# Patient Record
Sex: FEMALE | Race: BLACK OR AFRICAN AMERICAN | Employment: OTHER | URBAN - METROPOLITAN AREA
[De-identification: names, ages, dates, MRNs, and addresses within clinical notes are randomized per-mention and may not be internally consistent; named-entity substitution may affect disease eponyms.]

---

## 2017-01-27 PROBLEM — J45.909 UNCOMPLICATED ASTHMA: Status: ACTIVE | Noted: 2017-01-27

## 2017-01-27 PROBLEM — E55.9 VITAMIN D DEFICIENCY: Status: ACTIVE | Noted: 2017-01-27

## 2017-01-27 PROBLEM — F31.9 BIPOLAR AFFECTIVE DISORDER (HCC): Status: ACTIVE | Noted: 2017-01-27

## 2017-01-27 PROBLEM — E53.8 B12 DEFICIENCY: Status: ACTIVE | Noted: 2017-01-27

## 2017-01-27 PROBLEM — F43.10 PTSD (POST-TRAUMATIC STRESS DISORDER): Status: ACTIVE | Noted: 2017-01-27

## 2017-01-27 PROBLEM — D64.9 ANEMIA: Status: ACTIVE | Noted: 2017-01-27

## 2017-01-27 PROBLEM — K59.09 CHRONIC CONSTIPATION: Status: ACTIVE | Noted: 2017-01-27

## 2017-01-27 PROBLEM — Z91.199 NON-COMPLIANCE WITH TREATMENT: Status: ACTIVE | Noted: 2017-01-27

## 2017-01-27 PROBLEM — K56.7 ILEUS, UNSPECIFIED (HCC): Status: ACTIVE | Noted: 2017-01-27

## 2017-01-27 PROBLEM — E78.5 DYSLIPIDEMIA: Status: ACTIVE | Noted: 2017-01-27

## 2017-04-03 ENCOUNTER — OFFICE VISIT (OUTPATIENT)
Dept: NEUROLOGY | Age: 42
End: 2017-04-03
Payer: COMMERCIAL

## 2017-04-03 VITALS
BODY MASS INDEX: 26.46 KG/M2 | DIASTOLIC BLOOD PRESSURE: 90 MMHG | HEIGHT: 65 IN | SYSTOLIC BLOOD PRESSURE: 131 MMHG | HEART RATE: 70 BPM | WEIGHT: 158.8 LBS

## 2017-04-03 DIAGNOSIS — G40.909 SEIZURE DISORDER (HCC): ICD-10-CM

## 2017-04-03 PROCEDURE — 99204 OFFICE O/P NEW MOD 45 MIN: CPT | Performed by: PSYCHIATRY & NEUROLOGY

## 2017-04-03 RX ORDER — FOLIC ACID/VIT B COMPLEX AND C 400 MCG
1 TABLET ORAL DAILY
COMMUNITY
Start: 2017-03-29 | End: 2017-04-24 | Stop reason: SDUPTHER

## 2017-04-03 RX ORDER — LEVETIRACETAM 500 MG/1
500 TABLET ORAL 2 TIMES DAILY
Qty: 180 TABLET | Refills: 3 | Status: SHIPPED | OUTPATIENT
Start: 2017-04-03 | End: 2018-03-18 | Stop reason: SDUPTHER

## 2017-04-03 RX ORDER — HYDROXYZINE PAMOATE 25 MG/1
1 CAPSULE ORAL NIGHTLY
COMMUNITY
Start: 2017-03-17 | End: 2017-06-21 | Stop reason: ALTCHOICE

## 2017-04-13 ENCOUNTER — HOSPITAL ENCOUNTER (OUTPATIENT)
Dept: MRI IMAGING | Age: 42
Discharge: HOME | End: 2017-04-13
Payer: COMMERCIAL

## 2017-04-13 DIAGNOSIS — G40.909 SEIZURE DISORDER (HCC): ICD-10-CM

## 2017-04-13 PROCEDURE — 70553 MRI BRAIN STEM W/O & W/DYE: CPT

## 2017-04-13 PROCEDURE — 6360000004 HC RX CONTRAST MEDICATION: Performed by: PSYCHIATRY & NEUROLOGY

## 2017-04-13 PROCEDURE — A9579 GAD-BASE MR CONTRAST NOS,1ML: HCPCS | Performed by: PSYCHIATRY & NEUROLOGY

## 2017-04-13 RX ADMIN — GADOPENTETATE DIMEGLUMINE 15 ML: 469.01 INJECTION INTRAVENOUS at 11:30

## 2017-05-04 ENCOUNTER — HOSPITAL ENCOUNTER (OUTPATIENT)
Dept: NEUROLOGY | Age: 42
Discharge: HOME | End: 2017-05-04
Payer: COMMERCIAL

## 2017-05-04 DIAGNOSIS — G40.909 SEIZURE DISORDER (HCC): ICD-10-CM

## 2017-05-04 PROCEDURE — 95816 EEG AWAKE AND DROWSY: CPT

## 2017-05-30 ENCOUNTER — HOSPITAL ENCOUNTER (OUTPATIENT)
Age: 42
Discharge: HOME | End: 2017-05-30
Payer: COMMERCIAL

## 2017-05-30 DIAGNOSIS — R73.9 HYPERGLYCEMIA: ICD-10-CM
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
## 2017-05-30 LAB
ALBUMIN SERPL-MCNC: 4.2 G/DL (ref 3.5–5.2)
ALBUMIN/GLOBULIN RATIO: ABNORMAL (ref 1–2.5)
ALP BLD-CCNC: 78 U/L (ref 35–104)
ALT SERPL-CCNC: 20 U/L (ref 5–33)
ANION GAP SERPL CALCULATED.3IONS-SCNC: 15 MMOL/L (ref 9–17)
AST SERPL-CCNC: 18 U/L
BILIRUB SERPL-MCNC: 0.44 MG/DL (ref 0.3–1.2)
BUN BLDV-MCNC: 8 MG/DL (ref 6–20)
BUN/CREAT BLD: 10 (ref 9–20)
CALCIUM SERPL-MCNC: 9 MG/DL (ref 8.6–10.4)
CHLORIDE BLD-SCNC: 102 MMOL/L (ref 98–107)
CO2: 26 MMOL/L (ref 20–31)
CREAT SERPL-MCNC: 0.79 MG/DL (ref 0.5–0.9)
CREATININE URINE: 297.2 MG/DL (ref 28–217)
GFR AFRICAN AMERICAN: >60 ML/MIN
GFR NON-AFRICAN AMERICAN: >60 ML/MIN
GFR SERPL CREATININE-BSD FRML MDRD: ABNORMAL ML/MIN/{1.73_M2}
GFR SERPL CREATININE-BSD FRML MDRD: ABNORMAL ML/MIN/{1.73_M2}
GLUCOSE BLD-MCNC: 103 MG/DL (ref 70–99)
HCT VFR BLD CALC: 40.8 % (ref 36–46)
HEMOGLOBIN: 13 G/DL (ref 12–16)
MCH RBC QN AUTO: 23.8 PG (ref 26–34)
MCHC RBC AUTO-ENTMCNC: 31.8 G/DL (ref 31–37)
MCV RBC AUTO: 75 FL (ref 80–100)
MICROALBUMIN/CREAT 24H UR: 13 MG/L
MICROALBUMIN/CREAT UR-RTO: 4 MCG/MG CREAT
PDW BLD-RTO: 16 % (ref 11.5–14.5)
PLATELET # BLD: 192 K/UL (ref 130–400)
PMV BLD AUTO: 9.3 FL (ref 6–12)
POTASSIUM SERPL-SCNC: 3.5 MMOL/L (ref 3.7–5.3)
RBC # BLD: 5.44 M/UL (ref 4–5.2)
SODIUM BLD-SCNC: 143 MMOL/L (ref 135–144)
TOTAL PROTEIN: 7.5 G/DL (ref 6.4–8.3)
WBC # BLD: 5.5 K/UL (ref 3.5–11)

## 2017-05-30 PROCEDURE — 82043 UR ALBUMIN QUANTITATIVE: CPT

## 2017-05-30 PROCEDURE — 82570 ASSAY OF URINE CREATININE: CPT

## 2017-05-30 PROCEDURE — 83036 HEMOGLOBIN GLYCOSYLATED A1C: CPT

## 2017-05-30 PROCEDURE — 80053 COMPREHEN METABOLIC PANEL: CPT

## 2017-05-30 PROCEDURE — 85027 COMPLETE CBC AUTOMATED: CPT

## 2017-05-30 PROCEDURE — 36415 COLL VENOUS BLD VENIPUNCTURE: CPT

## 2017-05-31 ENCOUNTER — OFFICE VISIT (OUTPATIENT)
Dept: NEUROLOGY | Age: 42
End: 2017-05-31
Payer: COMMERCIAL

## 2017-05-31 VITALS
WEIGHT: 155.4 LBS | HEART RATE: 61 BPM | BODY MASS INDEX: 24.98 KG/M2 | HEIGHT: 66 IN | DIASTOLIC BLOOD PRESSURE: 97 MMHG | SYSTOLIC BLOOD PRESSURE: 147 MMHG

## 2017-05-31 DIAGNOSIS — R51.9 HEADACHE DISORDER: Primary | ICD-10-CM

## 2017-05-31 LAB
ESTIMATED AVERAGE GLUCOSE: 100 MG/DL
HBA1C MFR BLD: 5.1 % (ref 4–6)

## 2017-05-31 PROCEDURE — 99214 OFFICE O/P EST MOD 30 MIN: CPT | Performed by: PSYCHIATRY & NEUROLOGY

## 2017-05-31 RX ORDER — AMITRIPTYLINE HYDROCHLORIDE 25 MG/1
25 TABLET, FILM COATED ORAL NIGHTLY
Qty: 30 TABLET | Refills: 3 | Status: SHIPPED | OUTPATIENT
Start: 2017-05-31 | End: 2017-07-30 | Stop reason: SDUPTHER

## 2017-06-05 PROBLEM — K56.7 ILEUS, UNSPECIFIED (HCC): Status: RESOLVED | Noted: 2017-01-27 | Resolved: 2017-06-05

## 2017-06-14 ENCOUNTER — HOSPITAL ENCOUNTER (OUTPATIENT)
Dept: MRI IMAGING | Age: 42
Discharge: HOME | End: 2017-06-14
Payer: COMMERCIAL

## 2017-06-14 DIAGNOSIS — R51.9 HEADACHE DISORDER: ICD-10-CM

## 2017-06-14 PROCEDURE — A9579 GAD-BASE MR CONTRAST NOS,1ML: HCPCS | Performed by: PSYCHIATRY & NEUROLOGY

## 2017-06-14 PROCEDURE — 6360000004 HC RX CONTRAST MEDICATION: Performed by: PSYCHIATRY & NEUROLOGY

## 2017-06-14 PROCEDURE — 72156 MRI NECK SPINE W/O & W/DYE: CPT

## 2017-06-14 PROCEDURE — 70551 MRI BRAIN STEM W/O DYE: CPT

## 2017-06-14 RX ADMIN — GADOPENTETATE DIMEGLUMINE 15 ML: 469.01 INJECTION INTRAVENOUS at 13:45

## 2017-06-15 ENCOUNTER — TELEPHONE (OUTPATIENT)
Dept: NEUROLOGY | Age: 42
End: 2017-06-15

## 2017-06-15 DIAGNOSIS — R90.89 ABNORMAL BRAIN MRI: ICD-10-CM

## 2017-06-15 DIAGNOSIS — G93.5 CHIARI MALFORMATION TYPE I (HCC): Primary | ICD-10-CM

## 2017-06-21 PROBLEM — G93.5 CHIARI I MALFORMATION (HCC): Status: ACTIVE | Noted: 2017-06-21

## 2017-07-13 ENCOUNTER — INITIAL CONSULT (OUTPATIENT)
Dept: NEUROSURGERY | Age: 42
End: 2017-07-13
Payer: COMMERCIAL

## 2017-07-13 VITALS
HEART RATE: 68 BPM | HEIGHT: 66 IN | BODY MASS INDEX: 25.88 KG/M2 | SYSTOLIC BLOOD PRESSURE: 118 MMHG | WEIGHT: 161 LBS | DIASTOLIC BLOOD PRESSURE: 84 MMHG

## 2017-07-13 DIAGNOSIS — G93.5 CHIARI I MALFORMATION (HCC): Primary | ICD-10-CM

## 2017-07-13 PROCEDURE — 99203 OFFICE O/P NEW LOW 30 MIN: CPT | Performed by: NEUROLOGICAL SURGERY

## 2017-07-13 RX ORDER — CARVEDILOL 12.5 MG/1
TABLET ORAL
Refills: 0 | COMMUNITY
Start: 2017-07-03 | End: 2017-07-13

## 2017-07-13 ASSESSMENT — VISUAL ACUITY: VA_NORMAL: 1

## 2017-07-30 ENCOUNTER — OFFICE VISIT (OUTPATIENT)
Dept: NEUROLOGY | Age: 42
End: 2017-07-30
Payer: COMMERCIAL

## 2017-07-30 VITALS
WEIGHT: 161.6 LBS | HEIGHT: 66 IN | HEART RATE: 65 BPM | BODY MASS INDEX: 25.97 KG/M2 | DIASTOLIC BLOOD PRESSURE: 102 MMHG | SYSTOLIC BLOOD PRESSURE: 152 MMHG

## 2017-07-30 DIAGNOSIS — G93.5 CHIARI MALFORMATION TYPE I (HCC): Primary | ICD-10-CM

## 2017-07-30 PROCEDURE — 99214 OFFICE O/P EST MOD 30 MIN: CPT | Performed by: PSYCHIATRY & NEUROLOGY

## 2017-07-30 RX ORDER — AMITRIPTYLINE HYDROCHLORIDE 50 MG/1
50 TABLET, FILM COATED ORAL NIGHTLY
Qty: 90 TABLET | Refills: 3 | Status: SHIPPED | OUTPATIENT
Start: 2017-07-30 | End: 2019-01-08 | Stop reason: CLARIF

## 2017-08-07 ENCOUNTER — HOSPITAL ENCOUNTER (EMERGENCY)
Age: 42
Discharge: HOME | End: 2017-08-08
Attending: EMERGENCY MEDICINE
Payer: COMMERCIAL

## 2017-08-07 ENCOUNTER — APPOINTMENT (OUTPATIENT)
Dept: GENERAL RADIOLOGY | Age: 42
End: 2017-08-07
Payer: COMMERCIAL

## 2017-08-07 ENCOUNTER — APPOINTMENT (OUTPATIENT)
Dept: CT IMAGING | Age: 42
End: 2017-08-07
Payer: COMMERCIAL

## 2017-08-07 DIAGNOSIS — R07.89 CHEST WALL PAIN: Primary | ICD-10-CM

## 2017-08-07 LAB
ABSOLUTE EOS #: 0.2 K/UL (ref 0–0.4)
ABSOLUTE LYMPH #: 3.3 K/UL (ref 1–4.8)
ABSOLUTE MONO #: 0.8 K/UL (ref 0.2–0.8)
ANION GAP SERPL CALCULATED.3IONS-SCNC: 16 MMOL/L (ref 9–17)
BASOPHILS # BLD: 3 %
BASOPHILS ABSOLUTE: 0.2 K/UL (ref 0–0.2)
BUN BLDV-MCNC: 16 MG/DL (ref 6–20)
BUN/CREAT BLD: 21 (ref 9–20)
CALCIUM SERPL-MCNC: 9 MG/DL (ref 8.6–10.4)
CHLORIDE BLD-SCNC: 96 MMOL/L (ref 98–107)
CO2: 23 MMOL/L (ref 20–31)
CREAT SERPL-MCNC: 0.77 MG/DL (ref 0.5–0.9)
D-DIMER QUANTITATIVE: 0.56 MG/L FEU
DIFFERENTIAL TYPE: ABNORMAL
EOSINOPHILS RELATIVE PERCENT: 2 %
GFR AFRICAN AMERICAN: >60 ML/MIN
GFR NON-AFRICAN AMERICAN: >60 ML/MIN
GFR SERPL CREATININE-BSD FRML MDRD: ABNORMAL ML/MIN/{1.73_M2}
GFR SERPL CREATININE-BSD FRML MDRD: ABNORMAL ML/MIN/{1.73_M2}
GLUCOSE BLD-MCNC: 105 MG/DL (ref 70–99)
HCT VFR BLD CALC: 38.4 % (ref 36–46)
HEMOGLOBIN: 12.3 G/DL (ref 12–16)
LYMPHOCYTES # BLD: 44 %
MCH RBC QN AUTO: 24.3 PG (ref 26–34)
MCHC RBC AUTO-ENTMCNC: 32 G/DL (ref 31–37)
MCV RBC AUTO: 75.8 FL (ref 80–100)
MONOCYTES # BLD: 10 %
MYOGLOBIN: 22 NG/ML (ref 25–58)
PDW BLD-RTO: 17.1 % (ref 11.5–14.5)
PLATELET # BLD: 195 K/UL (ref 130–400)
PLATELET ESTIMATE: ABNORMAL
PMV BLD AUTO: 9.2 FL (ref 6–12)
POTASSIUM SERPL-SCNC: 4.1 MMOL/L (ref 3.7–5.3)
RBC # BLD: 5.07 M/UL (ref 4–5.2)
RBC # BLD: ABNORMAL 10*6/UL
SEG NEUTROPHILS: 41 %
SEGMENTED NEUTROPHILS ABSOLUTE COUNT: 3.1 K/UL (ref 1.8–7.7)
SODIUM BLD-SCNC: 135 MMOL/L (ref 135–144)
TROPONIN INTERP: ABNORMAL
TROPONIN T: <0.03 NG/ML
WBC # BLD: 7.5 K/UL (ref 3.5–11)
WBC # BLD: ABNORMAL 10*3/UL

## 2017-08-07 PROCEDURE — 85025 COMPLETE CBC W/AUTO DIFF WBC: CPT

## 2017-08-07 PROCEDURE — 6360000002 HC RX W HCPCS: Performed by: PHYSICIAN ASSISTANT

## 2017-08-07 PROCEDURE — 83874 ASSAY OF MYOGLOBIN: CPT

## 2017-08-07 PROCEDURE — 99285 EMERGENCY DEPT VISIT HI MDM: CPT

## 2017-08-07 PROCEDURE — 93005 ELECTROCARDIOGRAM TRACING: CPT

## 2017-08-07 PROCEDURE — 96374 THER/PROPH/DIAG INJ IV PUSH: CPT

## 2017-08-07 PROCEDURE — 6370000000 HC RX 637 (ALT 250 FOR IP): Performed by: PHYSICIAN ASSISTANT

## 2017-08-07 PROCEDURE — 71020 XR CHEST STANDARD TWO VW: CPT

## 2017-08-07 PROCEDURE — 6360000004 HC RX CONTRAST MEDICATION: Performed by: PHYSICIAN ASSISTANT

## 2017-08-07 PROCEDURE — 80048 BASIC METABOLIC PNL TOTAL CA: CPT

## 2017-08-07 PROCEDURE — 85379 FIBRIN DEGRADATION QUANT: CPT

## 2017-08-07 PROCEDURE — 71260 CT THORAX DX C+: CPT

## 2017-08-07 PROCEDURE — 84484 ASSAY OF TROPONIN QUANT: CPT

## 2017-08-07 RX ORDER — ASPIRIN 81 MG/1
324 TABLET, CHEWABLE ORAL ONCE
Status: COMPLETED | OUTPATIENT
Start: 2017-08-07 | End: 2017-08-07

## 2017-08-07 RX ORDER — KETOROLAC TROMETHAMINE 30 MG/ML
30 INJECTION, SOLUTION INTRAMUSCULAR; INTRAVENOUS ONCE
Status: COMPLETED | OUTPATIENT
Start: 2017-08-07 | End: 2017-08-07

## 2017-08-07 RX ADMIN — KETOROLAC TROMETHAMINE 30 MG: 30 INJECTION, SOLUTION INTRAMUSCULAR; INTRAVENOUS at 23:16

## 2017-08-07 RX ADMIN — ASPIRIN 81 MG 324 MG: 81 TABLET ORAL at 23:15

## 2017-08-07 ASSESSMENT — PAIN DESCRIPTION - FREQUENCY: FREQUENCY: CONTINUOUS

## 2017-08-07 ASSESSMENT — PAIN DESCRIPTION - ORIENTATION: ORIENTATION: RIGHT

## 2017-08-07 ASSESSMENT — PAIN DESCRIPTION - DESCRIPTORS: DESCRIPTORS: STABBING

## 2017-08-07 ASSESSMENT — PAIN DESCRIPTION - PAIN TYPE: TYPE: ACUTE PAIN

## 2017-08-07 ASSESSMENT — PAIN DESCRIPTION - LOCATION: LOCATION: CHEST

## 2017-08-07 ASSESSMENT — PAIN SCALES - GENERAL: PAINLEVEL_OUTOF10: 9

## 2017-08-08 VITALS
TEMPERATURE: 98.2 F | RESPIRATION RATE: 12 BRPM | OXYGEN SATURATION: 98 % | HEIGHT: 66 IN | HEART RATE: 83 BPM | WEIGHT: 161 LBS | BODY MASS INDEX: 25.88 KG/M2 | DIASTOLIC BLOOD PRESSURE: 85 MMHG | SYSTOLIC BLOOD PRESSURE: 145 MMHG

## 2017-08-08 PROCEDURE — 6360000004 HC RX CONTRAST MEDICATION: Performed by: PHYSICIAN ASSISTANT

## 2017-08-08 PROCEDURE — 6370000000 HC RX 637 (ALT 250 FOR IP): Performed by: PHYSICIAN ASSISTANT

## 2017-08-08 RX ORDER — OXYCODONE HYDROCHLORIDE AND ACETAMINOPHEN 5; 325 MG/1; MG/1
1-2 TABLET ORAL EVERY 6 HOURS PRN
Qty: 20 TABLET | Refills: 0 | Status: SHIPPED | OUTPATIENT
Start: 2017-08-08 | End: 2017-11-04

## 2017-08-08 RX ORDER — CYCLOBENZAPRINE HCL 10 MG
10 TABLET ORAL 3 TIMES DAILY PRN
Qty: 30 TABLET | Refills: 0 | Status: SHIPPED | OUTPATIENT
Start: 2017-08-08 | End: 2017-08-18

## 2017-08-08 RX ORDER — NAPROXEN 500 MG/1
500 TABLET ORAL 2 TIMES DAILY WITH MEALS
Qty: 20 TABLET | Refills: 0 | Status: ON HOLD | OUTPATIENT
Start: 2017-08-08 | End: 2018-01-16

## 2017-08-08 RX ORDER — OXYCODONE HYDROCHLORIDE AND ACETAMINOPHEN 5; 325 MG/1; MG/1
1 TABLET ORAL ONCE
Status: COMPLETED | OUTPATIENT
Start: 2017-08-08 | End: 2017-08-08

## 2017-08-08 RX ADMIN — OXYCODONE HYDROCHLORIDE AND ACETAMINOPHEN 1 TABLET: 5; 325 TABLET ORAL at 01:16

## 2017-08-08 RX ADMIN — IOVERSOL 100 ML: 741 INJECTION INTRA-ARTERIAL; INTRAVENOUS at 00:00

## 2017-08-09 LAB
EKG ATRIAL RATE: 87 BPM
EKG P AXIS: 62 DEGREES
EKG P-R INTERVAL: 188 MS
EKG Q-T INTERVAL: 376 MS
EKG QRS DURATION: 68 MS
EKG QTC CALCULATION (BAZETT): 452 MS
EKG R AXIS: -22 DEGREES
EKG T AXIS: 28 DEGREES
EKG VENTRICULAR RATE: 87 BPM

## 2017-08-23 ENCOUNTER — OFFICE VISIT (OUTPATIENT)
Dept: FAMILY MEDICINE CLINIC | Age: 42
End: 2017-08-23
Payer: COMMERCIAL

## 2017-08-23 ENCOUNTER — TELEPHONE (OUTPATIENT)
Dept: FAMILY MEDICINE CLINIC | Age: 42
End: 2017-08-23

## 2017-08-23 VITALS
SYSTOLIC BLOOD PRESSURE: 120 MMHG | DIASTOLIC BLOOD PRESSURE: 80 MMHG | BODY MASS INDEX: 25.84 KG/M2 | WEIGHT: 160.8 LBS | HEART RATE: 67 BPM | HEIGHT: 66 IN

## 2017-08-23 DIAGNOSIS — H10.13 ALLERGIC CONJUNCTIVITIS, BILATERAL: ICD-10-CM

## 2017-08-23 DIAGNOSIS — M54.2 NECK PAIN: ICD-10-CM

## 2017-08-23 DIAGNOSIS — M25.511 RIGHT SHOULDER PAIN, UNSPECIFIED CHRONICITY: ICD-10-CM

## 2017-08-23 DIAGNOSIS — H00.012 HORDEOLUM EXTERNUM OF RIGHT LOWER EYELID: ICD-10-CM

## 2017-08-23 DIAGNOSIS — J30.9 ALLERGIC RHINITIS, UNSPECIFIED ALLERGIC RHINITIS TRIGGER, UNSPECIFIED RHINITIS SEASONALITY: Primary | ICD-10-CM

## 2017-08-23 PROCEDURE — 99213 OFFICE O/P EST LOW 20 MIN: CPT | Performed by: FAMILY MEDICINE

## 2017-08-23 RX ORDER — PREDNISONE 10 MG/1
10 TABLET ORAL
Qty: 15 TABLET | Refills: 0 | Status: SHIPPED | OUTPATIENT
Start: 2017-08-23 | End: 2017-08-28

## 2017-08-23 RX ORDER — CEPHALEXIN 250 MG/1
250 CAPSULE ORAL 2 TIMES DAILY
Qty: 14 CAPSULE | Refills: 0 | Status: SHIPPED | OUTPATIENT
Start: 2017-08-23 | End: 2017-09-05 | Stop reason: ALTCHOICE

## 2017-08-23 RX ORDER — KETOTIFEN FUMARATE 0.35 MG/ML
1 SOLUTION/ DROPS OPHTHALMIC 2 TIMES DAILY
Qty: 1 ML | Refills: 3 | Status: SHIPPED | OUTPATIENT
Start: 2017-08-23 | End: 2017-09-02

## 2017-08-23 ASSESSMENT — ENCOUNTER SYMPTOMS
BLOOD IN STOOL: 0
RECTAL PAIN: 0
ANAL BLEEDING: 0
EYE REDNESS: 0
RHINORRHEA: 1
VOICE CHANGE: 0
EYE PAIN: 0
TROUBLE SWALLOWING: 0
ABDOMINAL PAIN: 0
ABDOMINAL DISTENTION: 0
CONSTIPATION: 0
EYE DISCHARGE: 1
BACK PAIN: 0
DIARRHEA: 0
SINUS PRESSURE: 0
CHEST TIGHTNESS: 0
NAUSEA: 0
VOMITING: 0
SHORTNESS OF BREATH: 0
COLOR CHANGE: 0
COUGH: 1

## 2017-09-05 ENCOUNTER — APPOINTMENT (OUTPATIENT)
Dept: GENERAL RADIOLOGY | Age: 42
End: 2017-09-05
Payer: COMMERCIAL

## 2017-09-05 ENCOUNTER — HOSPITAL ENCOUNTER (EMERGENCY)
Age: 42
Discharge: HOME | End: 2017-09-05
Attending: EMERGENCY MEDICINE
Payer: COMMERCIAL

## 2017-09-05 VITALS
BODY MASS INDEX: 25.5 KG/M2 | OXYGEN SATURATION: 99 % | DIASTOLIC BLOOD PRESSURE: 69 MMHG | SYSTOLIC BLOOD PRESSURE: 113 MMHG | WEIGHT: 158 LBS | TEMPERATURE: 98.3 F | RESPIRATION RATE: 18 BRPM | HEART RATE: 80 BPM

## 2017-09-05 DIAGNOSIS — J40 BRONCHITIS: ICD-10-CM

## 2017-09-05 DIAGNOSIS — G40.909 SEIZURE DISORDER (HCC): Primary | ICD-10-CM

## 2017-09-05 LAB
ABSOLUTE EOS #: 0.1 K/UL (ref 0–0.4)
ABSOLUTE LYMPH #: 2.4 K/UL (ref 1–4.8)
ABSOLUTE MONO #: 0.5 K/UL (ref 0.2–0.8)
ANION GAP SERPL CALCULATED.3IONS-SCNC: 14 MMOL/L (ref 9–17)
BASOPHILS # BLD: 2 %
BASOPHILS ABSOLUTE: 0.2 K/UL (ref 0–0.2)
BUN BLDV-MCNC: 11 MG/DL (ref 6–20)
BUN/CREAT BLD: 18 (ref 9–20)
CALCIUM SERPL-MCNC: 8.3 MG/DL (ref 8.6–10.4)
CHLORIDE BLD-SCNC: 104 MMOL/L (ref 98–107)
CO2: 20 MMOL/L (ref 20–31)
CREAT SERPL-MCNC: 0.6 MG/DL (ref 0.5–0.9)
DIFFERENTIAL TYPE: ABNORMAL
EOSINOPHILS RELATIVE PERCENT: 2 %
GFR AFRICAN AMERICAN: >60 ML/MIN
GFR NON-AFRICAN AMERICAN: >60 ML/MIN
GFR SERPL CREATININE-BSD FRML MDRD: ABNORMAL ML/MIN/{1.73_M2}
GFR SERPL CREATININE-BSD FRML MDRD: ABNORMAL ML/MIN/{1.73_M2}
GLUCOSE BLD-MCNC: 118 MG/DL (ref 70–99)
HCT VFR BLD CALC: 34.4 % (ref 36–46)
HEMOGLOBIN: 11.6 G/DL (ref 12–16)
KEPPRA: 14 UG/ML
LYMPHOCYTES # BLD: 29 %
MCH RBC QN AUTO: 25.6 PG (ref 26–34)
MCHC RBC AUTO-ENTMCNC: 33.7 G/DL (ref 31–37)
MCV RBC AUTO: 75.9 FL (ref 80–100)
MONOCYTES # BLD: 6 %
PDW BLD-RTO: 16.3 % (ref 11.5–14.5)
PLATELET # BLD: 174 K/UL (ref 130–400)
PLATELET ESTIMATE: ABNORMAL
PMV BLD AUTO: ABNORMAL FL (ref 6–12)
POTASSIUM SERPL-SCNC: 4 MMOL/L (ref 3.7–5.3)
RBC # BLD: 4.53 M/UL (ref 4–5.2)
RBC # BLD: ABNORMAL 10*6/UL
SEG NEUTROPHILS: 61 %
SEGMENTED NEUTROPHILS ABSOLUTE COUNT: 5.1 K/UL (ref 1.8–7.7)
SODIUM BLD-SCNC: 138 MMOL/L (ref 135–144)
WBC # BLD: 8.3 K/UL (ref 3.5–11)
WBC # BLD: ABNORMAL 10*3/UL

## 2017-09-05 PROCEDURE — 2580000003 HC RX 258: Performed by: EMERGENCY MEDICINE

## 2017-09-05 PROCEDURE — 87040 BLOOD CULTURE FOR BACTERIA: CPT

## 2017-09-05 PROCEDURE — 6360000002 HC RX W HCPCS: Performed by: EMERGENCY MEDICINE

## 2017-09-05 PROCEDURE — 36415 COLL VENOUS BLD VENIPUNCTURE: CPT

## 2017-09-05 PROCEDURE — 80048 BASIC METABOLIC PNL TOTAL CA: CPT

## 2017-09-05 PROCEDURE — 85025 COMPLETE CBC W/AUTO DIFF WBC: CPT

## 2017-09-05 PROCEDURE — 96365 THER/PROPH/DIAG IV INF INIT: CPT

## 2017-09-05 PROCEDURE — 80177 DRUG SCRN QUAN LEVETIRACETAM: CPT

## 2017-09-05 PROCEDURE — 71010 XR CHEST PORTABLE: CPT

## 2017-09-05 PROCEDURE — 6370000000 HC RX 637 (ALT 250 FOR IP): Performed by: EMERGENCY MEDICINE

## 2017-09-05 PROCEDURE — 99285 EMERGENCY DEPT VISIT HI MDM: CPT

## 2017-09-05 RX ORDER — AMOXICILLIN AND CLAVULANATE POTASSIUM 875; 125 MG/1; MG/1
1 TABLET, FILM COATED ORAL 2 TIMES DAILY
Qty: 20 TABLET | Refills: 0 | Status: SHIPPED | OUTPATIENT
Start: 2017-09-05 | End: 2017-09-15

## 2017-09-05 RX ORDER — BENZONATATE 100 MG/1
100 CAPSULE ORAL 3 TIMES DAILY PRN
Qty: 30 CAPSULE | Refills: 0 | Status: SHIPPED | OUTPATIENT
Start: 2017-09-05 | End: 2017-09-12

## 2017-09-05 RX ORDER — HYDROCODONE BITARTRATE AND ACETAMINOPHEN 5; 325 MG/1; MG/1
1 TABLET ORAL EVERY 6 HOURS PRN
Qty: 10 TABLET | Refills: 0 | Status: SHIPPED | OUTPATIENT
Start: 2017-09-05 | End: 2017-09-12

## 2017-09-05 RX ORDER — HYDROCODONE BITARTRATE AND ACETAMINOPHEN 5; 325 MG/1; MG/1
1 TABLET ORAL EVERY 6 HOURS PRN
Status: DISCONTINUED | OUTPATIENT
Start: 2017-09-05 | End: 2017-09-05 | Stop reason: HOSPADM

## 2017-09-05 RX ADMIN — HYDROCODONE BITARTRATE AND ACETAMINOPHEN 1 TABLET: 5; 325 TABLET ORAL at 14:59

## 2017-09-05 RX ADMIN — LEVETIRACETAM 500 MG: 100 INJECTION, SOLUTION INTRAVENOUS at 16:07

## 2017-09-05 ASSESSMENT — ENCOUNTER SYMPTOMS
SHORTNESS OF BREATH: 1
COUGH: 1
SINUS PRESSURE: 1
DIARRHEA: 0
GASTROINTESTINAL NEGATIVE: 1
SORE THROAT: 1
APNEA: 0

## 2017-09-05 ASSESSMENT — PAIN SCALES - GENERAL
PAINLEVEL_OUTOF10: 8
PAINLEVEL_OUTOF10: 6
PAINLEVEL_OUTOF10: 8

## 2017-09-11 LAB
CULTURE: NORMAL
Lab: NORMAL
Lab: NORMAL
SPECIMEN DESCRIPTION: NORMAL
STATUS: NORMAL
STATUS: NORMAL

## 2017-11-04 ENCOUNTER — APPOINTMENT (OUTPATIENT)
Dept: GENERAL RADIOLOGY | Age: 42
End: 2017-11-04
Payer: COMMERCIAL

## 2017-11-04 ENCOUNTER — APPOINTMENT (OUTPATIENT)
Dept: CT IMAGING | Age: 42
End: 2017-11-04
Payer: COMMERCIAL

## 2017-11-04 ENCOUNTER — HOSPITAL ENCOUNTER (EMERGENCY)
Age: 42
Discharge: HOME | End: 2017-11-04
Attending: EMERGENCY MEDICINE
Payer: COMMERCIAL

## 2017-11-04 VITALS
SYSTOLIC BLOOD PRESSURE: 136 MMHG | DIASTOLIC BLOOD PRESSURE: 95 MMHG | WEIGHT: 160 LBS | BODY MASS INDEX: 26.66 KG/M2 | OXYGEN SATURATION: 100 % | HEART RATE: 65 BPM | TEMPERATURE: 97.7 F | RESPIRATION RATE: 18 BRPM | HEIGHT: 65 IN

## 2017-11-04 DIAGNOSIS — S43.401A SPRAIN OF RIGHT SHOULDER, UNSPECIFIED SHOULDER SPRAIN TYPE, INITIAL ENCOUNTER: ICD-10-CM

## 2017-11-04 DIAGNOSIS — R56.9 SEIZURE (HCC): Primary | ICD-10-CM

## 2017-11-04 DIAGNOSIS — A59.03 TRICHOMONIASIS OF BLADDER: ICD-10-CM

## 2017-11-04 LAB
-: ABNORMAL
ABSOLUTE EOS #: 0.13 K/UL (ref 0–0.4)
ABSOLUTE IMMATURE GRANULOCYTE: ABNORMAL K/UL (ref 0–0.3)
ABSOLUTE LYMPH #: 3.4 K/UL (ref 1–4.8)
ABSOLUTE MONO #: 0.69 K/UL (ref 0.2–0.8)
AMORPHOUS: ABNORMAL
AMPHETAMINE SCREEN URINE: NEGATIVE
ANION GAP SERPL CALCULATED.3IONS-SCNC: 12 MMOL/L (ref 9–17)
BACTERIA: ABNORMAL
BARBITURATE SCREEN URINE: NEGATIVE
BASOPHILS # BLD: 1 %
BASOPHILS ABSOLUTE: 0.06 K/UL (ref 0–0.2)
BENZODIAZEPINE SCREEN, URINE: NEGATIVE
BILIRUBIN URINE: NEGATIVE
BUN BLDV-MCNC: 11 MG/DL (ref 6–20)
BUN/CREAT BLD: 15 (ref 9–20)
BUPRENORPHINE URINE: ABNORMAL
CALCIUM SERPL-MCNC: 8.5 MG/DL (ref 8.6–10.4)
CANNABINOID SCREEN URINE: POSITIVE
CASTS UA: ABNORMAL /LPF
CHLORIDE BLD-SCNC: 105 MMOL/L (ref 98–107)
CO2: 20 MMOL/L (ref 20–31)
COCAINE METABOLITE, URINE: NEGATIVE
COLOR: YELLOW
COMMENT UA: ABNORMAL
CREAT SERPL-MCNC: 0.71 MG/DL (ref 0.5–0.9)
CRYSTALS, UA: ABNORMAL /HPF
DIFFERENTIAL TYPE: ABNORMAL
EKG ATRIAL RATE: 59 BPM
EKG P AXIS: 48 DEGREES
EKG P-R INTERVAL: 216 MS
EKG Q-T INTERVAL: 452 MS
EKG QRS DURATION: 78 MS
EKG QTC CALCULATION (BAZETT): 447 MS
EKG R AXIS: -10 DEGREES
EKG T AXIS: 1 DEGREES
EKG VENTRICULAR RATE: 59 BPM
EOSINOPHILS RELATIVE PERCENT: 2 %
EPITHELIAL CELLS UA: ABNORMAL /HPF
ETHANOL PERCENT: <0.01 %
ETHANOL: <10 MG/DL
GFR AFRICAN AMERICAN: >60 ML/MIN
GFR NON-AFRICAN AMERICAN: >60 ML/MIN
GFR SERPL CREATININE-BSD FRML MDRD: ABNORMAL ML/MIN/{1.73_M2}
GFR SERPL CREATININE-BSD FRML MDRD: ABNORMAL ML/MIN/{1.73_M2}
GLUCOSE BLD-MCNC: 96 MG/DL (ref 70–99)
GLUCOSE URINE: NEGATIVE
HCT VFR BLD CALC: 37.9 % (ref 36–46)
HEMOGLOBIN: 12.1 G/DL (ref 12–16)
IMMATURE GRANULOCYTES: ABNORMAL %
KEPPRA: 8 UG/ML
KETONES, URINE: NEGATIVE
LEUKOCYTE ESTERASE, URINE: ABNORMAL
LYMPHOCYTES # BLD: 54 %
MCH RBC QN AUTO: 24.8 PG (ref 26–34)
MCHC RBC AUTO-ENTMCNC: 31.9 G/DL (ref 31–37)
MCV RBC AUTO: 77.6 FL (ref 80–100)
MDMA URINE: ABNORMAL
METHADONE SCREEN, URINE: NEGATIVE
METHAMPHETAMINE, URINE: ABNORMAL
MONOCYTES # BLD: 11 %
MORPHOLOGY: ABNORMAL
MUCUS: ABNORMAL
NITRITE, URINE: NEGATIVE
OPIATES, URINE: NEGATIVE
OTHER OBSERVATIONS UA: ABNORMAL
OXYCODONE SCREEN URINE: NEGATIVE
PDW BLD-RTO: 15.7 % (ref 11.5–14.5)
PH UA: 6 (ref 5–8)
PHENCYCLIDINE, URINE: NEGATIVE
PLATELET # BLD: 184 K/UL (ref 130–400)
PLATELET ESTIMATE: ABNORMAL
PMV BLD AUTO: 10.4 FL (ref 6–12)
POTASSIUM SERPL-SCNC: 3.9 MMOL/L (ref 3.7–5.3)
PROPOXYPHENE, URINE: ABNORMAL
PROTEIN UA: NEGATIVE
RBC # BLD: 4.88 M/UL (ref 4–5.2)
RBC # BLD: ABNORMAL 10*6/UL
RBC UA: ABNORMAL /HPF (ref 0–2)
RENAL EPITHELIAL, UA: ABNORMAL /HPF
SEG NEUTROPHILS: 32 %
SEGMENTED NEUTROPHILS ABSOLUTE COUNT: 2.02 K/UL (ref 1.8–7.7)
SODIUM BLD-SCNC: 137 MMOL/L (ref 135–144)
SPECIFIC GRAVITY UA: 1.02 (ref 1–1.03)
TEST INFORMATION: ABNORMAL
TRICHOMONAS: POSITIVE
TRICYCLIC ANTIDEPRESSANTS, UR: ABNORMAL
TURBIDITY: ABNORMAL
URINE HGB: NEGATIVE
UROBILINOGEN, URINE: NORMAL
WBC # BLD: 6.3 K/UL (ref 3.5–11)
WBC # BLD: ABNORMAL 10*3/UL
WBC UA: ABNORMAL /HPF (ref 0–5)
YEAST: ABNORMAL

## 2017-11-04 PROCEDURE — 6370000000 HC RX 637 (ALT 250 FOR IP): Performed by: EMERGENCY MEDICINE

## 2017-11-04 PROCEDURE — 85025 COMPLETE CBC W/AUTO DIFF WBC: CPT

## 2017-11-04 PROCEDURE — 93005 ELECTROCARDIOGRAM TRACING: CPT

## 2017-11-04 PROCEDURE — 81001 URINALYSIS AUTO W/SCOPE: CPT

## 2017-11-04 PROCEDURE — 80048 BASIC METABOLIC PNL TOTAL CA: CPT

## 2017-11-04 PROCEDURE — 73030 X-RAY EXAM OF SHOULDER: CPT

## 2017-11-04 PROCEDURE — 6360000002 HC RX W HCPCS: Performed by: EMERGENCY MEDICINE

## 2017-11-04 PROCEDURE — G0480 DRUG TEST DEF 1-7 CLASSES: HCPCS

## 2017-11-04 PROCEDURE — 70450 CT HEAD/BRAIN W/O DYE: CPT

## 2017-11-04 PROCEDURE — 80177 DRUG SCRN QUAN LEVETIRACETAM: CPT

## 2017-11-04 PROCEDURE — 96365 THER/PROPH/DIAG IV INF INIT: CPT

## 2017-11-04 PROCEDURE — 2580000003 HC RX 258: Performed by: EMERGENCY MEDICINE

## 2017-11-04 PROCEDURE — 96361 HYDRATE IV INFUSION ADD-ON: CPT

## 2017-11-04 PROCEDURE — 99285 EMERGENCY DEPT VISIT HI MDM: CPT

## 2017-11-04 PROCEDURE — 80307 DRUG TEST PRSMV CHEM ANLYZR: CPT

## 2017-11-04 RX ORDER — ACETAMINOPHEN AND CODEINE PHOSPHATE 300; 30 MG/1; MG/1
1 TABLET ORAL EVERY 4 HOURS PRN
Status: DISCONTINUED | OUTPATIENT
Start: 2017-11-04 | End: 2017-11-05 | Stop reason: HOSPADM

## 2017-11-04 RX ORDER — ACETAMINOPHEN AND CODEINE PHOSPHATE 300; 30 MG/1; MG/1
1 TABLET ORAL 3 TIMES DAILY PRN
Qty: 10 TABLET | Refills: 0 | Status: SHIPPED | OUTPATIENT
Start: 2017-11-04 | End: 2018-01-06

## 2017-11-04 RX ORDER — SODIUM CHLORIDE 9 MG/ML
INJECTION, SOLUTION INTRAVENOUS CONTINUOUS
Status: DISCONTINUED | OUTPATIENT
Start: 2017-11-04 | End: 2017-11-05 | Stop reason: HOSPADM

## 2017-11-04 RX ORDER — METRONIDAZOLE 500 MG/1
500 TABLET ORAL 3 TIMES DAILY
Qty: 30 TABLET | Refills: 0 | Status: SHIPPED | OUTPATIENT
Start: 2017-11-04 | End: 2017-11-14

## 2017-11-04 RX ADMIN — SODIUM CHLORIDE: 9 INJECTION, SOLUTION INTRAVENOUS at 20:01

## 2017-11-04 RX ADMIN — LEVETIRACETAM 500 MG: 100 INJECTION, SOLUTION INTRAVENOUS at 21:32

## 2017-11-04 RX ADMIN — ACETAMINOPHEN AND CODEINE PHOSPHATE 1 TABLET: 300; 30 TABLET ORAL at 23:38

## 2017-11-04 ASSESSMENT — ENCOUNTER SYMPTOMS
GASTROINTESTINAL NEGATIVE: 1
EYES NEGATIVE: 1
ALLERGIC/IMMUNOLOGIC NEGATIVE: 1
RESPIRATORY NEGATIVE: 1

## 2017-11-04 ASSESSMENT — PAIN DESCRIPTION - DESCRIPTORS: DESCRIPTORS: ACHING

## 2017-11-04 ASSESSMENT — PAIN DESCRIPTION - ORIENTATION: ORIENTATION: RIGHT

## 2017-11-04 ASSESSMENT — PAIN DESCRIPTION - FREQUENCY: FREQUENCY: CONTINUOUS

## 2017-11-04 ASSESSMENT — PAIN DESCRIPTION - LOCATION: LOCATION: ARM

## 2017-11-04 ASSESSMENT — PAIN SCALES - GENERAL
PAINLEVEL_OUTOF10: 8
PAINLEVEL_OUTOF10: 10

## 2017-11-04 ASSESSMENT — PAIN DESCRIPTION - PAIN TYPE: TYPE: ACUTE PAIN

## 2017-11-04 NOTE — ED NOTES
Patient is brought in by  at bedside. Patient presents with general weakness and right arm pain with onset 11/2 and seizures 3 on 11/3 and 3 today. Patient states that she has history of grand mal seizures and takes keppra daily.  makes movement that patient may not be complaint with medications. Patient is alert and oriented bu drowsy. Patient denies any chest pain or sob at this time and does not appear to be in distress. Patient states that she had appt with dr Jeremiah Carty on 11/3 but missed it d/t seizure activity.        Carol Ann Keating RN  11/04/17 [id number]

## 2017-11-05 NOTE — ED NOTES
Patient in bed sleeping, woke easily and is calling . No signs of seizure since admission.      Holden Minaya RN  11/04/17 [id number]

## 2017-11-05 NOTE — ED NOTES
Pt informed(significant other present in room) that the trichomonas she is being treated for is sexually transmitted  And that sexual partner should seek tx as well     Maria Esther Belcher LPN  25/56/08 5617

## 2017-11-14 DIAGNOSIS — G93.5 CHIARI I MALFORMATION (HCC): ICD-10-CM

## 2017-11-14 DIAGNOSIS — G40.909 SEIZURE DISORDER (HCC): ICD-10-CM

## 2017-11-14 PROBLEM — R51.9 CHRONIC HEADACHE: Status: ACTIVE | Noted: 2017-11-14

## 2017-11-14 PROBLEM — G89.29 CHRONIC HEADACHE: Status: ACTIVE | Noted: 2017-11-14

## 2018-01-06 ENCOUNTER — HOSPITAL ENCOUNTER (EMERGENCY)
Age: 43
Discharge: HOME | End: 2018-01-06
Attending: EMERGENCY MEDICINE
Payer: COMMERCIAL

## 2018-01-06 VITALS
RESPIRATION RATE: 18 BRPM | WEIGHT: 174.25 LBS | TEMPERATURE: 98 F | OXYGEN SATURATION: 96 % | BODY MASS INDEX: 29.03 KG/M2 | HEART RATE: 86 BPM | SYSTOLIC BLOOD PRESSURE: 147 MMHG | DIASTOLIC BLOOD PRESSURE: 90 MMHG | HEIGHT: 65 IN

## 2018-01-06 DIAGNOSIS — J01.90 ACUTE SINUSITIS, RECURRENCE NOT SPECIFIED, UNSPECIFIED LOCATION: Primary | ICD-10-CM

## 2018-01-06 DIAGNOSIS — L02.411 ABSCESSES OF BOTH AXILLAE: ICD-10-CM

## 2018-01-06 DIAGNOSIS — L02.412 ABSCESSES OF BOTH AXILLAE: ICD-10-CM

## 2018-01-06 PROCEDURE — 99282 EMERGENCY DEPT VISIT SF MDM: CPT

## 2018-01-06 RX ORDER — DOXYCYCLINE HYCLATE 100 MG
100 TABLET ORAL 2 TIMES DAILY
Qty: 20 TABLET | Refills: 0 | Status: SHIPPED | OUTPATIENT
Start: 2018-01-06 | End: 2018-01-16

## 2018-01-06 RX ORDER — FLUTICASONE PROPIONATE 50 MCG
1 SPRAY, SUSPENSION (ML) NASAL DAILY
Qty: 1 BOTTLE | Refills: 0 | Status: ON HOLD | OUTPATIENT
Start: 2018-01-06 | End: 2019-04-01

## 2018-01-06 RX ORDER — ACETAMINOPHEN AND CODEINE PHOSPHATE 300; 30 MG/1; MG/1
1 TABLET ORAL EVERY 6 HOURS PRN
Qty: 20 TABLET | Refills: 0 | Status: SHIPPED | OUTPATIENT
Start: 2018-01-06 | End: 2018-01-13

## 2018-01-06 NOTE — ED PROVIDER NOTES
[address] ED  eMERGENCY dEPARTMENT eNCOUnter      Pt Name: Carol Duarte  MRN: [id number]  Estefaníagfmarlen 1975  Date of evaluation: 1/6/2018  Provider: Bruna Campbell NP    CHIEF COMPLAINT       Chief Complaint   Patient presents with    Pharyngitis     past 2 days    Abscess     bilateral axilla past week / drainage noted this am         HISTORY OF PRESENT ILLNESS  (Location/Symptom, Timing/Onset, Context/Setting, Quality, Duration, Modifying Factors, Severity.)   Carol Duarte is a 38 y.o. female who presents to the emergency department via private auto. Reports sinus congestion/drainage/pressure, sore throat, cough, ear pain x3 days. Reports erythematous, raised areas to her bilateral axilla. Onset was one week ago. Reports drainage this morning. Denies fever, chills, SOB. Rates her pain 10/10 at this time. Nursing Notes were reviewed. ALLERGIES     Bee venom; Levaquin [levofloxacin in d5w];  Macrobid [nitrofurantoin monohyd macro]; and Sulfa antibiotics    CURRENT MEDICATIONS       Discharge Medication List as of 1/6/2018  1:08 PM      CONTINUE these medications which have NOT CHANGED    Details   cloNIDine (CATAPRES) 0.2 MG/24HR apply 1 patch every week, Disp-4 patch, R-0Normal      amLODIPine (NORVASC) 10 MG tablet take 1 tablet by mouth once daily, Disp-30 tablet, R-2Normal      lisinopril (PRINIVIL;ZESTRIL) 40 MG tablet take 1 tablet by mouth once daily, Disp-30 tablet, R-2Normal      pantoprazole (PROTONIX) 40 MG tablet take 1 tablet by mouth once daily, Disp-30 tablet, R-3Normal      carvedilol (COREG) 25 MG tablet take 1 tablet by mouth twice a day, Disp-60 tablet, R-3Normal      Cholecalciferol (VITAMIN D) 2000 units CAPS capsule Take 1 capsule by mouth dailyHistorical Med      atorvastatin (LIPITOR) 10 MG tablet take 1 tablet by mouth once daily, Disp-30 tablet, R-3Normal      naproxen (NAPROSYN) 500 MG tablet Take 1 tablet by mouth 2 times daily (with meals), Disp-20 tablet,

## 2018-01-06 NOTE — ED PROVIDER NOTES
The patient was seen and examined by me in conjunction with the mid-level provider. I agree with his/her assessment and treatment plan. The patient will be treated for sinusitis and will also follow-up with her surgeon in regards to the hidradenitis.      Rufino Polo MD  01/06/18 [id number]

## 2018-01-07 ASSESSMENT — ENCOUNTER SYMPTOMS
COLOR CHANGE: 1
SORE THROAT: 1
SHORTNESS OF BREATH: 0
COUGH: 1
RHINORRHEA: 1
VOMITING: 0
NAUSEA: 0
ABDOMINAL PAIN: 0
DIARRHEA: 0
SINUS PRESSURE: 1

## 2018-01-08 ENCOUNTER — OFFICE VISIT (OUTPATIENT)
Dept: FAMILY MEDICINE CLINIC | Age: 43
End: 2018-01-08
Payer: COMMERCIAL

## 2018-01-08 VITALS
DIASTOLIC BLOOD PRESSURE: 66 MMHG | WEIGHT: 176.4 LBS | SYSTOLIC BLOOD PRESSURE: 102 MMHG | BODY MASS INDEX: 29.39 KG/M2 | HEIGHT: 65 IN | HEART RATE: 68 BPM

## 2018-01-08 DIAGNOSIS — G89.29 CHRONIC NONINTRACTABLE HEADACHE, UNSPECIFIED HEADACHE TYPE: ICD-10-CM

## 2018-01-08 DIAGNOSIS — I10 ESSENTIAL HYPERTENSION: ICD-10-CM

## 2018-01-08 DIAGNOSIS — J45.909 UNCOMPLICATED ASTHMA, UNSPECIFIED ASTHMA SEVERITY, UNSPECIFIED WHETHER PERSISTENT: ICD-10-CM

## 2018-01-08 DIAGNOSIS — F43.10 PTSD (POST-TRAUMATIC STRESS DISORDER): ICD-10-CM

## 2018-01-08 DIAGNOSIS — R51.9 CHRONIC NONINTRACTABLE HEADACHE, UNSPECIFIED HEADACHE TYPE: ICD-10-CM

## 2018-01-08 DIAGNOSIS — E55.9 VITAMIN D DEFICIENCY: ICD-10-CM

## 2018-01-08 DIAGNOSIS — E78.5 DYSLIPIDEMIA: ICD-10-CM

## 2018-01-08 DIAGNOSIS — L73.2 HIDRADENITIS AXILLARIS: Primary | ICD-10-CM

## 2018-01-08 DIAGNOSIS — K59.09 CHRONIC CONSTIPATION: ICD-10-CM

## 2018-01-08 DIAGNOSIS — F31.9 BIPOLAR AFFECTIVE DISORDER, REMISSION STATUS UNSPECIFIED (HCC): ICD-10-CM

## 2018-01-08 PROCEDURE — G8427 DOCREV CUR MEDS BY ELIG CLIN: HCPCS | Performed by: FAMILY MEDICINE

## 2018-01-08 PROCEDURE — 4004F PT TOBACCO SCREEN RCVD TLK: CPT | Performed by: FAMILY MEDICINE

## 2018-01-08 PROCEDURE — G8484 FLU IMMUNIZE NO ADMIN: HCPCS | Performed by: FAMILY MEDICINE

## 2018-01-08 PROCEDURE — 99214 OFFICE O/P EST MOD 30 MIN: CPT | Performed by: FAMILY MEDICINE

## 2018-01-08 PROCEDURE — G8417 CALC BMI ABV UP PARAM F/U: HCPCS | Performed by: FAMILY MEDICINE

## 2018-01-08 RX ORDER — PANTOPRAZOLE SODIUM 40 MG/1
TABLET, DELAYED RELEASE ORAL
COMMUNITY
Start: 2017-03-06 | End: 2018-01-08 | Stop reason: SDUPTHER

## 2018-01-08 RX ORDER — ALBUTEROL SULFATE 90 UG/1
AEROSOL, METERED RESPIRATORY (INHALATION)
COMMUNITY
End: 2018-01-08 | Stop reason: SDUPTHER

## 2018-01-08 RX ORDER — LISINOPRIL 40 MG/1
40 TABLET ORAL DAILY
Qty: 30 TABLET | Refills: 2 | Status: SHIPPED | OUTPATIENT
Start: 2018-01-08 | End: 2018-04-15 | Stop reason: SDUPTHER

## 2018-01-08 RX ORDER — ERGOCALCIFEROL (VITAMIN D2) 1250 MCG
CAPSULE ORAL
Status: ON HOLD | COMMUNITY
Start: 2016-12-16 | End: 2018-06-02 | Stop reason: ALTCHOICE

## 2018-01-08 RX ORDER — LUBIPROSTONE 24 UG/1
CAPSULE, GELATIN COATED ORAL
COMMUNITY
Start: 2016-07-26 | End: 2018-01-08 | Stop reason: SDUPTHER

## 2018-01-08 RX ORDER — LUBIPROSTONE 24 UG/1
24 CAPSULE, GELATIN COATED ORAL 2 TIMES DAILY WITH MEALS
Qty: 60 CAPSULE | Refills: 3 | Status: SHIPPED | OUTPATIENT
Start: 2018-01-08 | End: 2019-01-08 | Stop reason: CLARIF

## 2018-01-08 ASSESSMENT — PATIENT HEALTH QUESTIONNAIRE - PHQ9
SUM OF ALL RESPONSES TO PHQ9 QUESTIONS 1 & 2: 0
2. FEELING DOWN, DEPRESSED OR HOPELESS: 0
SUM OF ALL RESPONSES TO PHQ QUESTIONS 1-9: 0
1. LITTLE INTEREST OR PLEASURE IN DOING THINGS: 0

## 2018-01-08 ASSESSMENT — ENCOUNTER SYMPTOMS
COLOR CHANGE: 1
SINUS PRESSURE: 1
RHINORRHEA: 1
EYE DISCHARGE: 0
TROUBLE SWALLOWING: 0
SHORTNESS OF BREATH: 0
EYE REDNESS: 0
DIARRHEA: 0
ANAL BLEEDING: 0
CONSTIPATION: 0
SINUS PAIN: 1
VOMITING: 0
VOICE CHANGE: 0
CHEST TIGHTNESS: 0
COUGH: 0
BLOOD IN STOOL: 0
NAUSEA: 0
ABDOMINAL DISTENTION: 0
RECTAL PAIN: 0
EYE PAIN: 0
BACK PAIN: 0
ABDOMINAL PAIN: 0

## 2018-01-08 NOTE — PROGRESS NOTES
and affect. Assessment:      1. Hidradenitis axillaris     2. Essential hypertension  cloNIDine (CATAPRES) 0.2 MG/24HR    lisinopril (PRINIVIL;ZESTRIL) 40 MG tablet   3. Chronic constipation  lubiprostone (AMITIZA) 24 MCG capsule   4. Dyslipidemia     5. Vitamin D deficiency     6. Uncomplicated asthma, unspecified asthma severity, unspecified whether persistent     7. Bipolar affective disorder, remission status unspecified ([facility]     8. PTSD (post-traumatic stress disorder)     9. Chronic nonintractable headache, unspecified headache type           Plan:      No orders of the defined types were placed in this encounter. Outpatient Encounter Prescriptions as of 1/8/2018   Medication Sig Dispense Refill    lurasidone (LATUDA) 40 MG TABS tablet take 1 tablet by mouth once daily with food AT LEAST 350 CALORIES      ergocalciferol (DRISDOL) 52212 units capsule       cloNIDine (CATAPRES) 0.2 MG/24HR Place 1 patch onto the skin once a week 4 patch 0    lubiprostone (AMITIZA) 24 MCG capsule Take 1 capsule by mouth 2 times daily (with meals) 60 capsule 3    lisinopril (PRINIVIL;ZESTRIL) 40 MG tablet Take 1 tablet by mouth daily 30 tablet 2    doxycycline hyclate (VIBRA-TABS) 100 MG tablet Take 1 tablet by mouth 2 times daily for 10 days 20 tablet 0    acetaminophen-codeine (TYLENOL/CODEINE #3) 300-30 MG per tablet Take 1 tablet by mouth every 6 hours as needed for Pain (WARNING:  May cause drowsiness.  May impair ability to operate vehicles or machinery.  Do not use in combination with alcohol.) for up to 7 days.  20 tablet 0    fluticasone (FLONASE) 50 MCG/ACT nasal spray 1 spray by Nasal route daily 1 Bottle 0    amLODIPine (NORVASC) 10 MG tablet take 1 tablet by mouth once daily 30 tablet 2    pantoprazole (PROTONIX) 40 MG tablet take 1 tablet by mouth once daily 30 tablet 3    carvedilol (COREG) 25 MG tablet take 1 tablet by mouth twice a day 60 tablet 3    Cholecalciferol (VITAMIN D) 2000 units

## 2018-01-15 ENCOUNTER — ANESTHESIA EVENT (OUTPATIENT)
Dept: OPERATING ROOM | Age: 43
End: 2018-01-15
Payer: COMMERCIAL

## 2018-01-16 ENCOUNTER — ANESTHESIA (OUTPATIENT)
Dept: OPERATING ROOM | Age: 43
End: 2018-01-16
Payer: COMMERCIAL

## 2018-01-16 ENCOUNTER — HOSPITAL ENCOUNTER (OUTPATIENT)
Age: 43
Setting detail: OUTPATIENT SURGERY
Discharge: HOME | End: 2018-01-16
Attending: SURGERY | Admitting: SURGERY
Payer: COMMERCIAL

## 2018-01-16 VITALS
OXYGEN SATURATION: 98 % | HEART RATE: 75 BPM | SYSTOLIC BLOOD PRESSURE: 152 MMHG | RESPIRATION RATE: 14 BRPM | HEIGHT: 65 IN | BODY MASS INDEX: 29.22 KG/M2 | TEMPERATURE: 97.5 F | WEIGHT: 175.4 LBS | DIASTOLIC BLOOD PRESSURE: 93 MMHG

## 2018-01-16 VITALS — SYSTOLIC BLOOD PRESSURE: 98 MMHG | DIASTOLIC BLOOD PRESSURE: 58 MMHG | TEMPERATURE: 93.6 F | OXYGEN SATURATION: 100 %

## 2018-01-16 DIAGNOSIS — L73.2 HIDRADENITIS AXILLARIS: Primary | ICD-10-CM

## 2018-01-16 PROCEDURE — 87075 CULTR BACTERIA EXCEPT BLOOD: CPT

## 2018-01-16 PROCEDURE — 7100000010 HC PHASE II RECOVERY - FIRST 15 MIN: Performed by: SURGERY

## 2018-01-16 PROCEDURE — A6403 STERILE GAUZE>16 <= 48 SQ IN: HCPCS | Performed by: SURGERY

## 2018-01-16 PROCEDURE — 87077 CULTURE AEROBIC IDENTIFY: CPT

## 2018-01-16 PROCEDURE — 6360000002 HC RX W HCPCS: Performed by: ANESTHESIOLOGY

## 2018-01-16 PROCEDURE — 7100000000 HC PACU RECOVERY - FIRST 15 MIN: Performed by: SURGERY

## 2018-01-16 PROCEDURE — 87205 SMEAR GRAM STAIN: CPT

## 2018-01-16 PROCEDURE — 2580000003 HC RX 258: Performed by: NURSE ANESTHETIST, CERTIFIED REGISTERED

## 2018-01-16 PROCEDURE — 7100000001 HC PACU RECOVERY - ADDTL 15 MIN: Performed by: SURGERY

## 2018-01-16 PROCEDURE — 87070 CULTURE OTHR SPECIMN AEROBIC: CPT

## 2018-01-16 PROCEDURE — A6222 GAUZE <=16 IN NO W/SAL W/O B: HCPCS | Performed by: SURGERY

## 2018-01-16 PROCEDURE — 3600000012 HC SURGERY LEVEL 2 ADDTL 15MIN: Performed by: SURGERY

## 2018-01-16 PROCEDURE — 87186 SC STD MICRODIL/AGAR DIL: CPT

## 2018-01-16 PROCEDURE — 3700000001 HC ADD 15 MINUTES (ANESTHESIA): Performed by: SURGERY

## 2018-01-16 PROCEDURE — 2500000003 HC RX 250 WO HCPCS: Performed by: NURSE ANESTHETIST, CERTIFIED REGISTERED

## 2018-01-16 PROCEDURE — 6360000002 HC RX W HCPCS: Performed by: NURSE ANESTHETIST, CERTIFIED REGISTERED

## 2018-01-16 PROCEDURE — 3700000000 HC ANESTHESIA ATTENDED CARE: Performed by: SURGERY

## 2018-01-16 PROCEDURE — 2580000003 HC RX 258: Performed by: ANESTHESIOLOGY

## 2018-01-16 PROCEDURE — 3600000002 HC SURGERY LEVEL 2 BASE: Performed by: SURGERY

## 2018-01-16 PROCEDURE — 86403 PARTICLE AGGLUT ANTBDY SCRN: CPT

## 2018-01-16 PROCEDURE — 87176 TISSUE HOMOGENIZATION CULTR: CPT

## 2018-01-16 PROCEDURE — 7100000011 HC PHASE II RECOVERY - ADDTL 15 MIN: Performed by: SURGERY

## 2018-01-16 RX ORDER — PROMETHAZINE HYDROCHLORIDE 25 MG/ML
6.25 INJECTION, SOLUTION INTRAMUSCULAR; INTRAVENOUS
Status: DISCONTINUED | OUTPATIENT
Start: 2018-01-16 | End: 2018-01-16 | Stop reason: HOSPADM

## 2018-01-16 RX ORDER — ONDANSETRON 2 MG/ML
INJECTION INTRAMUSCULAR; INTRAVENOUS PRN
Status: DISCONTINUED | OUTPATIENT
Start: 2018-01-16 | End: 2018-01-16 | Stop reason: SDUPTHER

## 2018-01-16 RX ORDER — HYDROMORPHONE HCL 110MG/55ML
0.5 PATIENT CONTROLLED ANALGESIA SYRINGE INTRAVENOUS EVERY 5 MIN PRN
Status: DISCONTINUED | OUTPATIENT
Start: 2018-01-16 | End: 2018-01-16 | Stop reason: HOSPADM

## 2018-01-16 RX ORDER — KETOROLAC TROMETHAMINE 30 MG/ML
30 INJECTION, SOLUTION INTRAMUSCULAR; INTRAVENOUS ONCE
Status: COMPLETED | OUTPATIENT
Start: 2018-01-16 | End: 2018-01-16

## 2018-01-16 RX ORDER — MIDAZOLAM HYDROCHLORIDE 1 MG/ML
INJECTION INTRAMUSCULAR; INTRAVENOUS PRN
Status: DISCONTINUED | OUTPATIENT
Start: 2018-01-16 | End: 2018-01-16 | Stop reason: SDUPTHER

## 2018-01-16 RX ORDER — ONDANSETRON 2 MG/ML
4 INJECTION INTRAMUSCULAR; INTRAVENOUS
Status: DISCONTINUED | OUTPATIENT
Start: 2018-01-16 | End: 2018-01-16 | Stop reason: HOSPADM

## 2018-01-16 RX ORDER — GREEN TEA/HOODIA GORDONII 315-12.5MG
1 CAPSULE ORAL DAILY
Qty: 10 TABLET | Refills: 0 | Status: SHIPPED | OUTPATIENT
Start: 2018-01-16 | End: 2018-01-26

## 2018-01-16 RX ORDER — SODIUM CHLORIDE, SODIUM LACTATE, POTASSIUM CHLORIDE, CALCIUM CHLORIDE 600; 310; 30; 20 MG/100ML; MG/100ML; MG/100ML; MG/100ML
INJECTION, SOLUTION INTRAVENOUS CONTINUOUS PRN
Status: DISCONTINUED | OUTPATIENT
Start: 2018-01-16 | End: 2018-01-16 | Stop reason: SDUPTHER

## 2018-01-16 RX ORDER — FENTANYL CITRATE 50 UG/ML
25 INJECTION, SOLUTION INTRAMUSCULAR; INTRAVENOUS EVERY 5 MIN PRN
Status: DISCONTINUED | OUTPATIENT
Start: 2018-01-16 | End: 2018-01-16 | Stop reason: HOSPADM

## 2018-01-16 RX ORDER — DOXYCYCLINE HYCLATE 100 MG
100 TABLET ORAL 2 TIMES DAILY
Qty: 14 TABLET | Refills: 0 | Status: SHIPPED | OUTPATIENT
Start: 2018-01-16 | End: 2018-01-23

## 2018-01-16 RX ORDER — DIPHENHYDRAMINE HYDROCHLORIDE 50 MG/ML
25 INJECTION INTRAMUSCULAR; INTRAVENOUS
Status: COMPLETED | OUTPATIENT
Start: 2018-01-16 | End: 2018-01-16

## 2018-01-16 RX ORDER — LIDOCAINE HYDROCHLORIDE 20 MG/ML
INJECTION, SOLUTION INFILTRATION; PERINEURAL PRN
Status: DISCONTINUED | OUTPATIENT
Start: 2018-01-16 | End: 2018-01-16 | Stop reason: SDUPTHER

## 2018-01-16 RX ORDER — OXYCODONE HYDROCHLORIDE AND ACETAMINOPHEN 5; 325 MG/1; MG/1
1 TABLET ORAL EVERY 6 HOURS PRN
Qty: 28 TABLET | Refills: 0 | Status: SHIPPED | OUTPATIENT
Start: 2018-01-16 | End: 2018-01-23

## 2018-01-16 RX ORDER — PROPOFOL 10 MG/ML
INJECTION, EMULSION INTRAVENOUS PRN
Status: DISCONTINUED | OUTPATIENT
Start: 2018-01-16 | End: 2018-01-16 | Stop reason: SDUPTHER

## 2018-01-16 RX ORDER — ASPIRIN 81 MG/1
81 TABLET ORAL DAILY
Qty: 30 TABLET | Refills: 3 | Status: SHIPPED | OUTPATIENT
Start: 2018-01-17 | End: 2019-10-04 | Stop reason: ALTCHOICE

## 2018-01-16 RX ORDER — FENTANYL CITRATE 50 UG/ML
INJECTION, SOLUTION INTRAMUSCULAR; INTRAVENOUS PRN
Status: DISCONTINUED | OUTPATIENT
Start: 2018-01-16 | End: 2018-01-16 | Stop reason: SDUPTHER

## 2018-01-16 RX ORDER — LABETALOL HYDROCHLORIDE 5 MG/ML
5 INJECTION, SOLUTION INTRAVENOUS EVERY 10 MIN PRN
Status: DISCONTINUED | OUTPATIENT
Start: 2018-01-16 | End: 2018-01-16 | Stop reason: HOSPADM

## 2018-01-16 RX ORDER — HYDRALAZINE HYDROCHLORIDE 20 MG/ML
5 INJECTION INTRAMUSCULAR; INTRAVENOUS EVERY 10 MIN PRN
Status: DISCONTINUED | OUTPATIENT
Start: 2018-01-16 | End: 2018-01-16 | Stop reason: HOSPADM

## 2018-01-16 RX ORDER — ROCURONIUM BROMIDE 10 MG/ML
INJECTION, SOLUTION INTRAVENOUS PRN
Status: DISCONTINUED | OUTPATIENT
Start: 2018-01-16 | End: 2018-01-16 | Stop reason: SDUPTHER

## 2018-01-16 RX ORDER — LIDOCAINE HYDROCHLORIDE 10 MG/ML
5 INJECTION, SOLUTION INFILTRATION; PERINEURAL ONCE
Status: DISCONTINUED | OUTPATIENT
Start: 2018-01-16 | End: 2018-01-16 | Stop reason: HOSPADM

## 2018-01-16 RX ORDER — HYDROMORPHONE HCL 110MG/55ML
0.5 PATIENT CONTROLLED ANALGESIA SYRINGE INTRAVENOUS EVERY 5 MIN PRN
Status: COMPLETED | OUTPATIENT
Start: 2018-01-16 | End: 2018-01-16

## 2018-01-16 RX ORDER — SODIUM CHLORIDE, SODIUM LACTATE, POTASSIUM CHLORIDE, CALCIUM CHLORIDE 600; 310; 30; 20 MG/100ML; MG/100ML; MG/100ML; MG/100ML
INJECTION, SOLUTION INTRAVENOUS CONTINUOUS
Status: DISCONTINUED | OUTPATIENT
Start: 2018-01-16 | End: 2018-01-16 | Stop reason: HOSPADM

## 2018-01-16 RX ORDER — CEFAZOLIN SODIUM 1 G/3ML
INJECTION, POWDER, FOR SOLUTION INTRAMUSCULAR; INTRAVENOUS PRN
Status: DISCONTINUED | OUTPATIENT
Start: 2018-01-16 | End: 2018-01-16 | Stop reason: SDUPTHER

## 2018-01-16 RX ADMIN — SODIUM CHLORIDE, POTASSIUM CHLORIDE, SODIUM LACTATE AND CALCIUM CHLORIDE: 600; 310; 30; 20 INJECTION, SOLUTION INTRAVENOUS at 07:37

## 2018-01-16 RX ADMIN — FENTANYL CITRATE 100 MCG: 50 INJECTION, SOLUTION INTRAMUSCULAR; INTRAVENOUS at 09:04

## 2018-01-16 RX ADMIN — HYDROMORPHONE HYDROCHLORIDE 0.5 MG: 2 INJECTION, SOLUTION INTRAMUSCULAR; INTRAVENOUS; SUBCUTANEOUS at 11:00

## 2018-01-16 RX ADMIN — HYDROMORPHONE HYDROCHLORIDE 0.5 MG: 2 INJECTION, SOLUTION INTRAMUSCULAR; INTRAVENOUS; SUBCUTANEOUS at 10:54

## 2018-01-16 RX ADMIN — HYDROMORPHONE HYDROCHLORIDE 0.5 MG: 2 INJECTION, SOLUTION INTRAMUSCULAR; INTRAVENOUS; SUBCUTANEOUS at 10:25

## 2018-01-16 RX ADMIN — MIDAZOLAM HYDROCHLORIDE 2 MG: 1 INJECTION, SOLUTION INTRAMUSCULAR; INTRAVENOUS at 08:57

## 2018-01-16 RX ADMIN — DIPHENHYDRAMINE HYDROCHLORIDE 25 MG: 50 INJECTION, SOLUTION INTRAMUSCULAR; INTRAVENOUS at 11:36

## 2018-01-16 RX ADMIN — LIDOCAINE HYDROCHLORIDE 60 MG: 20 INJECTION, SOLUTION INFILTRATION; PERINEURAL at 09:04

## 2018-01-16 RX ADMIN — ONDANSETRON 4 MG: 2 INJECTION, SOLUTION INTRAMUSCULAR; INTRAVENOUS at 09:21

## 2018-01-16 RX ADMIN — HYDROMORPHONE HYDROCHLORIDE 0.5 MG: 2 INJECTION, SOLUTION INTRAMUSCULAR; INTRAVENOUS; SUBCUTANEOUS at 10:36

## 2018-01-16 RX ADMIN — KETOROLAC TROMETHAMINE 30 MG: 30 INJECTION, SOLUTION INTRAMUSCULAR at 11:38

## 2018-01-16 RX ADMIN — SUGAMMADEX 200 MG: 100 INJECTION, SOLUTION INTRAVENOUS at 09:25

## 2018-01-16 RX ADMIN — ROCURONIUM BROMIDE 30 MG: 10 INJECTION, SOLUTION INTRAVENOUS at 09:04

## 2018-01-16 RX ADMIN — PROPOFOL 180 MG: 10 INJECTION, EMULSION INTRAVENOUS at 09:04

## 2018-01-16 RX ADMIN — CEFAZOLIN 2000 MG: 1 INJECTION, POWDER, FOR SOLUTION INTRAMUSCULAR; INTRAVENOUS at 09:13

## 2018-01-16 RX ADMIN — SODIUM CHLORIDE, POTASSIUM CHLORIDE, SODIUM LACTATE AND CALCIUM CHLORIDE: 600; 310; 30; 20 INJECTION, SOLUTION INTRAVENOUS at 08:57

## 2018-01-16 ASSESSMENT — PULMONARY FUNCTION TESTS
PIF_VALUE: 18
PIF_VALUE: 13
PIF_VALUE: 0
PIF_VALUE: 2
PIF_VALUE: 16
PIF_VALUE: 16
PIF_VALUE: 0
PIF_VALUE: 24
PIF_VALUE: 16
PIF_VALUE: 1
PIF_VALUE: 16
PIF_VALUE: 7
PIF_VALUE: 16
PIF_VALUE: 22
PIF_VALUE: 16
PIF_VALUE: 0
PIF_VALUE: 16
PIF_VALUE: 25
PIF_VALUE: 16
PIF_VALUE: 17
PIF_VALUE: 16
PIF_VALUE: 16
PIF_VALUE: 17
PIF_VALUE: 16
PIF_VALUE: 13
PIF_VALUE: 16
PIF_VALUE: 12
PIF_VALUE: 1
PIF_VALUE: 25
PIF_VALUE: 19
PIF_VALUE: 16
PIF_VALUE: 16
PIF_VALUE: 10

## 2018-01-16 ASSESSMENT — PAIN - FUNCTIONAL ASSESSMENT: PAIN_FUNCTIONAL_ASSESSMENT: 0-10

## 2018-01-16 ASSESSMENT — PAIN DESCRIPTION - LOCATION: LOCATION: ARM

## 2018-01-16 ASSESSMENT — PAIN SCALES - GENERAL
PAINLEVEL_OUTOF10: 8
PAINLEVEL_OUTOF10: 7
PAINLEVEL_OUTOF10: 10
PAINLEVEL_OUTOF10: 5
PAINLEVEL_OUTOF10: 10
PAINLEVEL_OUTOF10: 10
PAINLEVEL_OUTOF10: 5
PAINLEVEL_OUTOF10: 9
PAINLEVEL_OUTOF10: 7
PAINLEVEL_OUTOF10: 9
PAINLEVEL_OUTOF10: 7

## 2018-01-16 ASSESSMENT — PAIN DESCRIPTION - ORIENTATION: ORIENTATION: RIGHT

## 2018-01-16 ASSESSMENT — PAIN DESCRIPTION - DESCRIPTORS
DESCRIPTORS: ACHING;BURNING
DESCRIPTORS: BURNING

## 2018-01-16 ASSESSMENT — PAIN DESCRIPTION - PAIN TYPE: TYPE: SURGICAL PAIN

## 2018-01-16 NOTE — ANESTHESIA PRE PROCEDURE
Department of Anesthesiology  Preprocedure Note       Name:  Tequila Rojas   Age:  38 y.o.  :  1975                                          MRN:  [id number]         Date:  2018      Surgeon: Jorge Luis Ross):  Roseann Cabrales DO    Procedure: Procedure(s):  EXCISION INFECTED CYST BILATERAL    Medications prior to admission:   Prior to Admission medications    Medication Sig Start Date End Date Taking? Authorizing Provider   aspirin EC 81 MG EC tablet Take 1 tablet by mouth daily 18  Yes Gricelda Heard DO   doxycycline hyclate (VIBRA-TABS) 100 MG tablet Take 1 tablet by mouth 2 times daily for 7 days 18 Yes Gricelda Heard DO   Lactobacillus (PROBIOTIC ACIDOPHILUS) TABS Take 1 tablet by mouth daily for 10 days 18 Yes Roseann Cabrales DO   oxyCODONE-acetaminophen (PERCOCET) 5-325 MG per tablet Take 1 tablet by mouth every 6 hours as needed for Pain for up to 7 days.  18 Yes Gricelda Heard DO   lurasidone (LATUDA) 40 MG TABS tablet take 1 tablet by mouth once daily with food AT LEAST 350 CALORIES 6/15/17  Yes Historical Provider, MD   ergocalciferol (DRISDOL) 30658 units capsule  16  Yes Historical Provider, MD   cloNIDine (CATAPRES) 0.2 MG/24HR Place 1 patch onto the skin once a week 18  Yes Kendal Morris MD   lubiprostone (AMITIZA) 24 MCG capsule Take 1 capsule by mouth 2 times daily (with meals) 18  Yes Kendal Morris MD   lisinopril (PRINIVIL;ZESTRIL) 40 MG tablet Take 1 tablet by mouth daily 18  Yes Kendal Morris MD   doxycycline hyclate (VIBRA-TABS) 100 MG tablet Take 1 tablet by mouth 2 times daily for 10 days 18 Yes Marianela Sethi NP   amLODIPine (NORVASC) 10 MG tablet take 1 tablet by mouth once daily 17  Yes Kendal Morris MD   pantoprazole (PROTONIX) 40 MG tablet take 1 tablet by mouth once daily 10/24/17  Yes Kendal Morris MD   carvedilol (COREG) 25 MG tablet take 1 tablet by mouth twice a day 10/2/17  Yes Toy Turpin DO        HYDROmorphone (DILAUDID) injection 0.5 mg  0.5 mg Intravenous Q5 Min PRN Jillian Shorten, DO        [facility]ron [facility]) injection 4 mg  4 mg Intravenous Once PRN Jillian Shorten, DO        promethazine (PHENERGAN) injection 6.25 mg  6.25 mg Intravenous Once PRN Jillian Shorten, DO        labetalol (NORMODYNE;TRANDATE) injection 5 mg  5 mg Intravenous Q10 Min PRN Jillian Shorten, DO        hydrALAZINE (APRESOLINE) injection 5 mg  5 mg Intravenous Q10 Min PRN Jillian Shorten, DO           Allergies:     Allergies   Allergen Reactions    Bee Venom Hives    Levaquin [Levofloxacin In D5w] Hives    Macrobid [Nitrofurantoin Monohyd Macro] Hives    Sulfa Antibiotics Hives       Problem List:    Patient Active Problem List   Diagnosis Code    H/O hysterectomy for benign disease Z90.710    H/O gestational diabetes mellitus, not currently pregnant Z86.32    Borderline diabetes R73.03    FH: diabetes mellitus Z83.3    FH: throat cancer Z80.0    FH: uterine cancer Z80.53    FH: breast cancer Z80.3    Neck pain, chronic M54.2, G89.29    Tobacco abuse disorder Z72.0    Seizure disorder ([facility] G40.909    Non-compliance with treatment Z91.19    Dyslipidemia E72.7    Uncomplicated asthma W58.021    Anemia D64.9    Vitamin D deficiency E55.9    Chronic constipation K59.09    B12 deficiency E53.8    Bipolar affective disorder (HCC) F31.9    PTSD (post-traumatic stress disorder) F43.10    Chiari I malformation (HCC) G93.5    Chronic headache R51       Past Medical History:        Diagnosis Date    Abdominal pain     Anemia     Asthma     Back problem     Bipolar 1 disorder (HCC)     Chest pain     CHF (congestive heart failure) (HCC)     Chicken pox     Chronic mental illness     Depression     Depression     GERD (gastroesophageal reflux disease)     Hair loss     Headache     Hernia     High blood pressure     Hyperlipidemia     Hypertension     Ileus ([facility]     Irregular bowel habits     Irritable bowel syndrome     Nervousness     Pneumonia     Seizures (HCC)     Stroke (cerebrum) (HCC)     TIA (transient ischemic attack)     Weight loss        Past Surgical History:        Procedure Laterality Date    ABSCESS DRAINAGE      abdominal abscess RLQ    AXILLARY SURGERY Right 2018    Excision of hidradenitis cysts, right axilla    BREAST LUMPECTOMY Bilateral     BREAST LUMPECTOMY       SECTION      x2    ENDOMETRIAL ABLATION      HERNIA REPAIR      HYSTERECTOMY      TONSILLECTOMY      TUBAL LIGATION      UMBILICAL HERNIA REPAIR         Social History:    Social History   Substance Use Topics    Smoking status: Current Every Day Smoker     Packs/day: 0.50     Types: Cigarettes    Smokeless tobacco: Never Used      Comment: wants help    Alcohol use Yes      Comment: occ                                Ready to quit: Not Answered  Counseling given: Not Answered      Vital Signs (Current):   Vitals:    18 0806 18 0937 18 0945 18 1000   BP: 129/85 (!) 152/95 (!) 145/90 (!) 154/95   Pulse: 73 79 76 71   Resp:     Temp:  97.9 °F (36.6 °C)     TempSrc:  Temporal     SpO2:  96% 99% 100%   Weight:       Height:                                                  BP Readings from Last 3 Encounters:   18 (!) 154/95   18 102/66   18 (!) 147/90       NPO Status: Time of last liquid consumption: 0                        Time of last solid consumption:                         Date of last liquid consumption: 01/15/18                        Date of last solid food consumption: 01/15/18    BMI:   Wt Readings from Last 3 Encounters:   18 175 lb 6.4 oz (79.6 kg)   18 176 lb 6.4 oz (80 kg)   18 174 lb 4 oz (79 kg)     Body mass index is 29.19 kg/m².     CBC:   Lab Results   Component Value Date    WBC 6.3 2017    RBC 4.88 2017    HGB 12.1 2017    HCT 37.9 2017    MCV

## 2018-01-16 NOTE — PROGRESS NOTES
DR Kimberlee Pulido called and updated on pt now c/o itching al over body. No rash or hives noted. Also c/o pain although restful. New orders received.

## 2018-01-16 NOTE — H&P
10/24/17  Yes Mir Skinner MD   carvedilol (COREG) 25 MG tablet take 1 tablet by mouth twice a day 10/2/17  Yes Mir Skinner MD   Cholecalciferol (VITAMIN D) 2000 units CAPS capsule Take 1 capsule by mouth daily   Yes Historical Provider, MD   atorvastatin (LIPITOR) 10 MG tablet take 1 tablet by mouth once daily 8/8/17  Yes Mir Skinner MD   cetirizine (ZYRTEC) 10 MG tablet Take 1 tablet by mouth daily 6/21/17  Yes Mir Skinner MD   vitamin B and C (VITABEE/C) TABS tablet take 1 tablet by mouth once daily 4/24/17  Yes Mir Skinner MD   escitalopram (LEXAPRO) 10 MG tablet take 1 tablet by mouth once daily 3/17/17  Yes Historical Provider, MD   levETIRAcetam (KEPPRA) 500 MG tablet Take 1 tablet by mouth 2 times daily 4/3/17 1/16/18 Yes Roddy Rosado MD   RA VITAMIN C 500 MG tablet take 1 tablet by mouth once daily 3/2/17  Yes Mir Skinner MD   fluticasone [facility]) 50 MCG/ACT nasal spray 1 spray by Nasal route daily 1/6/18   Bruna Campbell NP   amitriptyline (ELAVIL) 50 MG tablet Take 1 tablet by mouth nightly 7/30/17 11/4/17  Roddy Rosado MD   cyanocobalamin ([facility] VITAMIN B12) 1000 MCG tablet Place under the tongue 11/1/16   Historical Provider, MD   aspirin EC 81 MG EC tablet Take 81 mg by mouth daily    Historical Provider, MD   miconazole (ZEASORB-AF) 2 % powder Apply topically 2 times daily. 5/22/17   Mir Skinner MD   albuterol sulfate HFA (VENTOLIN HFA) 108 (90 BASE) MCG/ACT inhaler Inhale 2 puffs into the lungs every 4 hours as needed for Wheezing 10/11/16   Mir Skinner MD       This is a 38 y.o. female who is pleasant, cooperative, alert and oriented x3, in no acute distress. Heart: Heart sounds are normal.  Regular rate and rhythm without murmur, gallop or rub.    Lungs:clear to auscultation without wheezes or rales  No increased work of breathing, good air exchange, clear to auscultation bilaterally, no crackles or wheezing  Abdomen: soft, nontender, nondistended, no masses menstrual problem, pelvic pain, urgency, vaginal bleeding and vaginal discharge. Musculoskeletal: Negative for arthralgias, back pain, gait problem, joint swelling, myalgias, neck pain and neck stiffness. Skin: Positive for color change and wound. Negative for pallor and rash. Multiple boils to B/L Axilla. Allergic/Immunologic: Negative for environmental allergies, food allergies and immunocompromised state. Neurological: Negative for dizziness, tremors, seizures, syncope, facial asymmetry, speech difficulty, weakness, light-headedness, numbness and headaches. Hematological: Negative for adenopathy. Does not bruise/bleed easily. Psychiatric/Behavioral: Negative for agitation, behavioral problems, confusion, decreased concentration, sleep disturbance and suicidal ideas. The patient is not nervous/anxious.          Objective:   Physical Exam   Constitutional: She is oriented to person, place, and time. She appears well-developed and well-nourished. HENT:   Head: Normocephalic and atraumatic. Right Ear: External ear normal.   Left Ear: External ear normal.   Nose: Nose normal.   Mouth/Throat: Oropharynx is clear and moist. No oropharyngeal exudate. Eyes: Conjunctivae and EOM are normal. Pupils are equal, round, and reactive to light. Right eye exhibits no discharge. Left eye exhibits no discharge. No scleral icterus. Neck: Normal range of motion. Neck supple. No JVD present. No tracheal deviation present. No thyromegaly present. Cardiovascular: Normal rate, regular rhythm, normal heart sounds and intact distal pulses. Exam reveals no gallop and no friction rub. No murmur heard. Pulmonary/Chest: Effort normal and breath sounds normal. No respiratory distress. She has no wheezes. She has no rales. She exhibits no tenderness. Abdominal: Soft. Bowel sounds are normal. She exhibits no distension and no mass. There is no tenderness. There is no rebound and no guarding.    Musculoskeletal:

## 2018-01-16 NOTE — OP NOTE
Operative Note      PATIENT NAME:  [facility] RECORD NO. [id number]                DATE OF PROCEDURE:  1/16/2018  PRIMARY CARE PHYSICIAN:  [facility], MD  PREOPERATIVE DIAGNOSIS:  Recurrent Hidradenitis, right axilla  POSTOPERATIVE DIAGNOSIS:  Same  PROCEDURE PERFORMED:  Excision of hidradenitis cysts, right axilla  SURGEON:  Gricelda Heard DO, FACS  ANESTHESIA:  general  BLOOD LOSS:  <10 ml. SPECIMENS:  Cysts for culture. COMPLICATIONS:  None immediately appreciated. DISCUSSION:  Prosper Bingham is a 38y.o. year old female who presented with signs and symptoms of recurrent hidradenitis, right axilla, unresponsive to antibiotics. After history and physical examination was performed potential diagnostic and therapeutic modalities were discussed with the patient. Operative and nonoperative management was discussed. Risks, complications, benefits were reviewed. She was given the opportunity to ask questions. Once answered an informed consent was obtained. The patient was brought to the operating room on 1/16/2018. PROCEDURE:   The patient received preoperative antibiotic, DVT and GI prophylaxis. She was taken to the operating room, placed on the operative table in the supine position. General anesthetic was administered and the patient was intubated. The operative site was prepped and draped in the usual sterile fashion. Time out was called. An elliptical incision was made, encompassing the infected cysts. Incision taken down through the SQ tissue. Cysts removed intact. The area was checked and hemostasis assured. Area was irrigated and dried, then packed with 1/2 inch iodophor packing, and a bulky dressing was applied   The patient was awakened, extubated, and taken to recovery in stable condition. Instruments, needles, and sponges were counted twice at the end of the procedure and the counts were correct.

## 2018-01-22 LAB
CULTURE: ABNORMAL
DIRECT EXAM: ABNORMAL
DIRECT EXAM: ABNORMAL
Lab: ABNORMAL
ORGANISM: ABNORMAL
SPECIMEN DESCRIPTION: ABNORMAL
SPECIMEN DESCRIPTION: ABNORMAL
STATUS: ABNORMAL

## 2018-01-24 ENCOUNTER — HOSPITAL ENCOUNTER (EMERGENCY)
Age: 43
Discharge: HOME | End: 2018-01-24
Attending: EMERGENCY MEDICINE
Payer: COMMERCIAL

## 2018-01-24 VITALS
HEIGHT: 65 IN | WEIGHT: 175 LBS | HEART RATE: 72 BPM | RESPIRATION RATE: 18 BRPM | TEMPERATURE: 99.1 F | BODY MASS INDEX: 29.16 KG/M2 | DIASTOLIC BLOOD PRESSURE: 100 MMHG | SYSTOLIC BLOOD PRESSURE: 149 MMHG | OXYGEN SATURATION: 98 %

## 2018-01-24 DIAGNOSIS — G89.18 POST-OP PAIN: ICD-10-CM

## 2018-01-24 DIAGNOSIS — R03.0 ELEVATED BLOOD PRESSURE READING: ICD-10-CM

## 2018-01-24 DIAGNOSIS — Z51.89 WOUND CHECK, ABSCESS: Primary | ICD-10-CM

## 2018-01-24 PROCEDURE — 99282 EMERGENCY DEPT VISIT SF MDM: CPT

## 2018-01-24 RX ORDER — CEPHALEXIN 500 MG/1
500 CAPSULE ORAL 4 TIMES DAILY
Qty: 40 CAPSULE | Refills: 0 | Status: SHIPPED | OUTPATIENT
Start: 2018-01-24 | End: 2018-02-03

## 2018-01-24 RX ORDER — OXYCODONE HYDROCHLORIDE AND ACETAMINOPHEN 5; 325 MG/1; MG/1
1-2 TABLET ORAL EVERY 6 HOURS PRN
Qty: 20 TABLET | Refills: 0 | Status: SHIPPED | OUTPATIENT
Start: 2018-01-24 | End: 2018-01-29

## 2018-01-24 RX ORDER — IBUPROFEN 800 MG/1
800 TABLET ORAL EVERY 8 HOURS PRN
Qty: 30 TABLET | Refills: 0 | Status: ON HOLD | OUTPATIENT
Start: 2018-01-24 | End: 2018-06-05 | Stop reason: HOSPADM

## 2018-01-24 ASSESSMENT — PAIN DESCRIPTION - ORIENTATION: ORIENTATION: RIGHT

## 2018-01-24 ASSESSMENT — PAIN DESCRIPTION - DESCRIPTORS: DESCRIPTORS: BURNING;CONSTANT

## 2018-01-24 ASSESSMENT — PAIN SCALES - GENERAL: PAINLEVEL_OUTOF10: 8

## 2018-01-24 ASSESSMENT — PAIN DESCRIPTION - FREQUENCY: FREQUENCY: CONTINUOUS

## 2018-01-24 NOTE — ED PROVIDER NOTES
[facility] ED  eMERGENCY dEPARTMENT eNCOUnter      Pt Name: Blair Blood  MRN: [id number]  [place] 1975  Date of evaluation: 1/24/2018  Provider: Katlyn Chun Dr       Chief Complaint   Patient presents with    Post-op Problem     right axilla pain, lump reoved 1/16/2018    Wound Infection         HISTORY OF PRESENT ILLNESS  (Location/Symptom, Timing/Onset, Context/Setting, Quality, Duration, Modifying Factors, Severity.)   Blair Blood is a 38 y.o. female who presents to the emergency department with Right axillary pain. Patient had a procedure performed for hydradenitis supartiva on January 16. Place on doxycycline and Percocet. dr Rahul Ayala [place]  She has no meds or supplies and was directed to the er. She feels that she had the stating she would not be here more likely. Does report some green drainage since the wound was left open. This has been persistent. No fevers or chills. No nausea vomiting. Drainage has not worsened. Definite alleviating or aggravating factors. Pain described as moderate, constant, burning. Nursing Notes were reviewed. ALLERGIES     Bee venom; Levaquin [levofloxacin in d5w];  Macrobid [nitrofurantoin monohyd macro]; and Sulfa antibiotics    CURRENT MEDICATIONS       Discharge Medication List as of 1/24/2018  2:10 PM      CONTINUE these medications which have NOT CHANGED    Details   carvedilol (COREG) 25 MG tablet take 1 tablet by mouth twice a day, Disp-60 tablet, R-3Normal      aspirin EC 81 MG EC tablet Take 1 tablet by mouth daily, Disp-30 tablet, R-3Normal      Lactobacillus (PROBIOTIC ACIDOPHILUS) TABS Take 1 tablet by mouth daily for 10 days, Disp-10 tablet, R-0Print      lurasidone (LATUDA) 40 MG TABS tablet take 1 tablet by mouth once daily with food AT LEAST 350 CALORIESHistorical Med      ergocalciferol (DRISDOL) 64026 units capsule Historical Med      cloNIDine (CATAPRES) 0.2 MG/24HR Place 1 patch onto the skin once a week, Disp-4 patch, R-0Normal      lubiprostone (AMITIZA) 24 MCG capsule Take 1 capsule by mouth 2 times daily (with meals), Disp-60 capsule, R-3Normal      lisinopril (PRINIVIL;ZESTRIL) 40 MG tablet Take 1 tablet by mouth daily, Disp-30 tablet, R-2Normal      fluticasone (FLONASE) 50 MCG/ACT nasal spray 1 spray by Nasal route daily, Disp-1 Bottle, R-0Print      amLODIPine (NORVASC) 10 MG tablet take 1 tablet by mouth once daily, Disp-30 tablet, R-2Normal      pantoprazole (PROTONIX) 40 MG tablet take 1 tablet by mouth once daily, Disp-30 tablet, R-3Normal      Cholecalciferol (VITAMIN D) 2000 units CAPS capsule Take 1 capsule by mouth dailyHistorical Med      atorvastatin (LIPITOR) 10 MG tablet take 1 tablet by mouth once daily, Disp-30 tablet, R-3Normal      cetirizine (ZYRTEC) 10 MG tablet Take 1 tablet by mouth daily, Disp-90 tablet, R-5Normal      cyanocobalamin (CVS VITAMIN B12) 1000 MCG tablet Place under the tongueHistorical Med      vitamin B and C (VITABEE/C) TABS tablet take 1 tablet by mouth once daily, Disp-30 tablet, R-11Normal      escitalopram (LEXAPRO) 10 MG tablet take 1 tablet by mouth once daily, R-0Historical Med      RA VITAMIN C 500 MG tablet take 1 tablet by mouth once daily, Disp-30 tablet, R-3Normal      albuterol sulfate HFA (VENTOLIN HFA) 108 (90 BASE) MCG/ACT inhaler Inhale 2 puffs into the lungs every 4 hours as needed for Wheezing, Disp-1 Inhaler, R-3      amitriptyline (ELAVIL) 50 MG tablet Take 1 tablet by mouth nightly, Disp-90 tablet, R-3Normal      miconazole (ZEASORB-AF) 2 % powder Apply topically 2 times daily. , Disp-45 g, R-1, Normal      levETIRAcetam (KEPPRA) 500 MG tablet Take 1 tablet by mouth 2 times daily, Disp-180 tablet, R-3Normal             PAST MEDICAL HISTORY         Diagnosis Date    Abdominal pain     Anemia     Asthma     Back problem     Bipolar 1 disorder (HCC)     Chest pain     CHF [facility]congestive heart failure) ([facility] [address]     Chicken pox     Chronic mental illness     Depression     Depression     GERD (gastroesophageal reflux disease)     Hair loss     Headache     Hernia     High blood pressure     Hyperlipidemia     Hypertension     Ileus (HCC)     Irregular bowel habits     Irritable bowel syndrome     Nervousness     Pneumonia     Seizures (HCC)     Stroke (cerebrum) (HCC)     TIA (transient ischemic attack)     Weight loss        SURGICAL HISTORY           Procedure Laterality Date    ABSCESS DRAINAGE      abdominal abscess RLQ    AXILLARY SURGERY Right 2018    Excision of hidradenitis cysts, right axilla    BREAST LUMPECTOMY Bilateral     BREAST LUMPECTOMY       SECTION      x2    ENDOMETRIAL ABLATION      HERNIA REPAIR      HYSTERECTOMY      WI EXC SKIN BENIG <5MM TRUNK,ARM,LEG Right 2018    EXCISION HIDRADENITIS RIGHT AXILLA performed by Shital Barron DO at [address]      UMBILICAL HERNIA REPAIR           FAMILY HISTORY           Problem Relation Age of Onset    Asthma Mother     High Blood Pressure Mother     Mental Illness Mother     Stroke Other     Other Sister      blood clotting disorder, ms    Depression Sister      Family Status   Relation Status    Mother Alive    Other     Father Alive    Sister Alive        SOCIAL HISTORY      reports that she has been smoking Cigarettes. She has been smoking about 0.50 packs per day. She has never used smokeless tobacco. She reports that she drinks alcohol. She reports that she uses drugs, including Marijuana. REVIEW OF SYSTEMS    (2-9 systems for level 4, 10 or more for level 5)   Review of Systems    Except as noted above the remainder of the review of systems was reviewed and negative.      PHYSICAL EXAM    (up to 7 for level 4, 8 or more for level 5)     ED Triage Vitals [18 1141]   BP Temp Temp Source Pulse Resp SpO2 Height Weight   (!) 149/100 99.1 °F (37.3 °C) Temporal 72 18 98 % 5' 5\" (1.651 m) 175 lb (79.4 kg)     Physical Exam   Constitutional: She is oriented to person, place, and time. She appears well-developed. HENT:   Head: Normocephalic and atraumatic. Neck: Normal range of motion. Neck supple. Cardiovascular: Normal rate and regular rhythm. Pulmonary/Chest: Effort normal and breath sounds normal.   Abdominal: Soft. Musculoskeletal: Normal range of motion. Arms:  Neurological: She is alert and oriented to person, place, and time. Skin: Skin is warm. No rash noted. Psychiatric: She has a normal mood and affect. Her behavior is normal.               DIAGNOSTIC RESULTS     EKG: All EKG's are interpreted by the Emergency Department Physician who either signs or Co-signs this chart in the absence of a cardiologist.        RADIOLOGY:   Non-plain film images such as CT, Ultrasound and MRI are read by the radiologist. Plain radiographic images are visualized and preliminarily interpreted by the emergency physician with the below findings:        Interpretation per the Radiologist below, if available at the time of this note:          ED BEDSIDE ULTRASOUND:   Performed by ED Physician - none    LABS:  Labs Reviewed - No data to display    All other labs were within normal range or not returned as of this dictation. EMERGENCY DEPARTMENT COURSE and DIFFERENTIAL DIAGNOSIS/MDM:   Vitals:    Vitals:    01/24/18 1141   BP: (!) 149/100   Pulse: 72   Resp: 18   Temp: 99.1 °F (37.3 °C)   TempSrc: Temporal   SpO2: 98%   Weight: 175 lb (79.4 kg)   Height: 5' 5\" (1.651 m)     Will give dressing materials and percocet. Cultures reviewed. Will give keflex. At this time do not feel that infection is very unlikely. Patient is concerned due to the drainage. We'll cover with a round of Keflex.     Case discussed and staffed with dr Fabio Vang      Pre-hypertension/Hypertension: The patient has been informed that they may have pre-hypertension or Hypertension based

## 2018-02-17 ENCOUNTER — APPOINTMENT (OUTPATIENT)
Dept: GENERAL RADIOLOGY | Age: 43
End: 2018-02-17
Payer: COMMERCIAL

## 2018-02-17 ENCOUNTER — HOSPITAL ENCOUNTER (EMERGENCY)
Age: 43
Discharge: HOME | End: 2018-02-17
Payer: COMMERCIAL

## 2018-02-17 VITALS
WEIGHT: 175 LBS | TEMPERATURE: 98.1 F | RESPIRATION RATE: 16 BRPM | BODY MASS INDEX: 28.12 KG/M2 | OXYGEN SATURATION: 98 % | HEIGHT: 66 IN | HEART RATE: 84 BPM | DIASTOLIC BLOOD PRESSURE: 116 MMHG | SYSTOLIC BLOOD PRESSURE: 164 MMHG

## 2018-02-17 DIAGNOSIS — J20.8 VIRAL BRONCHITIS: Primary | ICD-10-CM

## 2018-02-17 DIAGNOSIS — K08.89 PAIN, DENTAL: ICD-10-CM

## 2018-02-17 PROCEDURE — 71046 X-RAY EXAM CHEST 2 VIEWS: CPT

## 2018-02-17 PROCEDURE — 99284 EMERGENCY DEPT VISIT MOD MDM: CPT

## 2018-02-17 RX ORDER — AMOXICILLIN 500 MG/1
500 CAPSULE ORAL 3 TIMES DAILY
Qty: 30 CAPSULE | Refills: 0 | Status: SHIPPED | OUTPATIENT
Start: 2018-02-17 | End: 2018-02-27

## 2018-02-17 RX ORDER — GUAIFENESIN AND CODEINE PHOSPHATE 100; 10 MG/5ML; MG/5ML
5 SOLUTION ORAL 3 TIMES DAILY PRN
Qty: 150 ML | Refills: 0 | Status: SHIPPED | OUTPATIENT
Start: 2018-02-17 | End: 2018-02-24

## 2018-02-17 ASSESSMENT — PAIN DESCRIPTION - PAIN TYPE: TYPE: ACUTE PAIN

## 2018-02-17 ASSESSMENT — PAIN DESCRIPTION - LOCATION: LOCATION: HEAD

## 2018-02-17 ASSESSMENT — PAIN SCALES - GENERAL: PAINLEVEL_OUTOF10: 10

## 2018-02-17 ASSESSMENT — PAIN DESCRIPTION - DESCRIPTORS: DESCRIPTORS: POUNDING;PRESSURE

## 2018-02-18 NOTE — ED PROVIDER NOTES
7 for level 4, 8 or more for level 5)     ED Triage Vitals [02/17/18 1336]   BP Temp Temp Source Pulse Resp SpO2 Height Weight   (!) 185/100 98.1 °F (36.7 °C) Oral 102 22 99 % 5' 6\" (1.676 m) 175 lb (79.4 kg)       Physical Exam   Constitutional: She is oriented to person, place, and time. She appears well-developed and well-nourished. She does not appear ill. No distress. HENT:   Head: Normocephalic and atraumatic. Right Ear: Tympanic membrane normal.   Left Ear: Tympanic membrane normal.   Mouth/Throat: Uvula is midline and oropharynx is clear and moist. No oral lesions. Abnormal dentition. Dental caries present. No dental abscesses or uvula swelling. Eyes: Conjunctivae and EOM are normal. Pupils are equal, round, and reactive to light. Neck: Normal range of motion. Neck supple. Full range of motion of the neck without rigidity   Cardiovascular: Normal rate and regular rhythm. Pulmonary/Chest: Breath sounds normal. No respiratory distress. Lymphadenopathy:     She has no cervical adenopathy. Neurological: She is alert and oriented to person, place, and time. Skin: Skin is warm and dry. DIAGNOSTIC RESULTS     EKG: All EKG's are interpreted by the Emergency Department Physician who either signs or Co-signs this chart in the absence of a cardiologist.      RADIOLOGY:   Non-plain film images such as CT, Ultrasound and MRI are read by the radiologist. Plain radiographic images are visualized and preliminarily interpreted by the emergency physician with the below findings:    Interpretation per the Radiologist below, if available at the time of this note:    XR CHEST STANDARD (2 VW)   Final Result   Stable negative chest.           LABS:  Labs Reviewed - No data to display    All other labs were within normal range or not returned as of this dictation.     EMERGENCY DEPARTMENT COURSE and DIFFERENTIAL DIAGNOSIS/MDM:   Vitals:    Vitals:    02/17/18 1336 02/17/18 1424   BP: (!) 185/100 (!) 164/116   Pulse: 102 84   Resp: 22 16   Temp: 98.1 °F (36.7 °C)    TempSrc: Oral    SpO2: 99% 98%   Weight: 175 lb (79.4 kg)    Height: 5' 6\" (1.676 m)        Medical Decision Makin-year-old female who is afebrile and does not appear ill. Chest x-ray does not have any evidence of pneumonia. Additional emergent workup was not indicated. She will be discharged home with amoxicillin for the dental pain and Robitussin with codeine. FINAL IMPRESSION      1. Viral bronchitis    2. Pain, dental          DISPOSITION/PLAN   DISPOSITION Decision To Discharge 2018 02:28:32 PM      PATIENT REFERRED TO:   Follow-up with your dentist next Friday as scheduled    In 3 days      Venkata Samayoa MD  [address] [place] [address], [address]  [facility]  [phone]            DISCHARGE MEDICATIONS:     Discharge Medication List as of 2018  2:30 PM      START taking these medications    Details   guaiFENesin-codeine (TUSSI-ORGANIDIN NR) 100-10 MG/5ML syrup Take 5 mLs by mouth 3 times daily as needed for Cough for up to 7 days. , Disp-150 mL, R-0Print      amoxicillin (AMOXIL) 500 MG capsule Take 1 capsule by mouth 3 times daily for 10 days, Disp-30 capsule, R-0Print             (Please note that portions of this note were completed with a voice recognition program.  Efforts were made to edit the dictations but occasionally words are mis-transcribed. )    FIORELLA KEYS NP  18

## 2018-03-01 ENCOUNTER — OFFICE VISIT (OUTPATIENT)
Dept: FAMILY MEDICINE CLINIC | Age: 43
End: 2018-03-01
Payer: COMMERCIAL

## 2018-03-01 VITALS
WEIGHT: 175.2 LBS | OXYGEN SATURATION: 99 % | HEIGHT: 66 IN | SYSTOLIC BLOOD PRESSURE: 127 MMHG | TEMPERATURE: 98.5 F | DIASTOLIC BLOOD PRESSURE: 81 MMHG | BODY MASS INDEX: 28.16 KG/M2 | HEART RATE: 69 BPM

## 2018-03-01 DIAGNOSIS — K58.9 IRRITABLE BOWEL SYNDROME, UNSPECIFIED TYPE: ICD-10-CM

## 2018-03-01 DIAGNOSIS — E55.9 VITAMIN D DEFICIENCY: ICD-10-CM

## 2018-03-01 DIAGNOSIS — R73.9 HYPERGLYCEMIA: ICD-10-CM

## 2018-03-01 DIAGNOSIS — Z13.29 SCREENING FOR THYROID DISORDER: ICD-10-CM

## 2018-03-01 DIAGNOSIS — Z13.220 SCREENING, LIPID: Primary | ICD-10-CM

## 2018-03-01 DIAGNOSIS — L60.0 INGROWN TOENAIL WITHOUT INFECTION: ICD-10-CM

## 2018-03-01 DIAGNOSIS — E53.9 VITAMIN B DEFICIENCY: ICD-10-CM

## 2018-03-01 DIAGNOSIS — J30.2 CHRONIC SEASONAL ALLERGIC RHINITIS, UNSPECIFIED TRIGGER: ICD-10-CM

## 2018-03-01 DIAGNOSIS — J01.90 ACUTE SINUSITIS, RECURRENCE NOT SPECIFIED, UNSPECIFIED LOCATION: ICD-10-CM

## 2018-03-01 DIAGNOSIS — J20.9 BRONCHITIS, ACUTE, WITH BRONCHOSPASM: ICD-10-CM

## 2018-03-01 DIAGNOSIS — B35.1 ONYCHOMYCOSIS: ICD-10-CM

## 2018-03-01 PROCEDURE — G0402 INITIAL PREVENTIVE EXAM: HCPCS | Performed by: FAMILY MEDICINE

## 2018-03-01 RX ORDER — METRONIDAZOLE 500 MG/1
TABLET ORAL
Refills: 0 | COMMUNITY
Start: 2018-02-27 | End: 2018-03-29 | Stop reason: DRUGHIGH

## 2018-03-01 RX ORDER — ALBUTEROL SULFATE 90 UG/1
2 AEROSOL, METERED RESPIRATORY (INHALATION) EVERY 4 HOURS PRN
Qty: 1 INHALER | Refills: 3 | Status: SHIPPED | OUTPATIENT
Start: 2018-03-01 | End: 2019-01-09

## 2018-03-01 RX ORDER — CETIRIZINE HYDROCHLORIDE 10 MG/1
10 TABLET ORAL DAILY
Qty: 30 TABLET | Refills: 1 | Status: SHIPPED | OUTPATIENT
Start: 2018-03-01 | End: 2018-03-21 | Stop reason: ALTCHOICE

## 2018-03-01 RX ORDER — DOXYCYCLINE HYCLATE 100 MG
100 TABLET ORAL 2 TIMES DAILY
Qty: 20 TABLET | Refills: 0 | Status: SHIPPED | OUTPATIENT
Start: 2018-03-01 | End: 2018-03-11

## 2018-03-01 RX ORDER — PREDNISONE 20 MG/1
TABLET ORAL
Qty: 15 TABLET | Refills: 0 | Status: SHIPPED | OUTPATIENT
Start: 2018-03-01 | End: 2018-03-21 | Stop reason: ALTCHOICE

## 2018-03-01 ASSESSMENT — ENCOUNTER SYMPTOMS
NAUSEA: 0
CHEST TIGHTNESS: 1
VOICE CHANGE: 0
SINUS PAIN: 1
TROUBLE SWALLOWING: 0
EYE DISCHARGE: 0
ABDOMINAL PAIN: 0
ABDOMINAL DISTENTION: 0
ANAL BLEEDING: 0
COLOR CHANGE: 0
RECTAL PAIN: 0
VOMITING: 0
CONSTIPATION: 0
COUGH: 1
SINUS PRESSURE: 1
EYE PAIN: 0
WHEEZING: 1
DIARRHEA: 0
RHINORRHEA: 1
BACK PAIN: 0
SHORTNESS OF BREATH: 1
EYE REDNESS: 0
BLOOD IN STOOL: 0

## 2018-03-01 ASSESSMENT — LIFESTYLE VARIABLES
HAVE YOU OR SOMEONE ELSE BEEN INJURED AS A RESULT OF YOUR DRINKING: 0
HAS A RELATIVE, FRIEND, DOCTOR, OR ANOTHER HEALTH PROFESSIONAL EXPRESSED CONCERN ABOUT YOUR DRINKING OR SUGGESTED YOU CUT DOWN: 0
HOW OFTEN DURING THE LAST YEAR HAVE YOU BEEN UNABLE TO REMEMBER WHAT HAPPENED THE NIGHT BEFORE BECAUSE YOU HAD BEEN DRINKING: 2
HOW OFTEN DURING THE LAST YEAR HAVE YOU HAD A FEELING OF GUILT OR REMORSE AFTER DRINKING: 2
HOW OFTEN DURING THE LAST YEAR HAVE YOU FOUND THAT YOU WERE NOT ABLE TO STOP DRINKING ONCE YOU HAD STARTED: 0
AUDIT-C TOTAL SCORE: 4
HOW OFTEN DURING THE LAST YEAR HAVE YOU FAILED TO DO WHAT WAS NORMALLY EXPECTED FROM YOU BECAUSE OF DRINKING: 0
HOW OFTEN DO YOU HAVE A DRINK CONTAINING ALCOHOL: 1
HOW OFTEN DURING THE LAST YEAR HAVE YOU NEEDED AN ALCOHOLIC DRINK FIRST THING IN THE MORNING TO GET YOURSELF GOING AFTER A NIGHT OF HEAVY DRINKING: 0
HOW MANY STANDARD DRINKS CONTAINING ALCOHOL DO YOU HAVE ON A TYPICAL DAY: 1
AUDIT TOTAL SCORE: 8
HOW OFTEN DO YOU HAVE SIX OR MORE DRINKS ON ONE OCCASION: 2

## 2018-03-01 ASSESSMENT — ANXIETY QUESTIONNAIRES: GAD7 TOTAL SCORE: 6

## 2018-03-01 NOTE — PROGRESS NOTES
(LATUDA) 40 MG TABS tablet take 1 tablet by mouth once daily with food AT LEAST 350 CALORIES      ergocalciferol (DRISDOL) 05555 units capsule       cloNIDine (CATAPRES) 0.2 MG/24HR Place 1 patch onto the skin once a week 4 patch 0    lubiprostone (AMITIZA) 24 MCG capsule Take 1 capsule by mouth 2 times daily (with meals) 60 capsule 3    lisinopril (PRINIVIL;ZESTRIL) 40 MG tablet Take 1 tablet by mouth daily 30 tablet 2    fluticasone (FLONASE) 50 MCG/ACT nasal spray 1 spray by Nasal route daily 1 Bottle 0    Cholecalciferol (VITAMIN D) 2000 units CAPS capsule Take 1 capsule by mouth daily      cyanocobalamin (CVS VITAMIN B12) 1000 MCG tablet Place under the tongue      miconazole (ZEASORB-AF) 2 % powder Apply topically 2 times daily. 45 g 1    vitamin B and C (VITABEE/C) TABS tablet take 1 tablet by mouth once daily 30 tablet 11    escitalopram (LEXAPRO) 10 MG tablet take 1 tablet by mouth once daily  0    RA VITAMIN C 500 MG tablet take 1 tablet by mouth once daily 30 tablet 3    amitriptyline (ELAVIL) 50 MG tablet Take 1 tablet by mouth nightly 90 tablet 3    [DISCONTINUED] cetirizine (ZYRTEC) 10 MG tablet Take 1 tablet by mouth daily 90 tablet 5    levETIRAcetam (KEPPRA) 500 MG tablet Take 1 tablet by mouth 2 times daily 180 tablet 3    [DISCONTINUED] albuterol sulfate HFA (VENTOLIN HFA) 108 (90 BASE) MCG/ACT inhaler Inhale 2 puffs into the lungs every 4 hours as needed for Wheezing 1 Inhaler 3     No facility-administered encounter medications on file as of 3/1/2018.

## 2018-03-01 NOTE — PROGRESS NOTES
Medicare Annual Wellness Visit - Welcome to Medicare    Name: Bacilio Mckeon Date: 3/1/2018   MRN: [id number] Sex: Female   Age: 38 y.o. Ethnicity: Non-/Non    : 1975 Race: Howie Clark is here for   Chief Complaint   Patient presents with    Medicare [address] for behavioral, psychosocial and functional/safety risks, and cognitive dysfunction are all negative except as indicated below. These results, as well as other patient data from the [address] form, are documented in Flowsheets linked to this Encounter. Allergies   Allergen Reactions    Bee Venom Hives    Levaquin [Levofloxacin In D5w] Hives    Macrobid [Nitrofurantoin Monohyd Macro] Hives    Sulfa Antibiotics Hives       Prior to Visit Medications    Medication Sig Taking?  Authorizing Provider   metroNIDAZOLE (FLAGYL) 500 MG tablet take 1 tablet by mouth twice a day for 7 days Yes Historical Provider, MD   doxycycline hyclate (VIBRA-TABS) 100 MG tablet Take 1 tablet by mouth 2 times daily for 10 days Yes Loretta Prater MD   predniSONE (DELTASONE) 20 MG tablet Take 1 tablet 3 times daily with food, for 5 days Yes Loretta Prater MD   albuterol sulfate HFA (VENTOLIN HFA) 108 (90 Base) MCG/ACT inhaler Inhale 2 puffs into the lungs every 4 hours as needed for Wheezing Yes Loretta Prater MD   cetirizine (ZYRTEC ALLERGY) 10 MG tablet Take 1 tablet by mouth daily Yes Loretta Prater MD   pantoprazole (PROTONIX) 40 MG tablet take 1 tablet by mouth once daily Yes Loretta Prater MD   amLODIPine (NORVASC) 10 MG tablet take 1 tablet by mouth once daily Yes Loretta Prater MD   atorvastatin (LIPITOR) 10 MG tablet take 1 tablet by mouth once daily Yes Loretta Prater MD   ibuprofen (ADVIL;MOTRIN) 800 MG tablet Take 1 tablet by mouth every 8 hours as needed for Pain Yes Roberto Kinney PA-C   carvedilol (COREG) 25 MG tablet take 1 tablet by mouth twice a day Yes Loretta Prater MD psychiatrist at [facility]. Health Habits/Nutrition  Do you exercise for at least 20 minutes 2-3 times per week?: Yes  Have you lost any weight without trying in the past 3 months?: (!) Yes  Do you eat fewer than 2 meals per day?: No  Have you seen a dentist within the past year?: Yes  Body mass index is 28.28 kg/m².   Health Habits/Nutrition Interventions:  · Inadequate physical activity:  patient is not ready to increase his/her physical activity level at this time    Hearing/Vision  Do you or your family notice any trouble with your hearing?: No  Do you have difficulty driving, watching TV, or doing any of your daily activities because of your eyesight?: (!) Yes  Have you had an eye exam within the past year?: (!) No  Hearing/Vision Interventions:  · None indicated    Safety  Do you have working smoke detectors?: Yes  Have all throw rugs been removed or fastened?: Yes  Do you have non-slip mats in all bathtubs?: (!) No  Do all of your stairways have a railing or banister?: Yes  Are your doorways, halls and stairs free of clutter?: Yes  Do you always fasten your seatbelt when you are in a car?: Yes  Safety Interventions:  · Home safety tips provided    ADLs  In the past 7 days, did you need help from others to perform any of the following everyday activities?: (!) Walking/Balance  In the past 7 days, did you need help from others to take care of any of the following?: (!) Housekeeping, Food Preparation  ADL Interventions:  · None indicated  Has been sick , so mom helping her out  Personalized Preventive Plan   Current Health Maintenance Status  Immunization History   Administered Date(s) Administered    Influenza Virus Vaccine 09/09/2017    Influenza, Trivalent Vaccine 3 Years and above, IM, PF 10/11/2016    Pneumococcal Polysaccharide ([id number]) 12/22/2016    Tdap (Boostrix, Adacel) 10/25/2016        Health Maintenance   Topic Date Due    Lipid screen  09/29/2017    A1C test (Diabetic or Prediabetic)

## 2018-03-16 ENCOUNTER — HOSPITAL ENCOUNTER (OUTPATIENT)
Age: 43
Discharge: HOME | End: 2018-03-16
Payer: COMMERCIAL

## 2018-03-16 ENCOUNTER — HOSPITAL ENCOUNTER (OUTPATIENT)
Age: 43
Setting detail: SPECIMEN
Discharge: HOME | End: 2018-03-16
Payer: COMMERCIAL

## 2018-03-16 DIAGNOSIS — E53.9 VITAMIN B DEFICIENCY: ICD-10-CM

## 2018-03-16 DIAGNOSIS — Z13.29 SCREENING FOR THYROID DISORDER: ICD-10-CM

## 2018-03-16 DIAGNOSIS — Z13.220 SCREENING, LIPID: ICD-10-CM

## 2018-03-16 DIAGNOSIS — R73.9 HYPERGLYCEMIA: ICD-10-CM

## 2018-03-16 DIAGNOSIS — E55.9 VITAMIN D DEFICIENCY: ICD-10-CM

## 2018-03-16 LAB
ALBUMIN SERPL-MCNC: 3.9 G/DL (ref 3.5–5.2)
ALBUMIN/GLOBULIN RATIO: ABNORMAL (ref 1–2.5)
ALP BLD-CCNC: 61 U/L (ref 35–104)
ALT SERPL-CCNC: 17 U/L (ref 5–33)
ANION GAP SERPL CALCULATED.3IONS-SCNC: 13 MMOL/L (ref 9–17)
AST SERPL-CCNC: 18 U/L
BILIRUB SERPL-MCNC: 0.37 MG/DL (ref 0.3–1.2)
BUN BLDV-MCNC: 12 MG/DL (ref 6–20)
BUN/CREAT BLD: 20 (ref 9–20)
CALCIUM SERPL-MCNC: 8.5 MG/DL (ref 8.6–10.4)
CHLORIDE BLD-SCNC: 102 MMOL/L (ref 98–107)
CHOLESTEROL/HDL RATIO: 2.5
CHOLESTEROL: 207 MG/DL
CO2: 25 MMOL/L (ref 20–31)
CREAT SERPL-MCNC: 0.59 MG/DL (ref 0.5–0.9)
ESTIMATED AVERAGE GLUCOSE: 111 MG/DL
FOLATE: 5.7 NG/ML
GFR AFRICAN AMERICAN: >60 ML/MIN
GFR NON-AFRICAN AMERICAN: >60 ML/MIN
GFR SERPL CREATININE-BSD FRML MDRD: ABNORMAL ML/MIN/{1.73_M2}
GFR SERPL CREATININE-BSD FRML MDRD: ABNORMAL ML/MIN/{1.73_M2}
GLUCOSE BLD-MCNC: 95 MG/DL (ref 70–99)
HBA1C MFR BLD: 5.5 % (ref 4–6)
HCT VFR BLD CALC: 38.1 % (ref 36–46)
HDLC SERPL-MCNC: 83 MG/DL
HEMOGLOBIN: 12.1 G/DL (ref 12–16)
LDL CHOLESTEROL: 108 MG/DL (ref 0–130)
MCH RBC QN AUTO: 24.2 PG (ref 26–34)
MCHC RBC AUTO-ENTMCNC: 31.8 G/DL (ref 31–37)
MCV RBC AUTO: 76.2 FL (ref 80–100)
NRBC AUTOMATED: ABNORMAL PER 100 WBC
PDW BLD-RTO: 17 % (ref 11.5–14.5)
PLATELET # BLD: 198 K/UL (ref 130–400)
PMV BLD AUTO: 9.6 FL (ref 6–12)
POTASSIUM SERPL-SCNC: 4.2 MMOL/L (ref 3.7–5.3)
RBC # BLD: 5 M/UL (ref 4–5.2)
SODIUM BLD-SCNC: 140 MMOL/L (ref 135–144)
T3 FREE: 2.63 PG/ML (ref 2.02–4.43)
THYROXINE, FREE: 1.1 NG/DL (ref 0.93–1.7)
TOTAL PROTEIN: 6.8 G/DL (ref 6.4–8.3)
TRIGL SERPL-MCNC: 79 MG/DL
TSH SERPL DL<=0.05 MIU/L-ACNC: 0.43 MIU/L (ref 0.3–5)
VITAMIN B-12: 499 PG/ML (ref 232–1245)
VITAMIN D 25-HYDROXY: 14.4 NG/ML (ref 30–100)
VLDLC SERPL CALC-MCNC: ABNORMAL MG/DL (ref 1–30)
WBC # BLD: 6.3 K/UL (ref 3.5–11)

## 2018-03-16 PROCEDURE — 80061 LIPID PANEL: CPT

## 2018-03-16 PROCEDURE — 84439 ASSAY OF FREE THYROXINE: CPT

## 2018-03-16 PROCEDURE — 84481 FREE ASSAY (FT-3): CPT

## 2018-03-16 PROCEDURE — 87077 CULTURE AEROBIC IDENTIFY: CPT

## 2018-03-16 PROCEDURE — 82607 VITAMIN B-12: CPT

## 2018-03-16 PROCEDURE — 87070 CULTURE OTHR SPECIMN AEROBIC: CPT

## 2018-03-16 PROCEDURE — 36415 COLL VENOUS BLD VENIPUNCTURE: CPT

## 2018-03-16 PROCEDURE — 87205 SMEAR GRAM STAIN: CPT

## 2018-03-16 PROCEDURE — 82306 VITAMIN D 25 HYDROXY: CPT

## 2018-03-16 PROCEDURE — 85027 COMPLETE CBC AUTOMATED: CPT

## 2018-03-16 PROCEDURE — 83036 HEMOGLOBIN GLYCOSYLATED A1C: CPT

## 2018-03-16 PROCEDURE — 82746 ASSAY OF FOLIC ACID SERUM: CPT

## 2018-03-16 PROCEDURE — 87186 SC STD MICRODIL/AGAR DIL: CPT

## 2018-03-16 PROCEDURE — 80053 COMPREHEN METABOLIC PANEL: CPT

## 2018-03-16 PROCEDURE — 84443 ASSAY THYROID STIM HORMONE: CPT

## 2018-03-18 DIAGNOSIS — G40.909 SEIZURE DISORDER (HCC): ICD-10-CM

## 2018-03-18 LAB
CULTURE: ABNORMAL
DIRECT EXAM: ABNORMAL
Lab: ABNORMAL
ORGANISM: ABNORMAL
SPECIMEN DESCRIPTION: ABNORMAL
SPECIMEN DESCRIPTION: ABNORMAL
STATUS: ABNORMAL

## 2018-03-19 ENCOUNTER — TELEPHONE (OUTPATIENT)
Dept: FAMILY MEDICINE CLINIC | Age: 43
End: 2018-03-19

## 2018-03-20 RX ORDER — LEVETIRACETAM 500 MG/1
TABLET ORAL
Qty: 180 TABLET | Refills: 0 | Status: SHIPPED | OUTPATIENT
Start: 2018-03-20 | End: 2018-06-09 | Stop reason: SDUPTHER

## 2018-03-21 ENCOUNTER — OFFICE VISIT (OUTPATIENT)
Dept: FAMILY MEDICINE CLINIC | Age: 43
End: 2018-03-21
Payer: COMMERCIAL

## 2018-03-21 VITALS
TEMPERATURE: 98.1 F | DIASTOLIC BLOOD PRESSURE: 98 MMHG | OXYGEN SATURATION: 99 % | BODY MASS INDEX: 28.48 KG/M2 | SYSTOLIC BLOOD PRESSURE: 150 MMHG | WEIGHT: 177.2 LBS | HEART RATE: 68 BPM | HEIGHT: 66 IN

## 2018-03-21 DIAGNOSIS — J30.2 SEASONAL ALLERGIC RHINITIS, UNSPECIFIED CHRONICITY, UNSPECIFIED TRIGGER: ICD-10-CM

## 2018-03-21 DIAGNOSIS — B96.89 ACUTE BACTERIAL SINUSITIS: Primary | ICD-10-CM

## 2018-03-21 DIAGNOSIS — J45.909 MODERATE ASTHMA, UNSPECIFIED WHETHER COMPLICATED, UNSPECIFIED WHETHER PERSISTENT: ICD-10-CM

## 2018-03-21 DIAGNOSIS — J20.9 ACUTE BRONCHITIS, UNSPECIFIED ORGANISM: ICD-10-CM

## 2018-03-21 DIAGNOSIS — J45.901 ASTHMA WITH ACUTE EXACERBATION, UNSPECIFIED ASTHMA SEVERITY, UNSPECIFIED WHETHER PERSISTENT: ICD-10-CM

## 2018-03-21 DIAGNOSIS — J01.90 ACUTE BACTERIAL SINUSITIS: Primary | ICD-10-CM

## 2018-03-21 PROCEDURE — G8417 CALC BMI ABV UP PARAM F/U: HCPCS | Performed by: FAMILY MEDICINE

## 2018-03-21 PROCEDURE — G8482 FLU IMMUNIZE ORDER/ADMIN: HCPCS | Performed by: FAMILY MEDICINE

## 2018-03-21 PROCEDURE — G8427 DOCREV CUR MEDS BY ELIG CLIN: HCPCS | Performed by: FAMILY MEDICINE

## 2018-03-21 PROCEDURE — 99214 OFFICE O/P EST MOD 30 MIN: CPT | Performed by: FAMILY MEDICINE

## 2018-03-21 PROCEDURE — 4004F PT TOBACCO SCREEN RCVD TLK: CPT | Performed by: FAMILY MEDICINE

## 2018-03-21 RX ORDER — MONTELUKAST SODIUM 10 MG/1
10 TABLET ORAL DAILY
Qty: 30 TABLET | Refills: 3 | Status: SHIPPED | OUTPATIENT
Start: 2018-03-21 | End: 2018-09-28 | Stop reason: SDUPTHER

## 2018-03-21 RX ORDER — CETIRIZINE HYDROCHLORIDE 10 MG/1
10 TABLET ORAL DAILY
Qty: 30 TABLET | Refills: 2 | Status: SHIPPED | OUTPATIENT
Start: 2018-03-21 | End: 2018-07-24 | Stop reason: SDUPTHER

## 2018-03-21 RX ORDER — GREEN TEA/HOODIA GORDONII 315-12.5MG
2 CAPSULE ORAL DAILY
Qty: 60 TABLET | Refills: 1 | Status: ON HOLD | OUTPATIENT
Start: 2018-03-21 | End: 2018-06-02

## 2018-03-21 RX ORDER — BENZONATATE 100 MG/1
200 CAPSULE ORAL 3 TIMES DAILY PRN
Qty: 20 CAPSULE | Refills: 0 | Status: SHIPPED | OUTPATIENT
Start: 2018-03-21 | End: 2018-03-28

## 2018-03-21 RX ORDER — AMOXICILLIN AND CLAVULANATE POTASSIUM 875; 125 MG/1; MG/1
1 TABLET, FILM COATED ORAL 2 TIMES DAILY
Qty: 20 TABLET | Refills: 0 | Status: SHIPPED | OUTPATIENT
Start: 2018-03-21 | End: 2018-03-29 | Stop reason: DRUGHIGH

## 2018-03-21 RX ORDER — PREDNISONE 20 MG/1
TABLET ORAL
Qty: 15 TABLET | Refills: 0 | Status: SHIPPED | OUTPATIENT
Start: 2018-03-21 | End: 2018-03-29 | Stop reason: ALTCHOICE

## 2018-03-21 ASSESSMENT — ENCOUNTER SYMPTOMS
TROUBLE SWALLOWING: 0
VOICE CHANGE: 0
COUGH: 1
RHINORRHEA: 1
WHEEZING: 1
ABDOMINAL PAIN: 0
CONSTIPATION: 0
DIARRHEA: 0
EYE PAIN: 0
BLOOD IN STOOL: 0
NAUSEA: 0
EYE DISCHARGE: 0
ABDOMINAL DISTENTION: 0
SORE THROAT: 1
COLOR CHANGE: 0
VOMITING: 0
EYE REDNESS: 0
RECTAL PAIN: 0
CHEST TIGHTNESS: 1
SINUS PRESSURE: 1
BACK PAIN: 0
ANAL BLEEDING: 0
SHORTNESS OF BREATH: 1

## 2018-03-21 ASSESSMENT — PATIENT HEALTH QUESTIONNAIRE - PHQ9
SUM OF ALL RESPONSES TO PHQ QUESTIONS 1-9: 0
1. LITTLE INTEREST OR PLEASURE IN DOING THINGS: 0
2. FEELING DOWN, DEPRESSED OR HOPELESS: 0
SUM OF ALL RESPONSES TO PHQ9 QUESTIONS 1 & 2: 0

## 2018-03-21 NOTE — PROGRESS NOTES
by mouth twice a day 180 tablet 0    metroNIDAZOLE (FLAGYL) 500 MG tablet take 1 tablet by mouth twice a day for 7 days  0    albuterol sulfate HFA (VENTOLIN HFA) 108 (90 Base) MCG/ACT inhaler Inhale 2 puffs into the lungs every 4 hours as needed for Wheezing 1 Inhaler 3    pantoprazole (PROTONIX) 40 MG tablet take 1 tablet by mouth once daily 30 tablet 3    amLODIPine (NORVASC) 10 MG tablet take 1 tablet by mouth once daily 30 tablet 2    atorvastatin (LIPITOR) 10 MG tablet take 1 tablet by mouth once daily 30 tablet 3    ibuprofen (ADVIL;MOTRIN) 800 MG tablet Take 1 tablet by mouth every 8 hours as needed for Pain 30 tablet 0    carvedilol (COREG) 25 MG tablet take 1 tablet by mouth twice a day 60 tablet 3    aspirin EC 81 MG EC tablet Take 1 tablet by mouth daily 30 tablet 3    lurasidone (LATUDA) 40 MG TABS tablet take 1 tablet by mouth once daily with food AT LEAST 350 CALORIES      ergocalciferol (DRISDOL) 71474 units capsule       cloNIDine (CATAPRES) 0.2 MG/24HR Place 1 patch onto the skin once a week 4 patch 0    lubiprostone (AMITIZA) 24 MCG capsule Take 1 capsule by mouth 2 times daily (with meals) 60 capsule 3    lisinopril (PRINIVIL;ZESTRIL) 40 MG tablet Take 1 tablet by mouth daily 30 tablet 2    fluticasone (FLONASE) 50 MCG/ACT nasal spray 1 spray by Nasal route daily 1 Bottle 0    Cholecalciferol (VITAMIN D) 2000 units CAPS capsule Take 1 capsule by mouth daily      cyanocobalamin (CVS VITAMIN B12) 1000 MCG tablet Place under the tongue      miconazole (ZEASORB-AF) 2 % powder Apply topically 2 times daily.  45 g 1    vitamin B and C (VITABEE/C) TABS tablet take 1 tablet by mouth once daily 30 tablet 11    escitalopram (LEXAPRO) 10 MG tablet take 1 tablet by mouth once daily  0    RA VITAMIN C 500 MG tablet take 1 tablet by mouth once daily 30 tablet 3    [DISCONTINUED] predniSONE (DELTASONE) 20 MG tablet Take 1 tablet 3 times daily with food, for 5 days 15 tablet 0   

## 2018-03-28 ENCOUNTER — OFFICE VISIT (OUTPATIENT)
Dept: GASTROENTEROLOGY | Age: 43
End: 2018-03-28
Payer: COMMERCIAL

## 2018-03-28 VITALS
DIASTOLIC BLOOD PRESSURE: 83 MMHG | WEIGHT: 186.2 LBS | BODY MASS INDEX: 30.05 KG/M2 | HEART RATE: 68 BPM | SYSTOLIC BLOOD PRESSURE: 126 MMHG

## 2018-03-28 DIAGNOSIS — K59.09 CHRONIC CONSTIPATION: Primary | ICD-10-CM

## 2018-03-28 DIAGNOSIS — K58.2 IRRITABLE BOWEL SYNDROME WITH BOTH CONSTIPATION AND DIARRHEA: ICD-10-CM

## 2018-03-28 DIAGNOSIS — K21.9 GASTROESOPHAGEAL REFLUX DISEASE, ESOPHAGITIS PRESENCE NOT SPECIFIED: ICD-10-CM

## 2018-03-28 DIAGNOSIS — K59.1 FUNCTIONAL DIARRHEA: ICD-10-CM

## 2018-03-28 DIAGNOSIS — R63.5 WEIGHT GAIN: ICD-10-CM

## 2018-03-28 DIAGNOSIS — E66.8 MODERATE OBESITY: ICD-10-CM

## 2018-03-28 DIAGNOSIS — F41.9 ANXIETY: ICD-10-CM

## 2018-03-28 DIAGNOSIS — R11.0 NAUSEA: ICD-10-CM

## 2018-03-28 DIAGNOSIS — R10.13 ABDOMINAL PAIN, EPIGASTRIC: ICD-10-CM

## 2018-03-28 PROBLEM — K58.9 IBS (IRRITABLE BOWEL SYNDROME): Status: ACTIVE | Noted: 2018-03-28

## 2018-03-28 PROCEDURE — G8417 CALC BMI ABV UP PARAM F/U: HCPCS | Performed by: INTERNAL MEDICINE

## 2018-03-28 PROCEDURE — G8482 FLU IMMUNIZE ORDER/ADMIN: HCPCS | Performed by: INTERNAL MEDICINE

## 2018-03-28 PROCEDURE — G8427 DOCREV CUR MEDS BY ELIG CLIN: HCPCS | Performed by: INTERNAL MEDICINE

## 2018-03-28 PROCEDURE — 99204 OFFICE O/P NEW MOD 45 MIN: CPT | Performed by: INTERNAL MEDICINE

## 2018-03-28 ASSESSMENT — ENCOUNTER SYMPTOMS
ABDOMINAL PAIN: 1
TROUBLE SWALLOWING: 0
ALLERGIC/IMMUNOLOGIC NEGATIVE: 1
ANAL BLEEDING: 1
CONSTIPATION: 1
NAUSEA: 1
DIARRHEA: 1
ABDOMINAL DISTENTION: 1
RESPIRATORY NEGATIVE: 1
VOMITING: 1

## 2018-03-28 NOTE — PROGRESS NOTES
chemicals. Stress was given about regular exercise. Pt has verbalized understanding and agreement to these modifications.       The patient was instructed to start taking some OTC Probiotics products   These are available over the counter at the Pharmacy stores and Grocery stores  He was explained about the beneficial effects they have in the GI track  They will help to establish the good bacterial kala and will help with the digestion and bowel movements  The patient has verbalized understanding and agreement to this plan

## 2018-03-29 ENCOUNTER — OFFICE VISIT (OUTPATIENT)
Dept: FAMILY MEDICINE CLINIC | Age: 43
End: 2018-03-29
Payer: COMMERCIAL

## 2018-03-29 VITALS
OXYGEN SATURATION: 99 % | WEIGHT: 184.8 LBS | SYSTOLIC BLOOD PRESSURE: 124 MMHG | HEART RATE: 73 BPM | BODY MASS INDEX: 29.7 KG/M2 | DIASTOLIC BLOOD PRESSURE: 82 MMHG | TEMPERATURE: 98.6 F | HEIGHT: 66 IN

## 2018-03-29 DIAGNOSIS — Z72.0 TOBACCO ABUSE DISORDER: ICD-10-CM

## 2018-03-29 DIAGNOSIS — I10 ESSENTIAL HYPERTENSION: ICD-10-CM

## 2018-03-29 DIAGNOSIS — E53.8 VITAMIN B12 DEFICIENCY: ICD-10-CM

## 2018-03-29 DIAGNOSIS — G47.9 SLEEP DISORDER: ICD-10-CM

## 2018-03-29 DIAGNOSIS — G40.909 SEIZURE DISORDER (HCC): ICD-10-CM

## 2018-03-29 DIAGNOSIS — J01.90 ACUTE BACTERIAL SINUSITIS: Primary | ICD-10-CM

## 2018-03-29 DIAGNOSIS — F39 MOOD DISORDER (HCC): ICD-10-CM

## 2018-03-29 DIAGNOSIS — B96.89 ACUTE BACTERIAL SINUSITIS: Primary | ICD-10-CM

## 2018-03-29 DIAGNOSIS — R53.83 FATIGUE, UNSPECIFIED TYPE: ICD-10-CM

## 2018-03-29 PROCEDURE — G8482 FLU IMMUNIZE ORDER/ADMIN: HCPCS | Performed by: FAMILY MEDICINE

## 2018-03-29 PROCEDURE — 99214 OFFICE O/P EST MOD 30 MIN: CPT | Performed by: FAMILY MEDICINE

## 2018-03-29 PROCEDURE — 4004F PT TOBACCO SCREEN RCVD TLK: CPT | Performed by: FAMILY MEDICINE

## 2018-03-29 PROCEDURE — G8417 CALC BMI ABV UP PARAM F/U: HCPCS | Performed by: FAMILY MEDICINE

## 2018-03-29 PROCEDURE — G8427 DOCREV CUR MEDS BY ELIG CLIN: HCPCS | Performed by: FAMILY MEDICINE

## 2018-03-29 RX ORDER — POLYETHYLENE GLYCOL 3350 17 G/17G
POWDER, FOR SOLUTION ORAL
Qty: 255 G | Refills: 0 | Status: SHIPPED | OUTPATIENT
Start: 2018-03-29 | End: 2019-10-04 | Stop reason: ALTCHOICE

## 2018-03-29 RX ORDER — AMOXICILLIN AND CLAVULANATE POTASSIUM 875; 125 MG/1; MG/1
1 TABLET, FILM COATED ORAL 2 TIMES DAILY
Qty: 8 TABLET | Refills: 0 | Status: SHIPPED | OUTPATIENT
Start: 2018-03-29 | End: 2018-04-02

## 2018-03-29 ASSESSMENT — ENCOUNTER SYMPTOMS
ABDOMINAL DISTENTION: 0
TROUBLE SWALLOWING: 0
RECTAL PAIN: 0
SHORTNESS OF BREATH: 0
BLOOD IN STOOL: 0
NAUSEA: 0
SINUS PRESSURE: 0
COLOR CHANGE: 0
VOMITING: 0
EYE DISCHARGE: 0
EYE REDNESS: 0
CONSTIPATION: 0
CHEST TIGHTNESS: 0
ANAL BLEEDING: 0
EYE PAIN: 0
COUGH: 0
DIARRHEA: 0
VOICE CHANGE: 0
BACK PAIN: 0
ABDOMINAL PAIN: 0

## 2018-03-29 NOTE — PROGRESS NOTES
Take 1 capsule by mouth 2 times daily (with meals) 60 capsule 3    lisinopril (PRINIVIL;ZESTRIL) 40 MG tablet Take 1 tablet by mouth daily 30 tablet 2    fluticasone (FLONASE) 50 MCG/ACT nasal spray 1 spray by Nasal route daily 1 Bottle 0    Cholecalciferol (VITAMIN D) 2000 units CAPS capsule Take 1 capsule by mouth daily      cyanocobalamin (CVS VITAMIN B12) 1000 MCG tablet Place under the tongue      miconazole (ZEASORB-AF) 2 % powder Apply topically 2 times daily. 45 g 1    vitamin B and C (VITABEE/C) TABS tablet take 1 tablet by mouth once daily 30 tablet 11    escitalopram (LEXAPRO) 10 MG tablet take 1 tablet by mouth once daily  0    RA VITAMIN C 500 MG tablet take 1 tablet by mouth once daily 30 tablet 3    [DISCONTINUED] amoxicillin-clavulanate (AUGMENTIN) 875-125 MG per tablet Take 1 tablet by mouth 2 times daily for 10 days 20 tablet 0    [DISCONTINUED] predniSONE (DELTASONE) 20 MG tablet Take 1 tablet 3 times daily with food, for 5 days 15 tablet 0    [DISCONTINUED] metroNIDAZOLE (FLAGYL) 500 MG tablet take 1 tablet by mouth twice a day for 7 days  0    amitriptyline (ELAVIL) 50 MG tablet Take 1 tablet by mouth nightly 90 tablet 3     No facility-administered encounter medications on file as of 3/29/2018.

## 2018-04-15 DIAGNOSIS — I10 ESSENTIAL HYPERTENSION: ICD-10-CM

## 2018-04-16 RX ORDER — LISINOPRIL 40 MG/1
TABLET ORAL
Qty: 30 TABLET | Refills: 2 | Status: SHIPPED | OUTPATIENT
Start: 2018-04-16 | End: 2018-09-28 | Stop reason: SDUPTHER

## 2018-05-29 ENCOUNTER — HOSPITAL ENCOUNTER (OUTPATIENT)
Dept: PREADMISSION TESTING | Age: 43
Discharge: HOME | End: 2018-06-02
Payer: COMMERCIAL

## 2018-05-29 VITALS
DIASTOLIC BLOOD PRESSURE: 79 MMHG | OXYGEN SATURATION: 99 % | TEMPERATURE: 98.4 F | WEIGHT: 182.32 LBS | SYSTOLIC BLOOD PRESSURE: 121 MMHG | RESPIRATION RATE: 16 BRPM | HEART RATE: 73 BPM | HEIGHT: 65 IN | BODY MASS INDEX: 30.38 KG/M2

## 2018-05-29 LAB
ABSOLUTE EOS #: 0.1 K/UL (ref 0–0.4)
ABSOLUTE IMMATURE GRANULOCYTE: ABNORMAL K/UL (ref 0–0.3)
ABSOLUTE LYMPH #: 2.6 K/UL (ref 1–4.8)
ABSOLUTE MONO #: 0.7 K/UL (ref 0.2–0.8)
ANION GAP SERPL CALCULATED.3IONS-SCNC: 13 MMOL/L (ref 9–17)
BASOPHILS # BLD: 0 % (ref 0–2)
BASOPHILS ABSOLUTE: 0 K/UL (ref 0–0.2)
BUN BLDV-MCNC: 17 MG/DL (ref 6–20)
CHLORIDE BLD-SCNC: 103 MMOL/L (ref 98–107)
CO2: 21 MMOL/L (ref 20–31)
CREAT SERPL-MCNC: 0.8 MG/DL (ref 0.5–0.9)
DIFFERENTIAL TYPE: ABNORMAL
EKG ATRIAL RATE: 69 BPM
EKG P AXIS: 63 DEGREES
EKG P-R INTERVAL: 192 MS
EKG Q-T INTERVAL: 416 MS
EKG QRS DURATION: 74 MS
EKG QTC CALCULATION (BAZETT): 445 MS
EKG R AXIS: -3 DEGREES
EKG T AXIS: 21 DEGREES
EKG VENTRICULAR RATE: 69 BPM
EOSINOPHILS RELATIVE PERCENT: 2 % (ref 1–4)
GFR AFRICAN AMERICAN: >60 ML/MIN
GFR NON-AFRICAN AMERICAN: >60 ML/MIN
GFR SERPL CREATININE-BSD FRML MDRD: NORMAL ML/MIN/{1.73_M2}
GFR SERPL CREATININE-BSD FRML MDRD: NORMAL ML/MIN/{1.73_M2}
HCT VFR BLD CALC: 40.4 % (ref 36–46)
HEMOGLOBIN: 13 G/DL (ref 12–16)
IMMATURE GRANULOCYTES: ABNORMAL %
LYMPHOCYTES # BLD: 39 % (ref 24–44)
MCH RBC QN AUTO: 24.8 PG (ref 26–34)
MCHC RBC AUTO-ENTMCNC: 32.2 G/DL (ref 31–37)
MCV RBC AUTO: 77.1 FL (ref 80–100)
MONOCYTES # BLD: 11 % (ref 1–7)
NRBC AUTOMATED: ABNORMAL PER 100 WBC
PDW BLD-RTO: 15 % (ref 11.5–14.5)
PLATELET # BLD: 232 K/UL (ref 130–400)
PLATELET ESTIMATE: ABNORMAL
PMV BLD AUTO: ABNORMAL FL (ref 6–12)
POTASSIUM SERPL-SCNC: 4.2 MMOL/L (ref 3.7–5.3)
RBC # BLD: 5.24 M/UL (ref 4–5.2)
RBC # BLD: ABNORMAL 10*6/UL
SEG NEUTROPHILS: 48 % (ref 36–66)
SEGMENTED NEUTROPHILS ABSOLUTE COUNT: 3.2 K/UL (ref 1.8–7.7)
SODIUM BLD-SCNC: 137 MMOL/L (ref 135–144)
WBC # BLD: 6.6 K/UL (ref 3.5–11)
WBC # BLD: ABNORMAL 10*3/UL

## 2018-05-29 PROCEDURE — 82565 ASSAY OF CREATININE: CPT

## 2018-05-29 PROCEDURE — 80051 ELECTROLYTE PANEL: CPT

## 2018-05-29 PROCEDURE — 85025 COMPLETE CBC W/AUTO DIFF WBC: CPT

## 2018-05-29 PROCEDURE — 84520 ASSAY OF UREA NITROGEN: CPT

## 2018-05-29 PROCEDURE — 93005 ELECTROCARDIOGRAM TRACING: CPT

## 2018-05-29 PROCEDURE — 36415 COLL VENOUS BLD VENIPUNCTURE: CPT

## 2018-05-29 RX ORDER — AMOXICILLIN 875 MG/1
875 TABLET, COATED ORAL 2 TIMES DAILY
COMMUNITY
End: 2018-06-02

## 2018-05-29 ASSESSMENT — PAIN DESCRIPTION - PROGRESSION: CLINICAL_PROGRESSION: NOT CHANGED

## 2018-05-29 ASSESSMENT — PAIN DESCRIPTION - FREQUENCY: FREQUENCY: CONTINUOUS

## 2018-05-29 ASSESSMENT — PAIN SCALES - GENERAL: PAINLEVEL_OUTOF10: 7

## 2018-05-29 ASSESSMENT — PAIN DESCRIPTION - DESCRIPTORS: DESCRIPTORS: STABBING

## 2018-05-29 ASSESSMENT — PAIN DESCRIPTION - LOCATION: LOCATION: FOOT

## 2018-05-29 ASSESSMENT — PAIN DESCRIPTION - ORIENTATION: ORIENTATION: LEFT

## 2018-05-29 ASSESSMENT — PAIN DESCRIPTION - ONSET: ONSET: ON-GOING

## 2018-05-29 ASSESSMENT — PAIN DESCRIPTION - PAIN TYPE: TYPE: CHRONIC PAIN

## 2018-06-02 ENCOUNTER — APPOINTMENT (OUTPATIENT)
Dept: GENERAL RADIOLOGY | Age: 43
DRG: 299 | End: 2018-06-02
Payer: COMMERCIAL

## 2018-06-02 ENCOUNTER — HOSPITAL ENCOUNTER (INPATIENT)
Age: 43
LOS: 3 days | Discharge: HOME | DRG: 299 | End: 2018-06-05
Attending: FAMILY MEDICINE | Admitting: FAMILY MEDICINE
Payer: COMMERCIAL

## 2018-06-02 ENCOUNTER — APPOINTMENT (OUTPATIENT)
Dept: CT IMAGING | Age: 43
DRG: 299 | End: 2018-06-02
Payer: COMMERCIAL

## 2018-06-02 DIAGNOSIS — I26.92 ACUTE SADDLE PULMONARY EMBOLISM WITHOUT ACUTE COR PULMONALE (HCC): Primary | ICD-10-CM

## 2018-06-02 PROBLEM — J44.9 COPD (CHRONIC OBSTRUCTIVE PULMONARY DISEASE) (HCC): Status: ACTIVE | Noted: 2018-06-02

## 2018-06-02 LAB
ABSOLUTE EOS #: 0.1 K/UL (ref 0–0.4)
ABSOLUTE IMMATURE GRANULOCYTE: ABNORMAL K/UL (ref 0–0.3)
ABSOLUTE LYMPH #: 2.6 K/UL (ref 1–4.8)
ABSOLUTE MONO #: 0.9 K/UL (ref 0.2–0.8)
ANION GAP SERPL CALCULATED.3IONS-SCNC: 12 MMOL/L (ref 9–17)
BASOPHILS # BLD: 0 % (ref 0–2)
BASOPHILS ABSOLUTE: 0 K/UL (ref 0–0.2)
BNP INTERPRETATION: NORMAL
BUN BLDV-MCNC: 15 MG/DL (ref 6–20)
BUN/CREAT BLD: 22 (ref 9–20)
CALCIUM SERPL-MCNC: 8.5 MG/DL (ref 8.6–10.4)
CHLORIDE BLD-SCNC: 109 MMOL/L (ref 98–107)
CO2: 19 MMOL/L (ref 20–31)
CREAT SERPL-MCNC: 0.67 MG/DL (ref 0.5–0.9)
D-DIMER QUANTITATIVE: 3.94 MG/L FEU
DIFFERENTIAL TYPE: ABNORMAL
DIRECT EXAM: NORMAL
EKG ATRIAL RATE: 83 BPM
EKG P AXIS: 53 DEGREES
EKG P-R INTERVAL: 198 MS
EKG Q-T INTERVAL: 382 MS
EKG QRS DURATION: 68 MS
EKG QTC CALCULATION (BAZETT): 448 MS
EKG R AXIS: -2 DEGREES
EKG T AXIS: 4 DEGREES
EKG VENTRICULAR RATE: 83 BPM
EOSINOPHILS RELATIVE PERCENT: 2 % (ref 1–4)
GFR AFRICAN AMERICAN: >60 ML/MIN
GFR NON-AFRICAN AMERICAN: >60 ML/MIN
GFR SERPL CREATININE-BSD FRML MDRD: ABNORMAL ML/MIN/{1.73_M2}
GFR SERPL CREATININE-BSD FRML MDRD: ABNORMAL ML/MIN/{1.73_M2}
GLUCOSE BLD-MCNC: 102 MG/DL (ref 70–99)
HCT VFR BLD CALC: 37.1 % (ref 36–46)
HEMOGLOBIN: 11.9 G/DL (ref 12–16)
HOMOCYSTEINE: 8.2 UMOL/L
IMMATURE GRANULOCYTES: ABNORMAL %
INR BLD: 1
LYMPHOCYTES # BLD: 36 % (ref 24–44)
Lab: NORMAL
MCH RBC QN AUTO: 24.8 PG (ref 26–34)
MCHC RBC AUTO-ENTMCNC: 32.1 G/DL (ref 31–37)
MCV RBC AUTO: 77.4 FL (ref 80–100)
MONOCYTES # BLD: 12 % (ref 1–7)
MYOGLOBIN: <21 NG/ML (ref 25–58)
NRBC AUTOMATED: ABNORMAL PER 100 WBC
PARTIAL THROMBOPLASTIN TIME: 25.8 SEC (ref 23–31)
PDW BLD-RTO: 15 % (ref 11.5–14.5)
PLATELET # BLD: 174 K/UL (ref 130–400)
PLATELET ESTIMATE: ABNORMAL
PMV BLD AUTO: ABNORMAL FL (ref 6–12)
POTASSIUM SERPL-SCNC: 4.2 MMOL/L (ref 3.7–5.3)
PRO-BNP: 68 PG/ML
PROTHROMBIN TIME: 10.4 SEC (ref 9.7–11.6)
RBC # BLD: 4.79 M/UL (ref 4–5.2)
RBC # BLD: ABNORMAL 10*6/UL
RHEUMATOID FACTOR: <10 IU/ML
SEDIMENTATION RATE, ERYTHROCYTE: 8 MM (ref 0–20)
SEG NEUTROPHILS: 50 % (ref 36–66)
SEGMENTED NEUTROPHILS ABSOLUTE COUNT: 3.5 K/UL (ref 1.8–7.7)
SODIUM BLD-SCNC: 140 MMOL/L (ref 135–144)
SPECIMEN DESCRIPTION: NORMAL
STATUS: NORMAL
TROPONIN INTERP: ABNORMAL
TROPONIN T: <0.03 NG/ML
WBC # BLD: 7.1 K/UL (ref 3.5–11)
WBC # BLD: ABNORMAL 10*3/UL

## 2018-06-02 PROCEDURE — 2580000003 HC RX 258: Performed by: NURSE PRACTITIONER

## 2018-06-02 PROCEDURE — 6360000002 HC RX W HCPCS: Performed by: FAMILY MEDICINE

## 2018-06-02 PROCEDURE — 85730 THROMBOPLASTIN TIME PARTIAL: CPT

## 2018-06-02 PROCEDURE — 6360000002 HC RX W HCPCS: Performed by: NURSE PRACTITIONER

## 2018-06-02 PROCEDURE — 81241 F5 GENE: CPT

## 2018-06-02 PROCEDURE — 83090 ASSAY OF HOMOCYSTEINE: CPT

## 2018-06-02 PROCEDURE — 85302 CLOT INHIBIT PROT C ANTIGEN: CPT

## 2018-06-02 PROCEDURE — 83874 ASSAY OF MYOGLOBIN: CPT

## 2018-06-02 PROCEDURE — 6370000000 HC RX 637 (ALT 250 FOR IP): Performed by: FAMILY MEDICINE

## 2018-06-02 PROCEDURE — 85303 CLOT INHIBIT PROT C ACTIVITY: CPT

## 2018-06-02 PROCEDURE — 96374 THER/PROPH/DIAG INJ IV PUSH: CPT

## 2018-06-02 PROCEDURE — 99223 1ST HOSP IP/OBS HIGH 75: CPT | Performed by: FAMILY MEDICINE

## 2018-06-02 PROCEDURE — 85610 PROTHROMBIN TIME: CPT

## 2018-06-02 PROCEDURE — 85025 COMPLETE CBC W/AUTO DIFF WBC: CPT

## 2018-06-02 PROCEDURE — 80048 BASIC METABOLIC PNL TOTAL CA: CPT

## 2018-06-02 PROCEDURE — 83880 ASSAY OF NATRIURETIC PEPTIDE: CPT

## 2018-06-02 PROCEDURE — 93971 EXTREMITY STUDY: CPT

## 2018-06-02 PROCEDURE — 93005 ELECTROCARDIOGRAM TRACING: CPT

## 2018-06-02 PROCEDURE — 6360000002 HC RX W HCPCS: Performed by: INTERNAL MEDICINE

## 2018-06-02 PROCEDURE — 71260 CT THORAX DX C+: CPT

## 2018-06-02 PROCEDURE — 85613 RUSSELL VIPER VENOM DILUTED: CPT

## 2018-06-02 PROCEDURE — 86431 RHEUMATOID FACTOR QUANT: CPT

## 2018-06-02 PROCEDURE — 85651 RBC SED RATE NONAUTOMATED: CPT

## 2018-06-02 PROCEDURE — 85379 FIBRIN DEGRADATION QUANT: CPT

## 2018-06-02 PROCEDURE — 36415 COLL VENOUS BLD VENIPUNCTURE: CPT

## 2018-06-02 PROCEDURE — 87804 INFLUENZA ASSAY W/OPTIC: CPT

## 2018-06-02 PROCEDURE — 2000000000 HC ICU R&B

## 2018-06-02 PROCEDURE — 99285 EMERGENCY DEPT VISIT HI MDM: CPT

## 2018-06-02 PROCEDURE — 86038 ANTINUCLEAR ANTIBODIES: CPT

## 2018-06-02 PROCEDURE — 86147 CARDIOLIPIN ANTIBODY EA IG: CPT

## 2018-06-02 PROCEDURE — 71046 X-RAY EXAM CHEST 2 VIEWS: CPT

## 2018-06-02 PROCEDURE — 2580000003 HC RX 258: Performed by: FAMILY MEDICINE

## 2018-06-02 PROCEDURE — 6360000004 HC RX CONTRAST MEDICATION: Performed by: NURSE PRACTITIONER

## 2018-06-02 PROCEDURE — 85305 CLOT INHIBIT PROT S TOTAL: CPT

## 2018-06-02 PROCEDURE — 94640 AIRWAY INHALATION TREATMENT: CPT

## 2018-06-02 PROCEDURE — 94664 DEMO&/EVAL PT USE INHALER: CPT

## 2018-06-02 PROCEDURE — 85306 CLOT INHIBIT PROT S FREE: CPT

## 2018-06-02 PROCEDURE — 84484 ASSAY OF TROPONIN QUANT: CPT

## 2018-06-02 RX ORDER — HEPARIN SODIUM 1000 [USP'U]/ML
80 INJECTION, SOLUTION INTRAVENOUS; SUBCUTANEOUS PRN
Status: DISCONTINUED | OUTPATIENT
Start: 2018-06-02 | End: 2018-06-04 | Stop reason: ALTCHOICE

## 2018-06-02 RX ORDER — HEPARIN SODIUM 1000 [USP'U]/ML
80 INJECTION, SOLUTION INTRAVENOUS; SUBCUTANEOUS PRN
Status: DISCONTINUED | OUTPATIENT
Start: 2018-06-02 | End: 2018-06-02

## 2018-06-02 RX ORDER — ACETAMINOPHEN AND CODEINE PHOSPHATE 300; 30 MG/1; MG/1
2 TABLET ORAL EVERY 6 HOURS PRN
Status: DISCONTINUED | OUTPATIENT
Start: 2018-06-02 | End: 2018-06-02 | Stop reason: CLARIF

## 2018-06-02 RX ORDER — CETIRIZINE HYDROCHLORIDE 10 MG/1
10 TABLET ORAL DAILY PRN
Status: DISCONTINUED | OUTPATIENT
Start: 2018-06-02 | End: 2018-06-05 | Stop reason: HOSPADM

## 2018-06-02 RX ORDER — POLYETHYLENE GLYCOL 3350 17 G/17G
17 POWDER, FOR SOLUTION ORAL DAILY PRN
Status: DISCONTINUED | OUTPATIENT
Start: 2018-06-02 | End: 2018-06-05 | Stop reason: HOSPADM

## 2018-06-02 RX ORDER — LEVETIRACETAM 500 MG/1
500 TABLET ORAL 2 TIMES DAILY
Status: DISCONTINUED | OUTPATIENT
Start: 2018-06-02 | End: 2018-06-05 | Stop reason: HOSPADM

## 2018-06-02 RX ORDER — CARVEDILOL 12.5 MG/1
12.5 TABLET ORAL 2 TIMES DAILY WITH MEALS
Status: DISCONTINUED | OUTPATIENT
Start: 2018-06-02 | End: 2018-06-05 | Stop reason: HOSPADM

## 2018-06-02 RX ORDER — PANTOPRAZOLE SODIUM 40 MG/1
40 TABLET, DELAYED RELEASE ORAL
Status: DISCONTINUED | OUTPATIENT
Start: 2018-06-03 | End: 2018-06-05 | Stop reason: HOSPADM

## 2018-06-02 RX ORDER — CARVEDILOL 6.25 MG/1
6.25 TABLET ORAL 2 TIMES DAILY WITH MEALS
Status: DISCONTINUED | OUTPATIENT
Start: 2018-06-02 | End: 2018-06-05 | Stop reason: HOSPADM

## 2018-06-02 RX ORDER — AMLODIPINE BESYLATE 5 MG/1
5 TABLET ORAL NIGHTLY
Status: DISCONTINUED | OUTPATIENT
Start: 2018-06-02 | End: 2018-06-05 | Stop reason: HOSPADM

## 2018-06-02 RX ORDER — AMLODIPINE BESYLATE 10 MG/1
10 TABLET ORAL NIGHTLY
Status: DISCONTINUED | OUTPATIENT
Start: 2018-06-02 | End: 2018-06-05 | Stop reason: HOSPADM

## 2018-06-02 RX ORDER — ATORVASTATIN CALCIUM 10 MG/1
10 TABLET, FILM COATED ORAL NIGHTLY
Status: DISCONTINUED | OUTPATIENT
Start: 2018-06-02 | End: 2018-06-05 | Stop reason: HOSPADM

## 2018-06-02 RX ORDER — FLUTICASONE PROPIONATE 50 MCG
1 SPRAY, SUSPENSION (ML) NASAL DAILY
Status: DISCONTINUED | OUTPATIENT
Start: 2018-06-02 | End: 2018-06-05 | Stop reason: HOSPADM

## 2018-06-02 RX ORDER — LUBIPROSTONE 24 UG/1
24 CAPSULE, GELATIN COATED ORAL
Status: DISCONTINUED | OUTPATIENT
Start: 2018-06-03 | End: 2018-06-05 | Stop reason: HOSPADM

## 2018-06-02 RX ORDER — IPRATROPIUM BROMIDE AND ALBUTEROL SULFATE 2.5; .5 MG/3ML; MG/3ML
1 SOLUTION RESPIRATORY (INHALATION)
Status: DISCONTINUED | OUTPATIENT
Start: 2018-06-02 | End: 2018-06-02

## 2018-06-02 RX ORDER — HEPARIN SODIUM 10000 [USP'U]/100ML
18 INJECTION, SOLUTION INTRAVENOUS CONTINUOUS
Status: DISPENSED | OUTPATIENT
Start: 2018-06-02 | End: 2018-06-04

## 2018-06-02 RX ORDER — IPRATROPIUM BROMIDE AND ALBUTEROL SULFATE 2.5; .5 MG/3ML; MG/3ML
1 SOLUTION RESPIRATORY (INHALATION) EVERY 4 HOURS PRN
Status: DISCONTINUED | OUTPATIENT
Start: 2018-06-02 | End: 2018-06-05 | Stop reason: HOSPADM

## 2018-06-02 RX ORDER — HEPARIN SODIUM 1000 [USP'U]/ML
80 INJECTION, SOLUTION INTRAVENOUS; SUBCUTANEOUS ONCE
Status: DISCONTINUED | OUTPATIENT
Start: 2018-06-02 | End: 2018-06-02

## 2018-06-02 RX ORDER — ERGOCALCIFEROL (VITAMIN D2) 1250 MCG
50000 CAPSULE ORAL WEEKLY
Status: DISCONTINUED | OUTPATIENT
Start: 2018-06-02 | End: 2018-06-02

## 2018-06-02 RX ORDER — MORPHINE SULFATE 2 MG/ML
2 INJECTION, SOLUTION INTRAMUSCULAR; INTRAVENOUS
Status: DISCONTINUED | OUTPATIENT
Start: 2018-06-02 | End: 2018-06-05 | Stop reason: HOSPADM

## 2018-06-02 RX ORDER — MORPHINE SULFATE 2 MG/ML
1 INJECTION, SOLUTION INTRAMUSCULAR; INTRAVENOUS
Status: DISCONTINUED | OUTPATIENT
Start: 2018-06-02 | End: 2018-06-02

## 2018-06-02 RX ORDER — ALBUTEROL SULFATE 2.5 MG/3ML
5 SOLUTION RESPIRATORY (INHALATION)
Status: DISCONTINUED | OUTPATIENT
Start: 2018-06-02 | End: 2018-06-02

## 2018-06-02 RX ORDER — 0.9 % SODIUM CHLORIDE 0.9 %
80 INTRAVENOUS SOLUTION INTRAVENOUS ONCE
Status: COMPLETED | OUTPATIENT
Start: 2018-06-02 | End: 2018-06-02

## 2018-06-02 RX ORDER — METHYLPREDNISOLONE SODIUM SUCCINATE 40 MG/ML
40 INJECTION, POWDER, LYOPHILIZED, FOR SOLUTION INTRAMUSCULAR; INTRAVENOUS EVERY 8 HOURS
Status: DISCONTINUED | OUTPATIENT
Start: 2018-06-02 | End: 2018-06-05

## 2018-06-02 RX ORDER — SODIUM CHLORIDE 9 MG/ML
INJECTION, SOLUTION INTRAVENOUS CONTINUOUS
Status: DISCONTINUED | OUTPATIENT
Start: 2018-06-02 | End: 2018-06-05

## 2018-06-02 RX ORDER — CARVEDILOL 25 MG/1
25 TABLET ORAL 2 TIMES DAILY
Status: DISCONTINUED | OUTPATIENT
Start: 2018-06-02 | End: 2018-06-02 | Stop reason: SDUPTHER

## 2018-06-02 RX ORDER — MORPHINE SULFATE 2 MG/ML
2 INJECTION, SOLUTION INTRAMUSCULAR; INTRAVENOUS
Status: DISCONTINUED | OUTPATIENT
Start: 2018-06-02 | End: 2018-06-02

## 2018-06-02 RX ORDER — LANOLIN ALCOHOL/MO/W.PET/CERES
1000 CREAM (GRAM) TOPICAL DAILY
Status: DISCONTINUED | OUTPATIENT
Start: 2018-06-02 | End: 2018-06-05 | Stop reason: HOSPADM

## 2018-06-02 RX ORDER — NICOTINE 21 MG/24HR
1 PATCH, TRANSDERMAL 24 HOURS TRANSDERMAL DAILY
Status: DISCONTINUED | OUTPATIENT
Start: 2018-06-02 | End: 2018-06-05 | Stop reason: HOSPADM

## 2018-06-02 RX ORDER — LISINOPRIL 40 MG/1
40 TABLET ORAL DAILY
Status: DISCONTINUED | OUTPATIENT
Start: 2018-06-02 | End: 2018-06-05 | Stop reason: HOSPADM

## 2018-06-02 RX ORDER — HEPARIN SODIUM 1000 [USP'U]/ML
80 INJECTION, SOLUTION INTRAVENOUS; SUBCUTANEOUS ONCE
Status: COMPLETED | OUTPATIENT
Start: 2018-06-02 | End: 2018-06-02

## 2018-06-02 RX ORDER — HYDROCODONE BITARTRATE AND ACETAMINOPHEN 5; 325 MG/1; MG/1
2 TABLET ORAL EVERY 6 HOURS PRN
Status: DISCONTINUED | OUTPATIENT
Start: 2018-06-02 | End: 2018-06-05 | Stop reason: HOSPADM

## 2018-06-02 RX ORDER — MORPHINE SULFATE 2 MG/ML
1 INJECTION, SOLUTION INTRAMUSCULAR; INTRAVENOUS
Status: DISCONTINUED | OUTPATIENT
Start: 2018-06-02 | End: 2018-06-05 | Stop reason: HOSPADM

## 2018-06-02 RX ORDER — AMLODIPINE BESYLATE 10 MG/1
10 TABLET ORAL NIGHTLY
Status: DISCONTINUED | OUTPATIENT
Start: 2018-06-02 | End: 2018-06-02 | Stop reason: SDUPTHER

## 2018-06-02 RX ORDER — BENZONATATE 100 MG/1
100 CAPSULE ORAL 3 TIMES DAILY PRN
Status: DISCONTINUED | OUTPATIENT
Start: 2018-06-02 | End: 2018-06-05 | Stop reason: HOSPADM

## 2018-06-02 RX ORDER — ALBUTEROL SULFATE 90 UG/1
2 AEROSOL, METERED RESPIRATORY (INHALATION)
Status: DISCONTINUED | OUTPATIENT
Start: 2018-06-02 | End: 2018-06-02

## 2018-06-02 RX ORDER — ESCITALOPRAM OXALATE 10 MG/1
10 TABLET ORAL DAILY
Status: DISCONTINUED | OUTPATIENT
Start: 2018-06-02 | End: 2018-06-05 | Stop reason: HOSPADM

## 2018-06-02 RX ORDER — SODIUM CHLORIDE 0.9 % (FLUSH) 0.9 %
10 SYRINGE (ML) INJECTION EVERY 12 HOURS SCHEDULED
Status: DISCONTINUED | OUTPATIENT
Start: 2018-06-02 | End: 2018-06-05 | Stop reason: HOSPADM

## 2018-06-02 RX ORDER — MONTELUKAST SODIUM 10 MG/1
10 TABLET ORAL DAILY
Status: DISCONTINUED | OUTPATIENT
Start: 2018-06-02 | End: 2018-06-05 | Stop reason: HOSPADM

## 2018-06-02 RX ORDER — SODIUM CHLORIDE 0.9 % (FLUSH) 0.9 %
10 SYRINGE (ML) INJECTION PRN
Status: DISCONTINUED | OUTPATIENT
Start: 2018-06-02 | End: 2018-06-05 | Stop reason: HOSPADM

## 2018-06-02 RX ORDER — CARVEDILOL 25 MG/1
25 TABLET ORAL 2 TIMES DAILY WITH MEALS
Status: DISCONTINUED | OUTPATIENT
Start: 2018-06-02 | End: 2018-06-05 | Stop reason: HOSPADM

## 2018-06-02 RX ORDER — HEPARIN SODIUM 10000 [USP'U]/100ML
18 INJECTION, SOLUTION INTRAVENOUS CONTINUOUS
Status: DISCONTINUED | OUTPATIENT
Start: 2018-06-02 | End: 2018-06-02

## 2018-06-02 RX ORDER — DOCUSATE SODIUM 100 MG/1
100 CAPSULE, LIQUID FILLED ORAL 2 TIMES DAILY
Status: DISCONTINUED | OUTPATIENT
Start: 2018-06-02 | End: 2018-06-05 | Stop reason: HOSPADM

## 2018-06-02 RX ORDER — TERBINAFINE HYDROCHLORIDE 250 MG/1
250 TABLET ORAL DAILY
Status: ON HOLD | COMMUNITY
End: 2018-06-05 | Stop reason: HOSPADM

## 2018-06-02 RX ORDER — HEPARIN SODIUM 1000 [USP'U]/ML
40 INJECTION, SOLUTION INTRAVENOUS; SUBCUTANEOUS PRN
Status: DISCONTINUED | OUTPATIENT
Start: 2018-06-02 | End: 2018-06-04 | Stop reason: ALTCHOICE

## 2018-06-02 RX ORDER — ASCORBIC ACID 500 MG
250 TABLET ORAL DAILY
Status: DISCONTINUED | OUTPATIENT
Start: 2018-06-02 | End: 2018-06-05 | Stop reason: HOSPADM

## 2018-06-02 RX ORDER — ASPIRIN 81 MG/1
81 TABLET ORAL DAILY
Status: DISCONTINUED | OUTPATIENT
Start: 2018-06-02 | End: 2018-06-05 | Stop reason: HOSPADM

## 2018-06-02 RX ORDER — HEPARIN SODIUM 1000 [USP'U]/ML
40 INJECTION, SOLUTION INTRAVENOUS; SUBCUTANEOUS PRN
Status: DISCONTINUED | OUTPATIENT
Start: 2018-06-02 | End: 2018-06-02

## 2018-06-02 RX ORDER — AMITRIPTYLINE HYDROCHLORIDE 50 MG/1
50 TABLET, FILM COATED ORAL NIGHTLY
Status: DISCONTINUED | OUTPATIENT
Start: 2018-06-02 | End: 2018-06-05 | Stop reason: HOSPADM

## 2018-06-02 RX ORDER — ONDANSETRON 2 MG/ML
4 INJECTION INTRAMUSCULAR; INTRAVENOUS EVERY 6 HOURS PRN
Status: DISCONTINUED | OUTPATIENT
Start: 2018-06-02 | End: 2018-06-05 | Stop reason: HOSPADM

## 2018-06-02 RX ADMIN — HYDROCODONE BITARTRATE AND ACETAMINOPHEN 2 TABLET: 5; 325 TABLET ORAL at 18:02

## 2018-06-02 RX ADMIN — AMLODIPINE BESYLATE 10 MG: 10 TABLET ORAL at 20:10

## 2018-06-02 RX ADMIN — AMITRIPTYLINE HYDROCHLORIDE 50 MG: 50 TABLET, FILM COATED ORAL at 20:10

## 2018-06-02 RX ADMIN — Medication 2 MG: at 15:41

## 2018-06-02 RX ADMIN — MOMETASONE FUROATE AND FORMOTEROL FUMARATE DIHYDRATE 2 PUFF: 200; 5 AEROSOL RESPIRATORY (INHALATION) at 19:43

## 2018-06-02 RX ADMIN — ATORVASTATIN CALCIUM 10 MG: 10 TABLET, FILM COATED ORAL at 20:10

## 2018-06-02 RX ADMIN — HEPARIN SODIUM AND DEXTROSE 18 UNITS/KG/HR: 10000; 5 INJECTION INTRAVENOUS at 12:54

## 2018-06-02 RX ADMIN — LURASIDONE HYDROCHLORIDE 60 MG: 20 TABLET, FILM COATED ORAL at 17:52

## 2018-06-02 RX ADMIN — ASPIRIN 81 MG: 81 TABLET, COATED ORAL at 15:55

## 2018-06-02 RX ADMIN — SODIUM CHLORIDE: 9 INJECTION, SOLUTION INTRAVENOUS at 15:44

## 2018-06-02 RX ADMIN — SODIUM CHLORIDE 80 ML: 9 INJECTION, SOLUTION INTRAVENOUS at 11:58

## 2018-06-02 RX ADMIN — HEPARIN SODIUM 6620 UNITS: 1000 INJECTION, SOLUTION INTRAVENOUS; SUBCUTANEOUS at 12:52

## 2018-06-02 RX ADMIN — LEVETIRACETAM 500 MG: 500 TABLET ORAL at 20:10

## 2018-06-02 RX ADMIN — IOPAMIDOL 75 ML: 755 INJECTION, SOLUTION INTRAVENOUS at 11:58

## 2018-06-02 RX ADMIN — ALBUTEROL SULFATE 5 MG: 5 SOLUTION RESPIRATORY (INHALATION) at 10:46

## 2018-06-02 RX ADMIN — METHYLPREDNISOLONE SODIUM SUCCINATE 40 MG: 40 INJECTION, POWDER, FOR SOLUTION INTRAMUSCULAR; INTRAVENOUS at 23:47

## 2018-06-02 RX ADMIN — METHYLPREDNISOLONE SODIUM SUCCINATE 40 MG: 40 INJECTION, POWDER, FOR SOLUTION INTRAMUSCULAR; INTRAVENOUS at 15:55

## 2018-06-02 RX ADMIN — Medication 2 MG: at 22:04

## 2018-06-02 RX ADMIN — Medication 10 ML: at 11:58

## 2018-06-02 ASSESSMENT — ENCOUNTER SYMPTOMS
EYE REDNESS: 0
NAUSEA: 0
SORE THROAT: 0
RHINORRHEA: 1
ABDOMINAL PAIN: 0
STRIDOR: 0
BACK PAIN: 0
DOUBLE VISION: 0
DIARRHEA: 0
VOMITING: 0
COLOR CHANGE: 0
CONSTIPATION: 0
COUGH: 1
ORTHOPNEA: 0
BLOOD IN STOOL: 0
PHOTOPHOBIA: 0
SPUTUM PRODUCTION: 0
EYE DISCHARGE: 0
HEMOPTYSIS: 0
EYE PAIN: 0
HEARTBURN: 0
SINUS PRESSURE: 0
SHORTNESS OF BREATH: 1
BLURRED VISION: 0

## 2018-06-02 ASSESSMENT — PAIN SCALES - GENERAL
PAINLEVEL_OUTOF10: 10
PAINLEVEL_OUTOF10: 8
PAINLEVEL_OUTOF10: 10
PAINLEVEL_OUTOF10: 6
PAINLEVEL_OUTOF10: 9
PAINLEVEL_OUTOF10: 8
PAINLEVEL_OUTOF10: 10
PAINLEVEL_OUTOF10: 10

## 2018-06-02 ASSESSMENT — PAIN DESCRIPTION - PAIN TYPE
TYPE: ACUTE PAIN

## 2018-06-02 ASSESSMENT — PAIN DESCRIPTION - LOCATION
LOCATION: CHEST

## 2018-06-03 LAB
ABSOLUTE EOS #: 0 K/UL (ref 0–0.4)
ABSOLUTE IMMATURE GRANULOCYTE: ABNORMAL K/UL (ref 0–0.3)
ABSOLUTE LYMPH #: 1.4 K/UL (ref 1–4.8)
ABSOLUTE MONO #: 0.2 K/UL (ref 0.2–0.8)
BASOPHILS # BLD: 1 % (ref 0–2)
BASOPHILS ABSOLUTE: 0.1 K/UL (ref 0–0.2)
DIFFERENTIAL TYPE: ABNORMAL
EOSINOPHILS RELATIVE PERCENT: 0 % (ref 1–4)
HCT VFR BLD CALC: 36.5 % (ref 36–46)
HEMOGLOBIN: 11.7 G/DL (ref 12–16)
IMMATURE GRANULOCYTES: ABNORMAL %
LYMPHOCYTES # BLD: 16 % (ref 24–44)
MCH RBC QN AUTO: 25 PG (ref 26–34)
MCHC RBC AUTO-ENTMCNC: 32.2 G/DL (ref 31–37)
MCV RBC AUTO: 77.7 FL (ref 80–100)
MONOCYTES # BLD: 3 % (ref 1–7)
NRBC AUTOMATED: ABNORMAL PER 100 WBC
PARTIAL THROMBOPLASTIN TIME: 48.8 SEC (ref 23–31)
PARTIAL THROMBOPLASTIN TIME: 52.9 SEC (ref 23–31)
PARTIAL THROMBOPLASTIN TIME: 55.6 SEC (ref 23–31)
PARTIAL THROMBOPLASTIN TIME: 71.3 SEC (ref 23–31)
PARTIAL THROMBOPLASTIN TIME: 96.5 SEC (ref 23–31)
PARTIAL THROMBOPLASTIN TIME: >120 SEC (ref 23–31)
PDW BLD-RTO: 15.2 % (ref 11.5–14.5)
PLATELET # BLD: 168 K/UL (ref 130–400)
PLATELET ESTIMATE: ABNORMAL
PMV BLD AUTO: ABNORMAL FL (ref 6–12)
RBC # BLD: 4.7 M/UL (ref 4–5.2)
RBC # BLD: ABNORMAL 10*6/UL
SEG NEUTROPHILS: 80 % (ref 36–66)
SEGMENTED NEUTROPHILS ABSOLUTE COUNT: 6.9 K/UL (ref 1.8–7.7)
WBC # BLD: 8.6 K/UL (ref 3.5–11)
WBC # BLD: ABNORMAL 10*3/UL

## 2018-06-03 PROCEDURE — 6370000000 HC RX 637 (ALT 250 FOR IP): Performed by: FAMILY MEDICINE

## 2018-06-03 PROCEDURE — 85025 COMPLETE CBC W/AUTO DIFF WBC: CPT

## 2018-06-03 PROCEDURE — 6360000002 HC RX W HCPCS: Performed by: INTERNAL MEDICINE

## 2018-06-03 PROCEDURE — 36415 COLL VENOUS BLD VENIPUNCTURE: CPT

## 2018-06-03 PROCEDURE — 99233 SBSQ HOSP IP/OBS HIGH 50: CPT | Performed by: FAMILY MEDICINE

## 2018-06-03 PROCEDURE — 94640 AIRWAY INHALATION TREATMENT: CPT

## 2018-06-03 PROCEDURE — 6360000002 HC RX W HCPCS: Performed by: NURSE PRACTITIONER

## 2018-06-03 PROCEDURE — 2580000003 HC RX 258: Performed by: FAMILY MEDICINE

## 2018-06-03 PROCEDURE — 2000000000 HC ICU R&B

## 2018-06-03 PROCEDURE — 6360000002 HC RX W HCPCS: Performed by: FAMILY MEDICINE

## 2018-06-03 PROCEDURE — 94761 N-INVAS EAR/PLS OXIMETRY MLT: CPT

## 2018-06-03 PROCEDURE — 85730 THROMBOPLASTIN TIME PARTIAL: CPT

## 2018-06-03 RX ORDER — DOXYCYCLINE 100 MG/1
100 CAPSULE ORAL 2 TIMES DAILY
Status: DISCONTINUED | OUTPATIENT
Start: 2018-06-03 | End: 2018-06-05 | Stop reason: HOSPADM

## 2018-06-03 RX ORDER — GINSENG 100 MG
CAPSULE ORAL 3 TIMES DAILY
Status: DISCONTINUED | OUTPATIENT
Start: 2018-06-03 | End: 2018-06-05 | Stop reason: HOSPADM

## 2018-06-03 RX ADMIN — MOMETASONE FUROATE AND FORMOTEROL FUMARATE DIHYDRATE 2 PUFF: 200; 5 AEROSOL RESPIRATORY (INHALATION) at 09:44

## 2018-06-03 RX ADMIN — AMLODIPINE BESYLATE 10 MG: 10 TABLET ORAL at 20:41

## 2018-06-03 RX ADMIN — LUBIPROSTONE 24 MCG: 24 CAPSULE, GELATIN COATED ORAL at 08:46

## 2018-06-03 RX ADMIN — ESCITALOPRAM OXALATE 10 MG: 10 TABLET ORAL at 08:29

## 2018-06-03 RX ADMIN — METHYLPREDNISOLONE SODIUM SUCCINATE 40 MG: 40 INJECTION, POWDER, FOR SOLUTION INTRAMUSCULAR; INTRAVENOUS at 14:30

## 2018-06-03 RX ADMIN — CARVEDILOL 25 MG: 25 TABLET, FILM COATED ORAL at 08:19

## 2018-06-03 RX ADMIN — PROBIOTIC PRODUCT - TAB 2 TABLET: TAB at 08:45

## 2018-06-03 RX ADMIN — METHYLPREDNISOLONE SODIUM SUCCINATE 40 MG: 40 INJECTION, POWDER, FOR SOLUTION INTRAMUSCULAR; INTRAVENOUS at 08:21

## 2018-06-03 RX ADMIN — AMITRIPTYLINE HYDROCHLORIDE 50 MG: 50 TABLET, FILM COATED ORAL at 20:41

## 2018-06-03 RX ADMIN — DOCUSATE SODIUM 100 MG: 100 CAPSULE, LIQUID FILLED ORAL at 08:19

## 2018-06-03 RX ADMIN — LEVETIRACETAM 500 MG: 500 TABLET ORAL at 20:41

## 2018-06-03 RX ADMIN — LURASIDONE HYDROCHLORIDE 60 MG: 20 TABLET, FILM COATED ORAL at 16:37

## 2018-06-03 RX ADMIN — CYANOCOBALAMIN TAB 1000 MCG 1000 MCG: 1000 TAB at 08:46

## 2018-06-03 RX ADMIN — SODIUM CHLORIDE 75 ML/HR: 9 INJECTION, SOLUTION INTRAVENOUS at 10:08

## 2018-06-03 RX ADMIN — Medication 1 MG: at 20:51

## 2018-06-03 RX ADMIN — HEPARIN SODIUM AND DEXTROSE 12 UNITS/KG/HR: 10000; 5 INJECTION INTRAVENOUS at 10:09

## 2018-06-03 RX ADMIN — FLUTICASONE PROPIONATE 1 SPRAY: 50 SPRAY, METERED NASAL at 08:22

## 2018-06-03 RX ADMIN — CARVEDILOL 25 MG: 25 TABLET, FILM COATED ORAL at 16:37

## 2018-06-03 RX ADMIN — ATORVASTATIN CALCIUM 10 MG: 10 TABLET, FILM COATED ORAL at 20:41

## 2018-06-03 RX ADMIN — Medication 250 MG: at 08:44

## 2018-06-03 RX ADMIN — HYDROCODONE BITARTRATE AND ACETAMINOPHEN 2 TABLET: 5; 325 TABLET ORAL at 08:52

## 2018-06-03 RX ADMIN — HEPARIN SODIUM 3310 UNITS: 1000 INJECTION, SOLUTION INTRAVENOUS; SUBCUTANEOUS at 00:56

## 2018-06-03 RX ADMIN — DOXYCYCLINE 100 MG: 100 CAPSULE ORAL at 20:41

## 2018-06-03 RX ADMIN — ASPIRIN 81 MG: 81 TABLET, COATED ORAL at 08:19

## 2018-06-03 RX ADMIN — BACITRACIN: 500 OINTMENT TOPICAL at 20:41

## 2018-06-03 RX ADMIN — LEVETIRACETAM 500 MG: 500 TABLET ORAL at 08:19

## 2018-06-03 RX ADMIN — HEPARIN SODIUM 3310 UNITS: 1000 INJECTION, SOLUTION INTRAVENOUS; SUBCUTANEOUS at 15:19

## 2018-06-03 RX ADMIN — LISINOPRIL 40 MG: 40 TABLET ORAL at 08:45

## 2018-06-03 RX ADMIN — MONTELUKAST SODIUM 10 MG: 10 TABLET, FILM COATED ORAL at 20:41

## 2018-06-03 RX ADMIN — HYDROCODONE BITARTRATE AND ACETAMINOPHEN 2 TABLET: 5; 325 TABLET ORAL at 16:36

## 2018-06-03 RX ADMIN — VITAMIN D, TAB 1000IU (100/BT) 2000 UNITS: 25 TAB at 08:44

## 2018-06-03 RX ADMIN — PANTOPRAZOLE SODIUM 40 MG: 40 TABLET, DELAYED RELEASE ORAL at 08:44

## 2018-06-03 RX ADMIN — Medication 10 ML: at 08:31

## 2018-06-03 ASSESSMENT — ENCOUNTER SYMPTOMS
SHORTNESS OF BREATH: 0
COUGH: 1
NAUSEA: 0
VOMITING: 0
DIARRHEA: 0

## 2018-06-03 ASSESSMENT — PAIN SCALES - GENERAL
PAINLEVEL_OUTOF10: 8
PAINLEVEL_OUTOF10: 10
PAINLEVEL_OUTOF10: 10
PAINLEVEL_OUTOF10: 6
PAINLEVEL_OUTOF10: 10
PAINLEVEL_OUTOF10: 8
PAINLEVEL_OUTOF10: 6

## 2018-06-03 ASSESSMENT — PAIN DESCRIPTION - LOCATION
LOCATION: CHEST
LOCATION: BACK

## 2018-06-03 ASSESSMENT — PAIN DESCRIPTION - PAIN TYPE
TYPE: ACUTE PAIN
TYPE: ACUTE PAIN

## 2018-06-03 ASSESSMENT — PAIN DESCRIPTION - ORIENTATION: ORIENTATION: LOWER

## 2018-06-04 ENCOUNTER — APPOINTMENT (OUTPATIENT)
Dept: GENERAL RADIOLOGY | Age: 43
DRG: 299 | End: 2018-06-04
Payer: COMMERCIAL

## 2018-06-04 ENCOUNTER — TELEPHONE (OUTPATIENT)
Dept: GASTROENTEROLOGY | Age: 43
End: 2018-06-04

## 2018-06-04 LAB
ABSOLUTE EOS #: 0 K/UL (ref 0–0.4)
ABSOLUTE IMMATURE GRANULOCYTE: ABNORMAL K/UL (ref 0–0.3)
ABSOLUTE LYMPH #: 2.1 K/UL (ref 1–4.8)
ABSOLUTE MONO #: 0.6 K/UL (ref 0.2–0.8)
ANION GAP SERPL CALCULATED.3IONS-SCNC: 14 MMOL/L (ref 9–17)
ANTI-NUCLEAR ANTIBODY (ANA): NEGATIVE
BASOPHILS # BLD: 0 % (ref 0–2)
BASOPHILS ABSOLUTE: 0 K/UL (ref 0–0.2)
BUN BLDV-MCNC: 14 MG/DL (ref 6–20)
BUN/CREAT BLD: 20 (ref 9–20)
CALCIUM SERPL-MCNC: 8.7 MG/DL (ref 8.6–10.4)
CHLORIDE BLD-SCNC: 104 MMOL/L (ref 98–107)
CO2: 20 MMOL/L (ref 20–31)
CREAT SERPL-MCNC: 0.69 MG/DL (ref 0.5–0.9)
DIFFERENTIAL TYPE: ABNORMAL
EOSINOPHILS RELATIVE PERCENT: 0 % (ref 1–4)
GFR AFRICAN AMERICAN: >60 ML/MIN
GFR NON-AFRICAN AMERICAN: >60 ML/MIN
GFR SERPL CREATININE-BSD FRML MDRD: ABNORMAL ML/MIN/{1.73_M2}
GFR SERPL CREATININE-BSD FRML MDRD: ABNORMAL ML/MIN/{1.73_M2}
GLUCOSE BLD-MCNC: 143 MG/DL (ref 70–99)
HCT VFR BLD CALC: 36.3 % (ref 36–46)
HEMOGLOBIN: 11.4 G/DL (ref 12–16)
IMMATURE GRANULOCYTES: ABNORMAL %
LV EF: 65 %
LVEF MODALITY: NORMAL
LYMPHOCYTES # BLD: 13 % (ref 24–44)
MCH RBC QN AUTO: 24.8 PG (ref 26–34)
MCHC RBC AUTO-ENTMCNC: 31.5 G/DL (ref 31–37)
MCV RBC AUTO: 78.7 FL (ref 80–100)
MONOCYTES # BLD: 4 % (ref 1–7)
NRBC AUTOMATED: ABNORMAL PER 100 WBC
PARTIAL THROMBOPLASTIN TIME: 69.3 SEC (ref 23–31)
PDW BLD-RTO: 16 % (ref 11.5–14.5)
PLATELET # BLD: 180 K/UL (ref 130–400)
PLATELET # BLD: 185 K/UL (ref 130–400)
PLATELET ESTIMATE: ABNORMAL
PMV BLD AUTO: 10.2 FL (ref 6–12)
POTASSIUM SERPL-SCNC: 3.7 MMOL/L (ref 3.7–5.3)
RBC # BLD: 4.61 M/UL (ref 4–5.2)
RBC # BLD: ABNORMAL 10*6/UL
SEG NEUTROPHILS: 83 % (ref 36–66)
SEGMENTED NEUTROPHILS ABSOLUTE COUNT: 13.6 K/UL (ref 1.8–7.7)
SODIUM BLD-SCNC: 138 MMOL/L (ref 135–144)
WBC # BLD: 16.4 K/UL (ref 3.5–11)
WBC # BLD: ABNORMAL 10*3/UL

## 2018-06-04 PROCEDURE — 97530 THERAPEUTIC ACTIVITIES: CPT

## 2018-06-04 PROCEDURE — 2580000003 HC RX 258: Performed by: FAMILY MEDICINE

## 2018-06-04 PROCEDURE — G8979 MOBILITY GOAL STATUS: HCPCS

## 2018-06-04 PROCEDURE — 80048 BASIC METABOLIC PNL TOTAL CA: CPT

## 2018-06-04 PROCEDURE — 6360000002 HC RX W HCPCS: Performed by: NURSE PRACTITIONER

## 2018-06-04 PROCEDURE — 6370000000 HC RX 637 (ALT 250 FOR IP): Performed by: FAMILY MEDICINE

## 2018-06-04 PROCEDURE — 71045 X-RAY EXAM CHEST 1 VIEW: CPT

## 2018-06-04 PROCEDURE — 36415 COLL VENOUS BLD VENIPUNCTURE: CPT

## 2018-06-04 PROCEDURE — 94640 AIRWAY INHALATION TREATMENT: CPT

## 2018-06-04 PROCEDURE — 99233 SBSQ HOSP IP/OBS HIGH 50: CPT | Performed by: FAMILY MEDICINE

## 2018-06-04 PROCEDURE — 93306 TTE W/DOPPLER COMPLETE: CPT

## 2018-06-04 PROCEDURE — G8978 MOBILITY CURRENT STATUS: HCPCS

## 2018-06-04 PROCEDURE — 2060000000 HC ICU INTERMEDIATE R&B

## 2018-06-04 PROCEDURE — 85049 AUTOMATED PLATELET COUNT: CPT

## 2018-06-04 PROCEDURE — 94761 N-INVAS EAR/PLS OXIMETRY MLT: CPT

## 2018-06-04 PROCEDURE — 6370000000 HC RX 637 (ALT 250 FOR IP): Performed by: INTERNAL MEDICINE

## 2018-06-04 PROCEDURE — 97116 GAIT TRAINING THERAPY: CPT

## 2018-06-04 PROCEDURE — 97162 PT EVAL MOD COMPLEX 30 MIN: CPT

## 2018-06-04 PROCEDURE — 6360000002 HC RX W HCPCS: Performed by: INTERNAL MEDICINE

## 2018-06-04 PROCEDURE — 85730 THROMBOPLASTIN TIME PARTIAL: CPT

## 2018-06-04 PROCEDURE — 6360000002 HC RX W HCPCS: Performed by: FAMILY MEDICINE

## 2018-06-04 PROCEDURE — 85025 COMPLETE CBC W/AUTO DIFF WBC: CPT

## 2018-06-04 RX ADMIN — METHYLPREDNISOLONE SODIUM SUCCINATE 40 MG: 40 INJECTION, POWDER, FOR SOLUTION INTRAMUSCULAR; INTRAVENOUS at 16:35

## 2018-06-04 RX ADMIN — MONTELUKAST SODIUM 10 MG: 10 TABLET, FILM COATED ORAL at 20:50

## 2018-06-04 RX ADMIN — PROBIOTIC PRODUCT - TAB 2 TABLET: TAB at 09:49

## 2018-06-04 RX ADMIN — AMITRIPTYLINE HYDROCHLORIDE 50 MG: 50 TABLET, FILM COATED ORAL at 20:50

## 2018-06-04 RX ADMIN — ONDANSETRON 4 MG: 2 INJECTION INTRAMUSCULAR; INTRAVENOUS at 18:03

## 2018-06-04 RX ADMIN — ASPIRIN 81 MG: 81 TABLET, COATED ORAL at 09:49

## 2018-06-04 RX ADMIN — FLUTICASONE PROPIONATE 1 SPRAY: 50 SPRAY, METERED NASAL at 09:51

## 2018-06-04 RX ADMIN — HYDROCODONE BITARTRATE AND ACETAMINOPHEN 2 TABLET: 5; 325 TABLET ORAL at 09:38

## 2018-06-04 RX ADMIN — CARVEDILOL 25 MG: 25 TABLET, FILM COATED ORAL at 09:34

## 2018-06-04 RX ADMIN — DOXYCYCLINE 100 MG: 100 CAPSULE ORAL at 09:49

## 2018-06-04 RX ADMIN — METHYLPREDNISOLONE SODIUM SUCCINATE 40 MG: 40 INJECTION, POWDER, FOR SOLUTION INTRAMUSCULAR; INTRAVENOUS at 22:54

## 2018-06-04 RX ADMIN — SODIUM CHLORIDE: 9 INJECTION, SOLUTION INTRAVENOUS at 09:29

## 2018-06-04 RX ADMIN — ESCITALOPRAM OXALATE 10 MG: 10 TABLET ORAL at 09:49

## 2018-06-04 RX ADMIN — Medication 250 MG: at 09:33

## 2018-06-04 RX ADMIN — HEPARIN SODIUM AND DEXTROSE 14 UNITS/KG/HR: 10000; 5 INJECTION INTRAVENOUS at 09:30

## 2018-06-04 RX ADMIN — BACITRACIN: 500 OINTMENT TOPICAL at 13:56

## 2018-06-04 RX ADMIN — SODIUM CHLORIDE: 9 INJECTION, SOLUTION INTRAVENOUS at 22:49

## 2018-06-04 RX ADMIN — MOMETASONE FUROATE AND FORMOTEROL FUMARATE DIHYDRATE 2 PUFF: 200; 5 AEROSOL RESPIRATORY (INHALATION) at 19:29

## 2018-06-04 RX ADMIN — PANTOPRAZOLE SODIUM 40 MG: 40 TABLET, DELAYED RELEASE ORAL at 09:34

## 2018-06-04 RX ADMIN — BACITRACIN: 500 OINTMENT TOPICAL at 11:16

## 2018-06-04 RX ADMIN — CARVEDILOL 6.25 MG: 6.25 TABLET, FILM COATED ORAL at 17:00

## 2018-06-04 RX ADMIN — CYANOCOBALAMIN TAB 1000 MCG 1000 MCG: 1000 TAB at 09:33

## 2018-06-04 RX ADMIN — LEVETIRACETAM 500 MG: 500 TABLET ORAL at 09:33

## 2018-06-04 RX ADMIN — HYDROCODONE BITARTRATE AND ACETAMINOPHEN 2 TABLET: 5; 325 TABLET ORAL at 16:04

## 2018-06-04 RX ADMIN — LISINOPRIL 40 MG: 40 TABLET ORAL at 09:33

## 2018-06-04 RX ADMIN — APIXABAN 10 MG: 5 TABLET, FILM COATED ORAL at 20:50

## 2018-06-04 RX ADMIN — LURASIDONE HYDROCHLORIDE 60 MG: 20 TABLET, FILM COATED ORAL at 17:01

## 2018-06-04 RX ADMIN — DOCUSATE SODIUM 100 MG: 100 CAPSULE, LIQUID FILLED ORAL at 20:50

## 2018-06-04 RX ADMIN — ATORVASTATIN CALCIUM 10 MG: 10 TABLET, FILM COATED ORAL at 20:49

## 2018-06-04 RX ADMIN — METHYLPREDNISOLONE SODIUM SUCCINATE 40 MG: 40 INJECTION, POWDER, FOR SOLUTION INTRAMUSCULAR; INTRAVENOUS at 00:24

## 2018-06-04 RX ADMIN — LUBIPROSTONE 24 MCG: 24 CAPSULE, GELATIN COATED ORAL at 09:49

## 2018-06-04 RX ADMIN — DOXYCYCLINE 100 MG: 100 CAPSULE ORAL at 20:49

## 2018-06-04 RX ADMIN — VITAMIN D, TAB 1000IU (100/BT) 2000 UNITS: 25 TAB at 09:33

## 2018-06-04 RX ADMIN — LEVETIRACETAM 500 MG: 500 TABLET ORAL at 20:50

## 2018-06-04 RX ADMIN — AMLODIPINE BESYLATE 10 MG: 10 TABLET ORAL at 20:51

## 2018-06-04 RX ADMIN — METHYLPREDNISOLONE SODIUM SUCCINATE 40 MG: 40 INJECTION, POWDER, FOR SOLUTION INTRAMUSCULAR; INTRAVENOUS at 09:37

## 2018-06-04 RX ADMIN — MOMETASONE FUROATE AND FORMOTEROL FUMARATE DIHYDRATE 2 PUFF: 200; 5 AEROSOL RESPIRATORY (INHALATION) at 09:25

## 2018-06-04 ASSESSMENT — PAIN DESCRIPTION - PAIN TYPE: TYPE: ACUTE PAIN

## 2018-06-04 ASSESSMENT — PAIN SCALES - GENERAL
PAINLEVEL_OUTOF10: 8
PAINLEVEL_OUTOF10: 8
PAINLEVEL_OUTOF10: 4
PAINLEVEL_OUTOF10: 8

## 2018-06-04 ASSESSMENT — ENCOUNTER SYMPTOMS
SHORTNESS OF BREATH: 0
VOMITING: 0
DIARRHEA: 0
COUGH: 1
NAUSEA: 0

## 2018-06-04 ASSESSMENT — PAIN DESCRIPTION - ONSET: ONSET: ON-GOING

## 2018-06-04 ASSESSMENT — PAIN DESCRIPTION - ORIENTATION: ORIENTATION: LOWER

## 2018-06-04 ASSESSMENT — PAIN DESCRIPTION - DESCRIPTORS: DESCRIPTORS: ACHING;DISCOMFORT

## 2018-06-04 ASSESSMENT — PAIN DESCRIPTION - FREQUENCY: FREQUENCY: CONTINUOUS

## 2018-06-04 ASSESSMENT — PAIN DESCRIPTION - LOCATION: LOCATION: BACK

## 2018-06-05 VITALS
BODY MASS INDEX: 31.74 KG/M2 | HEIGHT: 65 IN | SYSTOLIC BLOOD PRESSURE: 135 MMHG | TEMPERATURE: 98.4 F | RESPIRATION RATE: 16 BRPM | DIASTOLIC BLOOD PRESSURE: 80 MMHG | OXYGEN SATURATION: 97 % | WEIGHT: 190.48 LBS | HEART RATE: 72 BPM

## 2018-06-05 LAB
ANTICARDIOLIPIN IGA ANTIBODY: 1.2 APU
ANTICARDIOLIPIN IGG ANTIBODY: 5.1 GPU
CARDIOLIPIN AB IGM: 5.8 MPU
DILUTE RUSSELL VIPER VENOM TIME: ABNORMAL
FACTOR V LEIDEN MUTATION: NORMAL
INR BLD: 1.1
LUPUS ANTICOAG: ABNORMAL
PARTIAL THROMBOPLASTIN TIME: 24.4 SEC (ref 23–31)
PARTIAL THROMBOPLASTIN TIME: >120 SEC (ref 23–31)
PROTEIN C ACTIVITY: 98 %
PROTEIN S ACTIVITY: 70 % (ref 59–130)
PROTHROMBIN TIME: 11.1 SEC (ref 9.7–11.6)

## 2018-06-05 PROCEDURE — 97165 OT EVAL LOW COMPLEX 30 MIN: CPT

## 2018-06-05 PROCEDURE — 97530 THERAPEUTIC ACTIVITIES: CPT

## 2018-06-05 PROCEDURE — 94640 AIRWAY INHALATION TREATMENT: CPT

## 2018-06-05 PROCEDURE — 6370000000 HC RX 637 (ALT 250 FOR IP): Performed by: INTERNAL MEDICINE

## 2018-06-05 PROCEDURE — 94760 N-INVAS EAR/PLS OXIMETRY 1: CPT

## 2018-06-05 PROCEDURE — 6360000002 HC RX W HCPCS: Performed by: INTERNAL MEDICINE

## 2018-06-05 PROCEDURE — 85730 THROMBOPLASTIN TIME PARTIAL: CPT

## 2018-06-05 PROCEDURE — G8987 SELF CARE CURRENT STATUS: HCPCS

## 2018-06-05 PROCEDURE — G8988 SELF CARE GOAL STATUS: HCPCS

## 2018-06-05 PROCEDURE — 36415 COLL VENOUS BLD VENIPUNCTURE: CPT

## 2018-06-05 PROCEDURE — 6370000000 HC RX 637 (ALT 250 FOR IP): Performed by: NURSE PRACTITIONER

## 2018-06-05 PROCEDURE — 6370000000 HC RX 637 (ALT 250 FOR IP): Performed by: FAMILY MEDICINE

## 2018-06-05 PROCEDURE — 99239 HOSP IP/OBS DSCHRG MGMT >30: CPT | Performed by: FAMILY MEDICINE

## 2018-06-05 RX ORDER — PSEUDOEPHEDRINE HCL 30 MG
100 TABLET ORAL 2 TIMES DAILY
Qty: 60 CAPSULE | Refills: 0 | Status: SHIPPED | OUTPATIENT
Start: 2018-06-05 | End: 2019-01-08 | Stop reason: CLARIF

## 2018-06-05 RX ORDER — PREDNISONE 20 MG/1
20 TABLET ORAL 2 TIMES DAILY
Status: DISCONTINUED | OUTPATIENT
Start: 2018-06-05 | End: 2018-06-05 | Stop reason: HOSPADM

## 2018-06-05 RX ORDER — NICOTINE 21 MG/24HR
1 PATCH, TRANSDERMAL 24 HOURS TRANSDERMAL DAILY
Qty: 30 PATCH | Refills: 3 | Status: SHIPPED | OUTPATIENT
Start: 2018-06-06 | End: 2019-01-08 | Stop reason: CLARIF

## 2018-06-05 RX ORDER — PREDNISONE 20 MG/1
TABLET ORAL
Qty: 40 TABLET | Refills: 0 | Status: SHIPPED | OUTPATIENT
Start: 2018-06-05 | End: 2018-07-12

## 2018-06-05 RX ADMIN — HYDROCODONE BITARTRATE AND ACETAMINOPHEN 2 TABLET: 5; 325 TABLET ORAL at 09:16

## 2018-06-05 RX ADMIN — VITAMIN D, TAB 1000IU (100/BT) 2000 UNITS: 25 TAB at 08:59

## 2018-06-05 RX ADMIN — LUBIPROSTONE 24 MCG: 24 CAPSULE, GELATIN COATED ORAL at 09:03

## 2018-06-05 RX ADMIN — CYANOCOBALAMIN TAB 1000 MCG 1000 MCG: 1000 TAB at 08:59

## 2018-06-05 RX ADMIN — ASPIRIN 81 MG: 81 TABLET, COATED ORAL at 08:59

## 2018-06-05 RX ADMIN — PROBIOTIC PRODUCT - TAB 2 TABLET: TAB at 08:59

## 2018-06-05 RX ADMIN — LEVETIRACETAM 500 MG: 500 TABLET ORAL at 09:00

## 2018-06-05 RX ADMIN — APIXABAN 10 MG: 5 TABLET, FILM COATED ORAL at 09:08

## 2018-06-05 RX ADMIN — ESCITALOPRAM OXALATE 10 MG: 10 TABLET ORAL at 09:00

## 2018-06-05 RX ADMIN — PREDNISONE 20 MG: 20 TABLET ORAL at 11:31

## 2018-06-05 RX ADMIN — DOXYCYCLINE 100 MG: 100 CAPSULE ORAL at 08:59

## 2018-06-05 RX ADMIN — CARVEDILOL 25 MG: 25 TABLET, FILM COATED ORAL at 08:59

## 2018-06-05 RX ADMIN — METHYLPREDNISOLONE SODIUM SUCCINATE 40 MG: 40 INJECTION, POWDER, FOR SOLUTION INTRAMUSCULAR; INTRAVENOUS at 08:56

## 2018-06-05 RX ADMIN — MOMETASONE FUROATE AND FORMOTEROL FUMARATE DIHYDRATE 2 PUFF: 200; 5 AEROSOL RESPIRATORY (INHALATION) at 09:38

## 2018-06-05 RX ADMIN — LISINOPRIL 40 MG: 40 TABLET ORAL at 09:00

## 2018-06-05 RX ADMIN — FLUTICASONE PROPIONATE 1 SPRAY: 50 SPRAY, METERED NASAL at 09:03

## 2018-06-05 RX ADMIN — DOCUSATE SODIUM 100 MG: 100 CAPSULE, LIQUID FILLED ORAL at 09:00

## 2018-06-05 RX ADMIN — BACITRACIN: 500 OINTMENT TOPICAL at 09:03

## 2018-06-05 RX ADMIN — Medication 250 MG: at 09:00

## 2018-06-05 RX ADMIN — PANTOPRAZOLE SODIUM 40 MG: 40 TABLET, DELAYED RELEASE ORAL at 08:59

## 2018-06-05 ASSESSMENT — PAIN SCALES - GENERAL
PAINLEVEL_OUTOF10: 8
PAINLEVEL_OUTOF10: 5

## 2018-06-06 LAB
PROTEIN C ANTIGEN: 101 % (ref 63–153)
PROTEIN S ANTIGEN, TOTAL: 118 % (ref 63–126)

## 2018-06-07 ENCOUNTER — OFFICE VISIT (OUTPATIENT)
Dept: FAMILY MEDICINE CLINIC | Age: 43
End: 2018-06-07
Payer: MEDICAID

## 2018-06-07 VITALS
WEIGHT: 188 LBS | HEIGHT: 65 IN | DIASTOLIC BLOOD PRESSURE: 86 MMHG | OXYGEN SATURATION: 97 % | BODY MASS INDEX: 31.32 KG/M2 | TEMPERATURE: 99.2 F | HEART RATE: 72 BPM | SYSTOLIC BLOOD PRESSURE: 117 MMHG

## 2018-06-07 DIAGNOSIS — I26.92 SADDLE EMBOLUS OF PULMONARY ARTERY WITHOUT ACUTE COR PULMONALE, UNSPECIFIED CHRONICITY (HCC): Primary | ICD-10-CM

## 2018-06-07 DIAGNOSIS — G40.909 SEIZURE DISORDER (HCC): ICD-10-CM

## 2018-06-07 DIAGNOSIS — E78.5 DYSLIPIDEMIA: ICD-10-CM

## 2018-06-07 DIAGNOSIS — E53.0 VITAMIN B2 DEFICIENCY: ICD-10-CM

## 2018-06-07 DIAGNOSIS — F31.9 BIPOLAR AFFECTIVE DISORDER, REMISSION STATUS UNSPECIFIED (HCC): ICD-10-CM

## 2018-06-07 DIAGNOSIS — E55.9 VITAMIN D DEFICIENCY: ICD-10-CM

## 2018-06-07 DIAGNOSIS — M79.605 ACUTE LEG PAIN, LEFT: ICD-10-CM

## 2018-06-07 DIAGNOSIS — K58.1 IRRITABLE BOWEL SYNDROME WITH CONSTIPATION: ICD-10-CM

## 2018-06-07 DIAGNOSIS — R07.9 CHEST PAIN, UNSPECIFIED TYPE: ICD-10-CM

## 2018-06-07 DIAGNOSIS — K59.04 CHRONIC IDIOPATHIC CONSTIPATION: ICD-10-CM

## 2018-06-07 DIAGNOSIS — J45.909 UNCOMPLICATED ASTHMA, UNSPECIFIED ASTHMA SEVERITY, UNSPECIFIED WHETHER PERSISTENT: ICD-10-CM

## 2018-06-07 DIAGNOSIS — Z72.0 TOBACCO ABUSE DISORDER: ICD-10-CM

## 2018-06-07 DIAGNOSIS — Z72.0 TOBACCO ABUSE: ICD-10-CM

## 2018-06-07 DIAGNOSIS — Z79.01 ANTICOAGULANT LONG-TERM USE: ICD-10-CM

## 2018-06-07 DIAGNOSIS — G93.5 CHIARI I MALFORMATION (HCC): ICD-10-CM

## 2018-06-07 DIAGNOSIS — E53.8 B12 DEFICIENCY: ICD-10-CM

## 2018-06-07 DIAGNOSIS — J44.9 CHRONIC OBSTRUCTIVE PULMONARY DISEASE, UNSPECIFIED COPD TYPE (HCC): ICD-10-CM

## 2018-06-07 PROCEDURE — 99495 TRANSJ CARE MGMT MOD F2F 14D: CPT | Performed by: FAMILY MEDICINE

## 2018-06-07 PROCEDURE — 1111F DSCHRG MED/CURRENT MED MERGE: CPT | Performed by: FAMILY MEDICINE

## 2018-06-07 RX ORDER — ERGOCALCIFEROL 1.25 MG/1
50000 CAPSULE ORAL WEEKLY
Qty: 12 CAPSULE | Refills: 2 | Status: SHIPPED | OUTPATIENT
Start: 2018-06-07 | End: 2019-10-04 | Stop reason: ALTCHOICE

## 2018-06-07 RX ORDER — ACETAMINOPHEN AND CODEINE PHOSPHATE 300; 30 MG/1; MG/1
1-2 TABLET ORAL
Qty: 18 TABLET | Refills: 0 | Status: SHIPPED | OUTPATIENT
Start: 2018-06-07 | End: 2018-06-10

## 2018-06-07 RX ORDER — MAGNESIUM 200 MG
1 TABLET ORAL DAILY
Qty: 30 TABLET | Refills: 11 | Status: SHIPPED | OUTPATIENT
Start: 2018-06-07 | End: 2020-01-27 | Stop reason: SDUPTHER

## 2018-06-07 ASSESSMENT — PATIENT HEALTH QUESTIONNAIRE - PHQ9
1. LITTLE INTEREST OR PLEASURE IN DOING THINGS: 0
SUM OF ALL RESPONSES TO PHQ QUESTIONS 1-9: 0
SUM OF ALL RESPONSES TO PHQ9 QUESTIONS 1 & 2: 0
2. FEELING DOWN, DEPRESSED OR HOPELESS: 0

## 2018-06-09 DIAGNOSIS — G40.909 SEIZURE DISORDER (HCC): ICD-10-CM

## 2018-06-12 RX ORDER — LEVETIRACETAM 500 MG/1
TABLET ORAL
Qty: 60 TABLET | Refills: 0 | Status: SHIPPED | OUTPATIENT
Start: 2018-06-12 | End: 2019-10-18 | Stop reason: SDUPTHER

## 2018-06-20 ENCOUNTER — HOSPITAL ENCOUNTER (EMERGENCY)
Age: 43
Discharge: HOME | End: 2018-06-20
Attending: EMERGENCY MEDICINE
Payer: MEDICAID

## 2018-06-20 ENCOUNTER — APPOINTMENT (OUTPATIENT)
Dept: GENERAL RADIOLOGY | Age: 43
End: 2018-06-20
Payer: MEDICAID

## 2018-06-20 VITALS
DIASTOLIC BLOOD PRESSURE: 86 MMHG | BODY MASS INDEX: 31.2 KG/M2 | OXYGEN SATURATION: 96 % | SYSTOLIC BLOOD PRESSURE: 127 MMHG | WEIGHT: 187.25 LBS | HEIGHT: 65 IN | RESPIRATION RATE: 18 BRPM | HEART RATE: 81 BPM | TEMPERATURE: 98.4 F

## 2018-06-20 DIAGNOSIS — K08.89 PAIN, DENTAL: ICD-10-CM

## 2018-06-20 DIAGNOSIS — S63.602A SPRAIN OF LEFT THUMB, UNSPECIFIED SITE OF FINGER, INITIAL ENCOUNTER: Primary | ICD-10-CM

## 2018-06-20 LAB
ABSOLUTE EOS #: 0.1 K/UL (ref 0–0.4)
ABSOLUTE IMMATURE GRANULOCYTE: ABNORMAL K/UL (ref 0–0.3)
ABSOLUTE LYMPH #: 3.3 K/UL (ref 1–4.8)
ABSOLUTE MONO #: 1 K/UL (ref 0.2–0.8)
ANION GAP SERPL CALCULATED.3IONS-SCNC: 13 MMOL/L (ref 9–17)
BASOPHILS # BLD: 3 % (ref 0–2)
BASOPHILS ABSOLUTE: 0.3 K/UL (ref 0–0.2)
BUN BLDV-MCNC: 14 MG/DL (ref 6–20)
BUN/CREAT BLD: 19 (ref 9–20)
CALCIUM SERPL-MCNC: 8.8 MG/DL (ref 8.6–10.4)
CHLORIDE BLD-SCNC: 99 MMOL/L (ref 98–107)
CO2: 26 MMOL/L (ref 20–31)
CREAT SERPL-MCNC: 0.72 MG/DL (ref 0.5–0.9)
DIFFERENTIAL TYPE: ABNORMAL
EKG ATRIAL RATE: 67 BPM
EKG P AXIS: 54 DEGREES
EKG P-R INTERVAL: 196 MS
EKG Q-T INTERVAL: 426 MS
EKG QRS DURATION: 84 MS
EKG QTC CALCULATION (BAZETT): 450 MS
EKG R AXIS: -12 DEGREES
EKG T AXIS: 21 DEGREES
EKG VENTRICULAR RATE: 67 BPM
EOSINOPHILS RELATIVE PERCENT: 1 % (ref 1–4)
GFR AFRICAN AMERICAN: >60 ML/MIN
GFR NON-AFRICAN AMERICAN: >60 ML/MIN
GFR SERPL CREATININE-BSD FRML MDRD: NORMAL ML/MIN/{1.73_M2}
GFR SERPL CREATININE-BSD FRML MDRD: NORMAL ML/MIN/{1.73_M2}
GLUCOSE BLD-MCNC: 81 MG/DL (ref 70–99)
HCT VFR BLD CALC: 37.8 % (ref 36–46)
HEMOGLOBIN: 12.4 G/DL (ref 12–16)
IMMATURE GRANULOCYTES: ABNORMAL %
INR BLD: 1
LYMPHOCYTES # BLD: 38 % (ref 24–44)
MCH RBC QN AUTO: 25.7 PG (ref 26–34)
MCHC RBC AUTO-ENTMCNC: 32.7 G/DL (ref 31–37)
MCV RBC AUTO: 78.6 FL (ref 80–100)
MONOCYTES # BLD: 12 % (ref 1–7)
MYOGLOBIN: <21 NG/ML (ref 25–58)
NRBC AUTOMATED: ABNORMAL PER 100 WBC
PDW BLD-RTO: 15.4 % (ref 11.5–14.5)
PLATELET # BLD: 186 K/UL (ref 130–400)
PLATELET ESTIMATE: ABNORMAL
PMV BLD AUTO: ABNORMAL FL (ref 6–12)
POTASSIUM SERPL-SCNC: 4.2 MMOL/L (ref 3.7–5.3)
PROTHROMBIN TIME: 10 SEC (ref 9.7–11.6)
RBC # BLD: 4.81 M/UL (ref 4–5.2)
RBC # BLD: ABNORMAL 10*6/UL
SEG NEUTROPHILS: 46 % (ref 36–66)
SEGMENTED NEUTROPHILS ABSOLUTE COUNT: 3.9 K/UL (ref 1.8–7.7)
SODIUM BLD-SCNC: 138 MMOL/L (ref 135–144)
TROPONIN INTERP: ABNORMAL
TROPONIN T: <0.03 NG/ML
WBC # BLD: 8.6 K/UL (ref 3.5–11)
WBC # BLD: ABNORMAL 10*3/UL

## 2018-06-20 PROCEDURE — 99284 EMERGENCY DEPT VISIT MOD MDM: CPT

## 2018-06-20 PROCEDURE — 93971 EXTREMITY STUDY: CPT

## 2018-06-20 PROCEDURE — 71046 X-RAY EXAM CHEST 2 VIEWS: CPT

## 2018-06-20 PROCEDURE — 6370000000 HC RX 637 (ALT 250 FOR IP): Performed by: NURSE PRACTITIONER

## 2018-06-20 PROCEDURE — 80048 BASIC METABOLIC PNL TOTAL CA: CPT

## 2018-06-20 PROCEDURE — 83874 ASSAY OF MYOGLOBIN: CPT

## 2018-06-20 PROCEDURE — 84484 ASSAY OF TROPONIN QUANT: CPT

## 2018-06-20 PROCEDURE — 73130 X-RAY EXAM OF HAND: CPT

## 2018-06-20 PROCEDURE — 29125 APPL SHORT ARM SPLINT STATIC: CPT

## 2018-06-20 PROCEDURE — 93005 ELECTROCARDIOGRAM TRACING: CPT

## 2018-06-20 PROCEDURE — 85610 PROTHROMBIN TIME: CPT

## 2018-06-20 PROCEDURE — 85025 COMPLETE CBC W/AUTO DIFF WBC: CPT

## 2018-06-20 RX ORDER — CEPHALEXIN 500 MG/1
500 CAPSULE ORAL 2 TIMES DAILY
Qty: 14 CAPSULE | Refills: 0 | Status: SHIPPED | OUTPATIENT
Start: 2018-06-20 | End: 2018-07-24

## 2018-06-20 RX ORDER — HYDROCODONE BITARTRATE AND ACETAMINOPHEN 5; 325 MG/1; MG/1
2 TABLET ORAL ONCE
Status: COMPLETED | OUTPATIENT
Start: 2018-06-20 | End: 2018-06-20

## 2018-06-20 RX ORDER — HYDROCODONE BITARTRATE AND ACETAMINOPHEN 5; 325 MG/1; MG/1
1 TABLET ORAL EVERY 8 HOURS PRN
Qty: 15 TABLET | Refills: 0 | Status: SHIPPED | OUTPATIENT
Start: 2018-06-20 | End: 2018-06-25

## 2018-06-20 RX ADMIN — HYDROCODONE BITARTRATE AND ACETAMINOPHEN 2 TABLET: 5; 325 TABLET ORAL at 21:22

## 2018-06-20 ASSESSMENT — ENCOUNTER SYMPTOMS
COLOR CHANGE: 0
VOMITING: 0
NAUSEA: 0
ABDOMINAL PAIN: 0
COUGH: 0
SORE THROAT: 0
SHORTNESS OF BREATH: 0
RHINORRHEA: 0
SINUS PRESSURE: 0
CONSTIPATION: 0
WHEEZING: 0
DIARRHEA: 0

## 2018-06-20 ASSESSMENT — PAIN DESCRIPTION - PAIN TYPE: TYPE: ACUTE PAIN

## 2018-06-20 ASSESSMENT — PAIN DESCRIPTION - DESCRIPTORS: DESCRIPTORS: ACHING

## 2018-06-20 ASSESSMENT — PAIN SCALES - GENERAL
PAINLEVEL_OUTOF10: 8
PAINLEVEL_OUTOF10: 8

## 2018-06-26 RX ORDER — EPINEPHRINE 0.3 MG/.3ML
0.3 INJECTION SUBCUTANEOUS ONCE
Qty: 0.3 ML | Refills: 0 | Status: SHIPPED | OUTPATIENT
Start: 2018-06-26 | End: 2019-01-09

## 2018-07-12 ENCOUNTER — OFFICE VISIT (OUTPATIENT)
Dept: FAMILY MEDICINE CLINIC | Age: 43
End: 2018-07-12
Payer: MEDICAID

## 2018-07-12 VITALS
DIASTOLIC BLOOD PRESSURE: 88 MMHG | SYSTOLIC BLOOD PRESSURE: 125 MMHG | HEIGHT: 65 IN | OXYGEN SATURATION: 100 % | BODY MASS INDEX: 31.96 KG/M2 | WEIGHT: 191.8 LBS | RESPIRATION RATE: 16 BRPM | HEART RATE: 72 BPM

## 2018-07-12 DIAGNOSIS — J40 BRONCHITIS: ICD-10-CM

## 2018-07-12 DIAGNOSIS — I26.92 SADDLE EMBOLUS OF PULMONARY ARTERY WITHOUT ACUTE COR PULMONALE, UNSPECIFIED CHRONICITY (HCC): Primary | ICD-10-CM

## 2018-07-12 DIAGNOSIS — M79.645 THUMB PAIN, LEFT: ICD-10-CM

## 2018-07-12 PROCEDURE — G8417 CALC BMI ABV UP PARAM F/U: HCPCS | Performed by: FAMILY MEDICINE

## 2018-07-12 PROCEDURE — 99214 OFFICE O/P EST MOD 30 MIN: CPT | Performed by: FAMILY MEDICINE

## 2018-07-12 PROCEDURE — G8427 DOCREV CUR MEDS BY ELIG CLIN: HCPCS | Performed by: FAMILY MEDICINE

## 2018-07-12 PROCEDURE — 4004F PT TOBACCO SCREEN RCVD TLK: CPT | Performed by: FAMILY MEDICINE

## 2018-07-12 RX ORDER — AMOXICILLIN 500 MG/1
500 CAPSULE ORAL 3 TIMES DAILY
Qty: 21 CAPSULE | Refills: 0 | Status: SHIPPED | OUTPATIENT
Start: 2018-07-12 | End: 2018-07-19

## 2018-07-12 RX ORDER — PREDNISONE 20 MG/1
40 TABLET ORAL DAILY
Qty: 10 TABLET | Refills: 0 | Status: SHIPPED | OUTPATIENT
Start: 2018-07-12 | End: 2018-07-17

## 2018-07-12 RX ORDER — TRAMADOL HYDROCHLORIDE 50 MG/1
50 TABLET ORAL EVERY 8 HOURS PRN
Qty: 20 TABLET | Refills: 0 | Status: SHIPPED | OUTPATIENT
Start: 2018-07-12 | End: 2018-07-17

## 2018-07-12 ASSESSMENT — ENCOUNTER SYMPTOMS
WHEEZING: 1
SORE THROAT: 1
SHORTNESS OF BREATH: 1
COUGH: 1

## 2018-07-12 NOTE — PROGRESS NOTES
Angelica Mills MD  [facility]  COMPREHENSIVE CARE  [address]  [address]  Dept: [phone]    Real Hollis is a 38 y.o. female who presents today for her medical conditions/complaints as noted below. Real Hollis is here today c/o Cough; Headache; Chest Pain (x 1 week); and Medication Refill (eloquis)       HPI:     Cough   This is a new problem. The current episode started in the past 7 days. The problem has been unchanged. The cough is productive of sputum. Associated symptoms include chest pain, chills, ear pain, headaches, nasal congestion, a sore throat, shortness of breath and wheezing. Pertinent negatives include no fever or weight loss. She has tried a beta-agonist inhaler for the symptoms. The treatment provided mild relief. Her past medical history is significant for asthma.    1 week hx of productive cough with yellow sputum, throat pain  No documented fevers  Asthma, compliant with inhalers and singulair, using prn albuterol q 6 hr this past week    ER June 20 for L thumb pain radiating up arm, now ongoing for few weeks, L hand xray wnl, no hx of injury  Sent home in velcro thumb splint from ER, discharged with norco and told to f/u with Dr. Symone Ribeiro the [facility] has not been touching her pain, has been trying Tylenol and Flexeril as well, wrist splint not offering any relief, she does wear it at night still, interfering with activities during the day    On eloquis for PE, unprovoked, diagnosed last month, saw PCP in follow-up for this, needs refill    Patient Active Problem List   Diagnosis    H/O hysterectomy for benign disease    H/O gestational diabetes mellitus, not currently pregnant    Borderline diabetes    FH: diabetes mellitus    FH: throat cancer    FH: uterine cancer    FH: breast cancer    Neck pain, chronic    Tobacco abuse disorder    Seizure disorder ([address]    Non-compliance with treatment    Dyslipidemia    Uncomplicated performed by Pablo Martin DO at [address] [place] [address]    TUBAL LIGATION      UMBILICAL HERNIA REPAIR       Family History   Problem Relation Age of Onset    Asthma Mother     High Blood Pressure Mother     Mental Illness Mother     Stroke Other     Other Sister         blood clotting disorder, ms    Depression Sister     Cancer Maternal Grandmother     Diabetes Maternal Grandmother     Cancer Maternal Aunt     Diabetes Maternal Grandfather      Social History   Substance Use Topics    Smoking status: Current Every Day Smoker     Packs/day: 1.00     Years: 26.00     Types: Cigarettes    Smokeless tobacco: Never Used      Comment: wants help- adviced about smoking cessation plans    Alcohol use Yes      Comment: occ       Current Outpatient Prescriptions:     apixaban (ELIQUIS) 5 MG TABS tablet, 1 tab BID, Disp: 60 tablet, Rfl: 0    predniSONE (DELTASONE) 20 MG tablet, Take 2 tablets by mouth daily for 5 days, Disp: 10 tablet, Rfl: 0    amoxicillin (AMOXIL) 500 MG capsule, Take 1 capsule by mouth 3 times daily for 7 days, Disp: 21 capsule, Rfl: 0    traMADol (ULTRAM) 50 MG tablet, Take 1 tablet by mouth every 8 hours as needed for Pain for up to 5 days. Intended supply: 5 days.  Take lowest dose possible to manage pain., Disp: 20 tablet, Rfl: 0    EPINEPHrine (EPIPEN 2-STEPHANIE) 0.3 MG/0.3ML SOAJ injection, Inject 0.3 mLs into the muscle once for 1 dose Use as directed for allergic reaction, Disp: 0.3 mL, Rfl: 0    cephALEXin (KEFLEX) 500 MG capsule, Take 1 capsule by mouth 2 times daily, Disp: 14 capsule, Rfl: 0    levETIRAcetam (KEPPRA) 500 MG tablet, take 1 tablet by mouth twice a day, Disp: 60 tablet, Rfl: 0    pantoprazole (PROTONIX) 40 MG tablet, take 1 tablet by mouth once daily, Disp: 30 tablet, Rfl: 3    Cyanocobalamin (VITAMIN B-12) 1000 MCG SUBL, Place 1 tablet under the tongue daily, Disp: 30 tablet, Rfl: 11    vitamin D (ERGOCALCIFEROL) Disp: 60 capsule, Rfl: 3    fluticasone (FLONASE) 50 MCG/ACT nasal spray, 1 spray by Nasal route daily, Disp: 1 Bottle, Rfl: 0    Cholecalciferol (VITAMIN D) 2000 units CAPS capsule, Take 1 capsule by mouth daily, Disp: , Rfl:     amitriptyline (ELAVIL) 50 MG tablet, Take 1 tablet by mouth nightly (Patient taking differently: Take 50 mg by mouth nightly as needed for Sleep ), Disp: 90 tablet, Rfl: 3    cyanocobalamin ([employer]S VITAMIN B12) 1000 MCG tablet, Take 1,000 mcg by mouth daily , Disp: , Rfl:     miconazole (ZEASORB-AF) 2 % powder, Apply topically 2 times daily. (Patient taking differently: Apply topically 2 times daily to foot), Disp: 45 g, Rfl: 1    vitamin B and C (VITABEE/C) TABS tablet, take 1 tablet by mouth once daily, Disp: 30 tablet, Rfl: 11    escitalopram (LEXAPRO) 10 MG tablet, take 1 tablet by mouth once daily, Disp: , Rfl: 0    RA VITAMIN C 500 MG tablet, take 1 tablet by mouth once daily, Disp: 30 tablet, Rfl: 3    Subjective:     Review of Systems   Constitutional: Positive for chills and fatigue. Negative for fever and weight loss. HENT: Positive for ear pain and sore throat. Respiratory: Positive for cough, shortness of breath and wheezing. Cardiovascular: Positive for chest pain. Negative for palpitations. Neurological: Positive for headaches. Objective:     /88   Pulse 72   Resp 16   Ht 5' 5\" (1.651 m)   Wt 191 lb 12.8 oz (87 kg)   SpO2 100%   BMI 31.92 kg/m²     Physical Exam   Constitutional: She is oriented to person, place, and time. She appears well-developed. No distress. HENT:   Head: Normocephalic. Right Ear: Ear canal normal. Tympanic membrane is erythematous. Tympanic membrane is not perforated, not retracted and not bulging. No middle ear effusion. Left Ear: Ear canal normal. Tympanic membrane is erythematous. Tympanic membrane is not perforated, not retracted and not bulging. No middle ear effusion.    Mouth/Throat: No oropharyngeal exudate. Eyes: Conjunctivae and EOM are normal.   Neck: Normal range of motion. Neck supple. No thyromegaly present. Cardiovascular: Normal heart sounds. No murmur heard. Pulmonary/Chest: Effort normal and breath sounds normal. No respiratory distress. She has no wheezes. Abdominal: Soft. There is no rebound and no guarding. Musculoskeletal: She exhibits no edema or deformity. Left thumb: Pain on palpation at the base of the thumb and along the entire digit, stiffness noted, unable to flex at the MCP or IP joints. No obvious effusion, skin changes, or erythema. Lymphadenopathy:     She has no cervical adenopathy. Neurological: She is alert and oriented to person, place, and time. Skin: Skin is warm. No rash noted. She is not diaphoretic. Psychiatric: She has a normal mood and affect. Her behavior is normal. Judgment and thought content normal.       Assessment & Plan:      1. Saddle embolus of pulmonary artery without acute cor pulmonale, unspecified chronicity (HCC)  1 month supply given, she is to follow with her PCP for further refills  - apixaban (ELIQUIS) 5 MG TABS tablet; 1 tab BID  Dispense: 60 tablet; Refill: 0    2. Thumb pain, left  Could be related to [place]. I recommended physical therapy, continuing with splint, tramadol and Tylenol as needed for pain. Referral made to Dr. Sergio Adkins. - Raina Carlson MD, Orthopedics [facility]*  - [facility]  - traMADol (ULTRAM) 50 MG tablet; Take 1 tablet by mouth every 8 hours as needed for Pain for up to 5 days. Intended supply: 5 days. Take lowest dose possible to manage pain. Dispense: 20 tablet; Refill: 0    3. Bronchitis  Recommend using albuterol inhaler as needed for wheezing  Recommend OTC Tylenol/Motrin for discomfort   Advise to use salt water gargles for sore throat  Advise to take hot water steam to help with congestion  Use saline rinse to nose as needed for congestion.    Recommend use

## 2018-07-24 ENCOUNTER — OFFICE VISIT (OUTPATIENT)
Dept: FAMILY MEDICINE CLINIC | Age: 43
End: 2018-07-24
Payer: MEDICAID

## 2018-07-24 VITALS
DIASTOLIC BLOOD PRESSURE: 78 MMHG | SYSTOLIC BLOOD PRESSURE: 110 MMHG | HEART RATE: 74 BPM | BODY MASS INDEX: 31.59 KG/M2 | HEIGHT: 65 IN | OXYGEN SATURATION: 98 % | WEIGHT: 189.6 LBS

## 2018-07-24 DIAGNOSIS — G40.909 SEIZURE DISORDER (HCC): ICD-10-CM

## 2018-07-24 DIAGNOSIS — Z79.01 ANTICOAGULANT LONG-TERM USE: ICD-10-CM

## 2018-07-24 DIAGNOSIS — F31.9 BIPOLAR AFFECTIVE DISORDER, REMISSION STATUS UNSPECIFIED (HCC): ICD-10-CM

## 2018-07-24 DIAGNOSIS — J44.9 CHRONIC OBSTRUCTIVE PULMONARY DISEASE, UNSPECIFIED COPD TYPE (HCC): ICD-10-CM

## 2018-07-24 DIAGNOSIS — T78.40XA ALLERGIC REACTION TO DRUG, INITIAL ENCOUNTER: ICD-10-CM

## 2018-07-24 DIAGNOSIS — I26.92 SADDLE EMBOLUS OF PULMONARY ARTERY WITHOUT ACUTE COR PULMONALE, UNSPECIFIED CHRONICITY (HCC): Primary | ICD-10-CM

## 2018-07-24 PROCEDURE — 99214 OFFICE O/P EST MOD 30 MIN: CPT | Performed by: FAMILY MEDICINE

## 2018-07-24 PROCEDURE — 3023F SPIROM DOC REV: CPT | Performed by: FAMILY MEDICINE

## 2018-07-24 PROCEDURE — 4004F PT TOBACCO SCREEN RCVD TLK: CPT | Performed by: FAMILY MEDICINE

## 2018-07-24 PROCEDURE — G8417 CALC BMI ABV UP PARAM F/U: HCPCS | Performed by: FAMILY MEDICINE

## 2018-07-24 PROCEDURE — G8926 SPIRO NO PERF OR DOC: HCPCS | Performed by: FAMILY MEDICINE

## 2018-07-24 PROCEDURE — G8427 DOCREV CUR MEDS BY ELIG CLIN: HCPCS | Performed by: FAMILY MEDICINE

## 2018-07-24 RX ORDER — CETIRIZINE HYDROCHLORIDE 10 MG/1
10 TABLET ORAL DAILY
Qty: 30 TABLET | Refills: 0 | Status: SHIPPED | OUTPATIENT
Start: 2018-07-24 | End: 2019-10-18

## 2018-07-24 RX ORDER — PREDNISONE 10 MG/1
TABLET ORAL
Qty: 10 TABLET | Refills: 0 | Status: SHIPPED | OUTPATIENT
Start: 2018-07-24 | End: 2019-01-08 | Stop reason: CLARIF

## 2018-07-24 ASSESSMENT — ENCOUNTER SYMPTOMS
TROUBLE SWALLOWING: 0
BACK PAIN: 0
EYE REDNESS: 0
ABDOMINAL DISTENTION: 0
BLOOD IN STOOL: 0
COLOR CHANGE: 0
CONSTIPATION: 0
VOICE CHANGE: 0
EYE DISCHARGE: 0
RECTAL PAIN: 0
SINUS PRESSURE: 0
ABDOMINAL PAIN: 0
CHEST TIGHTNESS: 0
DIARRHEA: 0
NAUSEA: 0
ANAL BLEEDING: 0
VOMITING: 0
COUGH: 1
SHORTNESS OF BREATH: 0
EYE PAIN: 0

## 2018-07-24 NOTE — PROGRESS NOTES
mouth 2 times daily 60 capsule 0    Doxycycline Hyclate (DOXY-CAPS PO) Take 100 mg by mouth 2 times daily      atorvastatin (LIPITOR) 10 MG tablet take 1 tablet by mouth once daily 30 tablet 3    carvedilol (COREG) 25 MG tablet take 1 tablet by mouth twice a day 60 tablet 3    amLODIPine (NORVASC) 10 MG tablet take 1 tablet by mouth once daily 30 tablet 2    lisinopril (PRINIVIL;ZESTRIL) 40 MG tablet take 1 tablet by mouth once daily 30 tablet 2    polyethylene glycol (MIRALAX) powder Please dispense 4 Ducolax tablets with Miralax. Use as directed by following your patient instructions given by office 255 g 0    mometasone-formoterol (DULERA) 200-5 MCG/ACT inhaler Inhale 2 puffs into the lungs every 12 hours 1 puff 0    montelukast (SINGULAIR) 10 MG tablet Take 1 tablet by mouth daily 30 tablet 3    albuterol sulfate HFA (VENTOLIN HFA) 108 (90 Base) MCG/ACT inhaler Inhale 2 puffs into the lungs every 4 hours as needed for Wheezing 1 Inhaler 3    aspirin EC 81 MG EC tablet Take 1 tablet by mouth daily 30 tablet 3    lurasidone (LATUDA) 60 MG TABS tablet take 1 tablet by mouth once daily with food AT LEAST 350 CALORIES      lubiprostone (AMITIZA) 24 MCG capsule Take 1 capsule by mouth 2 times daily (with meals) 60 capsule 3    fluticasone (FLONASE) 50 MCG/ACT nasal spray 1 spray by Nasal route daily 1 Bottle 0    Cholecalciferol (VITAMIN D) 2000 units CAPS capsule Take 1 capsule by mouth daily      cyanocobalamin (CVS VITAMIN B12) 1000 MCG tablet Take 1,000 mcg by mouth daily       miconazole (ZEASORB-AF) 2 % powder Apply topically 2 times daily.  (Patient taking differently: Apply topically 2 times daily to foot) 45 g 1    vitamin B and C (VITABEE/C) TABS tablet take 1 tablet by mouth once daily 30 tablet 11    escitalopram (LEXAPRO) 10 MG tablet take 1 tablet by mouth once daily  0    RA VITAMIN C 500 MG tablet take 1 tablet by mouth once daily 30 tablet 3    EPINEPHrine (EPIPEN 2-STEPHANIE) 0.3 MG/0.3ML SOAJ injection Inject 0.3 mLs into the muscle once for 1 dose Use as directed for allergic reaction 0.3 mL 0    [DISCONTINUED] cephALEXin (KEFLEX) 500 MG capsule Take 1 capsule by mouth 2 times daily 14 capsule 0    [DISCONTINUED] cetirizine (ZYRTEC ALLERGY) 10 MG tablet Take 1 tablet by mouth daily 30 tablet 2    amitriptyline (ELAVIL) 50 MG tablet Take 1 tablet by mouth nightly (Patient taking differently: Take 50 mg by mouth nightly as needed for Sleep ) 90 tablet 3     No facility-administered encounter medications on file as of 7/24/2018.

## 2018-07-27 ENCOUNTER — HOSPITAL ENCOUNTER (OUTPATIENT)
Age: 43
Discharge: HOME | End: 2018-07-27
Payer: MEDICAID

## 2018-07-27 ENCOUNTER — OFFICE VISIT (OUTPATIENT)
Dept: FAMILY MEDICINE CLINIC | Age: 43
End: 2018-07-27
Payer: MEDICAID

## 2018-07-27 ENCOUNTER — HOSPITAL ENCOUNTER (OUTPATIENT)
Dept: GENERAL RADIOLOGY | Age: 43
Discharge: HOME | End: 2018-07-29
Payer: MEDICAID

## 2018-07-27 ENCOUNTER — HOSPITAL ENCOUNTER (OUTPATIENT)
Age: 43
Discharge: HOME | End: 2018-07-29
Payer: MEDICAID

## 2018-07-27 VITALS
HEART RATE: 74 BPM | BODY MASS INDEX: 32.15 KG/M2 | HEIGHT: 65 IN | WEIGHT: 193 LBS | SYSTOLIC BLOOD PRESSURE: 147 MMHG | DIASTOLIC BLOOD PRESSURE: 102 MMHG | OXYGEN SATURATION: 98 %

## 2018-07-27 DIAGNOSIS — K59.1 FUNCTIONAL DIARRHEA: ICD-10-CM

## 2018-07-27 DIAGNOSIS — K21.9 GASTROESOPHAGEAL REFLUX DISEASE, ESOPHAGITIS PRESENCE NOT SPECIFIED: ICD-10-CM

## 2018-07-27 DIAGNOSIS — K59.09 CHRONIC CONSTIPATION: ICD-10-CM

## 2018-07-27 DIAGNOSIS — R11.0 NAUSEA: ICD-10-CM

## 2018-07-27 DIAGNOSIS — R50.9 FEVER, UNSPECIFIED FEVER CAUSE: Primary | ICD-10-CM

## 2018-07-27 DIAGNOSIS — R50.9 FEVER, UNSPECIFIED FEVER CAUSE: ICD-10-CM

## 2018-07-27 DIAGNOSIS — J20.9 ACUTE BRONCHITIS, UNSPECIFIED ORGANISM: ICD-10-CM

## 2018-07-27 DIAGNOSIS — G89.29 CHRONIC DENTAL PAIN: ICD-10-CM

## 2018-07-27 DIAGNOSIS — K58.2 IRRITABLE BOWEL SYNDROME WITH BOTH CONSTIPATION AND DIARRHEA: ICD-10-CM

## 2018-07-27 DIAGNOSIS — I10 ESSENTIAL HYPERTENSION: ICD-10-CM

## 2018-07-27 DIAGNOSIS — M79.10 MYALGIA: ICD-10-CM

## 2018-07-27 DIAGNOSIS — R63.5 WEIGHT GAIN: ICD-10-CM

## 2018-07-27 DIAGNOSIS — K08.9 CHRONIC DENTAL PAIN: ICD-10-CM

## 2018-07-27 DIAGNOSIS — R10.13 ABDOMINAL PAIN, EPIGASTRIC: ICD-10-CM

## 2018-07-27 DIAGNOSIS — E66.8 MODERATE OBESITY: ICD-10-CM

## 2018-07-27 DIAGNOSIS — F41.9 ANXIETY: ICD-10-CM

## 2018-07-27 LAB
ABSOLUTE EOS #: 0.1 K/UL (ref 0–0.4)
ABSOLUTE IMMATURE GRANULOCYTE: ABNORMAL K/UL (ref 0–0.3)
ABSOLUTE LYMPH #: 2.8 K/UL (ref 1–4.8)
ABSOLUTE MONO #: 1.2 K/UL (ref 0.2–0.8)
ALBUMIN SERPL-MCNC: 4.1 G/DL (ref 3.5–5.2)
ALBUMIN/GLOBULIN RATIO: ABNORMAL (ref 1–2.5)
ALP BLD-CCNC: 76 U/L (ref 35–104)
ALT SERPL-CCNC: 35 U/L (ref 5–33)
ANION GAP SERPL CALCULATED.3IONS-SCNC: 11 MMOL/L (ref 9–17)
AST SERPL-CCNC: 24 U/L
BASOPHILS # BLD: 0 % (ref 0–2)
BASOPHILS ABSOLUTE: 0.1 K/UL (ref 0–0.2)
BILIRUB SERPL-MCNC: 0.12 MG/DL (ref 0.3–1.2)
BUN BLDV-MCNC: 13 MG/DL (ref 6–20)
BUN/CREAT BLD: 18 (ref 9–20)
CALCIUM SERPL-MCNC: 8.9 MG/DL (ref 8.6–10.4)
CHLORIDE BLD-SCNC: 104 MMOL/L (ref 98–107)
CO2: 23 MMOL/L (ref 20–31)
CREAT SERPL-MCNC: 0.74 MG/DL (ref 0.5–0.9)
DIFFERENTIAL TYPE: ABNORMAL
EOSINOPHILS RELATIVE PERCENT: 0 % (ref 1–4)
GFR AFRICAN AMERICAN: >60 ML/MIN
GFR NON-AFRICAN AMERICAN: >60 ML/MIN
GFR SERPL CREATININE-BSD FRML MDRD: ABNORMAL ML/MIN/{1.73_M2}
GFR SERPL CREATININE-BSD FRML MDRD: ABNORMAL ML/MIN/{1.73_M2}
GLUCOSE BLD-MCNC: 94 MG/DL (ref 70–99)
HCT VFR BLD CALC: 39.1 % (ref 36–46)
HEMOGLOBIN: 12.4 G/DL (ref 12–16)
IMMATURE GRANULOCYTES: ABNORMAL %
LYMPHOCYTES # BLD: 21 % (ref 24–44)
MCH RBC QN AUTO: 24.5 PG (ref 26–34)
MCHC RBC AUTO-ENTMCNC: 31.7 G/DL (ref 31–37)
MCV RBC AUTO: 77.2 FL (ref 80–100)
MONOCYTES # BLD: 9 % (ref 1–7)
NRBC AUTOMATED: ABNORMAL PER 100 WBC
PDW BLD-RTO: 17.5 % (ref 11.5–14.5)
PLATELET # BLD: 211 K/UL (ref 130–400)
PLATELET ESTIMATE: ABNORMAL
PMV BLD AUTO: 9.9 FL (ref 6–12)
POTASSIUM SERPL-SCNC: 4.5 MMOL/L (ref 3.7–5.3)
RBC # BLD: 5.06 M/UL (ref 4–5.2)
RBC # BLD: ABNORMAL 10*6/UL
SEG NEUTROPHILS: 70 % (ref 36–66)
SEGMENTED NEUTROPHILS ABSOLUTE COUNT: 9.5 K/UL (ref 1.8–7.7)
SODIUM BLD-SCNC: 138 MMOL/L (ref 135–144)
TOTAL PROTEIN: 7.3 G/DL (ref 6.4–8.3)
WBC # BLD: 13.6 K/UL (ref 3.5–11)
WBC # BLD: ABNORMAL 10*3/UL

## 2018-07-27 PROCEDURE — 4004F PT TOBACCO SCREEN RCVD TLK: CPT | Performed by: FAMILY MEDICINE

## 2018-07-27 PROCEDURE — 36415 COLL VENOUS BLD VENIPUNCTURE: CPT

## 2018-07-27 PROCEDURE — 80053 COMPREHEN METABOLIC PANEL: CPT

## 2018-07-27 PROCEDURE — G8427 DOCREV CUR MEDS BY ELIG CLIN: HCPCS | Performed by: FAMILY MEDICINE

## 2018-07-27 PROCEDURE — 71046 X-RAY EXAM CHEST 2 VIEWS: CPT

## 2018-07-27 PROCEDURE — 99214 OFFICE O/P EST MOD 30 MIN: CPT | Performed by: FAMILY MEDICINE

## 2018-07-27 PROCEDURE — G8417 CALC BMI ABV UP PARAM F/U: HCPCS | Performed by: FAMILY MEDICINE

## 2018-07-27 PROCEDURE — 85025 COMPLETE CBC W/AUTO DIFF WBC: CPT

## 2018-07-27 ASSESSMENT — ENCOUNTER SYMPTOMS
NAUSEA: 0
ABDOMINAL DISTENTION: 0
TROUBLE SWALLOWING: 0
EYE PAIN: 0
SINUS PRESSURE: 0
ABDOMINAL PAIN: 0
ANAL BLEEDING: 0
BACK PAIN: 0
VOICE CHANGE: 0
RECTAL PAIN: 0
DIARRHEA: 0
EYE DISCHARGE: 0
EYE REDNESS: 0
VOMITING: 0
CONSTIPATION: 0
BLOOD IN STOOL: 0
CHEST TIGHTNESS: 0
SHORTNESS OF BREATH: 0
COLOR CHANGE: 0
COUGH: 0

## 2018-07-27 ASSESSMENT — PATIENT HEALTH QUESTIONNAIRE - PHQ9
1. LITTLE INTEREST OR PLEASURE IN DOING THINGS: 0
2. FEELING DOWN, DEPRESSED OR HOPELESS: 0
SUM OF ALL RESPONSES TO PHQ QUESTIONS 1-9: 0
SUM OF ALL RESPONSES TO PHQ9 QUESTIONS 1 & 2: 0

## 2018-07-27 NOTE — PROGRESS NOTES
numbness and headaches. Hematological: Negative for adenopathy. Does not bruise/bleed easily. Psychiatric/Behavioral: Negative for agitation, behavioral problems, confusion, decreased concentration, sleep disturbance and suicidal ideas. The patient is not nervous/anxious. Objective:   Physical Exam   Constitutional: She is oriented to person, place, and time. She appears well-developed and well-nourished. HENT:   Head: Normocephalic and atraumatic. Right Ear: External ear normal.   Left Ear: External ear normal.   Nose: Nose normal.   Mouth/Throat: Oropharynx is clear and moist. Abnormal dentition. Dental caries present. No oropharyngeal exudate. Eyes: Conjunctivae and EOM are normal. Pupils are equal, round, and reactive to light. Right eye exhibits no discharge. Left eye exhibits no discharge. No scleral icterus. Neck: Normal range of motion. Neck supple. No JVD present. No tracheal deviation present. No thyromegaly present. Cardiovascular: Normal rate, regular rhythm, normal heart sounds and intact distal pulses. Exam reveals no gallop and no friction rub. No murmur heard. Pulmonary/Chest: Effort normal and breath sounds normal. No respiratory distress. She has no wheezes. She has no rales. She exhibits no tenderness. Abdominal: Soft. Bowel sounds are normal. She exhibits no distension and no mass. There is no tenderness. There is no rebound and no guarding. Musculoskeletal: Normal range of motion. She exhibits no edema or tenderness. Lymphadenopathy:     She has no cervical adenopathy. Neurological: She is alert and oriented to person, place, and time. She has normal reflexes. No cranial nerve deficit. Coordination normal.   Skin: Skin is warm and dry. No rash noted. Psychiatric: She has a normal mood and affect. Has multiple teeth that are broken and discolored. one molar on right lower jaw is painful to touch, no swelling of cervical glands or localized gum swelling noted though she has poor gum health all over. Assessment:       Diagnosis Orders   1. Fever, unspecified fever cause  CBC With Auto Differential    Sputum Culture    Comprehensive Metabolic Panel    XR CHEST STANDARD (2 VW)   2. Myalgia     3. Acute bronchitis, unspecified organism     4. Chronic dental pain     5. Essential hypertension           Plan:      No orders of the defined types were placed in this encounter. Outpatient Encounter Prescriptions as of 7/27/2018   Medication Sig Dispense Refill    predniSONE (DELTASONE) 10 MG tablet 1 tab BID x 3 days, QD x 3 days 1/2 tab daily x 3 days and discontinue.  10 tablet 0    cetirizine (ZYRTEC ALLERGY) 10 MG tablet Take 1 tablet by mouth daily 30 tablet 0    apixaban (ELIQUIS) 5 MG TABS tablet 1 tab BID 60 tablet 0    levETIRAcetam (KEPPRA) 500 MG tablet take 1 tablet by mouth twice a day 60 tablet 0    pantoprazole (PROTONIX) 40 MG tablet take 1 tablet by mouth once daily 30 tablet 3    Cyanocobalamin (VITAMIN B-12) 1000 MCG SUBL Place 1 tablet under the tongue daily 30 tablet 11    vitamin D (ERGOCALCIFEROL) 71758 units CAPS capsule Take 1 capsule by mouth once a week 12 capsule 2    nicotine (NICODERM CQ) 14 MG/24HR Place 1 patch onto the skin daily 30 patch 3    lactobacillus (BACID) TABS Take 2 tablets by mouth daily 60 tablet 2    cloNIDine (CATAPRES) 0.2 MG/24HR Place 1 patch onto the skin once a week 4 patch 3    docusate sodium (COLACE, DULCOLAX) 100 MG CAPS Take 100 mg by mouth 2 times daily 60 capsule 0    Doxycycline Hyclate (DOXY-CAPS PO) Take 100 mg by mouth 2 times daily      atorvastatin (LIPITOR) 10 MG tablet take 1 tablet by mouth once daily 30 tablet 3    carvedilol (COREG) 25 MG tablet take 1 tablet by mouth twice a day 60 tablet 3    amLODIPine (NORVASC) 10 MG tablet take 1 tablet by mouth once daily 30 tablet 2    lisinopril (PRINIVIL;ZESTRIL) 40 MG tablet take 1 tablet by mouth once daily 30 tablet 2    polyethylene glycol

## 2018-07-28 ENCOUNTER — HOSPITAL ENCOUNTER (OUTPATIENT)
Age: 43
Setting detail: SPECIMEN
Discharge: HOME | End: 2018-07-28
Payer: MEDICAID

## 2018-07-28 PROCEDURE — 87070 CULTURE OTHR SPECIMN AEROBIC: CPT

## 2018-07-28 PROCEDURE — 87205 SMEAR GRAM STAIN: CPT

## 2018-07-30 ENCOUNTER — TELEPHONE (OUTPATIENT)
Dept: FAMILY MEDICINE CLINIC | Age: 43
End: 2018-07-30

## 2018-07-31 ENCOUNTER — TELEPHONE (OUTPATIENT)
Dept: FAMILY MEDICINE CLINIC | Age: 43
End: 2018-07-31

## 2018-07-31 NOTE — TELEPHONE ENCOUNTER
Lab called regarding sputum culture on 7/28/18, unable to perform test due to presence of oral akla.

## 2018-08-02 LAB
CULTURE: ABNORMAL
DIRECT EXAM: ABNORMAL
DIRECT EXAM: ABNORMAL
Lab: ABNORMAL
SPECIMEN DESCRIPTION: ABNORMAL
STATUS: ABNORMAL

## 2018-09-05 ENCOUNTER — TELEPHONE (OUTPATIENT)
Dept: PULMONOLOGY | Age: 43
End: 2018-09-05

## 2018-09-05 ENCOUNTER — OFFICE VISIT (OUTPATIENT)
Dept: PULMONOLOGY | Age: 43
End: 2018-09-05
Payer: MEDICAID

## 2018-09-05 ENCOUNTER — OFFICE VISIT (OUTPATIENT)
Dept: PULMONOLOGY | Age: 43
End: 2018-09-05
Payer: COMMERCIAL

## 2018-09-05 VITALS
HEIGHT: 65 IN | TEMPERATURE: 98 F | DIASTOLIC BLOOD PRESSURE: 77 MMHG | BODY MASS INDEX: 31.32 KG/M2 | RESPIRATION RATE: 20 BRPM | WEIGHT: 188 LBS | SYSTOLIC BLOOD PRESSURE: 109 MMHG | HEART RATE: 78 BPM

## 2018-09-05 VITALS — HEART RATE: 80 BPM | WEIGHT: 188 LBS | BODY MASS INDEX: 31.32 KG/M2 | HEIGHT: 65 IN | OXYGEN SATURATION: 99 %

## 2018-09-05 DIAGNOSIS — I26.92 SADDLE EMBOLUS OF PULMONARY ARTERY WITHOUT ACUTE COR PULMONALE, UNSPECIFIED CHRONICITY (HCC): ICD-10-CM

## 2018-09-05 DIAGNOSIS — E66.09 OBESITY DUE TO EXCESS CALORIES, UNSPECIFIED OBESITY SEVERITY: ICD-10-CM

## 2018-09-05 DIAGNOSIS — Z79.01 ANTICOAGULANT LONG-TERM USE: Primary | ICD-10-CM

## 2018-09-05 DIAGNOSIS — J44.9 CHRONIC OBSTRUCTIVE PULMONARY DISEASE, UNSPECIFIED COPD TYPE (HCC): Primary | ICD-10-CM

## 2018-09-05 DIAGNOSIS — F17.200 SMOKING: ICD-10-CM

## 2018-09-05 DIAGNOSIS — J45.40 MODERATE PERSISTENT ASTHMA WITHOUT COMPLICATION: ICD-10-CM

## 2018-09-05 PROBLEM — J45.909 ASTHMA: Status: ACTIVE | Noted: 2018-09-05

## 2018-09-05 PROCEDURE — 94726 PLETHYSMOGRAPHY LUNG VOLUMES: CPT | Performed by: INTERNAL MEDICINE

## 2018-09-05 PROCEDURE — G8417 CALC BMI ABV UP PARAM F/U: HCPCS | Performed by: INTERNAL MEDICINE

## 2018-09-05 PROCEDURE — 94060 EVALUATION OF WHEEZING: CPT | Performed by: INTERNAL MEDICINE

## 2018-09-05 PROCEDURE — 94729 DIFFUSING CAPACITY: CPT | Performed by: INTERNAL MEDICINE

## 2018-09-05 PROCEDURE — G8427 DOCREV CUR MEDS BY ELIG CLIN: HCPCS | Performed by: INTERNAL MEDICINE

## 2018-09-05 PROCEDURE — 99205 OFFICE O/P NEW HI 60 MIN: CPT | Performed by: INTERNAL MEDICINE

## 2018-09-06 NOTE — PROGRESS NOTES
abdominal abscess RLQ    AXILLARY SURGERY Right 2018    Excision of hidradenitis cysts, right axilla    BREAST LUMPECTOMY Bilateral     BREAST LUMPECTOMY       SECTION      x2    ENDOMETRIAL ABLATION      ENDOSCOPY, COLON, DIAGNOSTIC  2014    HERNIA REPAIR      HYSTERECTOMY      MA EXC SKIN BENIG <5MM TRUNK,ARM,LEG Right 2018    EXCISION HIDRADENITIS RIGHT AXILLA performed by Agnieszka Montoya DO at [address] [place]      UMBILICAL HERNIA REPAIR         ALLERGIES:    Allergies   Allergen Reactions    Amoxil [Amoxicillin]      Swelling of throat, rash and cough    Bee Venom Hives    Keflex [Cephalexin]      itching    Levaquin [Levofloxacin In D5w] Hives    Macrobid [Nitrofurantoin Monohyd Macro] Hives    Sulfa Antibiotics Hives       MEDICATIONS:   Current Outpatient Prescriptions   Medication Sig Dispense Refill    carvedilol (COREG) 25 MG tablet take 1 tablet by mouth twice a day 60 tablet 3    amLODIPine (NORVASC) 10 MG tablet take 1 tablet by mouth once daily 30 tablet 2    cetirizine (ZYRTEC ALLERGY) 10 MG tablet Take 1 tablet by mouth daily 30 tablet 0    levETIRAcetam (KEPPRA) 500 MG tablet take 1 tablet by mouth twice a day 60 tablet 0    pantoprazole (PROTONIX) 40 MG tablet take 1 tablet by mouth once daily 30 tablet 3    vitamin D (ERGOCALCIFEROL) 60641 units CAPS capsule Take 1 capsule by mouth once a week 12 capsule 2    cloNIDine (CATAPRES) 0.2 MG/24HR Place 1 patch onto the skin once a week 4 patch 3    atorvastatin (LIPITOR) 10 MG tablet take 1 tablet by mouth once daily 30 tablet 3    lisinopril (PRINIVIL;ZESTRIL) 40 MG tablet take 1 tablet by mouth once daily 30 tablet 2    polyethylene glycol (MIRALAX) powder Please dispense 4 Ducolax tablets with Miralax. Use as directed by following your patient instructions given by office 255 g 0    mometasone-formoterol (DULERA) 200-5 MCG/ACT (ZEASORB-AF) 2 % powder Apply topically 2 times daily. (Patient taking differently: Apply topically 2 times daily to foot) 45 g 1     No current facility-administered medications for this visit. FAMILY HISTORY: family history includes Asthma in her mother; Cancer in her maternal aunt and maternal grandmother; Depression in her sister; Diabetes in her maternal grandfather and maternal grandmother; High Blood Pressure in her mother; Mental Illness in her mother; Other in her sister; Stroke in an other family member. SOCIAL AND OCCUPATIONAL HEALTH:  The patient is a Current smoker. There  is not history of TB or TB exposure. There is not asbestos or silica dust exposure. The patient reports does not have coal, foundry, quarry or [employer] exposure. Travel history reveals no significant history of risk factors for pulm disease. There is not  history of recreational or IV drug use. The patient does not pets, dogs, cats turtles or exotic birds.         Review of Systems:  Review of Systems -   General ROS: Completed and except as mentioned above were negative   Psychological ROS:  Completed and except as mentioned above were negative  Ophthalmic ROS:  Completed and except as mentioned above were negative  ENT ROS:  Completed and except as mentioned above were negative  Allergy and Immunology ROS:  Completed and except as mentioned above were negative  Hematological and Lymphatic ROS:  Completed and except as mentioned above were negative  Endocrine ROS: Completed and except as mentioned above were negative  Breast ROS:  Completed and except as mentioned above were negative  Respiratory ROS:  Completed and except as mentioned above were negative  Cardiovascular ROS:  Completed and except as mentioned above were negative  Gastrointestinal ROS: Completed and except as mentioned above were negative  Genito-Urinary ROS:  Completed and except as mentioned above were negative  Musculoskeletal ROS:  Completed and content and emotional status is normal.          DATA:     pft's-isolated decrease in diffusing capacity    CT scan of the chest for pulmonary embolism in June 2018 showed-  Saddle embolus. Pulmonary arteries extending into the bilateral lower lobes without any right ventricle strain. A repeat CT scan in August showed presence of bilateral pulmonary artery webs    CXR: REVIEWED: On July 22, 2018 with no acute process  Patient had blood work including factor V Leiden mutation, protein C&S levels, lupus anticoagulant, rheumatoid factor, homocysteine level and CANDIDA and the overall normal    IMPRESSION:   1. Anticoagulant long-term use    2. Moderate persistent asthma without complication    3. Saddle embolus of pulmonary artery without acute cor pulmonale, unspecified chronicity (HCC)    4. Smoking    5. Obesity due to excess calories, unspecified obesity severity     I suspect patient has bronchial asthma well than COPD in view of the pulmonary function test is not showing an obstructive ventilatory defect Currently. The decrease in diffusing capacity seen in the pulmonary function test is likely due to pulmonary embolism               PLAN:       Refills were provided-none   Patient was recommended to have prednisone and an antibiotic available for use during an exacerbation  Educated and clarified the medication use. Recommended lifelong anticoagulation in view of the unprovoked pulmonary embolism, which was very significant  Discussed use, benefit, and side effects of prescribed medications. Barriers to medication compliance addressed. Jannet Farleyela received counseling on the following healthy behaviors: nutrition, exercise and medication adherence  Recommend flu vaccination in the fall annually. Recommendations given regarding pneumococcal vaccinations. Patient is up-to-date with vaccinations from pulmonary perspective. Maintain an active lifestyle. Recommend smoking cessation.   Jannet Enriquez was instructed on smoking cessation for greater than 10 minutes. I discussed with this patient today the risks and benefits of various tobacco cessation strategies  Patient was educated on how to use the respiratory medications. All the questions that the patient  has had were answered to  Her satisfaction. Home O2 evaluation to be done. Supplemental oxygen was not needed. Pulmonary function tests were reviewed. Chest x-ray was reviewed. CT scan of the chest was reviewed. Ordered a CT scan of the chest in 3 months for pulmonary embolism to evaluate the resolution of the blood clots. If not resolved, would consider surgical approach to pulmonary embolism. After reviewing the patient's smoking history and his age patient does not meet the criteria for lung cancer screening. We'll see the patient back in 3 months  Thank you for having us involved in the care of your patient. Please call us if you have any questions or concerns.               Jeevan Peña MD  9/6/2018 8:08 AM

## 2018-09-13 NOTE — TELEPHONE ENCOUNTER
Ann Castellanos is calling to request a refill on the following medication(s):  Requested Prescriptions     Pending Prescriptions Disp Refills    cloNIDine (CATAPRES) 0.2 MG/24HR [Pharmacy Med Name: CLONIDINE 0.2 MG/DAY PATCH] 4 patch 3     Sig: apply 1 patch ONTO SKIN EVERY WEEK       Last Visit Date (If Applicable):  8/94/6499    Next Visit Date:    Visit date not found

## 2018-10-11 ENCOUNTER — TELEPHONE (OUTPATIENT)
Dept: PULMONOLOGY | Age: 43
End: 2018-10-11

## 2018-10-11 NOTE — TELEPHONE ENCOUNTER
----- Message from Jean Hammer MD sent at 10/10/2018  7:06 PM EDT -----   I talked to Dr. Scott Baez, patient's podiatrist , and informed her that I would want continued anticoagulation for at least 3 more months since her last visit and would recommend CT scan at that time. She mentioned that she does not need anticoagulation to be stopped and informed her that I prefer so. She understands patient is on full anticoagulation with Eliquis.   Thank you

## 2018-11-13 ENCOUNTER — HOSPITAL ENCOUNTER (EMERGENCY)
Age: 43
Discharge: HOME | End: 2018-11-13
Attending: EMERGENCY MEDICINE
Payer: COMMERCIAL

## 2018-11-13 VITALS
HEART RATE: 81 BPM | RESPIRATION RATE: 18 BRPM | SYSTOLIC BLOOD PRESSURE: 140 MMHG | HEIGHT: 65 IN | TEMPERATURE: 98.2 F | BODY MASS INDEX: 30.99 KG/M2 | DIASTOLIC BLOOD PRESSURE: 87 MMHG | WEIGHT: 186 LBS | OXYGEN SATURATION: 100 %

## 2018-11-13 DIAGNOSIS — L02.91 ABSCESS: ICD-10-CM

## 2018-11-13 DIAGNOSIS — A59.9 TRICHOMONAL INFECTION: ICD-10-CM

## 2018-11-13 DIAGNOSIS — S39.012A STRAIN OF LUMBAR REGION, INITIAL ENCOUNTER: Primary | ICD-10-CM

## 2018-11-13 LAB
-: ABNORMAL
AMORPHOUS: ABNORMAL
BACTERIA: ABNORMAL
BILIRUBIN URINE: NEGATIVE
CASTS UA: ABNORMAL /LPF
COLOR: YELLOW
COMMENT UA: ABNORMAL
CRYSTALS, UA: ABNORMAL /HPF
EPITHELIAL CELLS UA: ABNORMAL /HPF (ref 0–5)
GLUCOSE URINE: NEGATIVE
KETONES, URINE: NEGATIVE
LEUKOCYTE ESTERASE, URINE: ABNORMAL
MUCUS: ABNORMAL
NITRITE, URINE: NEGATIVE
OTHER OBSERVATIONS UA: ABNORMAL
PH UA: 6 (ref 5–8)
PROTEIN UA: NEGATIVE
RBC UA: ABNORMAL /HPF (ref 0–2)
RENAL EPITHELIAL, UA: ABNORMAL /HPF
SPECIFIC GRAVITY UA: 1.02 (ref 1–1.03)
TRICHOMONAS: POSITIVE
TURBIDITY: ABNORMAL
URINE HGB: NEGATIVE
UROBILINOGEN, URINE: NORMAL
WBC UA: ABNORMAL /HPF (ref 0–5)
YEAST: ABNORMAL

## 2018-11-13 PROCEDURE — 96372 THER/PROPH/DIAG INJ SC/IM: CPT

## 2018-11-13 PROCEDURE — 87491 CHLMYD TRACH DNA AMP PROBE: CPT

## 2018-11-13 PROCEDURE — 81001 URINALYSIS AUTO W/SCOPE: CPT

## 2018-11-13 PROCEDURE — 87086 URINE CULTURE/COLONY COUNT: CPT

## 2018-11-13 PROCEDURE — 6360000002 HC RX W HCPCS: Performed by: NURSE PRACTITIONER

## 2018-11-13 PROCEDURE — 87591 N.GONORRHOEAE DNA AMP PROB: CPT

## 2018-11-13 PROCEDURE — 99283 EMERGENCY DEPT VISIT LOW MDM: CPT

## 2018-11-13 RX ORDER — KETOROLAC TROMETHAMINE 30 MG/ML
60 INJECTION, SOLUTION INTRAMUSCULAR; INTRAVENOUS ONCE
Status: COMPLETED | OUTPATIENT
Start: 2018-11-13 | End: 2018-11-13

## 2018-11-13 RX ORDER — IBUPROFEN 800 MG/1
800 TABLET ORAL EVERY 8 HOURS PRN
Qty: 15 TABLET | Refills: 0 | Status: SHIPPED | OUTPATIENT
Start: 2018-11-13 | End: 2019-01-08 | Stop reason: CLARIF

## 2018-11-13 RX ORDER — LIDOCAINE 50 MG/G
OINTMENT TOPICAL
Qty: 50 G | Refills: 0 | Status: SHIPPED | OUTPATIENT
Start: 2018-11-13 | End: 2019-01-08 | Stop reason: CLARIF

## 2018-11-13 RX ORDER — METHOCARBAMOL 750 MG/1
750 TABLET, FILM COATED ORAL 3 TIMES DAILY
Qty: 30 TABLET | Refills: 0 | Status: SHIPPED | OUTPATIENT
Start: 2018-11-13 | End: 2019-01-08 | Stop reason: CLARIF

## 2018-11-13 RX ORDER — DOXYCYCLINE HYCLATE 100 MG
100 TABLET ORAL 2 TIMES DAILY
Qty: 20 TABLET | Refills: 0 | Status: SHIPPED | OUTPATIENT
Start: 2018-11-13 | End: 2018-11-23

## 2018-11-13 RX ORDER — METRONIDAZOLE 500 MG/1
500 TABLET ORAL 2 TIMES DAILY
Qty: 14 TABLET | Refills: 0 | Status: SHIPPED | OUTPATIENT
Start: 2018-11-13 | End: 2018-11-20

## 2018-11-13 RX ADMIN — KETOROLAC TROMETHAMINE 60 MG: 30 INJECTION, SOLUTION INTRAMUSCULAR at 12:27

## 2018-11-13 ASSESSMENT — ENCOUNTER SYMPTOMS
COUGH: 0
ABDOMINAL PAIN: 0
DIARRHEA: 0
SHORTNESS OF BREATH: 0
COLOR CHANGE: 1
BACK PAIN: 1
NAUSEA: 0
VOMITING: 0

## 2018-11-13 ASSESSMENT — PAIN SCALES - GENERAL
PAINLEVEL_OUTOF10: 8
PAINLEVEL_OUTOF10: 10
PAINLEVEL_OUTOF10: 10

## 2018-11-13 ASSESSMENT — PAIN DESCRIPTION - DESCRIPTORS: DESCRIPTORS: ACHING;SHARP;STABBING;TENDER;THROBBING

## 2018-11-13 NOTE — ED PROVIDER NOTES
Sister     Cancer Maternal Grandmother     Diabetes Maternal Grandmother     Cancer Maternal Aunt     Diabetes Maternal Grandfather      Family Status   Relation Status    Mother Alive    Other (Not Specified)    Father Alive    Sister Alive    MGM (Not Specified)    MAunt (Not Specified)    MGF (Not Specified)        SOCIAL HISTORY      reports that she has been smoking Cigarettes. She has a 26.00 pack-year smoking history. She has never used smokeless tobacco. She reports that she drinks alcohol. She reports that she uses drugs, including Marijuana. REVIEW OF SYSTEMS    (2-9 systems for level 4, 10 or more for level 5)     Review of Systems   Constitutional: Negative for chills, diaphoresis, fatigue and fever. Respiratory: Negative for cough and shortness of breath. Cardiovascular: Negative for chest pain. Gastrointestinal: Negative for abdominal pain, diarrhea, nausea and vomiting. Genitourinary: Negative for flank pain, frequency, hematuria and urgency. Musculoskeletal: Positive for back pain. Skin: Positive for color change and wound. Except as noted above the remainder of the review of systems was reviewed and negative. PHYSICAL EXAM    (up to 7 for level 4, 8 or more for level 5)     ED Triage Vitals [11/13/18 1151]   BP Temp Temp Source Pulse Resp SpO2 Height Weight   (!) 140/87 98.2 °F (36.8 °C) Oral 81 18 100 % 5' 5\" (1.651 m) 186 lb (84.4 kg)     Physical Exam   Constitutional: She is oriented to person, place, and time. She appears well-developed and well-nourished. No distress. Eyes: Conjunctivae are normal.   Cardiovascular: Normal rate, regular rhythm and normal heart sounds. Pulmonary/Chest: Effort normal and breath sounds normal. No respiratory distress. She has no wheezes. She has no rales. Musculoskeletal:        Thoracic back: She exhibits no tenderness and no bony tenderness. Lumbar back: She exhibits tenderness (left supraspinal) and pain.  She exhibits no bony tenderness, no swelling and no deformity. Neurological: She is alert and oriented to person, place, and time. Skin: Skin is warm and dry. No rash noted. She is not diaphoretic. Erythematous, minimally raised area to right proximal medial thigh. No drainage. I and D is not indicated. Psychiatric: She has a normal mood and affect. Her behavior is normal.   Vitals reviewed. DIAGNOSTIC RESULTS       RADIOLOGY:   Non-plain film images such as CT, Ultrasound and MRI are read by the radiologist. Plain radiographic images are visualized and preliminarily interpreted by the emergency physician with the below findings:    Interpretation per the Radiologist below, if available at the time of this note:    Not indicated. LABS:  Labs Reviewed   URINE RT REFLEX TO CULTURE - Abnormal; Notable for the following:        Result Value    Turbidity UA SLIGHTLY CLOUDY (*)     Leukocyte Esterase, Urine TRACE (*)     All other components within normal limits   MICROSCOPIC URINALYSIS - Abnormal; Notable for the following:     Trichomonas, UA POSITIVE (*)     All other components within normal limits   URINE CULTURE CLEAN CATCH   C.TRACHOMATIS N.GONORRHOEAE DNA, URINE       All other labs were within normal range or not returned as of this dictation. EMERGENCY DEPARTMENT COURSE and DIFFERENTIAL DIAGNOSIS/MDM:   Vitals:    Vitals:    11/13/18 1151   BP: (!) 140/87   Pulse: 81   Resp: 18   Temp: 98.2 °F (36.8 °C)   TempSrc: Oral   SpO2: 100%   Weight: 186 lb (84.4 kg)   Height: 5' 5\" (1.651 m)       MEDICATIONS GIVEN IN THE ED:  Medications   ketorolac (TORADOL) injection 60 mg (60 mg Intramuscular Given 11/13/18 1227)       CLINICAL DECISION MAKING:  The patient presented alert with a nontoxic appearance and was seen in conjunction with Dr. Dhaval Leyva. Urinalysis was positive for trichomonas; GC/chlamydia cultures are pending.  Prescriptions were written for flagyl, doxycycline, motrin, robaxin, and

## 2018-11-14 LAB
C. TRACHOMATIS DNA ,URINE: NEGATIVE
CULTURE: NORMAL
Lab: NORMAL
N. GONORRHOEAE DNA, URINE: NEGATIVE
SPECIMEN DESCRIPTION: NORMAL
STATUS: NORMAL

## 2018-12-20 DIAGNOSIS — J30.2 SEASONAL ALLERGIC RHINITIS: ICD-10-CM

## 2018-12-20 DIAGNOSIS — I10 ESSENTIAL HYPERTENSION: ICD-10-CM

## 2018-12-20 RX ORDER — LISINOPRIL 40 MG/1
TABLET ORAL
Qty: 30 TABLET | Refills: 2 | Status: SHIPPED | OUTPATIENT
Start: 2018-12-20 | End: 2019-03-15 | Stop reason: SDUPTHER

## 2018-12-20 RX ORDER — MONTELUKAST SODIUM 10 MG/1
TABLET ORAL
Qty: 30 TABLET | Refills: 2 | Status: SHIPPED | OUTPATIENT
Start: 2018-12-20 | End: 2019-03-15 | Stop reason: SDUPTHER

## 2018-12-27 ENCOUNTER — HOSPITAL ENCOUNTER (OUTPATIENT)
Dept: CT IMAGING | Age: 43
Discharge: HOME | End: 2018-12-29
Payer: COMMERCIAL

## 2018-12-27 DIAGNOSIS — I26.92 SADDLE EMBOLUS OF PULMONARY ARTERY WITHOUT ACUTE COR PULMONALE, UNSPECIFIED CHRONICITY (HCC): ICD-10-CM

## 2018-12-27 LAB
CREAT SERPL-MCNC: 0.7 MG/DL (ref 0.5–0.9)
GFR AFRICAN AMERICAN: >60 ML/MIN
GFR NON-AFRICAN AMERICAN: >60 ML/MIN
GFR SERPL CREATININE-BSD FRML MDRD: NORMAL ML/MIN/{1.73_M2}
GFR SERPL CREATININE-BSD FRML MDRD: NORMAL ML/MIN/{1.73_M2}

## 2018-12-27 PROCEDURE — 82565 ASSAY OF CREATININE: CPT

## 2018-12-27 PROCEDURE — 6360000004 HC RX CONTRAST MEDICATION: Performed by: INTERNAL MEDICINE

## 2018-12-27 PROCEDURE — 36415 COLL VENOUS BLD VENIPUNCTURE: CPT

## 2018-12-27 PROCEDURE — 71260 CT THORAX DX C+: CPT

## 2018-12-27 PROCEDURE — 2580000003 HC RX 258: Performed by: INTERNAL MEDICINE

## 2018-12-27 RX ORDER — SODIUM CHLORIDE 0.9 % (FLUSH) 0.9 %
10 SYRINGE (ML) INJECTION PRN
Status: DISCONTINUED | OUTPATIENT
Start: 2018-12-27 | End: 2018-12-30 | Stop reason: HOSPADM

## 2018-12-27 RX ORDER — 0.9 % SODIUM CHLORIDE 0.9 %
80 INTRAVENOUS SOLUTION INTRAVENOUS ONCE
Status: COMPLETED | OUTPATIENT
Start: 2018-12-27 | End: 2018-12-27

## 2018-12-27 RX ADMIN — SODIUM CHLORIDE 80 ML: 9 INJECTION, SOLUTION INTRAVENOUS at 12:28

## 2018-12-27 RX ADMIN — Medication 10 ML: at 12:28

## 2018-12-27 RX ADMIN — IOPAMIDOL 75 ML: 755 INJECTION, SOLUTION INTRAVENOUS at 12:27

## 2019-01-08 ENCOUNTER — OFFICE VISIT (OUTPATIENT)
Dept: FAMILY MEDICINE CLINIC | Age: 44
End: 2019-01-08
Payer: COMMERCIAL

## 2019-01-08 VITALS
SYSTOLIC BLOOD PRESSURE: 112 MMHG | WEIGHT: 191.8 LBS | BODY MASS INDEX: 31.96 KG/M2 | TEMPERATURE: 97.7 F | HEART RATE: 68 BPM | DIASTOLIC BLOOD PRESSURE: 80 MMHG | OXYGEN SATURATION: 98 % | HEIGHT: 65 IN

## 2019-01-08 DIAGNOSIS — Z72.0 TOBACCO ABUSE DISORDER: ICD-10-CM

## 2019-01-08 DIAGNOSIS — G93.5 CHIARI I MALFORMATION (HCC): ICD-10-CM

## 2019-01-08 DIAGNOSIS — G40.909 SEIZURE DISORDER (HCC): ICD-10-CM

## 2019-01-08 DIAGNOSIS — H65.92 OME (OTITIS MEDIA WITH EFFUSION), LEFT: Primary | ICD-10-CM

## 2019-01-08 DIAGNOSIS — I26.92 SADDLE EMBOLUS OF PULMONARY ARTERY WITHOUT ACUTE COR PULMONALE, UNSPECIFIED CHRONICITY (HCC): ICD-10-CM

## 2019-01-08 DIAGNOSIS — J44.9 CHRONIC OBSTRUCTIVE PULMONARY DISEASE, UNSPECIFIED COPD TYPE (HCC): ICD-10-CM

## 2019-01-08 DIAGNOSIS — Z79.01 ANTICOAGULANT LONG-TERM USE: ICD-10-CM

## 2019-01-08 DIAGNOSIS — E55.9 VITAMIN D DEFICIENCY: ICD-10-CM

## 2019-01-08 DIAGNOSIS — F31.9 BIPOLAR AFFECTIVE DISORDER, REMISSION STATUS UNSPECIFIED (HCC): ICD-10-CM

## 2019-01-08 DIAGNOSIS — Z71.6 TOBACCO ABUSE COUNSELING: ICD-10-CM

## 2019-01-08 DIAGNOSIS — K58.1 IRRITABLE BOWEL SYNDROME WITH CONSTIPATION: ICD-10-CM

## 2019-01-08 PROCEDURE — G0444 DEPRESSION SCREEN ANNUAL: HCPCS | Performed by: FAMILY MEDICINE

## 2019-01-08 PROCEDURE — 4004F PT TOBACCO SCREEN RCVD TLK: CPT | Performed by: FAMILY MEDICINE

## 2019-01-08 PROCEDURE — G8427 DOCREV CUR MEDS BY ELIG CLIN: HCPCS | Performed by: FAMILY MEDICINE

## 2019-01-08 PROCEDURE — G8926 SPIRO NO PERF OR DOC: HCPCS | Performed by: FAMILY MEDICINE

## 2019-01-08 PROCEDURE — 3023F SPIROM DOC REV: CPT | Performed by: FAMILY MEDICINE

## 2019-01-08 PROCEDURE — G8484 FLU IMMUNIZE NO ADMIN: HCPCS | Performed by: FAMILY MEDICINE

## 2019-01-08 PROCEDURE — 99214 OFFICE O/P EST MOD 30 MIN: CPT | Performed by: FAMILY MEDICINE

## 2019-01-08 PROCEDURE — G8417 CALC BMI ABV UP PARAM F/U: HCPCS | Performed by: FAMILY MEDICINE

## 2019-01-08 RX ORDER — NEOMYCIN SULFATE, POLYMYXIN B SULFATE AND HYDROCORTISONE 10; 3.5; 1 MG/ML; MG/ML; [USP'U]/ML
SUSPENSION/ DROPS AURICULAR (OTIC)
Refills: 0 | COMMUNITY
Start: 2019-01-06 | End: 2019-01-08 | Stop reason: CLARIF

## 2019-01-08 RX ORDER — AZITHROMYCIN 250 MG/1
TABLET, FILM COATED ORAL
Refills: 0 | COMMUNITY
Start: 2019-01-06 | End: 2019-10-04 | Stop reason: ALTCHOICE

## 2019-01-08 ASSESSMENT — PATIENT HEALTH QUESTIONNAIRE - PHQ9
SUM OF ALL RESPONSES TO PHQ QUESTIONS 1-9: 1
6. FEELING BAD ABOUT YOURSELF - OR THAT YOU ARE A FAILURE OR HAVE LET YOURSELF OR YOUR FAMILY DOWN: 0
1. LITTLE INTEREST OR PLEASURE IN DOING THINGS: 0
9. THOUGHTS THAT YOU WOULD BE BETTER OFF DEAD, OR OF HURTING YOURSELF: 0
8. MOVING OR SPEAKING SO SLOWLY THAT OTHER PEOPLE COULD HAVE NOTICED. OR THE OPPOSITE, BEING SO FIGETY OR RESTLESS THAT YOU HAVE BEEN MOVING AROUND A LOT MORE THAN USUAL: 0
SUM OF ALL RESPONSES TO PHQ QUESTIONS 1-9: 1
10. IF YOU CHECKED OFF ANY PROBLEMS, HOW DIFFICULT HAVE THESE PROBLEMS MADE IT FOR YOU TO DO YOUR WORK, TAKE CARE OF THINGS AT HOME, OR GET ALONG WITH OTHER PEOPLE: 0
SUM OF ALL RESPONSES TO PHQ9 QUESTIONS 1 & 2: 6
SUM OF ALL RESPONSES TO PHQ9 QUESTIONS 1 & 2: 0
4. FEELING TIRED OR HAVING LITTLE ENERGY: 0
SUM OF ALL RESPONSES TO PHQ QUESTIONS 1-9: 6
1. LITTLE INTEREST OR PLEASURE IN DOING THINGS: 3
2. FEELING DOWN, DEPRESSED OR HOPELESS: 3
5. POOR APPETITE OR OVEREATING: 0
3. TROUBLE FALLING OR STAYING ASLEEP: 1
7. TROUBLE CONCENTRATING ON THINGS, SUCH AS READING THE NEWSPAPER OR WATCHING TELEVISION: 0
2. FEELING DOWN, DEPRESSED OR HOPELESS: 0
SUM OF ALL RESPONSES TO PHQ QUESTIONS 1-9: 6

## 2019-01-08 ASSESSMENT — ENCOUNTER SYMPTOMS
DIARRHEA: 0
CHEST TIGHTNESS: 0
COUGH: 0
BACK PAIN: 0
SINUS PRESSURE: 0
ABDOMINAL PAIN: 0
VOICE CHANGE: 0
EYE DISCHARGE: 0
COLOR CHANGE: 1
CONSTIPATION: 0
SHORTNESS OF BREATH: 0
TROUBLE SWALLOWING: 0
BLOOD IN STOOL: 0
ABDOMINAL DISTENTION: 0
EYE REDNESS: 0
RECTAL PAIN: 0
NAUSEA: 0
EYE PAIN: 0
ANAL BLEEDING: 0
VOMITING: 0

## 2019-01-09 ENCOUNTER — OFFICE VISIT (OUTPATIENT)
Dept: PULMONOLOGY | Age: 44
End: 2019-01-09
Payer: COMMERCIAL

## 2019-01-09 VITALS
SYSTOLIC BLOOD PRESSURE: 121 MMHG | BODY MASS INDEX: 31.89 KG/M2 | WEIGHT: 191.4 LBS | HEART RATE: 73 BPM | DIASTOLIC BLOOD PRESSURE: 86 MMHG | HEIGHT: 65 IN | RESPIRATION RATE: 14 BRPM

## 2019-01-09 DIAGNOSIS — I26.92 SADDLE EMBOLUS OF PULMONARY ARTERY WITHOUT ACUTE COR PULMONALE, UNSPECIFIED CHRONICITY (HCC): Primary | ICD-10-CM

## 2019-01-09 DIAGNOSIS — F17.200 SMOKING: ICD-10-CM

## 2019-01-09 DIAGNOSIS — J45.40 MODERATE PERSISTENT ASTHMA WITHOUT COMPLICATION: ICD-10-CM

## 2019-01-09 DIAGNOSIS — E66.09 OBESITY DUE TO EXCESS CALORIES, UNSPECIFIED OBESITY SEVERITY: ICD-10-CM

## 2019-01-09 DIAGNOSIS — Z79.01 ANTICOAGULANT LONG-TERM USE: ICD-10-CM

## 2019-01-09 PROCEDURE — G8484 FLU IMMUNIZE NO ADMIN: HCPCS | Performed by: INTERNAL MEDICINE

## 2019-01-09 PROCEDURE — G8417 CALC BMI ABV UP PARAM F/U: HCPCS | Performed by: INTERNAL MEDICINE

## 2019-01-09 PROCEDURE — 99214 OFFICE O/P EST MOD 30 MIN: CPT | Performed by: INTERNAL MEDICINE

## 2019-01-09 PROCEDURE — 4004F PT TOBACCO SCREEN RCVD TLK: CPT | Performed by: INTERNAL MEDICINE

## 2019-01-09 PROCEDURE — G8427 DOCREV CUR MEDS BY ELIG CLIN: HCPCS | Performed by: INTERNAL MEDICINE

## 2019-03-15 DIAGNOSIS — J30.2 SEASONAL ALLERGIC RHINITIS: ICD-10-CM

## 2019-03-15 DIAGNOSIS — I10 ESSENTIAL HYPERTENSION: ICD-10-CM

## 2019-03-15 RX ORDER — MONTELUKAST SODIUM 10 MG/1
TABLET ORAL
Qty: 30 TABLET | Refills: 2 | Status: SHIPPED | OUTPATIENT
Start: 2019-03-15 | End: 2019-06-07 | Stop reason: SDUPTHER

## 2019-03-15 RX ORDER — LISINOPRIL 40 MG/1
TABLET ORAL
Qty: 30 TABLET | Refills: 2 | Status: SHIPPED | OUTPATIENT
Start: 2019-03-15 | End: 2019-06-07 | Stop reason: SDUPTHER

## 2019-03-20 DIAGNOSIS — E55.9 VITAMIN D DEFICIENCY: ICD-10-CM

## 2019-03-20 RX ORDER — CLONIDINE 0.2 MG/24H
PATCH, EXTENDED RELEASE TRANSDERMAL
Qty: 4 PATCH | Refills: 3 | Status: SHIPPED | OUTPATIENT
Start: 2019-03-20 | End: 2019-06-30 | Stop reason: SDUPTHER

## 2019-03-20 RX ORDER — ERGOCALCIFEROL 1.25 MG/1
CAPSULE ORAL
Qty: 12 CAPSULE | Refills: 2 | OUTPATIENT
Start: 2019-03-20

## 2019-03-28 ENCOUNTER — HOSPITAL ENCOUNTER (EMERGENCY)
Age: 44
Discharge: HOME | End: 2019-03-28
Attending: EMERGENCY MEDICINE
Payer: COMMERCIAL

## 2019-03-28 VITALS
HEIGHT: 64 IN | SYSTOLIC BLOOD PRESSURE: 118 MMHG | WEIGHT: 189.9 LBS | OXYGEN SATURATION: 100 % | RESPIRATION RATE: 16 BRPM | TEMPERATURE: 98.2 F | DIASTOLIC BLOOD PRESSURE: 84 MMHG | HEART RATE: 72 BPM | BODY MASS INDEX: 32.42 KG/M2

## 2019-03-28 DIAGNOSIS — L02.219 CELLULITIS AND ABSCESS OF TRUNK: ICD-10-CM

## 2019-03-28 DIAGNOSIS — B37.31 CANDIDAL VULVOVAGINITIS: ICD-10-CM

## 2019-03-28 DIAGNOSIS — N76.0 BV (BACTERIAL VAGINOSIS): ICD-10-CM

## 2019-03-28 DIAGNOSIS — B96.89 BV (BACTERIAL VAGINOSIS): ICD-10-CM

## 2019-03-28 DIAGNOSIS — A59.01 TRICHOMONAS VAGINITIS: Primary | ICD-10-CM

## 2019-03-28 DIAGNOSIS — L03.319 CELLULITIS AND ABSCESS OF TRUNK: ICD-10-CM

## 2019-03-28 LAB
-: ABNORMAL
AMORPHOUS: ABNORMAL
BACTERIA: ABNORMAL
BILIRUBIN URINE: NEGATIVE
CASTS UA: ABNORMAL /LPF
CHP ED QC CHECK: NORMAL
COLOR: YELLOW
COMMENT UA: ABNORMAL
CRYSTALS, UA: ABNORMAL /HPF
DIRECT EXAM: ABNORMAL
EPITHELIAL CELLS UA: ABNORMAL /HPF (ref 0–5)
GLUCOSE URINE: NEGATIVE
KETONES, URINE: NEGATIVE
LEUKOCYTE ESTERASE, URINE: ABNORMAL
Lab: ABNORMAL
MUCUS: ABNORMAL
NITRITE, URINE: NEGATIVE
OTHER OBSERVATIONS UA: ABNORMAL
PH UA: 5.5 (ref 5–8)
PREGNANCY TEST URINE, POC: NORMAL
PROTEIN UA: NEGATIVE
RBC UA: ABNORMAL /HPF (ref 0–2)
RENAL EPITHELIAL, UA: ABNORMAL /HPF
SPECIFIC GRAVITY UA: 1.02 (ref 1–1.03)
SPECIMEN DESCRIPTION: ABNORMAL
TRICHOMONAS: POSITIVE
TURBIDITY: ABNORMAL
URINE HGB: NEGATIVE
UROBILINOGEN, URINE: NORMAL
WBC UA: ABNORMAL /HPF (ref 0–5)
YEAST: ABNORMAL

## 2019-03-28 PROCEDURE — 87491 CHLMYD TRACH DNA AMP PROBE: CPT

## 2019-03-28 PROCEDURE — 2500000003 HC RX 250 WO HCPCS: Performed by: NURSE PRACTITIONER

## 2019-03-28 PROCEDURE — 99283 EMERGENCY DEPT VISIT LOW MDM: CPT

## 2019-03-28 PROCEDURE — 10061 I&D ABSCESS COMP/MULTIPLE: CPT

## 2019-03-28 PROCEDURE — 87591 N.GONORRHOEAE DNA AMP PROB: CPT

## 2019-03-28 PROCEDURE — 6360000002 HC RX W HCPCS: Performed by: NURSE PRACTITIONER

## 2019-03-28 PROCEDURE — 81001 URINALYSIS AUTO W/SCOPE: CPT

## 2019-03-28 PROCEDURE — 6370000000 HC RX 637 (ALT 250 FOR IP): Performed by: NURSE PRACTITIONER

## 2019-03-28 PROCEDURE — 87480 CANDIDA DNA DIR PROBE: CPT

## 2019-03-28 PROCEDURE — 87510 GARDNER VAG DNA DIR PROBE: CPT

## 2019-03-28 PROCEDURE — 84703 CHORIONIC GONADOTROPIN ASSAY: CPT

## 2019-03-28 PROCEDURE — 96372 THER/PROPH/DIAG INJ SC/IM: CPT

## 2019-03-28 PROCEDURE — 87660 TRICHOMONAS VAGIN DIR PROBE: CPT

## 2019-03-28 RX ORDER — METRONIDAZOLE 500 MG/1
500 TABLET ORAL 2 TIMES DAILY
Qty: 14 TABLET | Refills: 0 | Status: SHIPPED | OUTPATIENT
Start: 2019-03-28 | End: 2019-04-04

## 2019-03-28 RX ORDER — FLUCONAZOLE 150 MG/1
150 TABLET ORAL ONCE
Status: COMPLETED | OUTPATIENT
Start: 2019-03-28 | End: 2019-03-28

## 2019-03-28 RX ORDER — IBUPROFEN 800 MG/1
800 TABLET ORAL 3 TIMES DAILY PRN
Status: DISCONTINUED | OUTPATIENT
Start: 2019-03-28 | End: 2019-03-28

## 2019-03-28 RX ORDER — ACETAMINOPHEN AND CODEINE PHOSPHATE 300; 30 MG/1; MG/1
1 TABLET ORAL EVERY 8 HOURS PRN
Qty: 10 TABLET | Refills: 0 | Status: SHIPPED | OUTPATIENT
Start: 2019-03-28 | End: 2019-03-31

## 2019-03-28 RX ORDER — AZITHROMYCIN 250 MG/1
1000 TABLET, FILM COATED ORAL ONCE
Status: COMPLETED | OUTPATIENT
Start: 2019-03-28 | End: 2019-03-28

## 2019-03-28 RX ORDER — CEFTRIAXONE SODIUM 250 MG/1
250 INJECTION, POWDER, FOR SOLUTION INTRAMUSCULAR; INTRAVENOUS ONCE
Status: COMPLETED | OUTPATIENT
Start: 2019-03-28 | End: 2019-03-28

## 2019-03-28 RX ORDER — FLUCONAZOLE 150 MG/1
150 TABLET ORAL DAILY
Qty: 2 TABLET | Refills: 0 | Status: SHIPPED | OUTPATIENT
Start: 2019-03-28 | End: 2019-03-30

## 2019-03-28 RX ORDER — ACETAMINOPHEN 500 MG
1000 TABLET ORAL ONCE
Status: COMPLETED | OUTPATIENT
Start: 2019-03-28 | End: 2019-03-28

## 2019-03-28 RX ORDER — LIDOCAINE HYDROCHLORIDE 10 MG/ML
5 INJECTION, SOLUTION INFILTRATION; PERINEURAL ONCE
Status: COMPLETED | OUTPATIENT
Start: 2019-03-28 | End: 2019-03-28

## 2019-03-28 RX ORDER — DOXYCYCLINE HYCLATE 100 MG
100 TABLET ORAL 2 TIMES DAILY
Qty: 20 TABLET | Refills: 0 | Status: SHIPPED | OUTPATIENT
Start: 2019-03-28 | End: 2019-04-07

## 2019-03-28 RX ADMIN — FLUCONAZOLE 150 MG: 150 TABLET ORAL at 13:09

## 2019-03-28 RX ADMIN — AZITHROMYCIN 1000 MG: 250 TABLET, FILM COATED ORAL at 12:19

## 2019-03-28 RX ADMIN — CEFTRIAXONE SODIUM 250 MG: 250 INJECTION, POWDER, FOR SOLUTION INTRAMUSCULAR; INTRAVENOUS at 12:19

## 2019-03-28 RX ADMIN — LIDOCAINE HYDROCHLORIDE 5 ML: 10 INJECTION, SOLUTION INFILTRATION; PERINEURAL at 13:21

## 2019-03-28 RX ADMIN — ACETAMINOPHEN 1000 MG: 500 TABLET ORAL at 12:54

## 2019-03-28 ASSESSMENT — PAIN DESCRIPTION - LOCATION: LOCATION: ARM

## 2019-03-28 ASSESSMENT — PAIN DESCRIPTION - ORIENTATION: ORIENTATION: LEFT

## 2019-03-28 ASSESSMENT — PAIN DESCRIPTION - DESCRIPTORS: DESCRIPTORS: CONSTANT;BURNING

## 2019-03-28 ASSESSMENT — ENCOUNTER SYMPTOMS
NAUSEA: 0
VOMITING: 0
ABDOMINAL PAIN: 0
COLOR CHANGE: 1

## 2019-03-28 ASSESSMENT — PAIN SCALES - GENERAL: PAINLEVEL_OUTOF10: 10

## 2019-03-28 ASSESSMENT — PAIN DESCRIPTION - FREQUENCY: FREQUENCY: CONTINUOUS

## 2019-03-28 NOTE — ED NOTES
Patient comes in from home and presents with vaginal itching and discharge, burning with urination and abscess above the left axillary. Patient states that her boyfriend told her that he had sex with another woman. Patient is alert and oriented and denies any other complaints at this time. Patient is afebrile, has warm dry skin and unlabored respirations at this time.      Ana Padilla RN  03/28/19 [id number]

## 2019-03-28 NOTE — ED PROVIDER NOTES
[address] [place] [address] ED  eMERGENCY dEPARTMENT eNCOUnter      Pt Name: Hieu Richard  MRN: [id number]  [place] 1975  Date of evaluation: 3/28/2019  Provider: ELIZABETH Peters CNP    CHIEF COMPLAINT       Chief Complaint   Patient presents with    Exposure to STD    Dysuria    Abscess     left AX         HISTORY OFPRESENT ILLNESS  (Location/Symptom, Timing/Onset, Context/Setting, Quality, Duration, Modifying Factors, Severity.)   Hieu Richard is a 41 y.o. female who presents to the emergency department by private auto for evaluation of vaginal discharge and vaginal irritation for the past 2 days. Patient states her partner recently told her that he was positive for STD but did not tell her what he was positive for. Patient also complains of left axillary abscess that started 3 days ago. She rates her abscess and vaginal pain at 10 out of 10. She denies fever or chills. Nursing Notes were reviewed. PASTMEDICAL HISTORY     Past Medical History:   Diagnosis Date    Abdominal pain     Anemia     Arthritis 2018    Left Foot    Asthma     Back problem     Bipolar 1 disorder ([address]     Chest pain     with \"coughing\" per pt    CHF (congestive heart failure) ([address]     When child was delivered     Chicken pox     Chronic idiopathic constipation 2014    Chronic mental illness     Depression     Depression     Emphysema lung ([address] 2011    GERD (gastroesophageal reflux disease)     GERD (gastroesophageal reflux disease) 2004    Hair loss     Headache     Hernia     High blood pressure     Hyperlipidemia     Hypertension     Ileus (HCC)     Irregular bowel habits     Irritable bowel syndrome     MDRO (multiple drug resistant organisms) resistance 2005    MRSA in  per pt.  Nervousness     Obesity     Pneumonia     Seizures ([address]     Last Seizure 18    Slow gastric motility     Stroke (cerebrum) ([address] 2014    Suicidal intent     2003.  Denies 18    TIA normal.   Nose: Nose normal.   Mouth/Throat: Oropharynx is clear and moist.   Eyes: Pupils are equal, round, and reactive to light. Conjunctivae and EOM are normal.   Neck: Normal range of motion. Neck supple. Pulmonary/Chest: Effort normal. No respiratory distress. Abdominal: Soft. Bowel sounds are normal. There is no tenderness. Genitourinary: Pelvic exam was performed with patient supine. There is no rash, tenderness or lesion on the right labia. There is no rash, tenderness or lesion on the left labia. Uterus is tender. Cervix exhibits no motion tenderness. Right adnexum displays no tenderness. Left adnexum displays no tenderness. No erythema or bleeding in the vagina. No foreign body in the vagina. Vaginal discharge found. Genitourinary Comments: Physical exam a chaperoned by a nurse Nolvia. There is a moderate amount of grayish white discharge in the vaginal vault. No foreign body or bleeding. No adnexal or cervical motion tenderness. Uterus is tender to palpation. Musculoskeletal: Normal range of motion. Neurological: She is alert and oriented to person, place, and time. She has normal strength. No cranial nerve deficit or sensory deficit. Skin: Skin is warm and dry. Capillary refill takes less than 2 seconds. No rash noted. There is erythema. Psychiatric: She has a normal mood and affect.  Her behavior is normal. Judgment and thought content normal.         DIAGNOSTIC RESULTS     EKG:All EKG's are interpreted by the Emergency Department Physician who either signs or Co-signs this chart in the absence of a cardiologist.        RADIOLOGY:   Non-plain film images such as CT, Ultrasound and MRI are read by theradiologist. Plain radiographic images are visualized and preliminarily interpreted by the emergency physician with the below findings:      Interpretation per the Radiologist below, if available at the time of this note:    No orders to display         EDBEDSIDE ULTRASOUND: Performed by Juliet Day - stormy    LABS:  [unfilled]    All other labs were within normal range or not returned as of this dictation. EMERGENCY DEPARTMENT COURSE andDIFFERENTIAL DIAGNOSIS/MDM:   Urine pregnancy negative. Chlamydia and gonorrhea cultures pending. Patient is positive for Gardnerella, Trichomonas and Candida. Patient states her to be treated prophylactically for possible STD exposure. She was given Rocephin, Diflucan, Tylenol and azithromycin in ED. left chest wall abscess I&D. See procedure note. Discharged with doxycycline, Flagyl, Diflucan and Tylenol with Codeine. She was instructed to follow-up with her doctor. Return to emergency department if symptoms worsen. Vitals:    Vitals:    03/28/19 1128   BP: 118/84   Pulse: 72   Resp: 16   Temp: 98.2 °F (36.8 °C)   TempSrc: Oral   SpO2: 100%   Weight: 189 lb 14.4 oz (86.1 kg)   Height: 5' 4\" (1.626 m)         CONSULTS:  None    PROCEDURES:  Incision/Drainage  Date/Time: 3/28/2019 8:32 PM  Performed by: ELIZABETH Hudson - CNP  Authorized by: Apolinar Rene MD     Consent:     Consent obtained:  Verbal    Consent given by:  Patient    Risks discussed:  Bleeding, incomplete drainage and pain  Location:     Type:  Abscess    Size:  1.5 cm    Location:  Trunk    Trunk location:  Chest (left)  Pre-procedure details:     Skin preparation:  Betadine  Anesthesia (see MAR for exact dosages): Anesthesia method:  Local infiltration    Local anesthetic:  Lidocaine 1% w/o epi (2 ml)  Procedure details:     Incision types:  Single straight    Incision depth:  Subcutaneous    Scalpel blade:  11    Wound management:  Probed and deloculated    Drainage:  Purulent    Drainage amount:  Copious    Wound treatment:  Wound left open    Packing materials:  None  Post-procedure details:     Patient tolerance of procedure: Tolerated well, no immediate complications        FINAL IMPRESSION      1. Trichomonas vaginitis    2.  BV (bacterial vaginosis) 3. Candidal vulvovaginitis    4. Cellulitis and abscess of trunk          DISPOSITION/PLAN   DISPOSITION Decision To Discharge 03/28/2019 01:07:13 PM      PATIENT REFERRED TO:   Iza Mckeon MD  [address] [place] [address] [facility]  [phone]    Schedule an appointment as soon as possible for a visit       [facility] ED  [address] [facility]  [phone]    If symptoms worsen      DISCHARGE MEDICATIONS:     Discharge Medication List as of 3/28/2019  1:12 PM      START taking these medications    Details   metroNIDAZOLE (FLAGYL) 500 MG tablet Take 1 tablet by mouth 2 times daily for 7 days, Disp-14 tablet, R-0Print      fluconazole (DIFLUCAN) 150 MG tablet Take 1 tablet by mouth daily for 2 days, Disp-2 tablet, R-0Print      doxycycline hyclate (VIBRA-TABS) 100 MG tablet Take 1 tablet by mouth 2 times daily for 10 days, Disp-20 tablet, R-0Print      acetaminophen-codeine (TYLENOL/CODEINE #3) 300-30 MG per tablet Take 1 tablet by mouth every 8 hours as needed for Pain for up to 3 days. , Disp-10 tablet, R-0Print           Electronically signed by ELIZABETH Anaya 3/28/2019 at 8:31 PM           ELIZABETH Anaya CNP  03/28/19 2034

## 2019-03-28 NOTE — ED PROVIDER NOTES
eMERGENCY dEPARTMENT eNCOUnter   Attending Attestation     Pt Name: Dayton Gabriel  MRN: [id number]  [place] 1975  Date of evaluation: 3/28/19   Dayton Gabriel is a 41 y.o. female with CC: Exposure to STD; Dysuria; and Abscess (left AX)    MDM:   The patient is a 53-year-old female presented to the emergency department secondary to dysuria concern for STD. Pelvic cultures obtained. Patient initially on antibiotics discharged home with outpatient follow-up. CRITICAL CARE:       EKG: All EKG's are interpreted by the Emergency Department Physician who either signs or Co-signs this chart in the absence of a cardiologist.      RADIOLOGY:All plain film, CT, MRI, and formal ultrasound images (except ED bedside ultrasound) are read by the radiologist, see reports below, unless otherwise noted in MDM or here. No orders to display     LABS: All lab results were reviewed by myself, and all abnormals are listed below. Labs Reviewed   VAGINITIS DNA PROBE - Abnormal; Notable for the following components:       Result Value    Direct Exam POSITIVE for Candida sp. (*)     Direct Exam POSITIVE for Gardnerella vaginalis. (*)     Direct Exam POSITIVE for Trichomonas vaginalis (*)     All other components within normal limits   URINALYSIS - Abnormal; Notable for the following components:    Turbidity UA SLIGHTLY CLOUDY (*)     Leukocyte Esterase, Urine TRACE (*)     All other components within normal limits   MICROSCOPIC URINALYSIS - Abnormal; Notable for the following components:    Trichomonas, UA POSITIVE (*)     All other components within normal limits   POCT URINE PREGNANCY - Normal   C.TRACHOMATIS N.GONORRHOEAE DNA           I personally evaluated and examined the patient in conjunction with the APC and agree with the assessment, treatment plan, and disposition of the patient as recorded by the APC.    Brenda Young MD  Attending Emergency Physician          Brenda Young MD  [phone]

## 2019-03-28 NOTE — ED NOTES
Patient education flyer \"[facility] What [facility] to [facility]\" was provided and reviewed. Questions answered and understanding was verbalized by the patient and/or family.         Estela Luacs RN  03/28/19 9858

## 2019-03-28 NOTE — ED NOTES
Patient denies any adverse reactions to medications. Patient does not appear to be in any distress or SOB.      Mauricio Ridley RN  03/28/19 6217

## 2019-03-29 ENCOUNTER — ANESTHESIA EVENT (OUTPATIENT)
Dept: OPERATING ROOM | Age: 44
End: 2019-03-29
Payer: COMMERCIAL

## 2019-03-29 LAB
C TRACH DNA GENITAL QL NAA+PROBE: NEGATIVE
HCG, PREGNANCY URINE (POC): NEGATIVE
N. GONORRHOEAE DNA: NEGATIVE
SPECIMEN DESCRIPTION: NORMAL

## 2019-04-01 ENCOUNTER — ANESTHESIA (OUTPATIENT)
Dept: OPERATING ROOM | Age: 44
End: 2019-04-01
Payer: COMMERCIAL

## 2019-04-01 ENCOUNTER — HOSPITAL ENCOUNTER (OUTPATIENT)
Age: 44
Setting detail: OUTPATIENT SURGERY
Discharge: HOME | End: 2019-04-01
Attending: SURGERY | Admitting: SURGERY
Payer: COMMERCIAL

## 2019-04-01 VITALS
SYSTOLIC BLOOD PRESSURE: 147 MMHG | WEIGHT: 188.8 LBS | OXYGEN SATURATION: 97 % | TEMPERATURE: 97.7 F | RESPIRATION RATE: 16 BRPM | HEART RATE: 77 BPM | HEIGHT: 66 IN | DIASTOLIC BLOOD PRESSURE: 101 MMHG | BODY MASS INDEX: 30.34 KG/M2

## 2019-04-01 VITALS — TEMPERATURE: 96.1 F | OXYGEN SATURATION: 98 % | SYSTOLIC BLOOD PRESSURE: 132 MMHG | DIASTOLIC BLOOD PRESSURE: 80 MMHG

## 2019-04-01 DIAGNOSIS — L73.2 HIDRADENITIS SUPPURATIVA OF LEFT AXILLA: Primary | ICD-10-CM

## 2019-04-01 LAB
ABSOLUTE EOS #: 0.1 K/UL (ref 0–0.4)
ABSOLUTE IMMATURE GRANULOCYTE: ABNORMAL K/UL (ref 0–0.3)
ABSOLUTE LYMPH #: 2.3 K/UL (ref 1–4.8)
ABSOLUTE MONO #: 0.7 K/UL (ref 0.2–0.8)
ANION GAP SERPL CALCULATED.3IONS-SCNC: 12 MMOL/L (ref 9–17)
BASOPHILS # BLD: 1 % (ref 0–2)
BASOPHILS ABSOLUTE: 0 K/UL (ref 0–0.2)
BUN BLDV-MCNC: 11 MG/DL (ref 6–20)
CHLORIDE BLD-SCNC: 104 MMOL/L (ref 98–107)
CO2: 23 MMOL/L (ref 20–31)
CREAT SERPL-MCNC: 0.68 MG/DL (ref 0.5–0.9)
DIFFERENTIAL TYPE: ABNORMAL
EOSINOPHILS RELATIVE PERCENT: 1 % (ref 1–4)
GFR AFRICAN AMERICAN: >60 ML/MIN
GFR NON-AFRICAN AMERICAN: >60 ML/MIN
GFR SERPL CREATININE-BSD FRML MDRD: NORMAL ML/MIN/{1.73_M2}
GFR SERPL CREATININE-BSD FRML MDRD: NORMAL ML/MIN/{1.73_M2}
HCT VFR BLD CALC: 37.8 % (ref 36–46)
HEMOGLOBIN: 12.1 G/DL (ref 12–16)
IMMATURE GRANULOCYTES: ABNORMAL %
INR BLD: 1
LYMPHOCYTES # BLD: 38 % (ref 24–44)
MCH RBC QN AUTO: 24 PG (ref 26–34)
MCHC RBC AUTO-ENTMCNC: 32.1 G/DL (ref 31–37)
MCV RBC AUTO: 74.8 FL (ref 80–100)
MONOCYTES # BLD: 11 % (ref 1–7)
NRBC AUTOMATED: ABNORMAL PER 100 WBC
PARTIAL THROMBOPLASTIN TIME: 26 SEC (ref 23–31)
PDW BLD-RTO: 18.9 % (ref 11.5–14.5)
PLATELET # BLD: 217 K/UL (ref 130–400)
PLATELET ESTIMATE: ABNORMAL
PMV BLD AUTO: 9.6 FL (ref 6–12)
POTASSIUM SERPL-SCNC: 3.6 MMOL/L (ref 3.7–5.3)
PROTHROMBIN TIME: 10.7 SEC (ref 9.7–11.6)
RBC # BLD: 5.05 M/UL (ref 4–5.2)
RBC # BLD: ABNORMAL 10*6/UL
SEG NEUTROPHILS: 49 % (ref 36–66)
SEGMENTED NEUTROPHILS ABSOLUTE COUNT: 3.1 K/UL (ref 1.8–7.7)
SODIUM BLD-SCNC: 139 MMOL/L (ref 135–144)
WBC # BLD: 6.2 K/UL (ref 3.5–11)
WBC # BLD: ABNORMAL 10*3/UL

## 2019-04-01 PROCEDURE — 3600000012 HC SURGERY LEVEL 2 ADDTL 15MIN: Performed by: SURGERY

## 2019-04-01 PROCEDURE — 85610 PROTHROMBIN TIME: CPT

## 2019-04-01 PROCEDURE — 85025 COMPLETE CBC W/AUTO DIFF WBC: CPT

## 2019-04-01 PROCEDURE — 93005 ELECTROCARDIOGRAM TRACING: CPT

## 2019-04-01 PROCEDURE — 3600000002 HC SURGERY LEVEL 2 BASE: Performed by: SURGERY

## 2019-04-01 PROCEDURE — 82565 ASSAY OF CREATININE: CPT

## 2019-04-01 PROCEDURE — 7100000011 HC PHASE II RECOVERY - ADDTL 15 MIN: Performed by: SURGERY

## 2019-04-01 PROCEDURE — 3700000000 HC ANESTHESIA ATTENDED CARE: Performed by: SURGERY

## 2019-04-01 PROCEDURE — 2500000003 HC RX 250 WO HCPCS: Performed by: SURGERY

## 2019-04-01 PROCEDURE — 6360000002 HC RX W HCPCS: Performed by: ANESTHESIOLOGY

## 2019-04-01 PROCEDURE — 3700000001 HC ADD 15 MINUTES (ANESTHESIA): Performed by: SURGERY

## 2019-04-01 PROCEDURE — 2709999900 HC NON-CHARGEABLE SUPPLY: Performed by: SURGERY

## 2019-04-01 PROCEDURE — 2500000003 HC RX 250 WO HCPCS: Performed by: NURSE ANESTHETIST, CERTIFIED REGISTERED

## 2019-04-01 PROCEDURE — 6360000002 HC RX W HCPCS: Performed by: NURSE ANESTHETIST, CERTIFIED REGISTERED

## 2019-04-01 PROCEDURE — 2580000003 HC RX 258: Performed by: ANESTHESIOLOGY

## 2019-04-01 PROCEDURE — 85730 THROMBOPLASTIN TIME PARTIAL: CPT

## 2019-04-01 PROCEDURE — 7100000010 HC PHASE II RECOVERY - FIRST 15 MIN: Performed by: SURGERY

## 2019-04-01 PROCEDURE — 7100000000 HC PACU RECOVERY - FIRST 15 MIN: Performed by: SURGERY

## 2019-04-01 PROCEDURE — 7100000001 HC PACU RECOVERY - ADDTL 15 MIN: Performed by: SURGERY

## 2019-04-01 PROCEDURE — 88304 TISSUE EXAM BY PATHOLOGIST: CPT

## 2019-04-01 PROCEDURE — 80051 ELECTROLYTE PANEL: CPT

## 2019-04-01 PROCEDURE — 84520 ASSAY OF UREA NITROGEN: CPT

## 2019-04-01 PROCEDURE — 2580000003 HC RX 258: Performed by: NURSE ANESTHETIST, CERTIFIED REGISTERED

## 2019-04-01 RX ORDER — MIDAZOLAM HYDROCHLORIDE 1 MG/ML
INJECTION INTRAMUSCULAR; INTRAVENOUS PRN
Status: DISCONTINUED | OUTPATIENT
Start: 2019-04-01 | End: 2019-04-01 | Stop reason: SDUPTHER

## 2019-04-01 RX ORDER — ONDANSETRON 2 MG/ML
4 INJECTION INTRAMUSCULAR; INTRAVENOUS
Status: DISCONTINUED | OUTPATIENT
Start: 2019-04-01 | End: 2019-04-01 | Stop reason: HOSPADM

## 2019-04-01 RX ORDER — CLINDAMYCIN PHOSPHATE 900 MG/50ML
900 INJECTION INTRAVENOUS ONCE
Status: COMPLETED | OUTPATIENT
Start: 2019-04-01 | End: 2019-04-01

## 2019-04-01 RX ORDER — ONDANSETRON 2 MG/ML
INJECTION INTRAMUSCULAR; INTRAVENOUS PRN
Status: DISCONTINUED | OUTPATIENT
Start: 2019-04-01 | End: 2019-04-01 | Stop reason: SDUPTHER

## 2019-04-01 RX ORDER — SODIUM CHLORIDE 0.9 % (FLUSH) 0.9 %
10 SYRINGE (ML) INJECTION PRN
Status: DISCONTINUED | OUTPATIENT
Start: 2019-04-01 | End: 2019-04-01 | Stop reason: HOSPADM

## 2019-04-01 RX ORDER — OXYCODONE HYDROCHLORIDE AND ACETAMINOPHEN 5; 325 MG/1; MG/1
1 TABLET ORAL EVERY 6 HOURS PRN
Qty: 28 TABLET | Refills: 0 | Status: SHIPPED | OUTPATIENT
Start: 2019-04-01 | End: 2019-04-08

## 2019-04-01 RX ORDER — SODIUM CHLORIDE, SODIUM LACTATE, POTASSIUM CHLORIDE, CALCIUM CHLORIDE 600; 310; 30; 20 MG/100ML; MG/100ML; MG/100ML; MG/100ML
INJECTION, SOLUTION INTRAVENOUS CONTINUOUS PRN
Status: DISCONTINUED | OUTPATIENT
Start: 2019-04-01 | End: 2019-04-01 | Stop reason: SDUPTHER

## 2019-04-01 RX ORDER — FENTANYL CITRATE 50 UG/ML
50 INJECTION, SOLUTION INTRAMUSCULAR; INTRAVENOUS EVERY 5 MIN PRN
Status: DISCONTINUED | OUTPATIENT
Start: 2019-04-01 | End: 2019-04-01 | Stop reason: HOSPADM

## 2019-04-01 RX ORDER — FENTANYL CITRATE 50 UG/ML
25 INJECTION, SOLUTION INTRAMUSCULAR; INTRAVENOUS EVERY 5 MIN PRN
Status: DISCONTINUED | OUTPATIENT
Start: 2019-04-01 | End: 2019-04-01 | Stop reason: HOSPADM

## 2019-04-01 RX ORDER — HYDROMORPHONE HCL 110MG/55ML
0.25 PATIENT CONTROLLED ANALGESIA SYRINGE INTRAVENOUS EVERY 5 MIN PRN
Status: DISCONTINUED | OUTPATIENT
Start: 2019-04-01 | End: 2019-04-01 | Stop reason: HOSPADM

## 2019-04-01 RX ORDER — LIDOCAINE HYDROCHLORIDE AND EPINEPHRINE 10; 10 MG/ML; UG/ML
INJECTION, SOLUTION INFILTRATION; PERINEURAL PRN
Status: DISCONTINUED | OUTPATIENT
Start: 2019-04-01 | End: 2019-04-01 | Stop reason: ALTCHOICE

## 2019-04-01 RX ORDER — LIDOCAINE HYDROCHLORIDE 10 MG/ML
1 INJECTION, SOLUTION EPIDURAL; INFILTRATION; INTRACAUDAL; PERINEURAL
Status: DISCONTINUED | OUTPATIENT
Start: 2019-04-01 | End: 2019-04-01 | Stop reason: HOSPADM

## 2019-04-01 RX ORDER — HYDROMORPHONE HCL 110MG/55ML
0.5 PATIENT CONTROLLED ANALGESIA SYRINGE INTRAVENOUS EVERY 5 MIN PRN
Status: DISCONTINUED | OUTPATIENT
Start: 2019-04-01 | End: 2019-04-01 | Stop reason: HOSPADM

## 2019-04-01 RX ORDER — PROPOFOL 10 MG/ML
INJECTION, EMULSION INTRAVENOUS PRN
Status: DISCONTINUED | OUTPATIENT
Start: 2019-04-01 | End: 2019-04-01 | Stop reason: SDUPTHER

## 2019-04-01 RX ORDER — LIDOCAINE HYDROCHLORIDE 20 MG/ML
INJECTION, SOLUTION EPIDURAL; INFILTRATION; INTRACAUDAL; PERINEURAL PRN
Status: DISCONTINUED | OUTPATIENT
Start: 2019-04-01 | End: 2019-04-01 | Stop reason: SDUPTHER

## 2019-04-01 RX ORDER — SODIUM CHLORIDE, SODIUM LACTATE, POTASSIUM CHLORIDE, CALCIUM CHLORIDE 600; 310; 30; 20 MG/100ML; MG/100ML; MG/100ML; MG/100ML
INJECTION, SOLUTION INTRAVENOUS CONTINUOUS
Status: DISCONTINUED | OUTPATIENT
Start: 2019-04-01 | End: 2019-04-01 | Stop reason: HOSPADM

## 2019-04-01 RX ORDER — SODIUM CHLORIDE 9 MG/ML
INJECTION, SOLUTION INTRAVENOUS CONTINUOUS
Status: DISCONTINUED | OUTPATIENT
Start: 2019-04-02 | End: 2019-04-01

## 2019-04-01 RX ORDER — PHENYLEPHRINE HCL IN 0.9% NACL 1 MG/10 ML
SYRINGE (ML) INTRAVENOUS PRN
Status: DISCONTINUED | OUTPATIENT
Start: 2019-04-01 | End: 2019-04-01 | Stop reason: SDUPTHER

## 2019-04-01 RX ORDER — DEXAMETHASONE SODIUM PHOSPHATE 10 MG/ML
INJECTION, SOLUTION INTRAMUSCULAR; INTRAVENOUS PRN
Status: DISCONTINUED | OUTPATIENT
Start: 2019-04-01 | End: 2019-04-01 | Stop reason: SDUPTHER

## 2019-04-01 RX ORDER — SODIUM CHLORIDE 0.9 % (FLUSH) 0.9 %
10 SYRINGE (ML) INJECTION EVERY 12 HOURS SCHEDULED
Status: DISCONTINUED | OUTPATIENT
Start: 2019-04-01 | End: 2019-04-01 | Stop reason: HOSPADM

## 2019-04-01 RX ORDER — FENTANYL CITRATE 50 UG/ML
INJECTION, SOLUTION INTRAMUSCULAR; INTRAVENOUS PRN
Status: DISCONTINUED | OUTPATIENT
Start: 2019-04-01 | End: 2019-04-01 | Stop reason: SDUPTHER

## 2019-04-01 RX ADMIN — Medication 100 MCG: at 07:31

## 2019-04-01 RX ADMIN — ONDANSETRON 4 MG: 2 INJECTION, SOLUTION INTRAMUSCULAR; INTRAVENOUS at 07:36

## 2019-04-01 RX ADMIN — SODIUM CHLORIDE, POTASSIUM CHLORIDE, SODIUM LACTATE AND CALCIUM CHLORIDE: 600; 310; 30; 20 INJECTION, SOLUTION INTRAVENOUS at 06:36

## 2019-04-01 RX ADMIN — SODIUM CHLORIDE, POTASSIUM CHLORIDE, SODIUM LACTATE AND CALCIUM CHLORIDE: 600; 310; 30; 20 INJECTION, SOLUTION INTRAVENOUS at 07:29

## 2019-04-01 RX ADMIN — Medication 200 MCG: at 07:45

## 2019-04-01 RX ADMIN — MIDAZOLAM 2 MG: 1 INJECTION INTRAMUSCULAR; INTRAVENOUS at 07:30

## 2019-04-01 RX ADMIN — FENTANYL CITRATE 50 MCG: 50 INJECTION, SOLUTION INTRAMUSCULAR; INTRAVENOUS at 08:48

## 2019-04-01 RX ADMIN — FENTANYL CITRATE 50 MCG: 50 INJECTION, SOLUTION INTRAMUSCULAR; INTRAVENOUS at 09:02

## 2019-04-01 RX ADMIN — PROPOFOL 150 MG: 10 INJECTION, EMULSION INTRAVENOUS at 07:31

## 2019-04-01 RX ADMIN — CLINDAMYCIN PHOSPHATE 900 MG: 900 INJECTION, SOLUTION INTRAVENOUS at 07:38

## 2019-04-01 RX ADMIN — DEXAMETHASONE SODIUM PHOSPHATE 10 MG: 10 INJECTION, SOLUTION INTRAMUSCULAR; INTRAVENOUS at 07:36

## 2019-04-01 RX ADMIN — LIDOCAINE HYDROCHLORIDE 100 MG: 20 INJECTION, SOLUTION EPIDURAL; INFILTRATION; INTRACAUDAL; PERINEURAL at 07:31

## 2019-04-01 ASSESSMENT — PULMONARY FUNCTION TESTS
PIF_VALUE: 14
PIF_VALUE: 2
PIF_VALUE: 14
PIF_VALUE: 10
PIF_VALUE: 2
PIF_VALUE: 2
PIF_VALUE: 10
PIF_VALUE: 11
PIF_VALUE: 2
PIF_VALUE: 3
PIF_VALUE: 2
PIF_VALUE: 2
PIF_VALUE: 1
PIF_VALUE: 10
PIF_VALUE: 1
PIF_VALUE: 11
PIF_VALUE: 11
PIF_VALUE: 10
PIF_VALUE: 14
PIF_VALUE: 14
PIF_VALUE: 19
PIF_VALUE: 2
PIF_VALUE: 1
PIF_VALUE: 2
PIF_VALUE: 3
PIF_VALUE: 3
PIF_VALUE: 2
PIF_VALUE: 2
PIF_VALUE: 14
PIF_VALUE: 6
PIF_VALUE: 10
PIF_VALUE: 2
PIF_VALUE: 10
PIF_VALUE: 2
PIF_VALUE: 2
PIF_VALUE: 3
PIF_VALUE: 2
PIF_VALUE: 2
PIF_VALUE: 11
PIF_VALUE: 2
PIF_VALUE: 10

## 2019-04-01 ASSESSMENT — PAIN DESCRIPTION - ORIENTATION
ORIENTATION: LEFT

## 2019-04-01 ASSESSMENT — PAIN - FUNCTIONAL ASSESSMENT
PAIN_FUNCTIONAL_ASSESSMENT: ACTIVITIES ARE NOT PREVENTED

## 2019-04-01 ASSESSMENT — LIFESTYLE VARIABLES: SMOKING_STATUS: 1

## 2019-04-01 ASSESSMENT — PAIN DESCRIPTION - LOCATION
LOCATION: ARM

## 2019-04-01 ASSESSMENT — PAIN DESCRIPTION - PAIN TYPE
TYPE: SURGICAL PAIN

## 2019-04-01 ASSESSMENT — PAIN DESCRIPTION - DESCRIPTORS
DESCRIPTORS: ACHING

## 2019-04-01 ASSESSMENT — PAIN DESCRIPTION - PROGRESSION
CLINICAL_PROGRESSION: RAPIDLY WORSENING
CLINICAL_PROGRESSION: RAPIDLY WORSENING
CLINICAL_PROGRESSION: GRADUALLY IMPROVING
CLINICAL_PROGRESSION: NOT CHANGED

## 2019-04-01 ASSESSMENT — PAIN SCALES - GENERAL
PAINLEVEL_OUTOF10: 8
PAINLEVEL_OUTOF10: 0
PAINLEVEL_OUTOF10: 0
PAINLEVEL_OUTOF10: 4
PAINLEVEL_OUTOF10: 4
PAINLEVEL_OUTOF10: 7
PAINLEVEL_OUTOF10: 0
PAINLEVEL_OUTOF10: 5

## 2019-04-01 ASSESSMENT — PAIN DESCRIPTION - ONSET
ONSET: ON-GOING

## 2019-04-01 ASSESSMENT — PAIN DESCRIPTION - FREQUENCY
FREQUENCY: CONTINUOUS

## 2019-04-01 NOTE — ANESTHESIA PRE PROCEDURE
Department of Anesthesiology  Preprocedure Note       Name:  Star Dejesus   Age:  41 y.o.  :  1975                                          MRN:  [id number]         Date:  2019      Surgeon: Sameer Stevens):  Sivakumar Sweeney DO    Procedure: LEFT AXILLARY HYDRADENITIS EXCISION (Left )    Medications prior to admission:   Prior to Admission medications    Medication Sig Start Date End Date Taking?  Authorizing Provider   metroNIDAZOLE (FLAGYL) 500 MG tablet Take 1 tablet by mouth 2 times daily for 7 days 3/28/19 4/4/19 Yes ELIZABETH Min CNP   doxycycline hyclate (VIBRA-TABS) 100 MG tablet Take 1 tablet by mouth 2 times daily for 10 days 3/28/19 4/7/19 Yes ELIZABETH Min CNP   cloNIDine (CATAPRES) 0.2 MG/24HR PTWK apply 1 patch every week 3/20/19  Yes Bertha Baker MD   lisinopril (PRINIVIL;ZESTRIL) 40 MG tablet take 1 tablet by mouth once daily 3/15/19  Yes Bertha Baker MD   montelukast (SINGULAIR) 10 MG tablet take 1 tablet by mouth once daily 3/15/19  Yes Bertha Baker MD   atorvastatin (LIPITOR) 10 MG tablet take 1 tablet by mouth once daily 19  Yes Bertha Baker MD   amLODIPine (NORVASC) 10 MG tablet take 1 tablet by mouth once daily 19  Yes Bertha Baker MD   azithromycin (ZITHROMAX) 250 MG tablet take 2 tablets by mouth today then take 1 tablet DAILY FOR 4 DAYS 19  Yes Historical Provider, MD   carvedilol (COREG) 25 MG tablet take 1 tablet by mouth twice a day 18  Yes Bertha Baker MD   levETIRAcetam (KEPPRA) 500 MG tablet take 1 tablet by mouth twice a day 18  Yes Jarad Arnold MD   Cyanocobalamin (VITAMIN B-12) 1000 MCG SUBL Place 1 tablet under the tongue daily 18  Yes Bertha Baker MD   vitamin D (ERGOCALCIFEROL) 96157 units CAPS capsule Take 1 capsule by mouth once a week 18  Yes Bertha Baker MD   aspirin EC 81 MG EC tablet Take 1 tablet by mouth daily 18  Yes Gricelda Heard DO   lurasidone (LATUDA) 60 MG TABS tablet take 1 tablet by mouth once daily with food AT LEAST 350 CALORIES 6/15/17  Yes Historical Provider, MD   escitalopram (LEXAPRO) 10 MG tablet take 1 tablet by mouth once daily 3/17/17  Yes Historical Provider, MD   apixaban (ELIQUIS) 5 MG TABS tablet 1 tab BID 9/5/18   Roddy Whaley MD   cetirizine (ZYRTEC ALLERGY) 10 MG tablet Take 1 tablet by mouth daily 7/24/18   Brittany Dang MD   polyethylene glycol (MIRALAX) powder Please dispense 4 Ducolax tablets with Miralax. Use as directed by following your patient instructions given by office 3/29/18   Radha Marie MD   mometasone-formoterol [facility]) 200-5 MCG/ACT inhaler Inhale 2 puffs into the lungs every 12 hours 3/21/18   Brittany Dang MD       Current medications:    Current Facility-Administered Medications   Medication Dose Route Frequency Provider Last Rate Last Dose    lactated ringers infusion   Intravenous Continuous Mammie Bonine,  mL/hr at 04/01/19 0636      sodium chloride flush 0.9 % injection 10 mL  10 mL Intravenous 2 times per day Hieu Rogers,         sodium chloride flush 0.9 % injection 10 mL  10 mL Intravenous PRN Mammie Bonine, DO        lidocaine PF 1 % injection 1 mL  1 mL Intradermal Once PRN Mammie Bonine, DO           Allergies:     Allergies   Allergen Reactions    Amoxil [Amoxicillin]      Swelling of throat, rash and cough    Bee Venom Hives    Keflex [Cephalexin]      itching    Levaquin [Levofloxacin In D5w] Hives    Macrobid [Nitrofurantoin Monohyd Macro] Hives    Sulfa Antibiotics Hives       Problem List:    Patient Active Problem List   Diagnosis Code    H/O hysterectomy for benign disease Z90.710    H/O gestational diabetes mellitus, not currently pregnant Z86.32    Borderline diabetes R73.03    FH: diabetes mellitus Z83.3    FH: throat cancer Z80.0    FH: uterine cancer Z80.53    FH: breast cancer Z80.3    Neck pain, chronic M54.2, G89.29    Tobacco abuse disorder Z72.0    Seizure disorder ([address] G40.909    Non-compliance with treatment Z91.19    Dyslipidemia B34.5    Uncomplicated asthma L87.915    Anemia D64.9    Vitamin D deficiency E55.9    Chronic constipation K59.09    B12 deficiency E53.8    Bipolar affective disorder ([facility]) F31.9    PTSD (post-traumatic stress disorder) F43.10    Chiari I malformation ([facility]) G93.5    Chronic headache R51    IBS (irritable bowel syndrome) K58.9    Saddle embolus of pulmonary artery without acute cor pulmonale ([facility]) I26.92    COPD (chronic obstructive pulmonary disease) ([facility]) J44.9    Chronic idiopathic constipation K59.04    Anticoagulant long-term use Z79.01    Asthma J45.909       Past Medical History:        Diagnosis Date    Abdominal pain     Anemia     Arthritis 2018    Left Foot    Asthma     Back problem     Bipolar 1 disorder ([facility])     Chest pain     with \"coughing\" per pt    CHF (congestive heart failure) ([address]     When child was delivered     Chicken pox     Chronic idiopathic constipation 2014    Chronic mental illness     Depression     Depression     Emphysema lung ([address]     GERD (gastroesophageal reflux disease)     GERD (gastroesophageal reflux disease) 2004    Hair loss     Headache     Hernia     High blood pressure     Hyperlipidemia     Hypertension     Ileus ([facility])     Irregular bowel habits     Irritable bowel syndrome     MDRO (multiple drug resistant organisms) resistance     MRSA in  per pt.  Nervousness     Obesity     Pneumonia     Seizures ([address]     Last Seizure 18    Slow gastric motility     Stroke (cerebrum) ([address] 2014    Suicidal intent     2003.  Denies 18    TIA (transient ischemic attack) 2014    Weight loss        Past Surgical History:        Procedure Laterality Date    ABSCESS DRAINAGE      abdominal abscess RLQ    AXILLARY SURGERY Right 2018    Excision of hidradenitis cysts, right axilla    BREAST LUMPECTOMY Bilateral     BREAST LUMPECTOMY       SECTION      x2    ENDOMETRIAL ABLATION      ENDOSCOPY, COLON, DIAGNOSTIC  2014    HERNIA REPAIR      HYSTERECTOMY      HI EXC SKIN BENIG <5MM TRUNK,ARM,LEG Right 2018    EXCISION HIDRADENITIS RIGHT AXILLA performed by Jazz Lynn DO at [address]      UMBILICAL HERNIA REPAIR         Social History:    Social History     Tobacco Use    Smoking status: Current Every Day Smoker     Packs/day: 1.00     Years: 26.00     Pack years: 26.00     Types: Cigarettes    Smokeless tobacco: Never Used    Tobacco comment: wants help- adviced about smoking cessation plans   Substance Use Topics    Alcohol use: Yes     Comment: occ                                Ready to quit: Not Answered  Counseling given: Not Answered  Comment: wants help- adviced about smoking cessation plans      Vital Signs (Current):   Vitals:    19 0622 19 0629   BP: (!) 142/95    Pulse: 65    Resp: 18    Temp: 97.9 °F (36.6 °C)    TempSrc: Oral Oral   SpO2: 99%    Weight:  188 lb 12.8 oz (85.6 kg)   Height:  5' 5.5\" (1.664 m)                                              BP Readings from Last 3 Encounters:   19 (!) 142/95   19 118/84   19 121/86       NPO Status: Time of last liquid consumption: 2300                        Time of last solid consumption:                         Date of last liquid consumption: 19                        Date of last solid food consumption: 19    BMI:   Wt Readings from Last 3 Encounters:   19 188 lb 12.8 oz (85.6 kg)   19 189 lb 14.4 oz (86.1 kg)   19 191 lb 6.4 oz (86.8 kg)     Body mass index is 30.94 kg/m².     CBC:   Lab Results   Component Value Date    WBC 6.2 2019    RBC 5.05 2019    HGB 12.1 2019    HCT 37.8 2019    MCV 74.8 2019    RDW 18.9 2019     2019       CMP:   Lab Results Component Value Date     04/01/2019    K 3.6 04/01/2019     04/01/2019    CO2 23 04/01/2019    BUN 11 04/01/2019    CREATININE 0.68 04/01/2019    GFRAA >60 04/01/2019    LABGLOM >60 04/01/2019    GLUCOSE 94 07/27/2018    PROT 7.3 07/27/2018    CALCIUM 8.9 07/27/2018    BILITOT 0.12 07/27/2018    ALKPHOS 76 07/27/2018    AST 24 07/27/2018    ALT 35 07/27/2018       POC Tests: No results for input(s): POCGLU, POCNA, POCK, POCCL, POCBUN, POCHEMO, POCHCT in the last 72 hours. Coags:   Lab Results   Component Value Date    PROTIME 10.7 04/01/2019    INR 1.0 04/01/2019    APTT 26.0 04/01/2019       HCG (If Applicable):   Lab Results   Component Value Date    PREGTESTUR neg 03/28/2019    HCG NEGATIVE 03/28/2019        ABGs: No results found for: PHART, PO2ART, HXH4SPW, GYS3GSD, BEART, V1YUYMLQ     Type & Screen (If Applicable):  No results found for: LABABO, LABRH    Anesthesia Evaluation   no history of anesthetic complications:   Airway: Mallampati: I  TM distance: >3 FB   Neck ROM: full  Mouth opening: > = 3 FB Dental:          Pulmonary: breath sounds clear to auscultation  (+) COPD:  asthma: current smoker                           Cardiovascular:    (+) hypertension:,     (-)  angina and  LUCIANO      Rhythm: regular  Rate: normal                    Neuro/Psych:   (+) seizures:, psychiatric history:            GI/Hepatic/Renal:   (+) GERD: well controlled,           Endo/Other:                     Abdominal:           Vascular:   + PE. Anesthesia Plan      general and MAC     ASA 3       Induction: intravenous. Anesthetic plan and risks discussed with patient.                       Reynaldo Mendes MD   4/1/2019

## 2019-04-01 NOTE — H&P
History and Physical Service   [place]    HISTORY AND PHYSICAL EXAMINATION            Date of Evaluation: 2019  Patient name:  Star Dejesus  MRN:   [id number]  YOB: 1975  PCP:    Jono Jones MD    History Obtained From:     Patient, medical records    History of Present Illness: This is Star Dejesus a 41 y.o. female who presents today for greg excision of a left hydradenitis left axilla by Dr. Susan Frankel. She has had intermittent exacerbation of the left axillary hydradenitis. She denies any recent fever or chills. No increased pain of the left axillary area. Past Medical History:     Past Medical History:   Diagnosis Date    Abdominal pain     Anemia     Arthritis 2018    Left Foot    Asthma     Back problem     Bipolar 1 disorder ([address]     Chest pain     with \"coughing\" per pt    CHF (congestive heart failure) ([address]     When child was delivered     Chicken pox     Chronic idiopathic constipation 2014    Chronic mental illness     Depression     Depression     Emphysema lung ([address]     GERD (gastroesophageal reflux disease)     GERD (gastroesophageal reflux disease) 2004    Hair loss     Headache     Hernia     High blood pressure     Hyperlipidemia     Hypertension     Ileus (HCC)     Irregular bowel habits     Irritable bowel syndrome     MDRO (multiple drug resistant organisms) resistance 2005    MRSA in  per pt.  Nervousness     Obesity     Pneumonia     Seizures ([address]     Last Seizure 18    Slow gastric motility     Stroke (cerebrum) ([address] 2014    Suicidal intent     .  Denies 18    TIA (transient ischemic attack) 2014    Weight loss         Past Surgical History:     Past Surgical History:   Procedure Laterality Date    ABSCESS DRAINAGE      abdominal abscess RLQ    AXILLARY SURGERY Right 2018    Excision of hidradenitis cysts, right axilla    BREAST LUMPECTOMY Bilateral tablet Take 1 tablet by mouth daily 1/17/18  Yes Gricelda Heard,    lurasidone (LATUDA) 60 MG TABS tablet take 1 tablet by mouth once daily with food AT LEAST 350 CALORIES 6/15/17  Yes Historical Provider, MD   escitalopram (LEXAPRO) 10 MG tablet take 1 tablet by mouth once daily 3/17/17  Yes Historical Provider, MD   apixaban (ELIQUIS) 5 MG TABS tablet 1 tab BID 9/5/18   Teresa Palacios MD   cetirizine (ZYRTEC ALLERGY) 10 MG tablet Take 1 tablet by mouth daily 7/24/18   Ledy Scott MD   polyethylene glycol (MIRALAX) powder Please dispense 4 Ducolax tablets with Miralax. Use as directed by following your patient instructions given by office 3/29/18   Last Rosales MD   mometasone-formoterol [facility]) 200-5 MCG/ACT inhaler Inhale 2 puffs into the lungs every 12 hours 3/21/18   eLdy Scott MD        Allergies:     Amoxil [amoxicillin]; Bee venom; Keflex [cephalexin]; Levaquin [levofloxacin in d5w]; Macrobid [nitrofurantoin monohyd macro]; and Sulfa antibiotics    Social History:     Tobacco:    reports that she has been smoking cigarettes. She has a 26.00 pack-year smoking history. She has never used smokeless tobacco.  Alcohol:      reports that she drinks alcohol. Drug Use:  reports that she has current or past drug history. Drug: Marijuana. Frequency: 7.00 times per week. Family History:     Family History   Problem Relation Age of Onset    Asthma Mother     High Blood Pressure Mother     Mental Illness Mother     Stroke Other     Other Sister         blood clotting disorder, ms    Depression Sister     Cancer Maternal Grandmother     Diabetes Maternal Grandmother     Cancer Maternal Aunt     Diabetes Maternal Grandfather        Review of Systems:     Positive and Negative as described in HPI.     CONSTITUTIONAL:  negative for fevers, chills, sweats, fatigue, weight loss  HEENT:  negative for vision, hearing changes, runny nose, throat pain  RESPIRATORY:  negative for shortness of breath, cough, congestion, wheezing. CARDIOVASCULAR:  negative for chest pain, palpitations. GASTROINTESTINAL:  negative for nausea, vomiting, diarrhea, constipation, change in bowel habits, abdominal pain   GENITOURINARY:  negative for difficulty of urination, burning with urination, frequency   INTEGUMENT:  See HPI. negative for rash, easy bruising   HEMATOLOGIC/LYMPHATIC:  negative for swelling/edema   ALLERGIC/IMMUNOLOGIC:  negative for urticaria , itching  ENDOCRINE:  negative increase in drinking, increase in urination, hot or cold intolerance  MUSCULOSKELETAL:  negative joint pains, muscle aches, swelling of joints  NEUROLOGICAL:  negative for headaches, dizziness, lightheadedness, numbness, pain, tingling extremities  BEHAVIOR/PSYCH:  negative for depression, anxiety    Physical Exam:   BP (!) 142/95   Pulse 65   Temp 97.9 °F (36.6 °C) (Oral)   Resp 18   Ht 5' 5.5\" (1.664 m)   Wt 188 lb 12.8 oz (85.6 kg)   LMP  (Exact Date)   SpO2 99%   BMI 30.94 kg/m²   No LMP recorded (exact date). Patient has had a hysterectomy. General Appearance:  alert, well appearing, and in no acute distress  Mental status: oriented to person, place, and time with normal affect  Head:  normocephalic, atraumatic. Eye: no icterus, redness, pupils equal and reactive, extraocular eye movements intact, conjunctiva clear  Ear: normal external ear, no discharge, hearing intact  Nose:  no drainage noted  Mouth: mucous membranes moist  Neck: supple, no carotid bruits, thyroid not palpable  Lungs: Bilateral equal air entry, clear to ausculation, no wheezing, rales or rhonchi, normal effort  Cardiovascular: normal rate, regular rhythm, no murmur, gallop, rub.   Abdomen: Soft, nontender, nondistended, normal bowel sounds, no hepatomegaly or splenomegaly  Neurologic: There are no new focal motor or sensory deficits, normal muscle tone and bulk, no abnormal sensation, normal speech, cranial nerves II through XII grossly intact  Skin: No rashes, bruising or bleeding on exposed skin area  Extremities:  peripheral pulses palpable, no pedal edema or calf pain with palpation  Psych: normal affect     Investigations:      Laboratory Testing:  Recent Results (from the past 24 hour(s))   CBC Auto Differential    Collection Time: 04/01/19  6:40 AM   Result Value Ref Range    WBC 6.2 3.5 - 11.0 k/uL    RBC 5.05 4.0 - 5.2 m/uL    Hemoglobin 12.1 12.0 - 16.0 g/dL    Hematocrit 37.8 36 - 46 %    MCV 74.8 (L) 80 - 100 fL    MCH 24.0 (L) 26 - 34 pg    MCHC 32.1 31 - 37 g/dL    RDW 18.9 (H) 11.5 - 14.5 %    Platelets 292 185 - 147 k/uL    MPV 9.6 6.0 - 12.0 fL    NRBC Automated NOT REPORTED per 100 WBC    Differential Type NOT REPORTED     Seg Neutrophils 49 36 - 66 %    Lymphocytes 38 24 - 44 %    Monocytes 11 (H) 1 - 7 %    Eosinophils % 1 1 - 4 %    Basophils 1 0 - 2 %    Immature Granulocytes NOT REPORTED 0 %    Segs Absolute 3.10 1.8 - 7.7 k/uL    Absolute Lymph # 2.30 1.0 - 4.8 k/uL    Absolute Mono # 0.70 0.2 - 0.8 k/uL    Absolute Eos # 0.10 0.0 - 0.4 k/uL    Basophils # 0.00 0.0 - 0.2 k/uL    Absolute Immature Granulocyte NOT REPORTED 0.00 - 0.30 k/uL    WBC Morphology NOT REPORTED     RBC Morphology NOT REPORTED     Platelet Estimate NOT REPORTED        Recent Labs     04/01/19  0640 03/28/19  1155   HGB 12.1  --    HCT 37.8  --    WBC 6.2  --    MCV 74.8*  --    HCG  --  NEGATIVE         Diagnosis:      Hydradenitis left axilla      ELIZABETH Duval CNP  4/1/2019  6:52 AM

## 2019-04-01 NOTE — ANESTHESIA POSTPROCEDURE EVALUATION
Department of Anesthesiology  Postprocedure Note    Patient: Chino James  MRN: [id number]  YOB: 1975  Date of evaluation: 4/1/2019  Time:  10:42 AM     Procedure Summary     Date:  04/01/19 Room / Location:  [facility] / [facility]    Anesthesia Start:  0436 Anesthesia Stop:  0816    Procedure:  LEFT AXILLARY HYDRADENITIS EXCISION (Left ) Diagnosis:  (DX HIDRADENTITIS LEFT AXILLA)    Surgeon:  Gladys Hazel DO Responsible Provider:  Carlos Chaves MD    Anesthesia Type:  general ASA Status:  3          Anesthesia Type: general    Viviane Phase I: Viviane Score: 7    Viviane Phase II:      Last vitals: Reviewed and per EMR flowsheets.        Anesthesia Post Evaluation    Patient location during evaluation: PACU  Level of consciousness: awake and alert  Airway patency: patent  Nausea & Vomiting: no nausea and no vomiting  Complications: no  Cardiovascular status: blood pressure returned to baseline  Respiratory status: acceptable

## 2019-04-02 LAB
EKG ATRIAL RATE: 63 BPM
EKG P AXIS: 35 DEGREES
EKG P-R INTERVAL: 210 MS
EKG Q-T INTERVAL: 454 MS
EKG QRS DURATION: 72 MS
EKG QTC CALCULATION (BAZETT): 464 MS
EKG R AXIS: -12 DEGREES
EKG T AXIS: -23 DEGREES
EKG VENTRICULAR RATE: 63 BPM
SURGICAL PATHOLOGY REPORT: NORMAL

## 2019-06-06 DIAGNOSIS — I26.92 SADDLE EMBOLUS OF PULMONARY ARTERY WITHOUT ACUTE COR PULMONALE, UNSPECIFIED CHRONICITY (HCC): ICD-10-CM

## 2019-06-07 DIAGNOSIS — J30.2 SEASONAL ALLERGIC RHINITIS: ICD-10-CM

## 2019-06-07 DIAGNOSIS — I10 ESSENTIAL HYPERTENSION: ICD-10-CM

## 2019-06-07 RX ORDER — MONTELUKAST SODIUM 10 MG/1
TABLET ORAL
Qty: 30 TABLET | Refills: 2 | Status: SHIPPED | OUTPATIENT
Start: 2019-06-07 | End: 2019-08-27 | Stop reason: SDUPTHER

## 2019-06-07 RX ORDER — LISINOPRIL 40 MG/1
TABLET ORAL
Qty: 30 TABLET | Refills: 2 | Status: SHIPPED | OUTPATIENT
Start: 2019-06-07 | End: 2019-08-29 | Stop reason: SDUPTHER

## 2019-06-07 NOTE — TELEPHONE ENCOUNTER
Izabel Washington is calling to request a refill on the following medication(s):  Requested Prescriptions     Pending Prescriptions Disp Refills    montelukast (SINGULAIR) 10 MG tablet [Pharmacy Med Name: MONTELUKAST SOD 10 MG TABLET] 30 tablet 2     Sig: take 1 tablet by mouth once daily    lisinopril (PRINIVIL;ZESTRIL) 40 MG tablet [Pharmacy Med Name: LISINOPRIL 40 MG TABLET] 30 tablet 2     Sig: take 1 tablet by mouth once daily       Last Visit Date (If Applicable):  3/6/2868    Next Visit Date:    Visit date not found

## 2019-07-01 RX ORDER — CLONIDINE 0.2 MG/24H
PATCH, EXTENDED RELEASE TRANSDERMAL
Qty: 4 PATCH | Refills: 3 | Status: SHIPPED | OUTPATIENT
Start: 2019-07-01 | End: 2019-10-04 | Stop reason: DRUGHIGH

## 2019-08-15 DIAGNOSIS — I26.92 SADDLE EMBOLUS OF PULMONARY ARTERY WITHOUT ACUTE COR PULMONALE, UNSPECIFIED CHRONICITY (HCC): ICD-10-CM

## 2019-08-27 DIAGNOSIS — J30.2 SEASONAL ALLERGIC RHINITIS: ICD-10-CM

## 2019-08-27 RX ORDER — MONTELUKAST SODIUM 10 MG/1
TABLET ORAL
Qty: 30 TABLET | Refills: 2 | Status: SHIPPED | OUTPATIENT
Start: 2019-08-27 | End: 2019-10-04 | Stop reason: ALTCHOICE

## 2019-08-29 ENCOUNTER — OFFICE VISIT (OUTPATIENT)
Dept: PULMONOLOGY | Age: 44
End: 2019-08-29
Payer: COMMERCIAL

## 2019-08-29 VITALS
BODY MASS INDEX: 30.37 KG/M2 | WEIGHT: 189 LBS | RESPIRATION RATE: 16 BRPM | HEART RATE: 69 BPM | DIASTOLIC BLOOD PRESSURE: 89 MMHG | HEIGHT: 66 IN | OXYGEN SATURATION: 98 % | SYSTOLIC BLOOD PRESSURE: 139 MMHG

## 2019-08-29 DIAGNOSIS — I26.92 SADDLE EMBOLUS OF PULMONARY ARTERY WITHOUT ACUTE COR PULMONALE, UNSPECIFIED CHRONICITY (HCC): Primary | ICD-10-CM

## 2019-08-29 DIAGNOSIS — Z79.01 ANTICOAGULANT LONG-TERM USE: ICD-10-CM

## 2019-08-29 DIAGNOSIS — F17.200 SMOKING: ICD-10-CM

## 2019-08-29 DIAGNOSIS — I82.413 DVT OF DEEP FEMORAL VEIN, BILATERAL (HCC): ICD-10-CM

## 2019-08-29 DIAGNOSIS — E66.09 OBESITY DUE TO EXCESS CALORIES, UNSPECIFIED OBESITY SEVERITY: ICD-10-CM

## 2019-08-29 DIAGNOSIS — J45.40 MODERATE PERSISTENT ASTHMA WITHOUT COMPLICATION: ICD-10-CM

## 2019-08-29 PROCEDURE — 99214 OFFICE O/P EST MOD 30 MIN: CPT | Performed by: INTERNAL MEDICINE

## 2019-08-29 PROCEDURE — G8427 DOCREV CUR MEDS BY ELIG CLIN: HCPCS | Performed by: INTERNAL MEDICINE

## 2019-08-29 PROCEDURE — G8417 CALC BMI ABV UP PARAM F/U: HCPCS | Performed by: INTERNAL MEDICINE

## 2019-08-29 PROCEDURE — 4004F PT TOBACCO SCREEN RCVD TLK: CPT | Performed by: INTERNAL MEDICINE

## 2019-08-29 RX ORDER — PREDNISONE 20 MG/1
TABLET ORAL
Refills: 0 | COMMUNITY
Start: 2019-08-25 | End: 2019-10-04 | Stop reason: ALTCHOICE

## 2019-08-30 ENCOUNTER — HOSPITAL ENCOUNTER (OUTPATIENT)
Dept: VASCULAR LAB | Age: 44
Discharge: HOME | End: 2019-08-30
Payer: COMMERCIAL

## 2019-08-30 DIAGNOSIS — I82.413 DVT OF DEEP FEMORAL VEIN, BILATERAL (HCC): Primary | ICD-10-CM

## 2019-08-30 PROCEDURE — 93970 EXTREMITY STUDY: CPT

## 2019-10-04 ENCOUNTER — OFFICE VISIT (OUTPATIENT)
Dept: FAMILY MEDICINE CLINIC | Age: 44
End: 2019-10-04
Payer: COMMERCIAL

## 2019-10-04 VITALS
WEIGHT: 193.4 LBS | HEIGHT: 66 IN | OXYGEN SATURATION: 99 % | SYSTOLIC BLOOD PRESSURE: 134 MMHG | BODY MASS INDEX: 31.08 KG/M2 | DIASTOLIC BLOOD PRESSURE: 96 MMHG | HEART RATE: 66 BPM

## 2019-10-04 DIAGNOSIS — I10 ESSENTIAL HYPERTENSION: ICD-10-CM

## 2019-10-04 DIAGNOSIS — E53.8 B12 DEFICIENCY: ICD-10-CM

## 2019-10-04 DIAGNOSIS — D64.9 ANEMIA, UNSPECIFIED TYPE: ICD-10-CM

## 2019-10-04 DIAGNOSIS — Z86.711 HISTORY OF PULMONARY EMBOLISM: ICD-10-CM

## 2019-10-04 DIAGNOSIS — K21.9 GASTROESOPHAGEAL REFLUX DISEASE, ESOPHAGITIS PRESENCE NOT SPECIFIED: ICD-10-CM

## 2019-10-04 DIAGNOSIS — J44.9 CHRONIC OBSTRUCTIVE PULMONARY DISEASE, UNSPECIFIED COPD TYPE (HCC): ICD-10-CM

## 2019-10-04 DIAGNOSIS — E55.9 VITAMIN D DEFICIENCY: Primary | ICD-10-CM

## 2019-10-04 DIAGNOSIS — J45.40 MODERATE PERSISTENT ASTHMA WITHOUT COMPLICATION: ICD-10-CM

## 2019-10-04 DIAGNOSIS — Z79.01 ANTICOAGULANT LONG-TERM USE: ICD-10-CM

## 2019-10-04 DIAGNOSIS — F31.9 BIPOLAR AFFECTIVE DISORDER, REMISSION STATUS UNSPECIFIED (HCC): ICD-10-CM

## 2019-10-04 DIAGNOSIS — E53.8 FOLATE DEFICIENCY: ICD-10-CM

## 2019-10-04 DIAGNOSIS — K58.1 IRRITABLE BOWEL SYNDROME WITH CONSTIPATION: ICD-10-CM

## 2019-10-04 DIAGNOSIS — E78.5 DYSLIPIDEMIA: ICD-10-CM

## 2019-10-04 DIAGNOSIS — R53.83 OTHER FATIGUE: ICD-10-CM

## 2019-10-04 DIAGNOSIS — R53.82 CHRONIC FATIGUE: ICD-10-CM

## 2019-10-04 DIAGNOSIS — R51.9 CHRONIC NONINTRACTABLE HEADACHE, UNSPECIFIED HEADACHE TYPE: ICD-10-CM

## 2019-10-04 DIAGNOSIS — R73.9 HYPERGLYCEMIA: ICD-10-CM

## 2019-10-04 DIAGNOSIS — Z23 NEED FOR INFLUENZA VACCINATION: ICD-10-CM

## 2019-10-04 DIAGNOSIS — F39 MOOD DISORDER (HCC): ICD-10-CM

## 2019-10-04 DIAGNOSIS — E53.9 VITAMIN B DEFICIENCY: ICD-10-CM

## 2019-10-04 DIAGNOSIS — Z13.29 SCREENING FOR THYROID DISORDER: ICD-10-CM

## 2019-10-04 DIAGNOSIS — G89.29 CHRONIC NONINTRACTABLE HEADACHE, UNSPECIFIED HEADACHE TYPE: ICD-10-CM

## 2019-10-04 PROBLEM — I26.92 SADDLE EMBOLUS OF PULMONARY ARTERY WITHOUT ACUTE COR PULMONALE (HCC): Status: RESOLVED | Noted: 2018-06-02 | Resolved: 2019-10-04

## 2019-10-04 PROCEDURE — 99214 OFFICE O/P EST MOD 30 MIN: CPT | Performed by: FAMILY MEDICINE

## 2019-10-04 PROCEDURE — G0008 ADMIN INFLUENZA VIRUS VAC: HCPCS | Performed by: FAMILY MEDICINE

## 2019-10-04 PROCEDURE — 90686 IIV4 VACC NO PRSV 0.5 ML IM: CPT | Performed by: FAMILY MEDICINE

## 2019-10-04 PROCEDURE — G8484 FLU IMMUNIZE NO ADMIN: HCPCS | Performed by: FAMILY MEDICINE

## 2019-10-04 PROCEDURE — 4004F PT TOBACCO SCREEN RCVD TLK: CPT | Performed by: FAMILY MEDICINE

## 2019-10-04 PROCEDURE — G8926 SPIRO NO PERF OR DOC: HCPCS | Performed by: FAMILY MEDICINE

## 2019-10-04 PROCEDURE — 3023F SPIROM DOC REV: CPT | Performed by: FAMILY MEDICINE

## 2019-10-04 PROCEDURE — G8417 CALC BMI ABV UP PARAM F/U: HCPCS | Performed by: FAMILY MEDICINE

## 2019-10-04 PROCEDURE — G8427 DOCREV CUR MEDS BY ELIG CLIN: HCPCS | Performed by: FAMILY MEDICINE

## 2019-10-04 RX ORDER — VERAPAMIL HYDROCHLORIDE 360 MG/1
360 CAPSULE, DELAYED RELEASE PELLETS ORAL DAILY
Qty: 30 CAPSULE | Refills: 0 | Status: SHIPPED | OUTPATIENT
Start: 2019-10-04 | End: 2020-01-20 | Stop reason: SDUPTHER

## 2019-10-04 RX ORDER — CLONIDINE 0.1 MG/24H
1 PATCH, EXTENDED RELEASE TRANSDERMAL
Qty: 4 PATCH | Refills: 1 | Status: SHIPPED | OUTPATIENT
Start: 2019-10-04 | End: 2019-11-23 | Stop reason: SDUPTHER

## 2019-10-04 RX ORDER — ATORVASTATIN CALCIUM 10 MG/1
10 TABLET, FILM COATED ORAL DAILY
Qty: 30 TABLET | Refills: 3 | Status: SHIPPED | OUTPATIENT
Start: 2019-10-04 | End: 2020-01-21

## 2019-10-04 RX ORDER — ESCITALOPRAM OXALATE 5 MG/1
TABLET ORAL
Refills: 0 | COMMUNITY
Start: 2019-09-23 | End: 2019-10-04 | Stop reason: ALTCHOICE

## 2019-10-04 ASSESSMENT — ENCOUNTER SYMPTOMS
SINUS PRESSURE: 0
TROUBLE SWALLOWING: 0
NAUSEA: 0
RECTAL PAIN: 0
ABDOMINAL PAIN: 0
SHORTNESS OF BREATH: 0
COLOR CHANGE: 0
COUGH: 1
VOICE CHANGE: 1
BLOOD IN STOOL: 0
DIARRHEA: 0
EYE DISCHARGE: 0
CONSTIPATION: 0
BACK PAIN: 0
SORE THROAT: 1
VOMITING: 0
ANAL BLEEDING: 0
EYE REDNESS: 0
ABDOMINAL DISTENTION: 0
EYE PAIN: 0
RHINORRHEA: 1
CHEST TIGHTNESS: 0

## 2019-10-04 ASSESSMENT — PATIENT HEALTH QUESTIONNAIRE - PHQ9
SUM OF ALL RESPONSES TO PHQ QUESTIONS 1-9: 0
2. FEELING DOWN, DEPRESSED OR HOPELESS: 0
SUM OF ALL RESPONSES TO PHQ QUESTIONS 1-9: 0
SUM OF ALL RESPONSES TO PHQ9 QUESTIONS 1 & 2: 0
1. LITTLE INTEREST OR PLEASURE IN DOING THINGS: 0

## 2019-10-07 ENCOUNTER — TELEPHONE (OUTPATIENT)
Dept: FAMILY MEDICINE CLINIC | Age: 44
End: 2019-10-07

## 2019-10-07 DIAGNOSIS — Z72.0 BRONCHITIS DUE TO TOBACCO USE: Primary | ICD-10-CM

## 2019-10-07 DIAGNOSIS — J40 BRONCHITIS DUE TO TOBACCO USE: Primary | ICD-10-CM

## 2019-10-09 ENCOUNTER — HOSPITAL ENCOUNTER (OUTPATIENT)
Age: 44
Discharge: HOME | End: 2019-10-09
Payer: COMMERCIAL

## 2019-10-09 DIAGNOSIS — R73.9 HYPERGLYCEMIA: ICD-10-CM

## 2019-10-09 DIAGNOSIS — E53.9 VITAMIN B DEFICIENCY: ICD-10-CM

## 2019-10-09 DIAGNOSIS — R53.83 OTHER FATIGUE: ICD-10-CM

## 2019-10-09 DIAGNOSIS — D64.9 ANEMIA, UNSPECIFIED TYPE: ICD-10-CM

## 2019-10-09 DIAGNOSIS — E78.5 DYSLIPIDEMIA: ICD-10-CM

## 2019-10-09 DIAGNOSIS — E55.9 VITAMIN D DEFICIENCY: ICD-10-CM

## 2019-10-09 DIAGNOSIS — Z13.29 SCREENING FOR THYROID DISORDER: ICD-10-CM

## 2019-10-09 PROBLEM — E53.8 FOLATE DEFICIENCY: Status: ACTIVE | Noted: 2019-10-09

## 2019-10-09 LAB
ABSOLUTE EOS #: 0.09 K/UL (ref 0–0.44)
ABSOLUTE IMMATURE GRANULOCYTE: <0.03 K/UL (ref 0–0.3)
ABSOLUTE LYMPH #: 2.99 K/UL (ref 1.1–3.7)
ABSOLUTE MONO #: 0.59 K/UL (ref 0.1–1.2)
ALBUMIN SERPL-MCNC: 3.9 G/DL (ref 3.5–5.2)
ALBUMIN/GLOBULIN RATIO: 1.2 (ref 1–2.5)
ALP BLD-CCNC: 93 U/L (ref 35–104)
ALT SERPL-CCNC: 17 U/L (ref 5–33)
ANION GAP SERPL CALCULATED.3IONS-SCNC: 13 MMOL/L (ref 9–17)
AST SERPL-CCNC: 17 U/L
BASOPHILS # BLD: 1 % (ref 0–2)
BASOPHILS ABSOLUTE: 0.03 K/UL (ref 0–0.2)
BILIRUB SERPL-MCNC: 0.27 MG/DL (ref 0.3–1.2)
BUN BLDV-MCNC: 11 MG/DL (ref 6–20)
BUN/CREAT BLD: ABNORMAL (ref 9–20)
CALCIUM SERPL-MCNC: 8.7 MG/DL (ref 8.6–10.4)
CHLORIDE BLD-SCNC: 106 MMOL/L (ref 98–107)
CHOLESTEROL/HDL RATIO: 2.9
CHOLESTEROL: 205 MG/DL
CO2: 20 MMOL/L (ref 20–31)
CREAT SERPL-MCNC: 0.76 MG/DL (ref 0.5–0.9)
DIFFERENTIAL TYPE: ABNORMAL
EOSINOPHILS RELATIVE PERCENT: 2 % (ref 1–4)
ESTIMATED AVERAGE GLUCOSE: 108 MG/DL
FOLATE: 2.9 NG/ML
GFR AFRICAN AMERICAN: >60 ML/MIN
GFR NON-AFRICAN AMERICAN: >60 ML/MIN
GFR SERPL CREATININE-BSD FRML MDRD: ABNORMAL ML/MIN/{1.73_M2}
GFR SERPL CREATININE-BSD FRML MDRD: ABNORMAL ML/MIN/{1.73_M2}
GLUCOSE BLD-MCNC: 84 MG/DL (ref 70–99)
HBA1C MFR BLD: 5.4 % (ref 4–6)
HCT VFR BLD CALC: 39.4 % (ref 36.3–47.1)
HDLC SERPL-MCNC: 70 MG/DL
HEMOGLOBIN: 12.1 G/DL (ref 11.9–15.1)
IMMATURE GRANULOCYTES: 0 %
IRON SATURATION: 28 % (ref 20–55)
IRON: 81 UG/DL (ref 37–145)
LDL CHOLESTEROL: 123 MG/DL (ref 0–130)
LYMPHOCYTES # BLD: 51 % (ref 24–43)
MCH RBC QN AUTO: 24.1 PG (ref 25.2–33.5)
MCHC RBC AUTO-ENTMCNC: 30.7 G/DL (ref 28.4–34.8)
MCV RBC AUTO: 78.3 FL (ref 82.6–102.9)
MONOCYTES # BLD: 10 % (ref 3–12)
NRBC AUTOMATED: 0 PER 100 WBC
PDW BLD-RTO: 18.2 % (ref 11.8–14.4)
PLATELET # BLD: ABNORMAL K/UL (ref 138–453)
PLATELET ESTIMATE: ABNORMAL
PLATELET, FLUORESCENCE: 191 K/UL (ref 138–453)
PLATELET, IMMATURE FRACTION: 6.4 % (ref 1.1–10.3)
PMV BLD AUTO: ABNORMAL FL (ref 8.1–13.5)
POTASSIUM SERPL-SCNC: 4.2 MMOL/L (ref 3.7–5.3)
RBC # BLD: 5.03 M/UL (ref 3.95–5.11)
RBC # BLD: ABNORMAL 10*6/UL
SEG NEUTROPHILS: 37 % (ref 36–65)
SEGMENTED NEUTROPHILS ABSOLUTE COUNT: 2.16 K/UL (ref 1.5–8.1)
SODIUM BLD-SCNC: 139 MMOL/L (ref 135–144)
T3 FREE: 2.61 PG/ML (ref 2.02–4.43)
THYROXINE, FREE: 1.05 NG/DL (ref 0.93–1.7)
TOTAL IRON BINDING CAPACITY: 294 UG/DL (ref 250–450)
TOTAL PROTEIN: 7.1 G/DL (ref 6.4–8.3)
TRIGL SERPL-MCNC: 58 MG/DL
TSH SERPL DL<=0.05 MIU/L-ACNC: 0.73 MIU/L (ref 0.3–5)
UNSATURATED IRON BINDING CAPACITY: 213 UG/DL (ref 112–347)
VITAMIN B-12: 375 PG/ML (ref 232–1245)
VITAMIN D 25-HYDROXY: 17.2 NG/ML (ref 30–100)
VLDLC SERPL CALC-MCNC: ABNORMAL MG/DL (ref 1–30)
WBC # BLD: 5.9 K/UL (ref 3.5–11.3)
WBC # BLD: ABNORMAL 10*3/UL

## 2019-10-09 PROCEDURE — 36415 COLL VENOUS BLD VENIPUNCTURE: CPT

## 2019-10-09 PROCEDURE — 84481 FREE ASSAY (FT-3): CPT

## 2019-10-09 PROCEDURE — 83036 HEMOGLOBIN GLYCOSYLATED A1C: CPT

## 2019-10-09 PROCEDURE — 83550 IRON BINDING TEST: CPT

## 2019-10-09 PROCEDURE — 84439 ASSAY OF FREE THYROXINE: CPT

## 2019-10-09 PROCEDURE — 84443 ASSAY THYROID STIM HORMONE: CPT

## 2019-10-09 PROCEDURE — 82306 VITAMIN D 25 HYDROXY: CPT

## 2019-10-09 PROCEDURE — 80053 COMPREHEN METABOLIC PANEL: CPT

## 2019-10-09 PROCEDURE — 85025 COMPLETE CBC W/AUTO DIFF WBC: CPT

## 2019-10-09 PROCEDURE — 85055 RETICULATED PLATELET ASSAY: CPT

## 2019-10-09 PROCEDURE — 80061 LIPID PANEL: CPT

## 2019-10-09 PROCEDURE — 82746 ASSAY OF FOLIC ACID SERUM: CPT

## 2019-10-09 PROCEDURE — 83540 ASSAY OF IRON: CPT

## 2019-10-09 PROCEDURE — 82607 VITAMIN B-12: CPT

## 2019-10-09 RX ORDER — ERGOCALCIFEROL 1.25 MG/1
50000 CAPSULE ORAL WEEKLY
Qty: 12 CAPSULE | Refills: 1 | Status: SHIPPED | OUTPATIENT
Start: 2019-10-09 | End: 2020-03-11

## 2019-10-09 RX ORDER — FOLIC ACID 1 MG/1
1 TABLET ORAL DAILY
Qty: 90 TABLET | Refills: 1 | Status: SHIPPED | OUTPATIENT
Start: 2019-10-09 | End: 2020-09-22 | Stop reason: SDUPTHER

## 2019-10-16 DIAGNOSIS — I26.92 SADDLE EMBOLUS OF PULMONARY ARTERY WITHOUT ACUTE COR PULMONALE, UNSPECIFIED CHRONICITY (HCC): ICD-10-CM

## 2019-10-18 ENCOUNTER — HOSPITAL ENCOUNTER (OUTPATIENT)
Age: 44
Setting detail: SPECIMEN
Discharge: HOME | End: 2019-10-18
Payer: COMMERCIAL

## 2019-10-18 ENCOUNTER — OFFICE VISIT (OUTPATIENT)
Dept: FAMILY MEDICINE CLINIC | Age: 44
End: 2019-10-18
Payer: COMMERCIAL

## 2019-10-18 VITALS
DIASTOLIC BLOOD PRESSURE: 99 MMHG | HEIGHT: 66 IN | WEIGHT: 196.8 LBS | BODY MASS INDEX: 31.63 KG/M2 | OXYGEN SATURATION: 100 % | SYSTOLIC BLOOD PRESSURE: 129 MMHG | HEART RATE: 61 BPM

## 2019-10-18 DIAGNOSIS — E55.9 VITAMIN D INSUFFICIENCY: ICD-10-CM

## 2019-10-18 DIAGNOSIS — R35.0 URINARY FREQUENCY: ICD-10-CM

## 2019-10-18 DIAGNOSIS — I10 ESSENTIAL HYPERTENSION: ICD-10-CM

## 2019-10-18 DIAGNOSIS — F43.10 PTSD (POST-TRAUMATIC STRESS DISORDER): ICD-10-CM

## 2019-10-18 DIAGNOSIS — Z79.01 ANTICOAGULANT LONG-TERM USE: ICD-10-CM

## 2019-10-18 DIAGNOSIS — Z86.711 HISTORY OF PULMONARY EMBOLISM: ICD-10-CM

## 2019-10-18 DIAGNOSIS — J20.9 ACUTE TRACHEOBRONCHITIS: ICD-10-CM

## 2019-10-18 DIAGNOSIS — G40.909 SEIZURE DISORDER (HCC): Primary | ICD-10-CM

## 2019-10-18 DIAGNOSIS — E78.5 DYSLIPIDEMIA: ICD-10-CM

## 2019-10-18 DIAGNOSIS — F31.9 BIPOLAR AFFECTIVE DISORDER, REMISSION STATUS UNSPECIFIED (HCC): ICD-10-CM

## 2019-10-18 DIAGNOSIS — J44.9 CHRONIC OBSTRUCTIVE PULMONARY DISEASE, UNSPECIFIED COPD TYPE (HCC): ICD-10-CM

## 2019-10-18 DIAGNOSIS — J30.2 SEASONAL ALLERGIES: ICD-10-CM

## 2019-10-18 DIAGNOSIS — R39.15 URINARY URGENCY: ICD-10-CM

## 2019-10-18 DIAGNOSIS — E53.8 B12 DEFICIENCY: ICD-10-CM

## 2019-10-18 DIAGNOSIS — E53.8 FOLATE DEFICIENCY: ICD-10-CM

## 2019-10-18 DIAGNOSIS — E55.9 VITAMIN D DEFICIENCY: ICD-10-CM

## 2019-10-18 LAB
-: ABNORMAL
AMORPHOUS: ABNORMAL
BACTERIA: ABNORMAL
BILIRUBIN URINE: NEGATIVE
BILIRUBIN, POC: NORMAL
BLOOD URINE, POC: NORMAL
CASTS UA: ABNORMAL /LPF (ref 0–8)
CLARITY, POC: NORMAL
COLOR, POC: NORMAL
COLOR: YELLOW
COMMENT UA: ABNORMAL
CRYSTALS, UA: ABNORMAL /HPF
EPITHELIAL CELLS UA: ABNORMAL /HPF (ref 0–5)
GLUCOSE URINE, POC: NORMAL
GLUCOSE URINE: NEGATIVE
KETONES, POC: NORMAL
KETONES, URINE: ABNORMAL
LEUKOCYTE EST, POC: NORMAL
LEUKOCYTE ESTERASE, URINE: NEGATIVE
MUCUS: ABNORMAL
NITRITE, POC: NORMAL
NITRITE, URINE: NEGATIVE
OTHER OBSERVATIONS UA: ABNORMAL
PH UA: 5.5 (ref 5–8)
PH, POC: 6
PROTEIN UA: ABNORMAL
PROTEIN, POC: NORMAL
RBC UA: ABNORMAL /HPF (ref 0–4)
RENAL EPITHELIAL, UA: ABNORMAL /HPF
SPECIFIC GRAVITY UA: 1.03 (ref 1–1.03)
SPECIFIC GRAVITY, POC: 1.03
TRICHOMONAS: ABNORMAL
TURBIDITY: ABNORMAL
URINE HGB: NEGATIVE
UROBILINOGEN, POC: 0.2
UROBILINOGEN, URINE: NORMAL
WBC UA: ABNORMAL /HPF (ref 0–5)
YEAST: ABNORMAL

## 2019-10-18 PROCEDURE — G8427 DOCREV CUR MEDS BY ELIG CLIN: HCPCS | Performed by: FAMILY MEDICINE

## 2019-10-18 PROCEDURE — 4004F PT TOBACCO SCREEN RCVD TLK: CPT | Performed by: FAMILY MEDICINE

## 2019-10-18 PROCEDURE — G8926 SPIRO NO PERF OR DOC: HCPCS | Performed by: FAMILY MEDICINE

## 2019-10-18 PROCEDURE — 99214 OFFICE O/P EST MOD 30 MIN: CPT | Performed by: FAMILY MEDICINE

## 2019-10-18 PROCEDURE — 81003 URINALYSIS AUTO W/O SCOPE: CPT | Performed by: FAMILY MEDICINE

## 2019-10-18 PROCEDURE — 3023F SPIROM DOC REV: CPT | Performed by: FAMILY MEDICINE

## 2019-10-18 PROCEDURE — G8482 FLU IMMUNIZE ORDER/ADMIN: HCPCS | Performed by: FAMILY MEDICINE

## 2019-10-18 PROCEDURE — G8417 CALC BMI ABV UP PARAM F/U: HCPCS | Performed by: FAMILY MEDICINE

## 2019-10-18 RX ORDER — FOLIC ACID 1 MG/1
1 TABLET ORAL DAILY
Qty: 90 TABLET | Refills: 1 | Status: SHIPPED | OUTPATIENT
Start: 2019-10-18 | End: 2020-01-20 | Stop reason: SDUPTHER

## 2019-10-18 RX ORDER — BENZTROPINE MESYLATE 0.5 MG/1
TABLET ORAL
Refills: 0 | COMMUNITY
Start: 2019-10-07

## 2019-10-18 RX ORDER — LEVETIRACETAM 500 MG/1
500 TABLET ORAL 2 TIMES DAILY
Qty: 60 TABLET | Refills: 0 | Status: SHIPPED | OUTPATIENT
Start: 2019-10-18 | End: 2020-08-18

## 2019-10-18 RX ORDER — CETIRIZINE HYDROCHLORIDE 10 MG/1
10 TABLET ORAL DAILY
Qty: 90 TABLET | Refills: 1 | Status: SHIPPED | OUTPATIENT
Start: 2019-10-18 | End: 2021-10-15

## 2019-10-18 ASSESSMENT — PATIENT HEALTH QUESTIONNAIRE - PHQ9
1. LITTLE INTEREST OR PLEASURE IN DOING THINGS: 1
SUM OF ALL RESPONSES TO PHQ9 QUESTIONS 1 & 2: 1
SUM OF ALL RESPONSES TO PHQ QUESTIONS 1-9: 1
2. FEELING DOWN, DEPRESSED OR HOPELESS: 0
SUM OF ALL RESPONSES TO PHQ QUESTIONS 1-9: 1

## 2019-10-18 ASSESSMENT — ENCOUNTER SYMPTOMS
CONSTIPATION: 0
VOMITING: 0
EYE DISCHARGE: 0
EYE REDNESS: 0
ANAL BLEEDING: 0
BACK PAIN: 0
TROUBLE SWALLOWING: 0
DIARRHEA: 0
ABDOMINAL PAIN: 0
SHORTNESS OF BREATH: 0
NAUSEA: 0
EYE PAIN: 0
ABDOMINAL DISTENTION: 0
VOICE CHANGE: 0
BLOOD IN STOOL: 0
COLOR CHANGE: 0
RECTAL PAIN: 0
CHEST TIGHTNESS: 0
COUGH: 0
SINUS PRESSURE: 0

## 2019-10-19 LAB
CULTURE: NORMAL
Lab: NORMAL
SPECIMEN DESCRIPTION: NORMAL

## 2019-10-23 ENCOUNTER — TELEPHONE (OUTPATIENT)
Dept: FAMILY MEDICINE CLINIC | Age: 44
End: 2019-10-23

## 2019-10-23 RX ORDER — DOXYCYCLINE HYCLATE 100 MG
100 TABLET ORAL 2 TIMES DAILY
Qty: 20 TABLET | Refills: 0 | Status: SHIPPED | OUTPATIENT
Start: 2019-10-23 | End: 2019-11-02

## 2019-11-04 ENCOUNTER — APPOINTMENT (OUTPATIENT)
Dept: CT IMAGING | Age: 44
End: 2019-11-04
Payer: COMMERCIAL

## 2019-11-04 ENCOUNTER — APPOINTMENT (OUTPATIENT)
Dept: GENERAL RADIOLOGY | Age: 44
End: 2019-11-04
Payer: COMMERCIAL

## 2019-11-04 ENCOUNTER — HOSPITAL ENCOUNTER (EMERGENCY)
Age: 44
Discharge: HOME | End: 2019-11-04
Attending: EMERGENCY MEDICINE
Payer: COMMERCIAL

## 2019-11-04 VITALS
TEMPERATURE: 97.9 F | RESPIRATION RATE: 18 BRPM | BODY MASS INDEX: 31.47 KG/M2 | DIASTOLIC BLOOD PRESSURE: 95 MMHG | HEART RATE: 55 BPM | SYSTOLIC BLOOD PRESSURE: 157 MMHG | WEIGHT: 195 LBS

## 2019-11-04 DIAGNOSIS — I27.82 CHRONIC PULMONARY EMBOLISM, UNSPECIFIED PULMONARY EMBOLISM TYPE, UNSPECIFIED WHETHER ACUTE COR PULMONALE PRESENT (HCC): Primary | ICD-10-CM

## 2019-11-04 LAB
% CKMB: 1.3 % (ref 0–3)
ABSOLUTE EOS #: 0.08 K/UL (ref 0–0.44)
ABSOLUTE IMMATURE GRANULOCYTE: 0.02 K/UL (ref 0–0.3)
ABSOLUTE LYMPH #: 4.21 K/UL (ref 1.1–3.7)
ABSOLUTE MONO #: 0.68 K/UL (ref 0.1–1.2)
ANION GAP SERPL CALCULATED.3IONS-SCNC: 11 MMOL/L (ref 9–17)
BASOPHILS # BLD: 0 % (ref 0–2)
BASOPHILS ABSOLUTE: <0.03 K/UL (ref 0–0.2)
BNP INTERPRETATION: NORMAL
BUN BLDV-MCNC: 11 MG/DL (ref 6–20)
BUN/CREAT BLD: 12 (ref 9–20)
CALCIUM SERPL-MCNC: 8.5 MG/DL (ref 8.6–10.4)
CHLORIDE BLD-SCNC: 101 MMOL/L (ref 98–107)
CK MB: <1 NG/ML
CKMB INTERPRETATION: NORMAL
CO2: 24 MMOL/L (ref 20–31)
CREAT SERPL-MCNC: 0.89 MG/DL (ref 0.5–0.9)
DIFFERENTIAL TYPE: ABNORMAL
EOSINOPHILS RELATIVE PERCENT: 1 % (ref 1–4)
GFR AFRICAN AMERICAN: >60 ML/MIN
GFR NON-AFRICAN AMERICAN: >60 ML/MIN
GFR SERPL CREATININE-BSD FRML MDRD: ABNORMAL ML/MIN/{1.73_M2}
GFR SERPL CREATININE-BSD FRML MDRD: ABNORMAL ML/MIN/{1.73_M2}
GLUCOSE BLD-MCNC: 108 MG/DL (ref 70–99)
HCT VFR BLD CALC: 34.2 % (ref 36.3–47.1)
HEMOGLOBIN: 11.3 G/DL (ref 11.9–15.1)
IMMATURE GRANULOCYTES: 0 %
LYMPHOCYTES # BLD: 58 % (ref 24–43)
MAGNESIUM: 1.9 MG/DL (ref 1.6–2.6)
MCH RBC QN AUTO: 24.3 PG (ref 25.2–33.5)
MCHC RBC AUTO-ENTMCNC: 33 G/DL (ref 28.4–34.8)
MCV RBC AUTO: 73.5 FL (ref 82.6–102.9)
MONOCYTES # BLD: 9 % (ref 3–12)
MYOGLOBIN: <21 NG/ML (ref 25–58)
NRBC AUTOMATED: 0 PER 100 WBC
PDW BLD-RTO: 15.1 % (ref 11.8–14.4)
PLATELET # BLD: 194 K/UL (ref 138–453)
PLATELET ESTIMATE: ABNORMAL
PMV BLD AUTO: 11.9 FL (ref 8.1–13.5)
POTASSIUM SERPL-SCNC: 3.7 MMOL/L (ref 3.7–5.3)
PRO-BNP: 133 PG/ML
RBC # BLD: 4.65 M/UL (ref 3.95–5.11)
RBC # BLD: ABNORMAL 10*6/UL
SEG NEUTROPHILS: 31 % (ref 36–65)
SEGMENTED NEUTROPHILS ABSOLUTE COUNT: 2.22 K/UL (ref 1.5–8.1)
SODIUM BLD-SCNC: 136 MMOL/L (ref 135–144)
TOTAL CK: 80 U/L (ref 26–192)
TROPONIN INTERP: ABNORMAL
TROPONIN T: ABNORMAL NG/ML
TROPONIN, HIGH SENSITIVITY: <6 NG/L (ref 0–14)
WBC # BLD: 7.2 K/UL (ref 3.5–11.3)
WBC # BLD: ABNORMAL 10*3/UL

## 2019-11-04 PROCEDURE — 82553 CREATINE MB FRACTION: CPT

## 2019-11-04 PROCEDURE — 96375 TX/PRO/DX INJ NEW DRUG ADDON: CPT

## 2019-11-04 PROCEDURE — 71260 CT THORAX DX C+: CPT

## 2019-11-04 PROCEDURE — 83735 ASSAY OF MAGNESIUM: CPT

## 2019-11-04 PROCEDURE — 84484 ASSAY OF TROPONIN QUANT: CPT

## 2019-11-04 PROCEDURE — 2580000003 HC RX 258: Performed by: EMERGENCY MEDICINE

## 2019-11-04 PROCEDURE — 96374 THER/PROPH/DIAG INJ IV PUSH: CPT

## 2019-11-04 PROCEDURE — 80048 BASIC METABOLIC PNL TOTAL CA: CPT

## 2019-11-04 PROCEDURE — 83880 ASSAY OF NATRIURETIC PEPTIDE: CPT

## 2019-11-04 PROCEDURE — 93005 ELECTROCARDIOGRAM TRACING: CPT | Performed by: EMERGENCY MEDICINE

## 2019-11-04 PROCEDURE — 85025 COMPLETE CBC W/AUTO DIFF WBC: CPT

## 2019-11-04 PROCEDURE — 36415 COLL VENOUS BLD VENIPUNCTURE: CPT

## 2019-11-04 PROCEDURE — 82550 ASSAY OF CK (CPK): CPT

## 2019-11-04 PROCEDURE — 6360000002 HC RX W HCPCS: Performed by: EMERGENCY MEDICINE

## 2019-11-04 PROCEDURE — 6360000004 HC RX CONTRAST MEDICATION: Performed by: EMERGENCY MEDICINE

## 2019-11-04 PROCEDURE — 83874 ASSAY OF MYOGLOBIN: CPT

## 2019-11-04 PROCEDURE — 71045 X-RAY EXAM CHEST 1 VIEW: CPT

## 2019-11-04 PROCEDURE — 99285 EMERGENCY DEPT VISIT HI MDM: CPT

## 2019-11-04 RX ORDER — MORPHINE SULFATE 4 MG/ML
4 INJECTION, SOLUTION INTRAMUSCULAR; INTRAVENOUS ONCE
Status: COMPLETED | OUTPATIENT
Start: 2019-11-04 | End: 2019-11-04

## 2019-11-04 RX ORDER — ONDANSETRON 2 MG/ML
4 INJECTION INTRAMUSCULAR; INTRAVENOUS ONCE
Status: COMPLETED | OUTPATIENT
Start: 2019-11-04 | End: 2019-11-04

## 2019-11-04 RX ORDER — 0.9 % SODIUM CHLORIDE 0.9 %
80 INTRAVENOUS SOLUTION INTRAVENOUS ONCE
Status: COMPLETED | OUTPATIENT
Start: 2019-11-04 | End: 2019-11-04

## 2019-11-04 RX ORDER — OXYCODONE HYDROCHLORIDE AND ACETAMINOPHEN 5; 325 MG/1; MG/1
1 TABLET ORAL EVERY 6 HOURS PRN
Qty: 20 TABLET | Refills: 0 | Status: SHIPPED | OUTPATIENT
Start: 2019-11-04 | End: 2019-11-05 | Stop reason: ALTCHOICE

## 2019-11-04 RX ORDER — SODIUM CHLORIDE 0.9 % (FLUSH) 0.9 %
10 SYRINGE (ML) INJECTION PRN
Status: DISCONTINUED | OUTPATIENT
Start: 2019-11-04 | End: 2019-11-05 | Stop reason: HOSPADM

## 2019-11-04 RX ADMIN — Medication 10 ML: at 21:45

## 2019-11-04 RX ADMIN — SODIUM CHLORIDE 80 ML: 9 INJECTION, SOLUTION INTRAVENOUS at 21:45

## 2019-11-04 RX ADMIN — IOPAMIDOL 80 ML: 755 INJECTION, SOLUTION INTRAVENOUS at 21:46

## 2019-11-04 RX ADMIN — ONDANSETRON 4 MG: 2 INJECTION INTRAMUSCULAR; INTRAVENOUS at 21:11

## 2019-11-04 RX ADMIN — MORPHINE SULFATE 4 MG: 4 INJECTION INTRAVENOUS at 21:11

## 2019-11-04 ASSESSMENT — ENCOUNTER SYMPTOMS
GASTROINTESTINAL NEGATIVE: 1
COUGH: 1
CHEST TIGHTNESS: 1
SHORTNESS OF BREATH: 1

## 2019-11-04 ASSESSMENT — PAIN DESCRIPTION - DESCRIPTORS: DESCRIPTORS: THROBBING

## 2019-11-04 ASSESSMENT — PAIN DESCRIPTION - LOCATION: LOCATION: CHEST

## 2019-11-04 ASSESSMENT — PAIN DESCRIPTION - ORIENTATION: ORIENTATION: LEFT

## 2019-11-04 ASSESSMENT — PAIN SCALES - GENERAL
PAINLEVEL_OUTOF10: 9
PAINLEVEL_OUTOF10: 9

## 2019-11-05 ENCOUNTER — OFFICE VISIT (OUTPATIENT)
Dept: FAMILY MEDICINE CLINIC | Age: 44
End: 2019-11-05
Payer: COMMERCIAL

## 2019-11-05 VITALS
HEIGHT: 66 IN | BODY MASS INDEX: 31.66 KG/M2 | DIASTOLIC BLOOD PRESSURE: 82 MMHG | OXYGEN SATURATION: 100 % | SYSTOLIC BLOOD PRESSURE: 115 MMHG | WEIGHT: 197 LBS | HEART RATE: 74 BPM

## 2019-11-05 DIAGNOSIS — Z00.00 ROUTINE GENERAL MEDICAL EXAMINATION AT A HEALTH CARE FACILITY: Primary | ICD-10-CM

## 2019-11-05 LAB
EKG ATRIAL RATE: 57 BPM
EKG P AXIS: 55 DEGREES
EKG P-R INTERVAL: 226 MS
EKG Q-T INTERVAL: 498 MS
EKG QRS DURATION: 86 MS
EKG QTC CALCULATION (BAZETT): 484 MS
EKG R AXIS: 3 DEGREES
EKG T AXIS: 1 DEGREES
EKG VENTRICULAR RATE: 57 BPM

## 2019-11-05 PROCEDURE — G8482 FLU IMMUNIZE ORDER/ADMIN: HCPCS | Performed by: FAMILY MEDICINE

## 2019-11-05 PROCEDURE — G0438 PPPS, INITIAL VISIT: HCPCS | Performed by: FAMILY MEDICINE

## 2019-11-05 RX ORDER — ESCITALOPRAM OXALATE 5 MG/1
10 TABLET ORAL
Refills: 0 | COMMUNITY
Start: 2019-10-26 | End: 2021-05-24 | Stop reason: DRUGHIGH

## 2019-11-05 RX ORDER — MONTELUKAST SODIUM 10 MG/1
TABLET ORAL
Refills: 0 | COMMUNITY
Start: 2019-10-26 | End: 2019-11-25 | Stop reason: SDUPTHER

## 2019-11-05 ASSESSMENT — LIFESTYLE VARIABLES
AUDIT TOTAL SCORE: 8
HOW OFTEN DURING THE LAST YEAR HAVE YOU BEEN UNABLE TO REMEMBER WHAT HAPPENED THE NIGHT BEFORE BECAUSE YOU HAD BEEN DRINKING: 1
HOW OFTEN DURING THE LAST YEAR HAVE YOU HAD A FEELING OF GUILT OR REMORSE AFTER DRINKING: 1
HOW OFTEN DURING THE LAST YEAR HAVE YOU NEEDED AN ALCOHOLIC DRINK FIRST THING IN THE MORNING TO GET YOURSELF GOING AFTER A NIGHT OF HEAVY DRINKING: 0
HOW OFTEN DURING THE LAST YEAR HAVE YOU FOUND THAT YOU WERE NOT ABLE TO STOP DRINKING ONCE YOU HAD STARTED: 0
HOW OFTEN DO YOU HAVE SIX OR MORE DRINKS ON ONE OCCASION: 2
HAS A RELATIVE, FRIEND, DOCTOR, OR ANOTHER HEALTH PROFESSIONAL EXPRESSED CONCERN ABOUT YOUR DRINKING OR SUGGESTED YOU CUT DOWN: 0
HOW OFTEN DO YOU HAVE A DRINK CONTAINING ALCOHOL: 1
HAVE YOU OR SOMEONE ELSE BEEN INJURED AS A RESULT OF YOUR DRINKING: 2
HOW MANY STANDARD DRINKS CONTAINING ALCOHOL DO YOU HAVE ON A TYPICAL DAY: 1
AUDIT-C TOTAL SCORE: 4
HOW OFTEN DURING THE LAST YEAR HAVE YOU FAILED TO DO WHAT WAS NORMALLY EXPECTED FROM YOU BECAUSE OF DRINKING: 0

## 2019-11-05 ASSESSMENT — PATIENT HEALTH QUESTIONNAIRE - PHQ9: SUM OF ALL RESPONSES TO PHQ QUESTIONS 1-9: 6

## 2019-11-24 DIAGNOSIS — I26.92 SADDLE EMBOLUS OF PULMONARY ARTERY WITHOUT ACUTE COR PULMONALE, UNSPECIFIED CHRONICITY (HCC): ICD-10-CM

## 2019-12-18 ENCOUNTER — APPOINTMENT (OUTPATIENT)
Dept: GENERAL RADIOLOGY | Age: 44
End: 2019-12-18
Payer: COMMERCIAL

## 2019-12-18 ENCOUNTER — HOSPITAL ENCOUNTER (EMERGENCY)
Age: 44
Discharge: HOME | End: 2019-12-18
Attending: EMERGENCY MEDICINE
Payer: COMMERCIAL

## 2019-12-18 VITALS
HEART RATE: 74 BPM | DIASTOLIC BLOOD PRESSURE: 97 MMHG | OXYGEN SATURATION: 100 % | RESPIRATION RATE: 16 BRPM | HEIGHT: 66 IN | TEMPERATURE: 97.9 F | BODY MASS INDEX: 29.89 KG/M2 | WEIGHT: 186 LBS | SYSTOLIC BLOOD PRESSURE: 145 MMHG

## 2019-12-18 DIAGNOSIS — G89.29 ACUTE EXACERBATION OF CHRONIC LOW BACK PAIN: Primary | ICD-10-CM

## 2019-12-18 DIAGNOSIS — K08.89 PAIN, DENTAL: ICD-10-CM

## 2019-12-18 DIAGNOSIS — M54.50 ACUTE EXACERBATION OF CHRONIC LOW BACK PAIN: Primary | ICD-10-CM

## 2019-12-18 DIAGNOSIS — K02.9 DENTAL CARIES: ICD-10-CM

## 2019-12-18 LAB
-: NORMAL
AMORPHOUS: NORMAL
BACTERIA: NORMAL
BILIRUBIN URINE: NEGATIVE
CASTS UA: NORMAL /LPF
CHP ED QC CHECK: NORMAL
COLOR: YELLOW
COMMENT UA: ABNORMAL
CRYSTALS, UA: NORMAL /HPF
EPITHELIAL CELLS UA: NORMAL /HPF (ref 0–5)
GLUCOSE URINE: NEGATIVE
KETONES, URINE: NEGATIVE
LEUKOCYTE ESTERASE, URINE: NEGATIVE
MUCUS: NORMAL
NITRITE, URINE: NEGATIVE
OTHER OBSERVATIONS UA: NORMAL
PH UA: 6 (ref 5–8)
PROTEIN UA: NEGATIVE
RBC UA: NORMAL /HPF (ref 0–2)
RENAL EPITHELIAL, UA: NORMAL /HPF
SPECIFIC GRAVITY UA: 1.02 (ref 1–1.03)
TRICHOMONAS: NORMAL
TURBIDITY: ABNORMAL
URINE HGB: NEGATIVE
UROBILINOGEN, URINE: NORMAL
WBC UA: NORMAL /HPF (ref 0–5)
YEAST: NORMAL

## 2019-12-18 PROCEDURE — 99283 EMERGENCY DEPT VISIT LOW MDM: CPT

## 2019-12-18 PROCEDURE — 81001 URINALYSIS AUTO W/SCOPE: CPT

## 2019-12-18 PROCEDURE — 6360000002 HC RX W HCPCS: Performed by: PHYSICIAN ASSISTANT

## 2019-12-18 PROCEDURE — 72100 X-RAY EXAM L-S SPINE 2/3 VWS: CPT

## 2019-12-18 PROCEDURE — 96372 THER/PROPH/DIAG INJ SC/IM: CPT

## 2019-12-18 RX ORDER — CLINDAMYCIN HYDROCHLORIDE 150 MG/1
300 CAPSULE ORAL 3 TIMES DAILY
Qty: 60 CAPSULE | Refills: 0 | Status: SHIPPED | OUTPATIENT
Start: 2019-12-18 | End: 2019-12-28

## 2019-12-18 RX ORDER — HYDROCODONE BITARTRATE AND ACETAMINOPHEN 5; 325 MG/1; MG/1
1-2 TABLET ORAL 3 TIMES DAILY
Qty: 12 TABLET | Refills: 0 | Status: SHIPPED | OUTPATIENT
Start: 2019-12-18 | End: 2019-12-21

## 2019-12-18 RX ORDER — ORPHENADRINE CITRATE 30 MG/ML
60 INJECTION INTRAMUSCULAR; INTRAVENOUS ONCE
Status: COMPLETED | OUTPATIENT
Start: 2019-12-18 | End: 2019-12-18

## 2019-12-18 RX ORDER — NAPROXEN 500 MG/1
500 TABLET ORAL 2 TIMES DAILY WITH MEALS
Qty: 20 TABLET | Refills: 0 | Status: SHIPPED | OUTPATIENT
Start: 2019-12-18 | End: 2020-01-20

## 2019-12-18 RX ORDER — KETOROLAC TROMETHAMINE 30 MG/ML
60 INJECTION, SOLUTION INTRAMUSCULAR; INTRAVENOUS ONCE
Status: COMPLETED | OUTPATIENT
Start: 2019-12-18 | End: 2019-12-18

## 2019-12-18 RX ORDER — METHOCARBAMOL 750 MG/1
750 TABLET, FILM COATED ORAL 4 TIMES DAILY
Qty: 40 TABLET | Refills: 0 | Status: SHIPPED | OUTPATIENT
Start: 2019-12-18 | End: 2019-12-28

## 2019-12-18 RX ADMIN — ORPHENADRINE CITRATE 60 MG: 30 INJECTION INTRAMUSCULAR; INTRAVENOUS at 13:23

## 2019-12-18 RX ADMIN — KETOROLAC TROMETHAMINE 60 MG: 30 INJECTION, SOLUTION INTRAMUSCULAR at 13:22

## 2019-12-18 ASSESSMENT — PAIN DESCRIPTION - DESCRIPTORS: DESCRIPTORS: CONSTANT;SHARP

## 2019-12-18 ASSESSMENT — PAIN DESCRIPTION - LOCATION: LOCATION: BACK;TEETH

## 2019-12-18 ASSESSMENT — PAIN DESCRIPTION - ORIENTATION: ORIENTATION: RIGHT;LEFT;UPPER

## 2019-12-18 ASSESSMENT — PAIN SCALES - GENERAL
PAINLEVEL_OUTOF10: 10
PAINLEVEL_OUTOF10: 10

## 2019-12-18 ASSESSMENT — PAIN DESCRIPTION - FREQUENCY: FREQUENCY: CONTINUOUS

## 2020-01-03 ENCOUNTER — TELEPHONE (OUTPATIENT)
Dept: OTHER | Age: 45
End: 2020-01-03

## 2020-01-03 NOTE — TELEPHONE ENCOUNTER
PT had to reschedule todays appt. Her brother passed away this morning. She is very worried she will be dismissed from the office for missing todays appt. She did reschedule for 01/17/20. Pt requested that I let you know why she had to cancel todays appt.       Please call Pt with any questions  Thank you

## 2020-01-15 RX ORDER — CLONIDINE 0.1 MG/24H
PATCH, EXTENDED RELEASE TRANSDERMAL
Qty: 4 PATCH | Refills: 1 | Status: SHIPPED | OUTPATIENT
Start: 2020-01-15 | End: 2020-02-17

## 2020-01-20 ENCOUNTER — OFFICE VISIT (OUTPATIENT)
Dept: FAMILY MEDICINE CLINIC | Age: 45
End: 2020-01-20
Payer: COMMERCIAL

## 2020-01-20 VITALS
HEART RATE: 69 BPM | OXYGEN SATURATION: 99 % | HEIGHT: 66 IN | BODY MASS INDEX: 31.72 KG/M2 | DIASTOLIC BLOOD PRESSURE: 99 MMHG | SYSTOLIC BLOOD PRESSURE: 145 MMHG | WEIGHT: 197.4 LBS

## 2020-01-20 PROBLEM — A49.02 MRSA INFECTION: Status: ACTIVE | Noted: 2020-01-20

## 2020-01-20 PROCEDURE — 99214 OFFICE O/P EST MOD 30 MIN: CPT | Performed by: FAMILY MEDICINE

## 2020-01-20 PROCEDURE — 4004F PT TOBACCO SCREEN RCVD TLK: CPT | Performed by: FAMILY MEDICINE

## 2020-01-20 PROCEDURE — G8427 DOCREV CUR MEDS BY ELIG CLIN: HCPCS | Performed by: FAMILY MEDICINE

## 2020-01-20 PROCEDURE — G8482 FLU IMMUNIZE ORDER/ADMIN: HCPCS | Performed by: FAMILY MEDICINE

## 2020-01-20 PROCEDURE — G8417 CALC BMI ABV UP PARAM F/U: HCPCS | Performed by: FAMILY MEDICINE

## 2020-01-20 RX ORDER — DOXYCYCLINE HYCLATE 100 MG
100 TABLET ORAL 2 TIMES DAILY
Qty: 20 TABLET | Refills: 0 | Status: SHIPPED | OUTPATIENT
Start: 2020-01-20 | End: 2020-01-30

## 2020-01-20 RX ORDER — VERAPAMIL HYDROCHLORIDE 360 MG/1
360 CAPSULE, DELAYED RELEASE PELLETS ORAL DAILY
Qty: 90 CAPSULE | Refills: 1 | Status: SHIPPED | OUTPATIENT
Start: 2020-01-20 | End: 2020-04-08

## 2020-01-20 ASSESSMENT — ENCOUNTER SYMPTOMS
RECTAL PAIN: 0
VOICE CHANGE: 0
SHORTNESS OF BREATH: 0
EYE DISCHARGE: 0
COUGH: 0
EYE PAIN: 0
ABDOMINAL DISTENTION: 0
EYE REDNESS: 0
COLOR CHANGE: 1
VOMITING: 0
CHEST TIGHTNESS: 0
SINUS PRESSURE: 0
ABDOMINAL PAIN: 0
CONSTIPATION: 0
BACK PAIN: 0
DIARRHEA: 0
ANAL BLEEDING: 0
NAUSEA: 0
TROUBLE SWALLOWING: 0
BLOOD IN STOOL: 0

## 2020-01-20 NOTE — PROGRESS NOTES
Subjective:      Patient ID: Mahi Sterling is a 45 y.o. female. HPI States has been under a lot of stress from deaths in the family. Had a sore spot on left buttock since last week and then it drained Saturday, pain is a little better but still sore. Has had similar lesions to her axilla and had surgery to axilla in past .Seen Dr Ama Bunch and Dr [place] Settler in the past for this. No fever or chills. H/O MRSA positive status for several years, has multiple drug resistance. States out of verapamil for a few weeks , states BP is high as she has not been taking this as prescribed. H/O PE and on long term anticoagulation  COPD hist, stable. Mood, sleep ok  Review of Systems   Constitutional: Negative for activity change, appetite change and fatigue. HENT: Negative for dental problem, ear pain, hearing loss, postnasal drip, sinus pressure, sneezing, tinnitus, trouble swallowing and voice change. Eyes: Negative for pain, discharge, redness and visual disturbance. Respiratory: Negative for cough, chest tightness and shortness of breath. Cardiovascular: Negative for chest pain, palpitations and leg swelling. Gastrointestinal: Negative for abdominal distention, abdominal pain, anal bleeding, blood in stool, constipation, diarrhea, nausea, rectal pain and vomiting. Endocrine: Negative for cold intolerance, heat intolerance, polydipsia, polyphagia and polyuria. Genitourinary: Negative for decreased urine volume, difficulty urinating, dyspareunia, dysuria, enuresis, flank pain, frequency, genital sores, hematuria, menstrual problem, pelvic pain, urgency, vaginal bleeding and vaginal discharge. Musculoskeletal: Negative for arthralgias, back pain, gait problem, joint swelling, myalgias, neck pain and neck stiffness. Skin: Positive for color change and wound. Negative for pallor and rash. Painful draining lesion to left buttock with tenderness, induration and warmth.    Allergic/Immunologic: TABS tablet take 1 tablet by mouth once daily with food AT LEAST 350 CALORIES      [DISCONTINUED] naproxen (NAPROSYN) 500 MG tablet Take 1 tablet by mouth 2 times daily (with meals) (Patient not taking: Reported on 1/20/2020) 20 tablet 0    [DISCONTINUED] folic acid (FOLVITE) 1 MG tablet Take 1 tablet by mouth daily 90 tablet 1    [DISCONTINUED] verapamil (VERELAN) 360 MG extended release capsule Take 1 capsule by mouth daily Stop amlodipine as well as lisinopril and coreg as of now, will reevaluate in a week 30 capsule 0     No facility-administered encounter medications on file as of 1/20/2020.             Conor Vyas MD

## 2020-01-27 ENCOUNTER — OFFICE VISIT (OUTPATIENT)
Dept: FAMILY MEDICINE CLINIC | Age: 45
End: 2020-01-27
Payer: COMMERCIAL

## 2020-01-27 VITALS
OXYGEN SATURATION: 99 % | DIASTOLIC BLOOD PRESSURE: 86 MMHG | HEART RATE: 67 BPM | WEIGHT: 197.2 LBS | HEIGHT: 66 IN | SYSTOLIC BLOOD PRESSURE: 140 MMHG | BODY MASS INDEX: 31.69 KG/M2

## 2020-01-27 PROBLEM — R42 DIZZINESS: Status: ACTIVE | Noted: 2020-01-27

## 2020-01-27 PROBLEM — E78.5 HYPERLIPIDEMIA: Status: ACTIVE | Noted: 2020-01-27

## 2020-01-27 PROBLEM — Z71.6 TOBACCO ABUSE COUNSELING: Status: ACTIVE | Noted: 2020-01-27

## 2020-01-27 PROCEDURE — G8482 FLU IMMUNIZE ORDER/ADMIN: HCPCS | Performed by: FAMILY MEDICINE

## 2020-01-27 PROCEDURE — G8427 DOCREV CUR MEDS BY ELIG CLIN: HCPCS | Performed by: FAMILY MEDICINE

## 2020-01-27 PROCEDURE — G8926 SPIRO NO PERF OR DOC: HCPCS | Performed by: FAMILY MEDICINE

## 2020-01-27 PROCEDURE — 4004F PT TOBACCO SCREEN RCVD TLK: CPT | Performed by: FAMILY MEDICINE

## 2020-01-27 PROCEDURE — 99213 OFFICE O/P EST LOW 20 MIN: CPT | Performed by: FAMILY MEDICINE

## 2020-01-27 PROCEDURE — G8417 CALC BMI ABV UP PARAM F/U: HCPCS | Performed by: FAMILY MEDICINE

## 2020-01-27 PROCEDURE — 3023F SPIROM DOC REV: CPT | Performed by: FAMILY MEDICINE

## 2020-01-27 RX ORDER — MAGNESIUM 200 MG
1 TABLET ORAL DAILY
Qty: 90 TABLET | Refills: 5 | Status: SHIPPED | OUTPATIENT
Start: 2020-01-27 | End: 2021-10-15 | Stop reason: SDUPTHER

## 2020-01-27 ASSESSMENT — PATIENT HEALTH QUESTIONNAIRE - PHQ9
SUM OF ALL RESPONSES TO PHQ QUESTIONS 1-9: 0
SUM OF ALL RESPONSES TO PHQ9 QUESTIONS 1 & 2: 0
1. LITTLE INTEREST OR PLEASURE IN DOING THINGS: 0
2. FEELING DOWN, DEPRESSED OR HOPELESS: 0
SUM OF ALL RESPONSES TO PHQ QUESTIONS 1-9: 0

## 2020-01-27 ASSESSMENT — ENCOUNTER SYMPTOMS
CHEST TIGHTNESS: 0
ABDOMINAL PAIN: 0
BLOOD IN STOOL: 0
SHORTNESS OF BREATH: 0
COUGH: 0
VOICE CHANGE: 0
ABDOMINAL DISTENTION: 0
ANAL BLEEDING: 0
DIARRHEA: 0
EYE REDNESS: 0
RECTAL PAIN: 0
SINUS PRESSURE: 0
NAUSEA: 0
COLOR CHANGE: 0
VOMITING: 0
EYE PAIN: 0
TROUBLE SWALLOWING: 0
CONSTIPATION: 0
BACK PAIN: 0
EYE DISCHARGE: 0

## 2020-01-27 NOTE — PROGRESS NOTES
4. Anticoagulant long-term use     5. Chronic fatigue     6. Gastroesophageal reflux disease, esophagitis presence not specified     7. History of pulmonary embolism     8. Folate deficiency     9. Vitamin D deficiency     10. Essential hypertension     11. Chronic obstructive pulmonary disease, unspecified COPD type ([facility]     12. Irritable bowel syndrome with constipation     13. PTSD (post-traumatic stress disorder)     14. Bipolar 1 disorder ([facility]     15. Chronic constipation     16. Dyslipidemia     17. Tobacco abuse disorder     18. Tobacco abuse counseling     19. Dizziness           Plan:      No orders of the defined types were placed in this encounter.       Outpatient Encounter Medications as of 1/27/2020   Medication Sig Dispense Refill    Cyanocobalamin (B-12) 1000 MCG SUBL Place 1 tablet under the tongue daily 90 tablet 5    atorvastatin (LIPITOR) 10 MG tablet take 1 tablet by mouth once daily 90 tablet 1    verapamil (VERELAN) 360 MG extended release capsule Take 1 capsule by mouth daily 90 capsule 1    doxycycline hyclate (VIBRA-TABS) 100 MG tablet Take 1 tablet by mouth 2 times daily for 10 days 20 tablet 0    cloNIDine (CATAPRES) 0.1 MG/24HR PTWK apply 1 patch  EVERY 7 DAYS 4 patch 1    montelukast (SINGULAIR) 10 MG tablet take 1 tablet by mouth once daily 90 tablet 1    apixaban (ELIQUIS) 5 MG TABS tablet take 1 tablet by mouth twice a day 60 tablet 3    escitalopram (LEXAPRO) 5 MG tablet   0    benztropine (COGENTIN) 0.5 MG tablet take 1 tablet by mouth twice a day  0    cetirizine (ZYRTEC) 10 MG tablet Take 1 tablet by mouth daily 90 tablet 1    levETIRAcetam (KEPPRA) 500 MG tablet Take 1 tablet by mouth 2 times daily 60 tablet 0    folic acid (FOLVITE) 1 MG tablet Take 1 tablet by mouth daily 90 tablet 1    vitamin D (ERGOCALCIFEROL) 70196 units CAPS capsule Take 1 capsule by mouth once a week 12 capsule 1    lurasidone (LATUDA) 60 MG TABS tablet take 1 tablet by mouth once daily with food AT LEAST 350 CALORIES      [DISCONTINUED] Cyanocobalamin (VITAMIN B-12) 1000 MCG SUBL Place 1 tablet under the tongue daily 30 tablet 11     No facility-administered encounter medications on file as of 1/27/2020.             Jason Ardon MD

## 2020-02-05 ENCOUNTER — OFFICE VISIT (OUTPATIENT)
Dept: SURGERY | Age: 45
End: 2020-02-05
Payer: COMMERCIAL

## 2020-02-05 VITALS
SYSTOLIC BLOOD PRESSURE: 188 MMHG | DIASTOLIC BLOOD PRESSURE: 108 MMHG | WEIGHT: 198 LBS | HEART RATE: 62 BPM | HEIGHT: 66 IN | BODY MASS INDEX: 31.82 KG/M2

## 2020-02-05 PROCEDURE — 4004F PT TOBACCO SCREEN RCVD TLK: CPT | Performed by: STUDENT IN AN ORGANIZED HEALTH CARE EDUCATION/TRAINING PROGRAM

## 2020-02-05 PROCEDURE — G8482 FLU IMMUNIZE ORDER/ADMIN: HCPCS | Performed by: STUDENT IN AN ORGANIZED HEALTH CARE EDUCATION/TRAINING PROGRAM

## 2020-02-05 PROCEDURE — 99202 OFFICE O/P NEW SF 15 MIN: CPT | Performed by: STUDENT IN AN ORGANIZED HEALTH CARE EDUCATION/TRAINING PROGRAM

## 2020-02-05 PROCEDURE — G8417 CALC BMI ABV UP PARAM F/U: HCPCS | Performed by: STUDENT IN AN ORGANIZED HEALTH CARE EDUCATION/TRAINING PROGRAM

## 2020-02-05 PROCEDURE — G8427 DOCREV CUR MEDS BY ELIG CLIN: HCPCS | Performed by: STUDENT IN AN ORGANIZED HEALTH CARE EDUCATION/TRAINING PROGRAM

## 2020-02-05 RX ORDER — DOXYCYCLINE HYCLATE 100 MG
100 TABLET ORAL 2 TIMES DAILY
Qty: 20 TABLET | Refills: 0 | Status: SHIPPED | OUTPATIENT
Start: 2020-02-05 | End: 2020-02-15

## 2020-02-05 NOTE — PROGRESS NOTES
History and Physical  [facility]    Patient's Name/Date of Birth: Cortney Tracey / 1975 (59 y.o.)    Date: 2020     HPI: Pt is a 45 y.o. female who presents to [facility] for evaluation of possible left buttock abscess. Patient states she has had pain in the area of the left buttock for the past few weeks. She visited her PCP who prescribed a 10 day course of doxycycline 100mg BID. Patient states that this helped decrease the size of the infection as well as decrease drainage which had been occurring, but she still experiences tenderness and is unable to put weight on her left side. This infection has come and gone twice before in the past several years. Denies fevers, chills. Patient does take eliquis for history of PE. Past Medical History:   Diagnosis Date    Abdominal pain     Anemia     Anticoagulant long-term use     Arthritis 2018    Left Foot    Asthma     Moderate persistent asthma without complication    Back problem     Bipolar 1 disorder (HCC)     Chest pain     with \"coughing\" per pt    CHF (congestive heart failure) ([address]     When child was delivered     Chicken pox     Chronic idiopathic constipation 2014    Chronic mental illness     Depression     Depression     Emphysema lung ([address] 2011    GERD (gastroesophageal reflux disease)     GERD (gastroesophageal reflux disease) 2004    Hair loss     Headache     Hernia     High blood pressure     Hyperlipidemia     Hypertension     Ileus ([address]     Irregular bowel habits     Irritable bowel syndrome     MDRO (multiple drug resistant organisms) resistance     MRSA in  per pt.  Nervousness     Obesity     Pneumonia     Saddle embolus of pulmonary artery without acute cor pulmonale (HCC)     Seizures ([address]     Last Seizure 18    Slow gastric motility     Smoking     Stroke (cerebrum) ([address] 2014    Suicidal intent     .  Denies 18    TIA mouth daily 90 tablet 1    vitamin D (ERGOCALCIFEROL) 96539 units CAPS capsule Take 1 capsule by mouth once a week 12 capsule 1    lurasidone (LATUDA) 60 MG TABS tablet take 1 tablet by mouth once daily with food AT LEAST 350 CALORIES       No current facility-administered medications for this visit.         Allergies   Allergen Reactions    Amoxil [Amoxicillin]      Swelling of throat, rash and cough    Bee Venom Hives    Flonase [Fluticasone]      Headaches      Keflex [Cephalexin]      itching    Levaquin [Levofloxacin In D5w] Hives    Macrobid [Nitrofurantoin Monohyd Macro] Hives    Sulfa Antibiotics Hives       Family History   Problem Relation Age of Onset    Asthma Mother     High Blood Pressure Mother     Mental Illness Mother     Stroke Other     Other Sister         blood clotting disorder, ms    Depression Sister     Cancer Maternal Grandmother     Diabetes Maternal Grandmother     Cancer Maternal Aunt     Diabetes Maternal Grandfather        Social History     Socioeconomic History    Marital status: Single     Spouse name: Not on file    Number of children: Not on file    Years of education: Not on file    Highest education level: Not on file   Occupational History    Not on file   Social Needs    Financial resource strain: Not on file    Food insecurity:     Worry: Not on file     Inability: Not on file    Transportation needs:     Medical: Not on file     Non-medical: Not on file   Tobacco Use    Smoking status: Current Every Day Smoker     Packs/day: 1.00     Years: 26.00     Pack years: 26.00     Types: Cigarettes     Start date: 1981    Smokeless tobacco: Never Used    Tobacco comment: wants help- adviced about smoking cessation plans   Substance and Sexual Activity    Alcohol use: Yes     Comment: occ    Drug use: Yes     Frequency: 7.0 times per week     Types: Marijuana     Comment: Last used 3/31/19    Sexual activity: Yes     Partners: Male   Lifestyle    Physical activity:     Days per week: Not on file     Minutes per session: Not on file    Stress: Not on file   Relationships    Social connections:     Talks on phone: Not on file     Gets together: Not on file     Attends [employer] service: Not on file     Active member of club or organization: Not on file     Attends meetings of clubs or organizations: Not on file     Relationship status: Not on file    Intimate partner violence:     Fear of current or ex partner: Not on file     Emotionally abused: Not on file     Physically abused: Not on file     Forced sexual activity: Not on file   Other Topics Concern    Not on file   Social History Narrative    Not on file       ROS:   GEN: Denies fevers, chills, fatigue. HEENT: No HA, vision of hearing changes   CV: No chest pain, palpitations   RESP: Denies shortness of breath, wheezing. GI: Denies abdominal pain, nausea/vomiting  : Denies increased frequency or dysuria. HEM[de-identified] H/o PE on eliquis   ENDO: Denies history of thyroid problems or diabetes. NEURO: h/o TIA. Physical Exam:  Vitals:    02/05/20 0927   BP: (!) 188/108   Pulse:      General:A & O x3, no acute distress  HEENT: atraumatic, normocephalic, PERRL, oral mucus membrane pink and moist, no mass palpated on neck exam  Heart: RRR, no murmurs   Lungs: Effort normal, no respiratory distress, no accessory muscle use   Abdomen: soft, nontender, nondistended, no guarding or peritoneal signs   RECTAL: Area of induration on left buttock without significant fluctuance. No active drainage or erythema. Extremity: Normal, without deformities, edema, or skin discoloration  Neuro: CN II-XII grossly intact. No motor or sensory deficits appreciated. MK:normal throughout upper and lower extremities    Assessment      Diagnosis Orders   1. Left buttock abscess         Plan   1. Patient's abscess appears to have resolved without any significant area of fluctuance that would benefit from drainage.  Small amount

## 2020-02-16 ENCOUNTER — HOSPITAL ENCOUNTER (EMERGENCY)
Age: 45
Discharge: HOME | End: 2020-02-16
Attending: EMERGENCY MEDICINE
Payer: COMMERCIAL

## 2020-02-16 ENCOUNTER — APPOINTMENT (OUTPATIENT)
Dept: GENERAL RADIOLOGY | Age: 45
End: 2020-02-16
Payer: COMMERCIAL

## 2020-02-16 VITALS
WEIGHT: 200 LBS | HEART RATE: 78 BPM | RESPIRATION RATE: 16 BRPM | OXYGEN SATURATION: 98 % | DIASTOLIC BLOOD PRESSURE: 92 MMHG | TEMPERATURE: 97.5 F | SYSTOLIC BLOOD PRESSURE: 152 MMHG | BODY MASS INDEX: 33.32 KG/M2 | HEIGHT: 65 IN

## 2020-02-16 LAB
ABSOLUTE EOS #: 0.06 K/UL (ref 0–0.44)
ABSOLUTE IMMATURE GRANULOCYTE: 0.02 K/UL (ref 0–0.3)
ABSOLUTE LYMPH #: 2.91 K/UL (ref 1.1–3.7)
ABSOLUTE MONO #: 0.64 K/UL (ref 0.1–1.2)
ANION GAP SERPL CALCULATED.3IONS-SCNC: 10 MMOL/L (ref 9–17)
BASOPHILS # BLD: 0 % (ref 0–2)
BASOPHILS ABSOLUTE: <0.03 K/UL (ref 0–0.2)
BNP INTERPRETATION: NORMAL
BUN BLDV-MCNC: 10 MG/DL (ref 6–20)
BUN/CREAT BLD: 10 (ref 9–20)
CALCIUM SERPL-MCNC: 8.4 MG/DL (ref 8.6–10.4)
CHLORIDE BLD-SCNC: 107 MMOL/L (ref 98–107)
CO2: 22 MMOL/L (ref 20–31)
CREAT SERPL-MCNC: 1 MG/DL (ref 0.5–0.9)
DIFFERENTIAL TYPE: ABNORMAL
EOSINOPHILS RELATIVE PERCENT: 1 % (ref 1–4)
GFR AFRICAN AMERICAN: >60 ML/MIN
GFR NON-AFRICAN AMERICAN: >60 ML/MIN
GFR SERPL CREATININE-BSD FRML MDRD: ABNORMAL ML/MIN/{1.73_M2}
GFR SERPL CREATININE-BSD FRML MDRD: ABNORMAL ML/MIN/{1.73_M2}
GLUCOSE BLD-MCNC: 119 MG/DL (ref 70–99)
HCT VFR BLD CALC: 34.4 % (ref 36.3–47.1)
HEMOGLOBIN: 11 G/DL (ref 11.9–15.1)
IMMATURE GRANULOCYTES: 0 %
LYMPHOCYTES # BLD: 52 % (ref 24–43)
MCH RBC QN AUTO: 23.6 PG (ref 25.2–33.5)
MCHC RBC AUTO-ENTMCNC: 32 G/DL (ref 28.4–34.8)
MCV RBC AUTO: 73.7 FL (ref 82.6–102.9)
MONOCYTES # BLD: 11 % (ref 3–12)
MYOGLOBIN: <21 NG/ML (ref 25–58)
NRBC AUTOMATED: 0 PER 100 WBC
PDW BLD-RTO: 16.1 % (ref 11.8–14.4)
PLATELET # BLD: 179 K/UL (ref 138–453)
PLATELET ESTIMATE: ABNORMAL
PMV BLD AUTO: 10.8 FL (ref 8.1–13.5)
POTASSIUM SERPL-SCNC: 3.6 MMOL/L (ref 3.7–5.3)
PRO-BNP: 233 PG/ML
RBC # BLD: 4.67 M/UL (ref 3.95–5.11)
RBC # BLD: ABNORMAL 10*6/UL
SEG NEUTROPHILS: 36 % (ref 36–65)
SEGMENTED NEUTROPHILS ABSOLUTE COUNT: 2.01 K/UL (ref 1.5–8.1)
SODIUM BLD-SCNC: 139 MMOL/L (ref 135–144)
TROPONIN INTERP: ABNORMAL
TROPONIN T: ABNORMAL NG/ML
TROPONIN, HIGH SENSITIVITY: <6 NG/L (ref 0–14)
WBC # BLD: 5.7 K/UL (ref 3.5–11.3)
WBC # BLD: ABNORMAL 10*3/UL

## 2020-02-16 PROCEDURE — 83874 ASSAY OF MYOGLOBIN: CPT

## 2020-02-16 PROCEDURE — 71045 X-RAY EXAM CHEST 1 VIEW: CPT

## 2020-02-16 PROCEDURE — 96375 TX/PRO/DX INJ NEW DRUG ADDON: CPT

## 2020-02-16 PROCEDURE — 6360000002 HC RX W HCPCS: Performed by: NURSE PRACTITIONER

## 2020-02-16 PROCEDURE — 80048 BASIC METABOLIC PNL TOTAL CA: CPT

## 2020-02-16 PROCEDURE — 85025 COMPLETE CBC W/AUTO DIFF WBC: CPT

## 2020-02-16 PROCEDURE — 6370000000 HC RX 637 (ALT 250 FOR IP): Performed by: EMERGENCY MEDICINE

## 2020-02-16 PROCEDURE — 93005 ELECTROCARDIOGRAM TRACING: CPT

## 2020-02-16 PROCEDURE — 99285 EMERGENCY DEPT VISIT HI MDM: CPT

## 2020-02-16 PROCEDURE — 84484 ASSAY OF TROPONIN QUANT: CPT

## 2020-02-16 PROCEDURE — 2580000003 HC RX 258: Performed by: NURSE PRACTITIONER

## 2020-02-16 PROCEDURE — 83880 ASSAY OF NATRIURETIC PEPTIDE: CPT

## 2020-02-16 PROCEDURE — 96374 THER/PROPH/DIAG INJ IV PUSH: CPT

## 2020-02-16 RX ORDER — 0.9 % SODIUM CHLORIDE 0.9 %
500 INTRAVENOUS SOLUTION INTRAVENOUS ONCE
Status: COMPLETED | OUTPATIENT
Start: 2020-02-16 | End: 2020-02-16

## 2020-02-16 RX ORDER — METOCLOPRAMIDE HYDROCHLORIDE 5 MG/ML
10 INJECTION INTRAMUSCULAR; INTRAVENOUS ONCE
Status: COMPLETED | OUTPATIENT
Start: 2020-02-16 | End: 2020-02-16

## 2020-02-16 RX ORDER — DIPHENHYDRAMINE HYDROCHLORIDE 50 MG/ML
25 INJECTION INTRAMUSCULAR; INTRAVENOUS ONCE
Status: COMPLETED | OUTPATIENT
Start: 2020-02-16 | End: 2020-02-16

## 2020-02-16 RX ORDER — BUTALBITAL, ACETAMINOPHEN AND CAFFEINE 50; 325; 40 MG/1; MG/1; MG/1
1 TABLET ORAL EVERY 6 HOURS PRN
Qty: 20 TABLET | Refills: 0 | Status: SHIPPED | OUTPATIENT
Start: 2020-02-16 | End: 2021-04-02

## 2020-02-16 RX ORDER — KETOROLAC TROMETHAMINE 30 MG/ML
30 INJECTION, SOLUTION INTRAMUSCULAR; INTRAVENOUS ONCE
Status: COMPLETED | OUTPATIENT
Start: 2020-02-16 | End: 2020-02-16

## 2020-02-16 RX ORDER — ACETAMINOPHEN 500 MG
1000 TABLET ORAL ONCE
Status: COMPLETED | OUTPATIENT
Start: 2020-02-16 | End: 2020-02-16

## 2020-02-16 RX ADMIN — KETOROLAC TROMETHAMINE 30 MG: 30 INJECTION, SOLUTION INTRAMUSCULAR at 13:53

## 2020-02-16 RX ADMIN — ACETAMINOPHEN 1000 MG: 500 TABLET ORAL at 15:14

## 2020-02-16 RX ADMIN — SODIUM CHLORIDE 500 ML: 9 INJECTION, SOLUTION INTRAVENOUS at 13:53

## 2020-02-16 RX ADMIN — DIPHENHYDRAMINE HYDROCHLORIDE 25 MG: 50 INJECTION, SOLUTION INTRAMUSCULAR; INTRAVENOUS at 13:54

## 2020-02-16 RX ADMIN — METOCLOPRAMIDE 10 MG: 5 INJECTION, SOLUTION INTRAMUSCULAR; INTRAVENOUS at 13:53

## 2020-02-16 ASSESSMENT — ENCOUNTER SYMPTOMS
SORE THROAT: 0
VOMITING: 0
SHORTNESS OF BREATH: 0
ABDOMINAL PAIN: 0
COUGH: 0
COLOR CHANGE: 0
SINUS PRESSURE: 0
DIARRHEA: 0
CONSTIPATION: 0
WHEEZING: 0
NAUSEA: 0
RHINORRHEA: 0

## 2020-02-16 ASSESSMENT — PAIN DESCRIPTION - PAIN TYPE: TYPE: ACUTE PAIN

## 2020-02-16 ASSESSMENT — PAIN SCALES - GENERAL
PAINLEVEL_OUTOF10: 7
PAINLEVEL_OUTOF10: 10
PAINLEVEL_OUTOF10: 10

## 2020-02-16 NOTE — ED PROVIDER NOTES
R-1Normal      cloNIDine (CATAPRES) 0.1 MG/24HR PTWK apply 1 patch  EVERY 7 DAYS, Disp-4 patch, R-1Normal      montelukast (SINGULAIR) 10 MG tablet take 1 tablet by mouth once daily, Disp-90 tablet, R-1Normal      apixaban (ELIQUIS) 5 MG TABS tablet take 1 tablet by mouth twice a day, Disp-60 tablet, R-3Normal      escitalopram (LEXAPRO) 5 MG tablet R-0Historical Med      benztropine (COGENTIN) 0.5 MG tablet take 1 tablet by mouth twice a day, R-0Historical Med      cetirizine (ZYRTEC) 10 MG tablet Take 1 tablet by mouth daily, Disp-90 tablet, R-1Normal      levETIRAcetam (KEPPRA) 500 MG tablet Take 1 tablet by mouth 2 times daily, Disp-60 tablet, [id number]      folic acid (FOLVITE) 1 MG tablet Take 1 tablet by mouth daily, Disp-90 tablet, R-1Normal      vitamin D (ERGOCALCIFEROL) 50254 units CAPS capsule Take 1 capsule by mouth once a week, Disp-12 capsule, R-1Normal      lurasidone (LATUDA) 60 MG TABS tablet take 1 tablet by mouth once daily with food AT LEAST 350 CALORIESHistorical Med             PAST MEDICAL HISTORY         Diagnosis Date    Abdominal pain     Anemia     Anticoagulant long-term use     Arthritis 2018    Left Foot    Asthma     Moderate persistent asthma without complication    Back problem     Bipolar 1 disorder (HCC)     Chest pain     with \"coughing\" per pt    CHF (congestive heart failure) ([address]     When child was delivered     Chicken pox     Chronic idiopathic constipation     Chronic mental illness     Depression     Depression     Emphysema lung ([address]     GERD (gastroesophageal reflux disease)     GERD (gastroesophageal reflux disease) 2004    Hair loss     Headache     Hernia     High blood pressure     Hyperlipidemia     Hypertension     Ileus ([address]     Irregular bowel habits     Irritable bowel syndrome     MDRO (multiple drug resistant organisms) resistance     MRSA in  per pt.     Nervousness     Obesity     Pneumonia     use. Frequency: 7.00 times per week. Drug: Marijuana. REVIEW OF SYSTEMS    (2-9 systems for level 4, 10 or more for level 5)     Review of Systems   Constitutional: Negative for chills, fever and unexpected weight change. HENT: Negative for congestion, rhinorrhea, sinus pressure and sore throat. Respiratory: Negative for cough, shortness of breath and wheezing. Cardiovascular: Negative for chest pain and palpitations. Gastrointestinal: Negative for abdominal pain, constipation, diarrhea, nausea and vomiting. Genitourinary: Negative for dysuria and hematuria. Musculoskeletal: Negative for arthralgias and myalgias. Skin: Negative for color change and rash. Neurological: Negative for dizziness, weakness and headaches. Hematological: Negative for adenopathy. All other systems reviewed and are negative. Except as noted above the remainder of the review of systems was reviewed and negative. PHYSICAL EXAM    (up to 7 for level 4, 8 or more for level 5)     ED Triage Vitals   BP Temp Temp src Pulse Resp SpO2 Height Weight   02/16/20 1300 02/16/20 1300 -- 02/16/20 1300 02/16/20 1300 02/16/20 1300 02/16/20 1302 02/16/20 1302   (!) 153/97 97.5 °F (36.4 °C)  89 16 98 % 5' 5\" (1.651 m) 200 lb (90.7 kg)       Physical Exam  Vitals signs reviewed. Constitutional:       Appearance: She is well-developed. HENT:      Head: Normocephalic and atraumatic. Eyes:      Conjunctiva/sclera: Conjunctivae normal.      Pupils: Pupils are equal, round, and reactive to light. Neck:      Musculoskeletal: Normal range of motion and neck supple. Cardiovascular:      Rate and Rhythm: Normal rate and regular rhythm. Pulmonary:      Effort: Pulmonary effort is normal. No respiratory distress. Breath sounds: Normal breath sounds. No stridor. Abdominal:      General: Bowel sounds are normal.      Palpations: Abdomen is soft. Musculoskeletal: Normal range of motion.    Lymphadenopathy:      Cervical: No cervical adenopathy. Skin:     General: Skin is warm and dry. Findings: No rash. Neurological:      Mental Status: She is alert and oriented to person, place, and time. DIAGNOSTIC RESULTS     RADIOLOGY:   Non-plain film images such as CT, Ultrasound and MRI are read by the radiologist. Latha Uribe radiographic images are visualized and preliminarily interpreted by the emergency physician with the below findings:    Xr Chest Portable    Result Date: 2/16/2020  EXAMINATION: ONE XRAY VIEW OF THE CHEST 2/16/2020 1:39 pm COMPARISON: November 4, 2019 HISTORY: ORDERING SYSTEM PROVIDED HISTORY: Chest Pain TECHNOLOGIST PROVIDED HISTORY: Chest Pain Reason for Exam: chest pain Acuity: Unknown Type of Exam: Unknown FINDINGS: Lungs are clear. Cardiomegaly. No pulmonary edema. No acute findings. Interpretation per the Radiologist below, if available at the time of this note:    XR CHEST PORTABLE   Final Result   No acute findings. LABS:  Labs Reviewed   CBC WITH AUTO DIFFERENTIAL - Abnormal; Notable for the following components:       Result Value    Hemoglobin 11.0 (*)     Hematocrit 34.4 (*)     MCV 73.7 (*)     MCH 23.6 (*)     RDW 16.1 (*)     Lymphocytes 52 (*)     All other components within normal limits   BASIC METABOLIC PANEL - Abnormal; Notable for the following components:    Glucose 119 (*)     CREATININE 1.00 (*)     Calcium 8.4 (*)     Potassium 3.6 (*)     All other components within normal limits   TROP/MYOGLOBIN - Abnormal; Notable for the following components:    Myoglobin <21 (*)     All other components within normal limits   BRAIN NATRIURETIC PEPTIDE       All other labs were within normal range or not returned as of this dictation.     EMERGENCY DEPARTMENT COURSE and DIFFERENTIAL DIAGNOSIS/MDM:   Vitals:    Vitals:    02/16/20 1300 02/16/20 1302 02/16/20 1500   BP: (!) 153/97  (!) 152/92   Pulse: 89  78   Resp: 16  16   Temp: 97.5 °F (36.4 °C)     SpO2: 98%  98% Weight:  200 lb (90.7 kg)    Height:  5' 5\" (1.651 m)        Medical Decision Making: Patient stable to be discharged home. Her laboratory studies to include a d-dimer are negative. She is also already on Eliquis. Will place on some Fioricet for her headache. Follow-up with her primary care physician for recheck reevaluation. Return ER for worsening headache, chest pain, shortness of breath, fever or any other concerns  Medications   ketorolac (TORADOL) injection 30 mg (30 mg Intravenous Given 2/16/20 1353)   metoclopramide (REGLAN) injection 10 mg (10 mg Intravenous Given 2/16/20 1353)   diphenhydrAMINE (BENADRYL) injection 25 mg (25 mg Intravenous Given 2/16/20 1354)   0.9 % sodium chloride bolus (0 mLs Intravenous Stopped 2/16/20 1508)   acetaminophen (TYLENOL) tablet 1,000 mg (1,000 mg Oral Given 2/16/20 1514)       FINAL IMPRESSION      1. Chest pain, unspecified type    2.  Acute nonintractable headache, unspecified headache type          DISPOSITION/PLAN   DISPOSITION Decision To Discharge 02/16/2020 03:03:24 PM      PATIENT REFERRED TO:   Mina Barbosa MD  [address] [place], [place] [id number] [phone]    Call       [facility] ED  [address] [facility]  [phone]    If symptoms worsen      DISCHARGE MEDICATIONS:     Discharge Medication List as of 2/16/2020  3:04 PM      START taking these medications    Details   butalbital-acetaminophen-caffeine (ESGIC) -40 MG per tablet Take 1 tablet by mouth every 6 hours as needed for Headaches, Disp-20 tablet, R-0Print                 (Please note that portions of this note were completed with a voice recognition program.  Efforts were made to edit the dictations but occasionally words are mis-transcribed.)    [address] FIORELLA, ELIZABETH - CNP  Certified Nurse Practitioner          ELIZABETH Doyle CNP  02/16/20 [id number]

## 2020-02-16 NOTE — ED PROVIDER NOTES
EMERGENCY DEPARTMENT ENCOUNTER   ATTENDING ATTESTATION     Pt Name: Roque Winston  MRN: [id number]  [place] 1975  Date of evaluation: 2/16/20   Roque Winston is a 45 y.o. female with CC: Chest Pain and Headache    MDM:   Patient is a 51-year-old female who presents to the ED complaining of headache, nasal congestion and chest wall pain. Headache started 2 weeks ago. She did not take ibuprofen because she is on Eliquis and she was told not to do so. Chest wall pain is reproducible, located right side, denies trauma. No evidence of ischemia on EKG or lab work. Chest x-ray negative for infiltrate. Labs unremarkable, chest x-ray unremarkable. Symptoms improving with analgesia. CRITICAL CARE:       EKG: All EKG's are interpreted by the Emergency Department Physician who either signs or Co-signs this chart in the absence of a cardiologist.      RADIOLOGY:All plain film, CT, MRI, and formal ultrasound images (except ED bedside ultrasound) are read by the radiologist, see reports below, unless otherwise noted in MDM or here. XR CHEST PORTABLE   Final Result   No acute findings. LABS: All lab results were reviewed by myself, and all abnormals are listed below. Labs Reviewed   CBC WITH AUTO DIFFERENTIAL - Abnormal; Notable for the following components:       Result Value    Hemoglobin 11.0 (*)     Hematocrit 34.4 (*)     MCV 73.7 (*)     MCH 23.6 (*)     RDW 16.1 (*)     Lymphocytes 52 (*)     All other components within normal limits   BASIC METABOLIC PANEL - Abnormal; Notable for the following components:    Glucose 119 (*)     CREATININE 1.00 (*)     Calcium 8.4 (*)     Potassium 3.6 (*)     All other components within normal limits   TROP/MYOGLOBIN - Abnormal; Notable for the following components:    Myoglobin <21 (*)     All other components within normal limits   BRAIN NATRIURETIC PEPTIDE     CONSULTS:  None  FINAL IMPRESSION    No diagnosis found.         PASTMEDICAL HISTORY Past Medical History:   Diagnosis Date    Abdominal pain     Anemia     Anticoagulant long-term use     Arthritis 2018    Left Foot    Asthma     Moderate persistent asthma without complication    Back problem     Bipolar 1 disorder (HCC)     Chest pain     with \"coughing\" per pt    CHF (congestive heart failure) ([address]     When child was delivered     Chicken pox     Chronic idiopathic constipation     Chronic mental illness     Depression     Depression     Emphysema lung ([address]     GERD (gastroesophageal reflux disease)     GERD (gastroesophageal reflux disease) 2004    Hair loss     Headache     Hernia     High blood pressure     Hyperlipidemia     Hypertension     Ileus ([address]     Irregular bowel habits     Irritable bowel syndrome     MDRO (multiple drug resistant organisms) resistance     MRSA in  per pt. 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  Denies 18    TIA (transient ischemic attack) 2014    Weight loss      SURGICAL HISTORY       Past Surgical History:   Procedure Laterality Date    ABSCESS DRAINAGE      abdominal abscess RLQ    AXILLARY SURGERY Right 2018    Excision of hidradenitis cysts, right axilla    BREAST LUMPECTOMY Bilateral     BREAST LUMPECTOMY       SECTION      x2    ENDOMETRIAL ABLATION      ENDOSCOPY, COLON, DIAGNOSTIC  2014    HERNIA REPAIR      HYSTERECTOMY      LYMPH NODE DISSECTION Left 2019    LEFT AXILLARY HYDRADENITIS EXCISION performed by Phill Shepherd DO at [address] Left 2019    LEFT AXILLARY HYDRADENITIS EXCISION (Left )    NC EXC SKIN BENIG <5MM TRUNK,ARM,LEG Right 2018    EXCISION HIDRADENITIS RIGHT AXILLA performed by Yolanda Campuzano

## 2020-02-17 ENCOUNTER — TELEPHONE (OUTPATIENT)
Dept: FAMILY MEDICINE CLINIC | Age: 45
End: 2020-02-17

## 2020-02-17 LAB
EKG ATRIAL RATE: 77 BPM
EKG P AXIS: 50 DEGREES
EKG P-R INTERVAL: 194 MS
EKG Q-T INTERVAL: 408 MS
EKG QRS DURATION: 78 MS
EKG QTC CALCULATION (BAZETT): 461 MS
EKG R AXIS: -10 DEGREES
EKG T AXIS: 5 DEGREES
EKG VENTRICULAR RATE: 77 BPM

## 2020-02-17 RX ORDER — CLONIDINE 0.1 MG/24H
PATCH, EXTENDED RELEASE TRANSDERMAL
Qty: 4 PATCH | Refills: 1 | Status: SHIPPED | OUTPATIENT
Start: 2020-02-17 | End: 2020-03-09

## 2020-02-17 NOTE — TELEPHONE ENCOUNTER
Tom Bridges is calling to request a refill on the following medication(s):    Last Visit Date (If Applicable):  9/98/9543    Next Visit Date:    3/9/2020  Last filled 01/20/2020  Medication Request:  Requested Prescriptions     Pending Prescriptions Disp Refills    cloNIDine (CATAPRES) 0.1 MG/24HR PTWK [Pharmacy Med Name: CLONIDINE 0.1 MG/DAY PATCH] 4 patch 1     Sig: apply 1 patch EVERY 7 DAYS

## 2020-02-17 NOTE — TELEPHONE ENCOUNTER
The pt went to the ER on:2.16.2020. HX: Headaches    She was given: butalbital-acetaminophen-caffeine (ESGIC) -40 MG per tablet   Not covered by her insurance.     Asking what could she use in place of?

## 2020-02-25 NOTE — TELEPHONE ENCOUNTER
Dr Fer Isaac, patient is current and due in for an appointment on 2/27/20. Per last dictation patient to be on lifelong anticoagulation. Please sign for refill if ok. Thank you.

## 2020-03-09 RX ORDER — CLONIDINE 0.1 MG/24H
PATCH, EXTENDED RELEASE TRANSDERMAL
Qty: 4 PATCH | Refills: 1 | Status: SHIPPED | OUTPATIENT
Start: 2020-03-09 | End: 2020-04-10

## 2020-03-09 NOTE — TELEPHONE ENCOUNTER
Sandrita Franco is calling to request a refill on the following medication(s):    Last Visit Date (If Applicable):  6/09/7551    Next Visit Date:    4/1/2020  Last filled 02/17/2020  Medication Request:  Requested Prescriptions     Pending Prescriptions Disp Refills    cloNIDine (CATAPRES) 0.1 MG/24HR PTWK [Pharmacy Med Name: CLONIDINE 0.1 MG/DAY PATCH] 4 patch 1     Sig: apply 1 patch EVERY 7 DAYS

## 2020-03-11 RX ORDER — ERGOCALCIFEROL 1.25 MG/1
CAPSULE ORAL
Qty: 12 CAPSULE | Refills: 1 | Status: SHIPPED | OUTPATIENT
Start: 2020-03-11 | End: 2020-08-14 | Stop reason: SDUPTHER

## 2020-03-11 NOTE — TELEPHONE ENCOUNTER
Roque Winston is calling to request a refill on the following medication(s):    Last Visit Date (If Applicable):  1/69/0405    Next Visit Date:    4/1/2020  Last filled 10/09/19  Medication Request:  Requested Prescriptions     Pending Prescriptions Disp Refills    vitamin D (ERGOCALCIFEROL) 1.25 MG (83593 UT) CAPS capsule [Pharmacy Med Name: VITAMIN D2 1.25MG(50,000 UNIT)] 12 capsule 1     Sig: take 1 capsule by mouth every week

## 2020-03-18 ENCOUNTER — TELEPHONE (OUTPATIENT)
Dept: FAMILY MEDICINE CLINIC | Age: 45
End: 2020-03-18

## 2020-03-18 NOTE — TELEPHONE ENCOUNTER
She is supposed to be seeing a neurologist, will need to contact them. I am not sure what the HA is from and cannot treat this without her getting evaluated for it.

## 2020-03-18 NOTE — TELEPHONE ENCOUNTER
Pt called for the following:    HX: H/A x 4 days     Been takin tabs of Tylenol 500 mg, every 6-8 hrs  Not working! Unable to use Motrin. H/A are most of the time are of her forehead. But, sometimes also in the back of head.

## 2020-03-19 ENCOUNTER — HOSPITAL ENCOUNTER (EMERGENCY)
Facility: CLINIC | Age: 45
Discharge: HOME | End: 2020-03-19
Attending: EMERGENCY MEDICINE
Payer: COMMERCIAL

## 2020-03-19 ENCOUNTER — APPOINTMENT (OUTPATIENT)
Dept: CT IMAGING | Facility: CLINIC | Age: 45
End: 2020-03-19
Payer: COMMERCIAL

## 2020-03-19 ENCOUNTER — OFFICE VISIT (OUTPATIENT)
Dept: FAMILY MEDICINE CLINIC | Age: 45
End: 2020-03-19
Payer: COMMERCIAL

## 2020-03-19 VITALS
SYSTOLIC BLOOD PRESSURE: 178 MMHG | RESPIRATION RATE: 16 BRPM | HEIGHT: 66 IN | OXYGEN SATURATION: 95 % | TEMPERATURE: 98.2 F | DIASTOLIC BLOOD PRESSURE: 100 MMHG | WEIGHT: 199 LBS | HEART RATE: 68 BPM | BODY MASS INDEX: 31.98 KG/M2

## 2020-03-19 VITALS
HEIGHT: 65 IN | WEIGHT: 199.4 LBS | DIASTOLIC BLOOD PRESSURE: 94 MMHG | SYSTOLIC BLOOD PRESSURE: 134 MMHG | BODY MASS INDEX: 33.22 KG/M2 | OXYGEN SATURATION: 98 % | HEART RATE: 69 BPM

## 2020-03-19 LAB
ABSOLUTE EOS #: 0 K/UL (ref 0–0.4)
ABSOLUTE IMMATURE GRANULOCYTE: ABNORMAL K/UL (ref 0–0.3)
ABSOLUTE LYMPH #: 2.6 K/UL (ref 1–4.8)
ABSOLUTE MONO #: 0.7 K/UL (ref 0.1–1.2)
ANION GAP SERPL CALCULATED.3IONS-SCNC: 11 MMOL/L (ref 9–17)
BASOPHILS # BLD: 1 % (ref 0–2)
BASOPHILS ABSOLUTE: 0.1 K/UL (ref 0–0.2)
BUN BLDV-MCNC: 7 MG/DL (ref 6–20)
BUN/CREAT BLD: NORMAL (ref 9–20)
CALCIUM SERPL-MCNC: 9.1 MG/DL (ref 8.6–10.4)
CHLORIDE BLD-SCNC: 103 MMOL/L (ref 98–107)
CO2: 24 MMOL/L (ref 20–31)
CREAT SERPL-MCNC: 0.8 MG/DL (ref 0.5–0.9)
DIFFERENTIAL TYPE: ABNORMAL
EOSINOPHILS RELATIVE PERCENT: 1 % (ref 1–4)
GFR AFRICAN AMERICAN: >60 ML/MIN
GFR NON-AFRICAN AMERICAN: >60 ML/MIN
GFR SERPL CREATININE-BSD FRML MDRD: NORMAL ML/MIN/{1.73_M2}
GFR SERPL CREATININE-BSD FRML MDRD: NORMAL ML/MIN/{1.73_M2}
GLUCOSE BLD-MCNC: 95 MG/DL (ref 70–99)
HCT VFR BLD CALC: 37.8 % (ref 36–46)
HEMOGLOBIN: 12 G/DL (ref 12–16)
IMMATURE GRANULOCYTES: ABNORMAL %
LYMPHOCYTES # BLD: 49 % (ref 24–44)
MCH RBC QN AUTO: 23.7 PG (ref 26–34)
MCHC RBC AUTO-ENTMCNC: 31.7 G/DL (ref 31–37)
MCV RBC AUTO: 74.8 FL (ref 80–100)
MONOCYTES # BLD: 13 % (ref 2–11)
NRBC AUTOMATED: ABNORMAL PER 100 WBC
PDW BLD-RTO: 15.9 % (ref 12.5–15.4)
PLATELET # BLD: 181 K/UL (ref 140–450)
PLATELET ESTIMATE: ABNORMAL
PMV BLD AUTO: 8.9 FL (ref 6–12)
POTASSIUM SERPL-SCNC: 4.2 MMOL/L (ref 3.7–5.3)
RBC # BLD: 5.05 M/UL (ref 4–5.2)
RBC # BLD: ABNORMAL 10*6/UL
SEG NEUTROPHILS: 36 % (ref 36–66)
SEGMENTED NEUTROPHILS ABSOLUTE COUNT: 2 K/UL (ref 1.8–7.7)
SODIUM BLD-SCNC: 138 MMOL/L (ref 135–144)
WBC # BLD: 5.4 K/UL (ref 3.5–11)
WBC # BLD: ABNORMAL 10*3/UL

## 2020-03-19 PROCEDURE — 96374 THER/PROPH/DIAG INJ IV PUSH: CPT

## 2020-03-19 PROCEDURE — 80048 BASIC METABOLIC PNL TOTAL CA: CPT

## 2020-03-19 PROCEDURE — 96375 TX/PRO/DX INJ NEW DRUG ADDON: CPT

## 2020-03-19 PROCEDURE — 70450 CT HEAD/BRAIN W/O DYE: CPT

## 2020-03-19 PROCEDURE — 36415 COLL VENOUS BLD VENIPUNCTURE: CPT

## 2020-03-19 PROCEDURE — G8926 SPIRO NO PERF OR DOC: HCPCS | Performed by: FAMILY MEDICINE

## 2020-03-19 PROCEDURE — G8482 FLU IMMUNIZE ORDER/ADMIN: HCPCS | Performed by: FAMILY MEDICINE

## 2020-03-19 PROCEDURE — 6360000002 HC RX W HCPCS: Performed by: EMERGENCY MEDICINE

## 2020-03-19 PROCEDURE — 4004F PT TOBACCO SCREEN RCVD TLK: CPT | Performed by: FAMILY MEDICINE

## 2020-03-19 PROCEDURE — 99214 OFFICE O/P EST MOD 30 MIN: CPT | Performed by: FAMILY MEDICINE

## 2020-03-19 PROCEDURE — 85025 COMPLETE CBC W/AUTO DIFF WBC: CPT

## 2020-03-19 PROCEDURE — G8417 CALC BMI ABV UP PARAM F/U: HCPCS | Performed by: FAMILY MEDICINE

## 2020-03-19 PROCEDURE — 3023F SPIROM DOC REV: CPT | Performed by: FAMILY MEDICINE

## 2020-03-19 PROCEDURE — 2580000003 HC RX 258: Performed by: EMERGENCY MEDICINE

## 2020-03-19 PROCEDURE — 99284 EMERGENCY DEPT VISIT MOD MDM: CPT

## 2020-03-19 PROCEDURE — G8427 DOCREV CUR MEDS BY ELIG CLIN: HCPCS | Performed by: FAMILY MEDICINE

## 2020-03-19 RX ORDER — PROMETHAZINE HYDROCHLORIDE 25 MG/ML
12.5 INJECTION, SOLUTION INTRAMUSCULAR; INTRAVENOUS ONCE
Status: COMPLETED | OUTPATIENT
Start: 2020-03-19 | End: 2020-03-19

## 2020-03-19 RX ORDER — 0.9 % SODIUM CHLORIDE 0.9 %
1000 INTRAVENOUS SOLUTION INTRAVENOUS ONCE
Status: COMPLETED | OUTPATIENT
Start: 2020-03-19 | End: 2020-03-19

## 2020-03-19 RX ORDER — HYDROCODONE BITARTRATE AND ACETAMINOPHEN 5; 325 MG/1; MG/1
1 TABLET ORAL EVERY 6 HOURS PRN
Qty: 10 TABLET | Refills: 0 | Status: SHIPPED | OUTPATIENT
Start: 2020-03-19 | End: 2020-03-22

## 2020-03-19 RX ORDER — ONDANSETRON 2 MG/ML
4 INJECTION INTRAMUSCULAR; INTRAVENOUS ONCE
Status: COMPLETED | OUTPATIENT
Start: 2020-03-19 | End: 2020-03-19

## 2020-03-19 RX ORDER — NALBUPHINE HCL 10 MG/ML
10 AMPUL (ML) INJECTION ONCE
Status: COMPLETED | OUTPATIENT
Start: 2020-03-19 | End: 2020-03-19

## 2020-03-19 RX ORDER — KETOROLAC TROMETHAMINE 30 MG/ML
30 INJECTION, SOLUTION INTRAMUSCULAR; INTRAVENOUS ONCE
Status: COMPLETED | OUTPATIENT
Start: 2020-03-19 | End: 2020-03-19

## 2020-03-19 RX ORDER — DIPHENHYDRAMINE HYDROCHLORIDE 50 MG/ML
25 INJECTION INTRAMUSCULAR; INTRAVENOUS ONCE
Status: COMPLETED | OUTPATIENT
Start: 2020-03-19 | End: 2020-03-19

## 2020-03-19 RX ADMIN — ONDANSETRON HYDROCHLORIDE 4 MG: 2 INJECTION, SOLUTION INTRAVENOUS at 14:30

## 2020-03-19 RX ADMIN — KETOROLAC TROMETHAMINE 30 MG: 30 INJECTION, SOLUTION INTRAMUSCULAR at 15:08

## 2020-03-19 RX ADMIN — PROMETHAZINE HYDROCHLORIDE 12.5 MG: 25 INJECTION INTRAMUSCULAR; INTRAVENOUS at 15:08

## 2020-03-19 RX ADMIN — NALBUPHINE HYDROCHLORIDE 10 MG: 10 INJECTION, SOLUTION INTRAMUSCULAR; INTRAVENOUS; SUBCUTANEOUS at 14:29

## 2020-03-19 RX ADMIN — DIPHENHYDRAMINE HYDROCHLORIDE 25 MG: 50 INJECTION, SOLUTION INTRAMUSCULAR; INTRAVENOUS at 15:08

## 2020-03-19 RX ADMIN — SODIUM CHLORIDE 1000 ML: 9 INJECTION, SOLUTION INTRAVENOUS at 14:29

## 2020-03-19 ASSESSMENT — ENCOUNTER SYMPTOMS
COUGH: 0
ANAL BLEEDING: 0
SINUS PRESSURE: 0
DIARRHEA: 0
COUGH: 0
VOMITING: 0
DIARRHEA: 0
ABDOMINAL DISTENTION: 0
SHORTNESS OF BREATH: 0
VOMITING: 0
EYE PAIN: 0
SHORTNESS OF BREATH: 0
EYE REDNESS: 0
BACK PAIN: 0
EYE DISCHARGE: 0
CONSTIPATION: 0
ABDOMINAL PAIN: 0
NAUSEA: 0
EYE PAIN: 0
COLOR CHANGE: 0
BACK PAIN: 0
NAUSEA: 0
CHEST TIGHTNESS: 0
ABDOMINAL PAIN: 0
RECTAL PAIN: 0
TROUBLE SWALLOWING: 0
VOICE CHANGE: 0
BLOOD IN STOOL: 0
BLOOD IN STOOL: 0
CONSTIPATION: 0

## 2020-03-19 ASSESSMENT — PAIN SCALES - GENERAL
PAINLEVEL_OUTOF10: 10
PAINLEVEL_OUTOF10: 8
PAINLEVEL_OUTOF10: 10
PAINLEVEL_OUTOF10: 10

## 2020-03-19 NOTE — ED PROVIDER NOTES
self-injury and suicidal ideas. PAST MEDICAL HISTORY    has a past medical history of Abdominal pain, Anemia, Anticoagulant long-term use, Arthritis, Asthma, Back problem, Bipolar 1 disorder (HCC), Chest pain, CHF (congestive heart failure) ([address] Chicken pox, Chronic idiopathic constipation, Chronic mental illness, Current every day smoker, Depression, Depression, DVT of deep femoral vein, bilateral ([address] Emphysema lung ([address] GERD (gastroesophageal reflux disease), GERD (gastroesophageal reflux disease), Hair loss, Headache, Hernia, High blood pressure, Hyperlipidemia, Hypertension, Ileus ([address] Irregular bowel habits, Irritable bowel syndrome, MDRO (multiple drug resistant organisms) resistance, Nervousness, Obesity, Pneumonia, Saddle embolus of pulmonary artery without acute cor pulmonale ([address] Seizures ([address] Slow gastric motility, Smoking, Stroke (cerebrum) ([address] Suicidal intent, TIA (transient ischemic attack), and Weight loss. SURGICAL HISTORY      has a past surgical history that includes  section; Umbilical hernia repair; Endometrial ablation; Hysterectomy; Tonsillectomy; Tubal ligation; Breast lumpectomy (Bilateral); Abscess Drainage; hernia repair; Breast lumpectomy; Axillary Surgery (Right, 2018); pr exc skin benig <5mm trunk,arm,leg (Right, 2018); Tonsillectomy and adenoidectomy (); Endoscopy, colon, diagnostic (); other surgical history (Left, 2019); and lymph node dissection (Left, 2019).     CURRENT MEDICATIONS       Previous Medications    APIXABAN (ELIQUIS) 5 MG TABS TABLET    take 1 tablet by mouth twice a day    ATORVASTATIN (LIPITOR) 10 MG TABLET    take 1 tablet by mouth once daily    BENZTROPINE (COGENTIN) 0.5 MG TABLET    take 1 tablet by mouth twice a day    BUTALBITAL-ACETAMINOPHEN-CAFFEINE (ESGIC) -40 MG PER TABLET    Take 1 tablet by mouth every 6 hours as needed for Headaches    CETIRIZINE (ZYRTEC) 10 MG TABLET    Take 1 tablet by Marijuana. PHYSICAL EXAM     INITIAL VITALS:  height is 5' 6\" (1.676 m) and weight is 90.3 kg (199 lb). Her oral temperature is 98.2 °F (36.8 °C). Her blood pressure is 149/92 (abnormal) and her pulse is 62. Her respiration is 16 and oxygen saturation is 95%. Physical Exam  Constitutional:       General: She is not in acute distress. Appearance: Normal appearance. She is well-developed. She is not ill-appearing, toxic-appearing or diaphoretic. HENT:      Head: Normocephalic and atraumatic. Right Ear: External ear normal.      Left Ear: External ear normal.   Eyes:      Conjunctiva/sclera: Conjunctivae normal.      Pupils: Pupils are equal, round, and reactive to light. Neck:      Musculoskeletal: Normal range of motion. No neck rigidity or muscular tenderness. Cardiovascular:      Rate and Rhythm: Normal rate and regular rhythm. Pulmonary:      Effort: Pulmonary effort is normal.      Breath sounds: Normal breath sounds. Abdominal:      General: Bowel sounds are normal.      Palpations: Abdomen is soft. Musculoskeletal: Normal range of motion. General: No tenderness. Lymphadenopathy:      Cervical: No cervical adenopathy. Skin:     General: Skin is warm and dry. Neurological:      General: No focal deficit present. Mental Status: She is alert and oriented to person, place, and time. Cranial Nerves: No cranial nerve deficit. Sensory: No sensory deficit. Motor: No weakness.       Coordination: Coordination normal.   Psychiatric:         Behavior: Behavior normal.           DIFFERENTIAL DIAGNOSIS/ MDM:     Headache for 1 month with a CT of the head treated with IV fluids check some basic labs and give her some pain control she does have a ride home    DIAGNOSTIC RESULTS     EKG: All EKG's are interpreted by the Emergency Department Physician who either signs or Co-signs this chart in the absence of a cardiologist.        RADIOLOGY:   Non-plain film images such as CT, Ultrasound and MRI are read by the radiologist. Plain radiographic images are visualized and the radiologist interpretations are reviewed as follows:        EXAMINATION:   CT OF THE HEAD WITHOUT CONTRAST  3/19/2020 2:29 pm       TECHNIQUE:   CT of the head was performed without the administration of intravenous   contrast. Dose modulation, iterative reconstruction, and/or weight based   adjustment of the mA/kV was utilized to reduce the radiation dose to as low   as reasonably achievable.       COMPARISON:   CT head 11/04/2017, MRI brain 04/13/2017       HISTORY:   ORDERING SYSTEM PROVIDED HISTORY: Headache for 1 month, known Chiari I   malformation. TECHNOLOGIST PROVIDED HISTORY:       Headache for 1 month   Is the patient pregnant?-> no   Reason for Exam: Pt c/o headache x 1 month, worsening last x 5 days. No   trauma. On blood thinners. Acuity: Acute   Type of Exam: Initial       FINDINGS:   BRAIN/VENTRICLES: There is no acute intracranial hemorrhage, mass effect or   midline shift.  No abnormal extra-axial fluid collection.  The gray-white   differentiation is maintained without evidence of an acute infarct.  There is   no evidence of hydrocephalus.  Inferior cerebellar tonsillar ectopia measures   about 7 mm in the posterior fossa appears tight, typical of Chiari I   malformation described on MRI brain 04/13/2017.       ORBITS: The visualized portion of the orbits demonstrate no acute abnormality.       SINUSES: The visualized paranasal sinuses and mastoid air cells demonstrate   no acute abnormality.       SOFT TISSUES/SKULL:  No acute abnormality of the visualized skull or soft   tissues.           Impression   No acute intracranial abnormality.       Stable appearance of Chiari I malformation.             LABS:  Results for orders placed or performed during the hospital encounter of 03/19/20   CBC Auto Differential   Result Value Ref Range    WBC 5.4 3.5 - 11.0 k/uL    RBC 5.05 4.0 - 5.2 m/uL Hemoglobin 12.0 12.0 - 16.0 g/dL    Hematocrit 37.8 36 - 46 %    MCV 74.8 (L) 80 - 100 fL    MCH 23.7 (L) 26 - 34 pg    MCHC 31.7 31 - 37 g/dL    RDW 15.9 (H) 12.5 - 15.4 %    Platelets 930 081 - 686 k/uL    MPV 8.9 6.0 - 12.0 fL    NRBC Automated NOT REPORTED per 100 WBC    Differential Type NOT REPORTED     Seg Neutrophils 36 36 - 66 %    Lymphocytes 49 (H) 24 - 44 %    Monocytes 13 (H) 2 - 11 %    Eosinophils % 1 1 - 4 %    Basophils 1 0 - 2 %    Immature Granulocytes NOT REPORTED 0 %    Segs Absolute 2.00 1.8 - 7.7 k/uL    Absolute Lymph # 2.60 1.0 - 4.8 k/uL    Absolute Mono # 0.70 0.1 - 1.2 k/uL    Absolute Eos # 0.00 0.0 - 0.4 k/uL    Basophils Absolute 0.10 0.0 - 0.2 k/uL    Absolute Immature Granulocyte NOT REPORTED 0.00 - 0.30 k/uL    WBC Morphology NOT REPORTED     RBC Morphology NOT REPORTED     Platelet Estimate NOT REPORTED    Basic Metabolic Panel   Result Value Ref Range    Glucose 95 70 - 99 mg/dL    BUN 7 6 - 20 mg/dL    CREATININE 0.80 0.50 - 0.90 mg/dL    Bun/Cre Ratio NOT REPORTED 9 - 20    Calcium 9.1 8.6 - 10.4 mg/dL    Sodium 138 135 - 144 mmol/L    Potassium 4.2 3.7 - 5.3 mmol/L    Chloride 103 98 - 107 mmol/L    CO2 24 20 - 31 mmol/L    Anion Gap 11 9 - 17 mmol/L    GFR Non-African American >60 >60 mL/min    GFR African American >60 >60 mL/min    GFR Comment          GFR Staging NOT REPORTED            EMERGENCY DEPARTMENT COURSE:   Vitals:    Vitals:    03/19/20 1356   BP: (!) 149/92   Pulse: 62   Resp: 16   Temp: 98.2 °F (36.8 °C)   TempSrc: Oral   SpO2: 95%   Weight: 90.3 kg (199 lb)   Height: 5' 6\" (1.676 m)     -------------------------  BP: (!) 149/92, Temp: 98.2 °F (36.8 °C), Pulse: 62, Resp: 16      Patient feeling better    CONSULTS:      PROCEDURES:  None    FINAL IMPRESSION      1.  Nonintractable headache, unspecified chronicity pattern, unspecified headache type          DISPOSITION/PLAN     Discharged in stable condition  PATIENT REFERRED TO:  Cynthia Weldon, [address]

## 2020-03-19 NOTE — PROGRESS NOTES
person, place, and time. Cranial Nerves: No cranial nerve deficit. Motor: No abnormal muscle tone. Deep Tendon Reflexes: Reflexes normal.         Assessment:       Diagnosis Orders   1. Chronic obstructive pulmonary disease, unspecified COPD type ([facility]     2. Anticoagulant long-term use     3. Chronic fatigue     4. History of pulmonary embolism     5. Vitamin D deficiency     6. Essential hypertension     7. Increased severity of headaches     8. Bipolar 1 disorder ([facility]     9. Chronic nonintractable headache, unspecified headache type           Plan:      No orders of the defined types were placed in this encounter.       Outpatient Encounter Medications as of 3/19/2020   Medication Sig Dispense Refill    apixaban (ELIQUIS) 5 MG TABS tablet take 1 tablet by mouth twice a day 60 tablet 0    vitamin D (ERGOCALCIFEROL) 1.25 MG (48556 UT) CAPS capsule take 1 capsule by mouth every week 12 capsule 1    cloNIDine (CATAPRES) 0.1 MG/24HR PTWK apply 1 patch EVERY 7 DAYS 4 patch 1    butalbital-acetaminophen-caffeine (ESGIC) -40 MG per tablet Take 1 tablet by mouth every 6 hours as needed for Headaches 20 tablet 0    Cyanocobalamin (B-12) 1000 MCG SUBL Place 1 tablet under the tongue daily 90 tablet 5    atorvastatin (LIPITOR) 10 MG tablet take 1 tablet by mouth once daily 90 tablet 1    verapamil (VERELAN) 360 MG extended release capsule Take 1 capsule by mouth daily 90 capsule 1    montelukast (SINGULAIR) 10 MG tablet take 1 tablet by mouth once daily 90 tablet 1    escitalopram (LEXAPRO) 5 MG tablet   0    benztropine (COGENTIN) 0.5 MG tablet take 1 tablet by mouth twice a day  0    cetirizine (ZYRTEC) 10 MG tablet Take 1 tablet by mouth daily 90 tablet 1    levETIRAcetam (KEPPRA) 500 MG tablet Take 1 tablet by mouth 2 times daily 60 tablet 0    folic acid (FOLVITE) 1 MG tablet Take 1 tablet by mouth daily 90 tablet 1    lurasidone (LATUDA) 60 MG TABS tablet take 1 tablet by mouth once

## 2020-03-25 PROBLEM — E78.5 DYSLIPIDEMIA: Status: RESOLVED | Noted: 2017-01-27 | Resolved: 2020-03-24

## 2020-03-27 RX ORDER — MONTELUKAST SODIUM 10 MG/1
TABLET ORAL
Qty: 90 TABLET | Refills: 1 | Status: SHIPPED | OUTPATIENT
Start: 2020-03-27 | End: 2020-06-17

## 2020-03-30 ENCOUNTER — TELEPHONE (OUTPATIENT)
Dept: FAMILY MEDICINE CLINIC | Age: 45
End: 2020-03-30

## 2020-03-30 NOTE — TELEPHONE ENCOUNTER
Patient called stating that she is in a lot of pain, wants something for the abscess on her behind. Requesting antibiotic and pain medication, says she was told to call if this happens again. Offered appt.  Please advise

## 2020-04-01 ENCOUNTER — VIRTUAL VISIT (OUTPATIENT)
Dept: FAMILY MEDICINE CLINIC | Age: 45
End: 2020-04-01
Payer: COMMERCIAL

## 2020-04-01 VITALS — HEIGHT: 66 IN | WEIGHT: 199 LBS | BODY MASS INDEX: 31.98 KG/M2 | TEMPERATURE: 97.3 F

## 2020-04-01 PROCEDURE — 99442 PR PHYS/QHP TELEPHONE EVALUATION 11-20 MIN: CPT | Performed by: FAMILY MEDICINE

## 2020-04-01 RX ORDER — BLOOD PRESSURE TEST KIT
KIT MISCELLANEOUS
Qty: 1 KIT | Refills: 0 | Status: SHIPPED | OUTPATIENT
Start: 2020-04-01 | End: 2021-10-15

## 2020-04-01 RX ORDER — DOXYCYCLINE HYCLATE 100 MG
100 TABLET ORAL 2 TIMES DAILY
Qty: 20 TABLET | Refills: 0 | Status: SHIPPED | OUTPATIENT
Start: 2020-04-01 | End: 2020-04-11

## 2020-04-01 RX ORDER — TOPIRAMATE 15 MG/1
15 CAPSULE, COATED PELLETS ORAL 2 TIMES DAILY
Qty: 60 CAPSULE | Refills: 3 | Status: SHIPPED | OUTPATIENT
Start: 2020-04-01 | End: 2020-07-20

## 2020-04-01 ASSESSMENT — ENCOUNTER SYMPTOMS
ABDOMINAL PAIN: 0
EYE REDNESS: 0
COLOR CHANGE: 1
VOICE CHANGE: 0
ABDOMINAL DISTENTION: 0
BACK PAIN: 1
VOMITING: 0
CONSTIPATION: 0
CHEST TIGHTNESS: 0
DIARRHEA: 0
COUGH: 1
SHORTNESS OF BREATH: 0
EYE PAIN: 0
NAUSEA: 0
BLOOD IN STOOL: 0
TROUBLE SWALLOWING: 0
ANAL BLEEDING: 0
EYE DISCHARGE: 0
SINUS PRESSURE: 0
RECTAL PAIN: 0

## 2020-04-01 ASSESSMENT — PATIENT HEALTH QUESTIONNAIRE - PHQ9
SUM OF ALL RESPONSES TO PHQ9 QUESTIONS 1 & 2: 0
SUM OF ALL RESPONSES TO PHQ QUESTIONS 1-9: 0
1. LITTLE INTEREST OR PLEASURE IN DOING THINGS: 0
SUM OF ALL RESPONSES TO PHQ QUESTIONS 1-9: 0
2. FEELING DOWN, DEPRESSED OR HOPELESS: 0

## 2020-04-08 ENCOUNTER — TELEPHONE (OUTPATIENT)
Dept: FAMILY MEDICINE CLINIC | Age: 45
End: 2020-04-08

## 2020-04-08 RX ORDER — VERAPAMIL HYDROCHLORIDE 360 MG/1
CAPSULE, DELAYED RELEASE PELLETS ORAL
Qty: 90 CAPSULE | Refills: 1 | Status: SHIPPED | OUTPATIENT
Start: 2020-04-08 | End: 2020-07-02

## 2020-04-10 RX ORDER — CLONIDINE 0.1 MG/24H
PATCH, EXTENDED RELEASE TRANSDERMAL
Qty: 4 PATCH | Refills: 1 | Status: SHIPPED | OUTPATIENT
Start: 2020-04-10 | End: 2020-04-30

## 2020-04-14 ENCOUNTER — VIRTUAL VISIT (OUTPATIENT)
Dept: FAMILY MEDICINE CLINIC | Age: 45
End: 2020-04-14
Payer: COMMERCIAL

## 2020-04-14 VITALS
HEIGHT: 66 IN | BODY MASS INDEX: 31.98 KG/M2 | TEMPERATURE: 98.1 F | DIASTOLIC BLOOD PRESSURE: 96 MMHG | WEIGHT: 199 LBS | SYSTOLIC BLOOD PRESSURE: 154 MMHG | HEART RATE: 91 BPM

## 2020-04-14 PROCEDURE — 99443 PR PHYS/QHP TELEPHONE EVALUATION 21-30 MIN: CPT | Performed by: FAMILY MEDICINE

## 2020-04-14 RX ORDER — ONDANSETRON 4 MG/1
4 TABLET, ORALLY DISINTEGRATING ORAL 3 TIMES DAILY PRN
Qty: 21 TABLET | Refills: 0 | Status: SHIPPED | OUTPATIENT
Start: 2020-04-14 | End: 2021-04-02

## 2020-04-14 RX ORDER — DOXYCYCLINE HYCLATE 100 MG
100 TABLET ORAL 2 TIMES DAILY
Qty: 14 TABLET | Refills: 0 | Status: SHIPPED | OUTPATIENT
Start: 2020-04-14 | End: 2020-04-21

## 2020-04-14 ASSESSMENT — ENCOUNTER SYMPTOMS
COLOR CHANGE: 1
CONSTIPATION: 0
ABDOMINAL PAIN: 0
DIARRHEA: 0
COUGH: 0
RECTAL PAIN: 0
TROUBLE SWALLOWING: 0
SHORTNESS OF BREATH: 0
ANAL BLEEDING: 0
BLOOD IN STOOL: 0
ABDOMINAL DISTENTION: 0
NAUSEA: 1
CHEST TIGHTNESS: 0
VOICE CHANGE: 0
EYE PAIN: 0
EYE REDNESS: 0
VOMITING: 1
SINUS PRESSURE: 0
BACK PAIN: 0
EYE DISCHARGE: 0

## 2020-04-14 ASSESSMENT — PATIENT HEALTH QUESTIONNAIRE - PHQ9
SUM OF ALL RESPONSES TO PHQ QUESTIONS 1-9: 0
SUM OF ALL RESPONSES TO PHQ QUESTIONS 1-9: 0
2. FEELING DOWN, DEPRESSED OR HOPELESS: 0
1. LITTLE INTEREST OR PLEASURE IN DOING THINGS: 0
SUM OF ALL RESPONSES TO PHQ9 QUESTIONS 1 & 2: 0

## 2020-04-14 NOTE — PROGRESS NOTES
Subjective:      Patient ID: Royal Melissa is a 45 y.o. female. Tele visit after obtaining consent to this visit,with patient with physician both at home. Approximate duration of visit 25 minutes. HPI  Has been throwing up since yesterday, 3 times yest and once today. States feels nauseated at times as well. Not been eating a lot due to the nausea. Her BP has been well controlled, got the machine I had called in. States only yesterday after throwing up her BP was high once. Her buttock abscess drained some but still sore and swollen,adviced to cont the antibiotic and cont warm compresses. States dentist cannot see her and her tooth has been bothering her,went to  and diag with tooth abscess. No fever, no chills, no cough, SOB ,sinus symptoms. States no diarrhea or constipation. Her HA has resolved after starting the topamax. Asthma is under control. Advised to see dentist if tooth pain is worse as tooth may need removal.  Mood, sleep, anxiety under control. Diet and exercise discussed and advised to reduce and stop smoking due to multiple risk factors. No seizures in the past week. Review of Systems   Constitutional: Negative for activity change, appetite change and fatigue. HENT: Positive for dental problem. Negative for ear pain, hearing loss, postnasal drip, sinus pressure, sneezing, tinnitus, trouble swallowing and voice change. Eyes: Negative for pain, discharge, redness and visual disturbance. Respiratory: Negative for cough, chest tightness and shortness of breath. Cardiovascular: Negative for chest pain, palpitations and leg swelling. Gastrointestinal: Positive for nausea and vomiting. Negative for abdominal distention, abdominal pain, anal bleeding, blood in stool, constipation, diarrhea and rectal pain. Endocrine: Negative for cold intolerance, heat intolerance, polydipsia, polyphagia and polyuria.    Genitourinary: Negative for decreased urine volume, difficulty urinating,

## 2020-04-22 ENCOUNTER — HOSPITAL ENCOUNTER (OUTPATIENT)
Age: 45
Setting detail: SPECIMEN
Discharge: HOME | End: 2020-04-22
Payer: COMMERCIAL

## 2020-04-22 ENCOUNTER — OFFICE VISIT (OUTPATIENT)
Dept: FAMILY MEDICINE CLINIC | Age: 45
End: 2020-04-22
Payer: COMMERCIAL

## 2020-04-22 ENCOUNTER — TELEPHONE (OUTPATIENT)
Dept: FAMILY MEDICINE CLINIC | Age: 45
End: 2020-04-22

## 2020-04-22 VITALS
HEIGHT: 66 IN | DIASTOLIC BLOOD PRESSURE: 94 MMHG | OXYGEN SATURATION: 100 % | SYSTOLIC BLOOD PRESSURE: 144 MMHG | WEIGHT: 198.4 LBS | BODY MASS INDEX: 31.88 KG/M2 | HEART RATE: 68 BPM

## 2020-04-22 PROBLEM — N76.0 ACUTE VAGINITIS: Status: ACTIVE | Noted: 2020-04-22

## 2020-04-22 PROBLEM — T78.40XA ALLERGIC DRUG REACTION: Status: ACTIVE | Noted: 2020-04-22

## 2020-04-22 LAB
DIRECT EXAM: ABNORMAL
Lab: ABNORMAL
SPECIMEN DESCRIPTION: ABNORMAL

## 2020-04-22 PROCEDURE — G8427 DOCREV CUR MEDS BY ELIG CLIN: HCPCS | Performed by: FAMILY MEDICINE

## 2020-04-22 PROCEDURE — G8417 CALC BMI ABV UP PARAM F/U: HCPCS | Performed by: FAMILY MEDICINE

## 2020-04-22 PROCEDURE — G8926 SPIRO NO PERF OR DOC: HCPCS | Performed by: FAMILY MEDICINE

## 2020-04-22 PROCEDURE — 3023F SPIROM DOC REV: CPT | Performed by: FAMILY MEDICINE

## 2020-04-22 PROCEDURE — 99214 OFFICE O/P EST MOD 30 MIN: CPT | Performed by: FAMILY MEDICINE

## 2020-04-22 PROCEDURE — 4004F PT TOBACCO SCREEN RCVD TLK: CPT | Performed by: FAMILY MEDICINE

## 2020-04-22 RX ORDER — TRIAMCINOLONE ACETONIDE 0.1 %
PASTE (GRAM) DENTAL
COMMUNITY
Start: 2020-04-08 | End: 2021-04-02

## 2020-04-22 RX ORDER — PSYLLIUM HUSK 3.4 G/7G
POWDER ORAL
COMMUNITY
Start: 2020-04-16 | End: 2020-07-14

## 2020-04-22 RX ORDER — CLINDAMYCIN HYDROCHLORIDE 300 MG/1
CAPSULE ORAL
COMMUNITY
Start: 2020-04-07 | End: 2020-04-22 | Stop reason: ALTCHOICE

## 2020-04-22 RX ORDER — FLUCONAZOLE 150 MG/1
150 TABLET ORAL
Qty: 2 TABLET | Refills: 0 | Status: SHIPPED | OUTPATIENT
Start: 2020-04-22 | End: 2020-04-24

## 2020-04-22 RX ORDER — LOSARTAN POTASSIUM 50 MG/1
50 TABLET ORAL DAILY
Qty: 30 TABLET | Refills: 0 | Status: SHIPPED | OUTPATIENT
Start: 2020-04-22 | End: 2020-05-19 | Stop reason: SDUPTHER

## 2020-04-22 ASSESSMENT — PATIENT HEALTH QUESTIONNAIRE - PHQ9
SUM OF ALL RESPONSES TO PHQ9 QUESTIONS 1 & 2: 0
SUM OF ALL RESPONSES TO PHQ QUESTIONS 1-9: 0
SUM OF ALL RESPONSES TO PHQ QUESTIONS 1-9: 0
1. LITTLE INTEREST OR PLEASURE IN DOING THINGS: 0
1. LITTLE INTEREST OR PLEASURE IN DOING THINGS: 0
2. FEELING DOWN, DEPRESSED OR HOPELESS: 0
SUM OF ALL RESPONSES TO PHQ QUESTIONS 1-9: 0
SUM OF ALL RESPONSES TO PHQ QUESTIONS 1-9: 0
SUM OF ALL RESPONSES TO PHQ9 QUESTIONS 1 & 2: 0
2. FEELING DOWN, DEPRESSED OR HOPELESS: 0

## 2020-04-22 ASSESSMENT — ENCOUNTER SYMPTOMS
RECTAL PAIN: 0
COUGH: 0
CHEST TIGHTNESS: 0
DIARRHEA: 0
BLOOD IN STOOL: 0
EYE DISCHARGE: 0
ABDOMINAL PAIN: 0
COLOR CHANGE: 0
VOICE CHANGE: 0
EYE REDNESS: 0
BACK PAIN: 0
EYE PAIN: 0
TROUBLE SWALLOWING: 0
SINUS PRESSURE: 0
VOMITING: 0
CONSTIPATION: 0
NAUSEA: 0
ANAL BLEEDING: 0
SHORTNESS OF BREATH: 0
ABDOMINAL DISTENTION: 0

## 2020-04-22 NOTE — TELEPHONE ENCOUNTER
Interactions per pharmacy:     Between Medication      Fluconazole (DIFLUCAN) 150 MG tablet      And      Atorvastatin.     Please, call the pharmacy: [facility]

## 2020-04-22 NOTE — TELEPHONE ENCOUNTER
Interactions per pharmacy:    Between Medication     Fluconazole (DIFLUCAN) 150 MG tablet     And     Atorvastatin.     Please, call the pharmacy: [facility]

## 2020-04-22 NOTE — PROGRESS NOTES
Subjective:      Patient ID: Blossom Lao is a 45 y.o. female. HPI States had no sex in 8 months , but last 2 week and a half to 2 weeks has had a discharge with odor. No fever or chills, No N/V/D  States no cough but has had a runny nose for a few days.  has been on the clindamycin for tooth infection x 14 days followed by doxycyclin for an abscess   on her left buttock that has since resolved.  was on it for over a week, will stop for now.  noticed a rash to  Her skin all opver, thinks may be from dandelions in her yard that she is allergic to   VS the clindamycin, stopped a week ago. Thinks rash is better after it was discontinued. Review of Systems   Constitutional: Negative for activity change, appetite change and fatigue. HENT: Negative for dental problem, ear pain, hearing loss, postnasal drip, sinus pressure, sneezing, tinnitus, trouble swallowing and voice change. Eyes: Negative for pain, discharge, redness and visual disturbance. Respiratory: Negative for cough, chest tightness and shortness of breath. Cardiovascular: Negative for chest pain, palpitations and leg swelling. Gastrointestinal: Negative for abdominal distention, abdominal pain, anal bleeding, blood in stool, constipation, diarrhea, nausea, rectal pain and vomiting. Endocrine: Negative for cold intolerance, heat intolerance, polydipsia, polyphagia and polyuria. Genitourinary: Negative for decreased urine volume, difficulty urinating, dyspareunia, dysuria, enuresis, flank pain, frequency, genital sores, hematuria, menstrual problem, pelvic pain, urgency, vaginal bleeding and vaginal discharge. Musculoskeletal: Negative for arthralgias, back pain, gait problem, joint swelling, myalgias, neck pain and neck stiffness. Skin: Negative for color change, pallor and rash. Allergic/Immunologic: Negative for environmental allergies, food allergies and immunocompromised state.    Neurological: Negative hypertension  losartan (COZAAR) 50 MG tablet   2. Allergic reaction to drug, initial encounter     3. Acute vaginitis  VAGINITIS DNA PROBE    Culture, Genital   4. Tobacco abuse counseling     5. Anticoagulant long-term use     6. Chronic obstructive pulmonary disease, unspecified COPD type ([address]     7. Tobacco abuse disorder     8. Bipolar 1 disorder ([facility]           Plan:      No orders of the defined types were placed in this encounter. Orders Placed This Encounter   Procedures    VAGINITIS DNA PROBE    Culture, Genital       Outpatient Encounter Medications as of 4/22/2020   Medication Sig Dispense Refill    RA VITAMIN B-12 TR 1000 MCG TBCR take 2 tablets by mouth once daily      triamcinolone acetonide (KENALOG) 0.1 % paste APPLY A THIN FILM TOPICALLY TO AFFECTED AREA 2-3 TIMES A DAY      losartan (COZAAR) 50 MG tablet Take 1 tablet by mouth daily 30 tablet 0    fluconazole (DIFLUCAN) 150 MG tablet Take 1 tablet by mouth every 72 hours for 2 days 2 tablet 0    ondansetron (ZOFRAN-ODT) 4 MG disintegrating tablet Take 1 tablet by mouth 3 times daily as needed for Nausea or Vomiting 21 tablet 0    cloNIDine (CATAPRES) 0.1 MG/24HR PTWK apply 1 patch EVERY 7 DAYS 4 patch 1    verapamil (VERELAN) 360 MG extended release capsule take 1 capsule by mouth once daily 90 capsule 1    Blood Pressure KIT Use as directed.  1 kit 0    topiramate (TOPAMAX SPRINKLE) 15 MG capsule Take 1 capsule by mouth 2 times daily Take 1 cap at night for a week and then go to 1 cap BID therafter 60 capsule 3    montelukast (SINGULAIR) 10 MG tablet take 1 tablet by mouth once daily 90 tablet 1    apixaban (ELIQUIS) 5 MG TABS tablet take 1 tablet by mouth twice a day 60 tablet 0    vitamin D (ERGOCALCIFEROL) 1.25 MG (31110 UT) CAPS capsule take 1 capsule by mouth every week 12 capsule 1    butalbital-acetaminophen-caffeine (ESGIC) -40 MG per tablet Take 1 tablet by mouth every 6 hours as needed for Headaches 20 tablet 0

## 2020-04-23 RX ORDER — METRONIDAZOLE 500 MG/1
500 TABLET ORAL 2 TIMES DAILY
Qty: 14 TABLET | Refills: 0 | Status: SHIPPED | OUTPATIENT
Start: 2020-04-23 | End: 2020-04-30

## 2020-04-23 RX ORDER — METRONIDAZOLE 7.5 MG/G
1 GEL VAGINAL DAILY
Qty: 1 TUBE | Refills: 0 | Status: SHIPPED | OUTPATIENT
Start: 2020-04-23 | End: 2020-04-28

## 2020-04-25 LAB
CULTURE: NORMAL
Lab: NORMAL
SPECIMEN DESCRIPTION: NORMAL

## 2020-04-28 RX ORDER — ATORVASTATIN CALCIUM 10 MG/1
TABLET, FILM COATED ORAL
Qty: 90 TABLET | Refills: 1 | Status: SHIPPED | OUTPATIENT
Start: 2020-04-28 | End: 2020-07-20

## 2020-04-30 RX ORDER — CLONIDINE 0.1 MG/24H
PATCH, EXTENDED RELEASE TRANSDERMAL
Qty: 4 PATCH | Refills: 1 | Status: SHIPPED | OUTPATIENT
Start: 2020-04-30 | End: 2020-08-12

## 2020-04-30 NOTE — TELEPHONE ENCOUNTER
Faizan Smiley is calling to request a refill on the following medication(s):    Last Visit Date (If Applicable):  8/25/3560    Next Visit Date:    5/27/2020  Last filled 04/10/2020  Medication Request:  Requested Prescriptions     Pending Prescriptions Disp Refills    cloNIDine (CATAPRES) 0.1 MG/24HR PTWK [Pharmacy Med Name: CLONIDINE 0.1 MG/DAY PATCH] 4 patch 1     Sig: apply 1 patch EVERY 7 DAYS

## 2020-05-04 ENCOUNTER — TELEPHONE (OUTPATIENT)
Dept: FAMILY MEDICINE CLINIC | Age: 45
End: 2020-05-04

## 2020-05-06 ENCOUNTER — OFFICE VISIT (OUTPATIENT)
Dept: FAMILY MEDICINE CLINIC | Age: 45
End: 2020-05-06
Payer: COMMERCIAL

## 2020-05-06 VITALS
SYSTOLIC BLOOD PRESSURE: 134 MMHG | BODY MASS INDEX: 32.82 KG/M2 | HEIGHT: 66 IN | OXYGEN SATURATION: 100 % | WEIGHT: 204.2 LBS | DIASTOLIC BLOOD PRESSURE: 93 MMHG | HEART RATE: 72 BPM

## 2020-05-06 PROBLEM — L73.2 AXILLARY HIDRADENITIS SUPPURATIVA: Status: ACTIVE | Noted: 2020-05-06

## 2020-05-06 PROCEDURE — G8926 SPIRO NO PERF OR DOC: HCPCS | Performed by: FAMILY MEDICINE

## 2020-05-06 PROCEDURE — 99214 OFFICE O/P EST MOD 30 MIN: CPT | Performed by: FAMILY MEDICINE

## 2020-05-06 PROCEDURE — 4004F PT TOBACCO SCREEN RCVD TLK: CPT | Performed by: FAMILY MEDICINE

## 2020-05-06 PROCEDURE — 3023F SPIROM DOC REV: CPT | Performed by: FAMILY MEDICINE

## 2020-05-06 PROCEDURE — G8427 DOCREV CUR MEDS BY ELIG CLIN: HCPCS | Performed by: FAMILY MEDICINE

## 2020-05-06 PROCEDURE — G8417 CALC BMI ABV UP PARAM F/U: HCPCS | Performed by: FAMILY MEDICINE

## 2020-05-06 RX ORDER — TRAMADOL HYDROCHLORIDE 50 MG/1
50 TABLET ORAL EVERY 6 HOURS PRN
Qty: 12 TABLET | Refills: 0 | Status: SHIPPED | OUTPATIENT
Start: 2020-05-06 | End: 2020-05-09

## 2020-05-06 RX ORDER — HYDROXYZINE HYDROCHLORIDE 10 MG/1
10 TABLET, FILM COATED ORAL NIGHTLY PRN
Qty: 30 TABLET | Refills: 0 | Status: SHIPPED | OUTPATIENT
Start: 2020-05-06 | End: 2020-05-16

## 2020-05-06 RX ORDER — DOXYCYCLINE HYCLATE 100 MG
100 TABLET ORAL 2 TIMES DAILY
Qty: 20 TABLET | Refills: 0 | Status: SHIPPED | OUTPATIENT
Start: 2020-05-06 | End: 2020-05-13

## 2020-05-06 ASSESSMENT — ENCOUNTER SYMPTOMS
EYE REDNESS: 0
COLOR CHANGE: 1
COUGH: 0
EYE PAIN: 0
CONSTIPATION: 0
CHEST TIGHTNESS: 0
BACK PAIN: 0
VOMITING: 0
TROUBLE SWALLOWING: 0
EYE DISCHARGE: 0
RECTAL PAIN: 0
BLOOD IN STOOL: 0
DIARRHEA: 0
ABDOMINAL PAIN: 0
ABDOMINAL DISTENTION: 0
ANAL BLEEDING: 0
SHORTNESS OF BREATH: 0
NAUSEA: 0
SINUS PRESSURE: 0
VOICE CHANGE: 0

## 2020-05-06 ASSESSMENT — PATIENT HEALTH QUESTIONNAIRE - PHQ9
SUM OF ALL RESPONSES TO PHQ QUESTIONS 1-9: 0
2. FEELING DOWN, DEPRESSED OR HOPELESS: 0
SUM OF ALL RESPONSES TO PHQ QUESTIONS 1-9: 0
SUM OF ALL RESPONSES TO PHQ QUESTIONS 1-9: 0
SUM OF ALL RESPONSES TO PHQ9 QUESTIONS 1 & 2: 0
SUM OF ALL RESPONSES TO PHQ QUESTIONS 1-9: 0
1. LITTLE INTEREST OR PLEASURE IN DOING THINGS: 0
2. FEELING DOWN, DEPRESSED OR HOPELESS: 0
1. LITTLE INTEREST OR PLEASURE IN DOING THINGS: 0
SUM OF ALL RESPONSES TO PHQ9 QUESTIONS 1 & 2: 0

## 2020-05-11 ENCOUNTER — TELEPHONE (OUTPATIENT)
Dept: FAMILY MEDICINE CLINIC | Age: 45
End: 2020-05-11

## 2020-05-11 RX ORDER — METRONIDAZOLE 500 MG/1
500 TABLET ORAL 2 TIMES DAILY
Qty: 14 TABLET | Refills: 0 | Status: SHIPPED | OUTPATIENT
Start: 2020-05-11 | End: 2020-05-18

## 2020-05-11 NOTE — TELEPHONE ENCOUNTER
Pt is not sexually active. Was only treated with Metrogel. Pt stated never received Flagyl from the pharmacy.

## 2020-05-11 NOTE — TELEPHONE ENCOUNTER
She was treated with flagyl, we cannot always continue that for long term due to numerous other meds she is on, is she sexually active with someone? May need to get the partner treated then so they dont give it back and forth. If she wants we can do a repeat pelvic this week or next and do another culture

## 2020-05-12 NOTE — TELEPHONE ENCOUNTER
Dr Cachorro Seals, patient is current and due in for an appointment on 6/25/20, last appointment was cancelled due to [address]. Per last dictation patient to be on lifelong anticoagulation. Please sign for refill if ok.  Thank you.

## 2020-05-13 ENCOUNTER — HOSPITAL ENCOUNTER (EMERGENCY)
Age: 45
Discharge: HOME | End: 2020-05-13
Attending: EMERGENCY MEDICINE
Payer: COMMERCIAL

## 2020-05-13 VITALS
TEMPERATURE: 97.8 F | HEIGHT: 66 IN | SYSTOLIC BLOOD PRESSURE: 125 MMHG | OXYGEN SATURATION: 100 % | RESPIRATION RATE: 16 BRPM | HEART RATE: 71 BPM | WEIGHT: 200 LBS | DIASTOLIC BLOOD PRESSURE: 83 MMHG | BODY MASS INDEX: 32.14 KG/M2

## 2020-05-13 PROCEDURE — 99283 EMERGENCY DEPT VISIT LOW MDM: CPT

## 2020-05-13 RX ORDER — HYDROCODONE BITARTRATE AND ACETAMINOPHEN 5; 325 MG/1; MG/1
1-2 TABLET ORAL EVERY 8 HOURS PRN
Qty: 12 TABLET | Refills: 0 | Status: SHIPPED | OUTPATIENT
Start: 2020-05-13 | End: 2020-05-16

## 2020-05-13 RX ORDER — DOXYCYCLINE HYCLATE 100 MG
100 TABLET ORAL 2 TIMES DAILY
Qty: 20 TABLET | Refills: 0 | Status: SHIPPED | OUTPATIENT
Start: 2020-05-13 | End: 2020-08-18

## 2020-05-13 ASSESSMENT — PAIN SCALES - GENERAL: PAINLEVEL_OUTOF10: 10

## 2020-05-13 NOTE — ED PROVIDER NOTES
take 2 tablets by mouth once daily, DAWHistorical Med      triamcinolone acetonide (KENALOG) 0.1 % paste APPLY A THIN FILM TOPICALLY TO AFFECTED AREA 2-3 TIMES A DAY, Historical Med      losartan (COZAAR) 50 MG tablet Take 1 tablet by mouth daily, Disp-30 tablet, R-0Normal      ondansetron (ZOFRAN-ODT) 4 MG disintegrating tablet Take 1 tablet by mouth 3 times daily as needed for Nausea or Vomiting, Disp-21 tablet, R-0Normal      verapamil (VERELAN) 360 MG extended release capsule take 1 capsule by mouth once daily, Disp-90 capsule, R-1Normal      Blood Pressure KIT Disp-1 kit, R-0, PrintUse as directed.       topiramate (TOPAMAX SPRINKLE) 15 MG capsule Take 1 capsule by mouth 2 times daily Take 1 cap at night for a week and then go to 1 cap BID therafter, Disp-60 capsule, R-3Normal      montelukast (SINGULAIR) 10 MG tablet take 1 tablet by mouth once daily, Disp-90 tablet, R-1Normal      vitamin D (ERGOCALCIFEROL) 1.25 MG (70019 UT) CAPS capsule take 1 capsule by mouth every week, Disp-12 capsule, R-1Normal      butalbital-acetaminophen-caffeine (ESGIC) -40 MG per tablet Take 1 tablet by mouth every 6 hours as needed for Headaches, Disp-20 tablet, R-0Print      Cyanocobalamin (B-12) 1000 MCG SUBL Place 1 tablet under the tongue daily, Disp-90 tablet, R-5Normal      escitalopram (LEXAPRO) 5 MG tablet R-0Historical Med      benztropine (COGENTIN) 0.5 MG tablet take 1 tablet by mouth twice a day, R-0Historical Med      cetirizine (ZYRTEC) 10 MG tablet Take 1 tablet by mouth daily, Disp-90 tablet, R-1Normal      levETIRAcetam (KEPPRA) 500 MG tablet Take 1 tablet by mouth 2 times daily, Disp-60 tablet, O-7OHDLHE      folic acid (FOLVITE) 1 MG tablet Take 1 tablet by mouth daily, Disp-90 tablet, R-1Normal      lurasidone (LATUDA) 60 MG TABS tablet take 1 tablet by mouth once daily with food AT LEAST 350 CALORIESHistorical Med             PAST MEDICAL HISTORY         Diagnosis Date    Abdominal pain     Anemia     Mental Status: She is alert and oriented to person, place, and time. Psychiatric:         Behavior: Behavior normal.                 DIAGNOSTIC RESULTS     EKG: All EKG's are interpreted by the Emergency Department Physician who either signs or Co-signs this chart in the absence of a cardiologist.        RADIOLOGY:   Non-plain film images such as CT, Ultrasound and MRI are read by the radiologist. Plain radiographic images arevisualized and preliminarily interpreted by the emergency physician with the below findings:        Interpretation per the Radiologist below, if available at thetime of this note:          ED BEDSIDE ULTRASOUND:   Performed by ED Physician - none    LABS:  Labs Reviewed - No data to display    All other labs were within normal range or not returned as of this dictation. EMERGENCY DEPARTMENT COURSE and DIFFERENTIAL DIAGNOSIS/MDM:   Vitals:    Vitals:    05/13/20 0945   BP: 125/83   Pulse: 71   Resp: 16   Temp: 97.8 °F (36.6 °C)   TempSrc: Oral   SpO2: 100%   Weight: 200 lb (90.7 kg)   Height: 5' 6\" (1.676 m)     Patient will be discharged home. Will encourage warm compresses and treat with doxycycline. Incision and drainage not indicated at this time. Patient follow with her doctor. CONSULTS:  None    PROCEDURES:  Procedures        FINAL IMPRESSION      1. Axillary abscess          DISPOSITION/PLAN   DISPOSITION Decision To Discharge 05/13/2020 10:26:39 AM      PATIENTREFERRED TO:   Gerard Carvalho MD  [address] [place] [address]  [phone]    In 3 days        DISCHARGE MEDICATIONS:     Discharge Medication List as of 5/13/2020 10:39 AM      START taking these medications    Details   HYDROcodone-acetaminophen (NORCO) 5-325 MG per tablet Take 1-2 tablets by mouth every 8 hours as needed for Pain for up to 3 days. , Disp-12 tablet, R-0Print                 (Please note that portions of this note were completed with a voice recognition program.  Efforts were made to edit thedictations but occasionally words are mis-transcribed.)    ZACHARY Palma PA-C  05/13/20 3190

## 2020-05-13 NOTE — ED NOTES
Pt presents to ed c/o right axillary abscess x 4-5 days. She's had a cyst surgically removed from that axilla last year. No drainage noted but is tender to palpation. She is afebrile at this time.       Isabella Mejia RN  05/13/20 3084

## 2020-05-14 ENCOUNTER — CARE COORDINATION (OUTPATIENT)
Dept: FAMILY MEDICINE CLINIC | Age: 45
End: 2020-05-14

## 2020-05-14 NOTE — CARE COORDINATION
ACM attempted outreach for ED follow up, underarm abscess, COVID 19 Protocol    No answer and unable to LVM at this time.

## 2020-05-15 ENCOUNTER — CARE COORDINATION (OUTPATIENT)
Dept: FAMILY MEDICINE CLINIC | Age: 45
End: 2020-05-15

## 2020-05-19 PROBLEM — Z87.42 HISTORY OF ABNORMAL UTERINE BLEEDING: Status: ACTIVE | Noted: 2017-07-03

## 2020-05-19 PROBLEM — R53.83 FATIGUE: Status: ACTIVE | Noted: 2019-10-04

## 2020-05-19 PROBLEM — K58.9 IRRITABLE BOWEL SYNDROME: Status: ACTIVE | Noted: 2018-03-28

## 2020-05-19 PROBLEM — J44.9 CHRONIC OBSTRUCTIVE LUNG DISEASE (HCC): Status: ACTIVE | Noted: 2018-06-02

## 2020-05-19 PROBLEM — Z91.199 NONCOMPLIANCE WITH TREATMENT: Status: ACTIVE | Noted: 2017-01-27

## 2020-05-19 PROBLEM — G93.2 BENIGN INTRACRANIAL HYPERTENSION: Status: ACTIVE | Noted: 2017-07-03

## 2020-05-19 PROBLEM — J45.909 ASTHMA: Status: ACTIVE | Noted: 2017-01-27

## 2020-05-19 PROBLEM — F43.10 POSTTRAUMATIC STRESS DISORDER: Status: ACTIVE | Noted: 2017-01-27

## 2020-05-19 PROBLEM — K57.32 DIVERTICULITIS OF SIGMOID COLON: Status: ACTIVE | Noted: 2017-07-03

## 2020-05-19 PROBLEM — F31.9 BIPOLAR DISORDER (HCC): Status: ACTIVE | Noted: 2017-01-27

## 2020-05-19 PROBLEM — A49.02 METHICILLIN RESISTANT STAPHYLOCOCCUS AUREUS INFECTION: Status: ACTIVE | Noted: 2020-01-20

## 2020-05-19 PROBLEM — G93.5 CHIARI MALFORMATION TYPE I (HCC): Status: ACTIVE | Noted: 2017-06-21

## 2020-05-19 PROBLEM — G89.29 CHRONIC HEADACHE DISORDER: Status: ACTIVE | Noted: 2017-11-14

## 2020-05-19 PROBLEM — K64.5 THROMBOSED EXTERNAL HEMORRHOIDS: Status: ACTIVE | Noted: 2017-07-03

## 2020-05-19 PROBLEM — R51.9 CHRONIC HEADACHE DISORDER: Status: ACTIVE | Noted: 2017-11-14

## 2020-05-19 RX ORDER — LOSARTAN POTASSIUM 50 MG/1
50 TABLET ORAL DAILY
Qty: 90 TABLET | Refills: 1 | Status: SHIPPED | OUTPATIENT
Start: 2020-05-19 | End: 2020-11-09 | Stop reason: SDUPTHER

## 2020-05-27 ENCOUNTER — TELEPHONE (OUTPATIENT)
Dept: FAMILY MEDICINE CLINIC | Age: 45
End: 2020-05-27

## 2020-05-27 ENCOUNTER — VIRTUAL VISIT (OUTPATIENT)
Dept: FAMILY MEDICINE CLINIC | Age: 45
End: 2020-05-27
Payer: COMMERCIAL

## 2020-05-27 PROBLEM — E66.9 OBESITY: Status: ACTIVE | Noted: 2020-05-27

## 2020-05-27 PROCEDURE — 99443 PR PHYS/QHP TELEPHONE EVALUATION 21-30 MIN: CPT | Performed by: FAMILY MEDICINE

## 2020-05-27 ASSESSMENT — ENCOUNTER SYMPTOMS
TROUBLE SWALLOWING: 0
BLOOD IN STOOL: 0
VOICE CHANGE: 0
DIARRHEA: 0
ANAL BLEEDING: 0
EYE DISCHARGE: 0
SHORTNESS OF BREATH: 0
ABDOMINAL PAIN: 0
RECTAL PAIN: 0
ABDOMINAL DISTENTION: 0
NAUSEA: 0
CONSTIPATION: 0
COLOR CHANGE: 0
COUGH: 0
VOMITING: 0
EYE PAIN: 0
EYE REDNESS: 0
SINUS PRESSURE: 0
CHEST TIGHTNESS: 0
BACK PAIN: 0

## 2020-05-27 ASSESSMENT — PATIENT HEALTH QUESTIONNAIRE - PHQ9
SUM OF ALL RESPONSES TO PHQ9 QUESTIONS 1 & 2: 1
SUM OF ALL RESPONSES TO PHQ QUESTIONS 1-9: 1
2. FEELING DOWN, DEPRESSED OR HOPELESS: 1
1. LITTLE INTEREST OR PLEASURE IN DOING THINGS: 0
SUM OF ALL RESPONSES TO PHQ QUESTIONS 1-9: 1

## 2020-05-27 NOTE — PROGRESS NOTES
Subjective:      Patient ID: Cheri Lim is a 45 y.o. female. TV with patient after obtaining consent. Total duration of visit was approx 25 min. HPI   Here for follow up of HTN, chronic headaches, leg pain, obesity as well as recurrent hydradenitis. States legs are still hurting, boil is still bothering her and has appt with specialist on the 3 rd. States no more daily HA, BP under control, bowels are ok. States sometimes feels she cannot hold urine, advised to use RR freq and see if helps. Mood, sleep appetite better but done at time celi to weight gain and leg swelling and pain that is chronic. Diet and exercise recommended, so did we discuss cutting down pop and cigarettes, patient trying, encouraged. Review of Systems   Constitutional: Negative for activity change, appetite change and fatigue. HENT: Negative for dental problem, ear pain, hearing loss, postnasal drip, sinus pressure, sneezing, tinnitus, trouble swallowing and voice change. Eyes: Negative for pain, discharge, redness and visual disturbance. Respiratory: Negative for cough, chest tightness and shortness of breath. Cardiovascular: Negative for chest pain, palpitations and leg swelling. Gastrointestinal: Negative for abdominal distention, abdominal pain, anal bleeding, blood in stool, constipation, diarrhea, nausea, rectal pain and vomiting. Endocrine: Negative for cold intolerance, heat intolerance, polydipsia, polyphagia and polyuria. Genitourinary: Negative for decreased urine volume, difficulty urinating, dyspareunia, dysuria, enuresis, flank pain, frequency, genital sores, hematuria, menstrual problem, pelvic pain, urgency, vaginal bleeding and vaginal discharge. Musculoskeletal: Negative for arthralgias, back pain, gait problem, joint swelling, myalgias, neck pain and neck stiffness. Skin: Negative for color change, pallor and rash.    Allergic/Immunologic: Negative for environmental allergies, food allergies

## 2020-06-04 ENCOUNTER — APPOINTMENT (OUTPATIENT)
Dept: GENERAL RADIOLOGY | Age: 45
End: 2020-06-04
Payer: COMMERCIAL

## 2020-06-04 ENCOUNTER — HOSPITAL ENCOUNTER (EMERGENCY)
Age: 45
Discharge: HOME | End: 2020-06-04
Attending: EMERGENCY MEDICINE
Payer: COMMERCIAL

## 2020-06-04 VITALS
OXYGEN SATURATION: 98 % | DIASTOLIC BLOOD PRESSURE: 85 MMHG | SYSTOLIC BLOOD PRESSURE: 138 MMHG | BODY MASS INDEX: 33.82 KG/M2 | TEMPERATURE: 99.1 F | RESPIRATION RATE: 12 BRPM | WEIGHT: 203 LBS | HEART RATE: 70 BPM | HEIGHT: 65 IN

## 2020-06-04 LAB
DIRECT EXAM: ABNORMAL
Lab: ABNORMAL
SPECIMEN DESCRIPTION: ABNORMAL

## 2020-06-04 PROCEDURE — 81003 URINALYSIS AUTO W/O SCOPE: CPT

## 2020-06-04 PROCEDURE — 87660 TRICHOMONAS VAGIN DIR PROBE: CPT

## 2020-06-04 PROCEDURE — 96372 THER/PROPH/DIAG INJ SC/IM: CPT

## 2020-06-04 PROCEDURE — 72100 X-RAY EXAM L-S SPINE 2/3 VWS: CPT

## 2020-06-04 PROCEDURE — 99283 EMERGENCY DEPT VISIT LOW MDM: CPT

## 2020-06-04 PROCEDURE — 87480 CANDIDA DNA DIR PROBE: CPT

## 2020-06-04 PROCEDURE — 6360000002 HC RX W HCPCS: Performed by: NURSE PRACTITIONER

## 2020-06-04 PROCEDURE — 81025 URINE PREGNANCY TEST: CPT

## 2020-06-04 PROCEDURE — 87510 GARDNER VAG DNA DIR PROBE: CPT

## 2020-06-04 PROCEDURE — 6370000000 HC RX 637 (ALT 250 FOR IP): Performed by: NURSE PRACTITIONER

## 2020-06-04 RX ORDER — METRONIDAZOLE 500 MG/1
500 TABLET ORAL 2 TIMES DAILY
Qty: 14 TABLET | Refills: 0 | Status: SHIPPED | OUTPATIENT
Start: 2020-06-04 | End: 2020-06-11

## 2020-06-04 RX ORDER — LIDOCAINE 4 G/G
1 PATCH TOPICAL DAILY
Status: DISCONTINUED | OUTPATIENT
Start: 2020-06-04 | End: 2020-06-04 | Stop reason: HOSPADM

## 2020-06-04 RX ORDER — MORPHINE SULFATE 4 MG/ML
4 INJECTION, SOLUTION INTRAMUSCULAR; INTRAVENOUS ONCE
Status: COMPLETED | OUTPATIENT
Start: 2020-06-04 | End: 2020-06-04

## 2020-06-04 RX ORDER — ORPHENADRINE CITRATE 30 MG/ML
60 INJECTION INTRAMUSCULAR; INTRAVENOUS ONCE
Status: COMPLETED | OUTPATIENT
Start: 2020-06-04 | End: 2020-06-04

## 2020-06-04 RX ORDER — HYDROCODONE BITARTRATE AND ACETAMINOPHEN 5; 325 MG/1; MG/1
1 TABLET ORAL EVERY 6 HOURS PRN
Qty: 12 TABLET | Refills: 0 | Status: SHIPPED | OUTPATIENT
Start: 2020-06-04 | End: 2020-06-07

## 2020-06-04 RX ORDER — CYCLOBENZAPRINE HCL 10 MG
10 TABLET ORAL 3 TIMES DAILY PRN
Qty: 21 TABLET | Refills: 0 | Status: SHIPPED | OUTPATIENT
Start: 2020-06-04 | End: 2020-06-14

## 2020-06-04 RX ADMIN — ORPHENADRINE CITRATE 60 MG: 30 INJECTION INTRAMUSCULAR; INTRAVENOUS at 18:59

## 2020-06-04 RX ADMIN — MORPHINE SULFATE 4 MG: 4 INJECTION, SOLUTION INTRAMUSCULAR; INTRAVENOUS at 19:00

## 2020-06-04 ASSESSMENT — ENCOUNTER SYMPTOMS
ABDOMINAL PAIN: 0
RHINORRHEA: 0
VOMITING: 0
COLOR CHANGE: 0
COUGH: 0
SHORTNESS OF BREATH: 0
NAUSEA: 0
SINUS PRESSURE: 0
BACK PAIN: 1
SORE THROAT: 0
WHEEZING: 0
CONSTIPATION: 0
DIARRHEA: 0

## 2020-06-04 ASSESSMENT — PAIN DESCRIPTION - DESCRIPTORS: DESCRIPTORS: ACHING;SHARP;SHOOTING;SPASM

## 2020-06-04 ASSESSMENT — PAIN DESCRIPTION - PROGRESSION
CLINICAL_PROGRESSION: GRADUALLY WORSENING
CLINICAL_PROGRESSION: NOT CHANGED

## 2020-06-04 ASSESSMENT — PAIN DESCRIPTION - ONSET: ONSET: ON-GOING

## 2020-06-04 ASSESSMENT — PAIN SCALES - GENERAL
PAINLEVEL_OUTOF10: 10
PAINLEVEL_OUTOF10: 10

## 2020-06-04 ASSESSMENT — PAIN DESCRIPTION - ORIENTATION: ORIENTATION: RIGHT;LEFT

## 2020-06-04 ASSESSMENT — PAIN DESCRIPTION - LOCATION: LOCATION: LEG;ABDOMEN

## 2020-06-04 NOTE — ED NOTES
Pt to room 14 via w/c with c/o ongoing BLE pain x 1 month, and right lower back pain with urinary urgency x 3-4 days. Pt denies any injuries. Pt reports hx of DVT, states her PCP order bilateral compression stockings \"But I couldn't afford them\". Pt also c/o white colored vaginal discharge with foul odor. Pt denies any abd pain, N/V, cough, or fevers. abd soft, rounded, BS active x 4 quads. Respirations even and non labored. Skin PWD, MMM. NAD noted.        Marcio Mata, DANIELLE  06/04/20 1853

## 2020-06-05 LAB — HCG, PREGNANCY URINE (POC): NEGATIVE

## 2020-06-05 NOTE — ED PROVIDER NOTES
[address] [place] [address] ED  eMERGENCY dEPARTMENT eNCOUnter      Pt Name: Tiffanie Almaraz  MRN: [id number]  [place] 1975  Date of evaluation: 6/4/2020  Provider: Anthony Thrasher NP, ELIZABETH Berrios 5957       Chief Complaint   Patient presents with    Back Pain    Leg Pain     BILATERAL LEG PAINS    Incontinence     DEVELOPED URINARY INCONTIENCE OVER THE PAST 4 DAYS.  Vaginal Discharge     FOUL ODOR FROM VAGINAL AREA. HISTORY OF PRESENT ILLNESS  (Location/Symptom, Timing/Onset, Context/Setting, Quality, Duration, Modifying Factors, Severity.)   Tiffanie Almaraz is a 45 y.o. female who presents to the emergency department today by private vehicle for evaluation of low back pain. Patient states that he has pain to both of his lower legs. That she has been having this pain to her lower legs for the last 1 month. She is been seen by her primary care physician. She has a history of DVTs but she is on Eliquis. She states that now she is having some pain in her right low back. She states that because of the pain in her back and her legs she has trouble getting up to go to the bathroom. She states that she knows when she has to go to the bathroom so she is not incontinent but she cannot make it to the bathroom so she urinates on herself. She is aware of when she has to use the restroom. She denies any saddle anesthesia. She rates her pain a 10 on a 0-to-10 scale. Nursing Notes were reviewed. ALLERGIES     Amoxil [amoxicillin]; Bee venom; Clindamycin/lincomycin; Flonase [fluticasone]; Keflex [cephalexin]; Levaquin [levofloxacin in d5w];  Macrobid [nitrofurantoin monohyd macro]; and Sulfa antibiotics    CURRENT MEDICATIONS       Discharge Medication List as of 6/4/2020  8:34 PM      CONTINUE these medications which have NOT CHANGED    Details   losartan (COZAAR) 50 MG tablet Take 1 tablet by mouth daily, Disp-90 tablet, R-1Normal      doxycycline hyclate (VIBRA-TABS) 100 MG tablet  LYMPH NODE DISSECTION Left 4/1/2019    LEFT AXILLARY HYDRADENITIS EXCISION performed by Jairo Mace DO at [facility]. Left 04/01/2019    LEFT AXILLARY HYDRADENITIS EXCISION (Left )    GA EXC SKIN BENIG <5MM TRUNK,ARM,LEG Right 1/16/2018    EXCISION HIDRADENITIS RIGHT AXILLA performed by Jairo Mace DO at [address] [place] [address]    TUBAL LIGATION      UMBILICAL HERNIA REPAIR           FAMILY HISTORY           Problem Relation Age of Onset    Asthma Mother     High Blood Pressure Mother     Mental Illness Mother     Stroke Other     Other Sister         blood clotting disorder, ms    Depression Sister     Cancer Maternal Grandmother     Diabetes Maternal Grandmother     Cancer Maternal Aunt     Diabetes Maternal Grandfather      Family Status   Relation Name Status    Mother  Alive    Other  (Not Specified)    Father  Alive    Sister  Alive    MGM  (Not Specified)    MAunt  (Not Specified)    MGF  (Not Specified)        SOCIAL HISTORY      reports that she has been smoking cigarettes. She started smoking about 39 years ago. She has a 26.00 pack-year smoking history. She has never used smokeless tobacco. She reports current alcohol use. She reports current drug use. Frequency: 7.00 times per week. Drug: Marijuana. REVIEW OF SYSTEMS    (2-9 systems for level 4, 10 or more for level 5)     Review of Systems   Constitutional: Negative for chills, fever and unexpected weight change. HENT: Negative for congestion, rhinorrhea, sinus pressure and sore throat. Respiratory: Negative for cough, shortness of breath and wheezing. Cardiovascular: Negative for chest pain and palpitations. Gastrointestinal: Negative for abdominal pain, constipation, diarrhea, nausea and vomiting. Genitourinary: Negative for dysuria and hematuria. Musculoskeletal: Positive for back pain. Negative for arthralgias and myalgias.    Skin: states low back pain that radiates down both legs with numbness and tingling x 3 days. No injury Acuity: Acute Type of Exam: Initial FINDINGS: There is unchanged slight retrolisthesis of L5 on S1. Vertebral body alignment is otherwise normal.  Lumbar vertebral body heights and disc spaces are normal.  Pedicles are intact. No evidence of an acute fracture. There are no degenerative changes. Sacroiliac joints are unremarkable. Stable minimal retrolisthesis of L5 on S1. Otherwise unremarkable lumbar spine examination. Interpretation per the Radiologist below, if available at the time of this note:    XR LUMBAR SPINE (2-3 VIEWS)   Final Result   Stable minimal retrolisthesis of L5 on S1. Otherwise unremarkable lumbar   spine examination. LABS:  Labs Reviewed   VAGINITIS DNA PROBE - Abnormal; Notable for the following components:       Result Value    Direct Exam POSITIVE for Gardnerella vaginalis. (*)     All other components within normal limits       All other labs were within normal range or not returned as of this dictation. EMERGENCY DEPARTMENT COURSE and DIFFERENTIAL DIAGNOSIS/MDM:   Vitals:    Vitals:    06/04/20 1824   BP: 138/85   Pulse: 70   Resp: 12   Temp: 99.1 °F (37.3 °C)   TempSrc: Oral   SpO2: 98%   Weight: 203 lb (92.1 kg)   Height: 5' 5\" (1.651 m)       Medical Decision Making: She was told that her x-ray shows some minimal retrolisthesis of her spine. She was told she needs a follow-up with her primary care physician regarding this leg pain for the last 1 month. She is already on Eliquis. Her doctor can order a venous Doppler of her lower legs. given some pain medication and muscle relaxants.  Also given some flagyl for complaints of vag discharge  Medications   lidocaine 4 % external patch 1 patch (1 patch Transdermal Patch Applied 6/4/20 1858)   morphine sulfate (PF) injection 4 mg (4 mg Intramuscular Given 6/4/20 1900)   orphenadrine (NORFLEX) injection 60 mg (60 mg Intramuscular Given 6/4/20 8860)       FINAL IMPRESSION      1. Acute low back pain, unspecified back pain laterality, unspecified whether sciatica present    2. Pain in both lower extremities          DISPOSITION/PLAN   DISPOSITION Decision To Discharge 06/04/2020 08:33:46 PM      PATIENT REFERRED TO:   Amina Martin MD  [address] [facility] [place] [facility]  [phone]    Schedule an appointment as soon as possible for a visit       [facility] ED  [address] [facility]  [phone]    If symptoms worsen      DISCHARGE MEDICATIONS:     Discharge Medication List as of 6/4/2020  8:34 PM      START taking these medications    Details   HYDROcodone-acetaminophen (NORCO) 5-325 MG per tablet Take 1 tablet by mouth every 6 hours as needed for Pain for up to 3 days. , Disp-12 tablet, R-0Print      cyclobenzaprine (FLEXERIL) 10 MG tablet Take 1 tablet by mouth 3 times daily as needed for Muscle spasms, Disp-21 tablet, R-0Print      metroNIDAZOLE (FLAGYL) 500 MG tablet Take 1 tablet by mouth 2 times daily for 7 days, Disp-14 tablet, R-0Print                 (Please note that portions of this note were completed with a voice recognition program.  Efforts were made to edit the dictations but occasionally words are mis-transcribed.)    [address] [facility] FIORELLA, ELIZABETH - CNP  Certified Nurse Practitioner            ELIZABETH Hui CNP  06/04/20 3415

## 2020-06-08 ENCOUNTER — TELEPHONE (OUTPATIENT)
Dept: FAMILY MEDICINE CLINIC | Age: 45
End: 2020-06-08

## 2020-06-09 ENCOUNTER — OFFICE VISIT (OUTPATIENT)
Dept: FAMILY MEDICINE CLINIC | Age: 45
End: 2020-06-09
Payer: COMMERCIAL

## 2020-06-09 VITALS
HEIGHT: 65 IN | WEIGHT: 222.8 LBS | TEMPERATURE: 97.3 F | HEART RATE: 82 BPM | OXYGEN SATURATION: 99 % | BODY MASS INDEX: 37.12 KG/M2 | DIASTOLIC BLOOD PRESSURE: 82 MMHG | SYSTOLIC BLOOD PRESSURE: 120 MMHG

## 2020-06-09 PROBLEM — R42 DIZZINESS: Status: RESOLVED | Noted: 2020-01-27 | Resolved: 2020-06-09

## 2020-06-09 PROBLEM — M79.604 PAIN IN BOTH LOWER EXTREMITIES: Status: ACTIVE | Noted: 2020-06-09

## 2020-06-09 PROBLEM — M79.605 PAIN IN BOTH LOWER EXTREMITIES: Status: ACTIVE | Noted: 2020-06-09

## 2020-06-09 PROBLEM — K59.04 CHRONIC IDIOPATHIC CONSTIPATION: Status: RESOLVED | Noted: 2018-06-07 | Resolved: 2020-06-09

## 2020-06-09 PROCEDURE — G8926 SPIRO NO PERF OR DOC: HCPCS | Performed by: FAMILY MEDICINE

## 2020-06-09 PROCEDURE — 99214 OFFICE O/P EST MOD 30 MIN: CPT | Performed by: FAMILY MEDICINE

## 2020-06-09 PROCEDURE — 4004F PT TOBACCO SCREEN RCVD TLK: CPT | Performed by: FAMILY MEDICINE

## 2020-06-09 PROCEDURE — G8417 CALC BMI ABV UP PARAM F/U: HCPCS | Performed by: FAMILY MEDICINE

## 2020-06-09 PROCEDURE — 3023F SPIROM DOC REV: CPT | Performed by: FAMILY MEDICINE

## 2020-06-09 PROCEDURE — G8427 DOCREV CUR MEDS BY ELIG CLIN: HCPCS | Performed by: FAMILY MEDICINE

## 2020-06-09 ASSESSMENT — ENCOUNTER SYMPTOMS
COUGH: 0
ABDOMINAL PAIN: 0
CONSTIPATION: 0
BACK PAIN: 0
COLOR CHANGE: 0
SHORTNESS OF BREATH: 0
DIARRHEA: 0

## 2020-06-09 NOTE — PROGRESS NOTES
thyromegaly. Trachea: No tracheal deviation. Cardiovascular:      Rate and Rhythm: Normal rate and regular rhythm. Heart sounds: No murmur. No friction rub. No gallop. Pulmonary:      Effort: No respiratory distress. Breath sounds: No stridor. No wheezing or rales. Chest:      Chest wall: No tenderness. Abdominal:      General: Bowel sounds are normal. There is no distension. Palpations: Abdomen is soft. Tenderness: There is no abdominal tenderness. There is no rebound. Musculoskeletal: Normal range of motion. Comments: Mild tenderness to Left > right calves. No  Swelling or induration noted. Lymphadenopathy:      Cervical: No cervical adenopathy. Skin:     General: Skin is warm. Coloration: Skin is not pale. Findings: No erythema or rash. Neurological:      Mental Status: She is alert and oriented to person, place, and time. Cranial Nerves: No cranial nerve deficit. Motor: No abnormal muscle tone. Deep Tendon Reflexes: Reflexes normal.         Assessment:       Diagnosis Orders   1. Tobacco abuse disorder     2. Seizures ([facility]     3. PTSD (post-traumatic stress disorder)     4. Irritable bowel syndrome with constipation     5. Chronic obstructive pulmonary disease, unspecified COPD type ([facility]     6. Long term current use of anticoagulant therapy     7. Gastroesophageal reflux disease, esophagitis presence not specified     8. Essential hypertension     9. Tobacco abuse counseling     10. Pain in both lower extremities           Plan:      No orders of the defined types were placed in this encounter.       Outpatient Encounter Medications as of 6/9/2020   Medication Sig Dispense Refill    cyclobenzaprine (FLEXERIL) 10 MG tablet Take 1 tablet by mouth 3 times daily as needed for Muscle spasms 21 tablet 0    metroNIDAZOLE (FLAGYL) 500 MG tablet Take 1 tablet by mouth 2 times daily for 7 days 14 tablet 0    losartan (COZAAR) 50 MG tablet Take 1 tablet by mouth daily 90 tablet 1    doxycycline hyclate (VIBRA-TABS) 100 MG tablet Take 1 tablet by mouth 2 times daily 20 tablet 0    apixaban (ELIQUIS) 5 MG TABS tablet take 1 tablet by mouth twice a day 60 tablet 2    cloNIDine (CATAPRES) 0.1 MG/24HR PTWK apply 1 patch EVERY 7 DAYS 4 patch 1    atorvastatin (LIPITOR) 10 MG tablet take 1 tablet by mouth once daily 90 tablet 1    RA VITAMIN B-12 TR 1000 MCG TBCR take 2 tablets by mouth once daily      triamcinolone acetonide (KENALOG) 0.1 % paste APPLY A THIN FILM TOPICALLY TO AFFECTED AREA 2-3 TIMES A DAY      ondansetron (ZOFRAN-ODT) 4 MG disintegrating tablet Take 1 tablet by mouth 3 times daily as needed for Nausea or Vomiting 21 tablet 0    verapamil (VERELAN) 360 MG extended release capsule take 1 capsule by mouth once daily 90 capsule 1    Blood Pressure KIT Use as directed.  1 kit 0    topiramate (TOPAMAX SPRINKLE) 15 MG capsule Take 1 capsule by mouth 2 times daily Take 1 cap at night for a week and then go to 1 cap BID therafter 60 capsule 3    montelukast (SINGULAIR) 10 MG tablet take 1 tablet by mouth once daily 90 tablet 1    vitamin D (ERGOCALCIFEROL) 1.25 MG (88706 UT) CAPS capsule take 1 capsule by mouth every week 12 capsule 1    butalbital-acetaminophen-caffeine (ESGIC) -40 MG per tablet Take 1 tablet by mouth every 6 hours as needed for Headaches 20 tablet 0    Cyanocobalamin (B-12) 1000 MCG SUBL Place 1 tablet under the tongue daily 90 tablet 5    escitalopram (LEXAPRO) 5 MG tablet   0    benztropine (COGENTIN) 0.5 MG tablet take 1 tablet by mouth twice a day  0    cetirizine (ZYRTEC) 10 MG tablet Take 1 tablet by mouth daily 90 tablet 1    levETIRAcetam (KEPPRA) 500 MG tablet Take 1 tablet by mouth 2 times daily 60 tablet 0    folic acid (FOLVITE) 1 MG tablet Take 1 tablet by mouth daily 90 tablet 1    lurasidone (LATUDA) 60 MG TABS tablet take 1 tablet by mouth once daily with food AT LEAST 350 CALORIES       No facility-administered encounter medications on file as of 6/9/2020.             Jessa Pineda MD

## 2020-06-17 RX ORDER — MONTELUKAST SODIUM 10 MG/1
TABLET ORAL
Qty: 90 TABLET | Refills: 1 | Status: SHIPPED | OUTPATIENT
Start: 2020-06-17 | End: 2020-08-18

## 2020-06-25 ENCOUNTER — OFFICE VISIT (OUTPATIENT)
Dept: PULMONOLOGY | Age: 45
End: 2020-06-25
Payer: COMMERCIAL

## 2020-06-25 VITALS
BODY MASS INDEX: 33.69 KG/M2 | SYSTOLIC BLOOD PRESSURE: 128 MMHG | WEIGHT: 202.2 LBS | HEIGHT: 65 IN | RESPIRATION RATE: 16 BRPM | OXYGEN SATURATION: 98 % | DIASTOLIC BLOOD PRESSURE: 92 MMHG | TEMPERATURE: 98.3 F | HEART RATE: 85 BPM

## 2020-06-25 PROCEDURE — 4004F PT TOBACCO SCREEN RCVD TLK: CPT | Performed by: INTERNAL MEDICINE

## 2020-06-25 PROCEDURE — G8417 CALC BMI ABV UP PARAM F/U: HCPCS | Performed by: INTERNAL MEDICINE

## 2020-06-25 PROCEDURE — 99214 OFFICE O/P EST MOD 30 MIN: CPT | Performed by: INTERNAL MEDICINE

## 2020-06-25 PROCEDURE — G8427 DOCREV CUR MEDS BY ELIG CLIN: HCPCS | Performed by: INTERNAL MEDICINE

## 2020-06-25 RX ORDER — HYDROCORTISONE ACETATE 0.5 %
1 CREAM (GRAM) TOPICAL 2 TIMES DAILY
Qty: 60 CAPSULE | Refills: 5 | Status: SHIPPED | OUTPATIENT
Start: 2020-06-25 | End: 2020-08-18

## 2020-06-25 ASSESSMENT — SLEEP AND FATIGUE QUESTIONNAIRES
HOW LIKELY ARE YOU TO NOD OFF OR FALL ASLEEP WHILE SITTING AND READING: 1
HOW LIKELY ARE YOU TO NOD OFF OR FALL ASLEEP IN A CAR, WHILE STOPPED FOR A FEW MINUTES IN TRAFFIC: 0
HOW LIKELY ARE YOU TO NOD OFF OR FALL ASLEEP WHILE SITTING AND TALKING TO SOMEONE: 0
ESS TOTAL SCORE: 6
HOW LIKELY ARE YOU TO NOD OFF OR FALL ASLEEP WHILE WATCHING TV: 2
HOW LIKELY ARE YOU TO NOD OFF OR FALL ASLEEP WHEN YOU ARE A PASSENGER IN A CAR FOR AN HOUR WITHOUT A BREAK: 0
HOW LIKELY ARE YOU TO NOD OFF OR FALL ASLEEP WHILE SITTING QUIETLY AFTER LUNCH WITHOUT ALCOHOL: 0
HOW LIKELY ARE YOU TO NOD OFF OR FALL ASLEEP WHILE SITTING INACTIVE IN A PUBLIC PLACE: 0
HOW LIKELY ARE YOU TO NOD OFF OR FALL ASLEEP WHILE LYING DOWN TO REST IN THE AFTERNOON WHEN CIRCUMSTANCES PERMIT: 3

## 2020-06-26 ENCOUNTER — TELEPHONE (OUTPATIENT)
Dept: PULMONOLOGY | Age: 45
End: 2020-06-26

## 2020-06-26 NOTE — TELEPHONE ENCOUNTER
A PA notice was received from [facility]. This PA is in the process with covermymeds.

## 2020-06-26 NOTE — TELEPHONE ENCOUNTER
This script has been DENIED  I phoned [facility] spoke with Yessenia informed her of the denial they will notify the patient.

## 2020-06-29 NOTE — PROGRESS NOTES
child was delivered     Chicken pox     Chronic idiopathic constipation     Chronic mental illness     Current every day smoker     Depression     Depression     DVT of deep femoral vein, bilateral (HCC)     Emphysema lung ([address]     GERD (gastroesophageal reflux disease)     GERD (gastroesophageal reflux disease)     Hair loss     Headache     Hernia     High blood pressure     History of pulmonary embolism 10/4/2019    Hyperlipidemia     Hypertension     Ileus ([address]     Irregular bowel habits     Irritable bowel syndrome     MDRO (multiple drug resistant organisms) resistance     MRSA in  per pt.  Nervousness     Obesity     Pneumonia     Saddle embolus of pulmonary artery without acute cor pulmonale (HCC)     Seizures ([address]     Last Seizure 18    Slow gastric motility     Smoking     Stroke (cerebrum) ([address]     Suicidal intent     2003.  Denies 18    TIA (transient ischemic attack)     Tobacco abuse disorder 3/22/2016    Weight loss        SURGICAL HISTORY:    Past Surgical History:   Procedure Laterality Date    ABSCESS DRAINAGE      abdominal abscess RLQ    AXILLARY SURGERY Right 2018    Excision of hidradenitis cysts, right axilla    BREAST LUMPECTOMY Bilateral     BREAST LUMPECTOMY       SECTION      x2    ENDOMETRIAL ABLATION      ENDOSCOPY, COLON, DIAGNOSTIC  2014    HERNIA REPAIR      HYSTERECTOMY      LYMPH NODE DISSECTION Left 2019    LEFT AXILLARY HYDRADENITIS EXCISION performed by Urban Ybarra DO at [address] Left 2019    LEFT AXILLARY HYDRADENITIS EXCISION (Left )    MT EXC SKIN BENIG <5MM TRUNK,ARM,LEG Right 2018    EXCISION HIDRADENITIS RIGHT AXILLA performed by Urban Ybarra DO at [address] [place]      UMBILICAL HERNIA REPAIR         ALLERGIES:    Allergies   Allergen Reactions    Amoxil [Amoxicillin]      Swelling of throat, rash and cough    Bee Venom Hives    Clindamycin/Lincomycin      rash    Flonase [Fluticasone]      Headaches      Keflex [Cephalexin]      itching    Levaquin [Levofloxacin In D5w] Hives    Macrobid [Nitrofurantoin Monohyd Macro] Hives    Sulfa Antibiotics Hives       MEDICATIONS:   Current Outpatient Medications   Medication Sig Dispense Refill    Horse [place] 300 MG CAPS Take 1 tablet by mouth 2 times daily 60 capsule 5    montelukast (SINGULAIR) 10 MG tablet take 1 tablet by mouth once daily 90 tablet 1    losartan (COZAAR) 50 MG tablet Take 1 tablet by mouth daily 90 tablet 1    doxycycline hyclate (VIBRA-TABS) 100 MG tablet Take 1 tablet by mouth 2 times daily 20 tablet 0    apixaban (ELIQUIS) 5 MG TABS tablet take 1 tablet by mouth twice a day 60 tablet 2    cloNIDine (CATAPRES) 0.1 MG/24HR PTWK apply 1 patch EVERY 7 DAYS 4 patch 1    atorvastatin (LIPITOR) 10 MG tablet take 1 tablet by mouth once daily 90 tablet 1    RA VITAMIN B-12 TR 1000 MCG TBCR take 2 tablets by mouth once daily      triamcinolone acetonide (KENALOG) 0.1 % paste APPLY A THIN FILM TOPICALLY TO AFFECTED AREA 2-3 TIMES A DAY      ondansetron (ZOFRAN-ODT) 4 MG disintegrating tablet Take 1 tablet by mouth 3 times daily as needed for Nausea or Vomiting 21 tablet 0    verapamil (VERELAN) 360 MG extended release capsule take 1 capsule by mouth once daily 90 capsule 1    Blood Pressure KIT Use as directed.  1 kit 0    topiramate (TOPAMAX SPRINKLE) 15 MG capsule Take 1 capsule by mouth 2 times daily Take 1 cap at night for a week and then go to 1 cap BID therafter 60 capsule 3    vitamin D (ERGOCALCIFEROL) 1.25 MG (63392 UT) CAPS capsule take 1 capsule by mouth every week 12 capsule 1    butalbital-acetaminophen-caffeine (ESGIC) -40 MG per tablet Take 1 tablet by mouth every 6 hours as needed for Headaches 20 tablet 0    Cyanocobalamin (B-12) 1000 MCG SUBL Place 1 tablet under the tongue daily 90 tablet 5    escitalopram (LEXAPRO) 5 MG tablet   0    benztropine (COGENTIN) 0.5 MG tablet take 1 tablet by mouth twice a day  0    cetirizine (ZYRTEC) 10 MG tablet Take 1 tablet by mouth daily 90 tablet 1    levETIRAcetam (KEPPRA) 500 MG tablet Take 1 tablet by mouth 2 times daily 60 tablet 0    folic acid (FOLVITE) 1 MG tablet Take 1 tablet by mouth daily 90 tablet 1    lurasidone (LATUDA) 60 MG TABS tablet take 1 tablet by mouth once daily with food AT LEAST 350 CALORIES       No current facility-administered medications for this visit. FAMILY HISTORY: family history includes Asthma in her mother; Cancer in her maternal aunt and maternal grandmother; Depression in her sister; Diabetes in her maternal grandfather and maternal grandmother; High Blood Pressure in her mother; Mental Illness in her mother; Other in her sister; Stroke in an other family member. SOCIAL AND OCCUPATIONAL HEALTH:  The patient is a Current smoker. There  is not history of TB or TB exposure. There is not asbestos or silica dust exposure. The patient reports does not have coal, foundry, quarry or [employer] exposure. Travel history reveals no significant history of risk factors for pulm disease. There is not  history of recreational or IV drug use. The patient does not pets, dogs, cats turtles or exotic birds.         Review of Systems:  Review of Systems -   General ROS: Completed and except as mentioned above were negative   Psychological ROS:  Completed and except as mentioned above were negative  Ophthalmic ROS:  Completed and except as mentioned above were negative  ENT ROS:  Completed and except as mentioned above were negative  Allergy and Immunology ROS:  Completed and except as mentioned above were negative  Hematological and Lymphatic ROS:  Completed and except as mentioned above were negative  Endocrine ROS: Completed and except as mentioned above were negative  Breast ROS:  Completed Slightly rounded and soft without organomegaly. No rebound, rigidity or guarding was appreciated. Lymphatic: No lymphadenopathy. Musculoskeletal: Normal curvature of the spine. No gross muscle weakness. Extremities:  No lower extremity edema, ulcerations, tenderness, varicosities or erythema. Muscle size, tone and strength are normal.  No involuntary movements are noted. Skin:  Warm and dry. Good color, turgor and pigmentation. No lesions or scars. No cyanosis or clubbing  Neurological/Psychiatric: The patient's general behavior, level of consciousness, thought content and emotional status is normal.          DATA:     Venous Dopplers in August 2019 with recanalized chronic lower extremity clot in the left lower extremity      IMPRESSION:   1. Saddle embolus of pulmonary artery without acute cor pulmonale, unspecified chronicity ([address]    2. Moderate persistent asthma without complication    3. Anticoagulant long-term use    4. Obesity due to excess calories, unspecified obesity severity    5. Smoking    6. DVT of deep femoral vein, bilateral ([address]    7. ELIZABETH (obstructive sleep apnea)    8. Postphlebitic syndrome without complication                   PLAN:       Reviewed venous Dopplers to evaluate for left lower extremity DVT  Echocardiogram to obtain annually as recommended by [address] [place] [address],[address]  She is being followed by Dr. Damien Kern at [address] were provided-[facility]  Patient was recommended to have prednisone and an antibiotic available for use during an exacerbation  Educated and clarified the medication use. Recommended lifelong anticoagulation in view of the unprovoked pulmonary embolism, which was very significant  Discussed use, benefit, and side effects of prescribed medications. Barriers to medication compliance addressed.    Robert Ramos received counseling on the following healthy behaviors: nutrition, exercise and medication adherence  Recommend flu vaccination

## 2020-07-02 RX ORDER — VERAPAMIL HYDROCHLORIDE 360 MG/1
CAPSULE, DELAYED RELEASE PELLETS ORAL
Qty: 90 CAPSULE | Refills: 1 | Status: SHIPPED | OUTPATIENT
Start: 2020-07-02 | End: 2020-08-26 | Stop reason: DRUGHIGH

## 2020-07-02 NOTE — TELEPHONE ENCOUNTER
Derek Montaño is calling to request a refill on the following medication(s):    Last Visit Date (If Applicable):  1/7/3722    Next Visit Date:    7/9/2020  Last filled 04/08/2020  Medication Request:  Requested Prescriptions     Pending Prescriptions Disp Refills    verapamil (VERELAN) 360 MG extended release capsule [Pharmacy Med Name: VERAPAMIL ER 360MG CP PLT 24HR] 90 capsule 1     Sig: take 1 capsule by mouth once daily

## 2020-07-14 RX ORDER — PSYLLIUM HUSK 3.4 G/7G
POWDER ORAL
Qty: 180 TABLET | Refills: 1 | Status: SHIPPED | OUTPATIENT
Start: 2020-07-14 | End: 2021-10-15 | Stop reason: SDUPTHER

## 2020-07-14 NOTE — TELEPHONE ENCOUNTER
Trupti He is calling to request a refill on the following medication(s):    Last Visit Date (If Applicable):  1/8/8192    Next Visit Date:    7/21/2020  Last filled 04/16/220  Medication Request:  Requested Prescriptions     Pending Prescriptions Disp Refills    RA VITAMIN B-12 TR 1000 MCG TBCR [Pharmacy Med Name: RA VITAMIN B-12 1,000 MCG TAB] 180 tablet      Sig: take 2 tablets by mouth once daily

## 2020-07-20 RX ORDER — ATORVASTATIN CALCIUM 10 MG/1
TABLET, FILM COATED ORAL
Qty: 90 TABLET | Refills: 1 | Status: SHIPPED | OUTPATIENT
Start: 2020-07-20 | End: 2021-04-02 | Stop reason: SDUPTHER

## 2020-07-20 RX ORDER — TOPIRAMATE 15 MG/1
CAPSULE, COATED PELLETS ORAL
Qty: 60 CAPSULE | Refills: 3 | Status: SHIPPED | OUTPATIENT
Start: 2020-07-20 | End: 2021-10-15 | Stop reason: ALTCHOICE

## 2020-07-20 NOTE — TELEPHONE ENCOUNTER
Joy Demarco is calling to request a refill on the following medication(s):    Last Visit Date (If Applicable):  8/5/4905    Next Visit Date:    7/21/2020    Medication Request:  Requested Prescriptions     Pending Prescriptions Disp Refills    atorvastatin (LIPITOR) 10 MG tablet [Pharmacy Med Name: ATORVASTATIN 10 MG TABLET] 90 tablet 1     Sig: take 1 tablet by mouth once daily    topiramate (TOPAMAX SPRINKLE) 15 MG capsule [Pharmacy Med Name: TOPIRAMATE 15 MG SPRINKLE CAP] 60 capsule 3     Sig: take 1 capsule by mouth nightly for 1 week then 1 capsule twice a day THEREAFTER

## 2020-07-24 ENCOUNTER — VIRTUAL VISIT (OUTPATIENT)
Dept: FAMILY MEDICINE CLINIC | Age: 45
End: 2020-07-24
Payer: COMMERCIAL

## 2020-07-24 PROCEDURE — 99443 PR PHYS/QHP TELEPHONE EVALUATION 21-30 MIN: CPT | Performed by: FAMILY MEDICINE

## 2020-07-24 RX ORDER — LEVETIRACETAM 750 MG/1
TABLET ORAL
COMMUNITY
Start: 2020-06-17 | End: 2021-04-02 | Stop reason: ALTCHOICE

## 2020-07-24 RX ORDER — FLUTICASONE PROPIONATE 50 MCG
2 SPRAY, SUSPENSION (ML) NASAL DAILY
Qty: 3 BOTTLE | Refills: 1 | Status: SHIPPED | OUTPATIENT
Start: 2020-07-24 | End: 2021-10-15 | Stop reason: SINTOL

## 2020-07-24 RX ORDER — CETIRIZINE HYDROCHLORIDE 10 MG/1
10 TABLET ORAL DAILY
Qty: 30 TABLET | Refills: 0 | Status: SHIPPED | OUTPATIENT
Start: 2020-07-24

## 2020-07-24 ASSESSMENT — ENCOUNTER SYMPTOMS
DIARRHEA: 0
BACK PAIN: 0
CHEST TIGHTNESS: 0
BLOOD IN STOOL: 0
CONSTIPATION: 0
SHORTNESS OF BREATH: 0
COUGH: 0
ABDOMINAL PAIN: 0
EYE REDNESS: 0
ABDOMINAL DISTENTION: 0
VOICE CHANGE: 0
SINUS PRESSURE: 0
ANAL BLEEDING: 0
COLOR CHANGE: 0
EYE DISCHARGE: 0
RECTAL PAIN: 0
EYE PAIN: 0
NAUSEA: 0
TROUBLE SWALLOWING: 0
VOMITING: 0

## 2020-07-24 ASSESSMENT — PATIENT HEALTH QUESTIONNAIRE - PHQ9
SUM OF ALL RESPONSES TO PHQ9 QUESTIONS 1 & 2: 0
SUM OF ALL RESPONSES TO PHQ QUESTIONS 1-9: 0
SUM OF ALL RESPONSES TO PHQ QUESTIONS 1-9: 0
1. LITTLE INTEREST OR PLEASURE IN DOING THINGS: 0
2. FEELING DOWN, DEPRESSED OR HOPELESS: 0

## 2020-07-24 NOTE — PROGRESS NOTES
Negative for environmental allergies, food allergies and immunocompromised state. Neurological: Negative for dizziness, tremors, seizures, syncope, facial asymmetry, speech difficulty, weakness, light-headedness, numbness and headaches. Hematological: Negative for adenopathy. Does not bruise/bleed easily. Psychiatric/Behavioral: Negative for agitation, behavioral problems, confusion, decreased concentration, sleep disturbance and suicidal ideas. The patient is not nervous/anxious. Objective:   Physical Exam    Assessment:        Diagnosis Orders   1. Allergic rhinitis, unspecified seasonality, unspecified trigger  fluticasone (FLONASE) 50 MCG/ACT nasal spray    cetirizine (ZYRTEC) 10 MG tablet   2. Borderline diabetes     3. Tobacco abuse disorder     4. Seizures ([facility]     5. Uncomplicated asthma, unspecified asthma severity, unspecified whether persistent     6. Vitamin D insufficiency     7. Bipolar 1 disorder ([facility]     8. PTSD (post-traumatic stress disorder)     9. Chronic nonintractable headache, unspecified headache type     10. Irritable bowel syndrome with constipation     11. Chronic obstructive pulmonary disease, unspecified COPD type ([facility]     12. Long term current use of anticoagulant therapy     13. Gastroesophageal reflux disease, esophagitis presence not specified     14. History of pulmonary embolism     15. Tobacco abuse counseling             Plan:      No orders of the defined types were placed in this encounter.       Outpatient Encounter Medications as of 7/24/2020   Medication Sig Dispense Refill    levETIRAcetam (KEPPRA) 750 MG tablet       fluticasone (FLONASE) 50 MCG/ACT nasal spray 2 sprays by Each Nostril route daily 3 Bottle 1    cetirizine (ZYRTEC) 10 MG tablet Take 1 tablet by mouth daily 30 tablet 0    atorvastatin (LIPITOR) 10 MG tablet take 1 tablet by mouth once daily 90 tablet 1    topiramate (TOPAMAX SPRINKLE) 15 MG capsule take 1 capsule by mouth nightly for 1 week then 1 capsule twice a day THEREAFTER 60 capsule 3    RA VITAMIN B-12 TR 1000 MCG TBCR take 2 tablets by mouth once daily 180 tablet 1    verapamil (VERELAN) 360 MG extended release capsule take 1 capsule by mouth once daily 90 capsule 1    Horse [place] 300 MG CAPS Take 1 tablet by mouth 2 times daily 60 capsule 5    montelukast (SINGULAIR) 10 MG tablet take 1 tablet by mouth once daily 90 tablet 1    losartan (COZAAR) 50 MG tablet Take 1 tablet by mouth daily 90 tablet 1    doxycycline hyclate (VIBRA-TABS) 100 MG tablet Take 1 tablet by mouth 2 times daily 20 tablet 0    apixaban (ELIQUIS) 5 MG TABS tablet take 1 tablet by mouth twice a day 60 tablet 2    cloNIDine (CATAPRES) 0.1 MG/24HR PTWK apply 1 patch EVERY 7 DAYS 4 patch 1    triamcinolone acetonide (KENALOG) 0.1 % paste APPLY A THIN FILM TOPICALLY TO AFFECTED AREA 2-3 TIMES A DAY      ondansetron (ZOFRAN-ODT) 4 MG disintegrating tablet Take 1 tablet by mouth 3 times daily as needed for Nausea or Vomiting 21 tablet 0    Blood Pressure KIT Use as directed.  1 kit 0    vitamin D (ERGOCALCIFEROL) 1.25 MG (04075 UT) CAPS capsule take 1 capsule by mouth every week 12 capsule 1    butalbital-acetaminophen-caffeine (ESGIC) -40 MG per tablet Take 1 tablet by mouth every 6 hours as needed for Headaches 20 tablet 0    Cyanocobalamin (B-12) 1000 MCG SUBL Place 1 tablet under the tongue daily 90 tablet 5    escitalopram (LEXAPRO) 5 MG tablet   0    benztropine (COGENTIN) 0.5 MG tablet take 1 tablet by mouth twice a day  0    cetirizine (ZYRTEC) 10 MG tablet Take 1 tablet by mouth daily 90 tablet 1    levETIRAcetam (KEPPRA) 500 MG tablet Take 1 tablet by mouth 2 times daily 60 tablet 0    folic acid (FOLVITE) 1 MG tablet Take 1 tablet by mouth daily 90 tablet 1    lurasidone (LATUDA) 60 MG TABS tablet take 1 tablet by mouth once daily with food AT LEAST 350 CALORIES       No facility-administered encounter medications on file as of 7/24/2020.             Shirleyann Aschoff, MD

## 2020-07-29 ENCOUNTER — TELEPHONE (OUTPATIENT)
Dept: FAMILY MEDICINE CLINIC | Age: 45
End: 2020-07-29

## 2020-07-29 NOTE — TELEPHONE ENCOUNTER
Pt called wants to know if she need to fast for her labs and also that she need a weight scale to check her weight before she come to the doctor office per Dr Maple Severin

## 2020-07-29 NOTE — TELEPHONE ENCOUNTER
No need to fast,I don't know why she needs to check weight,o if she needs a rx for a weight scale she needs to talk to Dr Ashu Mathur who recommended this.

## 2020-08-07 ENCOUNTER — HOSPITAL ENCOUNTER (OUTPATIENT)
Age: 45
Discharge: HOME | End: 2020-08-07
Payer: COMMERCIAL

## 2020-08-07 LAB
ANION GAP SERPL CALCULATED.3IONS-SCNC: 13 MMOL/L (ref 9–17)
BUN BLDV-MCNC: 13 MG/DL (ref 6–20)
BUN/CREAT BLD: ABNORMAL (ref 9–20)
CALCIUM SERPL-MCNC: 9 MG/DL (ref 8.6–10.4)
CHLORIDE BLD-SCNC: 105 MMOL/L (ref 98–107)
CO2: 16 MMOL/L (ref 20–31)
CREAT SERPL-MCNC: 0.84 MG/DL (ref 0.5–0.9)
CREATININE URINE: 112 MG/DL (ref 28–217)
GFR AFRICAN AMERICAN: >60 ML/MIN
GFR NON-AFRICAN AMERICAN: >60 ML/MIN
GFR SERPL CREATININE-BSD FRML MDRD: ABNORMAL ML/MIN/{1.73_M2}
GFR SERPL CREATININE-BSD FRML MDRD: ABNORMAL ML/MIN/{1.73_M2}
GLUCOSE BLD-MCNC: 77 MG/DL (ref 70–99)
MICROALBUMIN/CREAT 24H UR: <12 MG/L
MICROALBUMIN/CREAT UR-RTO: NORMAL MCG/MG CREAT
POTASSIUM SERPL-SCNC: 5.2 MMOL/L (ref 3.7–5.3)
SODIUM BLD-SCNC: 134 MMOL/L (ref 135–144)

## 2020-08-07 PROCEDURE — 83036 HEMOGLOBIN GLYCOSYLATED A1C: CPT

## 2020-08-07 PROCEDURE — 80048 BASIC METABOLIC PNL TOTAL CA: CPT

## 2020-08-07 PROCEDURE — 82570 ASSAY OF URINE CREATININE: CPT

## 2020-08-07 PROCEDURE — 82043 UR ALBUMIN QUANTITATIVE: CPT

## 2020-08-07 PROCEDURE — 36415 COLL VENOUS BLD VENIPUNCTURE: CPT

## 2020-08-08 LAB
ESTIMATED AVERAGE GLUCOSE: 117 MG/DL
HBA1C MFR BLD: 5.7 % (ref 4–6)

## 2020-08-11 NOTE — TELEPHONE ENCOUNTER
Dr Tatiana Rubi, patient is current and due in for an appointment with [facility] on 8/18/20. Per last dictation patient to be on lifelong anticoagulation. Please sign for refill if ok. Thank you.

## 2020-08-12 ENCOUNTER — TELEPHONE (OUTPATIENT)
Dept: FAMILY MEDICINE CLINIC | Age: 45
End: 2020-08-12

## 2020-08-12 RX ORDER — CLONIDINE 0.1 MG/24H
PATCH, EXTENDED RELEASE TRANSDERMAL
Qty: 4 PATCH | Refills: 1 | Status: SHIPPED | OUTPATIENT
Start: 2020-08-12 | End: 2020-10-12 | Stop reason: SDUPTHER

## 2020-08-12 NOTE — TELEPHONE ENCOUNTER
Lmor, for the pt to get labs done prior to additional refills     for: Vit D2 1.25 mg (50.000unit)    Appt: 8.20.20

## 2020-08-13 ENCOUNTER — TELEPHONE (OUTPATIENT)
Dept: FAMILY MEDICINE CLINIC | Age: 45
End: 2020-08-13

## 2020-08-14 ENCOUNTER — HOSPITAL ENCOUNTER (OUTPATIENT)
Age: 45
Discharge: HOME | End: 2020-08-14
Payer: COMMERCIAL

## 2020-08-14 LAB
ALBUMIN SERPL-MCNC: 3.9 G/DL (ref 3.5–5.2)
ALBUMIN/GLOBULIN RATIO: 1.2 (ref 1–2.5)
ALP BLD-CCNC: 96 U/L (ref 35–104)
ALT SERPL-CCNC: 21 U/L (ref 5–33)
ANION GAP SERPL CALCULATED.3IONS-SCNC: 16 MMOL/L (ref 9–17)
AST SERPL-CCNC: 18 U/L
BILIRUB SERPL-MCNC: <0.1 MG/DL (ref 0.3–1.2)
BUN BLDV-MCNC: 12 MG/DL (ref 6–20)
BUN/CREAT BLD: ABNORMAL (ref 9–20)
CALCIUM SERPL-MCNC: 9.1 MG/DL (ref 8.6–10.4)
CHLORIDE BLD-SCNC: 105 MMOL/L (ref 98–107)
CO2: 17 MMOL/L (ref 20–31)
CREAT SERPL-MCNC: 0.74 MG/DL (ref 0.5–0.9)
CREATININE URINE: 134.9 MG/DL (ref 28–217)
ESTIMATED AVERAGE GLUCOSE: 111 MG/DL
FOLATE: >20 NG/ML
GFR AFRICAN AMERICAN: >60 ML/MIN
GFR NON-AFRICAN AMERICAN: >60 ML/MIN
GFR SERPL CREATININE-BSD FRML MDRD: ABNORMAL ML/MIN/{1.73_M2}
GFR SERPL CREATININE-BSD FRML MDRD: ABNORMAL ML/MIN/{1.73_M2}
GLUCOSE BLD-MCNC: 94 MG/DL (ref 70–99)
HBA1C MFR BLD: 5.5 % (ref 4–6)
HCT VFR BLD CALC: 37.9 % (ref 36.3–47.1)
HEMOGLOBIN: 11.9 G/DL (ref 11.9–15.1)
IRON SATURATION: 21 % (ref 20–55)
IRON: 64 UG/DL (ref 37–145)
MCH RBC QN AUTO: 23.8 PG (ref 25.2–33.5)
MCHC RBC AUTO-ENTMCNC: 31.4 G/DL (ref 28.4–34.8)
MCV RBC AUTO: 75.6 FL (ref 82.6–102.9)
MICROALBUMIN/CREAT 24H UR: <12 MG/L
MICROALBUMIN/CREAT UR-RTO: NORMAL MCG/MG CREAT
NRBC AUTOMATED: 0 PER 100 WBC
PDW BLD-RTO: 15.7 % (ref 11.8–14.4)
PLATELET # BLD: 203 K/UL (ref 138–453)
PMV BLD AUTO: 11.3 FL (ref 8.1–13.5)
POTASSIUM SERPL-SCNC: 4.5 MMOL/L (ref 3.7–5.3)
RBC # BLD: 5.01 M/UL (ref 3.95–5.11)
SODIUM BLD-SCNC: 138 MMOL/L (ref 135–144)
TOTAL IRON BINDING CAPACITY: 298 UG/DL (ref 250–450)
TOTAL PROTEIN: 7.1 G/DL (ref 6.4–8.3)
TSH SERPL DL<=0.05 MIU/L-ACNC: 0.66 MIU/L (ref 0.3–5)
UNSATURATED IRON BINDING CAPACITY: 234 UG/DL (ref 112–347)
VITAMIN B-12: >2000 PG/ML (ref 232–1245)
VITAMIN D 25-HYDROXY: 20.5 NG/ML (ref 30–100)
WBC # BLD: 5.6 K/UL (ref 3.5–11.3)

## 2020-08-14 PROCEDURE — 80053 COMPREHEN METABOLIC PANEL: CPT

## 2020-08-14 PROCEDURE — 83550 IRON BINDING TEST: CPT

## 2020-08-14 PROCEDURE — 36415 COLL VENOUS BLD VENIPUNCTURE: CPT

## 2020-08-14 PROCEDURE — 82570 ASSAY OF URINE CREATININE: CPT

## 2020-08-14 PROCEDURE — 82306 VITAMIN D 25 HYDROXY: CPT

## 2020-08-14 PROCEDURE — 83036 HEMOGLOBIN GLYCOSYLATED A1C: CPT

## 2020-08-14 PROCEDURE — 82043 UR ALBUMIN QUANTITATIVE: CPT

## 2020-08-14 PROCEDURE — 82746 ASSAY OF FOLIC ACID SERUM: CPT

## 2020-08-14 PROCEDURE — 84443 ASSAY THYROID STIM HORMONE: CPT

## 2020-08-14 PROCEDURE — 83540 ASSAY OF IRON: CPT

## 2020-08-14 PROCEDURE — 85027 COMPLETE CBC AUTOMATED: CPT

## 2020-08-14 PROCEDURE — 82607 VITAMIN B-12: CPT

## 2020-08-14 RX ORDER — ERGOCALCIFEROL 1.25 MG/1
50000 CAPSULE ORAL WEEKLY
Qty: 12 CAPSULE | Refills: 1 | Status: SHIPPED | OUTPATIENT
Start: 2020-08-14 | End: 2021-04-02 | Stop reason: ALTCHOICE

## 2020-08-18 ENCOUNTER — OFFICE VISIT (OUTPATIENT)
Dept: PULMONOLOGY | Age: 45
End: 2020-08-18
Payer: COMMERCIAL

## 2020-08-18 VITALS
BODY MASS INDEX: 34.55 KG/M2 | SYSTOLIC BLOOD PRESSURE: 130 MMHG | TEMPERATURE: 97.9 F | DIASTOLIC BLOOD PRESSURE: 80 MMHG | WEIGHT: 207.6 LBS | OXYGEN SATURATION: 100 % | HEART RATE: 72 BPM

## 2020-08-18 PROCEDURE — 99213 OFFICE O/P EST LOW 20 MIN: CPT | Performed by: NURSE PRACTITIONER

## 2020-08-18 PROCEDURE — G8417 CALC BMI ABV UP PARAM F/U: HCPCS | Performed by: NURSE PRACTITIONER

## 2020-08-18 PROCEDURE — 4004F PT TOBACCO SCREEN RCVD TLK: CPT | Performed by: NURSE PRACTITIONER

## 2020-08-18 PROCEDURE — G8427 DOCREV CUR MEDS BY ELIG CLIN: HCPCS | Performed by: NURSE PRACTITIONER

## 2020-08-18 RX ORDER — ALBUTEROL SULFATE 90 UG/1
2 AEROSOL, METERED RESPIRATORY (INHALATION) 4 TIMES DAILY PRN
Qty: 1 INHALER | Refills: 5 | Status: SHIPPED | OUTPATIENT
Start: 2020-08-18 | End: 2021-07-30

## 2020-08-18 ASSESSMENT — ENCOUNTER SYMPTOMS
EYES NEGATIVE: 1
GASTROINTESTINAL NEGATIVE: 1
ALLERGIC/IMMUNOLOGIC NEGATIVE: 1

## 2020-08-18 NOTE — PATIENT INSTRUCTIONS
Chase Sun will follow up on dental procedure and sleep study forms RD  I have the forms. Sleep studies done 8/18 & 9/18, Anahi Castano said she will fill out after that. The form is in her mail box on her desk.  Kisha Rule

## 2020-08-18 NOTE — PROGRESS NOTES
MD at bedside. Dr. Sawyer in to see pt.   Subjective:      Patient ID: Rhea Fraser is a 45 y.o. female. HPI:     Here for presurgical clearance for multiple tooth extractions. Patient was last seen in the office in June 2020 per Dr. Darryl Lama. She was seen at that visit for follow-up for her history of saddle embolism on anticoagulation with Eliquis, moderate persistent asthma, bilateral DVT, post phlebitic syndrome and suspected ELIZABETH. Patient is having her sleep study completed on 8/21/2020. She currently does not have a date scheduled for her dental extractions, once that date is set she will need to coordinate with her oral surgeon regarding holding her Eliquis and utilizing Lovenox in the interim to prevent bleeding risk during her oral surgical procedure. Unfortunately patient still continues to smoke, currently smoking 1 pack of cigarettes per day in addition to marijuana. Patient states that she utilizes marijuana for her pain relief. At the last office visit with Dr. Darryl Lama she was recommended to try [facility] SCREVEN however she has not been able to obtain this supplement. She continues to endorse shortness of breath with activity. States that her shortness of breath is secondary also to chronic pain issues. Denies any productive cough, purulent sputum or hemoptysis. Prior work-up:  PFT 9/5/2018: FVC 2.66 (83% of predicted 3.20), FEV1 2.10 (80% of predicted 2.63), FEV1/FVC 79 (95% of predicted 83), FEF 25-75% 2.01 (69% of predicted 2.90), lung volumes by plethysmography RV 1.24 (82% of predicted 1.51, TLC 4.11 (87% of predicted 4.71), DSB 20.16 (88% of predicted 22.90). Final impression: Isolated reduction in diffusion capacity could be related to patient's recent pulmonary embolism. CT Chest Imaging 11/4/2019: Lungs were clear with the exception of minimal dependent atelectasis.   Similar areas of relative linear and ill-defined low density and multiple pulmonary arteries this is likely due to chronic pulmonary emboli. There are no new definitive areas of pulmonary arterial filling defect to suggest acute pulmonary emboli. CT chest 12/27/2018: Weblike filling defects in the bilateral lower lobe pulmonary arteries are present. These findings are compatible with chronic pulmonary emboli. Outside studies were not available for comparison. The main pulmonary artery is borderline enlarged at 2.8 cm transversely with no acute pulmonary embolism evident. Bilateral groundglass opacities in the lower lungs likely atelectasis. No pneumothorax no focal airspace consolidation and no suspicious pulmonary nodules. CT chest 6/2/2018: Pulmonary arteries are adequately opacified for evaluation and noted to have a transverse filling defect extending from the main pulmonary artery into both the right and left pulmonary arteries with propagation into the second order branches in the right lower lobe and second and third order branches in the left lower lobe. Main pulmonary artery is normal in size. No right ventricular strain noted. Lungs are without evidence of acute processes, no focal consolidation or pulmonary edema. No evidence of pleural effusion or pneumothorax. Saddle embolism of the pulmonary arteries. Not completely occlusive. CT chest 8/8/2017: Pulmonary arteries are adequately opacified for evaluation with no evidence of intraluminal filling defect to suggest pulmonary embolism. Lungs are without acute process, no focal consolidation or pulmonary edema. No evidence of pleural effusion or pneumothorax. Echocardiogram 6/4/2018: Left ventricle is normal in size and wall thickness with global left ventricular systolic function normal with an EF of 65%. No obvious wall motion abnormality seen. Mild mitral regurgitation, mild tricuspid regurgitation. No pulmonary hypertension. Venous Dopplers 8/30/2019: Popliteal chronic post thrombotic changes with partial recanalization.   No evidence of superficial venous thrombosis in the left lower extremity. No evidence of superficial or deep venous thrombosis in the right lower extremity.     Medications:   Eliquis 5 mg twice daily:  Albuterol HFA: PRN    Immunizations:   Immunization History   Administered Date(s) Administered    Influenza A ([id number]) Vaccine PF IM 11/19/2009    Influenza Virus Vaccine 10/11/2016, 09/09/2017    Influenza, Quadv, IM, PF (6 mo and older Fluzone, Flulaval, Fluarix, and 3 yrs and older Afluria) 10/04/2019    Influenza, Triv, 3 Years and older, IM, PF (Afluria 5yrs and older) 10/11/2016    Pneumococcal Polysaccharide ([id number]) 12/22/2016    Tdap (Boostrix, Adacel) 10/25/2016        Sleep Medicine 6/25/2020   Sitting and reading 1   Watching TV 2   Sitting, inactive in a public place (e.g. a theatre or a meeting) 0   As a passenger in a car for an hour without a break 0   Lying down to rest in the afternoon when circumstances permit 3   Sitting and talking to someone 0   Sitting quietly after a lunch without alcohol 0   In a car, while stopped for a few minutes in traffic 0   Total score 6       /80   Pulse 72   Temp 97.9 °F (36.6 °C)   Wt 207 lb 9.6 oz (94.2 kg)   SpO2 100%   BMI 34.55 kg/m²     Past Medical History:   Diagnosis Date    Abdominal pain     Anemia     Anticoagulant long-term use     Arthritis 04/2018    Left Foot    Asthma     Moderate persistent asthma without complication    Back problem     Bipolar 1 disorder ([address]     Chest pain     with \"coughing\" per pt    CHF (congestive heart failure) ([address]     When child was delivered 2005    Chicken pox     Chronic idiopathic constipation 2014    Chronic mental illness     Current every day smoker     Depression     Depression     DVT of deep femoral vein, bilateral (HCC)     Emphysema lung ([address] 2011    GERD (gastroesophageal reflux disease)     GERD (gastroesophageal reflux disease) 2004    Hair loss     Headache     Hernia     High blood pressure     History of pulmonary embolism 10/4/2019    Hyperlipidemia     Hypertension     [facility]us ([facility]     Irregular bowel habits     Irritable bowel syndrome     MDRO (multiple drug resistant organisms) resistance     MRSA in  per pt.  Nervousness     Obesity     Pneumonia     Saddle embolus of pulmonary artery without acute cor pulmonale (HCC)     Seizures ([address]     Last Seizure 18    Slow gastric motility     Smoking     Stroke (cerebrum) ([facility] 2014    Suicidal intent     .  Denies 18    TIA (transient ischemic attack)     Tobacco abuse disorder 3/22/2016    Weight loss      Past Surgical History:   Procedure Laterality Date    ABSCESS DRAINAGE      abdominal abscess RLQ    AXILLARY SURGERY Right 2018    Excision of hidradenitis cysts, right axilla    BREAST LUMPECTOMY Bilateral     BREAST LUMPECTOMY       SECTION      x2    ENDOMETRIAL ABLATION      ENDOSCOPY, COLON, DIAGNOSTIC  2014    HERNIA REPAIR      HYSTERECTOMY      LYMPH NODE DISSECTION Left 2019    LEFT AXILLARY HYDRADENITIS EXCISION performed by Kelly Keller DO at [facility] Left 2019    LEFT AXILLARY HYDRADENITIS EXCISION (Left )    IA EXC SKIN BENIG <5MM TRUNK,ARM,LEG Right 2018    EXCISION HIDRADENITIS RIGHT AXILLA performed by Kelly Keller DO at [facility]    TUBAL LIGATION      UMBILICAL HERNIA REPAIR       Family History   Problem Relation Age of Onset    Asthma Mother     High Blood Pressure Mother     Mental Illness Mother     Stroke Other     Other Sister         blood clotting disorder, ms    Depression Sister     Cancer Maternal Grandmother     Diabetes Maternal Grandmother     Cancer Maternal Aunt     Diabetes Maternal Grandfather        Social History     Socioeconomic History    Marital status: Single     Spouse name: Not on file    Number of children: Not on file    Years of education: Not on file    Highest education level: Not on file   Occupational History    Not on file   Social Needs    Financial resource strain: Not on file    Food insecurity     Worry: Not on file     Inability: Not on file    Transportation needs     Medical: Not on file     Non-medical: Not on file   Tobacco Use    Smoking status: Current Every Day Smoker     Packs/day: 1.00     Years: 26.00     Pack years: 26.00     Types: Cigarettes     Start date: 0    Smokeless tobacco: Never Used    Tobacco comment: wants help- adviced about smoking cessation plans   Substance and Sexual Activity    Alcohol use: Yes     Comment: occ    Drug use: Yes     Types: Marijuana    Sexual activity: Yes     Partners: Male   Lifestyle    Physical activity     Days per week: Not on file     Minutes per session: Not on file    Stress: Not on file   Relationships    Social connections     Talks on phone: Not on file     Gets together: Not on file     Attends Scientologist service: Not on file     Active member of club or organization: Not on file     Attends meetings of clubs or organizations: Not on file     Relationship status: Not on file    Intimate partner violence     Fear of current or ex partner: Not on file     Emotionally abused: Not on file     Physically abused: Not on file     Forced sexual activity: Not on file   Other Topics Concern    Not on file   Social History Narrative    Not on file       Review of Systems   Constitutional:        Chronic pain   HENT:        Multiple teeth in poor dentition- recommended for multiple tooth extractions. Pending surgical date. Eyes: Negative. Respiratory:        LUCIANO with minimal exertion. No cough. Cardiovascular: Negative. Gastrointestinal: Negative. Endocrine: Negative. Genitourinary: Negative. Musculoskeletal:        Generalized lower extremity pain. Skin: Negative. Allergic/Immunologic: Negative. Neurological: Negative. Hematological: Negative. Psychiatric/Behavioral: Negative. Objective:       Physical Exam  General appearance - alert, well appearing, and in no distress, oriented to person, place, and time and overweight  Mental status - alert, oriented to person, place, and time, normal mood, behavior, speech, dress, motor activity, and thought processes  Eyes - pupils equal and reactive, extraocular eye movements intact  Ears - not examined  Nose - normal and patent, no erythema, discharge or polyps  Mouth - not examined  Neck - supple, no significant adenopathy  Chest - clear to auscultation, no wheezes, rales or rhonchi, symmetric air entry  Heart -normal rate, regular rhythm, normal S1, S2, no murmurs, rubs, clicks or gallops  Abdomen - soft, nontender, nondistended, no masses or organomegaly  Neuro- alert, oriented, normal speech, no focal findings or movement disorder noted}  Extremities - peripheral pulses normal, no pedal edema, no clubbing or cyanosis  Skin - normal coloration and turgor, no rashes, no suspicious skin lesions noted     Wt Readings from Last 3 Encounters:   08/18/20 207 lb 9.6 oz (94.2 kg)   06/25/20 202 lb 3.2 oz (91.7 kg)   06/09/20 222 lb 12.8 oz (101.1 kg)       Results for orders placed or performed during the hospital encounter of 08/14/20   Microalbumin / Creatinine Urine Ratio   Result Value Ref Range    Microalb, Ur <12 <21 mg/L    Creatinine, Ur 134.9 28.0 - 217.0 mg/dL    Microalb/Crt.  Ratio CANNOT BE CALCULATED <25 mcg/mg creat   Folate   Result Value Ref Range    Folate >20.0 >4.8 ng/mL   TSH without Reflex   Result Value Ref Range    TSH 0.66 0.30 - 5.00 mIU/L   Vitamin B12   Result Value Ref Range    Vitamin B-12 >2000 (H) 232 - 1245 pg/mL   Vitamin D 25 Hydroxy   Result Value Ref Range    Vit D, 25-Hydroxy 20.5 (L) 30.0 - 100.0 ng/mL   Iron and TIBC   Result Value Ref Range    Iron 64 37 - 145 ug/dL    TIBC 298 250 - 450 ug/dL    Iron Saturation 21 20 - 55 %    UIBC 234 112 - 347 ug/dL   Hemoglobin A1C   Result Value Ref Range    Hemoglobin A1C 5.5 4.0 - 6.0 %    Estimated Avg Glucose 111 mg/dL   Comprehensive Metabolic Panel   Result Value Ref Range    Glucose 94 70 - 99 mg/dL    BUN 12 6 - 20 mg/dL    CREATININE 0.74 0.50 - 0.90 mg/dL    Bun/Cre Ratio NOT REPORTED 9 - 20    Calcium 9.1 8.6 - 10.4 mg/dL    Sodium 138 135 - 144 mmol/L    Potassium 4.5 3.7 - 5.3 mmol/L    Chloride 105 98 - 107 mmol/L    CO2 17 (L) 20 - 31 mmol/L    Anion Gap 16 9 - 17 mmol/L    Alkaline Phosphatase 96 35 - 104 U/L    ALT 21 5 - 33 U/L    AST 18 <32 U/L    Total Bilirubin <0.10 (L) 0.3 - 1.2 mg/dL    Total Protein 7.1 6.4 - 8.3 g/dL    Alb 3.9 3.5 - 5.2 g/dL    Albumin/Globulin Ratio 1.2 1.0 - 2.5    GFR Non-African American >60 >60 mL/min    GFR African American >60 >60 mL/min    GFR Comment          GFR Staging NOT REPORTED    CBC   Result Value Ref Range    WBC 5.6 3.5 - 11.3 k/uL    RBC 5.01 3.95 - 5.11 m/uL    Hemoglobin 11.9 11.9 - 15.1 g/dL    Hematocrit 37.9 36.3 - 47.1 %    MCV 75.6 (L) 82.6 - 102.9 fL    MCH 23.8 (L) 25.2 - 33.5 pg    MCHC 31.4 28.4 - 34.8 g/dL    RDW 15.7 (H) 11.8 - 14.4 %    Platelets 398 625 - 143 k/uL    MPV 11.3 8.1 - 13.5 fL    NRBC Automated 0.0 0.0 per 100 WBC       No results found. Assessment:      1. Saddle embolus of pulmonary artery without acute cor pulmonale, unspecified chronicity ([facility]    2. Moderate persistent asthma without complication    3. Obesity due to excess calories, unspecified obesity severity    4. Postphlebitic syndrome without complication    5. Smoking    6. Anticoagulant long-term use    7. DVT of deep femoral vein, bilateral ([facility]    8. ELIZABETH (obstructive sleep apnea)          Plan:      1. Medications reviewed. Continue as ordered. Once oral surgery date is set, patient will need to be off Eliquis, and anticoagulated on Lovenox to be stopped 12H prior to surgery.  This can be coordinated with her oral surgeon once surgery date is scheduled. 2. Educated and clarified the medication use. 3. Recommend flu vaccination in the fall annually. 4. Patient is up-to-date with vaccinations from pulmonary perspective. 5. Maintain an active lifestyle. 6. Patient's questions were answered to her satisfaction. 7. Unfortunately patient is smoking 1 ppd, not motivated to quit. Strongly admonished smoking cessation, including abstaining from smoking marijuana. 8. Pulmonary function tests were reviewed   9. Sleep study is scheduled for 8/21/2020  10. We'll see the patient back in 2 months. Cleared for surgery with moderate non prohibitive risk due to her chronic anticoagulation in setting of unprovoked extensive saddle pulmonary embolism, current smoking, marijuana use and suspected ELIZABETH. Would advise patient to stop smoking, including stopping marijuana use. Sleep study to be completed 8/21/2020 for evaluation of suspected ELIZABETH. Once oral surgery is scheduled will need to coordinate transition to Lovenox, and hold Lovenox 12 H prior to surgery and resume anticoagulation post-operative as soon as safe.          Electronically signed by ELIZABETH Curran CNP on 8/18/2020 at 12:52 PM

## 2020-08-23 ENCOUNTER — HOSPITAL ENCOUNTER (EMERGENCY)
Age: 45
Discharge: HOME | End: 2020-08-23
Attending: EMERGENCY MEDICINE
Payer: COMMERCIAL

## 2020-08-23 VITALS
BODY MASS INDEX: 33.99 KG/M2 | HEART RATE: 79 BPM | OXYGEN SATURATION: 100 % | WEIGHT: 204 LBS | TEMPERATURE: 98.6 F | DIASTOLIC BLOOD PRESSURE: 91 MMHG | RESPIRATION RATE: 16 BRPM | SYSTOLIC BLOOD PRESSURE: 147 MMHG | HEIGHT: 65 IN

## 2020-08-23 LAB
ABSOLUTE EOS #: 0.15 K/UL (ref 0–0.44)
ABSOLUTE IMMATURE GRANULOCYTE: 0.04 K/UL (ref 0–0.3)
ABSOLUTE LYMPH #: 3.13 K/UL (ref 1.1–3.7)
ABSOLUTE MONO #: 0.84 K/UL (ref 0.1–1.2)
ANION GAP SERPL CALCULATED.3IONS-SCNC: 13 MMOL/L (ref 9–17)
BASOPHILS # BLD: 1 % (ref 0–2)
BASOPHILS ABSOLUTE: 0.04 K/UL (ref 0–0.2)
BUN BLDV-MCNC: 8 MG/DL (ref 6–20)
BUN/CREAT BLD: 11 (ref 9–20)
CALCIUM SERPL-MCNC: 8.8 MG/DL (ref 8.6–10.4)
CHLORIDE BLD-SCNC: 103 MMOL/L (ref 98–107)
CO2: 19 MMOL/L (ref 20–31)
CREAT SERPL-MCNC: 0.72 MG/DL (ref 0.5–0.9)
D-DIMER QUANTITATIVE: <0.27 MG/L FEU (ref 0–0.59)
DIFFERENTIAL TYPE: ABNORMAL
EOSINOPHILS RELATIVE PERCENT: 2 % (ref 1–4)
GFR AFRICAN AMERICAN: >60 ML/MIN
GFR NON-AFRICAN AMERICAN: >60 ML/MIN
GFR SERPL CREATININE-BSD FRML MDRD: ABNORMAL ML/MIN/{1.73_M2}
GFR SERPL CREATININE-BSD FRML MDRD: ABNORMAL ML/MIN/{1.73_M2}
GLUCOSE BLD-MCNC: 90 MG/DL (ref 70–99)
HCT VFR BLD CALC: 37.1 % (ref 36.3–47.1)
HEMOGLOBIN: 11.8 G/DL (ref 11.9–15.1)
IMMATURE GRANULOCYTES: 1 %
LYMPHOCYTES # BLD: 40 % (ref 24–43)
MCH RBC QN AUTO: 24 PG (ref 25.2–33.5)
MCHC RBC AUTO-ENTMCNC: 31.8 G/DL (ref 28.4–34.8)
MCV RBC AUTO: 75.4 FL (ref 82.6–102.9)
MONOCYTES # BLD: 11 % (ref 3–12)
NRBC AUTOMATED: 0 PER 100 WBC
PDW BLD-RTO: 15.7 % (ref 11.8–14.4)
PLATELET # BLD: 215 K/UL (ref 138–453)
PLATELET ESTIMATE: ABNORMAL
PMV BLD AUTO: 10.9 FL (ref 8.1–13.5)
POTASSIUM SERPL-SCNC: 4.5 MMOL/L (ref 3.7–5.3)
RBC # BLD: 4.92 M/UL (ref 3.95–5.11)
RBC # BLD: ABNORMAL 10*6/UL
SEG NEUTROPHILS: 45 % (ref 36–65)
SEGMENTED NEUTROPHILS ABSOLUTE COUNT: 3.69 K/UL (ref 1.5–8.1)
SODIUM BLD-SCNC: 135 MMOL/L (ref 135–144)
WBC # BLD: 7.9 K/UL (ref 3.5–11.3)
WBC # BLD: ABNORMAL 10*3/UL

## 2020-08-23 PROCEDURE — 93005 ELECTROCARDIOGRAM TRACING: CPT | Performed by: EMERGENCY MEDICINE

## 2020-08-23 PROCEDURE — 36415 COLL VENOUS BLD VENIPUNCTURE: CPT

## 2020-08-23 PROCEDURE — 99285 EMERGENCY DEPT VISIT HI MDM: CPT

## 2020-08-23 PROCEDURE — 6370000000 HC RX 637 (ALT 250 FOR IP): Performed by: EMERGENCY MEDICINE

## 2020-08-23 PROCEDURE — 85025 COMPLETE CBC W/AUTO DIFF WBC: CPT

## 2020-08-23 PROCEDURE — 85379 FIBRIN DEGRADATION QUANT: CPT

## 2020-08-23 PROCEDURE — 80048 BASIC METABOLIC PNL TOTAL CA: CPT

## 2020-08-23 RX ORDER — PENICILLIN V POTASSIUM 500 MG/1
500 TABLET ORAL 4 TIMES DAILY
Qty: 40 TABLET | Refills: 0 | Status: SHIPPED | OUTPATIENT
Start: 2020-08-23 | End: 2020-09-02

## 2020-08-23 RX ORDER — HYDROCODONE BITARTRATE AND ACETAMINOPHEN 5; 325 MG/1; MG/1
1 TABLET ORAL EVERY 4 HOURS PRN
Qty: 10 TABLET | Refills: 0 | Status: SHIPPED | OUTPATIENT
Start: 2020-08-23 | End: 2020-08-26

## 2020-08-23 RX ORDER — HYDROCODONE BITARTRATE AND ACETAMINOPHEN 5; 325 MG/1; MG/1
1 TABLET ORAL ONCE
Status: COMPLETED | OUTPATIENT
Start: 2020-08-23 | End: 2020-08-23

## 2020-08-23 RX ADMIN — HYDROCODONE BITARTRATE AND ACETAMINOPHEN 1 TABLET: 5; 325 TABLET ORAL at 09:45

## 2020-08-23 ASSESSMENT — PAIN SCALES - GENERAL
PAINLEVEL_OUTOF10: 10
PAINLEVEL_OUTOF10: 10

## 2020-08-23 ASSESSMENT — PAIN DESCRIPTION - LOCATION: LOCATION: LEG

## 2020-08-23 ASSESSMENT — PAIN DESCRIPTION - ORIENTATION: ORIENTATION: RIGHT;LEFT

## 2020-08-23 NOTE — ED PROVIDER NOTES
EMERGENCY DEPARTMENT ENCOUNTER    Pt Name: Mu Kern  MRN: [id number]  [place] 1975  Date of evaluation: 8/23/20  CHIEF COMPLAINT       Chief Complaint   Patient presents with    Leg Pain     cesar pain and swelling    Oral Swelling     dental issue    Chest Pain     HISTORY OF PRESENT ILLNESS   63-year-old female presents to the emergency room for 2 separate complaints. Patient states that she has been having issues with a dental infection. She had been to see her dentist who started her on clindamycin which she states she does have an allergy to. She states she has been taking Tylenol at home. She had stopped taking her blood thinner recently in preparation for procedure to have the tooth removed however was recently told that the procedure will not be until later in the year. Patient also reports that she is having a lot of leg discomfort bilaterally. Pain seems to be mostly in the posterior calves. Patient is concerned due to DVT history and recent holding of the Eliquis in relation to her pain. REVIEW OF SYSTEMS     Review of Systems   All other systems reviewed and are negative.       PASTMEDICAL HISTORY     Past Medical History:   Diagnosis Date    Abdominal pain     Anemia     Anticoagulant long-term use     Arthritis 04/2018    Left Foot    Asthma     Moderate persistent asthma without complication    Back problem     Bipolar 1 disorder (HCC)     Chest pain     with \"coughing\" per pt    CHF (congestive heart failure) ([address]     When child was delivered 2005    Chicken pox     Chronic idiopathic constipation 2014    Chronic mental illness     Current every day smoker     Depression     Depression     DVT of deep femoral vein, bilateral (HCC)     Emphysema lung ([address] 2011    GERD (gastroesophageal reflux disease)     GERD (gastroesophageal reflux disease) 2004    Hair loss     Headache     Hernia     High blood pressure     History of pulmonary embolism 10/4/2019    Hyperlipidemia     Hypertension     [facility]     Irregular bowel habits     Irritable bowel syndrome     MDRO (multiple drug resistant organisms) resistance 2005    MRSA in  per pt.  Nervousness     Obesity     Pneumonia     Saddle embolus of pulmonary artery without acute cor pulmonale (HCC)     Seizures ([facility]     Last Seizure 18    Slow gastric motility     Smoking     Stroke (cerebrum) ([facility]     Suicidal intent     .  Denies 18    TIA (transient ischemic attack)     Tobacco abuse disorder 3/22/2016    Weight loss      Past Problem List  Patient Active Problem List   Diagnosis Code    H/O hysterectomy for benign disease Z90.710    H/O gestational diabetes mellitus, not currently pregnant Z86.32    Borderline diabetes R73.03    FH: diabetes mellitus Z83.3    FH: throat cancer Z80.0    FH: uterine cancer Z80.53    FH: breast cancer Z80.3    Neck pain, chronic M54.2, G89.29    Tobacco abuse disorder Z72.0    Seizures ([facility] R56.9    Non-compliance with treatment W06.40    Uncomplicated asthma G42.675    Anemia D64.9    Vitamin D insufficiency E55.9    Chronic constipation K59.09    Cobalamin deficiency E53.8    Bipolar 1 disorder (HCC) F31.9    PTSD (post-traumatic stress disorder) F43.10    Chiari I malformation (HCC) G93.5    Chronic headache R51    IBS (irritable bowel syndrome) K58.9    COPD (chronic obstructive pulmonary disease) ([facility] J44.9    Long term current use of anticoagulant therapy Z79.01    Asthma J45.909    Chronic fatigue R53.82    GERD (gastroesophageal reflux disease) K21.9    History of pulmonary embolism Z86.711    Folate deficiency E53.8    Vitamin D deficiency E55.9    Hypertension I10    MRSA infection A49.02    Hyperlipidemia E78.5    Tobacco abuse counseling Z71.6    Allergic drug reaction T78.40XA    Acute vaginitis N76.0    Axillary hidradenitis suppurativa L73.2    Benign intracranial DISPOSITION/PLAN   DISPOSITION        PATIENT REFERRED TO:  No follow-up provider specified.   DISCHARGE MEDICATIONS:  New Prescriptions    No medications on file     Jessy Jiang MD  Attending Emergency Physician                 Tiff Melara MD  08/23/20 Philip Mckay MD  08/25/20 [id number]

## 2020-08-24 ENCOUNTER — HOSPITAL ENCOUNTER (OUTPATIENT)
Dept: VASCULAR LAB | Age: 45
Discharge: HOME | End: 2020-08-24
Payer: COMMERCIAL

## 2020-08-24 LAB
EKG ATRIAL RATE: 73 BPM
EKG P AXIS: 36 DEGREES
EKG P-R INTERVAL: 222 MS
EKG Q-T INTERVAL: 408 MS
EKG QRS DURATION: 84 MS
EKG QTC CALCULATION (BAZETT): 449 MS
EKG R AXIS: -13 DEGREES
EKG T AXIS: 4 DEGREES
EKG VENTRICULAR RATE: 73 BPM

## 2020-08-24 PROCEDURE — 93970 EXTREMITY STUDY: CPT

## 2020-08-24 PROCEDURE — 93010 ELECTROCARDIOGRAM REPORT: CPT | Performed by: INTERNAL MEDICINE

## 2020-08-26 ENCOUNTER — OFFICE VISIT (OUTPATIENT)
Dept: FAMILY MEDICINE CLINIC | Age: 45
End: 2020-08-26
Payer: COMMERCIAL

## 2020-08-26 VITALS
WEIGHT: 211 LBS | DIASTOLIC BLOOD PRESSURE: 87 MMHG | HEART RATE: 87 BPM | OXYGEN SATURATION: 94 % | TEMPERATURE: 98.6 F | BODY MASS INDEX: 35.16 KG/M2 | HEIGHT: 65 IN | SYSTOLIC BLOOD PRESSURE: 129 MMHG

## 2020-08-26 PROBLEM — R51.9 CHRONIC HEADACHE: Status: RESOLVED | Noted: 2017-11-14 | Resolved: 2020-08-26

## 2020-08-26 PROBLEM — G89.29 CHRONIC HEADACHE: Status: RESOLVED | Noted: 2017-11-14 | Resolved: 2020-08-26

## 2020-08-26 PROCEDURE — G8427 DOCREV CUR MEDS BY ELIG CLIN: HCPCS | Performed by: FAMILY MEDICINE

## 2020-08-26 PROCEDURE — 3023F SPIROM DOC REV: CPT | Performed by: FAMILY MEDICINE

## 2020-08-26 PROCEDURE — G8417 CALC BMI ABV UP PARAM F/U: HCPCS | Performed by: FAMILY MEDICINE

## 2020-08-26 PROCEDURE — G8926 SPIRO NO PERF OR DOC: HCPCS | Performed by: FAMILY MEDICINE

## 2020-08-26 PROCEDURE — 4004F PT TOBACCO SCREEN RCVD TLK: CPT | Performed by: FAMILY MEDICINE

## 2020-08-26 PROCEDURE — 99214 OFFICE O/P EST MOD 30 MIN: CPT | Performed by: FAMILY MEDICINE

## 2020-08-26 RX ORDER — VERAPAMIL HYDROCHLORIDE 240 MG/1
240 TABLET, FILM COATED, EXTENDED RELEASE ORAL DAILY
Qty: 30 TABLET | Refills: 0 | Status: SHIPPED | OUTPATIENT
Start: 2020-08-26 | End: 2020-09-17 | Stop reason: SDUPTHER

## 2020-08-26 RX ORDER — INDAPAMIDE 1.25 MG/1
1.25 TABLET, FILM COATED ORAL EVERY MORNING
Qty: 30 TABLET | Refills: 0 | Status: SHIPPED | OUTPATIENT
Start: 2020-08-26 | End: 2020-09-17 | Stop reason: SDUPTHER

## 2020-08-26 ASSESSMENT — ENCOUNTER SYMPTOMS
VOICE CHANGE: 0
VOMITING: 0
BLOOD IN STOOL: 0
ANAL BLEEDING: 0
EYE REDNESS: 0
CONSTIPATION: 0
EYE PAIN: 0
TROUBLE SWALLOWING: 0
CHEST TIGHTNESS: 0
ABDOMINAL PAIN: 0
SINUS PRESSURE: 0
COUGH: 0
BACK PAIN: 0
SHORTNESS OF BREATH: 0
DIARRHEA: 0
COLOR CHANGE: 0
EYE DISCHARGE: 0
RECTAL PAIN: 0
ABDOMINAL DISTENTION: 0
NAUSEA: 0

## 2020-08-26 NOTE — PROGRESS NOTES
allergies, food allergies and immunocompromised state. Neurological: Negative for dizziness, tremors, seizures, syncope, facial asymmetry, speech difficulty, weakness, light-headedness, numbness and headaches. Hematological: Negative for adenopathy. Does not bruise/bleed easily. Psychiatric/Behavioral: Positive for sleep disturbance. Negative for agitation, behavioral problems, confusion, decreased concentration and suicidal ideas. The patient is not nervous/anxious. Objective:   Physical Exam  Constitutional:       General: She is not in acute distress. HENT:      Head: Normocephalic and atraumatic. Right Ear: External ear normal.      Left Ear: External ear normal.   Eyes:      Conjunctiva/sclera: Conjunctivae normal.      Pupils: Pupils are equal, round, and reactive to light. Neck:      Musculoskeletal: Normal range of motion. Thyroid: No thyromegaly. Trachea: No tracheal deviation. Cardiovascular:      Rate and Rhythm: Normal rate and regular rhythm. Heart sounds: No murmur. No friction rub. No gallop. Pulmonary:      Effort: No respiratory distress. Breath sounds: No stridor. No wheezing or rales. Chest:      Chest wall: No tenderness. Abdominal:      General: Bowel sounds are normal. There is no distension. Palpations: Abdomen is soft. Tenderness: There is no abdominal tenderness. There is no rebound. Musculoskeletal: Normal range of motion. Lymphadenopathy:      Cervical: No cervical adenopathy. Skin:     General: Skin is warm. Coloration: Skin is not pale. Findings: No erythema or rash. Neurological:      Mental Status: She is alert and oriented to person, place, and time. Cranial Nerves: No cranial nerve deficit. Motor: No abnormal muscle tone.       Deep Tendon Reflexes: Reflexes normal.     Left LE swollen and tender, RLE swelling some but not as much, states swelling is not better after keeping it elevated. Assessment:       Diagnosis Orders   1. Pain of left lower extremity  Anjelica Gutierrez    Basic Metabolic Panel   2. Essential hypertension  Basic Metabolic Panel   3. Swelling of lower leg  verapamil (CALAN SR) 240 MG extended release tablet    indapamide (LOZOL) 1.25 MG tablet   4. Tobacco abuse disorder     5. Uncomplicated asthma, unspecified asthma severity, unspecified whether persistent     6. Vitamin D insufficiency     7. Cobalamin deficiency     8. Bipolar 1 disorder ([facility] [address]     9. Gastroesophageal reflux disease, esophagitis presence not specified     10. Chronic fatigue     11. Long term current use of anticoagulant therapy     12. Chronic obstructive pulmonary disease, unspecified COPD type ([facility] [address]     13. Irritable bowel syndrome with constipation     14. History of pulmonary embolism     15. Tobacco abuse counseling           Plan:      Orders Placed This Encounter   Procedures    Basic Metabolic Panel   [place] [id number] [facility] [place]       Outpatient Encounter Medications as of 8/26/2020   Medication Sig Dispense Refill    verapamil (CALAN SR) 240 MG extended release tablet Take 1 tablet by mouth daily 30 tablet 0    indapamide (LOZOL) 1.25 MG tablet Take 1 tablet by mouth every morning 30 tablet 0    cariprazine hcl (VRAYLAR) 1.5 MG capsule Take 1.5 mg by mouth daily      penicillin v potassium (VEETID) 500 MG tablet Take 1 tablet by mouth 4 times daily for 10 days 40 tablet 0    HYDROcodone-acetaminophen (NORCO) 5-325 MG per tablet Take 1 tablet by mouth every 4 hours as needed for Pain for up to 3 days. Intended supply: 3 days.  Take lowest dose possible to manage pain 10 tablet 0    albuterol sulfate HFA (VENTOLIN HFA) 108 (90 Base) MCG/ACT inhaler Inhale 2 puffs into the lungs 4 times daily as needed for Wheezing 1 Inhaler 5    vitamin D (ERGOCALCIFEROL) 1.25 MG (67576 UT) CAPS capsule Take 1 capsule by mouth once a week 12 capsule 1  cloNIDine (CATAPRES) 0.1 MG/24HR PTWK apply 1 patch every 7 days 4 patch 1    apixaban (ELIQUIS) 5 MG TABS tablet take 1 tablet by mouth twice a day 60 tablet 4    levETIRAcetam (KEPPRA) 750 MG tablet       fluticasone (FLONASE) 50 MCG/ACT nasal spray 2 sprays by Each Nostril route daily 3 Bottle 1    cetirizine (ZYRTEC) 10 MG tablet Take 1 tablet by mouth daily 30 tablet 0    atorvastatin (LIPITOR) 10 MG tablet take 1 tablet by mouth once daily 90 tablet 1    topiramate (TOPAMAX SPRINKLE) 15 MG capsule take 1 capsule by mouth nightly for 1 week then 1 capsule twice a day THEREAFTER 60 capsule 3    RA VITAMIN B-12 TR 1000 MCG TBCR take 2 tablets by mouth once daily 180 tablet 1    losartan (COZAAR) 50 MG tablet Take 1 tablet by mouth daily 90 tablet 1    triamcinolone acetonide (KENALOG) 0.1 % paste APPLY A THIN FILM TOPICALLY TO AFFECTED AREA 2-3 TIMES A DAY      ondansetron (ZOFRAN-ODT) 4 MG disintegrating tablet Take 1 tablet by mouth 3 times daily as needed for Nausea or Vomiting 21 tablet 0    Blood Pressure KIT Use as directed. 1 kit 0    butalbital-acetaminophen-caffeine (ESGIC) -40 MG per tablet Take 1 tablet by mouth every 6 hours as needed for Headaches 20 tablet 0    Cyanocobalamin (B-12) 1000 MCG SUBL Place 1 tablet under the tongue daily 90 tablet 5    escitalopram (LEXAPRO) 5 MG tablet   0    benztropine (COGENTIN) 0.5 MG tablet take 1 tablet by mouth twice a day  0    cetirizine (ZYRTEC) 10 MG tablet Take 1 tablet by mouth daily 90 tablet 1    folic acid (FOLVITE) 1 MG tablet Take 1 tablet by mouth daily 90 tablet 1    lurasidone (LATUDA) 60 MG TABS tablet take 1 tablet by mouth once daily with food AT LEAST 350 CALORIES      [DISCONTINUED] verapamil (VERELAN) 360 MG extended release capsule take 1 capsule by mouth once daily 90 capsule 1     No facility-administered encounter medications on file as of 8/26/2020.             Ricarda Barry MD

## 2020-09-01 ENCOUNTER — TELEPHONE (OUTPATIENT)
Dept: VASCULAR SURGERY | Age: 45
End: 2020-09-01

## 2020-09-02 ENCOUNTER — HOSPITAL ENCOUNTER (OUTPATIENT)
Age: 45
Setting detail: SPECIMEN
Discharge: HOME | End: 2020-09-02
Payer: COMMERCIAL

## 2020-09-02 LAB
ANION GAP SERPL CALCULATED.3IONS-SCNC: 13 MMOL/L (ref 9–17)
BUN BLDV-MCNC: 16 MG/DL (ref 6–20)
BUN/CREAT BLD: NORMAL (ref 9–20)
CALCIUM SERPL-MCNC: 9.3 MG/DL (ref 8.6–10.4)
CHLORIDE BLD-SCNC: 103 MMOL/L (ref 98–107)
CO2: 22 MMOL/L (ref 20–31)
CREAT SERPL-MCNC: 0.81 MG/DL (ref 0.5–0.9)
GFR AFRICAN AMERICAN: >60 ML/MIN
GFR NON-AFRICAN AMERICAN: >60 ML/MIN
GFR SERPL CREATININE-BSD FRML MDRD: NORMAL ML/MIN/{1.73_M2}
GFR SERPL CREATININE-BSD FRML MDRD: NORMAL ML/MIN/{1.73_M2}
GLUCOSE BLD-MCNC: 97 MG/DL (ref 70–99)
POTASSIUM SERPL-SCNC: 4.1 MMOL/L (ref 3.7–5.3)
SODIUM BLD-SCNC: 138 MMOL/L (ref 135–144)

## 2020-09-02 PROCEDURE — 36415 COLL VENOUS BLD VENIPUNCTURE: CPT

## 2020-09-02 PROCEDURE — 80048 BASIC METABOLIC PNL TOTAL CA: CPT

## 2020-09-08 ENCOUNTER — OFFICE VISIT (OUTPATIENT)
Dept: FAMILY MEDICINE CLINIC | Age: 45
End: 2020-09-08
Payer: COMMERCIAL

## 2020-09-08 VITALS
HEART RATE: 82 BPM | WEIGHT: 212.6 LBS | BODY MASS INDEX: 34.17 KG/M2 | DIASTOLIC BLOOD PRESSURE: 85 MMHG | TEMPERATURE: 98.4 F | OXYGEN SATURATION: 100 % | HEIGHT: 66 IN | SYSTOLIC BLOOD PRESSURE: 126 MMHG

## 2020-09-08 PROBLEM — K59.09 CHRONIC CONSTIPATION: Status: RESOLVED | Noted: 2017-01-27 | Resolved: 2020-09-08

## 2020-09-08 PROBLEM — R53.82 CHRONIC FATIGUE: Status: RESOLVED | Noted: 2019-10-04 | Resolved: 2020-09-08

## 2020-09-08 PROCEDURE — G0008 ADMIN INFLUENZA VIRUS VAC: HCPCS | Performed by: FAMILY MEDICINE

## 2020-09-08 PROCEDURE — 90686 IIV4 VACC NO PRSV 0.5 ML IM: CPT | Performed by: FAMILY MEDICINE

## 2020-09-08 PROCEDURE — G8427 DOCREV CUR MEDS BY ELIG CLIN: HCPCS | Performed by: FAMILY MEDICINE

## 2020-09-08 PROCEDURE — 99214 OFFICE O/P EST MOD 30 MIN: CPT | Performed by: FAMILY MEDICINE

## 2020-09-08 PROCEDURE — 3023F SPIROM DOC REV: CPT | Performed by: FAMILY MEDICINE

## 2020-09-08 PROCEDURE — 4004F PT TOBACCO SCREEN RCVD TLK: CPT | Performed by: FAMILY MEDICINE

## 2020-09-08 PROCEDURE — G8926 SPIRO NO PERF OR DOC: HCPCS | Performed by: FAMILY MEDICINE

## 2020-09-08 PROCEDURE — G8417 CALC BMI ABV UP PARAM F/U: HCPCS | Performed by: FAMILY MEDICINE

## 2020-09-08 RX ORDER — MONTELUKAST SODIUM 10 MG/1
TABLET ORAL
COMMUNITY
Start: 2020-09-06 | End: 2020-12-08 | Stop reason: SDUPTHER

## 2020-09-08 ASSESSMENT — ENCOUNTER SYMPTOMS
DIARRHEA: 0
BACK PAIN: 0
COLOR CHANGE: 0
ABDOMINAL DISTENTION: 0
SINUS PRESSURE: 0
BLOOD IN STOOL: 0
SHORTNESS OF BREATH: 0
RECTAL PAIN: 0
ABDOMINAL PAIN: 0
ANAL BLEEDING: 0
CHEST TIGHTNESS: 0
VOMITING: 0
COUGH: 0
VOICE CHANGE: 0
TROUBLE SWALLOWING: 0
EYE REDNESS: 0
EYE DISCHARGE: 0
CONSTIPATION: 0
NAUSEA: 0
EYE PAIN: 0

## 2020-09-08 NOTE — PROGRESS NOTES
Subjective:      Patient ID: Rashard Navarro is a 45 y.o. female. HPI States her pain in her legs are getting worse. States left leg hurts more than her right, hurts mostly in her toes, gets worse with walking. States trying to move to second floor from [address] apt as she has diff going up and down stairs. States legs hurt all day even when at rest.  States stockings dont help. States soaking in epsom salt makes pain and swelling worse. Cant see the Vascular surgeon at [facility] till mid Oct   States trying to quit. I have advised her to stop smoking completely as we are suspecting PVD. States asthma is stable. Last seizures were few weeks ago, getting less freq. We called and got her to Dr Cesar Savage who has seen her in the past, tomorrow at 4.00 pm.  Review of Systems   Constitutional: Negative for activity change, appetite change and fatigue. HENT: Negative for dental problem, ear pain, hearing loss, postnasal drip, sinus pressure, sneezing, tinnitus, trouble swallowing and voice change. Eyes: Negative for pain, discharge, redness and visual disturbance. Respiratory: Negative for cough, chest tightness and shortness of breath. Cardiovascular: Positive for leg swelling. Negative for chest pain and palpitations. Gastrointestinal: Negative for abdominal distention, abdominal pain, anal bleeding, blood in stool, constipation, diarrhea, nausea, rectal pain and vomiting. Endocrine: Negative for cold intolerance, heat intolerance, polydipsia, polyphagia and polyuria. Genitourinary: Negative for decreased urine volume, difficulty urinating, dyspareunia, dysuria, enuresis, flank pain, frequency, genital sores, hematuria, menstrual problem, pelvic pain, urgency, vaginal bleeding and vaginal discharge. Musculoskeletal: Positive for myalgias. Negative for arthralgias, back pain, gait problem, joint swelling, neck pain and neck stiffness.         Leg pain   Skin: Negative for color change, pallor and rash. Allergic/Immunologic: Negative for environmental allergies, food allergies and immunocompromised state. Neurological: Negative for dizziness, tremors, seizures, syncope, facial asymmetry, speech difficulty, weakness, light-headedness, numbness and headaches. Hematological: Negative for adenopathy. Does not bruise/bleed easily. Psychiatric/Behavioral: Negative for agitation, behavioral problems, confusion, decreased concentration, sleep disturbance and suicidal ideas. The patient is not nervous/anxious. Objective:   Physical Exam  Constitutional:       General: She is not in acute distress. HENT:      Head: Normocephalic and atraumatic. Right Ear: External ear normal.      Left Ear: External ear normal.   Eyes:      Conjunctiva/sclera: Conjunctivae normal.      Pupils: Pupils are equal, round, and reactive to light. Neck:      Musculoskeletal: Normal range of motion. Thyroid: No thyromegaly. Trachea: No tracheal deviation. Cardiovascular:      Rate and Rhythm: Normal rate and regular rhythm. Heart sounds: No murmur. No friction rub. No gallop. Pulmonary:      Effort: No respiratory distress. Breath sounds: No stridor. No wheezing or rales. Chest:      Chest wall: No tenderness. Abdominal:      General: Bowel sounds are normal. There is no distension. Palpations: Abdomen is soft. Tenderness: There is no abdominal tenderness. There is no rebound. Musculoskeletal: Normal range of motion. Lymphadenopathy:      Cervical: No cervical adenopathy. Skin:     General: Skin is warm. Coloration: Skin is not pale. Findings: No erythema or rash. Neurological:      Mental Status: She is alert and oriented to person, place, and time. Cranial Nerves: No cranial nerve deficit. Motor: No abnormal muscle tone. Deep Tendon Reflexes: Reflexes normal.         Assessment:       Diagnosis Orders   1.  Pain in both lower extremities  [facility]   2. PVD (peripheral vascular disease) ([facility]  [facility]   3. Tobacco abuse disorder     4. Tobacco abuse counseling     5. History of pulmonary embolism     6. Chronic obstructive pulmonary disease, unspecified COPD type ([facility]     7. Long term current use of anticoagulant therapy     8. Vitamin D deficiency     9. Folate deficiency     10. Gastroesophageal reflux disease, esophagitis presence not specified     11. Cobalamin deficiency     12. Uncomplicated asthma, unspecified asthma severity, unspecified whether persistent     13.  Seizures ([facility]           Plan:      Orders Placed This Encounter   Procedures   [facility]       Outpatient Encounter Medications as of 9/8/2020   Medication Sig Dispense Refill    montelukast (SINGULAIR) 10 MG tablet       verapamil (CALAN SR) 240 MG extended release tablet Take 1 tablet by mouth daily 30 tablet 0    indapamide (LOZOL) 1.25 MG tablet Take 1 tablet by mouth every morning 30 tablet 0    albuterol sulfate HFA (VENTOLIN HFA) 108 (90 Base) MCG/ACT inhaler Inhale 2 puffs into the lungs 4 times daily as needed for Wheezing 1 Inhaler 5    vitamin D (ERGOCALCIFEROL) 1.25 MG (38584 UT) CAPS capsule Take 1 capsule by mouth once a week 12 capsule 1    cloNIDine (CATAPRES) 0.1 MG/24HR PTWK apply 1 patch every 7 days 4 patch 1    apixaban (ELIQUIS) 5 MG TABS tablet take 1 tablet by mouth twice a day 60 tablet 4    levETIRAcetam (KEPPRA) 750 MG tablet       fluticasone (FLONASE) 50 MCG/ACT nasal spray 2 sprays by Each Nostril route daily 3 Bottle 1    cetirizine (ZYRTEC) 10 MG tablet Take 1 tablet by mouth daily 30 tablet 0    atorvastatin (LIPITOR) 10 MG tablet take 1 tablet by mouth once daily 90 tablet 1    topiramate (TOPAMAX SPRINKLE) 15 MG capsule take 1 capsule by mouth nightly for 1 week then 1 capsule twice a day THEREAFTER

## 2020-09-09 ENCOUNTER — TELEPHONE (OUTPATIENT)
Dept: FAMILY MEDICINE CLINIC | Age: 45
End: 2020-09-09

## 2020-09-17 RX ORDER — VERAPAMIL HYDROCHLORIDE 240 MG/1
240 TABLET, FILM COATED, EXTENDED RELEASE ORAL DAILY
Qty: 90 TABLET | Refills: 1 | Status: SHIPPED | OUTPATIENT
Start: 2020-09-17 | End: 2021-04-02

## 2020-09-17 RX ORDER — INDAPAMIDE 1.25 MG/1
1.25 TABLET, FILM COATED ORAL EVERY MORNING
Qty: 90 TABLET | Refills: 1 | Status: SHIPPED | OUTPATIENT
Start: 2020-09-17 | End: 2021-04-02 | Stop reason: SDUPTHER

## 2020-09-22 RX ORDER — FOLIC ACID 1 MG/1
1 TABLET ORAL DAILY
Qty: 90 TABLET | Refills: 1 | Status: SHIPPED | OUTPATIENT
Start: 2020-09-22 | End: 2021-10-15 | Stop reason: SDUPTHER

## 2020-09-28 ENCOUNTER — TELEPHONE (OUTPATIENT)
Dept: VASCULAR SURGERY | Age: 45
End: 2020-09-28

## 2020-10-12 RX ORDER — CLONIDINE 0.1 MG/24H
1 PATCH, EXTENDED RELEASE TRANSDERMAL
Qty: 4 PATCH | Refills: 1 | Status: SHIPPED | OUTPATIENT
Start: 2020-10-12 | End: 2020-12-02 | Stop reason: SDUPTHER

## 2020-10-21 ENCOUNTER — APPOINTMENT (OUTPATIENT)
Dept: CT IMAGING | Age: 45
End: 2020-10-21
Payer: COMMERCIAL

## 2020-10-21 ENCOUNTER — HOSPITAL ENCOUNTER (EMERGENCY)
Age: 45
Discharge: HOME | End: 2020-10-21
Attending: EMERGENCY MEDICINE
Payer: COMMERCIAL

## 2020-10-21 VITALS
RESPIRATION RATE: 14 BRPM | DIASTOLIC BLOOD PRESSURE: 98 MMHG | HEART RATE: 89 BPM | TEMPERATURE: 98.4 F | OXYGEN SATURATION: 100 % | SYSTOLIC BLOOD PRESSURE: 137 MMHG

## 2020-10-21 LAB
ABSOLUTE EOS #: 0.07 K/UL (ref 0–0.44)
ABSOLUTE IMMATURE GRANULOCYTE: 0.03 K/UL (ref 0–0.3)
ABSOLUTE LYMPH #: 3.44 K/UL (ref 1.1–3.7)
ABSOLUTE MONO #: 0.84 K/UL (ref 0.1–1.2)
ALBUMIN SERPL-MCNC: 3.5 G/DL (ref 3.5–5.2)
ALBUMIN/GLOBULIN RATIO: ABNORMAL (ref 1–2.5)
ALP BLD-CCNC: 101 U/L (ref 35–104)
ALT SERPL-CCNC: 19 U/L (ref 5–33)
ANION GAP SERPL CALCULATED.3IONS-SCNC: 6 MMOL/L (ref 9–17)
AST SERPL-CCNC: 14 U/L
BASOPHILS # BLD: 0 % (ref 0–2)
BASOPHILS ABSOLUTE: <0.03 K/UL (ref 0–0.2)
BILIRUB SERPL-MCNC: <0.1 MG/DL (ref 0.3–1.2)
BNP INTERPRETATION: NORMAL
BUN BLDV-MCNC: 16 MG/DL (ref 6–20)
BUN/CREAT BLD: 20 (ref 9–20)
CALCIUM SERPL-MCNC: 8.5 MG/DL (ref 8.6–10.4)
CHLORIDE BLD-SCNC: 108 MMOL/L (ref 98–107)
CO2: 22 MMOL/L (ref 20–31)
CREAT SERPL-MCNC: 0.79 MG/DL (ref 0.5–0.9)
DIFFERENTIAL TYPE: ABNORMAL
EOSINOPHILS RELATIVE PERCENT: 1 % (ref 1–4)
GFR AFRICAN AMERICAN: >60 ML/MIN
GFR NON-AFRICAN AMERICAN: >60 ML/MIN
GFR SERPL CREATININE-BSD FRML MDRD: ABNORMAL ML/MIN/{1.73_M2}
GFR SERPL CREATININE-BSD FRML MDRD: ABNORMAL ML/MIN/{1.73_M2}
GLUCOSE BLD-MCNC: 108 MG/DL (ref 70–99)
HCT VFR BLD CALC: 35.1 % (ref 36.3–47.1)
HEMOGLOBIN: 11.2 G/DL (ref 11.9–15.1)
IMMATURE GRANULOCYTES: 0 %
INR BLD: 0.9
LYMPHOCYTES # BLD: 44 % (ref 24–43)
MCH RBC QN AUTO: 24.2 PG (ref 25.2–33.5)
MCHC RBC AUTO-ENTMCNC: 31.9 G/DL (ref 28.4–34.8)
MCV RBC AUTO: 75.8 FL (ref 82.6–102.9)
MONOCYTES # BLD: 11 % (ref 3–12)
NRBC AUTOMATED: ABNORMAL PER 100 WBC
PARTIAL THROMBOPLASTIN TIME: 28 SEC (ref 23.9–33.8)
PDW BLD-RTO: 16.7 % (ref 11.8–14.4)
PLATELET # BLD: 246 K/UL (ref 138–453)
PLATELET ESTIMATE: ABNORMAL
PMV BLD AUTO: 9.8 FL (ref 8.1–13.5)
POTASSIUM SERPL-SCNC: 4.4 MMOL/L (ref 3.7–5.3)
PRO-BNP: 120 PG/ML
PROTHROMBIN TIME: 12.2 SEC (ref 11.5–14.2)
RBC # BLD: 4.63 M/UL (ref 3.95–5.11)
RBC # BLD: ABNORMAL 10*6/UL
SEG NEUTROPHILS: 44 % (ref 36–65)
SEGMENTED NEUTROPHILS ABSOLUTE COUNT: 3.42 K/UL (ref 1.5–8.1)
SODIUM BLD-SCNC: 136 MMOL/L (ref 135–144)
TOTAL PROTEIN: 6.5 G/DL (ref 6.4–8.3)
TROPONIN INTERP: NORMAL
TROPONIN T: NORMAL NG/ML
TROPONIN, HIGH SENSITIVITY: <6 NG/L (ref 0–14)
WBC # BLD: 7.8 K/UL (ref 3.5–11.3)
WBC # BLD: ABNORMAL 10*3/UL

## 2020-10-21 PROCEDURE — 85610 PROTHROMBIN TIME: CPT

## 2020-10-21 PROCEDURE — 71260 CT THORAX DX C+: CPT

## 2020-10-21 PROCEDURE — 99283 EMERGENCY DEPT VISIT LOW MDM: CPT

## 2020-10-21 PROCEDURE — 96375 TX/PRO/DX INJ NEW DRUG ADDON: CPT

## 2020-10-21 PROCEDURE — 74177 CT ABD & PELVIS W/CONTRAST: CPT

## 2020-10-21 PROCEDURE — 2580000003 HC RX 258: Performed by: EMERGENCY MEDICINE

## 2020-10-21 PROCEDURE — 6360000002 HC RX W HCPCS: Performed by: EMERGENCY MEDICINE

## 2020-10-21 PROCEDURE — 85730 THROMBOPLASTIN TIME PARTIAL: CPT

## 2020-10-21 PROCEDURE — 83880 ASSAY OF NATRIURETIC PEPTIDE: CPT

## 2020-10-21 PROCEDURE — 93005 ELECTROCARDIOGRAM TRACING: CPT | Performed by: EMERGENCY MEDICINE

## 2020-10-21 PROCEDURE — 85025 COMPLETE CBC W/AUTO DIFF WBC: CPT

## 2020-10-21 PROCEDURE — 84484 ASSAY OF TROPONIN QUANT: CPT

## 2020-10-21 PROCEDURE — 96374 THER/PROPH/DIAG INJ IV PUSH: CPT

## 2020-10-21 PROCEDURE — 6360000004 HC RX CONTRAST MEDICATION: Performed by: EMERGENCY MEDICINE

## 2020-10-21 PROCEDURE — 80053 COMPREHEN METABOLIC PANEL: CPT

## 2020-10-21 RX ORDER — FENTANYL CITRATE 50 UG/ML
50 INJECTION, SOLUTION INTRAMUSCULAR; INTRAVENOUS ONCE
Status: COMPLETED | OUTPATIENT
Start: 2020-10-21 | End: 2020-10-21

## 2020-10-21 RX ORDER — HYDROCODONE BITARTRATE AND ACETAMINOPHEN 5; 325 MG/1; MG/1
1 TABLET ORAL EVERY 4 HOURS PRN
Qty: 10 TABLET | Refills: 0 | Status: SHIPPED | OUTPATIENT
Start: 2020-10-21 | End: 2020-10-26

## 2020-10-21 RX ORDER — PENICILLIN V POTASSIUM 500 MG/1
500 TABLET ORAL 4 TIMES DAILY
Qty: 40 TABLET | Refills: 0 | Status: SHIPPED | OUTPATIENT
Start: 2020-10-21 | End: 2020-10-31

## 2020-10-21 RX ORDER — MORPHINE SULFATE 4 MG/ML
4 INJECTION, SOLUTION INTRAMUSCULAR; INTRAVENOUS ONCE
Status: COMPLETED | OUTPATIENT
Start: 2020-10-21 | End: 2020-10-21

## 2020-10-21 RX ORDER — SODIUM CHLORIDE 0.9 % (FLUSH) 0.9 %
10 SYRINGE (ML) INJECTION PRN
Status: DISCONTINUED | OUTPATIENT
Start: 2020-10-21 | End: 2020-10-21 | Stop reason: HOSPADM

## 2020-10-21 RX ORDER — 0.9 % SODIUM CHLORIDE 0.9 %
80 INTRAVENOUS SOLUTION INTRAVENOUS ONCE
Status: COMPLETED | OUTPATIENT
Start: 2020-10-21 | End: 2020-10-21

## 2020-10-21 RX ORDER — METHYLPREDNISOLONE SODIUM SUCCINATE 125 MG/2ML
80 INJECTION, POWDER, LYOPHILIZED, FOR SOLUTION INTRAMUSCULAR; INTRAVENOUS ONCE
Status: COMPLETED | OUTPATIENT
Start: 2020-10-21 | End: 2020-10-21

## 2020-10-21 RX ADMIN — Medication 10 ML: at 09:56

## 2020-10-21 RX ADMIN — METHYLPREDNISOLONE SODIUM SUCCINATE 80 MG: 125 INJECTION, POWDER, FOR SOLUTION INTRAMUSCULAR; INTRAVENOUS at 09:09

## 2020-10-21 RX ADMIN — FENTANYL CITRATE 50 MCG: 50 INJECTION, SOLUTION INTRAMUSCULAR; INTRAVENOUS at 11:19

## 2020-10-21 RX ADMIN — IOPAMIDOL 80 ML: 755 INJECTION, SOLUTION INTRAVENOUS at 09:56

## 2020-10-21 RX ADMIN — SODIUM CHLORIDE 80 ML: 9 INJECTION, SOLUTION INTRAVENOUS at 09:56

## 2020-10-21 RX ADMIN — MORPHINE SULFATE 4 MG: 4 INJECTION, SOLUTION INTRAMUSCULAR; INTRAVENOUS at 09:10

## 2020-10-21 ASSESSMENT — ENCOUNTER SYMPTOMS
COLOR CHANGE: 0
BLOOD IN STOOL: 0
SINUS PAIN: 0
APNEA: 0
COUGH: 1
CHEST TIGHTNESS: 0
VOMITING: 0
ABDOMINAL PAIN: 1
DIARRHEA: 0
EYES NEGATIVE: 1
CONSTIPATION: 0
ABDOMINAL DISTENTION: 0
SHORTNESS OF BREATH: 0

## 2020-10-21 ASSESSMENT — PAIN SCALES - GENERAL
PAINLEVEL_OUTOF10: 10

## 2020-10-21 NOTE — ED TRIAGE NOTES
Pt to room 16, states she awoke with dental pain today, scheduled to see a dentist in November but could not wait. Pt also c/o chest pain midsternal \"stabbing\" pain greater than 1 month. C/o abdominal pain left lower quadrant x 1 month denies n/v/d. Pt is here with her mother today who is also being seen as a patient for dental pain.

## 2020-10-21 NOTE — ED PROVIDER NOTES
EMERGENCY DEPARTMENT ENCOUNTER    Pt Name: Hussein Marquis  MRN: [id number]  [place] 1975  Date of evaluation: 10/21/20  CHIEF COMPLAINT       Chief Complaint   Patient presents with    Abdominal Pain    Chest Pain    Dental Pain     HISTORY OF PRESENT ILLNESS   26-year-old female presents to the emergency room for multiple complaints. First complaint is related to abdominal pain that has been going on for several weeks. Pain seems to be in the left lower quadrant and is fairly constant. Patient does report recurrent issues with constipation. She denies any blood in the stool. She has not had any vomiting. She does report somewhat of a decreased appetite. She is also complaining of stabbing chest pain in the mid sternum. This is also been present for 2 to 3 weeks. Patient reports that she is having some mild shortness of breath with her symptoms. She states when she coughs the pain is most severe. Patient is on Eliquis for previous DVT/PE and states that she did miss a couple of doses over the weekend as she was out of town and forgot her medications. Patient states that she has had this dry cough for several days. She states that this is unusual for her even with her history of asthma/COPD. She is also worried about dental pain. REVIEW OF SYSTEMS     Review of Systems   Constitutional: Positive for appetite change and fatigue. Negative for activity change, chills and diaphoresis. HENT: Positive for dental problem. Negative for congestion, sinus pain and tinnitus. Eyes: Negative. Respiratory: Positive for cough. Negative for apnea, chest tightness and shortness of breath. Cardiovascular: Positive for chest pain. Gastrointestinal: Positive for abdominal pain. Negative for abdominal distention, blood in stool, constipation, diarrhea and vomiting. Genitourinary: Negative for difficulty urinating and frequency. Musculoskeletal: Negative for arthralgias and myalgias. Skin: Negative for color change and rash. Neurological: Negative for dizziness. Hematological: Negative. Psychiatric/Behavioral: Negative. All other systems reviewed and are negative. PASTMEDICAL HISTORY     Past Medical History:   Diagnosis Date    Abdominal pain     Anemia     Anticoagulant long-term use     Arthritis 2018    Left Foot    Asthma     Moderate persistent asthma without complication    Back problem     Bipolar 1 disorder (HCC)     Chest pain     with \"coughing\" per pt    CHF (congestive heart failure) ([facility]     When child was delivered     Chicken pox     Chronic idiopathic constipation 2014    Chronic mental illness     Current every day smoker     Depression     Depression     DVT of deep femoral vein, bilateral (HCC)     Emphysema lung ([facility]     GERD (gastroesophageal reflux disease)     GERD (gastroesophageal reflux disease) 2004    Hair loss     Headache     Hernia     High blood pressure     History of pulmonary embolism 10/4/2019    Hyperlipidemia     Hypertension     Ileus ([facility]     Irregular bowel habits     Irritable bowel syndrome     MDRO (multiple drug resistant organisms) resistance 2005    MRSA in  per pt.  Nervousness     Obesity     Pneumonia     Saddle embolus of pulmonary artery without acute cor pulmonale (HCC)     Seizures ([facility]     Last Seizure 18    Slow gastric motility     Smoking     Stroke (cerebrum) ([facility] 2014    Suicidal intent     2003.  Denies 18    TIA (transient ischemic attack) 2014    Tobacco abuse disorder 3/22/2016    Weight loss      Past Problem List  Patient Active Problem List   Diagnosis Code    H/O hysterectomy for benign disease Z90.710    Borderline diabetes R73.03    FH: diabetes mellitus Z83.3    FH: throat cancer Z80.0    FH: uterine cancer Z80.49    FH: breast cancer Z80.3    Neck pain, chronic M54.2, G89.29    Tobacco abuse disorder Z72.0    Seizures ([facility] R56.9    Non-compliance with treatment S47.27    Uncomplicated asthma I83.513    Anemia D64.9    Cobalamin deficiency E53.8    Bipolar 1 disorder ([facility]) F31.9    PTSD (post-traumatic stress disorder) F43.10    Chiari I malformation ([facility]) G93.5    IBS (irritable bowel syndrome) K58.9    COPD (chronic obstructive pulmonary disease) ([facility]) J44.9    Long term current use of anticoagulant therapy Z79.01    Asthma J45.909    GERD (gastroesophageal reflux disease) K21.9    History of pulmonary embolism Z86.711    Folate deficiency E53.8    Vitamin D deficiency E55.9    Hypertension I10    MRSA infection A49.02    Hyperlipidemia E78.5    Tobacco abuse counseling Z71.6    Allergic drug reaction T78.40XA    Acute vaginitis N76.0    Axillary hidradenitis suppurativa L73.2    Benign intracranial hypertension G93.2    Diverticulitis of sigmoid colon K57.32    History of abnormal uterine bleeding Z87.42    Thrombosed external hemorrhoids K64.5    Bipolar disorder ([facility]) F31.9    Borderline diabetes mellitus R73.03    Chiari malformation type I ([facility] G93.5    Irritable bowel syndrome K58.9    Fatigue R53.83    Chronic headache disorder R51.9, G89.29    Chronic obstructive lung disease ([facility]) J44.9    Family history of malignant neoplasm of breast Z80.3    Family history of diabetes mellitus Z83.3    Family history of throat cancer Z80.0    Family history of malignant neoplasm of uterus Z80.49    Gastroesophageal reflux disease K21.9    History of diabetes mellitus Z86.39    History of hysterectomy for benign disease Z90.710    Methicillin resistant Staphylococcus aureus infection A49.02    Chronic neck pain M54.2, G89.29    Noncompliance with treatment Z91.19    Posttraumatic stress disorder F43.10    Tobacco use Z72.0    Obesity E66.9    Pain in both lower extremities M79.604, M79.605     SURGICAL HISTORY       Past Surgical History:   Procedure Laterality Date    ABSCESS DRAINAGE abdominal abscess RLQ    AXILLARY SURGERY Right 2018    Excision of hidradenitis cysts, right axilla    BREAST LUMPECTOMY Bilateral     BREAST LUMPECTOMY       SECTION      x2    ENDOMETRIAL ABLATION      ENDOSCOPY, COLON, DIAGNOSTIC  2014    HERNIA REPAIR      HYSTERECTOMY      LYMPH NODE DISSECTION Left 2019    LEFT AXILLARY HYDRADENITIS EXCISION performed by Yeison Pierson DO at [address] [place] [address] Left 2019    LEFT AXILLARY HYDRADENITIS EXCISION (Left )    KS EXC SKIN BENIG <5MM TRUNK,ARM,LEG Right 2018    EXCISION HIDRADENITIS RIGHT AXILLA performed by Yeison Pierson DO at [address] [place] [address]       Discharge Medication List as of 10/21/2020 11:15 AM      CONTINUE these medications which have NOT CHANGED    Details   cloNIDine (CATAPRES-TTS-1) 0.1 MG/24HR PTWK Place 1 patch onto the skin every 7 days, Disp-4 [id number]      folic acid (FOLVITE) 1 MG tablet Take 1 tablet by mouth daily, Disp-90 tablet,R-1Normal      indapamide (LOZOL) 1.25 MG tablet Take 1 tablet by mouth every morning, Disp-90 tablet,R-1Normal      verapamil (CALAN SR) 240 MG extended release tablet Take 1 tablet by mouth daily, Disp-90 tablet,R-1Normal      montelukast (SINGULAIR) 10 MG tablet Historical Med      cariprazine hcl (VRAYLAR) 1.5 MG capsule Take 1.5 mg by mouth dailyHistorical Med      albuterol sulfate HFA (VENTOLIN HFA) 108 (90 Base) MCG/ACT inhaler Inhale 2 puffs into the lungs 4 times daily as needed for Wheezing, Disp-1 Inhaler,R-5Normal      vitamin D (ERGOCALCIFEROL) 1.25 MG (55942 UT) CAPS capsule Take 1 capsule by mouth once a week, Disp-12 capsule,R-1Normal      apixaban (ELIQUIS) 5 MG TABS tablet take 1 tablet by mouth twice a day, Disp-60 tablet,R-4Normal      levETIRAcetam (KEPPRA) 750 MG tablet Historical Med      fluticasone (FLONASE) 50 MCG/ACT nasal spray 2 sprays by Each Nostril route daily, Disp-3 Bottle,R-1Normal      !! cetirizine (ZYRTEC) 10 MG tablet Take 1 tablet by mouth daily, Disp-30 tablet,R-0Normal      atorvastatin (LIPITOR) 10 MG tablet take 1 tablet by mouth once daily, Disp-90 tablet,R-1Normal      topiramate (TOPAMAX SPRINKLE) 15 MG capsule take 1 capsule by mouth nightly for 1 week then 1 capsule twice a day THEREAFTER, Disp-60 capsule,R-3Normal      RA VITAMIN B-12 TR 1000 MCG TBCR take 2 tablets by mouth once daily, Disp-180 tablet,R-1Normal      losartan (COZAAR) 50 MG tablet Take 1 tablet by mouth daily, Disp-90 tablet, R-1Normal      triamcinolone acetonide (KENALOG) 0.1 % paste APPLY A THIN FILM TOPICALLY TO AFFECTED AREA 2-3 TIMES A DAY, Historical Med      ondansetron (ZOFRAN-ODT) 4 MG disintegrating tablet Take 1 tablet by mouth 3 times daily as needed for Nausea or Vomiting, Disp-21 tablet, R-0Normal      Blood Pressure KIT Disp-1 kit, R-0, PrintUse as directed. butalbital-acetaminophen-caffeine (ESGIC) -40 MG per tablet Take 1 tablet by mouth every 6 hours as needed for Headaches, Disp-20 tablet, R-0Print      Cyanocobalamin (B-12) 1000 MCG SUBL Place 1 tablet under the tongue daily, Disp-90 tablet, R-5Normal      escitalopram (LEXAPRO) 5 MG tablet R-0Historical Med      benztropine (COGENTIN) 0.5 MG tablet take 1 tablet by mouth twice a day, R-0Historical Med      !! cetirizine (ZYRTEC) 10 MG tablet Take 1 tablet by mouth daily, Disp-90 tablet, R-1Normal      lurasidone (LATUDA) 60 MG TABS tablet take 1 tablet by mouth once daily with food AT [address] [place] [address]       !! - Potential duplicate medications found. Please discuss with provider. ALLERGIES     is allergic to amoxil [amoxicillin]; bee venom; clindamycin/lincomycin; flonase [fluticasone]; keflex [cephalexin]; levaquin [levofloxacin in d5w]; macrobid [nitrofurantoin monohyd macro]; and sulfa antibiotics.   FAMILY present for several weeks. Patient is undergoing treatment for pulmonary embolism which was diagnosed several years ago. CTA pulmonary shows chronic PEs with nothing acute. No right heart strain. Given duration of pain, constant nature of pain and type of pain I am not concerned for cute coronary syndrome. Chronic pulmonary embolism as well as asthma/COPD likely are related to some of her chest pain. Patient does not have any significant wheezing on exam.  CT abdomen pelvis does not show any acute pathology to explain patient's left lower quadrant pain. Abdominal pain has also been present for over 4 weeks. Patient does have previous diagnosis of chronic abdominal pain  Did discuss the importance of follow-up given that we were unable to resolve some of the patient's complaints here today. Given her testing I feel patient can be discharged with follow-up. Patient does not at this time meet any criteria for admission or urgent intervention. CRITICAL CARE:       PROCEDURES:    Procedures    DIAGNOSTIC RESULTS   EKG:All EKG's are interpreted by the Emergency Department Physician who either signs or Co-signs this chart in the absence of a cardiologist.    EKG- motion artifact, overall poor quality EKG   sinus with rate 81  No ST elevation or depression  T wave inversion III, nonspecific- seen on previous on 8/23/20  QTc 408  My impression: poor quality but nonspecific EKG        RADIOLOGY:All plain film, CT, MRI, and formal ultrasound images (except ED bedside ultrasound) are read by the radiologist, see reports below, unless otherwisenoted in MDM or here. CT CHEST PULMONARY EMBOLISM W CONTRAST   Final Result   No evidence of acute pulmonary embolism. Partial interval resolution chronic   pulmonary emboli. Mild-moderate PAH. No acute pulmonary abnormality. Mild dependent changes.          CT ABDOMEN PELVIS W IV CONTRAST Additional Contrast? None   Final Result   No evidence of acute intra-abdominal pathology. Normal corpus luteum noted of the left ovary. LABS: All lab results were reviewed by myself, and all abnormals are listed below. Labs Reviewed   CBC WITH AUTO DIFFERENTIAL - Abnormal; Notable for the following components:       Result Value    Hemoglobin 11.2 (*)     Hematocrit 35.1 (*)     MCV 75.8 (*)     MCH 24.2 (*)     RDW 16.7 (*)     Lymphocytes 44 (*)     All other components within normal limits   COMPREHENSIVE METABOLIC PANEL W/ REFLEX TO MG FOR LOW K - Abnormal; Notable for the following components:    Glucose 108 (*)     Calcium 8.5 (*)     Chloride 108 (*)     Anion Gap 6 (*)     Total Bilirubin <0.10 (*)     All other components within normal limits   TROPONIN   BRAIN NATRIURETIC PEPTIDE   PROTIME-INR   APTT       EMERGENCY DEPARTMENTCOURSE:         Vitals:    Vitals:    10/21/20 0847   BP: (!) 137/98   Pulse: 89   Resp: 14   Temp: 98.4 °F (36.9 °C)   SpO2: 100%       The patient was given the following medications while in the emergency department:  Orders Placed This Encounter   Medications    morphine sulfate (PF) injection 4 mg    methylPREDNISolone sodium (SOLU-MEDROL) injection 80 mg    iopamidol (ISOVUE-370) 76 % injection 80 mL    0.9 % sodium chloride bolus    DISCONTD: sodium chloride flush 0.9 % injection 10 mL    fentaNYL (SUBLIMAZE) injection 50 mcg    HYDROcodone-acetaminophen (NORCO) 5-325 MG per tablet     Sig: Take 1 tablet by mouth every 4 hours as needed for Pain for up to 5 days. Intended supply: 3 days. Take lowest dose possible to manage pain     Dispense:  10 tablet     Refill:  0    penicillin v potassium (VEETID) 500 MG tablet     Sig: Take 1 tablet by mouth 4 times daily for 10 days     Dispense:  40 tablet     Refill:  0     CONSULTS:  None    FINAL IMPRESSION      1. Pain, dental    2. Left lower quadrant abdominal pain    3.  Chronic pulmonary embolism without acute cor pulmonale, unspecified pulmonary embolism type ([address]

## 2020-10-22 LAB
EKG ATRIAL RATE: 81 BPM
EKG P AXIS: 12 DEGREES
EKG P-R INTERVAL: 164 MS
EKG Q-T INTERVAL: 352 MS
EKG QRS DURATION: 82 MS
EKG QTC CALCULATION (BAZETT): 408 MS
EKG R AXIS: -11 DEGREES
EKG T AXIS: 9 DEGREES
EKG VENTRICULAR RATE: 81 BPM

## 2020-10-22 PROCEDURE — 93010 ELECTROCARDIOGRAM REPORT: CPT | Performed by: INTERNAL MEDICINE

## 2020-10-23 ENCOUNTER — HOSPITAL ENCOUNTER (OUTPATIENT)
Dept: SLEEP CENTER | Age: 45
Discharge: HOME | End: 2020-10-25
Payer: COMMERCIAL

## 2020-10-23 VITALS — TEMPERATURE: 98.6 F

## 2020-10-23 PROCEDURE — 95810 POLYSOM 6/> YRS 4/> PARAM: CPT

## 2020-11-03 PROBLEM — J44.9 COPD (CHRONIC OBSTRUCTIVE PULMONARY DISEASE) (HCC): Status: RESOLVED | Noted: 2018-06-02 | Resolved: 2020-11-03

## 2020-11-09 RX ORDER — LOSARTAN POTASSIUM 50 MG/1
50 TABLET ORAL DAILY
Qty: 90 TABLET | Refills: 0 | Status: SHIPPED | OUTPATIENT
Start: 2020-11-09 | End: 2021-02-22

## 2020-11-10 LAB — STATUS: NORMAL

## 2020-11-10 RX ORDER — TOPIRAMATE 15 MG/1
CAPSULE, COATED PELLETS ORAL
Qty: 60 CAPSULE | Refills: 0 | OUTPATIENT
Start: 2020-11-10

## 2020-12-02 ENCOUNTER — TELEPHONE (OUTPATIENT)
Dept: FAMILY MEDICINE CLINIC | Age: 45
End: 2020-12-02

## 2020-12-02 PROBLEM — I82.409 DEEP VEIN THROMBOSIS (DVT) OF LOWER EXTREMITY (HCC): Status: ACTIVE | Noted: 2018-06-01

## 2020-12-02 RX ORDER — CLONIDINE 0.1 MG/24H
1 PATCH, EXTENDED RELEASE TRANSDERMAL
Qty: 4 PATCH | Refills: 1 | Status: SHIPPED | OUTPATIENT
Start: 2020-12-02 | End: 2021-04-02 | Stop reason: SDUPTHER

## 2020-12-02 NOTE — TELEPHONE ENCOUNTER
Patient called to reschedule her ED follow UP due to provider being out,she requested referral to Gi,She has IBS AND ABDOMEN PAIN SHE STATES ER STATED SHE NEED A REFERRAL FOR GI, REFERRAL PLACED.

## 2020-12-02 NOTE — TELEPHONE ENCOUNTER
Hussein Marquis is calling to request a refill on the following medication(s):    Last Visit Date (If Applicable):  50/57/1849    Next Visit Date:    1/4/2021    Medication Request:  Requested Prescriptions     Pending Prescriptions Disp Refills    cloNIDine (CATAPRES-TTS-1) 0.1 MG/24HR PTWK 4 patch 1     Sig: Place 1 patch onto the skin every 7 days

## 2020-12-08 RX ORDER — MONTELUKAST SODIUM 10 MG/1
10 TABLET ORAL NIGHTLY
Qty: 90 TABLET | Refills: 5 | Status: SHIPPED | OUTPATIENT
Start: 2020-12-08 | End: 2021-05-24

## 2020-12-08 NOTE — TELEPHONE ENCOUNTER
Lizet Stafford is calling to request a refill on the following medication(s):    Last Visit Date (If Applicable):  70/18/2776    Next Visit Date:    1/4/2021    Medication Request:  Requested Prescriptions     Pending Prescriptions Disp Refills    montelukast (SINGULAIR) 10 MG tablet 30 tablet

## 2020-12-16 ENCOUNTER — VIRTUAL VISIT (OUTPATIENT)
Dept: PULMONOLOGY | Age: 45
End: 2020-12-16
Payer: COMMERCIAL

## 2020-12-16 PROCEDURE — 99442 PR PHYS/QHP TELEPHONE EVALUATION 11-20 MIN: CPT | Performed by: INTERNAL MEDICINE

## 2020-12-16 NOTE — PATIENT INSTRUCTIONS
-Faxed Dr Cora Baron surgery clearance form - fax [phone]  -[facility] Dr Berkley Man appointment scheduled Jan 8, 2021 @ 1:45 - office will work on out of net work referral.   Faxed digital glass scale Rx to [employer] [facility]HA WORKMAN 12/16/20   Mailed AVS

## 2020-12-16 NOTE — LETTER
[facility]  [address] [place] [address],  [address]   [address]  [facility], [facility] [address]  Phone: [phone]  Fax: [phone]      Amparo Lomeli M.D. SURGERY CLEARANCE FORM      Alexandr Kinney  1975 12/16/2020      Dear Dr. Maria Eugenia James,    Thank you for having me participate in the preoperative evaluation of your patient, Luann Pickering. Luann Pickering is cleared for surgery    I have made the following assessment:    Moderate Risk    Luann Pickering will need:  Hold eliquis for 48 hours and resume asap after dental surgery. If you have any questions, please feel free to call me.      Sincerely,      Amparo Lomeli MD  12/16/2020, 9:41 AM

## 2020-12-19 NOTE — PROGRESS NOTES
PULMONARY OP progress note    FOR TELEPHONE VISITS PLEASE COMPLETE THE FOLLOWING    Consent:  She and/or health care decision maker is aware that that she may receive a bill for this telephone service, depending on her insurance coverage, and has provided verbal consent to proceed: Yes      I affirm this is a Patient Initiated Episode with an Established Patient who has not had a related appointment within my department in the past 7 days or scheduled within the next 24 hours. Total Time: minutes: 11-20 minutes    Note: not billable if this call serves to triage the patient into an appointment for the relevant concern      REFERRED BY: [facility],5[facility], MD    REASON FOR CONSULTATION: Recent pulmonary embolism with history of COPD    Patient is being seen in follow-up for-  1. Saddle embolus of pulmonary artery without acute cor pulmonale, unspecified chronicity ([address]    2. Moderate persistent asthma without complication    3. Obesity due to excess calories, unspecified obesity severity    4. Postphlebitic syndrome without complication    5. Smoking    6. Anticoagulant long-term use    7. DVT of deep femoral vein, bilateral ([address]    8. ELIZABETH (obstructive sleep apnea)    9. CTEPH (chronic thromboembolic pulmonary hypertension) ([address]    10. Snoring        HISTORY OF PRESENT ILLNESS:    Ammy Chambers is a 39y.o. year old female here for evaluation of above problems. Patient is on anticoagulants  Denied any shortness of breath or wheezing  No associated cough or sputum production. Denied any wheezing  No fever, chills, night sweats. No orthopnea or PND. No epistaxis or sore throat. No hemoptysis, hematemesis, melena, hematochezia, hematuria or vaginal bleeding that is significant. She continues to smoke.   Denied much of daytime sleepiness but has some fatigue and tiredness  Patient has been in bilateral lower extremity pain associated with increasing swelling She has been followed at [facility] for her PE in the past but because of the Covid she could not follow-up  She needs follow-up with Dr. Herlinda Lawson because of her pulmonary artery hypertension  She complains of chronic pain in bilateral lower extremities        PAST MEDICAL HISTORY:       Diagnosis Date    Abdominal pain     Anemia     Anticoagulant long-term use     Arthritis 2018    Left Foot    Asthma     Moderate persistent asthma without complication    Back problem     Bipolar 1 disorder ([address]     Chest pain     with \"coughing\" per pt    CHF (congestive heart failure) ([address]     When child was delivered     Chicken pox     Chronic idiopathic constipation     Chronic mental illness     Current every day smoker     Depression     Depression     DVT of deep femoral vein, bilateral (HCC)     Emphysema lung ([address]     GERD (gastroesophageal reflux disease)     GERD (gastroesophageal reflux disease) 2004    Hair loss     Headache     Hernia     High blood pressure     History of pulmonary embolism 10/4/2019    Hyperlipidemia     Hypertension     Ileus ([address]     Irregular bowel habits     Irritable bowel syndrome     MDRO (multiple drug resistant organisms) resistance 2005    MRSA in  per pt. 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  Denies 18    TIA (transient ischemic attack) 2014    Tobacco abuse disorder 3/22/2016    Weight loss        SURGICAL HISTORY:    Past Surgical History:   Procedure Laterality Date    ABSCESS DRAINAGE      abdominal abscess RLQ    AXILLARY SURGERY Right 2018    Excision of hidradenitis cysts, right axilla    BREAST LUMPECTOMY Bilateral     BREAST LUMPECTOMY       SECTION      x2 FAMILY HISTORY: family history includes Asthma in her mother; Cancer in her maternal aunt and maternal grandmother; Depression in her sister; Diabetes in her maternal grandfather and maternal grandmother; High Blood Pressure in her mother; Mental Illness in her mother; Other in her sister; Stroke in an other family member. SOCIAL AND OCCUPATIONAL HEALTH:  The patient is a Current smoker. There  is not history of TB or TB exposure. There is not asbestos or silica dust exposure. The patient reports does not have coal, foundry, quarry or [employer] exposure. Travel history reveals no significant history of risk factors for pulm disease. There is not  history of recreational or IV drug use. The patient does not pets, dogs, cats turtles or exotic birds.         Review of Systems:  Review of Systems -   General ROS: Completed and except as mentioned above were negative   Psychological ROS:  Completed and except as mentioned above were negative  Ophthalmic ROS:  Completed and except as mentioned above were negative  ENT ROS:  Completed and except as mentioned above were negative  Allergy and Immunology ROS:  Completed and except as mentioned above were negative  Hematological and Lymphatic ROS:  Completed and except as mentioned above were negative  Endocrine ROS: Completed and except as mentioned above were negative  Breast ROS:  Completed and except as mentioned above were negative  Respiratory ROS:  Completed and except as mentioned above were negative  Cardiovascular ROS:  Completed and except as mentioned above were negative  Gastrointestinal ROS: Completed and except as mentioned above were negative  Genito-Urinary ROS:  Completed and except as mentioned above were negative  Musculoskeletal ROS:  Completed and except as mentioned above were negative  Neurological ROS:  Completed and except as mentioned above were negative  Dermatological ROS:  Completed and except as mentioned above were negative PHYSICAL EXAMINATION:  There were no vitals filed for this visit. PHYSICAL EXAMINATION:  There were no vitals filed for this visit. As this was a telephone visit, physical examination could not be performed. Patient appeared comfortable on the telephone and was able to speak in complete sentences and answering questions appropriately. DATA:     Venous Dopplers in August 2019 with recanalized chronic lower extremity clot in the left lower extremity    Venous Dopplers of bilateral lower extremities in August 2020 without any DVT    CT scan of the chest for pulmonary embolism did not show any evidence of acute pulmonary emboli in October 2020. There was partial resolution of chronic pulmonary emboli when compared to the previous scan along with evidence of mild to moderate pulmonary artery hypertension    Patient had a sleep study in October 23 of 2020 and it did not show any evidence of sleep apnea but showed some snoring      IMPRESSION:   1. Saddle embolus of pulmonary artery without acute cor pulmonale, unspecified chronicity ([address]    2. Moderate persistent asthma without complication    3. Obesity due to excess calories, unspecified obesity severity    4. Postphlebitic syndrome without complication    5. Smoking    6. Anticoagulant long-term use    7. DVT of deep femoral vein, bilateral ([address]    8. ELIZABETH (obstructive sleep apnea)    9. CTEPH (chronic thromboembolic pulmonary hypertension) ([address]    10.  Snoring                   PLAN:       Reviewed venous Dopplers to evaluate for left lower extremity DVT and ordered new venous Dopplers  Echocardiogram to obtain annually as recommended by [facility]  She is being followed by [facility] Koko Hernandez at [facility]  She needs to be followed by Dr. Koko Hernandez as she is one of the world's authority for pulmonary artery hypertension  Refills were provided-weighing scale Patient was recommended to have prednisone and an antibiotic available for use during an exacerbation  Educated and clarified the medication use. Recommended lifelong anticoagulation in view of the unprovoked pulmonary embolism, which was very significant  Discussed use, benefit, and side effects of prescribed medications. Barriers to medication compliance addressed. Brynn Mancuso received counseling on the following healthy behaviors: nutrition, exercise and medication adherence  Recommend flu vaccination in the fall annually. Patient had flu vaccination for the season  Recommendations given regarding pneumococcal vaccinations. Patient is up-to-date with vaccinations from pulmonary perspective. Maintain an active lifestyle. Recommend smoking cessation. Brynn Mancuso was instructed on smoking cessation for greater than 10 minutes. I discussed with this patient today the risks and benefits of various tobacco cessation strategies  Patient was educated on how to use the respiratory medications. All the questions that the patient  has had were answered to  Her satisfaction. Home O2 evaluation was done at the last visit. Supplemental oxygen was not needed. Polysomnogram was reviewed and patient had snoring without any sleep apnea  Pt is not to drive if sleepy. After reviewing the patient's smoking history and his age patient does not meet the criteria for lung cancer screening. We'll see the patient back in 6 months  Thank you for having us involved in the care of your patient. Please call us if you have any questions or concerns.               Judith Welch MD  12/19/2020 7:32 AM

## 2020-12-26 ENCOUNTER — HOSPITAL ENCOUNTER (OUTPATIENT)
Dept: VASCULAR LAB | Age: 45
Discharge: HOME | End: 2020-12-26
Payer: COMMERCIAL

## 2020-12-26 PROCEDURE — 93970 EXTREMITY STUDY: CPT

## 2020-12-28 ENCOUNTER — TELEPHONE (OUTPATIENT)
Dept: GASTROENTEROLOGY | Age: 45
End: 2020-12-28

## 2020-12-28 NOTE — TELEPHONE ENCOUNTER
Writer called for 1st attempt to schedule patient for a follow up appointment. Left detailed message for patient to call [phone] opt 1 to schedule and appointment. Patient last saw Dr. Chely Neville in 2018.

## 2021-01-27 ENCOUNTER — APPOINTMENT (OUTPATIENT)
Dept: GENERAL RADIOLOGY | Age: 46
End: 2021-01-27
Payer: COMMERCIAL

## 2021-01-27 ENCOUNTER — HOSPITAL ENCOUNTER (EMERGENCY)
Age: 46
Discharge: HOME | End: 2021-01-27
Attending: EMERGENCY MEDICINE
Payer: COMMERCIAL

## 2021-01-27 ENCOUNTER — APPOINTMENT (OUTPATIENT)
Dept: CT IMAGING | Age: 46
End: 2021-01-27
Payer: COMMERCIAL

## 2021-01-27 VITALS
SYSTOLIC BLOOD PRESSURE: 160 MMHG | OXYGEN SATURATION: 99 % | DIASTOLIC BLOOD PRESSURE: 98 MMHG | HEART RATE: 76 BPM | RESPIRATION RATE: 18 BRPM | TEMPERATURE: 98.2 F

## 2021-01-27 DIAGNOSIS — R07.9 CHEST PAIN, UNSPECIFIED TYPE: Primary | ICD-10-CM

## 2021-01-27 LAB
ABSOLUTE EOS #: 0.07 K/UL (ref 0–0.44)
ABSOLUTE IMMATURE GRANULOCYTE: 0.03 K/UL (ref 0–0.3)
ABSOLUTE LYMPH #: 2.97 K/UL (ref 1.1–3.7)
ABSOLUTE MONO #: 0.63 K/UL (ref 0.1–1.2)
ANION GAP SERPL CALCULATED.3IONS-SCNC: 11 MMOL/L (ref 9–17)
BASOPHILS # BLD: 1 % (ref 0–2)
BASOPHILS ABSOLUTE: 0.03 K/UL (ref 0–0.2)
BNP INTERPRETATION: ABNORMAL
BUN BLDV-MCNC: 11 MG/DL (ref 6–20)
BUN/CREAT BLD: 16 (ref 9–20)
C-REACTIVE PROTEIN: 7.5 MG/L (ref 0–5)
CALCIUM SERPL-MCNC: 8.5 MG/DL (ref 8.6–10.4)
CHLORIDE BLD-SCNC: 104 MMOL/L (ref 98–107)
CO2: 24 MMOL/L (ref 20–31)
CREAT SERPL-MCNC: 0.68 MG/DL (ref 0.5–0.9)
D-DIMER QUANTITATIVE: 0.9 MG/L FEU (ref 0–0.59)
DIFFERENTIAL TYPE: ABNORMAL
DIRECT EXAM: NORMAL
EOSINOPHILS RELATIVE PERCENT: 1 % (ref 1–4)
FERRITIN: 73 UG/L (ref 13–150)
GFR AFRICAN AMERICAN: >60 ML/MIN
GFR NON-AFRICAN AMERICAN: >60 ML/MIN
GFR SERPL CREATININE-BSD FRML MDRD: ABNORMAL ML/MIN/{1.73_M2}
GFR SERPL CREATININE-BSD FRML MDRD: ABNORMAL ML/MIN/{1.73_M2}
GLUCOSE BLD-MCNC: 92 MG/DL (ref 70–99)
HCT VFR BLD CALC: 36.4 % (ref 36.3–47.1)
HEMOGLOBIN: 11.5 G/DL (ref 11.9–15.1)
IMMATURE GRANULOCYTES: 1 %
LACTATE DEHYDROGENASE: 163 U/L (ref 135–214)
LYMPHOCYTES # BLD: 45 % (ref 24–43)
Lab: NORMAL
MCH RBC QN AUTO: 24.1 PG (ref 25.2–33.5)
MCHC RBC AUTO-ENTMCNC: 31.6 G/DL (ref 28.4–34.8)
MCV RBC AUTO: 76.3 FL (ref 82.6–102.9)
MONOCYTES # BLD: 10 % (ref 3–12)
MYOGLOBIN: 32 NG/ML (ref 25–58)
MYOGLOBIN: 34 NG/ML (ref 25–58)
NRBC AUTOMATED: 0 PER 100 WBC
PDW BLD-RTO: 14.7 % (ref 11.8–14.4)
PLATELET # BLD: 204 K/UL (ref 138–453)
PLATELET ESTIMATE: ABNORMAL
PMV BLD AUTO: 10.7 FL (ref 8.1–13.5)
POTASSIUM SERPL-SCNC: 3.7 MMOL/L (ref 3.7–5.3)
PRO-BNP: 498 PG/ML
PROCALCITONIN: 0.03 NG/ML
RBC # BLD: 4.77 M/UL (ref 3.95–5.11)
RBC # BLD: ABNORMAL 10*6/UL
SEG NEUTROPHILS: 42 % (ref 36–65)
SEGMENTED NEUTROPHILS ABSOLUTE COUNT: 2.67 K/UL (ref 1.5–8.1)
SODIUM BLD-SCNC: 139 MMOL/L (ref 135–144)
SPECIMEN DESCRIPTION: NORMAL
TROPONIN INTERP: NORMAL
TROPONIN INTERP: NORMAL
TROPONIN T: NORMAL NG/ML
TROPONIN T: NORMAL NG/ML
TROPONIN, HIGH SENSITIVITY: <6 NG/L (ref 0–14)
TROPONIN, HIGH SENSITIVITY: <6 NG/L (ref 0–14)
WBC # BLD: 6.4 K/UL (ref 3.5–11.3)
WBC # BLD: ABNORMAL 10*3/UL

## 2021-01-27 PROCEDURE — 71260 CT THORAX DX C+: CPT

## 2021-01-27 PROCEDURE — 6360000004 HC RX CONTRAST MEDICATION: Performed by: NURSE PRACTITIONER

## 2021-01-27 PROCEDURE — 96376 TX/PRO/DX INJ SAME DRUG ADON: CPT

## 2021-01-27 PROCEDURE — 96374 THER/PROPH/DIAG INJ IV PUSH: CPT

## 2021-01-27 PROCEDURE — 86140 C-REACTIVE PROTEIN: CPT

## 2021-01-27 PROCEDURE — 83615 LACTATE (LD) (LDH) ENZYME: CPT

## 2021-01-27 PROCEDURE — 87880 STREP A ASSAY W/OPTIC: CPT

## 2021-01-27 PROCEDURE — 83880 ASSAY OF NATRIURETIC PEPTIDE: CPT

## 2021-01-27 PROCEDURE — 84484 ASSAY OF TROPONIN QUANT: CPT

## 2021-01-27 PROCEDURE — 82728 ASSAY OF FERRITIN: CPT

## 2021-01-27 PROCEDURE — 99285 EMERGENCY DEPT VISIT HI MDM: CPT

## 2021-01-27 PROCEDURE — 85379 FIBRIN DEGRADATION QUANT: CPT

## 2021-01-27 PROCEDURE — 6360000002 HC RX W HCPCS: Performed by: NURSE PRACTITIONER

## 2021-01-27 PROCEDURE — 84145 PROCALCITONIN (PCT): CPT

## 2021-01-27 PROCEDURE — 2580000003 HC RX 258: Performed by: NURSE PRACTITIONER

## 2021-01-27 PROCEDURE — 85025 COMPLETE CBC W/AUTO DIFF WBC: CPT

## 2021-01-27 PROCEDURE — 6370000000 HC RX 637 (ALT 250 FOR IP): Performed by: NURSE PRACTITIONER

## 2021-01-27 PROCEDURE — 80048 BASIC METABOLIC PNL TOTAL CA: CPT

## 2021-01-27 PROCEDURE — 71045 X-RAY EXAM CHEST 1 VIEW: CPT

## 2021-01-27 PROCEDURE — 83874 ASSAY OF MYOGLOBIN: CPT

## 2021-01-27 PROCEDURE — 93005 ELECTROCARDIOGRAM TRACING: CPT | Performed by: NURSE PRACTITIONER

## 2021-01-27 RX ORDER — SODIUM CHLORIDE 0.9 % (FLUSH) 0.9 %
10 SYRINGE (ML) INJECTION PRN
Status: DISCONTINUED | OUTPATIENT
Start: 2021-01-27 | End: 2021-01-27 | Stop reason: HOSPADM

## 2021-01-27 RX ORDER — 0.9 % SODIUM CHLORIDE 0.9 %
80 INTRAVENOUS SOLUTION INTRAVENOUS ONCE
Status: COMPLETED | OUTPATIENT
Start: 2021-01-27 | End: 2021-01-27

## 2021-01-27 RX ORDER — ASPIRIN 81 MG/1
324 TABLET, CHEWABLE ORAL ONCE
Status: COMPLETED | OUTPATIENT
Start: 2021-01-27 | End: 2021-01-27

## 2021-01-27 RX ORDER — MORPHINE SULFATE 2 MG/ML
2 INJECTION, SOLUTION INTRAMUSCULAR; INTRAVENOUS ONCE
Status: COMPLETED | OUTPATIENT
Start: 2021-01-27 | End: 2021-01-27

## 2021-01-27 RX ADMIN — Medication 10 ML: at 11:40

## 2021-01-27 RX ADMIN — Medication 2 MG: at 10:45

## 2021-01-27 RX ADMIN — ASPIRIN 81 MG 324 MG: 81 TABLET ORAL at 10:45

## 2021-01-27 RX ADMIN — IOPAMIDOL 75 ML: 755 INJECTION, SOLUTION INTRAVENOUS at 11:40

## 2021-01-27 RX ADMIN — MORPHINE SULFATE 2 MG: 2 INJECTION, SOLUTION INTRAMUSCULAR; INTRAVENOUS at 12:30

## 2021-01-27 RX ADMIN — SODIUM CHLORIDE 80 ML: 9 INJECTION, SOLUTION INTRAVENOUS at 11:40

## 2021-01-27 ASSESSMENT — ENCOUNTER SYMPTOMS
TROUBLE SWALLOWING: 0
SHORTNESS OF BREATH: 1
SORE THROAT: 1
SINUS PRESSURE: 0
COLOR CHANGE: 0
COUGH: 0
VOMITING: 0
PHOTOPHOBIA: 0
NAUSEA: 0

## 2021-01-27 ASSESSMENT — PAIN SCALES - GENERAL
PAINLEVEL_OUTOF10: 10
PAINLEVEL_OUTOF10: 8

## 2021-01-27 NOTE — ED PROVIDER NOTES
[facility] ED  EMERGENCY DEPARTMENT ENCOUNTER      Pt Name: Jaime Zavaleta  MRN: [id number]  [place] 1975  Date of evaluation: 1/27/21  CHIEF COMPLAINT       Chief Complaint   Patient presents with    Chest Pain    Shortness of Breath     HISTORY OF PRESENT ILLNESS   Presents to the emergency room via private auto with chest pain, shortness of breath and sore throat. The chest pain and shortness of breath started over 3 weeks ago but worsened 3 days ago. Pain has been intermittent and is worse with activity and improves slightly with rest.  No body aches or fevers. She does admit to a sore throat which has been present for the past week. No known ill contacts. She does have a history of heart disease. She was actually scheduled to go to [place] for an echocardiogram today but when she called to advise them of the worsening chest pain and shortness of breath they had her come to a local emergency room for evaluation. The history is provided by the patient. REVIEW OF SYSTEMS     Review of Systems   Constitutional: Negative for activity change and fever. HENT: Positive for sore throat. Negative for congestion, sinus pressure and trouble swallowing. Eyes: Negative for photophobia and visual disturbance. Respiratory: Positive for shortness of breath. Negative for cough. Cardiovascular: Positive for chest pain. Negative for palpitations. Gastrointestinal: Negative for nausea and vomiting. Genitourinary: Negative for dysuria and frequency. Musculoskeletal: Negative for arthralgias and myalgias. Skin: Negative for color change and rash. Neurological: Negative for dizziness, weakness, numbness and headaches. Psychiatric/Behavioral: Negative for confusion and decreased concentration.      PASTMEDICAL HISTORY     Past Medical History:   Diagnosis Date    Abdominal pain     Anemia     Anticoagulant long-term use     Arthritis 04/2018    Left Foot    Asthma Moderate persistent asthma without complication    Back problem     Bipolar 1 disorder ([address]     Chest pain     with \"coughing\" per pt    CHF (congestive heart failure) ([address]     When child was delivered     Chicken pox     Chronic idiopathic constipation 2014    Chronic mental illness     Current every day smoker     Depression     Depression     DVT of deep femoral vein, bilateral (HCC)     Emphysema lung ([address]     GERD (gastroesophageal reflux disease)     GERD (gastroesophageal reflux disease)     Hair loss     Headache     Hernia     High blood pressure     History of pulmonary embolism 10/4/2019    Hyperlipidemia     Hypertension     Ileus ([address]     Irregular bowel habits     Irritable bowel syndrome     MDRO (multiple drug resistant organisms) resistance 2005    MRSA in  per pt. 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  Denies 20    TIA (transient ischemic attack)     Tobacco abuse disorder 3/22/2016    Weight loss      SURGICAL HISTORY       Past Surgical History:   Procedure Laterality Date    ABSCESS DRAINAGE      abdominal abscess RLQ    AXILLARY SURGERY Right 2018    Excision of hidradenitis cysts, right axilla    BREAST LUMPECTOMY Bilateral     BREAST LUMPECTOMY       SECTION      x2    ENDOMETRIAL ABLATION      ENDOSCOPY, COLON, DIAGNOSTIC      HERNIA REPAIR      HYSTERECTOMY      LYMPH NODE DISSECTION Left 2019    LEFT AXILLARY HYDRADENITIS EXCISION performed by Harmony Hollins DO at [address] Left 2019    LEFT AXILLARY HYDRADENITIS EXCISION (Left )    OK EXC SKIN BENIG <5MM TRUNK,ARM,LEG Right 2018 EXCISION HIDRADENITIS RIGHT AXILLA performed by Blossom Chaudhry DO at [address]      UMBILICAL HERNIA REPAIR       CURRENT MEDICATIONS       Previous Medications    ALBUTEROL SULFATE HFA (VENTOLIN HFA) 108 (90 BASE) MCG/ACT INHALER    Inhale 2 puffs into the lungs 4 times daily as needed for Wheezing    APIXABAN (ELIQUIS) 5 MG TABS TABLET    take 1 tablet by mouth twice a day    ATORVASTATIN (LIPITOR) 10 MG TABLET    take 1 tablet by mouth once daily    BENZTROPINE (COGENTIN) 0.5 MG TABLET    take 1 tablet by mouth twice a day    BLOOD PRESSURE KIT    Use as directed. BUTALBITAL-ACETAMINOPHEN-CAFFEINE (ESGIC) -40 MG PER TABLET    Take 1 tablet by mouth every 6 hours as needed for Headaches    CARIPRAZINE HCL (VRAYLAR) 1.5 MG CAPSULE    Take 1.5 mg by mouth daily    CETIRIZINE (ZYRTEC) 10 MG TABLET    Take 1 tablet by mouth daily    CETIRIZINE (ZYRTEC) 10 MG TABLET    Take 1 tablet by mouth daily    CLONIDINE (CATAPRES-TTS-1) 0.1 MG/24HR PTWK    Place 1 patch onto the skin every 7 days    CYANOCOBALAMIN (B-12) 1000 MCG SUBL    Place 1 tablet under the tongue daily    ESCITALOPRAM (LEXAPRO) 5 MG TABLET        FLUTICASONE (FLONASE) 50 MCG/ACT NASAL SPRAY    2 sprays by Each Nostril route daily    FOLIC ACID (FOLVITE) 1 MG TABLET    Take 1 tablet by mouth daily    INDAPAMIDE (LOZOL) 1.25 MG TABLET    Take 1 tablet by mouth every morning    LEVETIRACETAM (KEPPRA) 750 MG TABLET        LOSARTAN (COZAAR) 50 MG TABLET    Take 1 tablet by mouth daily    LURASIDONE (LATUDA) 60 MG TABS TABLET    take 1 tablet by mouth once daily with food AT LEAST 350 CALORIES    MISC.  DEVICES (DIGITAL GLASS SCALE) MISC    1 Act by Does not apply route daily    MONTELUKAST (SINGULAIR) 10 MG TABLET    Take 1 tablet by mouth nightly ONDANSETRON (ZOFRAN-ODT) 4 MG DISINTEGRATING TABLET    Take 1 tablet by mouth 3 times daily as needed for Nausea or Vomiting    RA VITAMIN B-12 TR 1000 MCG TBCR    take 2 tablets by mouth once daily    TOPIRAMATE (TOPAMAX SPRINKLE) 15 MG CAPSULE    take 1 capsule by mouth nightly for 1 week then 1 capsule twice a day THEREAFTER    TRIAMCINOLONE ACETONIDE (KENALOG) 0.1 % PASTE    APPLY A THIN FILM TOPICALLY TO AFFECTED AREA 2-3 TIMES A DAY    VERAPAMIL (CALAN SR) 240 MG EXTENDED RELEASE TABLET    Take 1 tablet by mouth daily    VITAMIN D (ERGOCALCIFEROL) 1.25 MG (13880 UT) CAPS CAPSULE    Take 1 capsule by mouth once a week     ALLERGIES     is allergic to penicillins; amoxil [amoxicillin]; bee venom; clindamycin/lincomycin; flonase [fluticasone]; keflex [cephalexin]; levaquin [levofloxacin in d5w]; macrobid [nitrofurantoin monohyd macro]; and sulfa antibiotics. FAMILY HISTORY     She indicated that her mother is alive. She indicated that her father is alive. She indicated that her sister is alive. She indicated that the status of her maternal grandmother is unknown. She indicated that the status of her maternal grandfather is unknown. She indicated that the status of her maternal aunt is unknown. She indicated that the status of her other is unknown. SOCIAL HISTORY       Social History     Tobacco Use    Smoking status: Current Every Day Smoker     Packs/day: 1.00     Years: 26.00     Pack years: 26.00     Types: Cigarettes     Start date: 0    Smokeless tobacco: Never Used    Tobacco comment: wants help- adviced about smoking cessation plans   Substance Use Topics    Alcohol use: Yes     Comment: occ    Drug use: Yes     Types: Marijuana     PHYSICAL EXAM     INITIAL VITALS: BP (!) 160/98   Pulse 76   Temp 98.2 °F (36.8 °C)   Resp 18   SpO2 99%    Physical Exam  Constitutional:       Appearance: Normal appearance.    HENT:      Right Ear: External ear normal.      Left Ear: External ear normal. Mouth/Throat:      Pharynx: Posterior oropharyngeal erythema present. No oropharyngeal exudate. Eyes:      General:         Right eye: No discharge. Left eye: No discharge. Conjunctiva/sclera: Conjunctivae normal.   Cardiovascular:      Rate and Rhythm: Normal rate and regular rhythm. Pulmonary:      Effort: Pulmonary effort is normal.      Breath sounds: Normal breath sounds. Musculoskeletal: Normal range of motion. Right lower leg: No edema. Left lower leg: No edema. Skin:     General: Skin is warm and dry. Neurological:      General: No focal deficit present. Mental Status: She is alert and oriented to person, place, and time. Psychiatric:         Mood and Affect: Mood normal.         Thought Content: Thought content normal.         DIAGNOSTIC RESULTS     RADIOLOGY:All plain film, CT, MRI, and formal ultrasound images (except ED bedside ultrasound) are read by the radiologist, see reports below, unless otherwise noted in MDM or here. Interpretation per the Radiologist below, if available at the time of this note:    CT CHEST PULMONARY EMBOLISM W CONTRAST   Preliminary Result   No evidence of pulmonary embolism or acute pulmonary abnormality.          XR CHEST PORTABLE   Final Result   No acute cardiopulmonary process             LABS:  Labs Reviewed   CBC WITH AUTO DIFFERENTIAL - Abnormal; Notable for the following components:       Result Value    Hemoglobin 11.5 (*)     MCV 76.3 (*)     MCH 24.1 (*)     RDW 14.7 (*)     Lymphocytes 45 (*)     Immature Granulocytes 1 (*)     All other components within normal limits   BASIC METABOLIC PANEL - Abnormal; Notable for the following components:    Calcium 8.5 (*)     All other components within normal limits   BRAIN NATRIURETIC PEPTIDE - Abnormal; Notable for the following components:    Pro- (*)     All other components within normal limits   D-DIMER, QUANTITATIVE - Abnormal; Notable for the following components: D-Dimer, Quant 0.90 (*)     All other components within normal limits   STREP SCREEN GROUP A THROAT   TROP/MYOGLOBIN   TROP/MYOGLOBIN   LACTATE DEHYDROGENASE   FERRITIN   PROCALCITONIN   C-REACTIVE PROTEIN       All other labs were within normal range or not returned as of this dictation. EMERGENCY DEPARTMENT COURSE and DIFFERENTIAL DIAGNOSIS/MDM:   Vitals:    Vitals:    21 1004 21 1103   BP: (!) 160/98    Pulse: 76 76   Resp: 18    Temp: 98.2 °F (36.8 °C)    SpO2: 99%        Medical Decision Makin-year-old female who is afebrile does not appear ill. No distress. No acute distress. Chest x-ray, basic blood work and 2 sets of cardiac markers are unremarkable. D-dimer is elevated but CT did not have any evidence of PE. She will be discharged home to follow-up with her cardiologist.        The patient was given the following meds in the ED:  Orders Placed This Encounter   Medications    aspirin chewable tablet 324 mg    morphine (PF) injection 2 mg    iopamidol (ISOVUE-370) 76 % injection 75 mL    0.9 % sodium chloride bolus    sodium chloride flush 0.9 % injection 10 mL    morphine (PF) injection 2 mg       FINAL IMPRESSION      1.  Chest pain, unspecified type          DISPOSITION/PLAN   DISPOSITION Decision To Discharge 2021 01:19:00 PM      PATIENT REFERRED TO:     Call your cardiologist today to schedule a follow-up appointment          DISCHARGE MEDICATIONS:     New Prescriptions    No medications on file       CRITICAL CARE TIME       Please note that portions of this note were completed with a voice recognition program.      ELIZABETH KEYS CNP, APRN - CNP  [id number]

## 2021-01-27 NOTE — ED PROVIDER NOTES
EMERGENCY DEPARTMENT ENCOUNTER   ATTENDING ATTESTATION     Pt Name: Shawn Hayes  MRN: [id number]  [place] 1975  Date of evaluation: 1/27/21   Shawn Hayes is a 39 y.o. female with CC: Chest Pain and Shortness of Breath    MDM:   Patient is a 14-year-old female here with chest pain, sore throat, shortness of breath. She contacted her cardiologist who recommended she come here for evaluation. She states she deals with his pain not infrequently. Denies any ripping or tearing pain to the back. Has had a bit of a sore throat and a cough. No fevers no vomiting no abdominal pain. She had a hysterectomy. She has had PE and is on Eliquis states she has been compliant. On exam no distress resting comfortably in bed speaking full sentences she has equal radial pulses no calf tenderness or asymmetry. Will check labs, chest x-ray, cardiac work-up, if negative work-up anticipate discharge with outpatient follow-up. CRITICAL CARE:       EKG: All EKG's are interpreted by the Emergency Department Physician who either signs or Co-signs this chart in the absence of a cardiologist.    Sinus rhythm with sinus arrhythmia first-degree AV block rate of 69  normal QRS  normal axis no ST elevations or depressions T wave inversion lead III, Q waves lead III, nonspecific EKG    RADIOLOGY:All plain film, CT, MRI, and formal ultrasound images (except ED bedside ultrasound) are read by the radiologist, see reports below, unless otherwise noted in MDM or here. CT CHEST PULMONARY EMBOLISM W CONTRAST   Preliminary Result   No evidence of pulmonary embolism or acute pulmonary abnormality. XR CHEST PORTABLE   Final Result   No acute cardiopulmonary process           LABS: All lab results were reviewed by myself, and all abnormals are listed below.   Labs Reviewed   CBC WITH AUTO DIFFERENTIAL - Abnormal; Notable for the following components:       Result Value    Hemoglobin 11.5 (*) MCV 76.3 (*)     MCH 24.1 (*)     RDW 14.7 (*)     Lymphocytes 45 (*)     Immature Granulocytes 1 (*)     All other components within normal limits   BASIC METABOLIC PANEL - Abnormal; Notable for the following components:    Calcium 8.5 (*)     All other components within normal limits   BRAIN NATRIURETIC PEPTIDE - Abnormal; Notable for the following components:    Pro- (*)     All other components within normal limits   D-DIMER, QUANTITATIVE - Abnormal; Notable for the following components:    D-Dimer, Quant 0.90 (*)     All other components within normal limits   STREP SCREEN GROUP A THROAT   TROP/MYOGLOBIN   TROP/MYOGLOBIN   LACTATE DEHYDROGENASE   FERRITIN   PROCALCITONIN   C-REACTIVE PROTEIN     CONSULTS:  None  FINAL IMPRESSION    No diagnosis found. PASTMEDICAL HISTORY     Past Medical History:   Diagnosis Date    Abdominal pain     Anemia     Anticoagulant long-term use     Arthritis 2018    Left Foot    Asthma     Moderate persistent asthma without complication    Back problem     Bipolar 1 disorder (HCC)     Chest pain     with \"coughing\" per pt    CHF (congestive heart failure) ([facility] [address]     When child was delivered     Chicken pox     Chronic idiopathic constipation 2014    Chronic mental illness     Current every day smoker     Depression     Depression     DVT of deep femoral vein, bilateral (HCC)     Emphysema lung ([address]     GERD (gastroesophageal reflux disease)     GERD (gastroesophageal reflux disease) 2004    Hair loss     Headache     Hernia     High blood pressure     History of pulmonary embolism 10/4/2019    Hyperlipidemia     Hypertension     Ileus ([facility] [address]     Irregular bowel habits     Irritable bowel syndrome     MDRO (multiple drug resistant organisms) resistance     MRSA in  per pt.     Nervousness     Obesity     Pneumonia     Saddle embolus of pulmonary artery without acute cor pulmonale (HCC)     Seizures (HCC) Last Seizure 18    Slow gastric motility     Smoking     Stroke (cerebrum) ([facility] [address] 2014    Suicidal intent     . Denies 18    TIA (transient ischemic attack)     Tobacco abuse disorder 3/22/2016    Weight loss      SURGICAL HISTORY       Past Surgical History:   Procedure Laterality Date    ABSCESS DRAINAGE      abdominal abscess RLQ    AXILLARY SURGERY Right 2018    Excision of hidradenitis cysts, right axilla    BREAST LUMPECTOMY Bilateral     BREAST LUMPECTOMY       SECTION      x2    ENDOMETRIAL ABLATION      ENDOSCOPY, COLON, DIAGNOSTIC  2014    HERNIA REPAIR      HYSTERECTOMY      LYMPH NODE DISSECTION Left 2019    LEFT AXILLARY HYDRADENITIS EXCISION performed by Priyanka Rodriguez, DO at [facility] Left 2019    LEFT AXILLARY HYDRADENITIS EXCISION (Left )    OH EXC SKIN BENIG <5MM TRUNK,ARM,LEG Right 2018    EXCISION HIDRADENITIS RIGHT AXILLA performed by Priyanka Rodriguez, DO at [address]      UMBILICAL HERNIA REPAIR       CURRENT MEDICATIONS       Previous Medications    ALBUTEROL SULFATE HFA (VENTOLIN HFA) 108 (90 BASE) MCG/ACT INHALER    Inhale 2 puffs into the lungs 4 times daily as needed for Wheezing    APIXABAN (ELIQUIS) 5 MG TABS TABLET    take 1 tablet by mouth twice a day    ATORVASTATIN (LIPITOR) 10 MG TABLET    take 1 tablet by mouth once daily    BENZTROPINE (COGENTIN) 0.5 MG TABLET    take 1 tablet by mouth twice a day    BLOOD PRESSURE KIT    Use as directed.     BUTALBITAL-ACETAMINOPHEN-CAFFEINE (ESGIC) -40 MG PER TABLET    Take 1 tablet by mouth every 6 hours as needed for Headaches    CARIPRAZINE HCL (VRAYLAR) 1.5 MG CAPSULE    Take 1.5 mg by mouth daily    CETIRIZINE (ZYRTEC) 10 MG TABLET    Take 1 tablet by mouth daily    CETIRIZINE (ZYRTEC) 10 MG TABLET    Take 1 tablet by mouth daily CLONIDINE (CATAPRES-TTS-1) 0.1 MG/24HR PTWK    Place 1 patch onto the skin every 7 days    CYANOCOBALAMIN (B-12) 1000 MCG SUBL    Place 1 tablet under the tongue daily    ESCITALOPRAM (LEXAPRO) 5 MG TABLET        FLUTICASONE (FLONASE) 50 MCG/ACT NASAL SPRAY    2 sprays by Each Nostril route daily    FOLIC ACID (FOLVITE) 1 MG TABLET    Take 1 tablet by mouth daily    INDAPAMIDE (LOZOL) 1.25 MG TABLET    Take 1 tablet by mouth every morning    LEVETIRACETAM (KEPPRA) 750 MG TABLET        LOSARTAN (COZAAR) 50 MG TABLET    Take 1 tablet by mouth daily    LURASIDONE (LATUDA) 60 MG TABS TABLET    take 1 tablet by mouth once daily with food AT LEAST 350 CALORIES    MISC. DEVICES (DIGITAL GLASS SCALE) MISC    1 Act by Does not apply route daily    MONTELUKAST (SINGULAIR) 10 MG TABLET    Take 1 tablet by mouth nightly    ONDANSETRON (ZOFRAN-ODT) 4 MG DISINTEGRATING TABLET    Take 1 tablet by mouth 3 times daily as needed for Nausea or Vomiting    RA VITAMIN B-12 TR 1000 MCG TBCR    take 2 tablets by mouth once daily    TOPIRAMATE (TOPAMAX SPRINKLE) 15 MG CAPSULE    take 1 capsule by mouth nightly for 1 week then 1 capsule twice a day THEREAFTER    TRIAMCINOLONE ACETONIDE (KENALOG) 0.1 % PASTE    APPLY A THIN FILM TOPICALLY TO AFFECTED AREA 2-3 TIMES A DAY    VERAPAMIL (CALAN SR) 240 MG EXTENDED RELEASE TABLET    Take 1 tablet by mouth daily    VITAMIN D (ERGOCALCIFEROL) 1.25 MG (63018 UT) CAPS CAPSULE    Take 1 capsule by mouth once a week     ALLERGIES     is allergic to penicillins; amoxil [amoxicillin]; bee venom; clindamycin/lincomycin; flonase [fluticasone]; keflex [cephalexin]; levaquin [levofloxacin in d5w]; macrobid [nitrofurantoin monohyd macro]; and sulfa antibiotics.   FAMILY HISTORY She indicated that her mother is alive. She indicated that her father is alive. She indicated that her sister is alive. She indicated that the status of her maternal grandmother is unknown. She indicated that the status of her maternal grandfather is unknown. She indicated that the status of her maternal aunt is unknown. She indicated that the status of her other is unknown. SOCIAL HISTORY       Social History     Tobacco Use    Smoking status: Current Every Day Smoker     Packs/day: 1.00     Years: 26.00     Pack years: 26.00     Types: Cigarettes     Start date: 0    Smokeless tobacco: Never Used    Tobacco comment: wants help- adviced about smoking cessation plans   Substance Use Topics    Alcohol use: Yes     Comment: occ    Drug use: Yes     Types: Marijuana       I personally evaluated and examined the patient in conjunction with the APC and agree with the assessment, treatment plan, and disposition of the patient as recorded by the APC.    Ama Hendricks MD  Attending Emergency Physician         Ama Hendricks MD  01/27/21 [id number]

## 2021-01-27 NOTE — ED NOTES
Pt called out states chest pain increasing. Negar Gómez NP, notified. N.o. in process.       Osman Torres RN  01/27/21 [id number]

## 2021-01-27 NOTE — ED TRIAGE NOTES
Pt to ED c/o chest pain x3 days, throat pain x2 days, and bilateral lower leg edema x3 wks. Pt AOx4. States h/o dvt and pe. Patent airway, no dyspnea or cyanosis note. Skin warm, appropriate to hue and dry. Bed to lowest position, side rails up x2, call light within reach.

## 2021-01-28 LAB
EKG ATRIAL RATE: 69 BPM
EKG P AXIS: 48 DEGREES
EKG P-R INTERVAL: 210 MS
EKG Q-T INTERVAL: 432 MS
EKG QRS DURATION: 86 MS
EKG QTC CALCULATION (BAZETT): 462 MS
EKG R AXIS: 1 DEGREES
EKG T AXIS: 13 DEGREES
EKG VENTRICULAR RATE: 69 BPM

## 2021-01-28 PROCEDURE — 93010 ELECTROCARDIOGRAM REPORT: CPT | Performed by: INTERNAL MEDICINE

## 2021-03-11 ENCOUNTER — TELEPHONE (OUTPATIENT)
Dept: FAMILY MEDICINE CLINIC | Age: 46
End: 2021-03-11

## 2021-03-11 ENCOUNTER — TELEPHONE (OUTPATIENT)
Dept: GASTROENTEROLOGY | Age: 46
End: 2021-03-11

## 2021-03-17 ENCOUNTER — TELEPHONE (OUTPATIENT)
Dept: FAMILY MEDICINE CLINIC | Age: 46
End: 2021-03-17

## 2021-04-02 ENCOUNTER — OFFICE VISIT (OUTPATIENT)
Dept: FAMILY MEDICINE CLINIC | Age: 46
End: 2021-04-02
Payer: COMMERCIAL

## 2021-04-02 VITALS
HEART RATE: 80 BPM | DIASTOLIC BLOOD PRESSURE: 102 MMHG | TEMPERATURE: 97.7 F | BODY MASS INDEX: 36.67 KG/M2 | OXYGEN SATURATION: 99 % | HEIGHT: 66 IN | WEIGHT: 228.2 LBS | SYSTOLIC BLOOD PRESSURE: 143 MMHG

## 2021-04-02 DIAGNOSIS — Z11.59 NEED FOR HEPATITIS C SCREENING TEST: ICD-10-CM

## 2021-04-02 DIAGNOSIS — E53.9 VITAMIN B DEFICIENCY: ICD-10-CM

## 2021-04-02 DIAGNOSIS — E78.5 DYSLIPIDEMIA: Primary | ICD-10-CM

## 2021-04-02 DIAGNOSIS — E55.9 VITAMIN D DEFICIENCY: ICD-10-CM

## 2021-04-02 DIAGNOSIS — I10 ESSENTIAL HYPERTENSION: ICD-10-CM

## 2021-04-02 DIAGNOSIS — E66.01 CLASS 2 SEVERE OBESITY WITH SERIOUS COMORBIDITY AND BODY MASS INDEX (BMI) OF 36.0 TO 36.9 IN ADULT, UNSPECIFIED OBESITY TYPE (HCC): ICD-10-CM

## 2021-04-02 DIAGNOSIS — R73.9 HYPERGLYCEMIA: ICD-10-CM

## 2021-04-02 DIAGNOSIS — Z00.00 ROUTINE GENERAL MEDICAL EXAMINATION AT A HEALTH CARE FACILITY: ICD-10-CM

## 2021-04-02 DIAGNOSIS — D64.9 ANEMIA, UNSPECIFIED TYPE: ICD-10-CM

## 2021-04-02 DIAGNOSIS — Z13.29 SCREENING FOR THYROID DISORDER: ICD-10-CM

## 2021-04-02 PROCEDURE — G0439 PPPS, SUBSEQ VISIT: HCPCS | Performed by: FAMILY MEDICINE

## 2021-04-02 RX ORDER — CLONIDINE 0.1 MG/24H
1 PATCH, EXTENDED RELEASE TRANSDERMAL
Qty: 4 PATCH | Refills: 0 | Status: SHIPPED | OUTPATIENT
Start: 2021-04-02 | End: 2021-04-25

## 2021-04-02 RX ORDER — LOSARTAN POTASSIUM 50 MG/1
50 TABLET ORAL DAILY
Qty: 30 TABLET | Refills: 0 | Status: SHIPPED | OUTPATIENT
Start: 2021-04-02 | End: 2021-04-27

## 2021-04-02 RX ORDER — INDAPAMIDE 1.25 MG/1
1.25 TABLET, FILM COATED ORAL EVERY MORNING
Qty: 30 TABLET | Refills: 0 | Status: SHIPPED | OUTPATIENT
Start: 2021-04-02 | End: 2021-04-27

## 2021-04-02 RX ORDER — ATORVASTATIN CALCIUM 10 MG/1
10 TABLET, FILM COATED ORAL DAILY
Qty: 30 TABLET | Refills: 0 | Status: SHIPPED | OUTPATIENT
Start: 2021-04-02 | End: 2021-04-27

## 2021-04-02 SDOH — ECONOMIC STABILITY: TRANSPORTATION INSECURITY
IN THE PAST 12 MONTHS, HAS LACK OF TRANSPORTATION KEPT YOU FROM MEETINGS, WORK, OR FROM GETTING THINGS NEEDED FOR DAILY LIVING?: NOT ASKED

## 2021-04-02 SDOH — ECONOMIC STABILITY: INCOME INSECURITY: HOW HARD IS IT FOR YOU TO PAY FOR THE VERY BASICS LIKE FOOD, HOUSING, MEDICAL CARE, AND HEATING?: NOT HARD AT ALL

## 2021-04-02 SDOH — ECONOMIC STABILITY: TRANSPORTATION INSECURITY
IN THE PAST 12 MONTHS, HAS THE LACK OF TRANSPORTATION KEPT YOU FROM MEDICAL APPOINTMENTS OR FROM GETTING MEDICATIONS?: NOT ASKED

## 2021-04-02 ASSESSMENT — PATIENT HEALTH QUESTIONNAIRE - PHQ9
SUM OF ALL RESPONSES TO PHQ9 QUESTIONS 1 & 2: 4
SUM OF ALL RESPONSES TO PHQ QUESTIONS 1-9: 4
5. POOR APPETITE OR OVEREATING: 0
10. IF YOU CHECKED OFF ANY PROBLEMS, HOW DIFFICULT HAVE THESE PROBLEMS MADE IT FOR YOU TO DO YOUR WORK, TAKE CARE OF THINGS AT HOME, OR GET ALONG WITH OTHER PEOPLE: 1
4. FEELING TIRED OR HAVING LITTLE ENERGY: 1
SUM OF ALL RESPONSES TO PHQ QUESTIONS 1-9: 4
7. TROUBLE CONCENTRATING ON THINGS, SUCH AS READING THE NEWSPAPER OR WATCHING TELEVISION: 0
SUM OF ALL RESPONSES TO PHQ QUESTIONS 1-9: 4
3. TROUBLE FALLING OR STAYING ASLEEP: 0
SUM OF ALL RESPONSES TO PHQ9 QUESTIONS 1 & 2: 2
2. FEELING DOWN, DEPRESSED OR HOPELESS: 2
SUM OF ALL RESPONSES TO PHQ QUESTIONS 1-9: 3
6. FEELING BAD ABOUT YOURSELF - OR THAT YOU ARE A FAILURE OR HAVE LET YOURSELF OR YOUR FAMILY DOWN: 0

## 2021-04-02 ASSESSMENT — LIFESTYLE VARIABLES: HOW OFTEN DO YOU HAVE SIX OR MORE DRINKS ON ONE OCCASION: 0
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
## 2021-04-02 NOTE — PATIENT INSTRUCTIONS
Personalized Preventive Plan for Kaley Kinney - 4/2/2021  Medicare offers a range of preventive health benefits. Some of the tests and screenings are paid in full while other may be subject to a deductible, co-insurance, and/or copay. Some of these benefits include a comprehensive review of your medical history including lifestyle, illnesses that may run in your family, and various assessments and screenings as appropriate. After reviewing your medical record and screening and assessments performed today your provider may have ordered immunizations, labs, imaging, and/or referrals for you. A list of these orders (if applicable) as well as your Preventive Care list are included within your After Visit Summary for your review. Other Preventive Recommendations:    · A preventive eye exam performed by an eye specialist is recommended every 1-2 years to screen for glaucoma; cataracts, macular degeneration, and other eye disorders. · A preventive dental visit is recommended every 6 months. · Try to get at least 150 minutes of exercise per week or 10,000 steps per day on a pedometer . · Order or download the FREE \"Exercise & Physical Activity: Your Everyday Guide\" from [employer]. Call 9-659.471.5466 or search [employer] online. · You need 7694-1342 mg of calcium and 7328-1205 IU of vitamin D per day. It is possible to meet your calcium requirement with diet alone, but a vitamin D supplement is usually necessary to meet this goal.  · When exposed to the sun, use a sunscreen that protects against both UVA and UVB radiation with an SPF of 30 or greater. Reapply every 2 to 3 hours or after sweating, drying off with a towel, or swimming. · Always wear a seat belt when traveling in a car. Always wear a helmet when riding a bicycle or motorcycle.

## 2021-04-20 ENCOUNTER — TELEPHONE (OUTPATIENT)
Dept: FAMILY MEDICINE CLINIC | Age: 46
End: 2021-04-20

## 2021-04-25 DIAGNOSIS — I10 ESSENTIAL HYPERTENSION: ICD-10-CM

## 2021-04-25 RX ORDER — CLONIDINE 0.1 MG/24H
PATCH, EXTENDED RELEASE TRANSDERMAL
Qty: 4 PATCH | Refills: 0 | Status: SHIPPED | OUTPATIENT
Start: 2021-04-25 | End: 2021-05-24

## 2021-04-27 DIAGNOSIS — I10 ESSENTIAL HYPERTENSION: ICD-10-CM

## 2021-04-27 RX ORDER — INDAPAMIDE 1.25 MG/1
1.25 TABLET, FILM COATED ORAL EVERY MORNING
Qty: 30 TABLET | Refills: 0 | Status: SHIPPED | OUTPATIENT
Start: 2021-04-27 | End: 2021-05-26

## 2021-04-27 RX ORDER — ATORVASTATIN CALCIUM 10 MG/1
TABLET, FILM COATED ORAL
Qty: 30 TABLET | Refills: 0 | Status: SHIPPED | OUTPATIENT
Start: 2021-04-27 | End: 2021-05-26

## 2021-04-27 RX ORDER — LOSARTAN POTASSIUM 50 MG/1
TABLET ORAL
Qty: 30 TABLET | Refills: 0 | Status: SHIPPED | OUTPATIENT
Start: 2021-04-27 | End: 2021-05-26

## 2021-04-28 ENCOUNTER — HOSPITAL ENCOUNTER (OUTPATIENT)
Age: 46
Discharge: HOME | End: 2021-04-28
Payer: COMMERCIAL

## 2021-04-28 DIAGNOSIS — D64.9 ANEMIA, UNSPECIFIED TYPE: ICD-10-CM

## 2021-04-28 DIAGNOSIS — E78.5 DYSLIPIDEMIA: ICD-10-CM

## 2021-04-28 DIAGNOSIS — R73.9 HYPERGLYCEMIA: ICD-10-CM

## 2021-04-28 DIAGNOSIS — Z11.59 NEED FOR HEPATITIS C SCREENING TEST: ICD-10-CM

## 2021-04-28 DIAGNOSIS — E55.9 VITAMIN D DEFICIENCY: ICD-10-CM

## 2021-04-28 DIAGNOSIS — Z13.29 SCREENING FOR THYROID DISORDER: ICD-10-CM

## 2021-04-28 DIAGNOSIS — E66.01 CLASS 2 SEVERE OBESITY WITH SERIOUS COMORBIDITY AND BODY MASS INDEX (BMI) OF 36.0 TO 36.9 IN ADULT, UNSPECIFIED OBESITY TYPE (HCC): ICD-10-CM

## 2021-04-28 DIAGNOSIS — E53.9 VITAMIN B DEFICIENCY: ICD-10-CM

## 2021-04-28 LAB
ALBUMIN SERPL-MCNC: 3.7 G/DL (ref 3.5–5.2)
ALBUMIN/GLOBULIN RATIO: 1.1 (ref 1–2.5)
ALP BLD-CCNC: 101 U/L (ref 35–104)
ALT SERPL-CCNC: 18 U/L (ref 5–33)
ANION GAP SERPL CALCULATED.3IONS-SCNC: 11 MMOL/L (ref 9–17)
AST SERPL-CCNC: 15 U/L
BILIRUB SERPL-MCNC: 0.25 MG/DL (ref 0.3–1.2)
BUN BLDV-MCNC: 11 MG/DL (ref 6–20)
BUN/CREAT BLD: ABNORMAL (ref 9–20)
CALCIUM SERPL-MCNC: 8.5 MG/DL (ref 8.6–10.4)
CHLORIDE BLD-SCNC: 104 MMOL/L (ref 98–107)
CHOLESTEROL/HDL RATIO: 3
CHOLESTEROL: 177 MG/DL
CO2: 22 MMOL/L (ref 20–31)
CREAT SERPL-MCNC: 0.73 MG/DL (ref 0.5–0.9)
ESTIMATED AVERAGE GLUCOSE: 120 MG/DL
FOLATE: 7.6 NG/ML
GFR AFRICAN AMERICAN: >60 ML/MIN
GFR NON-AFRICAN AMERICAN: >60 ML/MIN
GFR SERPL CREATININE-BSD FRML MDRD: ABNORMAL ML/MIN/{1.73_M2}
GFR SERPL CREATININE-BSD FRML MDRD: ABNORMAL ML/MIN/{1.73_M2}
GLUCOSE BLD-MCNC: 90 MG/DL (ref 70–99)
HBA1C MFR BLD: 5.8 % (ref 4–6)
HCT VFR BLD CALC: 36.7 % (ref 36.3–47.1)
HDLC SERPL-MCNC: 59 MG/DL
HEMOGLOBIN: 11.8 G/DL (ref 11.9–15.1)
HEPATITIS C ANTIBODY: NONREACTIVE
IRON SATURATION: 26 % (ref 20–55)
IRON: 79 UG/DL (ref 37–145)
LDL CHOLESTEROL: 104 MG/DL (ref 0–130)
MCH RBC QN AUTO: 24.1 PG (ref 25.2–33.5)
MCHC RBC AUTO-ENTMCNC: 32.2 G/DL (ref 28.4–34.8)
MCV RBC AUTO: 75.1 FL (ref 82.6–102.9)
NRBC AUTOMATED: 0 PER 100 WBC
PDW BLD-RTO: 16.6 % (ref 11.8–14.4)
PLATELET # BLD: 235 K/UL (ref 138–453)
PMV BLD AUTO: 11.6 FL (ref 8.1–13.5)
POTASSIUM SERPL-SCNC: 4.1 MMOL/L (ref 3.7–5.3)
RBC # BLD: 4.89 M/UL (ref 3.95–5.11)
SODIUM BLD-SCNC: 137 MMOL/L (ref 135–144)
THYROXINE, FREE: 1.14 NG/DL (ref 0.93–1.7)
TOTAL IRON BINDING CAPACITY: 303 UG/DL (ref 250–450)
TOTAL PROTEIN: 7.1 G/DL (ref 6.4–8.3)
TRIGL SERPL-MCNC: 68 MG/DL
TSH SERPL DL<=0.05 MIU/L-ACNC: 0.84 MIU/L (ref 0.3–5)
UNSATURATED IRON BINDING CAPACITY: 224 UG/DL (ref 112–347)
VITAMIN B-12: 692 PG/ML (ref 232–1245)
VITAMIN D 25-HYDROXY: 14.7 NG/ML (ref 30–100)
VLDLC SERPL CALC-MCNC: NORMAL MG/DL (ref 1–30)
WBC # BLD: 6.7 K/UL (ref 3.5–11.3)

## 2021-04-28 PROCEDURE — 84443 ASSAY THYROID STIM HORMONE: CPT

## 2021-04-28 PROCEDURE — 80053 COMPREHEN METABOLIC PANEL: CPT

## 2021-04-28 PROCEDURE — 86803 HEPATITIS C AB TEST: CPT

## 2021-04-28 PROCEDURE — 83540 ASSAY OF IRON: CPT

## 2021-04-28 PROCEDURE — 82607 VITAMIN B-12: CPT

## 2021-04-28 PROCEDURE — 83036 HEMOGLOBIN GLYCOSYLATED A1C: CPT

## 2021-04-28 PROCEDURE — 83527 ASSAY OF INSULIN: CPT

## 2021-04-28 PROCEDURE — 82746 ASSAY OF FOLIC ACID SERUM: CPT

## 2021-04-28 PROCEDURE — 83525 ASSAY OF INSULIN: CPT

## 2021-04-28 PROCEDURE — 85027 COMPLETE CBC AUTOMATED: CPT

## 2021-04-28 PROCEDURE — 36415 COLL VENOUS BLD VENIPUNCTURE: CPT

## 2021-04-28 PROCEDURE — 84439 ASSAY OF FREE THYROXINE: CPT

## 2021-04-28 PROCEDURE — 83550 IRON BINDING TEST: CPT

## 2021-04-28 PROCEDURE — 80061 LIPID PANEL: CPT

## 2021-04-28 PROCEDURE — 82306 VITAMIN D 25 HYDROXY: CPT

## 2021-04-30 LAB
INSULIN FREE: 25 UIU/ML (ref 3–19)
INSULIN: 32 UIU/ML (ref 3–19)

## 2021-05-06 ENCOUNTER — IMMUNIZATION (OUTPATIENT)
Dept: PRIMARY CARE CLINIC | Age: 46
End: 2021-05-06
Payer: COMMERCIAL

## 2021-05-06 PROCEDURE — 91300 COVID-19, PFIZER VACCINE 30MCG/0.3ML DOSE: CPT | Performed by: INTERNAL MEDICINE

## 2021-05-06 PROCEDURE — 0001A COVID-19, PFIZER VACCINE 30MCG/0.3ML DOSE: CPT | Performed by: INTERNAL MEDICINE

## 2021-05-12 ENCOUNTER — OFFICE VISIT (OUTPATIENT)
Dept: GASTROENTEROLOGY | Age: 46
End: 2021-05-12
Payer: COMMERCIAL

## 2021-05-12 VITALS
HEIGHT: 66 IN | WEIGHT: 234 LBS | DIASTOLIC BLOOD PRESSURE: 85 MMHG | BODY MASS INDEX: 37.61 KG/M2 | SYSTOLIC BLOOD PRESSURE: 119 MMHG

## 2021-05-12 DIAGNOSIS — Z91.89 EXCESSIVE CONSUMPTION OF SODA POP: ICD-10-CM

## 2021-05-12 DIAGNOSIS — F17.200 SMOKER: ICD-10-CM

## 2021-05-12 DIAGNOSIS — R63.5 WEIGHT GAIN: ICD-10-CM

## 2021-05-12 DIAGNOSIS — K58.2 IRRITABLE BOWEL SYNDROME WITH BOTH CONSTIPATION AND DIARRHEA: Primary | ICD-10-CM

## 2021-05-12 DIAGNOSIS — K21.9 GASTROESOPHAGEAL REFLUX DISEASE, UNSPECIFIED WHETHER ESOPHAGITIS PRESENT: ICD-10-CM

## 2021-05-12 DIAGNOSIS — E66.01 MORBID (SEVERE) OBESITY DUE TO EXCESS CALORIES (HCC): ICD-10-CM

## 2021-05-12 DIAGNOSIS — K62.5 RECTAL BLEEDING: ICD-10-CM

## 2021-05-12 DIAGNOSIS — F12.90 MARIJUANA USE: ICD-10-CM

## 2021-05-12 PROCEDURE — 99204 OFFICE O/P NEW MOD 45 MIN: CPT | Performed by: INTERNAL MEDICINE

## 2021-05-12 PROCEDURE — 4004F PT TOBACCO SCREEN RCVD TLK: CPT | Performed by: INTERNAL MEDICINE

## 2021-05-12 PROCEDURE — G8417 CALC BMI ABV UP PARAM F/U: HCPCS | Performed by: INTERNAL MEDICINE

## 2021-05-12 PROCEDURE — G8427 DOCREV CUR MEDS BY ELIG CLIN: HCPCS | Performed by: INTERNAL MEDICINE

## 2021-05-12 RX ORDER — POLYETHYLENE GLYCOL 3350 17 G/17G
POWDER, FOR SOLUTION ORAL
Qty: 238 G | Refills: 0 | Status: SHIPPED | OUTPATIENT
Start: 2021-05-12 | End: 2021-05-24

## 2021-05-12 ASSESSMENT — ENCOUNTER SYMPTOMS
ABDOMINAL PAIN: 1
COUGH: 1
TROUBLE SWALLOWING: 0
CONSTIPATION: 1
ABDOMINAL DISTENTION: 1
ANAL BLEEDING: 1

## 2021-05-12 NOTE — PROGRESS NOTES
GI OFFICE FOLLOW UP    Sekuo Lopez is a 39 y.o. female evaluated via on 5/12/2021. Consent:  She and/or health care decision maker is aware that that she may receive a bill for this telephone service, depending on her insurance coverage, and has provided verbal consent to proceed: YES      INTERVAL HISTORY:   Kathy Medellin MD  [address] [address] [address],  [address] [facility]    Chief Complaint   Patient presents with    Follow-up     Patient is here today for IBS with constipation and epigastric pain. Patient notes last week when she was having BM she was having some bleeding as well on the toilet paper. Patient notes her abd pain makes her feel like someone is stabbing her in the stomach. Patient notes pain has been ongoing for a few months. Patient notes GERD symptoms. Notes having alot of bloating. Notes increased weight gain in the last few months. 1. Irritable bowel syndrome with both constipation and diarrhea    2. Gastroesophageal reflux disease, unspecified whether esophagitis present    3. Smoker    4. Marijuana use    5. Rectal bleeding    6. Morbid (severe) obesity due to excess calories ([address]    7. Excessive consumption of soda pop    8.  Weight gain      This patient is evaluated in my office for above issues  She was seen more than 3 years ago for similar issues  She was scheduled for EGD and colonoscopy  But she never showed up for this procedure neither showed up in the office after that until now  She said that she is having significant rectal bleeding off-and-on  Having significant acid reflux associated with some nausea  She has a lot of stress anxiety depression bipolar issues  Patient has been complaining of some abdominal pains, off and on cramping  Also complains of abdominal bloating and gas  Has off and on nausea without any sig vomiting  Has some alternating constipation and diarrhea  Has no weight loss  Has some anxiety issues  She has gained a lot of weight  She drinks excessive soda pops  Denies family history for colon cancer  She also uses marijuana  Denies alcohol abuse  She is a single mother of 2  kids    HISTORY OF PRESENT ILLNESS: Rosaura Echeverria is a 39 y.o. female with a past history remarkable for , referred for evaluation of   Chief Complaint   Patient presents with    Follow-up     Patient is here today for IBS with constipation and epigastric pain. Patient notes last week when she was having BM she was having some bleeding as well on the toilet paper. Patient notes her abd pain makes her feel like someone is stabbing her in the stomach. Patient notes pain has been ongoing for a few months. Patient notes GERD symptoms. Notes having alot of bloating. Notes increased weight gain in the last few months. .    Past Medical,Family, and Social History reviewed and does contribute to the patient presenting condition. Patient's PMH/PSH,SH,PSYCH Hx, MEDs, ALLERGIES, and ROS were all reviewed and updated in the appropriate sections.     PAST MEDICAL HISTORY:  Past Medical History:   Diagnosis Date    Abdominal pain     Anemia     Anticoagulant long-term use     Arthritis 04/2018    Left Foot    Asthma     Moderate persistent asthma without complication    Back problem     Bipolar 1 disorder (HCC)     Chest pain     with \"coughing\" per pt    CHF (congestive heart failure) ([address]     When child was delivered 2005    Chicken pox     Chronic idiopathic constipation 2014    Chronic mental illness     Current every day smoker     Depression     Depression     DVT of deep femoral vein, bilateral (HCC)     Emphysema lung ([address] 2011    GERD (gastroesophageal reflux disease)     GERD (gastroesophageal reflux disease) 2004    Hair loss     Headache     Hernia     High blood pressure     History of pulmonary embolism 10/4/2019    Hyperlipidemia     Hypertension     I[facility]us ([facility]     Irregular bowel habits     Irritable bowel syndrome     MDRO (multiple drug resistant organisms) resistance     MRSA in  per pt.  Nervousness     Obesity     Pneumonia     Saddle embolus of pulmonary artery without acute cor pulmonale (HCC)     Seizures ([facility]     Last Seizure 18    Slow gastric motility     Smoking     Stroke (cerebrum) ([facility]     Suicidal intent     .  Denies 18    TIA (transient ischemic attack)     Tobacco abuse disorder 3/22/2016    Weight loss        Past Surgical History:   Procedure Laterality Date    ABSCESS DRAINAGE      abdominal abscess RLQ    AXILLARY SURGERY Right 2018    Excision of hidradenitis cysts, right axilla    BREAST LUMPECTOMY Bilateral     BREAST LUMPECTOMY       SECTION      x2    ENDOMETRIAL ABLATION      ENDOSCOPY, COLON, DIAGNOSTIC  2014    HERNIA REPAIR      HYSTERECTOMY      LYMPH NODE DISSECTION Left 2019    LEFT AXILLARY HYDRADENITIS EXCISION performed by Danny Obrien DO at [address] Left 2019    LEFT AXILLARY HYDRADENITIS EXCISION (Left )    WI EXC SKIN BENIG <5MM TRUNK,ARM,LEG Right 2018    EXCISION HIDRADENITIS RIGHT AXILLA performed by Danny Obrien DO at [address]      UMBILICAL HERNIA REPAIR         CURRENT MEDICATIONS:    Current Outpatient Medications:     atorvastatin (LIPITOR) 10 MG tablet, take 1 tablet by mouth once daily, Disp: 30 tablet, Rfl: 0    indapamide (LOZOL) 1.25 MG tablet, take 1 tablet by mouth every morning, Disp: 30 tablet, Rfl: 0    losartan (COZAAR) 50 MG tablet, take 1 tablet by mouth once daily, Disp: 30 tablet, Rfl: 0    cloNIDine (CATAPRES) 0.1 MG/24HR PTWK, apply 1 patch every week as directed, Disp: 4 patch, Rfl: 0    apixaban (ELIQUIS) 5 MG TABS tablet, take 1 tablet by mouth twice a day, Disp: 60 tablet, Rfl: 4    Misc.  Devices (DIGITAL GLASS SCALE) MISC, 1 Act by Does not apply route daily, Disp: 1 each, Rfl: 0    montelukast (SINGULAIR) 10 MG tablet, Take 1 tablet by mouth nightly, Disp: 90 tablet, Rfl: 5    folic acid (FOLVITE) 1 MG tablet, Take 1 tablet by mouth daily, Disp: 90 tablet, Rfl: 1    albuterol sulfate HFA (VENTOLIN HFA) 108 (90 Base) MCG/ACT inhaler, Inhale 2 puffs into the lungs 4 times daily as needed for Wheezing, Disp: 1 Inhaler, Rfl: 5    fluticasone (FLONASE) 50 MCG/ACT nasal spray, 2 sprays by Each Nostril route daily, Disp: 3 Bottle, Rfl: 1    cetirizine (ZYRTEC) 10 MG tablet, Take 1 tablet by mouth daily, Disp: 30 tablet, Rfl: 0    topiramate (TOPAMAX SPRINKLE) 15 MG capsule, take 1 capsule by mouth nightly for 1 week then 1 capsule twice a day THEREAFTER, Disp: 60 capsule, Rfl: 3    RA VITAMIN B-12 TR 1000 MCG TBCR, take 2 tablets by mouth once daily, Disp: 180 tablet, Rfl: 1    Blood Pressure KIT, Use as directed., Disp: 1 kit, Rfl: 0    Cyanocobalamin (B-12) 1000 MCG SUBL, Place 1 tablet under the tongue daily, Disp: 90 tablet, Rfl: 5    escitalopram (LEXAPRO) 5 MG tablet, 10 mg , Disp: , Rfl: 0    benztropine (COGENTIN) 0.5 MG tablet, take 1 tablet by mouth twice a day, Disp: , Rfl: 0    cetirizine (ZYRTEC) 10 MG tablet, Take 1 tablet by mouth daily, Disp: 90 tablet, Rfl: 1    ALLERGIES:   Allergies   Allergen Reactions    Penicillins Anaphylaxis and Rash    Amoxil [Amoxicillin]      Swelling of throat, rash and cough    Bee Venom Hives and Other (See Comments)    Clindamycin/Lincomycin      rash    Flonase [Fluticasone]      Headaches      Keflex [Cephalexin]      itching    Levaquin [Levofloxacin In D5w] Hives    Macrobid [Nitrofurantoin Monohyd Macro] Hives    Sulfa Antibiotics Hives       FAMILY HISTORY:       Problem Relation Age of Onset    Asthma Mother     High Blood Pressure Mother     Mental Illness Mother     Stroke Other     Other Sister blood clotting disorder, ms    Depression Sister     Cancer Maternal Grandmother     Diabetes Maternal Grandmother     Cancer Maternal Aunt     Diabetes Maternal Grandfather          SOCIAL HISTORY:   Social History     Socioeconomic History    Marital status: Single     Spouse name: Not on file    Number of children: Not on file    Years of education: Not on file    Highest education level: Not on file   Occupational History    Not on file   Social Needs    Financial resource strain: Not hard at all   Lianne-Soraida insecurity     Worry: Never true     Inability: Never true   [employer] needs     Medical: Not on file     Non-medical: Not on file   Tobacco Use    Smoking status: Current Every Day Smoker     Packs/day: 1.00     Years: 26.00     Pack years: 26.00     Types: Cigarettes     Start date: 1981    Smokeless tobacco: Never Used    Tobacco comment: wants help- adviced about smoking cessation plans   Substance and Sexual Activity    Alcohol use: Yes     Comment: occ    Drug use: Yes     Types: Marijuana    Sexual activity: Yes     Partners: Male   Lifestyle    Physical activity     Days per week: Not on file     Minutes per session: Not on file    Stress: Not on file   Relationships    Social connections     Talks on phone: Not on file     Gets together: Not on file     Attends Protestant service: Not on file     Active member of club or organization: Not on file     Attends meetings of clubs or organizations: Not on file     Relationship status: Not on file    Intimate partner violence     Fear of current or ex partner: Not on file     Emotionally abused: Not on file     Physically abused: Not on file     Forced sexual activity: Not on file   Other Topics Concern    Not on file   Social History Narrative    Not on file         REVIEW OF SYSTEMS:         Review of Systems   Constitutional: Positive for appetite change (loss), fatigue and unexpected weight change (gaining).    HENT: 0.73 04/28/2021    LABALBU 3.7 04/28/2021    BILITOT 0.25 (L) 04/28/2021    ALKPHOS 101 04/28/2021    AST 15 04/28/2021    ALT 18 04/28/2021    INR 0.9 10/21/2020         Lab Results   Component Value Date    RBC 4.89 04/28/2021    HGB 11.8 (L) 04/28/2021    MCV 75.1 (L) 04/28/2021    MCH 24.1 (L) 04/28/2021    MCHC 32.2 04/28/2021    RDW 16.6 (H) 04/28/2021    MPV 11.6 04/28/2021    BASOPCT 1 01/27/2021    LYMPHSABS 2.97 01/27/2021    MONOSABS 0.63 01/27/2021    NEUTROABS 2.67 01/27/2021    EOSABS 0.07 01/27/2021    BASOSABS 0.03 01/27/2021         DIAGNOSTIC TESTING:     No results found. Assessment  1. Irritable bowel syndrome with both constipation and diarrhea    2. Gastroesophageal reflux disease, unspecified whether esophagitis present    3. Smoker    4. Marijuana use    5. Rectal bleeding    6. Morbid (severe) obesity due to excess calories ([address]    7. Excessive consumption of soda pop    8. Weight gain        Plan    Plan EGD and Colonoscopy     The Endoscopic procedure was explained to the patient in detail  The prep and NPO were explained  All the Risks, Benefits, and Alternatives were explained  Risk of Bleeding, Perforation and Cardio Respiratory risks were explained  her questions were answered  The procedure has been scheduled with the  in the office  Patient was asked to give us a call for any questions  The patient has verbalized understanding and agreement to this plan. Quit soda  Quit processed food    Pt seems to have signs and symptoms consistent with GERD, acid indigestion and heartburns. She was discussed  in detail about some possible life style and dietary modifications. She was stressed about the maintenance  of appropriate weight and effect of obesity contributing to reflux symptoms. Routine exercise was streesed. Avoidance of Caffeine, nicotine and chocolate were explained. Pt was asked to avoid spices grease and fried food.  Advices were also given about avoidance of any kind of fast foods, soda pops and high energy drinks. Pt was advised to place two small block under the head end of the bed which may help with night time reflux. Was advised not to eat any thin at least 2-3 hrs before going to bed and walk especially after dinner    Pt has verbalized understanding and agreement to this plan. Pt was advised in detail about some life style and dietary modifications. She was advised about avoidance of caffeine, nicotine and chocolate. Pt was also told to stay away from any kind of fast foods, soda pops. She was also advised to avoid lots of spices, grease and fried food etc.     Instructions were also given about trying to arrange the timing, quality and quantity of food. Instructions were given about using ample amount of fiber including dietary and supplemental fiber either metamucil, bennafiber or citrucell etc.  Pt was advised about drinking ample amount of water without any colors or chemicals. Stress was given about regular exercise. Pt has verbalized understanding and agreement to these modifications. The patient was instructed to start taking some OTC Probiotics products   These are available over the counter at the Pharmacy stores and Grocery stores  He was explained about the beneficial effects they have in the GI track  They will help to establish the good bacterial kala and will help with the digestion and bowel movements  The patient has verbalized understanding and agreement to this plan    More than half of patient's clinic visit time was spent in counseling about lifestyle and dietary modifications  Patient's  questions were answered in this regard as well  The patient has verbalized understanding and agreement     Pt was given advices and instructions about weight loss. She was advised about the significance of exercise at least three times a week .    Dietary advices were also given about the avoidance of fast food, fried food and lots of spices and grease. nstructions were given about using ample amount of fiber including dietary and supplemental fiber either metamucil, bennafiber or citrucell etc.  Pt was advised about drinking ample amount of water without any colors or chemicals. Stress was given about regular exercise. Pt was told to stay way from soda pops. Pt has verbalized understanding and agrrement        I communicated with the patient and/or health care decision maker about   Details of this discussion including any medical advice provided:YES      I affirm this is a Patient Initiated Episode with an Established Patient who has not had a related appointment within my department in the past 7 days or scheduled within the next 24 hours. Total Time: minutes: 21-30 minutes    Note: not billable if this call serves to triage the patient into an appointment for the relevant concern      Thank you for allowing me to participate in the care of Ms. Kinney. For any further questions please do not hesitate to contact me. I have reviewed and agree with the ROS entered by the MA/LPN.          Hollie Lamb MD, [facility]  Board Certified in Gastroenterology and [address] [place] [address] Gastroenterology  Office #: [phone]

## 2021-05-13 ENCOUNTER — TELEPHONE (OUTPATIENT)
Dept: GASTROENTEROLOGY | Age: 46
End: 2021-05-13

## 2021-05-13 NOTE — TELEPHONE ENCOUNTER
Talked to Philip Cotton to inform her of Covid testing on 06/12/21 @ 10:10 am at [facility], she voiced understanding.

## 2021-05-23 DIAGNOSIS — I10 ESSENTIAL HYPERTENSION: ICD-10-CM

## 2021-05-24 ENCOUNTER — OFFICE VISIT (OUTPATIENT)
Dept: FAMILY MEDICINE CLINIC | Age: 46
End: 2021-05-24
Payer: COMMERCIAL

## 2021-05-24 ENCOUNTER — TELEPHONE (OUTPATIENT)
Dept: GASTROENTEROLOGY | Age: 46
End: 2021-05-24

## 2021-05-24 ENCOUNTER — HOSPITAL ENCOUNTER (OUTPATIENT)
Age: 46
Setting detail: SPECIMEN
Discharge: HOME | End: 2021-05-24
Payer: COMMERCIAL

## 2021-05-24 VITALS
DIASTOLIC BLOOD PRESSURE: 97 MMHG | WEIGHT: 232 LBS | OXYGEN SATURATION: 100 % | SYSTOLIC BLOOD PRESSURE: 132 MMHG | TEMPERATURE: 97.3 F | HEART RATE: 77 BPM | BODY MASS INDEX: 37.45 KG/M2

## 2021-05-24 DIAGNOSIS — N73.9 PELVIC INFECTION IN FEMALE: ICD-10-CM

## 2021-05-24 DIAGNOSIS — Z71.6 TOBACCO ABUSE COUNSELING: ICD-10-CM

## 2021-05-24 DIAGNOSIS — G89.29 CHRONIC NONINTRACTABLE HEADACHE, UNSPECIFIED HEADACHE TYPE: ICD-10-CM

## 2021-05-24 DIAGNOSIS — F31.9 BIPOLAR 1 DISORDER (HCC): ICD-10-CM

## 2021-05-24 DIAGNOSIS — Z72.0 TOBACCO ABUSE DISORDER: Primary | ICD-10-CM

## 2021-05-24 DIAGNOSIS — Z86.711 HISTORY OF PULMONARY EMBOLISM: ICD-10-CM

## 2021-05-24 DIAGNOSIS — E53.8 FOLATE DEFICIENCY: ICD-10-CM

## 2021-05-24 DIAGNOSIS — E66.9 CLASS 1 OBESITY WITH BODY MASS INDEX (BMI) OF 32.0 TO 32.9 IN ADULT, UNSPECIFIED OBESITY TYPE, UNSPECIFIED WHETHER SERIOUS COMORBIDITY PRESENT: ICD-10-CM

## 2021-05-24 DIAGNOSIS — R30.0 DYSURIA: ICD-10-CM

## 2021-05-24 DIAGNOSIS — R51.9 CHRONIC NONINTRACTABLE HEADACHE, UNSPECIFIED HEADACHE TYPE: ICD-10-CM

## 2021-05-24 DIAGNOSIS — Z79.01 LONG TERM CURRENT USE OF ANTICOAGULANT THERAPY: ICD-10-CM

## 2021-05-24 DIAGNOSIS — F43.10 POSTTRAUMATIC STRESS DISORDER: ICD-10-CM

## 2021-05-24 DIAGNOSIS — F43.10 PTSD (POST-TRAUMATIC STRESS DISORDER): ICD-10-CM

## 2021-05-24 DIAGNOSIS — R56.9 SEIZURES (HCC): ICD-10-CM

## 2021-05-24 DIAGNOSIS — I10 ESSENTIAL HYPERTENSION: ICD-10-CM

## 2021-05-24 DIAGNOSIS — E55.9 VITAMIN D DEFICIENCY: ICD-10-CM

## 2021-05-24 PROBLEM — Z87.42 HISTORY OF ABNORMAL UTERINE BLEEDING: Status: RESOLVED | Noted: 2017-07-03 | Resolved: 2021-05-24

## 2021-05-24 PROBLEM — M79.604 PAIN IN BOTH LOWER EXTREMITIES: Status: RESOLVED | Noted: 2020-06-09 | Resolved: 2021-05-24

## 2021-05-24 PROBLEM — K42.9 UMBILICAL HERNIA: Status: ACTIVE | Noted: 2018-01-19

## 2021-05-24 PROBLEM — A49.02 MRSA INFECTION: Status: RESOLVED | Noted: 2020-01-20 | Resolved: 2021-05-24

## 2021-05-24 PROBLEM — R53.83 FATIGUE: Status: RESOLVED | Noted: 2019-10-04 | Resolved: 2021-05-24

## 2021-05-24 PROBLEM — M79.605 PAIN IN BOTH LOWER EXTREMITIES: Status: RESOLVED | Noted: 2020-06-09 | Resolved: 2021-05-24

## 2021-05-24 PROBLEM — L73.2 AXILLARY HIDRADENITIS SUPPURATIVA: Status: RESOLVED | Noted: 2020-05-06 | Resolved: 2021-05-24

## 2021-05-24 PROBLEM — K62.5 RECTAL BLEEDING: Status: RESOLVED | Noted: 2021-05-12 | Resolved: 2021-05-24

## 2021-05-24 PROBLEM — N76.0 ACUTE VAGINITIS: Status: RESOLVED | Noted: 2020-04-22 | Resolved: 2021-05-24

## 2021-05-24 PROBLEM — N92.6 ABNORMAL MENSTRUAL CYCLE: Status: ACTIVE | Noted: 2018-01-19

## 2021-05-24 LAB
BILIRUBIN, POC: NEGATIVE
BLOOD URINE, POC: NEGATIVE
CLARITY, POC: ABNORMAL
COLOR, POC: YELLOW
GLUCOSE URINE, POC: NEGATIVE
KETONES, POC: NEGATIVE
LEUKOCYTE EST, POC: NEGATIVE
NITRITE, POC: ABNORMAL
PH, POC: 5
PROTEIN, POC: NEGATIVE
SPECIFIC GRAVITY, POC: 1.01
UROBILINOGEN, POC: 0.2

## 2021-05-24 PROCEDURE — G8427 DOCREV CUR MEDS BY ELIG CLIN: HCPCS | Performed by: FAMILY MEDICINE

## 2021-05-24 PROCEDURE — 99214 OFFICE O/P EST MOD 30 MIN: CPT | Performed by: FAMILY MEDICINE

## 2021-05-24 PROCEDURE — G8417 CALC BMI ABV UP PARAM F/U: HCPCS | Performed by: FAMILY MEDICINE

## 2021-05-24 PROCEDURE — 81003 URINALYSIS AUTO W/O SCOPE: CPT | Performed by: FAMILY MEDICINE

## 2021-05-24 PROCEDURE — 4004F PT TOBACCO SCREEN RCVD TLK: CPT | Performed by: FAMILY MEDICINE

## 2021-05-24 RX ORDER — DOXYCYCLINE HYCLATE 100 MG
100 TABLET ORAL 2 TIMES DAILY
Qty: 28 TABLET | Refills: 0 | Status: SHIPPED | OUTPATIENT
Start: 2021-05-24 | End: 2021-06-07

## 2021-05-24 RX ORDER — METRONIDAZOLE 500 MG/1
500 TABLET ORAL 2 TIMES DAILY
Qty: 28 TABLET | Refills: 0 | Status: SHIPPED | OUTPATIENT
Start: 2021-05-24 | End: 2021-06-07

## 2021-05-24 RX ORDER — ESCITALOPRAM OXALATE 10 MG/1
TABLET ORAL
COMMUNITY
Start: 2021-05-18

## 2021-05-24 RX ORDER — CARIPRAZINE 3 MG/1
CAPSULE, GELATIN COATED ORAL
COMMUNITY
Start: 2021-04-26

## 2021-05-24 RX ORDER — CLONIDINE 0.1 MG/24H
PATCH, EXTENDED RELEASE TRANSDERMAL
Qty: 4 PATCH | Refills: 0 | Status: SHIPPED | OUTPATIENT
Start: 2021-05-24 | Stop reason: SDUPTHER

## 2021-05-24 ASSESSMENT — ENCOUNTER SYMPTOMS
RESPIRATORY NEGATIVE: 1
BLOOD IN STOOL: 1
BACK PAIN: 1
EYES NEGATIVE: 1
ALLERGIC/IMMUNOLOGIC NEGATIVE: 1

## 2021-05-24 ASSESSMENT — PATIENT HEALTH QUESTIONNAIRE - PHQ9
SUM OF ALL RESPONSES TO PHQ QUESTIONS 1-9: 0
2. FEELING DOWN, DEPRESSED OR HOPELESS: 0
SUM OF ALL RESPONSES TO PHQ QUESTIONS 1-9: 0
SUM OF ALL RESPONSES TO PHQ QUESTIONS 1-9: 0

## 2021-05-24 NOTE — PROGRESS NOTES
Subjective:      Patient ID: Marino Pena is a 39 y.o. female. HPI  States was with a new sexual partner and afte a week started to burn and have an odor and   Also whitish discharge for the last few days. No fever or chills, low back is hurting more than usual and states burning and pain with urination. States freq as well as urgency and incontinence.  has been taking all BP meds but forgot to change her Catapress patch today so BP is high. Mood, sleep ,appetite are ok. She has been having BR blood per rectum for a few weeks and   has a colonoscopy scheduled in June. Review of Systems   Constitutional: Negative. HENT: Negative. Eyes: Negative. Respiratory: Negative. Cardiovascular: Negative. Gastrointestinal: Positive for blood in stool. Endocrine: Negative. Genitourinary: Positive for dysuria, frequency, pelvic pain, urgency and vaginal discharge. Musculoskeletal: Positive for back pain. Skin: Negative. Allergic/Immunologic: Negative. Neurological: Negative. Hematological: Negative. Psychiatric/Behavioral: Negative. Objective:   Physical Exam  Constitutional:       General: She is not in acute distress. HENT:      Head: Normocephalic and atraumatic. Right Ear: External ear normal.      Left Ear: External ear normal.   Eyes:      Conjunctiva/sclera: Conjunctivae normal.      Pupils: Pupils are equal, round, and reactive to light. Neck:      Thyroid: No thyromegaly. Trachea: No tracheal deviation. Cardiovascular:      Rate and Rhythm: Normal rate and regular rhythm. Heart sounds: No murmur heard. No friction rub. No gallop. Pulmonary:      Effort: Pulmonary effort is normal. No respiratory distress. Breath sounds: Normal breath sounds. No stridor. No wheezing or rales. Chest:      Chest wall: No tenderness. Abdominal:      General: Bowel sounds are normal. There is no distension. Palpations: Abdomen is soft. Tenderness: There is no abdominal tenderness. There is no rebound. Genitourinary:     General: Normal vulva. Vagina: Vaginal discharge present. Comments: Tenderness L>R adnexa  Musculoskeletal:         General: Normal range of motion. Cervical back: Normal range of motion. Lymphadenopathy:      Cervical: No cervical adenopathy. Skin:     General: Skin is warm. Coloration: Skin is not pale. Findings: No erythema or rash. Neurological:      General: No focal deficit present. Mental Status: She is alert and oriented to person, place, and time. Mental status is at baseline. Cranial Nerves: No cranial nerve deficit. Motor: No abnormal muscle tone. Deep Tendon Reflexes: Reflexes normal.   Psychiatric:         Mood and Affect: Mood normal.         Behavior: Behavior normal.         Thought Content: Thought content normal.         Judgment: Judgment normal.         Assessment:       Diagnosis Orders   1. Tobacco abuse disorder     2. Seizures ([facility]     3. Bipolar 1 disorder ([facility]     4. PTSD (post-traumatic stress disorder)     5. Long term current use of anticoagulant therapy     6. History of pulmonary embolism     7. Essential hypertension     8. Vitamin D deficiency     9. Folate deficiency     10. Tobacco abuse counseling     11. Posttraumatic stress disorder     12. Chronic nonintractable headache, unspecified headache type     13. Class 1 obesity with body mass index (BMI) of 32.0 to 32.9 in adult, unspecified obesity type, unspecified whether serious comorbidity present     14. Dysuria  POCT Urinalysis No Micro (Auto)    Urinalysis Reflex to Culture    Chlamydia/GC DNA, Urine   15.  Pelvic infection in female  CBC With Auto Differential    Comprehensive Metabolic Panel    doxycycline hyclate (VIBRA-TABS) 100 MG tablet    metroNIDAZOLE (FLAGYL) 500 MG tablet    C.trachomatis N.gonorrhoeae DNA    C.trachomatis N.gonorrhoeae DNA, Urine    VAGINITIS DNA PROBE Plan:      Orders Placed This Encounter   Procedures    C.trachomatis N.gonorrhoeae DNA    C.trachomatis N.gonorrhoeae DNA, Urine    VAGINITIS DNA PROBE    Chlamydia/GC DNA, Urine    Urinalysis Reflex to Culture    CBC With Auto Differential    Comprehensive Metabolic Panel    POCT Urinalysis No Micro (Auto)       Outpatient Encounter Medications as of 5/24/2021   Medication Sig Dispense Refill    cloNIDine (CATAPRES) 0.1 MG/24HR PTWK apply 1 patch every week as directed 4 patch 0    doxycycline hyclate (VIBRA-TABS) 100 MG tablet Take 1 tablet by mouth 2 times daily for 14 days 28 tablet 0    metroNIDAZOLE (FLAGYL) 500 MG tablet Take 1 tablet by mouth 2 times daily for 14 days 28 tablet 0    atorvastatin (LIPITOR) 10 MG tablet take 1 tablet by mouth once daily 30 tablet 0    indapamide (LOZOL) 1.25 MG tablet take 1 tablet by mouth every morning 30 tablet 0    losartan (COZAAR) 50 MG tablet take 1 tablet by mouth once daily 30 tablet 0    apixaban (ELIQUIS) 5 MG TABS tablet take 1 tablet by mouth twice a day 60 tablet 4    Misc. Devices (DIGITAL GLASS SCALE) MISC 1 Act by Does not apply route daily 1 each 0    folic acid (FOLVITE) 1 MG tablet Take 1 tablet by mouth daily 90 tablet 1    albuterol sulfate HFA (VENTOLIN HFA) 108 (90 Base) MCG/ACT inhaler Inhale 2 puffs into the lungs 4 times daily as needed for Wheezing 1 Inhaler 5    fluticasone (FLONASE) 50 MCG/ACT nasal spray 2 sprays by Each Nostril route daily 3 Bottle 1    cetirizine (ZYRTEC) 10 MG tablet Take 1 tablet by mouth daily 30 tablet 0    topiramate (TOPAMAX SPRINKLE) 15 MG capsule take 1 capsule by mouth nightly for 1 week then 1 capsule twice a day THEREAFTER 60 capsule 3    RA VITAMIN B-12 TR 1000 MCG TBCR take 2 tablets by mouth once daily 180 tablet 1    Blood Pressure KIT Use as directed.  1 kit 0    Cyanocobalamin (B-12) 1000 MCG SUBL Place 1 tablet under the tongue daily 90 tablet 5    benztropine (COGENTIN) 0.5 MG tablet take 1 tablet by mouth twice a day  0    cetirizine (ZYRTEC) 10 MG tablet Take 1 tablet by mouth daily 90 tablet 1    VRAYLAR 3 MG CAPS capsule take 1 tablet by mouth once daily      escitalopram (LEXAPRO) 10 MG tablet       [DISCONTINUED] polyethylene glycol (GLYCOLAX) 17 GM/SCOOP powder Use as directed by following your patient instructions given by office. (Patient not taking: Reported on 5/24/2021) 238 g 0    [DISCONTINUED] polyethylene glycol (GLYCOLAX) 17 GM/SCOOP powder Use as directed by following your patient instructions given by office. (Patient not taking: Reported on 5/24/2021) 238 g 0    [DISCONTINUED] bisacodyl (DULCOLAX) 5 MG EC tablet TAKE 4 TABS AS DIRECTED BY PHYSICIAN OFFICE (Patient not taking: Reported on 5/24/2021) 4 tablet 0    [DISCONTINUED] bisacodyl (DULCOLAX) 5 MG EC tablet TAKE 4 TABS AS DIRECTED BY PHYSICIAN OFFICE (Patient not taking: Reported on 5/24/2021) 4 tablet 0    [DISCONTINUED] magnesium citrate solution Take 296 mLs by mouth once for 1 dose 296 mL 0    [DISCONTINUED] magnesium citrate solution Take 296 mLs by mouth once for 1 dose 296 mL 0    [DISCONTINUED] magnesium citrate solution Take 296 mLs by mouth once for 1 dose 296 mL 0    [DISCONTINUED] magnesium citrate solution Take 296 mLs by mouth once for 1 dose 296 mL 0    [DISCONTINUED] montelukast (SINGULAIR) 10 MG tablet Take 1 tablet by mouth nightly (Patient not taking: Reported on 5/24/2021) 90 tablet 5    [DISCONTINUED] escitalopram (LEXAPRO) 5 MG tablet 10 mg   0     No facility-administered encounter medications on file as of 5/24/2021. She has multiple drug allergies- will treat with flagyl and doxy and wait for urine and pelvic cultures.     Daisha Costello MD

## 2021-05-24 NOTE — TELEPHONE ENCOUNTER
Clearance received and scanned. Talked to Philip Cotton to inform her of ok to hold Eliquis 48 hours prior to procedure. She voiced understanding.

## 2021-05-24 NOTE — PATIENT INSTRUCTIONS
the end of sexual contact. · Talk to your partner before you have sex. Find out if he or she has or is at risk for any sexually transmitted infection (STI). Keep in mind that a person may be able to spread an STI even if he or she does not have symptoms. · Do not have sex with anyone who has symptoms of an STI, such as sores on the genitals or mouth. · Having one sex partner (who does not have STIs and does not have sex with anyone else) is a good way to avoid STIs. When should you call for help? Call your doctor now or seek immediate medical care if:    · You have a new or higher fever.     · You have unusual vaginal bleeding.     · You have new or worse belly or pelvic pain.     · You have vaginal discharge that has increased in amount or smells bad. Watch closely for changes in your health, and be sure to contact your doctor if:    · You do not get better as expected. Where can you learn more? Go to [email or web address] and sign in to your [employer] account. Enter N294 in the [employer] box to learn more about \"Pelvic Inflammatory Disease: Care Instructions. \"     If you do not have an account, please click on the \"Sign Up Now\" link. Current as of: July 17, 2020               Content Version: 12.8  © 2006-2021 Healthwise, Incorporated. Care instructions adapted under license by [facility] ([facility]). If you have questions about a medical condition or this instruction, always ask your healthcare professional. [place] [address] any warranty or liability for your use of this information.

## 2021-05-24 NOTE — TELEPHONE ENCOUNTER
Blanca Machado is calling to request a refill on the following medication(s):    Medication Request:  Requested Prescriptions     Pending Prescriptions Disp Refills    cloNIDine (CATAPRES) 0.1 MG/24HR PTWK [Pharmacy Med Name: CLONIDINE 0.1 MG/DAY PATCH] 4 patch 0     Sig: apply 1 patch every week as directed       Last Visit Date (If Applicable):  0/0/4589    Next Visit Date:    5/24/2021

## 2021-05-25 DIAGNOSIS — I26.92 SADDLE EMBOLUS OF PULMONARY ARTERY WITHOUT ACUTE COR PULMONALE, UNSPECIFIED CHRONICITY (HCC): ICD-10-CM

## 2021-05-25 LAB
BILIRUBIN URINE: NEGATIVE
C TRACH DNA GENITAL QL NAA+PROBE: NEGATIVE
COLOR: YELLOW
COMMENT UA: NORMAL
DIRECT EXAM: ABNORMAL
GLUCOSE URINE: NEGATIVE
KETONES, URINE: NEGATIVE
LEUKOCYTE ESTERASE, URINE: NEGATIVE
Lab: ABNORMAL
N. GONORRHOEAE DNA: NEGATIVE
NITRITE, URINE: NEGATIVE
PH UA: 6 (ref 5–8)
PROTEIN UA: NEGATIVE
SPECIFIC GRAVITY UA: 1.02 (ref 1–1.03)
SPECIMEN DESCRIPTION: ABNORMAL
SPECIMEN DESCRIPTION: NORMAL
TURBIDITY: CLEAR
URINE HGB: NEGATIVE
UROBILINOGEN, URINE: NORMAL

## 2021-05-25 NOTE — TELEPHONE ENCOUNTER
Dr Abram Carty, patient is current and is scheduled for follow up on 6/16/21. Per last dictation recommended lifelong anticoagulation. Please sign for refill if ok. Thank you.

## 2021-05-26 ENCOUNTER — HOSPITAL ENCOUNTER (OUTPATIENT)
Age: 46
Discharge: HOME | End: 2021-05-26
Payer: COMMERCIAL

## 2021-05-26 DIAGNOSIS — N73.9 PELVIC INFECTION IN FEMALE: ICD-10-CM

## 2021-05-26 DIAGNOSIS — Z79.01 LONG TERM CURRENT USE OF ANTICOAGULANT THERAPY: ICD-10-CM

## 2021-05-26 LAB
ABSOLUTE EOS #: 0.09 K/UL (ref 0–0.44)
ABSOLUTE IMMATURE GRANULOCYTE: <0.03 K/UL (ref 0–0.3)
ABSOLUTE LYMPH #: 3.44 K/UL (ref 1.1–3.7)
ABSOLUTE MONO #: 0.76 K/UL (ref 0.1–1.2)
ALBUMIN SERPL-MCNC: 3.6 G/DL (ref 3.5–5.2)
ALBUMIN/GLOBULIN RATIO: 1.1 (ref 1–2.5)
ALP BLD-CCNC: 118 U/L (ref 35–104)
ALT SERPL-CCNC: 20 U/L (ref 5–33)
ANION GAP SERPL CALCULATED.3IONS-SCNC: 11 MMOL/L (ref 9–17)
AST SERPL-CCNC: 18 U/L
BASOPHILS # BLD: 0 % (ref 0–2)
BASOPHILS ABSOLUTE: <0.03 K/UL (ref 0–0.2)
BILIRUB SERPL-MCNC: 0.2 MG/DL (ref 0.3–1.2)
BUN BLDV-MCNC: 9 MG/DL (ref 6–20)
BUN/CREAT BLD: ABNORMAL (ref 9–20)
CALCIUM SERPL-MCNC: 8.5 MG/DL (ref 8.6–10.4)
CHLORIDE BLD-SCNC: 103 MMOL/L (ref 98–107)
CO2: 19 MMOL/L (ref 20–31)
CREAT SERPL-MCNC: 0.73 MG/DL (ref 0.5–0.9)
DIFFERENTIAL TYPE: ABNORMAL
EOSINOPHILS RELATIVE PERCENT: 1 % (ref 1–4)
GFR AFRICAN AMERICAN: >60 ML/MIN
GFR NON-AFRICAN AMERICAN: >60 ML/MIN
GFR SERPL CREATININE-BSD FRML MDRD: ABNORMAL ML/MIN/{1.73_M2}
GFR SERPL CREATININE-BSD FRML MDRD: ABNORMAL ML/MIN/{1.73_M2}
GLUCOSE BLD-MCNC: 95 MG/DL (ref 70–99)
HCT VFR BLD CALC: 36.6 % (ref 36.3–47.1)
HEMOGLOBIN: 11.6 G/DL (ref 11.9–15.1)
IMMATURE GRANULOCYTES: 0 %
LYMPHOCYTES # BLD: 48 % (ref 24–43)
MCH RBC QN AUTO: 23.7 PG (ref 25.2–33.5)
MCHC RBC AUTO-ENTMCNC: 31.7 G/DL (ref 28.4–34.8)
MCV RBC AUTO: 74.7 FL (ref 82.6–102.9)
MONOCYTES # BLD: 11 % (ref 3–12)
NRBC AUTOMATED: 0 PER 100 WBC
PDW BLD-RTO: 15.9 % (ref 11.8–14.4)
PLATELET # BLD: 213 K/UL (ref 138–453)
PLATELET ESTIMATE: ABNORMAL
PMV BLD AUTO: 11.2 FL (ref 8.1–13.5)
POTASSIUM SERPL-SCNC: 4 MMOL/L (ref 3.7–5.3)
RBC # BLD: 4.9 M/UL (ref 3.95–5.11)
RBC # BLD: ABNORMAL 10*6/UL
SEG NEUTROPHILS: 40 % (ref 36–65)
SEGMENTED NEUTROPHILS ABSOLUTE COUNT: 2.9 K/UL (ref 1.5–8.1)
SODIUM BLD-SCNC: 133 MMOL/L (ref 135–144)
TOTAL PROTEIN: 6.9 G/DL (ref 6.4–8.3)
WBC # BLD: 7.2 K/UL (ref 3.5–11.3)
WBC # BLD: ABNORMAL 10*3/UL

## 2021-05-26 PROCEDURE — 80053 COMPREHEN METABOLIC PANEL: CPT

## 2021-05-26 PROCEDURE — 85025 COMPLETE CBC W/AUTO DIFF WBC: CPT

## 2021-05-26 PROCEDURE — 36415 COLL VENOUS BLD VENIPUNCTURE: CPT

## 2021-06-02 ENCOUNTER — OFFICE VISIT (OUTPATIENT)
Dept: FAMILY MEDICINE CLINIC | Age: 46
End: 2021-06-02
Payer: COMMERCIAL

## 2021-06-02 VITALS
DIASTOLIC BLOOD PRESSURE: 90 MMHG | SYSTOLIC BLOOD PRESSURE: 130 MMHG | OXYGEN SATURATION: 99 % | BODY MASS INDEX: 36.64 KG/M2 | HEART RATE: 76 BPM | TEMPERATURE: 97.2 F | WEIGHT: 228 LBS | HEIGHT: 66 IN

## 2021-06-02 DIAGNOSIS — G89.29 CHRONIC NONINTRACTABLE HEADACHE, UNSPECIFIED HEADACHE TYPE: ICD-10-CM

## 2021-06-02 DIAGNOSIS — R56.9 SEIZURES (HCC): ICD-10-CM

## 2021-06-02 DIAGNOSIS — R51.9 CHRONIC NONINTRACTABLE HEADACHE, UNSPECIFIED HEADACHE TYPE: ICD-10-CM

## 2021-06-02 DIAGNOSIS — E55.9 VITAMIN D DEFICIENCY: ICD-10-CM

## 2021-06-02 DIAGNOSIS — Z79.01 LONG TERM CURRENT USE OF ANTICOAGULANT THERAPY: ICD-10-CM

## 2021-06-02 DIAGNOSIS — Z91.89 EXCESSIVE CONSUMPTION OF SODA POP: ICD-10-CM

## 2021-06-02 DIAGNOSIS — A59.9 TRICHOMONAL INFECTION: ICD-10-CM

## 2021-06-02 DIAGNOSIS — J44.9 CHRONIC OBSTRUCTIVE PULMONARY DISEASE, UNSPECIFIED COPD TYPE (HCC): ICD-10-CM

## 2021-06-02 DIAGNOSIS — E66.01 MORBID (SEVERE) OBESITY DUE TO EXCESS CALORIES (HCC): ICD-10-CM

## 2021-06-02 DIAGNOSIS — Z71.6 TOBACCO ABUSE COUNSELING: ICD-10-CM

## 2021-06-02 DIAGNOSIS — I10 ESSENTIAL HYPERTENSION: Primary | ICD-10-CM

## 2021-06-02 DIAGNOSIS — K58.9 IRRITABLE BOWEL SYNDROME, UNSPECIFIED TYPE: ICD-10-CM

## 2021-06-02 DIAGNOSIS — Z72.0 TOBACCO ABUSE DISORDER: ICD-10-CM

## 2021-06-02 DIAGNOSIS — E66.9 CLASS 1 OBESITY WITH BODY MASS INDEX (BMI) OF 32.0 TO 32.9 IN ADULT, UNSPECIFIED OBESITY TYPE, UNSPECIFIED WHETHER SERIOUS COMORBIDITY PRESENT: ICD-10-CM

## 2021-06-02 DIAGNOSIS — F43.10 PTSD (POST-TRAUMATIC STRESS DISORDER): ICD-10-CM

## 2021-06-02 DIAGNOSIS — E78.5 HYPERLIPIDEMIA, UNSPECIFIED HYPERLIPIDEMIA TYPE: ICD-10-CM

## 2021-06-02 DIAGNOSIS — F12.90 MARIJUANA USE: ICD-10-CM

## 2021-06-02 DIAGNOSIS — F31.9 BIPOLAR 1 DISORDER (HCC): ICD-10-CM

## 2021-06-02 PROBLEM — B96.89 BACTERIAL VAGINITIS: Status: ACTIVE | Noted: 2020-04-22

## 2021-06-02 PROBLEM — A49.02 METHICILLIN RESISTANT STAPHYLOCOCCUS AUREUS INFECTION: Status: RESOLVED | Noted: 2020-01-20 | Resolved: 2021-06-02

## 2021-06-02 PROBLEM — I82.409 DEEP VEIN THROMBOSIS (DVT) OF LOWER EXTREMITY (HCC): Status: RESOLVED | Noted: 2018-06-01 | Resolved: 2021-06-02

## 2021-06-02 PROBLEM — Z86.711 HISTORY OF PULMONARY EMBOLISM: Status: RESOLVED | Noted: 2019-10-04 | Resolved: 2021-06-02

## 2021-06-02 PROBLEM — T78.40XA ALLERGIC DRUG REACTION: Status: RESOLVED | Noted: 2020-04-22 | Resolved: 2021-06-02

## 2021-06-02 PROBLEM — K64.5 THROMBOSED EXTERNAL HEMORRHOIDS: Status: RESOLVED | Noted: 2017-07-03 | Resolved: 2021-06-02

## 2021-06-02 PROCEDURE — G8427 DOCREV CUR MEDS BY ELIG CLIN: HCPCS | Performed by: FAMILY MEDICINE

## 2021-06-02 PROCEDURE — 4004F PT TOBACCO SCREEN RCVD TLK: CPT | Performed by: FAMILY MEDICINE

## 2021-06-02 PROCEDURE — G8926 SPIRO NO PERF OR DOC: HCPCS | Performed by: FAMILY MEDICINE

## 2021-06-02 PROCEDURE — 3023F SPIROM DOC REV: CPT | Performed by: FAMILY MEDICINE

## 2021-06-02 PROCEDURE — 99214 OFFICE O/P EST MOD 30 MIN: CPT | Performed by: FAMILY MEDICINE

## 2021-06-02 PROCEDURE — G8417 CALC BMI ABV UP PARAM F/U: HCPCS | Performed by: FAMILY MEDICINE

## 2021-06-02 RX ORDER — TRIAMTERENE AND HYDROCHLOROTHIAZIDE 37.5; 25 MG/1; MG/1
1 CAPSULE ORAL EVERY MORNING
Qty: 30 CAPSULE | Refills: 3 | Status: SHIPPED | OUTPATIENT
Start: 2021-06-02 | End: 2021-09-21

## 2021-06-02 ASSESSMENT — ENCOUNTER SYMPTOMS
COLOR CHANGE: 0
RECTAL PAIN: 0
DIARRHEA: 0
BACK PAIN: 0
SHORTNESS OF BREATH: 0
ANAL BLEEDING: 0
SINUS PRESSURE: 0
ABDOMINAL DISTENTION: 0
EYE REDNESS: 0
TROUBLE SWALLOWING: 0
CHEST TIGHTNESS: 0
COUGH: 0
NAUSEA: 0
EYE DISCHARGE: 0
VOICE CHANGE: 0
ABDOMINAL PAIN: 0
VOMITING: 0
BLOOD IN STOOL: 0
EYE PAIN: 0
CONSTIPATION: 0

## 2021-06-02 ASSESSMENT — PATIENT HEALTH QUESTIONNAIRE - PHQ9
SUM OF ALL RESPONSES TO PHQ QUESTIONS 1-9: 0
1. LITTLE INTEREST OR PLEASURE IN DOING THINGS: 0
SUM OF ALL RESPONSES TO PHQ9 QUESTIONS 1 & 2: 0
2. FEELING DOWN, DEPRESSED OR HOPELESS: 0

## 2021-06-02 NOTE — PROGRESS NOTES
Subjective:      Patient ID: Constantino Franks is a 39 y.o. female. HPI States she has been concerned about her pelvic infection after having rekindled sex with an old boyfriend. Was started on doxy as well as flagyl. States has not been sexually active ,odor is gone and overall feels better after starting the antibiotics 2 days after it was prescribed. Her cultures did come back positive for BV and TRICOMONAS. She tested neg for GC and Chlamydia. NO fever, chills, urinary sx, no there complaints. She may lose her home and is worried about this as well. Mood has been so so. Headaches have been under control. Saw GI recently and has colonoscopy and EGD scheduled for next month. Breathing has been stable,her mood, sleep appetite are ok. Headaches are better, seizure no more lately as well. Review of Systems   Constitutional: Negative for activity change, appetite change and fatigue. HENT: Negative for dental problem, ear pain, hearing loss, postnasal drip, sinus pressure, sneezing, tinnitus, trouble swallowing and voice change. Eyes: Negative for pain, discharge, redness and visual disturbance. Respiratory: Negative for cough, chest tightness and shortness of breath. Cardiovascular: Negative for chest pain, palpitations and leg swelling. Gastrointestinal: Negative for abdominal distention, abdominal pain, anal bleeding, blood in stool, constipation, diarrhea, nausea, rectal pain and vomiting. Endocrine: Negative for cold intolerance, heat intolerance, polydipsia, polyphagia and polyuria. Genitourinary: Negative for decreased urine volume, difficulty urinating, dyspareunia, dysuria, enuresis, flank pain, frequency, genital sores, hematuria, menstrual problem, pelvic pain, urgency, vaginal bleeding and vaginal discharge. Musculoskeletal: Negative for arthralgias, back pain, gait problem, joint swelling, myalgias, neck pain and neck stiffness.    Skin: Negative for color change, pallor and to person, place, and time. Cranial Nerves: No cranial nerve deficit. Motor: No abnormal muscle tone. Deep Tendon Reflexes: Reflexes normal.   Psychiatric:         Mood and Affect: Mood normal.         Behavior: Behavior normal.         Thought Content: Thought content normal.         Judgment: Judgment normal.         Assessment:       Diagnosis Orders   1. Essential hypertension  triamterene-hydroCHLOROthiazide (DYAZIDE) 37.5-25 MG per capsule    Basic Metabolic Panel   2. Hyperlipidemia, unspecified hyperlipidemia type     3. Chronic nonintractable headache, unspecified headache type     4. Chronic obstructive pulmonary disease, unspecified COPD type ([facility]     5. Long term current use of anticoagulant therapy     6. Bipolar 1 disorder ([facility]     7. Vitamin D deficiency     8. Tobacco abuse counseling     9. Tobacco abuse disorder     10. Seizures ([facility]     11. Class 1 obesity with body mass index (BMI) of 32.0 to 32.9 in adult, unspecified obesity type, unspecified whether serious comorbidity present     12. Morbid (severe) obesity due to excess calories ([facility]     13. Marijuana use     14. Excessive consumption of soda pop     15. PTSD (post-traumatic stress disorder)     16. Irritable bowel syndrome, unspecified type     17.  Trichomonal infection           Plan:      Orders Placed This Encounter   Procedures    Basic Metabolic Panel       Outpatient Encounter Medications as of 6/2/2021   Medication Sig Dispense Refill    triamterene-hydroCHLOROthiazide (DYAZIDE) 37.5-25 MG per capsule Take 1 capsule by mouth every morning 30 capsule 3    atorvastatin (LIPITOR) 10 MG tablet take 1 tablet by mouth once daily 90 tablet 1    losartan (COZAAR) 50 MG tablet take 1 tablet by mouth once daily 90 tablet 1    apixaban (ELIQUIS) 5 MG TABS tablet take 1 tablet by mouth twice a day 60 tablet 1    cloNIDine (CATAPRES) 0.1 MG/24HR PTWK apply 1 patch every week as directed 4 patch 0    VRAYLAR 3 MG CAPS capsule take 1 tablet by mouth once daily      escitalopram (LEXAPRO) 10 MG tablet       doxycycline hyclate (VIBRA-TABS) 100 MG tablet Take 1 tablet by mouth 2 times daily for 14 days 28 tablet 0    metroNIDAZOLE (FLAGYL) 500 MG tablet Take 1 tablet by mouth 2 times daily for 14 days 28 tablet 0    Misc. Devices (DIGITAL GLASS SCALE) MISC 1 Act by Does not apply route daily 1 each 0    folic acid (FOLVITE) 1 MG tablet Take 1 tablet by mouth daily 90 tablet 1    albuterol sulfate HFA (VENTOLIN HFA) 108 (90 Base) MCG/ACT inhaler Inhale 2 puffs into the lungs 4 times daily as needed for Wheezing 1 Inhaler 5    fluticasone (FLONASE) 50 MCG/ACT nasal spray 2 sprays by Each Nostril route daily 3 Bottle 1    cetirizine (ZYRTEC) 10 MG tablet Take 1 tablet by mouth daily 30 tablet 0    topiramate (TOPAMAX SPRINKLE) 15 MG capsule take 1 capsule by mouth nightly for 1 week then 1 capsule twice a day THEREAFTER 60 capsule 3    RA VITAMIN B-12 TR 1000 MCG TBCR take 2 tablets by mouth once daily 180 tablet 1    Blood Pressure KIT Use as directed. 1 kit 0    Cyanocobalamin (B-12) 1000 MCG SUBL Place 1 tablet under the tongue daily 90 tablet 5    benztropine (COGENTIN) 0.5 MG tablet take 1 tablet by mouth twice a day  0    cetirizine (ZYRTEC) 10 MG tablet Take 1 tablet by mouth daily 90 tablet 1    [DISCONTINUED] indapamide (LOZOL) 1.25 MG tablet take 1 tablet by mouth every morning 90 tablet 1     No facility-administered encounter medications on file as of 6/2/2021.            Cindy Camarillo MD

## 2021-06-08 ENCOUNTER — HOSPITAL ENCOUNTER (OUTPATIENT)
Age: 46
Discharge: HOME | End: 2021-06-08
Payer: COMMERCIAL

## 2021-06-08 ENCOUNTER — IMMUNIZATION (OUTPATIENT)
Dept: PRIMARY CARE CLINIC | Age: 46
End: 2021-06-08
Payer: COMMERCIAL

## 2021-06-08 DIAGNOSIS — I10 ESSENTIAL HYPERTENSION: ICD-10-CM

## 2021-06-08 LAB
ANION GAP SERPL CALCULATED.3IONS-SCNC: 17 MMOL/L (ref 9–17)
BUN BLDV-MCNC: 10 MG/DL (ref 6–20)
BUN/CREAT BLD: NORMAL (ref 9–20)
CALCIUM SERPL-MCNC: 9 MG/DL (ref 8.6–10.4)
CHLORIDE BLD-SCNC: 99 MMOL/L (ref 98–107)
CO2: 21 MMOL/L (ref 20–31)
CREAT SERPL-MCNC: 0.77 MG/DL (ref 0.5–0.9)
GFR AFRICAN AMERICAN: >60 ML/MIN
GFR NON-AFRICAN AMERICAN: >60 ML/MIN
GFR SERPL CREATININE-BSD FRML MDRD: NORMAL ML/MIN/{1.73_M2}
GFR SERPL CREATININE-BSD FRML MDRD: NORMAL ML/MIN/{1.73_M2}
GLUCOSE BLD-MCNC: 97 MG/DL (ref 70–99)
POTASSIUM SERPL-SCNC: 3.8 MMOL/L (ref 3.7–5.3)
SODIUM BLD-SCNC: 137 MMOL/L (ref 135–144)

## 2021-06-08 PROCEDURE — 0002A COVID-19, PFIZER VACCINE 30MCG/0.3ML DOSE: CPT | Performed by: INTERNAL MEDICINE

## 2021-06-08 PROCEDURE — 80048 BASIC METABOLIC PNL TOTAL CA: CPT

## 2021-06-08 PROCEDURE — 36415 COLL VENOUS BLD VENIPUNCTURE: CPT

## 2021-06-08 PROCEDURE — 91300 COVID-19, PFIZER VACCINE 30MCG/0.3ML DOSE: CPT | Performed by: INTERNAL MEDICINE

## 2021-06-12 ENCOUNTER — HOSPITAL ENCOUNTER (OUTPATIENT)
Dept: LAB | Age: 46
Setting detail: SPECIMEN
Discharge: HOME | End: 2021-06-12
Payer: COMMERCIAL

## 2021-06-12 DIAGNOSIS — Z01.818 PREOP TESTING: Primary | ICD-10-CM

## 2021-06-12 PROCEDURE — U0005 INFEC AGEN DETEC AMPLI PROBE: HCPCS

## 2021-06-12 PROCEDURE — U0003 INFECTIOUS AGENT DETECTION BY NUCLEIC ACID (DNA OR RNA); SEVERE ACUTE RESPIRATORY SYNDROME CORONAVIRUS 2 (SARS-COV-2) (CORONAVIRUS DISEASE [COVID-19]), AMPLIFIED PROBE TECHNIQUE, MAKING USE OF HIGH THROUGHPUT TECHNOLOGIES AS DESCRIBED BY CMS-2020-01-R: HCPCS

## 2021-06-13 LAB
SARS-COV-2: NORMAL
SARS-COV-2: NOT DETECTED
SOURCE: NORMAL

## 2021-06-14 RX ORDER — POLYETHYLENE GLYCOL 3350 17 G/17G
POWDER, FOR SOLUTION ORAL
Qty: 255 G | Refills: 0 | Status: ON HOLD | OUTPATIENT
Start: 2021-06-14 | End: 2021-06-16 | Stop reason: ALTCHOICE

## 2021-06-14 NOTE — TELEPHONE ENCOUNTER
Returned call to Philip Cotton who states she needs directions for Miralax. Writer gave verbal instructions which she understands and states she only received bowel prep for 1 day from pharmacy. Writer is entering request for more prep for second day to be sent to pharmacy. She voices understanding.

## 2021-06-15 ENCOUNTER — TELEPHONE (OUTPATIENT)
Dept: GASTROENTEROLOGY | Age: 46
End: 2021-06-15

## 2021-06-15 NOTE — TELEPHONE ENCOUNTER
Pt did go to her covid test, writer called pt and informed her that we did see that she went and she can continue with procedure on 6/16.

## 2021-06-16 ENCOUNTER — ANESTHESIA EVENT (OUTPATIENT)
Dept: OPERATING ROOM | Age: 46
End: 2021-06-16
Payer: COMMERCIAL

## 2021-06-16 ENCOUNTER — HOSPITAL ENCOUNTER (OUTPATIENT)
Age: 46
Setting detail: OUTPATIENT SURGERY
Discharge: HOME | End: 2021-06-16
Attending: INTERNAL MEDICINE | Admitting: INTERNAL MEDICINE
Payer: COMMERCIAL

## 2021-06-16 ENCOUNTER — ANESTHESIA (OUTPATIENT)
Dept: OPERATING ROOM | Age: 46
End: 2021-06-16
Payer: COMMERCIAL

## 2021-06-16 VITALS — DIASTOLIC BLOOD PRESSURE: 99 MMHG | OXYGEN SATURATION: 98 % | SYSTOLIC BLOOD PRESSURE: 172 MMHG

## 2021-06-16 VITALS
WEIGHT: 223 LBS | OXYGEN SATURATION: 100 % | BODY MASS INDEX: 37.15 KG/M2 | DIASTOLIC BLOOD PRESSURE: 106 MMHG | TEMPERATURE: 98.6 F | HEART RATE: 77 BPM | RESPIRATION RATE: 22 BRPM | SYSTOLIC BLOOD PRESSURE: 153 MMHG | HEIGHT: 65 IN

## 2021-06-16 PROCEDURE — 2709999900 HC NON-CHARGEABLE SUPPLY: Performed by: INTERNAL MEDICINE

## 2021-06-16 PROCEDURE — 3700000000 HC ANESTHESIA ATTENDED CARE: Performed by: INTERNAL MEDICINE

## 2021-06-16 PROCEDURE — 88342 IMHCHEM/IMCYTCHM 1ST ANTB: CPT

## 2021-06-16 PROCEDURE — 7100000010 HC PHASE II RECOVERY - FIRST 15 MIN: Performed by: INTERNAL MEDICINE

## 2021-06-16 PROCEDURE — 3700000001 HC ADD 15 MINUTES (ANESTHESIA): Performed by: INTERNAL MEDICINE

## 2021-06-16 PROCEDURE — 6360000002 HC RX W HCPCS

## 2021-06-16 PROCEDURE — 2580000003 HC RX 258: Performed by: ANESTHESIOLOGY

## 2021-06-16 PROCEDURE — 43239 EGD BIOPSY SINGLE/MULTIPLE: CPT | Performed by: INTERNAL MEDICINE

## 2021-06-16 PROCEDURE — 88305 TISSUE EXAM BY PATHOLOGIST: CPT

## 2021-06-16 PROCEDURE — 3609027000 HC COLONOSCOPY: Performed by: INTERNAL MEDICINE

## 2021-06-16 PROCEDURE — 7100000011 HC PHASE II RECOVERY - ADDTL 15 MIN: Performed by: INTERNAL MEDICINE

## 2021-06-16 PROCEDURE — G0121 COLON CA SCRN NOT HI RSK IND: HCPCS | Performed by: INTERNAL MEDICINE

## 2021-06-16 PROCEDURE — 2500000003 HC RX 250 WO HCPCS

## 2021-06-16 PROCEDURE — 3609012400 HC EGD TRANSORAL BIOPSY SINGLE/MULTIPLE: Performed by: INTERNAL MEDICINE

## 2021-06-16 RX ORDER — SODIUM CHLORIDE 0.9 % (FLUSH) 0.9 %
10 SYRINGE (ML) INJECTION PRN
Status: DISCONTINUED | OUTPATIENT
Start: 2021-06-16 | End: 2021-06-16 | Stop reason: HOSPADM

## 2021-06-16 RX ORDER — LIDOCAINE HYDROCHLORIDE 20 MG/ML
INJECTION, SOLUTION EPIDURAL; INFILTRATION; INTRACAUDAL; PERINEURAL PRN
Status: DISCONTINUED | OUTPATIENT
Start: 2021-06-16 | End: 2021-06-16 | Stop reason: SDUPTHER

## 2021-06-16 RX ORDER — LIDOCAINE HYDROCHLORIDE 10 MG/ML
1 INJECTION, SOLUTION EPIDURAL; INFILTRATION; INTRACAUDAL; PERINEURAL
Status: DISCONTINUED | OUTPATIENT
Start: 2021-06-16 | End: 2021-06-16 | Stop reason: HOSPADM

## 2021-06-16 RX ORDER — SODIUM CHLORIDE 9 MG/ML
INJECTION, SOLUTION INTRAVENOUS CONTINUOUS
Status: DISCONTINUED | OUTPATIENT
Start: 2021-06-16 | End: 2021-06-16 | Stop reason: HOSPADM

## 2021-06-16 RX ORDER — SODIUM CHLORIDE 0.9 % (FLUSH) 0.9 %
10 SYRINGE (ML) INJECTION EVERY 12 HOURS SCHEDULED
Status: DISCONTINUED | OUTPATIENT
Start: 2021-06-16 | End: 2021-06-16 | Stop reason: HOSPADM

## 2021-06-16 RX ORDER — PROPOFOL 10 MG/ML
INJECTION, EMULSION INTRAVENOUS PRN
Status: DISCONTINUED | OUTPATIENT
Start: 2021-06-16 | End: 2021-06-16 | Stop reason: SDUPTHER

## 2021-06-16 RX ORDER — SODIUM CHLORIDE, SODIUM LACTATE, POTASSIUM CHLORIDE, CALCIUM CHLORIDE 600; 310; 30; 20 MG/100ML; MG/100ML; MG/100ML; MG/100ML
INJECTION, SOLUTION INTRAVENOUS CONTINUOUS
Status: DISCONTINUED | OUTPATIENT
Start: 2021-06-16 | End: 2021-06-16 | Stop reason: HOSPADM

## 2021-06-16 RX ORDER — SODIUM CHLORIDE 9 MG/ML
25 INJECTION, SOLUTION INTRAVENOUS PRN
Status: DISCONTINUED | OUTPATIENT
Start: 2021-06-16 | End: 2021-06-16 | Stop reason: HOSPADM

## 2021-06-16 RX ADMIN — PROPOFOL 20 MG: 10 INJECTION, EMULSION INTRAVENOUS at 11:05

## 2021-06-16 RX ADMIN — SODIUM CHLORIDE, POTASSIUM CHLORIDE, SODIUM LACTATE AND CALCIUM CHLORIDE: 600; 310; 30; 20 INJECTION, SOLUTION INTRAVENOUS at 09:23

## 2021-06-16 RX ADMIN — PROPOFOL 20 MG: 10 INJECTION, EMULSION INTRAVENOUS at 11:01

## 2021-06-16 RX ADMIN — PROPOFOL 20 MG: 10 INJECTION, EMULSION INTRAVENOUS at 11:03

## 2021-06-16 RX ADMIN — PROPOFOL 30 MG: 10 INJECTION, EMULSION INTRAVENOUS at 10:51

## 2021-06-16 RX ADMIN — PROPOFOL 20 MG: 10 INJECTION, EMULSION INTRAVENOUS at 10:55

## 2021-06-16 RX ADMIN — PROPOFOL 20 MG: 10 INJECTION, EMULSION INTRAVENOUS at 10:53

## 2021-06-16 RX ADMIN — PROPOFOL 80 MG: 10 INJECTION, EMULSION INTRAVENOUS at 10:49

## 2021-06-16 RX ADMIN — PROPOFOL 20 MG: 10 INJECTION, EMULSION INTRAVENOUS at 10:57

## 2021-06-16 RX ADMIN — LIDOCAINE HYDROCHLORIDE 80 MG: 20 INJECTION, SOLUTION EPIDURAL; INFILTRATION; INTRACAUDAL; PERINEURAL at 10:49

## 2021-06-16 RX ADMIN — PROPOFOL 20 MG: 10 INJECTION, EMULSION INTRAVENOUS at 10:59

## 2021-06-16 ASSESSMENT — PULMONARY FUNCTION TESTS
PIF_VALUE: 1
PIF_VALUE: 0
PIF_VALUE: 1
PIF_VALUE: 0
PIF_VALUE: 1

## 2021-06-16 ASSESSMENT — PAIN - FUNCTIONAL ASSESSMENT
PAIN_FUNCTIONAL_ASSESSMENT: 0-10

## 2021-06-16 ASSESSMENT — LIFESTYLE VARIABLES: SMOKING_STATUS: 1

## 2021-06-16 ASSESSMENT — PAIN SCALES - GENERAL: PAINLEVEL_OUTOF10: 0

## 2021-06-16 ASSESSMENT — PAIN DESCRIPTION - DESCRIPTORS: DESCRIPTORS: ACHING

## 2021-06-16 NOTE — OP NOTE
PROCEDURE NOTE    DATE OF PROCEDURE: 6/16/2021    SURGEON: Bhanu Howell MD    ASSISTANT: None    PREOPERATIVE DIAGNOSIS: IBS   SCREENING    POSTOPERATIVE DIAGNOSIS: as described below    OPERATION: Total colonoscopy     ANESTHESIA: MAC PER ANESTHESIA     ESTIMATED BLOOD LOSS: less than 50     COMPLICATIONS: None. SPECIMENS:  Was Not Obtained    HISTORY: The patient is a 39y.o. year old female with history of above preop diagnosis. I recommended colonoscopy with possible biopsy or polypectomy and I explained the risk, benefits, expected outcome, and alternatives to the procedure. Risks included but are not limited to bleeding, infection, respiratory distress, hypotension, and perforation of the colon and possibility of missing a lesion. The patient understands and is in agreement. PROCEDURE: The patient was given IV conscious sedation. The patient's SPO2 remained above 90% throughout the procedure. The colonoscope was inserted per rectum and advanced under direct vision to the cecum without difficulty. The prep was GOOD      Findings:  Terminal ileum: normal    Cecum/Ascending colon: normal    Transverse colon: normal    Descending/Sigmoid colon: abnormal: FEW DIVERTICULI    Rectum/Anus: examined in normal and retroflexed positions and was abnormal: MOD HEMORRHODS     Withdrawal Time was (minutes): 12    The colon was decompressed and the scope was removed. The patient tolerated the procedure well. Recommendations/Plan:   1. Lifestyle and dietary modifications as discussed  2. F/U In Office in 3-4 weeks  3. Discussed with the family  4. Colonoscopy Recall :8 year  5. POST SEDATION PATIENT WAS STABLE WITH STABLE VITAL SIGNS AND OXYGEN SATURATIONS AND WAS DISCHARGED HOME WITH RIDE IN A STABLE CONDITION.     Electronically signed by Bhanu Howell MD  on 6/16/2021 at 11:15 AM

## 2021-06-16 NOTE — ANESTHESIA PRE PROCEDURE
Department of Anesthesiology  Preprocedure Note       Name:  Penny Boles   Age:  39 y.o.  :  1975                                          MRN:  [id number]         Date:  2021      Surgeon: Amarilys Medina):  Jax Nelson MD    Procedure: Procedure(s):  COLONOSCOPY DIAGNOSTIC  EGD ESOPHAGOGASTRODUODENOSCOPY    Medications prior to admission:   Prior to Admission medications    Medication Sig Start Date End Date Taking? Authorizing Provider   polyethylene glycol (GLYCOLAX) 17 GM/SCOOP powder Use as directed by following your patient instructions given by office. 21   Jax Nelson MD   bisacodyl (DULCOLAX) 5 MG EC tablet TAKE 4 TABS AS DIRECTED BY PHYSICIAN OFFICE 21   Jax Nelson MD   magnesium citrate solution Take 296 mLs by mouth once for 1 dose 21  Jax Nelson MD   magnesium citrate solution Take 296 mLs by mouth once for 1 dose 21  Jax Nelson MD   triamterene-hydroCHLOROthiazide (DYAZIDE) 37.5-25 MG per capsule Take 1 capsule by mouth every morning 21   Maykel Otoole MD   atorvastatin (LIPITOR) 10 MG tablet take 1 tablet by mouth once daily 21   Maykel Otoole MD   losartan (COZAAR) 50 MG tablet take 1 tablet by mouth once daily 21   Maykel Otoole MD   apixaban (ELIQUIS) 5 MG TABS tablet take 1 tablet by mouth twice a day 21   Clotilde Lee MD   cloNIDine (CATAPRES) 0.1 MG/24HR PTWK apply 1 patch every week as directed 21   Maykel Otoole MD   VRAYLAR 3 MG CAPS capsule take 1 tablet by mouth once daily 21   Historical Provider, MD   escitalopram (LEXAPRO) 10 MG tablet  21   Historical Provider, MD [place].  Devices (DIGITAL GLASS SCALE) MISC 1 Act by Does not apply route daily 20   Asmita Lopez MD   folic acid (FOLVITE) 1 MG tablet Take 1 tablet by mouth daily 20   Maykel Otoole MD   albuterol sulfate HFA (VENTOLIN HFA) 108 (90 Base) MCG/ACT inhaler Inhale 2 puffs into the lungs 4 times daily as needed for Wheezing 8/18/20   Garcia Ni, APRN - CNP   fluticasone Hulon Sane) 50 MCG/ACT nasal spray 2 sprays by Each Nostril route daily 7/24/20   Alcira Arroyo MD   cetirizine (ZYRTEC) 10 MG tablet Take 1 tablet by mouth daily 7/24/20   Alcira Arroyo MD   topiramate (TOPAMAX SPRINKLE) 15 MG capsule take 1 capsule by mouth nightly for 1 week then 1 capsule twice a day THEREAFTER 7/20/20   Alcira Arroyo MD   RA VITAMIN B-12 TR 1000 MCG TBCR take 2 tablets by mouth once daily 7/14/20   Alcira Arroyo MD   Blood Pressure KIT Use as directed. 4/1/20   Alcira Arroyo MD   Cyanocobalamin (B-12) 1000 MCG SUBL Place 1 tablet under the tongue daily 1/27/20   Alcira Arroyo MD   benztropine (COGENTIN) 0.5 MG tablet take 1 tablet by mouth twice a day 10/7/19   Historical Provider, MD   cetirizine (ZYRTEC) 10 MG tablet Take 1 tablet by mouth daily 10/18/19   Alcira Arroyo MD       Current medications:    Current Facility-Administered Medications   Medication Dose Route Frequency Provider Last Rate Last Admin    0.9 % sodium chloride infusion   Intravenous Continuous Ndal Dariana Kwong MD        lactated ringers infusion   Intravenous Continuous Niles Farrell MD        sodium chloride flush 0.9 % injection 10 mL  10 mL Intravenous 2 times per day Niles Farrell MD        sodium chloride flush 0.9 % injection 10 mL  10 mL Intravenous PRN Niles Farrell MD        0.9 % sodium chloride infusion  25 mL Intravenous PRN Niles Farrell MD        lidocaine PF 1 % injection 1 mL  1 mL Intradermal Once PRN Niles Farrell MD           Allergies:     Allergies   Allergen Reactions    Penicillins Anaphylaxis and Rash    Amoxil [Amoxicillin]      Swelling of throat, rash and cough    Bee Pollen     Bee Venom Hives and Other (See Comments)    Ciprofloxacin     Clindamycin/Lincomycin      rash    Flonase [Fluticasone]      Headaches      Keflex [Cephalexin]      itching    Levaquin [Levofloxacin In D5w] Hives    Levofloxacin  Macrobid [Nitrofurantoin Jenny Grumman Macro] Hives    Nitrofurantoin     Sulfa Antibiotics Hives    Sulfur        Problem List:    Patient Active Problem List   Diagnosis Code    H/O hysterectomy for benign disease Z90.710    Borderline diabetes R73.03    FH: diabetes mellitus Z83.3    FH: throat cancer Z80.0    FH: uterine cancer Z80.53    FH: breast cancer Z80.3    Neck pain, chronic M54.2, G89.29    Tobacco abuse disorder Z72.0    Seizures ([facility] R56.9    Non-compliance with treatment Z91.19    Anemia D64.9    Cobalamin deficiency E53.8    Bipolar 1 disorder (HCC) F31.9    PTSD (post-traumatic stress disorder) F43.10    Chiari I malformation ([facility]) G93.5    IBS (irritable bowel syndrome) K58.9    Long term current use of anticoagulant therapy Z79.01    Asthma J45.909    Folate deficiency E53.8    Vitamin D deficiency E55.9    Hypertension I10    Hyperlipidemia E78.5    Tobacco abuse counseling Z71.6    Bacterial vaginitis N76.0, B96.89    Diverticulitis of sigmoid colon K57.32    Borderline diabetes mellitus R73.03    Chiari malformation type I ([facility] G93.5    Irritable bowel syndrome K58.9    Chronic headache disorder R51.9, G89.29    Chronic obstructive lung disease (HCC) J44.9    Family history of malignant neoplasm of breast Z80.3    Family history of diabetes mellitus Z83.3    Family history of throat cancer Z80.0    Family history of malignant neoplasm of uterus Z80.49    Noncompliance with treatment Z91.19    Marijuana use F12.90    Morbid (severe) obesity due to excess calories ([facility]) E66.01    Excessive consumption of soda pop Z91.89    Abnormal menstrual cycle N92.6    Recurrent major depressive episodes, moderate ([facility]) G33.7    Umbilical hernia I02.9    Trichomonal infection A59.9       Past Medical History:        Diagnosis Date    Abdominal pain     Acute vaginitis 4/22/2020    Anemia     Anticoagulant long-term use     Arthritis 04/2018    Left Foot    Asthma     Moderate persistent asthma without complication    Axillary hidradenitis suppurativa 2020    Back problem     Bipolar 1 disorder (HCC)     Chest pain     with \"coughing\" per pt    CHF (congestive heart failure) ([address]     When child was delivered     Chiari I malformation ([address] 2017    A MRI of the brain in  was suspicious for pseudotumor cerebri but a spinal tap revealed a normal opening pressure of 16 cm water. An EEG was normal.  A MRI of the thoracic spine in  was normal. Relevant history of cervical myelopathy with an abnormal MRI of the cervical spine in 2015. A follow-up MRI of the brain in 2017 was unchanged. A MRI of the cervical spine in     Chicken pox     Chronic idiopathic constipation     Chronic mental illness     Current every day smoker     Deep vein thrombosis (DVT) of lower extremity ([address] 2018    Deep vein thrombosis (DVT) of lower extremity ([address] 2018    Depression     Depression     Diverticulitis of sigmoid colon 7/3/2017    DVT of deep femoral vein, bilateral (HCC)     Emphysema lung ([address]     Excessive consumption of soda pop 2021    FH: breast cancer 3/22/2016    FH: uterine cancer 3/22/2016    FH: uterine cancer 3/22/2016    GERD (gastroesophageal reflux disease)     GERD (gastroesophageal reflux disease) 2004    Hair loss     Headache     Hernia     High blood pressure     History of abnormal uterine bleeding 7/3/2017    History of diabetes mellitus 3/22/2016    History of hysterectomy for benign disease 3/22/2016    History of pulmonary embolism 10/4/2019    History of pulmonary embolism 10/4/2019    Hyperlipidemia     Hypertension     Ileus ([address]     Irregular bowel habits     Irritable bowel syndrome     MDRO (multiple drug resistant organisms) resistance     MRSA in  per pt.     Nervousness     Non-compliance with treatment 2017    Obesity     Pneumonia  Saddle embolus of pulmonary artery without acute cor pulmonale (HCC)     Seizures ([facility]     Last Seizure 18    Slow gastric motility     Smoking     Stroke (cerebrum) ([facility] [facility] 2014    Suicidal intent     .  Denies 18    TIA (transient ischemic attack) 2014    Tobacco abuse disorder 3/22/2016    Weight loss        Past Surgical History:        Procedure Laterality Date    ABSCESS DRAINAGE      abdominal abscess RLQ    AXILLARY SURGERY Right 2018    Excision of hidradenitis cysts, right axilla    BREAST LUMPECTOMY Bilateral     BREAST LUMPECTOMY       SECTION      x2    ENDOMETRIAL ABLATION      ENDOSCOPY, COLON, DIAGNOSTIC  2014    HERNIA REPAIR      HYSTERECTOMY      LYMPH NODE DISSECTION Left 2019    LEFT AXILLARY HYDRADENITIS EXCISION performed by Pheluis a Lancasterter, DO at [facility] Left 2019    LEFT AXILLARY HYDRADENITIS EXCISION (Left )    OK EXC SKIN BENIG <5MM TRUNK,ARM,LEG Right 2018    EXCISION HIDRADENITIS RIGHT AXILLA performed by Carl Lancasterter, DO at [address] [place] [address]    TUBAL LIGATION      UMBILICAL HERNIA REPAIR         Social History:    Social History     Tobacco Use    Smoking status: Current Every Day Smoker     Packs/day: 1.00     Years: 26.00     Pack years: 26.00     Types: Cigarettes     Start date:     Smokeless tobacco: Never Used    Tobacco comment: wants help- adviced about smoking cessation plans   Substance Use Topics    Alcohol use: Yes     Comment: occ                                Ready to quit: Not Answered  Counseling given: Not Answered  Comment: wants help- adviced about smoking cessation plans      Vital Signs (Current):   Vitals:    21 0854   BP: (!) 149/102   Pulse: 92   Resp: 16   Temp: 97.5 °F (36.4 °C)   TempSrc: Temporal   SpO2: 93%                                              BP Readings from Last 3 Encounters:   21 (!) 149/102   06/02/21 (!) 130/90   05/24/21 (!) 132/97       NPO Status:                                                                                 BMI:   Wt Readings from Last 3 Encounters:   06/02/21 228 lb (103.4 kg)   05/24/21 232 lb (105.2 kg)   05/12/21 234 lb (106.1 kg)     There is no height or weight on file to calculate BMI.    CBC:   Lab Results   Component Value Date    WBC 7.2 05/26/2021    RBC 4.90 05/26/2021    HGB 11.6 05/26/2021    HCT 36.6 05/26/2021    MCV 74.7 05/26/2021    RDW 15.9 05/26/2021     05/26/2021       CMP:   Lab Results   Component Value Date     06/08/2021    K 3.8 06/08/2021    CL 99 06/08/2021    CO2 21 06/08/2021    BUN 10 06/08/2021    CREATININE 0.77 06/08/2021    GFRAA >60 06/08/2021    LABGLOM >60 06/08/2021    GLUCOSE 97 06/08/2021    PROT 6.9 05/26/2021    CALCIUM 9.0 06/08/2021    BILITOT 0.20 05/26/2021    ALKPHOS 118 05/26/2021    AST 18 05/26/2021    ALT 20 05/26/2021       POC Tests: No results for input(s): POCGLU, POCNA, POCK, POCCL, POCBUN, POCHEMO, POCHCT in the last 72 hours.     Coags:   Lab Results   Component Value Date    PROTIME 12.2 10/21/2020    INR 0.9 10/21/2020    APTT 28.0 10/21/2020       HCG (If Applicable):   Lab Results   Component Value Date    PREGTESTUR neg 03/28/2019    HCG NEGATIVE 06/04/2020        ABGs: No results found for: PHART, PO2ART, YRN0XRC, NEG1RHQ, BEART, N1SQERTW     Type & Screen (If Applicable):  No results found for: LABABO, LABRH    Drug/Infectious Status (If Applicable):  Lab Results   Component Value Date    HEPCAB NONREACTIVE 04/28/2021       COVID-19 Screening (If Applicable):   Lab Results   Component Value Date    COVID19 Not Detected 06/12/2021           Anesthesia Evaluation  Patient summary reviewed and Nursing notes reviewed history of anesthetic complications:   Airway: Mallampati: IV  TM distance: >3 FB   Neck ROM: full  Mouth opening: > = 3 FB Dental: normal exam         Pulmonary:normal exam    (+)

## 2021-06-16 NOTE — OP NOTE
PROCEDURE NOTE    DATE OF PROCEDURE: 6/16/2021     SURGEON: Yoko Godwin MD    ASSISTANT: None    PREOPERATIVE DIAGNOSIS: ABD PAINS  NAUSEA AND VOMITING  GERD    POSTOPERATIVE DIAGNOSIS: As described below    OPERATION: Upper GI endoscopy with Biopsy    ANESTHESIA: MAC PER ANESTHESIA     ESTIMATED BLOOD LOSS: Less than 50 ml    COMPLICATIONS: None. SPECIMENS:  Was Obtained:     HISTORY: The patient is a 39y.o. year old female with history of above preop diagnosis. I recommended esophagogastroduodenoscopy with possible biopsy and I explained the risk, benefits, expected outcome, and alternatives to the procedure. Risks included but are not limited to bleeding, infection, respiratory distress, hypotension, and perforation of the esophagus, stomach, or duodenum. Patient understands and is in agreement. PROCEDURE: The patient was given IV conscious sedation. The patient's SPO2 remained above 90% throughout the procedure. The gastroscope was inserted orally and advanced under direct vision through the esophagus, through the stomach, through the pylorus, and into the descending duodenum. Findings:    Retropharyngeal area was grossly normal appearing    Esophagus: abnormal: MILD DISTAL ESOPHAGITIS WITH SUP EROSIONS  BIOPSIES AND PICTURES WERE TAKEN   SMALL ONE CM HIATAL HERNIA    Stomach:    Fundus: normal    Body: normal    Antrum: abnormal: SEVERE EROSIVE GASTRITIS WAS NOTED AND HEME BIOPSIES AND PICTURES WERE TAKEN    Duodenum:     Descending: normal    Bulb: normal    The scope was removed and the patient tolerated the procedure well. Recommendations/Plan:   1. F/U Biopsies  2. F/U In Office in 3-4 weeks  3. Discussed with the family  4.  Post sedation patient was stable with stable vital signs and stable O2 saturations    Electronically signed by Yoko Godwin MD  on 6/16/2021 at 10:55 AM

## 2021-06-16 NOTE — ANESTHESIA POSTPROCEDURE EVALUATION
POST- ANESTHESIA EVALUATION       Pt Name: Tarah Ulloa  MRN: [id number]  [place]: 1975  Date of evaluation: 6/16/2021  Time:  12:04 PM      BP (!) 153/106   Pulse 77   Temp 98.6 °F (37 °C) (Temporal)   Resp 22   Ht 5' 5\" (1.651 m)   Wt 223 lb (101.2 kg)   SpO2 100%   BMI 37.11 kg/m²      Consciousness Level  Awake  Cardiopulmonary Status  Stable  Pain Adequately Treated YES  Nausea / Vomiting  NO  Adequate Hydration  YES  Anesthesia Related Complications NONE      Electronically signed by Agustin Pryor MD on 6/16/2021 at 12:04 PM       Department of Anesthesiology  Postprocedure Note    Patient: Tarah Ulloa  MRN: [id number]  YOB: 1975  Date of evaluation: 6/16/2021  Time:  12:04 PM     Procedure Summary     Date: 06/16/21 Room / Location: [place] [address] / [facility] - INPATIENT    Anesthesia Start: 3133 Anesthesia Stop: 1118    Procedures:       COLONOSCOPY DIAGNOSTIC (N/A )      EGD BIOPSY Diagnosis: (DX GERD, IBS ABD PAIN, RECTAL BLEEDING)    Surgeons: Elina Navarro MD Responsible Provider: Agustin Pryor MD    Anesthesia Type: MAC ASA Status: 3          Anesthesia Type: MAC    Viviane Phase I:      Viviane Phase II: Viviane Score: 10    Last vitals: Reviewed and per EMR flowsheets.        Anesthesia Post Evaluation

## 2021-06-16 NOTE — H&P
History and Physical GI Update    Pt Name: Darrell Vang  MRN: [id number]  YOB: 1975  Date of evaluation: 6/16/2021      [x] I have reviewed the Lashae Cantor 6/2/21 Laura Brown M.D. found in [facility]  which meets the criteria for an Interval History and Physical note and is attached below. [x] I have examined  Darrell Vang and there are no changes to the patient or plans for a DIAGNOSTIC COLONOSCOPY AND EGD . by Dr Ashley Ferro for IRRITABLE BOWEL SYNDROME WITH BOTH CONSTIPATION AND DIARRHEA, AND GERD HAS OCCASIONAL RECTAL BLEEDING. . Bowel Prep completed: Yes with ADEQUATE results. Last colonoscopy NEVER HAD EITHER COLONOSCOPY OR EGD. Vishal Huitron Denies history of ulcers, hiatal hernia, HAS  acid reflux/GERD, or IBS. Previous EGD: No.  .  .   Patient today denies  fever, chills, night sweats, pain or unexplained weight loss. LAST TOOK ELIQUIS ON 6/16/21, SHE DOES NOT HAVE DIABETES. Vital signs: BP (!) 149/102   Pulse 92   Temp 97.5 °F (36.4 °C) (Temporal)   Resp 16   Ht 5' 5\" (1.651 m)   Wt 223 lb (101.2 kg)   SpO2 93%   BMI 37.11 kg/m²     Allergies:  Penicillins, Amoxil [amoxicillin], Bee pollen, Bee venom, Ciprofloxacin, Clindamycin/lincomycin, Flonase [fluticasone], Keflex [cephalexin], Levaquin [levofloxacin in d5w], Levofloxacin, Macrobid [nitrofurantoin monohyd macro], Nitrofurantoin, Sulfa antibiotics, and Sulfur    Medications:   No current facility-administered medications on file prior to encounter.      Current Outpatient Medications on File Prior to Encounter   Medication Sig Dispense Refill    albuterol sulfate HFA (VENTOLIN HFA) 108 (90 Base) MCG/ACT inhaler Inhale 2 puffs into the lungs 4 times daily as needed for Wheezing 1 Inhaler 5    fluticasone (FLONASE) 50 MCG/ACT nasal spray 2 sprays by Each Nostril route daily 3 Bottle 1    cetirizine (ZYRTEC) 10 MG tablet Take 1 tablet by mouth daily 30 tablet 0    topiramate (TOPAMAX SPRINKLE) 15 MG capsule take 1 capsule 1 tablet under the tongue daily 1/27/20  Yes Marylee Drew, MD   benztropine (COGENTIN) 0.5 MG tablet take 1 tablet by mouth twice a day 10/7/19  Yes Historical Provider, MD   magnesium citrate solution Take 296 mLs by mouth once for 1 dose 6/14/21 6/14/21  Mina Alvarez MD   magnesium citrate solution Take 296 mLs by mouth once for 1 dose 6/14/21 6/14/21  Mina Alvarez MD   apixaban (ELIQUIS) 5 MG TABS tablet take 1 tablet by mouth twice a day 5/25/21   Jonnathan Varma MD   Mis. Devices (DIGITAL GLASS SCALE) MISC 1 Act by Does not apply route daily 12/16/20   Josh Betancur MD   folic acid (FOLVITE) 1 MG tablet Take 1 tablet by mouth daily 9/22/20   Marylee Drew, MD   RA VITAMIN B-12 TR 1000 MCG TBCR take 2 tablets by mouth once daily 7/14/20   Marylee Drew, MD   Blood Pressure KIT Use as directed. 4/1/20   Marylee Drew, MD   cetirizine (ZYRTEC) 10 MG tablet Take 1 tablet by mouth daily 10/18/19   Marylee Drew, MD       This is a 39 y.o. female who is  pleasant, cooperative, alert and oriented x3, in no acute distress. Heart: Heart sounds are normal.  Regular rate and rhythm without murmur, gallop or rub. Lungs:clear to auscultation without wheezes or rales    Abdomen: soft, nontender, nondistended, bowel sounds present X 4 .    PEDAL PULSES + 2 BILATERALLY NO CALF TENDERNESS NO EDEMA    Labs:  Recent Labs     06/08/21  1624 05/26/21  1051   HGB  --  11.6*   HCT  --  36.6   WBC  --  7.2   MCV  --  74.7*    133*   K 3.8 4.0   CL 99 103   CO2 21 19*   BUN 10 9   CREATININE 0.77 0.73   GLUCOSE 97 95   AST  --  18   ALT  --  20   LABALBU  --  3.6       ELIZABETH Rojas CNP   Electronically signed 6/16/2021 at 9:26 AM             Encounter Date:  6/2/2021         Related encounter: Office Visit from 6/2/2021 in [address]  [x]Manual[x]Template[]Copied    Added by:  [x]Crystal Ronni Arenas MD    []Denise for details  Subjective:      Patient ID: Luther Bolton is a 39 y.o. female. HPI States she has been concerned about her pelvic infection after having rekindled sex with an old boyfriend. Was started on doxy as well as flagyl. States has not been sexually active ,odor is gone and overall feels better after starting the antibiotics 2 days after it was prescribed. Her cultures did come back positive for BV and TRICOMONAS. She tested neg for GC and Chlamydia. NO fever, chills, urinary sx, no there complaints. She may lose her home and is worried about this as well. Mood has been so so. Headaches have been under control. Saw GI recently and has colonoscopy and EGD scheduled for next month. Breathing has been stable,her mood, sleep appetite are ok. Headaches are better, seizure no more lately as well. Review of Systems   Constitutional: Negative for activity change, appetite change and fatigue. HENT: Negative for dental problem, ear pain, hearing loss, postnasal drip, sinus pressure, sneezing, tinnitus, trouble swallowing and voice change. Eyes: Negative for pain, discharge, redness and visual disturbance. Respiratory: Negative for cough, chest tightness and shortness of breath. Cardiovascular: Negative for chest pain, palpitations and leg swelling. Gastrointestinal: Negative for abdominal distention, abdominal pain, anal bleeding, blood in stool, constipation, diarrhea, nausea, rectal pain and vomiting. Endocrine: Negative for cold intolerance, heat intolerance, polydipsia, polyphagia and polyuria. Genitourinary: Negative for decreased urine volume, difficulty urinating, dyspareunia, dysuria, enuresis, flank pain, frequency, genital sores, hematuria, menstrual problem, pelvic pain, urgency, vaginal bleeding and vaginal discharge. Musculoskeletal: Negative for arthralgias, back pain, gait problem, joint swelling, myalgias, neck pain and neck stiffness.    Skin: Negative for color change, pallor and rash. Allergic/Immunologic: Negative for environmental allergies, food allergies and immunocompromised state. Neurological: Negative for dizziness, tremors, seizures, syncope, facial asymmetry, speech difficulty, weakness, light-headedness, numbness and headaches. Hematological: Negative for adenopathy. Does not bruise/bleed easily. Psychiatric/Behavioral: Negative for agitation, behavioral problems, confusion, decreased concentration, sleep disturbance and suicidal ideas. The patient is not nervous/anxious. Objective:   Physical Exam  Constitutional:       General: She is not in acute distress. HENT:      Head: Normocephalic and atraumatic. Right Ear: External ear normal.      Left Ear: External ear normal.      Nose: Nose normal.      Mouth/Throat:      Mouth: Mucous membranes are moist.   Eyes:      Conjunctiva/sclera: Conjunctivae normal.      Pupils: Pupils are equal, round, and reactive to light. Neck:      Thyroid: No thyromegaly. Trachea: No tracheal deviation. Cardiovascular:      Rate and Rhythm: Normal rate and regular rhythm. Pulses: Normal pulses. Heart sounds: Normal heart sounds. No murmur heard. No friction rub. No gallop. Pulmonary:      Effort: Pulmonary effort is normal. No respiratory distress. Breath sounds: Normal breath sounds. No stridor. No wheezing or rales. Chest:      Chest wall: No tenderness. Abdominal:      General: Bowel sounds are normal. There is no distension. Palpations: Abdomen is soft. Tenderness: There is no abdominal tenderness. There is no rebound. Musculoskeletal:         General: Normal range of motion. Cervical back: Normal range of motion. Lymphadenopathy:      Cervical: No cervical adenopathy. Skin:     General: Skin is warm. Coloration: Skin is not pale. Findings: No erythema or rash. Neurological:      General: No focal deficit present.       Mental Status: She is alert and oriented to person, place, and time. Cranial Nerves: No cranial nerve deficit. Motor: No abnormal muscle tone. Deep Tendon Reflexes: Reflexes normal.   Psychiatric:         Mood and Affect: Mood normal.         Behavior: Behavior normal.         Thought Content: Thought content normal.         Judgment: Judgment normal.            Assessment:     Diagnosis Orders   1. Essential hypertension  triamterene-hydroCHLOROthiazide (DYAZIDE) 37.5-25 MG per capsule     Basic Metabolic Panel   2. Hyperlipidemia, unspecified hyperlipidemia type      3. Chronic nonintractable headache, unspecified headache type      4. Chronic obstructive pulmonary disease, unspecified COPD type ([facility]      5. Long term current use of anticoagulant therapy      6. Bipolar 1 disorder ([facility]      7. Vitamin D deficiency      8. Tobacco abuse counseling      9. Tobacco abuse disorder      10. Seizures ([facility]      11. Class 1 obesity with body mass index (BMI) of 32.0 to 32.9 in adult, unspecified obesity type, unspecified whether serious comorbidity present      12. Morbid (severe) obesity due to excess calories ([facility]      13. Marijuana use      14. Excessive consumption of soda pop      15. PTSD (post-traumatic stress disorder)      16. Irritable bowel syndrome, unspecified type      17.  Trichomonal infection                       Plan:       Orders Placed This Encounter   Procedures    Basic Metabolic Panel         Encounter Medications          Outpatient Encounter Medications as of 6/2/2021   Medication Sig Dispense Refill    triamterene-hydroCHLOROthiazide (DYAZIDE) 37.5-25 MG per capsule Take 1 capsule by mouth every morning 30 capsule 3    atorvastatin (LIPITOR) 10 MG tablet take 1 tablet by mouth once daily 90 tablet 1    losartan (COZAAR) 50 MG tablet take 1 tablet by mouth once daily 90 tablet 1    apixaban (ELIQUIS) 5 MG TABS tablet take 1 tablet by mouth twice a day 60 tablet 1    cloNIDine (CATAPRES) 0.1 MG/24HR PTWK apply 1 patch every week as directed 4 patch 0    VRAYLAR 3 MG CAPS capsule take 1 tablet by mouth once daily        escitalopram (LEXAPRO) 10 MG tablet          doxycycline hyclate (VIBRA-TABS) 100 MG tablet Take 1 tablet by mouth 2 times daily for 14 days 28 tablet 0    metroNIDAZOLE (FLAGYL) 500 MG tablet Take 1 tablet by mouth 2 times daily for 14 days 28 tablet 0    Misc. Devices (DIGITAL GLASS SCALE) MISC 1 Act by Does not apply route daily 1 each 0    folic acid (FOLVITE) 1 MG tablet Take 1 tablet by mouth daily 90 tablet 1    albuterol sulfate HFA (VENTOLIN HFA) 108 (90 Base) MCG/ACT inhaler Inhale 2 puffs into the lungs 4 times daily as needed for Wheezing 1 Inhaler 5    fluticasone (FLONASE) 50 MCG/ACT nasal spray 2 sprays by Each Nostril route daily 3 Bottle 1    cetirizine (ZYRTEC) 10 MG tablet Take 1 tablet by mouth daily 30 tablet 0    topiramate (TOPAMAX SPRINKLE) 15 MG capsule take 1 capsule by mouth nightly for 1 week then 1 capsule twice a day THEREAFTER 60 capsule 3    RA VITAMIN B-12 TR 1000 MCG TBCR take 2 tablets by mouth once daily 180 tablet 1    Blood Pressure KIT Use as directed. 1 kit 0    Cyanocobalamin (B-12) 1000 MCG SUBL Place 1 tablet under the tongue daily 90 tablet 5    benztropine (COGENTIN) 0.5 MG tablet take 1 tablet by mouth twice a day   0    cetirizine (ZYRTEC) 10 MG tablet Take 1 tablet by mouth daily 90 tablet 1    [DISCONTINUED] indapamide (LOZOL) 1.25 MG tablet take 1 tablet by mouth every morning 90 tablet 1      No facility-administered encounter medications on file as of 6/2/2021.                           Chaim Alonzo MD

## 2021-06-17 LAB — SURGICAL PATHOLOGY REPORT: NORMAL

## 2021-06-19 DIAGNOSIS — I10 ESSENTIAL HYPERTENSION: ICD-10-CM

## 2021-06-21 RX ORDER — CLONIDINE 0.1 MG/24H
PATCH, EXTENDED RELEASE TRANSDERMAL
Qty: 4 PATCH | Refills: 0 | Status: SHIPPED | OUTPATIENT
Start: 2021-06-21 | End: 2021-07-19

## 2021-07-15 DIAGNOSIS — I26.92 SADDLE EMBOLUS OF PULMONARY ARTERY WITHOUT ACUTE COR PULMONALE, UNSPECIFIED CHRONICITY (HCC): ICD-10-CM

## 2021-07-18 DIAGNOSIS — I10 ESSENTIAL HYPERTENSION: ICD-10-CM

## 2021-07-19 RX ORDER — CLONIDINE 0.1 MG/24H
PATCH, EXTENDED RELEASE TRANSDERMAL
Qty: 4 PATCH | Refills: 0 | Status: SHIPPED | OUTPATIENT
Start: 2021-07-19 | Stop reason: SDUPTHER

## 2021-07-26 NOTE — ED PROVIDER NOTES
[address] ED  eMERGENCY dEPARTMENT eNCOUnter      Pt Name: Vince Nguyen  MRN: [id number]  [place] 1975  Date of evaluation: 2017  Provider: Anthony King MD    CHIEF COMPLAINT       Chief Complaint   Patient presents with    Fatigue    Seizures      last seizure 1750    Arm Pain         HISTORY OF PRESENT ILLNESS  (Location/Symptom, Timing/Onset, Context/Setting, Quality, Duration, Modifying Factors, Severity.)   Vince Nguyen is a 38 y.o. female who presents to the emergency department   The following  Seizure, the patient claim she had 3 seizures at today 1 in the morning last one 2 hours ago,  did not witness seizure, patient said her children were there  Patient states she is on [address] she takes regularly, however  stated she has poor compliance with medication  Does drink alcohol but denies drinking alcohol today    PAST MEDICAL HISTORY     Past Medical History:   Diagnosis Date    Abdominal pain     Anemia     Asthma     Back problem     Bipolar 1 disorder ([address]     Chest pain     CHF (congestive heart failure) ([address]     Chicken pox     Chronic mental illness     Depression     Depression     GERD (gastroesophageal reflux disease)     Hair loss     Headache     Hernia     High blood pressure     Hyperlipidemia     Hypertension     Ileus ([address]     Irregular bowel habits     Irritable bowel syndrome     Nervousness     Pneumonia     Seizures ([address]     Stroke (cerebrum) ([address]     TIA (transient ischemic attack)     Weight loss          SURGICAL HISTORY       Past Surgical History:   Procedure Laterality Date    ABSCESS DRAINAGE      abdominal abscess RLQ    BREAST LUMPECTOMY Bilateral     BREAST LUMPECTOMY       SECTION      x2    ENDOMETRIAL ABLATION      HERNIA REPAIR      HYSTERECTOMY      TONSILLECTOMY      TUBAL LIGATION      UMBILICAL HERNIA REPAIR           CURRENT MEDICATIONS       Previous Medications    ALBUTEROL SULFATE Per Aimee they need one week sent to [facility] pt is out and then 90 day [facility]   HFA (VENTOLIN HFA) 108 (90 BASE) MCG/ACT INHALER    Inhale 2 puffs into the lungs every 4 hours as needed for Wheezing    AMITRIPTYLINE (ELAVIL) 50 MG TABLET    Take 1 tablet by mouth nightly    AMLODIPINE (NORVASC) 10 MG TABLET    take 1 tablet by mouth once daily    ASPIRIN EC 81 MG EC TABLET    Take 81 mg by mouth daily    ATORVASTATIN (LIPITOR) 10 MG TABLET    take 1 tablet by mouth once daily    CARVEDILOL (COREG) 25 MG TABLET    take 1 tablet by mouth twice a day    CETIRIZINE (ZYRTEC) 10 MG TABLET    Take 1 tablet by mouth daily    CHOLECALCIFEROL (VITAMIN D) 2000 UNITS CAPS CAPSULE    Take 1 capsule by mouth daily    CLONIDINE (CATAPRES) 0.2 MG/24HR    place 1 patch ONTO THE SKIN every 7 days    CYANOCOBALAMIN ([facility] VITAMIN B12) 1000 MCG TABLET    Place under the tongue    ESCITALOPRAM (LEXAPRO) 10 MG TABLET    take 1 tablet by mouth once daily    FLUTICASONE (FLONASE) 50 MCG/ACT NASAL SPRAY    2 sprays by Nasal route daily    LATUDA 40 MG TABS TABLET    take 1 tablet by mouth once daily with food AT LEAST 350 CALORIES    LEVETIRACETAM (KEPPRA) 500 MG TABLET    Take 1 tablet by mouth 2 times daily    LISINOPRIL (PRINIVIL;ZESTRIL) 40 MG TABLET    take 1 tablet by mouth once daily    LUBIPROSTONE (AMITIZA) 24 MCG CAPSULE    Take 1 capsule by mouth 2 times daily (with meals)    MICONAZOLE (ZEASORB-AF) 2 % POWDER    Apply topically 2 times daily. NAPROXEN (NAPROSYN) 500 MG TABLET    Take 1 tablet by mouth 2 times daily (with meals)    PANTOPRAZOLE (PROTONIX) 40 MG TABLET    take 1 tablet by mouth once daily    RA VITAMIN C 500 MG TABLET    take 1 tablet by mouth once daily    VITAMIN B AND C (VITABEE/C) TABS TABLET    take 1 tablet by mouth once daily       ALLERGIES     Bee venom; Levaquin [levofloxacin in d5w];  Macrobid [nitrofurantoin monohyd macro]; and Sulfa antibiotics    FAMILY HISTORY       Family History   Problem Relation Age of Onset    Asthma Mother     High Blood Pressure Mother     Mental Illness Mother     Stroke Other     Other Sister      blood clotting disorder, ms    Depression Sister           SOCIAL HISTORY       Social History     Social History    Marital status: Single     Spouse name: N/A    Number of children: N/A    Years of education: N/A     Social History Main Topics    Smoking status: Current Every Day Smoker     Packs/day: 0.50     Types: Cigarettes    Smokeless tobacco: Never Used      Comment: wants help    Alcohol use Yes      Comment: occ    Drug use:      Types: Marijuana      Comment: occ    Sexual activity: Yes     Partners: Male     Other Topics Concern    None     Social History Narrative    None         REVIEW OF SYSTEMS    (2-9 systems for level 4, 10 or more for level 5)     Review of Systems   Constitutional: Positive for fatigue. HENT: Negative. Eyes: Negative. Respiratory: Negative. Cardiovascular: Negative. Gastrointestinal: Negative. Endocrine: Negative. Genitourinary: Negative for difficulty urinating. Musculoskeletal:        Rt shoulder pain   Skin: Negative. Allergic/Immunologic: Negative. Neurological: Positive for seizures. Hematological: Negative. Psychiatric/Behavioral: Negative. Except as noted above the remainder of the review of systems was reviewed and negative. PHYSICAL EXAM    (up to 7 for level 4, 8 or more for level 5)     ED Triage Vitals [11/04/17 1929]   BP Temp Temp Source Pulse Resp SpO2 Height Weight   (!) 136/95 97.7 °F (36.5 °C) Oral 65 18 100 % 5' 5\" (1.651 m) 160 lb (72.6 kg)       Physical Exam   Constitutional: She is oriented to person, place, and time. She appears well-developed and well-nourished. No bite marks in the tongue   HENT:   Head: Normocephalic and atraumatic. Right Ear: External ear normal.   Left Ear: External ear normal.   Neck: Normal range of motion. Neck supple. Cardiovascular: Normal rate and regular rhythm.     Pulmonary/Chest: Effort normal and breath sounds normal. No respiratory distress. She has no wheezes. Abdominal: Soft. Bowel sounds are normal. She exhibits no distension. There is no tenderness. Musculoskeletal: She exhibits no edema. Complaining of pain in the right shoulder however no swelling noticed during exam, rom  normal     Neurological: She is alert and oriented to person, place, and time. No cranial nerve deficit. Coordination normal.   Skin: Skin is warm. DIAGNOSTIC RESULTS     EKG: All EKG's are interpreted by the Emergency Department Physician who either signs or Co-signs this chart in the absence of a cardiologist.    Sinus bradycardia. RADIOLOGY:   Non-plain film images such as CT, Ultrasound and MRI are read by the radiologist. Linda Cash radiographic images are visualized and preliminarily interpreted by the emergency physician with the below finding    Xr Shoulder Right (min 2 Views)    Result Date: 11/4/2017  EXAMINATION: 3 VIEWS OF THE RIGHT SHOULDER 11/4/2017 8:53 pm COMPARISON: None. HISTORY: ORDERING SYSTEM PROVIDED HISTORY: Pain TECHNOLOGIST PROVIDED HISTORY: Reason for exam:->Pain Ordering Physician Provided Reason for Exam: pain Type of Exam: Initial Relevant Medical/Surgical History: pt had a siezure today, pain in rt shoulder FINDINGS: No bone, joint or soft tissue abnormality. No evidence of an acute fracture or dislocation. No evidence of an acute injury. Ct Head Wo Contrast    Result Date: 11/4/2017  EXAMINATION: CT OF THE HEAD WITHOUT CONTRAST - STROKE ALERT  11/4/2017 7:55 pm TECHNIQUE: CT of the head was performed without the administration of intravenous contrast. Dose modulation, iterative reconstruction, and/or weight based adjustment of the mA/kV was utilized to reduce the radiation dose to as low as reasonably achievable. COMPARISON: 01/25/2017 HISTORY: ORDERING SYSTEM PROVIDED HISTORY: seizure FINDINGS: BRAIN/VENTRICLES: There is an empty sella. Ventricular system is normal in size and in the midline. 3. Sprain of right shoulder, unspecified shoulder sprain type, initial encounter          DISPOSITION/PLAN   DISPOSITION Decision to Discharge    PATIENT REFERRED TO:   Jay Merino MD  [address] [place] [address] [facility]  [phone]    Schedule an appointment as soon as possible for a visit   return to  [facility] if necessary      DISCHARGE MEDICATIONS:     New Prescriptions    ACETAMINOPHEN-CODEINE (TYLENOL/CODEINE #3) 300-30 MG PER TABLET    Take 1 tablet by mouth 3 times daily as needed for Pain    METRONIDAZOLE (FLAGYL) 500 MG TABLET    Take 1 tablet by mouth 3 times daily for 10 days           (Please note that portions of this note were completed with a voice recognition program.  Efforts were made to edit the dictations but occasionally words are mis-transcribed.)    Pop Richardson MD  Attending Emergency Physician          Pop Richardson MD  11/04/17 6140

## 2021-07-30 ENCOUNTER — APPOINTMENT (OUTPATIENT)
Dept: GENERAL RADIOLOGY | Age: 46
End: 2021-07-30
Payer: COMMERCIAL

## 2021-07-30 ENCOUNTER — HOSPITAL ENCOUNTER (EMERGENCY)
Age: 46
Discharge: HOME | End: 2021-07-30
Attending: EMERGENCY MEDICINE
Payer: COMMERCIAL

## 2021-07-30 VITALS
HEIGHT: 67 IN | SYSTOLIC BLOOD PRESSURE: 158 MMHG | HEART RATE: 89 BPM | RESPIRATION RATE: 18 BRPM | BODY MASS INDEX: 34.53 KG/M2 | OXYGEN SATURATION: 100 % | WEIGHT: 220 LBS | DIASTOLIC BLOOD PRESSURE: 104 MMHG | TEMPERATURE: 98.3 F

## 2021-07-30 DIAGNOSIS — J06.9 VIRAL URI: Primary | ICD-10-CM

## 2021-07-30 PROCEDURE — 71045 X-RAY EXAM CHEST 1 VIEW: CPT

## 2021-07-30 PROCEDURE — 99284 EMERGENCY DEPT VISIT MOD MDM: CPT

## 2021-07-30 PROCEDURE — 6370000000 HC RX 637 (ALT 250 FOR IP): Performed by: EMERGENCY MEDICINE

## 2021-07-30 RX ORDER — PREDNISONE 10 MG/1
TABLET ORAL
Qty: 30 TABLET | Refills: 0 | Status: SHIPPED | OUTPATIENT
Start: 2021-07-30 | End: 2021-07-30

## 2021-07-30 RX ORDER — PREDNISONE 20 MG/1
40 TABLET ORAL ONCE
Status: COMPLETED | OUTPATIENT
Start: 2021-07-30 | End: 2021-07-30

## 2021-07-30 RX ORDER — PREDNISONE 10 MG/1
TABLET ORAL
Qty: 30 TABLET | Refills: 0 | Status: SHIPPED | OUTPATIENT
Start: 2021-07-30 | End: 2021-10-15 | Stop reason: ALTCHOICE

## 2021-07-30 RX ORDER — BENZONATATE 100 MG/1
100 CAPSULE ORAL ONCE
Status: COMPLETED | OUTPATIENT
Start: 2021-07-30 | End: 2021-07-30

## 2021-07-30 RX ORDER — ALBUTEROL SULFATE 90 UG/1
2 AEROSOL, METERED RESPIRATORY (INHALATION) EVERY 4 HOURS PRN
Qty: 1 INHALER | Refills: 0 | Status: SHIPPED | OUTPATIENT
Start: 2021-07-30 | End: 2021-12-15

## 2021-07-30 RX ORDER — ACETAMINOPHEN 325 MG/1
650 TABLET ORAL ONCE
Status: COMPLETED | OUTPATIENT
Start: 2021-07-30 | End: 2021-07-30

## 2021-07-30 RX ORDER — BENZONATATE 100 MG/1
100 CAPSULE ORAL EVERY 8 HOURS PRN
Qty: 20 CAPSULE | Refills: 0 | Status: SHIPPED | OUTPATIENT
Start: 2021-07-30 | End: 2021-10-15 | Stop reason: ALTCHOICE

## 2021-07-30 RX ADMIN — ACETAMINOPHEN 650 MG: 325 TABLET ORAL at 01:31

## 2021-07-30 RX ADMIN — BENZONATATE 100 MG: 100 CAPSULE ORAL at 02:17

## 2021-07-30 RX ADMIN — PREDNISONE 40 MG: 20 TABLET ORAL at 02:17

## 2021-07-30 ASSESSMENT — ENCOUNTER SYMPTOMS
EYE DISCHARGE: 0
FACIAL SWELLING: 0
SHORTNESS OF BREATH: 0
EYE REDNESS: 0
VOMITING: 0
COLOR CHANGE: 0
DIARRHEA: 0
COUGH: 1
CONSTIPATION: 0
ABDOMINAL PAIN: 0

## 2021-07-30 ASSESSMENT — PAIN SCALES - GENERAL
PAINLEVEL_OUTOF10: 8
PAINLEVEL_OUTOF10: 8

## 2021-07-30 ASSESSMENT — PAIN DESCRIPTION - LOCATION: LOCATION: HEAD

## 2021-07-30 ASSESSMENT — PAIN DESCRIPTION - PAIN TYPE: TYPE: ACUTE PAIN

## 2021-07-30 ASSESSMENT — PAIN DESCRIPTION - FREQUENCY: FREQUENCY: CONTINUOUS

## 2021-07-30 NOTE — ED PROVIDER NOTES
[address] [place] [address] ED  EMERGENCY DEPARTMENT ENCOUNTER      Pt Name: Melina Vaughn  MRN: [id number]  [place] 1975  Date of evaluation: 7/30/2021  Provider: Caroline Burkett MD    CHIEF COMPLAINT       Chief Complaint   Patient presents with    Cough    Headache         HISTORY OF PRESENT ILLNESS  (Location/Symptom, Timing/Onset, Context/Setting, Quality, Duration, Modifying Factors, Severity.)   Melina Vaughn is a 39 y.o. female who presents to the emergency department for cough and headache. She is having cough for about a week and it is nonproductive. She states she is fully vaccinated against Covid. No fever or hemoptysis. She rated the headache as an 8. No vomiting or diarrhea. Her headache is continuous. Nursing Notes were reviewed. ALLERGIES     Penicillins, Amoxil [amoxicillin], Bee pollen, Bee venom, Ciprofloxacin, Clindamycin/lincomycin, Flonase [fluticasone], Keflex [cephalexin], Levaquin [levofloxacin in d5w], Levofloxacin, Macrobid [nitrofurantoin monohyd macro], Nitrofurantoin, Sulfa antibiotics, and Sulfur    CURRENT MEDICATIONS       Previous Medications    APIXABAN (ELIQUIS) 5 MG TABS TABLET    take 1 tablet by mouth twice a day    ATORVASTATIN (LIPITOR) 10 MG TABLET    take 1 tablet by mouth once daily    BENZTROPINE (COGENTIN) 0.5 MG TABLET    take 1 tablet by mouth twice a day    BLOOD PRESSURE KIT    Use as directed.     CETIRIZINE (ZYRTEC) 10 MG TABLET    Take 1 tablet by mouth daily    CETIRIZINE (ZYRTEC) 10 MG TABLET    Take 1 tablet by mouth daily    CLONIDINE (CATAPRES) 0.1 MG/24HR PTWK    apply 1 patch every week as directed    CYANOCOBALAMIN (B-12) 1000 MCG SUBL    Place 1 tablet under the tongue daily    ESCITALOPRAM (LEXAPRO) 10 MG TABLET        FLUTICASONE (FLONASE) 50 MCG/ACT NASAL SPRAY    2 sprays by Each Nostril route daily    FOLIC ACID (FOLVITE) 1 MG TABLET    Take 1 tablet by mouth daily    LOSARTAN (COZAAR) 50 MG TABLET    take 1 tablet by mouth once daily MAGNESIUM CITRATE SOLUTION    Take 296 mLs by mouth once for 1 dose    MAGNESIUM CITRATE SOLUTION    Take 296 mLs by mouth once for 1 dose    MISC. DEVICES (DIGITAL GLASS SCALE) MISC    1 Act by Does not apply route daily    RA VITAMIN B-12 TR 1000 MCG TBCR    take 2 tablets by mouth once daily    TOPIRAMATE (TOPAMAX SPRINKLE) 15 MG CAPSULE    take 1 capsule by mouth nightly for 1 week then 1 capsule twice a day THEREAFTER    TRIAMTERENE-HYDROCHLOROTHIAZIDE (DYAZIDE) 37.5-25 MG PER CAPSULE    Take 1 capsule by mouth every morning    VRAYLAR 3 MG CAPS CAPSULE    take 1 tablet by mouth once daily       PAST MEDICAL HISTORY         Diagnosis Date    Abdominal pain     Acute vaginitis 4/22/2020    Anemia     Anticoagulant long-term use     Arthritis 04/2018    Left Foot    Asthma     Moderate persistent asthma without complication    Axillary hidradenitis suppurativa 5/6/2020    Back problem     Bipolar 1 disorder (HCC)     Chest pain     with \"coughing\" per pt    CHF (congestive heart failure) ([address]     When child was delivered 2005    Chiari I malformation ([address] 6/21/2017    A MRI of the brain in 2014 was suspicious for pseudotumor cerebri but a spinal tap revealed a normal opening pressure of 16 cm water. An EEG was normal.  A MRI of the thoracic spine in 2015 was normal. Relevant history of cervical myelopathy with an abnormal MRI of the cervical spine in January 2015. A follow-up MRI of the brain in April 2017 was unchanged.   A MRI of the cervical spine in June 2    Chicken pox     Chronic idiopathic constipation 2014    Chronic mental illness     Current every day smoker     Deep vein thrombosis (DVT) of lower extremity ([address] 6/1/2018    Deep vein thrombosis (DVT) of lower extremity ([address] 6/1/2018    Depression     Depression     Diverticulitis of sigmoid colon 7/3/2017    DVT of deep femoral vein, bilateral (HCC)     Emphysema lung ([address] 2011    Excessive consumption of soda pop 2021    FH: breast cancer 3/22/2016    FH: uterine cancer 3/22/2016    FH: uterine cancer 3/22/2016    GERD (gastroesophageal reflux disease)     GERD (gastroesophageal reflux disease) 2004    Hair loss     Headache     Hernia     High blood pressure     History of abnormal uterine bleeding 7/3/2017    History of diabetes mellitus 3/22/2016    History of hysterectomy for benign disease 3/22/2016    History of pulmonary embolism 10/4/2019    History of pulmonary embolism 10/4/2019    Hyperlipidemia     Hypertension     [facility]     Irregular bowel habits     Irritable bowel syndrome     MDRO (multiple drug resistant organisms) resistance     MRSA in  per pt.  Nervousness     Non-compliance with treatment 2017    Obesity     Pneumonia     Saddle embolus of pulmonary artery without acute cor pulmonale (HCC)     Seizures (HCC)     Last Seizure 18    Slow gastric motility     Smoking     Stroke (cerebrum) ([facility] 2014    Suicidal intent     2003.  Denies 18    TIA (transient ischemic attack) 2014    Tobacco abuse disorder 3/22/2016    Weight loss        SURGICAL HISTORY           Procedure Laterality Date    ABSCESS DRAINAGE      abdominal abscess RLQ    AXILLARY SURGERY Right 2018    Excision of hidradenitis cysts, right axilla    BREAST LUMPECTOMY Bilateral     BREAST LUMPECTOMY       SECTION      x2    COLONOSCOPY N/A 2021    COLONOSCOPY DIAGNOSTIC performed by Tima Lyon MD at [address] [place], COLON, DIAGNOSTIC      HERNIA REPAIR      HYSTERECTOMY      LYMPH NODE DISSECTION Left 2019    LEFT AXILLARY HYDRADENITIS EXCISION performed by Arlen Ghosh DO at [facility] Left 2019    LEFT AXILLARY HYDRADENITIS EXCISION (Left )    PA EXC SKIN BENIG <5MM TRUNK,ARM,LEG Right 2018    EXCISION HIDRADENITIS RIGHT AXILLA performed by Arlen Ghosh DO at [facility]    TONSILLECTOMY      TONSILLECTOMY AND ADENOIDECTOMY  1993    TUBAL LIGATION      UMBILICAL HERNIA REPAIR      UPPER GASTROINTESTINAL ENDOSCOPY  6/16/2021    EGD BIOPSY performed by Mariza Mayorga MD at [place] [address]           Problem Relation Age of Onset    Asthma Mother     High Blood Pressure Mother     Mental Illness Mother     Stroke Other     Other Sister         blood clotting disorder, ms    Depression Sister     Cancer Maternal Grandmother     Diabetes Maternal Grandmother     Cancer Maternal Aunt     Diabetes Maternal Grandfather      Family Status   Relation Name Status    Mother  Alive    Other  (Not Specified)    Father  Alive    Sister  Alive    MGM  (Not Specified)    MAunt  (Not Specified)    MGF  (Not Specified)        SOCIAL HISTORY      reports that she has been smoking cigarettes. She started smoking about 40 years ago. She has a 26.00 pack-year smoking history. She has never used smokeless tobacco. She reports current alcohol use. She reports current drug use. Drug: Marijuana. REVIEW OF SYSTEMS    (2-9 systems for level 4, 10 or more for level 5)     Review of Systems   Constitutional: Negative for chills, fatigue and fever. HENT: Negative for congestion, ear discharge and facial swelling. Eyes: Negative for discharge and redness. Respiratory: Positive for cough. Negative for shortness of breath. Cardiovascular: Negative for chest pain. Gastrointestinal: Negative for abdominal pain, constipation, diarrhea and vomiting. Genitourinary: Negative for dysuria and hematuria. Musculoskeletal: Negative for arthralgias. Skin: Negative for color change and rash. Neurological: Positive for headaches. Negative for syncope and numbness. Hematological: Negative for adenopathy. Psychiatric/Behavioral: Negative for confusion. The patient is not nervous/anxious.          Except as noted above the remainder of the review of systems was reviewed and negative. PHYSICAL EXAM    (up to 7 for level 4, 8 or more for level 5)     Vitals:    07/30/21 0038   BP: (!) 158/104   Pulse: 89   Resp: 18   Temp: 98.3 °F (36.8 °C)   TempSrc: Oral   SpO2: 100%   Weight: 220 lb (99.8 kg)   Height: 5' 6.5\" (1.689 m)       Physical Exam  Vitals reviewed. Constitutional:       General: She is not in acute distress. Appearance: She is well-developed. She is not diaphoretic. HENT:      Head: Normocephalic and atraumatic. Eyes:      General: No scleral icterus. Right eye: No discharge. Left eye: No discharge. Cardiovascular:      Rate and Rhythm: Normal rate and regular rhythm. Pulmonary:      Effort: Pulmonary effort is normal. No respiratory distress. Breath sounds: Normal breath sounds. No stridor. No wheezing or rales. Abdominal:      General: There is no distension. Palpations: Abdomen is soft. Tenderness: There is no abdominal tenderness. Musculoskeletal:         General: Normal range of motion. Cervical back: Neck supple. Lymphadenopathy:      Cervical: No cervical adenopathy. Skin:     General: Skin is warm and dry. Findings: No erythema or rash. Neurological:      Mental Status: She is alert and oriented to person, place, and time.    Psychiatric:         Behavior: Behavior normal.             DIAGNOSTIC RESULTS     EKG: All EKG's are interpreted by the Emergency Department Physician who either signs or Co-signs this chart in the absence of a cardiologist    RADIOLOGY:   Non-plain film images such as CT, Ultrasound and MRI are read by the radiologist. Plain radiographic images are visualized and preliminarily interpreted by the emergency physician with the below findings:    Interpretation per the Radiologist below, if available at the time of this note:    XR CHEST PORTABLE    Result Date: 7/30/2021  EXAMINATION: [address] 7/30/2021 1:06 am COMPARISON: 01/27/2021 HISTORY: ORDERING SYSTEM PROVIDED HISTORY: cough TECHNOLOGIST PROVIDED HISTORY: cough Reason for Exam: cough Relevant Medical/Surgical History: asthma   CHF FINDINGS: Heart size is within normal limits. Descending thoracic aorta is mildly tortuous and unchanged. The lungs are clear. No pneumothorax or pleural fluid. No acute bone finding. Stable chest x-ray. No acute disease. LABS:  Labs Reviewed - No data to display    All other labs were within normal range or not returned as of this dictation. EMERGENCY DEPARTMENT COURSE and DIFFERENTIAL DIAGNOSIS/MDM:   Vitals:    Vitals:    07/30/21 0038   BP: (!) 158/104   Pulse: 89   Resp: 18   Temp: 98.3 °F (36.8 °C)   TempSrc: Oral   SpO2: 100%   Weight: 220 lb (99.8 kg)   Height: 5' 6.5\" (1.689 m)       Orders Placed This Encounter   Medications    DISCONTD: dilTIAZem 125 mg in dextrose 5 % 125 mL infusion    acetaminophen (TYLENOL) tablet 650 mg    predniSONE (DELTASONE) 10 MG tablet     Sig: Take 4 by mouth daily for 3 days then  3 by mouth daily for 3 days then  2 by mouth daily for 3 days then  1 by mouth daily for 3 days     Dispense:  30 tablet     Refill:  0    benzonatate (TESSALON PERLES) 100 MG capsule     Sig: Take 1 capsule by mouth every 8 hours as needed for Cough     Dispense:  20 capsule     Refill:  0    albuterol sulfate HFA (PROVENTIL HFA) 108 (90 Base) MCG/ACT inhaler     Sig: Inhale 2 puffs into the lungs every 4 hours as needed for Wheezing or Shortness of Breath (Space out to every 6 hours as symptoms improve) Space out to every 6 hours as symptoms improve. Dispense:  1 Inhaler     Refill:  0    predniSONE (DELTASONE) tablet 40 mg    benzonatate (TESSALON) capsule 100 mg       Medical Decision Making: Chest x-ray is negative. My clinical impression is that she has a viral URI. Clinically I do not suspect Covid. Treatment diagnosis and follow-up were discussed with the patient.       CONSULTS:  None    PROCEDURES:  None    FINAL IMPRESSION 1. Viral URI          DISPOSITION/PLAN   DISPOSITION Decision To Discharge 07/30/2021 02:01:58 AM      PATIENT REFERRED TO:   Anali Kenny MD  [address] [facility]  [phone]      As needed    [address] [facility] ED  [address] [facility]  [phone]    If symptoms worsen      DISCHARGE MEDICATIONS:     New Prescriptions    ALBUTEROL SULFATE HFA (PROVENTIL HFA) 108 (90 BASE) MCG/ACT INHALER    Inhale 2 puffs into the lungs every 4 hours as needed for Wheezing or Shortness of Breath (Space out to every 6 hours as symptoms improve) Space out to every 6 hours as symptoms improve.     BENZONATATE (TESSALON PERLES) 100 MG CAPSULE    Take 1 capsule by mouth every 8 hours as needed for Cough    PREDNISONE (DELTASONE) 10 MG TABLET    Take 4 by mouth daily for 3 days then  3 by mouth daily for 3 days then  2 by mouth daily for 3 days then  1 by mouth daily for 3 days         (Please note that portions of this note were completed with a voice recognition program.  Efforts were made to edit the dictations but occasionally words are mis-transcribed.)    Cony Chaudhari MD  Attending Emergency Physician           Cony Chaudhari MD  07/30/21 [id number]

## 2021-08-03 ENCOUNTER — TELEPHONE (OUTPATIENT)
Dept: FAMILY MEDICINE CLINIC | Age: 46
End: 2021-08-03

## 2021-08-04 ENCOUNTER — TELEPHONE (OUTPATIENT)
Dept: FAMILY MEDICINE CLINIC | Age: 46
End: 2021-08-04

## 2021-08-14 DIAGNOSIS — I10 ESSENTIAL HYPERTENSION: ICD-10-CM

## 2021-08-16 RX ORDER — CLONIDINE 0.1 MG/24H
PATCH, EXTENDED RELEASE TRANSDERMAL
Qty: 4 PATCH | Refills: 0 | Status: SHIPPED | OUTPATIENT
Start: 2021-08-16 | End: 2021-09-13

## 2021-09-08 DIAGNOSIS — I26.92 SADDLE EMBOLUS OF PULMONARY ARTERY WITHOUT ACUTE COR PULMONALE, UNSPECIFIED CHRONICITY (HCC): ICD-10-CM

## 2021-09-08 NOTE — TELEPHONE ENCOUNTER
Dr Cortney Ortiz, patient is current, has cancelled last two appointments and is now scheduled for follow up on 10/7/21. Per last dictation patient to have lifelong anticoagulation. Please sign for refill if ok. Thank you.

## 2021-09-13 DIAGNOSIS — I10 ESSENTIAL HYPERTENSION: ICD-10-CM

## 2021-09-13 RX ORDER — CLONIDINE 0.1 MG/24H
PATCH, EXTENDED RELEASE TRANSDERMAL
Qty: 4 PATCH | Refills: 0 | Status: SHIPPED | OUTPATIENT
Start: 2021-09-13 | End: 2021-10-11

## 2021-09-15 ENCOUNTER — OFFICE VISIT (OUTPATIENT)
Dept: GASTROENTEROLOGY | Age: 46
End: 2021-09-15
Payer: COMMERCIAL

## 2021-09-15 VITALS
WEIGHT: 232 LBS | BODY MASS INDEX: 37.28 KG/M2 | SYSTOLIC BLOOD PRESSURE: 143 MMHG | HEIGHT: 66 IN | DIASTOLIC BLOOD PRESSURE: 88 MMHG

## 2021-09-15 DIAGNOSIS — F17.200 SMOKER: ICD-10-CM

## 2021-09-15 DIAGNOSIS — E66.01 MORBID (SEVERE) OBESITY DUE TO EXCESS CALORIES (HCC): ICD-10-CM

## 2021-09-15 DIAGNOSIS — K29.60 EROSIVE GASTRITIS: ICD-10-CM

## 2021-09-15 DIAGNOSIS — K58.8 OTHER IRRITABLE BOWEL SYNDROME: ICD-10-CM

## 2021-09-15 DIAGNOSIS — F12.90 MARIJUANA USE: ICD-10-CM

## 2021-09-15 DIAGNOSIS — Z91.89 EXCESSIVE CONSUMPTION OF SODA POP: ICD-10-CM

## 2021-09-15 DIAGNOSIS — K21.00 GASTROESOPHAGEAL REFLUX DISEASE WITH ESOPHAGITIS WITHOUT HEMORRHAGE: Primary | ICD-10-CM

## 2021-09-15 PROCEDURE — G8417 CALC BMI ABV UP PARAM F/U: HCPCS | Performed by: INTERNAL MEDICINE

## 2021-09-15 PROCEDURE — G8427 DOCREV CUR MEDS BY ELIG CLIN: HCPCS | Performed by: INTERNAL MEDICINE

## 2021-09-15 PROCEDURE — 4004F PT TOBACCO SCREEN RCVD TLK: CPT | Performed by: INTERNAL MEDICINE

## 2021-09-15 PROCEDURE — 99214 OFFICE O/P EST MOD 30 MIN: CPT | Performed by: INTERNAL MEDICINE

## 2021-09-15 RX ORDER — GREEN TEA/HOODIA GORDONII 315-12.5MG
1 CAPSULE ORAL 2 TIMES DAILY
Qty: 60 TABLET | Refills: 0 | Status: SHIPPED | OUTPATIENT
Start: 2021-09-15 | End: 2021-10-15 | Stop reason: ALTCHOICE

## 2021-09-15 RX ORDER — OMEPRAZOLE 20 MG/1
20 CAPSULE, DELAYED RELEASE ORAL
Qty: 180 CAPSULE | Refills: 1 | Status: SHIPPED | OUTPATIENT
Start: 2021-09-15 | End: 2022-03-04

## 2021-09-15 ASSESSMENT — ENCOUNTER SYMPTOMS
CONSTIPATION: 0
TROUBLE SWALLOWING: 0
COUGH: 0
ABDOMINAL DISTENTION: 1
ANAL BLEEDING: 0
ABDOMINAL PAIN: 1

## 2021-09-15 NOTE — PROGRESS NOTES
GI OFFICE FOLLOW UP    Janine Oliver is a 39 y.o. female evaluated via on 9/15/2021. Consent:  She and/or health care decision maker is aware that that she may receive a bill for this telephone service, depending on her insurance coverage, and has provided verbal consent to proceed: YES      INTERVAL HISTORY:   No referring provider defined for this encounter. Chief Complaint   Patient presents with    Follow-up     Patient is a colon/egd f/u. Patient notes since the procedure she has been having some sharp pains. Notes intermittent. Notes loss of appetite, notes leg swelling and bloating.  Fatigue     Notes sleeping more       1. Gastroesophageal reflux disease with esophagitis without hemorrhage    2. Excessive consumption of soda pop    3. Morbid (severe) obesity due to excess calories ([address]    4. Marijuana use    5. Other irritable bowel syndrome    6. Erosive gastritis    7. Smoker       The patient is here as a follow up of her recent GI procedure. The results have been sent to you separately   The findings were explained to the patient in detail and biopsies were also discussed   with her    Has bee having some abd pains  Patient has been complaining of some abdominal pains, off and on cramping  Also complains of abdominal bloating and gas  Has off and on nausea without any sig vomiting  Has some alternating constipation and diarrhea  Has no weight loss  Has some anxiety issues        HISTORY OF PRESENT ILLNESS: Naya Scott is a 39 y.o. female with a past history remarkable for , referred for evaluation of   Chief Complaint   Patient presents with    Follow-up     Patient is a colon/egd f/u. Patient notes since the procedure she has been having some sharp pains. Notes intermittent. Notes loss of appetite, notes leg swelling and bloating.      Fatigue     Notes sleeping more .    Past Medical,Family, and Social History reviewed and does contribute to the patient presenting condition. Patient's PMH/PSH,SH,PSYCH Hx, MEDs, ALLERGIES, and ROS were all reviewed and updated in the appropriate sections. PAST MEDICAL HISTORY:  Past Medical History:   Diagnosis Date    Abdominal pain     Acute vaginitis 4/22/2020    Anemia     Anticoagulant long-term use     Arthritis 04/2018    Left Foot    Asthma     Moderate persistent asthma without complication    Axillary hidradenitis suppurativa 5/6/2020    Back problem     Bipolar 1 disorder ([address]     Chest pain     with \"coughing\" per pt    CHF (congestive heart failure) ([address]     When child was delivered 2005    Chiari I malformation ([address] 6/21/2017    A MRI of the brain in 2014 was suspicious for pseudotumor cerebri but a spinal tap revealed a normal opening pressure of 16 cm water. An EEG was normal.  A MRI of the thoracic spine in 2015 was normal. Relevant history of cervical myelopathy with an abnormal MRI of the cervical spine in January 2015. A follow-up MRI of the brain in April 2017 was unchanged.   A MRI of the cervical spine in June 2    Chicken pox     Chronic idiopathic constipation 2014    Chronic mental illness     Current every day smoker     Deep vein thrombosis (DVT) of lower extremity ([address] 6/1/2018    Deep vein thrombosis (DVT) of lower extremity ([address] 6/1/2018    Depression     Depression     Diverticulitis of sigmoid colon 7/3/2017    DVT of deep femoral vein, bilateral (HCC)     Emphysema lung ([address] 2011    Excessive consumption of soda pop 5/12/2021    FH: breast cancer 3/22/2016    FH: uterine cancer 3/22/2016    FH: uterine cancer 3/22/2016    GERD (gastroesophageal reflux disease)     GERD (gastroesophageal reflux disease) 2004    Hair loss     Headache     Hernia     High blood pressure     History of abnormal uterine bleeding 7/3/2017    History of diabetes mellitus 3/22/2016    History of hysterectomy for benign disease 3/22/2016    History of pulmonary embolism 10/4/2019    History of pulmonary embolism 10/4/2019    Hyperlipidemia     Hypertension     [facility]     Irregular bowel habits     Irritable bowel syndrome     MDRO (multiple drug resistant organisms) resistance     MRSA in  per pt.  Nervousness     Non-compliance with treatment 2017    Obesity     Pneumonia     Saddle embolus of pulmonary artery without acute cor pulmonale (HCC)     Seizures (HCC)     Last Seizure 18    Slow gastric motility     Smoking     Stroke (cerebrum) ([facility] 2014    Suicidal intent     2003.  Denies 18    TIA (transient ischemic attack)     Tobacco abuse disorder 3/22/2016    Weight loss        Past Surgical History:   Procedure Laterality Date    ABSCESS DRAINAGE      abdominal abscess RLQ    AXILLARY SURGERY Right 2018    Excision of hidradenitis cysts, right axilla    BREAST LUMPECTOMY Bilateral     BREAST LUMPECTOMY       SECTION      x2    COLONOSCOPY N/A 2021    COLONOSCOPY DIAGNOSTIC performed by Chantel Butcher MD at [address], COLON, DIAGNOSTIC      HERNIA REPAIR      HYSTERECTOMY      LYMPH NODE DISSECTION Left 2019    LEFT AXILLARY HYDRADENITIS EXCISION performed by Nick Pena DO at [address] [facility] Left 2019    LEFT AXILLARY HYDRADENITIS EXCISION (Left )    TX EXC SKIN BENIG <5MM TRUNK,ARM,LEG Right 2018    EXCISION HIDRADENITIS RIGHT AXILLA performed by Nick Pena DO at [facility]      UPPER GASTROINTESTINAL ENDOSCOPY  2021    EGD BIOPSY performed by Chantel Butcher MD at [facility]:    Current Outpatient Medications:     omeprazole (PRILOSEC) 20 MG delayed release capsule, Take 1 capsule by mouth 2 times daily (before meals), Disp: 180 capsule, Rfl: 1    Probiotic Acidophilus (FLORANEX) TABS, Take 1 tablet by mouth 2 times daily, Disp: 60 tablet, Rfl: 0    Calcium-Magnesium 200-50 MG TABS, Take 1 tablet by mouth daily, Disp: 90 tablet, Rfl: 2    cloNIDine (CATAPRES) 0.1 MG/24HR PTWK, apply 1 patch every week as directed, Disp: 4 patch, Rfl: 0    apixaban (ELIQUIS) 5 MG TABS tablet, take 1 tablet by mouth twice a day, Disp: 60 tablet, Rfl: 1    benzonatate (TESSALON PERLES) 100 MG capsule, Take 1 capsule by mouth every 8 hours as needed for Cough, Disp: 20 capsule, Rfl: 0    albuterol sulfate HFA (PROVENTIL HFA) 108 (90 Base) MCG/ACT inhaler, Inhale 2 puffs into the lungs every 4 hours as needed for Wheezing or Shortness of Breath (Space out to every 6 hours as symptoms improve) Space out to every 6 hours as symptoms improve., Disp: 1 Inhaler, Rfl: 0    predniSONE (DELTASONE) 10 MG tablet, Take 4 by mouth daily for 3 days then 3 by mouth daily for 3 days then 2 by mouth daily for 3 days then 1 by mouth daily for 3 days, Disp: 30 tablet, Rfl: 0    triamterene-hydroCHLOROthiazide (DYAZIDE) 37.5-25 MG per capsule, Take 1 capsule by mouth every morning, Disp: 30 capsule, Rfl: 3    atorvastatin (LIPITOR) 10 MG tablet, take 1 tablet by mouth once daily, Disp: 90 tablet, Rfl: 1    losartan (COZAAR) 50 MG tablet, take 1 tablet by mouth once daily, Disp: 90 tablet, Rfl: 1    VRAYLAR 3 MG CAPS capsule, take 1 tablet by mouth once daily, Disp: , Rfl:     escitalopram (LEXAPRO) 10 MG tablet, , Disp: , Rfl:     Misc.  Devices (DIGITAL GLASS SCALE) MISC, 1 Act by Does not apply route daily, Disp: 1 each, Rfl: 0    folic acid (FOLVITE) 1 MG tablet, Take 1 tablet by mouth daily, Disp: 90 tablet, Rfl: 1    fluticasone (FLONASE) 50 MCG/ACT nasal spray, 2 sprays by Each Nostril route daily, Disp: 3 Bottle, Rfl: 1    cetirizine (ZYRTEC) 10 MG tablet, Take 1 tablet by mouth daily, Disp: 30 tablet, Rfl: 0   topiramate (TOPAMAX SPRINKLE) 15 MG capsule, take 1 capsule by mouth nightly for 1 week then 1 capsule twice a day THEREAFTER, Disp: 60 capsule, Rfl: 3    RA VITAMIN B-12 TR 1000 MCG TBCR, take 2 tablets by mouth once daily, Disp: 180 tablet, Rfl: 1    Blood Pressure KIT, Use as directed., Disp: 1 kit, Rfl: 0    Cyanocobalamin (B-12) 1000 MCG SUBL, Place 1 tablet under the tongue daily, Disp: 90 tablet, Rfl: 5    benztropine (COGENTIN) 0.5 MG tablet, take 1 tablet by mouth twice a day, Disp: , Rfl: 0    cetirizine (ZYRTEC) 10 MG tablet, Take 1 tablet by mouth daily, Disp: 90 tablet, Rfl: 1    magnesium citrate solution, Take 296 mLs by mouth once for 1 dose, Disp: 296 mL, Rfl: 0    magnesium citrate solution, Take 296 mLs by mouth once for 1 dose, Disp: 296 mL, Rfl: 0    ALLERGIES:   Allergies   Allergen Reactions    Penicillins Anaphylaxis and Rash    Amoxil [Amoxicillin]      Swelling of throat, rash and cough    Bee Pollen     Bee Venom Hives and Other (See Comments)    Ciprofloxacin     Clindamycin/Lincomycin      rash    Flonase [Fluticasone]      Headaches      Keflex [Cephalexin]      itching    Levaquin [Levofloxacin In D5w] Hives    Levofloxacin     Macrobid [Nitrofurantoin Monohyd Macro] Hives    Nitrofurantoin     Sulfa Antibiotics Hives    Sulfur        FAMILY HISTORY:       Problem Relation Age of Onset    Asthma Mother     High Blood Pressure Mother     Mental Illness Mother     Stroke Other     Other Sister         blood clotting disorder, ms    Depression Sister     Cancer Maternal Grandmother     Diabetes Maternal Grandmother     Cancer Maternal Aunt     Diabetes Maternal Grandfather          SOCIAL HISTORY:   Social History     Socioeconomic History    Marital status: Single     Spouse name: Not on file    Number of children: Not on file    Years of education: Not on file    Highest education level: Not on file   Occupational History    Not on file   Tobacco Use  Smoking status: Current Every Day Smoker     Packs/day: 1.00     Years: 26.00     Pack years: 26.00     Types: Cigarettes     Start date: 0    Smokeless tobacco: Never Used    Tobacco comment: wants help- adviced about smoking cessation plans   Vaping Use    Vaping Use: Never used   Substance and Sexual Activity    Alcohol use: Yes     Comment: occ    Drug use: Yes     Types: Marijuana    Sexual activity: Yes     Partners: Male   Other Topics Concern    Not on file   Social History Narrative    Not on file     Social Determinants of Health     Financial Resource Strain: Low Risk     Difficulty of Paying Living Expenses: Not hard at all   Food Insecurity: No Food Insecurity    Worried About Running Out of Food in the Last Year: Never true    Alesia of Food in the Last Year: Never true   Transportation Needs:     Lack of Transportation (Medical):  Lack of Transportation (Non-Medical):    Physical Activity:     Days of Exercise per Week:     Minutes of Exercise per Session:    Stress:     Feeling of Stress :    Social Connections:     Frequency of Communication with Friends and Family:     Frequency of Social Gatherings with Friends and Family:     Attends Adventism Services:     Active Member of Clubs or Organizations:     Attends Club or Organization Meetings:     Marital Status:    Intimate Partner Violence:     Fear of Current or Ex-Partner:     Emotionally Abused:     Physically Abused:     Sexually Abused:          REVIEW OF SYSTEMS:         Review of Systems   Constitutional: Positive for appetite change (loss), fatigue and unexpected weight change (gaining). HENT: Negative for trouble swallowing. Eyes: Positive for visual disturbance. Respiratory: Negative for cough. Cardiovascular: Negative for chest pain. Gastrointestinal: Positive for abdominal distention and abdominal pain. Negative for anal bleeding and constipation. Endocrine: Negative.     Genitourinary: MCH 23.7 (L) 05/26/2021    MCHC 31.7 05/26/2021    RDW 15.9 (H) 05/26/2021    MPV 11.2 05/26/2021    BASOPCT 0 05/26/2021    LYMPHSABS 3.44 05/26/2021    MONOSABS 0.76 05/26/2021    NEUTROABS 2.90 05/26/2021    EOSABS 0.09 05/26/2021    BASOSABS <0.03 05/26/2021         DIAGNOSTIC TESTING:     No results found. Assessment  1. Gastroesophageal reflux disease with esophagitis without hemorrhage    2. Excessive consumption of soda pop    3. Morbid (severe) obesity due to excess calories ([address]    4. Marijuana use    5. Other irritable bowel syndrome    6. Erosive gastritis    7. Smoker        Plan    Start PPI    Pt seems to have signs and symptoms consistent with GERD, acid indigestion and heartburns. She was discussed  in detail about some possible life style and dietary modifications. She was stressed about the maintenance  of appropriate weight and effect of obesity contributing to reflux symptoms. Routine exercise was streesed. Avoidance of Caffeine, nicotine and chocolate were explained. Pt was asked to avoid spices grease and fried food. Advices were also given about avoidance of any kind of fast foods, soda pops and high energy drinks. Pt was advised to place two small block under the head end of the bed which may help with night time reflux. Was advised not to eat any thin at least 2-3 hrs before going to bed and walk especially after dinner    Pt has verbalized understanding and agreement to this plan. Pt was discussed in detail about the possible side effects of proton pump inhibiter therapy. She was explained about the possibility of calcium and magnesium malabsorption and was advised to start taking calcium supplements with Vit D. Some over the counter regimens were explained to patient. Some dietary advices were also given. She has verbalized understanding and agreement to this. Pt was given advices and instructions about weight loss.  She was advised about the significance of exercise at least three times a week . Dietary advices were also given about the avoidance of fast food, fried food and lots of spices and grease. nstructions were given about using ample amount of fiber including dietary and supplemental fiber either metamucil, bennafiber or citrucell etc.  Pt was advised about drinking ample amount of water without any colors or chemicals. Stress was given about regular exercise. Pt was told to stay way from soda pops. Pt has verbalized understanding and agrrement    The patient was instructed to start taking some OTC Probiotics products   These are available over the counter at the Pharmacy stores and Grocery stores  He was explained about the beneficial effects they have in the GI track  They will help to establish the good bacterial kala and will help with the digestion and bowel movements  The patient has verbalized understanding and agreement to this plan      More than half of patient's clinic visit time was spent in counseling about lifestyle and dietary modifications  Patient's  questions were answered in this regard as well  The patient has verbalized understanding and agreement         Pt was discussed in detail about the possible side effects of proton pump inhibiter therapy. She was explained about the possibility of calcium and magnesium malabsorption and was advised to start taking calcium supplements with Vit D. Some over the counter regimens were explained to patient. Some dietary advices were also given. She has verbalized understanding and agreement to this. I communicated with the patient and/or health care decision maker about   Details of this discussion including any medical advice provided:YES      I affirm this is a Patient Initiated Episode with an Established Patient who has not had a related appointment within my department in the past 7 days or scheduled within the next 24 hours.     Total Time: minutes: 21-30 minutes    Note: not billable if this call serves to triage the patient into an appointment for the relevant concern      Thank you for allowing me to participate in the care of Ms. Kinney. For any further questions please do not hesitate to contact me. I have reviewed and agree with the ROS entered by the MA/JESICAN.          Kevin Contreras MD, [facility]  Board Certified in Gastroenterology and [facility]  Office #: [phone]

## 2021-09-21 DIAGNOSIS — I10 ESSENTIAL HYPERTENSION: ICD-10-CM

## 2021-09-21 RX ORDER — TRIAMTERENE AND HYDROCHLOROTHIAZIDE 37.5; 25 MG/1; MG/1
1 CAPSULE ORAL EVERY MORNING
Qty: 30 CAPSULE | Refills: 3 | Status: SHIPPED | OUTPATIENT
Start: 2021-09-21 | End: 2022-02-07

## 2021-10-13 ENCOUNTER — NURSE TRIAGE (OUTPATIENT)
Dept: OTHER | Facility: CLINIC | Age: 46
End: 2021-10-13

## 2021-10-13 NOTE — TELEPHONE ENCOUNTER
Pt had a appt today and she canceled it so I called and left a message for pt to call and reschedule her appt

## 2021-10-13 NOTE — TELEPHONE ENCOUNTER
Received call from [facility] with [employer]. Brief description of triage: Patient reports bilateral foot swelling and pain. Triage indicates for patient to seen in office today. Triage RN advised patient to go to [facility] if she cannot be seen in the office today. Care advice provided, patient verbalizes understanding; denies any other questions or concerns; instructed to call back for any new or worsening symptoms. Writer provided warm transfer to Mee Canales at [facility] for appointment scheduling. Attention Provider: Thank you for allowing me to participate in the care of your patient. The patient was connected to triage in response to information provided to the ECC/PSC. Please do not respond through this encounter as the response is not directed to a shared pool. Reason for Disposition   MODERATE swelling of both ankles (e.g., swelling extends up to the knees) AND new onset or worsening    Answer Assessment - Initial Assessment Questions  1. ONSET: \"When did the swelling start? \" (e.g., minutes, hours, days)      \"last few months\"    2. LOCATION: \"What part of the leg is swollen? \"  \"Are both legs swollen or just one leg? \"      Both feet and ankles    3. SEVERITY: \"How bad is the swelling? \" (e.g., localized; mild, moderate, severe)   - Localized - small area of swelling localized to one leg   - MILD pedal edema - swelling limited to foot and ankle, pitting edema < 1/4 inch (6 mm) deep, rest and elevation eliminate most or all swelling   - MODERATE edema - swelling of lower leg to knee, pitting edema > 1/4 inch (6 mm) deep, rest and elevation only partially reduce swelling   - SEVERE edema - swelling extends above knee, facial or hand swelling present       Moderate    4. REDNESS: \"Does the swelling look red or infected? \"      Denies    5. PAIN: \"Is the swelling painful to touch? \" If so, ask: \"How painful is it? \"   (Scale 1-10; mild, moderate or severe)      9/10    6. FEVER: \"Do you have a fever? \" If so, ask: \"What is it, how was it measured, and when did it start? \"       Denies    7. CAUSE: \"What do you think is causing the leg swelling? \"      Unknown    8. MEDICAL HISTORY: \"Do you have a history of heart failure, kidney disease, liver failure, or cancer? \"      Denies  History of DVT and PE    9. RECURRENT SYMPTOM: \"Have you had leg swelling before? \" If so, ask: \"When was the last time? \" \"What happened that time? \"      Denies    10. OTHER SYMPTOMS: \"Do you have any other symptoms? \" (e.g., chest pain, difficulty breathing)        \"My chest always hurts\"     11. PREGNANCY: \"Is there any chance you are pregnant? \" \"When was your last menstrual period? \"       Hysterectomy    Protocols used: LEG SWELLING AND EDEMA-ADULT-OH

## 2021-10-15 ENCOUNTER — OFFICE VISIT (OUTPATIENT)
Dept: FAMILY MEDICINE CLINIC | Age: 46
End: 2021-10-15
Payer: COMMERCIAL

## 2021-10-15 VITALS
HEART RATE: 73 BPM | BODY MASS INDEX: 37.45 KG/M2 | SYSTOLIC BLOOD PRESSURE: 156 MMHG | OXYGEN SATURATION: 98 % | DIASTOLIC BLOOD PRESSURE: 96 MMHG | TEMPERATURE: 97.2 F | HEIGHT: 66 IN | WEIGHT: 233 LBS

## 2021-10-15 DIAGNOSIS — Z79.01 LONG TERM CURRENT USE OF ANTICOAGULANT THERAPY: ICD-10-CM

## 2021-10-15 DIAGNOSIS — J45.41 MODERATE PERSISTENT ASTHMA WITH ACUTE EXACERBATION: ICD-10-CM

## 2021-10-15 DIAGNOSIS — R51.9 CHRONIC NONINTRACTABLE HEADACHE, UNSPECIFIED HEADACHE TYPE: ICD-10-CM

## 2021-10-15 DIAGNOSIS — J44.9 CHRONIC OBSTRUCTIVE PULMONARY DISEASE, UNSPECIFIED COPD TYPE (HCC): ICD-10-CM

## 2021-10-15 DIAGNOSIS — E53.8 COBALAMIN DEFICIENCY: ICD-10-CM

## 2021-10-15 DIAGNOSIS — E53.8 FOLATE DEFICIENCY: ICD-10-CM

## 2021-10-15 DIAGNOSIS — E78.5 HYPERLIPIDEMIA, UNSPECIFIED HYPERLIPIDEMIA TYPE: ICD-10-CM

## 2021-10-15 DIAGNOSIS — Z71.6 TOBACCO ABUSE COUNSELING: ICD-10-CM

## 2021-10-15 DIAGNOSIS — R06.02 SOB (SHORTNESS OF BREATH): ICD-10-CM

## 2021-10-15 DIAGNOSIS — D50.9 IRON DEFICIENCY ANEMIA, UNSPECIFIED IRON DEFICIENCY ANEMIA TYPE: ICD-10-CM

## 2021-10-15 DIAGNOSIS — Z23 NEED FOR INFLUENZA VACCINATION: ICD-10-CM

## 2021-10-15 DIAGNOSIS — Z72.0 TOBACCO ABUSE DISORDER: ICD-10-CM

## 2021-10-15 DIAGNOSIS — G89.29 CHRONIC NONINTRACTABLE HEADACHE, UNSPECIFIED HEADACHE TYPE: ICD-10-CM

## 2021-10-15 DIAGNOSIS — R56.9 SEIZURES (HCC): ICD-10-CM

## 2021-10-15 DIAGNOSIS — R45.851 SUICIDAL INTENT: ICD-10-CM

## 2021-10-15 DIAGNOSIS — I10 ESSENTIAL HYPERTENSION: ICD-10-CM

## 2021-10-15 DIAGNOSIS — E55.9 VITAMIN D DEFICIENCY: ICD-10-CM

## 2021-10-15 DIAGNOSIS — J44.1 COPD WITH ACUTE EXACERBATION (HCC): Primary | ICD-10-CM

## 2021-10-15 PROBLEM — E66.9 OBESITY: Status: ACTIVE | Noted: 2021-05-12

## 2021-10-15 PROBLEM — G93.5 CHIARI I MALFORMATION (HCC): Status: RESOLVED | Noted: 2017-06-21 | Resolved: 2021-10-15

## 2021-10-15 PROBLEM — Z86.711 HISTORY OF PULMONARY EMBOLISM: Status: RESOLVED | Noted: 2019-10-04 | Resolved: 2021-10-15

## 2021-10-15 PROBLEM — Z91.89 EXCESSIVE CONSUMPTION OF SODA POP: Status: RESOLVED | Noted: 2021-05-12 | Resolved: 2021-10-15

## 2021-10-15 PROBLEM — I82.413 DVT OF DEEP FEMORAL VEIN, BILATERAL (HCC): Status: RESOLVED | Noted: 2018-06-01 | Resolved: 2021-10-15

## 2021-10-15 PROBLEM — I82.413 DVT OF DEEP FEMORAL VEIN, BILATERAL (HCC): Status: ACTIVE | Noted: 2018-06-01

## 2021-10-15 PROCEDURE — 3023F SPIROM DOC REV: CPT | Performed by: FAMILY MEDICINE

## 2021-10-15 PROCEDURE — G8427 DOCREV CUR MEDS BY ELIG CLIN: HCPCS | Performed by: FAMILY MEDICINE

## 2021-10-15 PROCEDURE — 90674 CCIIV4 VAC NO PRSV 0.5 ML IM: CPT | Performed by: FAMILY MEDICINE

## 2021-10-15 PROCEDURE — G8926 SPIRO NO PERF OR DOC: HCPCS | Performed by: FAMILY MEDICINE

## 2021-10-15 PROCEDURE — 4004F PT TOBACCO SCREEN RCVD TLK: CPT | Performed by: FAMILY MEDICINE

## 2021-10-15 PROCEDURE — G8482 FLU IMMUNIZE ORDER/ADMIN: HCPCS | Performed by: FAMILY MEDICINE

## 2021-10-15 PROCEDURE — G8417 CALC BMI ABV UP PARAM F/U: HCPCS | Performed by: FAMILY MEDICINE

## 2021-10-15 PROCEDURE — G0008 ADMIN INFLUENZA VIRUS VAC: HCPCS | Performed by: FAMILY MEDICINE

## 2021-10-15 PROCEDURE — 99214 OFFICE O/P EST MOD 30 MIN: CPT | Performed by: FAMILY MEDICINE

## 2021-10-15 RX ORDER — FOLIC ACID 1 MG/1
1 TABLET ORAL DAILY
Qty: 90 TABLET | Refills: 5 | Status: SHIPPED | OUTPATIENT
Start: 2021-10-15 | End: 2022-06-03 | Stop reason: SDUPTHER

## 2021-10-15 RX ORDER — PREDNISONE 20 MG/1
20 TABLET ORAL 3 TIMES DAILY
Qty: 15 TABLET | Refills: 0 | Status: SHIPPED | OUTPATIENT
Start: 2021-10-15 | End: 2021-10-20

## 2021-10-15 RX ORDER — SELENIUM 50 MCG
TABLET ORAL
COMMUNITY
Start: 2021-09-15

## 2021-10-15 RX ORDER — MAGNESIUM 200 MG
1 TABLET ORAL DAILY
Qty: 90 TABLET | Refills: 5 | Status: SHIPPED | OUTPATIENT
Start: 2021-10-15 | End: 2022-06-03 | Stop reason: SDUPTHER

## 2021-10-15 RX ORDER — ERGOCALCIFEROL 1.25 MG/1
50000 CAPSULE ORAL WEEKLY
Qty: 12 CAPSULE | Refills: 1 | Status: SHIPPED | OUTPATIENT
Start: 2021-10-15 | End: 2022-03-14

## 2021-10-15 RX ORDER — LEVETIRACETAM 750 MG/1
750 TABLET ORAL 2 TIMES DAILY
COMMUNITY
Start: 2021-08-05

## 2021-10-15 ASSESSMENT — PATIENT HEALTH QUESTIONNAIRE - PHQ9
SUM OF ALL RESPONSES TO PHQ QUESTIONS 1-9: 0
1. LITTLE INTEREST OR PLEASURE IN DOING THINGS: 0
SUM OF ALL RESPONSES TO PHQ QUESTIONS 1-9: 0
2. FEELING DOWN, DEPRESSED OR HOPELESS: 0
SUM OF ALL RESPONSES TO PHQ9 QUESTIONS 1 & 2: 0
SUM OF ALL RESPONSES TO PHQ QUESTIONS 1-9: 0

## 2021-10-15 ASSESSMENT — ENCOUNTER SYMPTOMS
ANAL BLEEDING: 0
BACK PAIN: 0
NAUSEA: 0
SINUS PRESSURE: 0
CHEST TIGHTNESS: 1
EYE DISCHARGE: 0
CONSTIPATION: 0
WHEEZING: 1
TROUBLE SWALLOWING: 0
RECTAL PAIN: 0
COUGH: 1
EYE REDNESS: 0
VOMITING: 0
SHORTNESS OF BREATH: 1
COLOR CHANGE: 0
ABDOMINAL PAIN: 0
DIARRHEA: 0
BLOOD IN STOOL: 0
VOICE CHANGE: 0
ABDOMINAL DISTENTION: 0
EYE PAIN: 0

## 2021-10-15 NOTE — PATIENT INSTRUCTIONS
Patient Education        DASH Diet: Care Instructions  Your Care Instructions     The DASH diet is an eating plan that can help lower your blood pressure. DASH stands for Dietary Approaches to Stop Hypertension. Hypertension is high blood pressure. The DASH diet focuses on eating foods that are high in calcium, potassium, and magnesium. These nutrients can lower blood pressure. The foods that are highest in these nutrients are fruits, vegetables, low-fat dairy products, nuts, seeds, and legumes. But taking calcium, potassium, and magnesium supplements instead of eating foods that are high in those nutrients does not have the same effect. The DASH diet also includes whole grains, fish, and poultry. The DASH diet is one of several lifestyle changes your doctor may recommend to lower your high blood pressure. Your doctor may also want you to decrease the amount of sodium in your diet. Lowering sodium while following the DASH diet can lower blood pressure even further than just the DASH diet alone. Follow-up care is a key part of your treatment and safety. Be sure to make and go to all appointments, and call your doctor if you are having problems. It's also a good idea to know your test results and keep a list of the medicines you take. How can you care for yourself at home? Following the DASH diet  · Eat 4 to 5 servings of fruit each day. A serving is 1 medium-sized piece of fruit, ½ cup chopped or canned fruit, 1/4 cup dried fruit, or 4 ounces (½ cup) of fruit juice. Choose fruit more often than fruit juice. · Eat 4 to 5 servings of vegetables each day. A serving is 1 cup of lettuce or raw leafy vegetables, ½ cup of chopped or cooked vegetables, or 4 ounces (½ cup) of vegetable juice. Choose vegetables more often than vegetable juice. · Get 2 to 3 servings of low-fat and fat-free dairy each day. A serving is 8 ounces of milk, 1 cup of yogurt, or 1 ½ ounces of cheese. · Eat 6 to 8 servings of grains each day.  A serving is 1 slice of bread, 1 ounce of dry cereal, or ½ cup of cooked rice, pasta, or cooked cereal. Try to choose whole-grain products as much as possible. · Limit lean meat, poultry, and fish to 2 servings each day. A serving is 3 ounces, about the size of a deck of cards. · Eat 4 to 5 servings of nuts, seeds, and legumes (cooked dried beans, lentils, and split peas) each week. A serving is 1/3 cup of nuts, 2 tablespoons of seeds, or ½ cup of cooked beans or peas. · Limit fats and oils to 2 to 3 servings each day. A serving is 1 teaspoon of vegetable oil or 2 tablespoons of salad dressing. · Limit sweets and added sugars to 5 servings or less a week. A serving is 1 tablespoon jelly or jam, ½ cup sorbet, or 1 cup of lemonade. · Eat less than 2,300 milligrams (mg) of sodium a day. If you limit your sodium to 1,500 mg a day, you can lower your blood pressure even more. · Be aware that all of these are the suggested number of servings for people who eat 1,800 to 2,000 calories a day. Your recommended number of servings may be different if you need more or fewer calories. Tips for success  · Start small. Do not try to make dramatic changes to your diet all at once. You might feel that you are missing out on your favorite foods and then be more likely to not follow the plan. Make small changes, and stick with them. Once those changes become habit, add a few more changes. · Try some of the following:  ? Make it a goal to eat a fruit or vegetable at every meal and at snacks. This will make it easy to get the recommended amount of fruits and vegetables each day. ? Try yogurt topped with fruit and nuts for a snack or healthy dessert. ? Add lettuce, tomato, cucumber, and onion to sandwiches. ? Combine a ready-made pizza crust with low-fat mozzarella cheese and lots of vegetable toppings. Try using tomatoes, squash, spinach, broccoli, carrots, cauliflower, and onions. ?  Have a variety of cut-up vegetables with a low-fat dip as an appetizer instead of chips and dip. ? Sprinkle sunflower seeds or chopped almonds over salads. Or try adding chopped walnuts or almonds to cooked vegetables. ? Try some vegetarian meals using beans and peas. Add garbanzo or kidney beans to salads. Make burritos and tacos with mashed little beans or black beans. Where can you learn more? Go to https://Sweeten.[email or web address]. org and sign in to your Music Messenger (MM) account. Enter G652 in the Happy Inspector box to learn more about \"DASH Diet: Care Instructions. \"     If you do not have an account, please click on the \"Sign Up Now\" link. Current as of: April 29, 2021               Content Version: 13.0  © 7418-5937 [employer]wise, [employer]. Care instructions adapted under license by [facility] ([facility]). If you have questions about a medical condition or this instruction, always ask your healthcare professional. [address] any warranty or liability for your use of this information.

## 2021-10-15 NOTE — PROGRESS NOTES
Vaccine Information Sheet, \"Influenza - Inactivated\"  given to Alexandr Kinney, or parent/legal guardian of  Shree Barrios and verbalized understanding. Patient responses:    Have you ever had a reaction to a flu vaccine? No  Are you able to eat eggs without adverse effects? No  Do you have any current illness? No  Have you ever had [place] Syndrome? No    Flu vaccine given per order. Please see immunization tab.

## 2021-10-15 NOTE — PROGRESS NOTES
Skin: Negative for color change, pallor and rash. Allergic/Immunologic: Negative for environmental allergies, food allergies and immunocompromised state. Neurological: Negative for dizziness, tremors, seizures, syncope, facial asymmetry, speech difficulty, weakness, light-headedness, numbness and headaches. Hematological: Negative for adenopathy. Does not bruise/bleed easily. Psychiatric/Behavioral: Negative for agitation, behavioral problems, confusion, decreased concentration, sleep disturbance and suicidal ideas. The patient is not nervous/anxious. Objective:   Physical Exam  Constitutional:       General: She is not in acute distress. HENT:      Head: Normocephalic and atraumatic. Right Ear: External ear normal.      Left Ear: External ear normal.      Nose: Nose normal.      Mouth/Throat:      Mouth: Mucous membranes are moist.   Eyes:      Conjunctiva/sclera: Conjunctivae normal.      Pupils: Pupils are equal, round, and reactive to light. Neck:      Thyroid: No thyromegaly. Trachea: No tracheal deviation. Cardiovascular:      Rate and Rhythm: Normal rate and regular rhythm. Pulses: Normal pulses. Heart sounds: Normal heart sounds. No murmur heard. No friction rub. No gallop. Pulmonary:      Effort: No respiratory distress. Breath sounds: No stridor. Wheezing present. No rales. Chest:      Chest wall: No tenderness. Abdominal:      General: Bowel sounds are normal. There is no distension. Palpations: Abdomen is soft. Tenderness: There is no abdominal tenderness. There is no rebound. Musculoskeletal:         General: Normal range of motion. Cervical back: Normal range of motion. Lymphadenopathy:      Cervical: No cervical adenopathy. Skin:     General: Skin is warm. Coloration: Skin is not pale. Findings: No erythema or rash. Neurological:      General: No focal deficit present.       Mental Status: She is alert and oriented to person, place, and time. Mental status is at baseline. Cranial Nerves: No cranial nerve deficit. Motor: No abnormal muscle tone. Deep Tendon Reflexes: Reflexes normal.   Psychiatric:         Mood and Affect: Mood normal.         Behavior: Behavior normal.         Thought Content: Thought content normal.         Judgment: Judgment normal.          Vitals:    10/15/21 1152   BP: (!) 156/96   Pulse: 73   Temp:    SpO2:          Assessment:      Diagnosis Orders   1. Need for influenza vaccination  INFLUENZA, MDCK QUADV, 2 YRS AND OLDER, IM, PF, PREFILL SYR OR SDV, 0.5ML (FLUCELVAX QUADV, PF)   2. Suicidal intent     3. Essential hypertension     4. Vitamin D deficiency     5. Folate deficiency     6. Long term current use of anticoagulant therapy     7. Cobalamin deficiency     8. Seizures ([address]     9. Tobacco abuse disorder     10. Chronic obstructive pulmonary disease, unspecified COPD type ([address]     11. Chronic nonintractable headache, unspecified headache type     12. Tobacco abuse counseling     13.  Hyperlipidemia, unspecified hyperlipidemia type           Plan:     Orders Placed This Encounter   Procedures    INFLUENZA, MDCK QUADV, 2 YRS AND OLDER, IM, PF, PREFILL SYR OR SDV, 0.5ML (FLUCELVAX QUADV, PF)    Brain Natriuretic Peptide    Iron And TIBC    Ferritin    CBC With Auto Differential    Basic Metabolic Panel       Outpatient Encounter Medications as of 10/15/2021   Medication Sig Dispense Refill    Lactobacillus (ACIDOPHILUS) CAPS capsule take 1 tablet by mouth twice a day      levETIRAcetam (KEPPRA) 750 MG tablet Take 750 mg by mouth 2 times daily      fluticasone-salmeterol (ADVAIR) 250-50 MCG/DOSE AEPB Inhale 1 puff into the lungs every 12 hours 60 each 3    predniSONE (DELTASONE) 20 MG tablet Take 1 tablet by mouth 3 times daily for 5 days 15 tablet 0    Cyanocobalamin (B-12) 1000 MCG SUBL Place 1 tablet under the tongue daily 90 tablet 5    folic acid (FOLVITE) 1 MG tablet Take 1 tablet by mouth daily 90 tablet 5    vitamin D (ERGOCALCIFEROL) 1.25 MG (74654 UT) CAPS capsule Take 1 capsule by mouth once a week 12 capsule 1    cloNIDine (CATAPRES) 0.1 MG/24HR PTWK apply 1 patch every week as directed 4 patch 0    triamterene-hydroCHLOROthiazide (DYAZIDE) 37.5-25 MG per capsule take 1 capsule by mouth every morning 30 capsule 3    omeprazole (PRILOSEC) 20 MG delayed release capsule Take 1 capsule by mouth 2 times daily (before meals) 180 capsule 1    Calcium-Magnesium 200-50 MG TABS Take 1 tablet by mouth daily 90 tablet 2    apixaban (ELIQUIS) 5 MG TABS tablet take 1 tablet by mouth twice a day 60 tablet 1    albuterol sulfate HFA (PROVENTIL HFA) 108 (90 Base) MCG/ACT inhaler Inhale 2 puffs into the lungs every 4 hours as needed for Wheezing or Shortness of Breath (Space out to every 6 hours as symptoms improve) Space out to every 6 hours as symptoms improve.  1 Inhaler 0    atorvastatin (LIPITOR) 10 MG tablet take 1 tablet by mouth once daily 90 tablet 1    losartan (COZAAR) 50 MG tablet take 1 tablet by mouth once daily 90 tablet 1    VRAYLAR 3 MG CAPS capsule take 1 tablet by mouth once daily      escitalopram (LEXAPRO) 10 MG tablet       cetirizine (ZYRTEC) 10 MG tablet Take 1 tablet by mouth daily 30 tablet 0    benztropine (COGENTIN) 0.5 MG tablet take 1 tablet by mouth twice a day  0    [DISCONTINUED] Probiotic Acidophilus (FLORANEX) TABS Take 1 tablet by mouth 2 times daily 60 tablet 0    [DISCONTINUED] benzonatate (TESSALON PERLES) 100 MG capsule Take 1 capsule by mouth every 8 hours as needed for Cough 20 capsule 0    [DISCONTINUED] predniSONE (DELTASONE) 10 MG tablet Take 4 by mouth daily for 3 days then  3 by mouth daily for 3 days then  2 by mouth daily for 3 days then  1 by mouth daily for 3 days 30 tablet 0    [DISCONTINUED] cloNIDine (CATAPRES) 0.1 MG/24HR PTWK apply 1 patch every week as directed 4 patch 0    [DISCONTINUED] magnesium citrate solution Take 296 mLs by mouth once for 1 dose 296 mL 0    [DISCONTINUED] magnesium citrate solution Take 296 mLs by mouth once for 1 dose 296 mL 0    [DISCONTINUED] cloNIDine (CATAPRES) 0.1 MG/24HR PTWK apply 1 patch every week as directed 4 patch 0    [DISCONTINUED] Misc. Devices (DIGITAL GLASS SCALE) MISC 1 Act by Does not apply route daily (Patient not taking: Reported on 10/15/2021) 1 each 0    [DISCONTINUED] folic acid (FOLVITE) 1 MG tablet Take 1 tablet by mouth daily 90 tablet 1    [DISCONTINUED] fluticasone (FLONASE) 50 MCG/ACT nasal spray 2 sprays by Each Nostril route daily 3 Bottle 1    [DISCONTINUED] topiramate (TOPAMAX SPRINKLE) 15 MG capsule take 1 capsule by mouth nightly for 1 week then 1 capsule twice a day THEREAFTER 60 capsule 3    [DISCONTINUED] RA VITAMIN B-12 TR 1000 MCG TBCR take 2 tablets by mouth once daily 180 tablet 1    [DISCONTINUED] Blood Pressure KIT Use as directed. 1 kit 0    [DISCONTINUED] Cyanocobalamin (B-12) 1000 MCG SUBL Place 1 tablet under the tongue daily 90 tablet 5    [DISCONTINUED] cetirizine (ZYRTEC) 10 MG tablet Take 1 tablet by mouth daily 90 tablet 1     No facility-administered encounter medications on file as of 10/15/2021.     15 minutes spent with patient counseling/educating on acute/chronic medical health problems, medications,  along with treatment options. Reviewed multiple labs/imaging/medications with patient during this time. Following Diet discussion/education was done on [employer] Diet. In addition Exercise plan and depression screen were discussed. Recent labs/imaging were discussed and reviewed with patient.

## 2021-10-25 ENCOUNTER — APPOINTMENT (OUTPATIENT)
Dept: GENERAL RADIOLOGY | Age: 46
End: 2021-10-25
Payer: COMMERCIAL

## 2021-10-25 ENCOUNTER — HOSPITAL ENCOUNTER (EMERGENCY)
Age: 46
Discharge: HOME | End: 2021-10-25
Attending: EMERGENCY MEDICINE
Payer: COMMERCIAL

## 2021-10-25 ENCOUNTER — APPOINTMENT (OUTPATIENT)
Dept: CT IMAGING | Age: 46
End: 2021-10-25
Payer: COMMERCIAL

## 2021-10-25 VITALS
OXYGEN SATURATION: 97 % | BODY MASS INDEX: 38.32 KG/M2 | HEART RATE: 85 BPM | DIASTOLIC BLOOD PRESSURE: 110 MMHG | WEIGHT: 230 LBS | TEMPERATURE: 98.2 F | SYSTOLIC BLOOD PRESSURE: 131 MMHG | RESPIRATION RATE: 18 BRPM | HEIGHT: 65 IN

## 2021-10-25 DIAGNOSIS — R05.9 COUGH: ICD-10-CM

## 2021-10-25 DIAGNOSIS — M79.605 PAIN IN BOTH LOWER EXTREMITIES: Primary | ICD-10-CM

## 2021-10-25 DIAGNOSIS — M79.604 PAIN IN BOTH LOWER EXTREMITIES: Primary | ICD-10-CM

## 2021-10-25 LAB
ABSOLUTE EOS #: 0.14 K/UL (ref 0–0.44)
ABSOLUTE IMMATURE GRANULOCYTE: 0.04 K/UL (ref 0–0.3)
ABSOLUTE LYMPH #: 3.37 K/UL (ref 1.1–3.7)
ABSOLUTE MONO #: 0.77 K/UL (ref 0.1–1.2)
ANION GAP SERPL CALCULATED.3IONS-SCNC: 13 MMOL/L (ref 9–17)
BASOPHILS # BLD: 0 % (ref 0–2)
BASOPHILS ABSOLUTE: <0.03 K/UL (ref 0–0.2)
BNP INTERPRETATION: NORMAL
BUN BLDV-MCNC: 9 MG/DL (ref 6–20)
BUN/CREAT BLD: 10 (ref 9–20)
CALCIUM SERPL-MCNC: 8.6 MG/DL (ref 8.6–10.4)
CHLORIDE BLD-SCNC: 104 MMOL/L (ref 98–107)
CO2: 21 MMOL/L (ref 20–31)
CREAT SERPL-MCNC: 0.87 MG/DL (ref 0.5–0.9)
D-DIMER QUANTITATIVE: 1.14 MG/L FEU (ref 0–0.59)
DIFFERENTIAL TYPE: ABNORMAL
EOSINOPHILS RELATIVE PERCENT: 2 % (ref 1–4)
GFR AFRICAN AMERICAN: >60 ML/MIN
GFR NON-AFRICAN AMERICAN: >60 ML/MIN
GFR SERPL CREATININE-BSD FRML MDRD: ABNORMAL ML/MIN/{1.73_M2}
GFR SERPL CREATININE-BSD FRML MDRD: ABNORMAL ML/MIN/{1.73_M2}
GLUCOSE BLD-MCNC: 104 MG/DL (ref 70–99)
HCT VFR BLD CALC: 36.9 % (ref 36.3–47.1)
HEMOGLOBIN: 12 G/DL (ref 11.9–15.1)
IMMATURE GRANULOCYTES: 1 %
LYMPHOCYTES # BLD: 45 % (ref 24–43)
MCH RBC QN AUTO: 23.4 PG (ref 25.2–33.5)
MCHC RBC AUTO-ENTMCNC: 32.5 G/DL (ref 28.4–34.8)
MCV RBC AUTO: 71.9 FL (ref 82.6–102.9)
MONOCYTES # BLD: 10 % (ref 3–12)
NRBC AUTOMATED: 0 PER 100 WBC
PDW BLD-RTO: 14.9 % (ref 11.8–14.4)
PLATELET # BLD: 228 K/UL (ref 138–453)
PLATELET ESTIMATE: ABNORMAL
PMV BLD AUTO: 9.9 FL (ref 8.1–13.5)
POTASSIUM SERPL-SCNC: 3.9 MMOL/L (ref 3.7–5.3)
PRO-BNP: 77 PG/ML
RBC # BLD: 5.13 M/UL (ref 3.95–5.11)
RBC # BLD: ABNORMAL 10*6/UL
SEG NEUTROPHILS: 42 % (ref 36–65)
SEGMENTED NEUTROPHILS ABSOLUTE COUNT: 3.11 K/UL (ref 1.5–8.1)
SODIUM BLD-SCNC: 138 MMOL/L (ref 135–144)
WBC # BLD: 7.5 K/UL (ref 3.5–11.3)
WBC # BLD: ABNORMAL 10*3/UL

## 2021-10-25 PROCEDURE — 85025 COMPLETE CBC W/AUTO DIFF WBC: CPT

## 2021-10-25 PROCEDURE — 71045 X-RAY EXAM CHEST 1 VIEW: CPT

## 2021-10-25 PROCEDURE — 36415 COLL VENOUS BLD VENIPUNCTURE: CPT

## 2021-10-25 PROCEDURE — 2580000003 HC RX 258: Performed by: NURSE PRACTITIONER

## 2021-10-25 PROCEDURE — 80048 BASIC METABOLIC PNL TOTAL CA: CPT

## 2021-10-25 PROCEDURE — 6360000004 HC RX CONTRAST MEDICATION: Performed by: NURSE PRACTITIONER

## 2021-10-25 PROCEDURE — 83880 ASSAY OF NATRIURETIC PEPTIDE: CPT

## 2021-10-25 PROCEDURE — 99282 EMERGENCY DEPT VISIT SF MDM: CPT

## 2021-10-25 PROCEDURE — 85379 FIBRIN DEGRADATION QUANT: CPT

## 2021-10-25 PROCEDURE — 71260 CT THORAX DX C+: CPT

## 2021-10-25 PROCEDURE — 93970 EXTREMITY STUDY: CPT

## 2021-10-25 RX ORDER — AZITHROMYCIN 250 MG/1
TABLET, FILM COATED ORAL
Qty: 1 PACKET | Refills: 0 | Status: SHIPPED | OUTPATIENT
Start: 2021-10-25 | End: 2021-11-04

## 2021-10-25 RX ORDER — SODIUM CHLORIDE 0.9 % (FLUSH) 0.9 %
10 SYRINGE (ML) INJECTION PRN
Status: DISCONTINUED | OUTPATIENT
Start: 2021-10-25 | End: 2021-10-25 | Stop reason: HOSPADM

## 2021-10-25 RX ORDER — 0.9 % SODIUM CHLORIDE 0.9 %
80 INTRAVENOUS SOLUTION INTRAVENOUS ONCE
Status: COMPLETED | OUTPATIENT
Start: 2021-10-25 | End: 2021-10-25

## 2021-10-25 RX ORDER — HYDROCODONE BITARTRATE AND ACETAMINOPHEN 5; 325 MG/1; MG/1
1 TABLET ORAL EVERY 6 HOURS PRN
Qty: 10 TABLET | Refills: 0 | Status: SHIPPED | OUTPATIENT
Start: 2021-10-25 | End: 2021-10-28

## 2021-10-25 RX ADMIN — SODIUM CHLORIDE 80 ML: 9 INJECTION, SOLUTION INTRAVENOUS at 12:00

## 2021-10-25 RX ADMIN — SODIUM CHLORIDE, PRESERVATIVE FREE 10 ML: 5 INJECTION INTRAVENOUS at 12:01

## 2021-10-25 RX ADMIN — IOPAMIDOL 75 ML: 755 INJECTION, SOLUTION INTRAVENOUS at 12:00

## 2021-10-25 ASSESSMENT — PAIN SCALES - GENERAL: PAINLEVEL_OUTOF10: 10

## 2021-10-25 ASSESSMENT — PAIN DESCRIPTION - LOCATION: LOCATION: LEG

## 2021-10-25 ASSESSMENT — PAIN DESCRIPTION - ORIENTATION: ORIENTATION: RIGHT;LEFT

## 2021-10-25 NOTE — ED PROVIDER NOTES
The patient was seen and examined by me in conjunction with the mid-level provider. I agree with his/her assessment and treatment plan. Her work-up is negative and she is able to be discharged home.      Ruth Mcrae MD  10/25/21 0790

## 2021-10-26 ENCOUNTER — TELEPHONE (OUTPATIENT)
Dept: FAMILY MEDICINE CLINIC | Age: 46
End: 2021-10-26

## 2021-10-26 ASSESSMENT — ENCOUNTER SYMPTOMS
RHINORRHEA: 0
COUGH: 0
WHEEZING: 0
SINUS PRESSURE: 0
DIARRHEA: 0
SORE THROAT: 0
CONSTIPATION: 0
SHORTNESS OF BREATH: 0
COLOR CHANGE: 0
VOMITING: 0
NAUSEA: 0
ABDOMINAL PAIN: 0

## 2021-10-26 NOTE — TELEPHONE ENCOUNTER
VOICEMAIL DOCUMENTATION - ERASE IF NOT USED  Hi, this message is for Alexandr. This is Patria Diamond MA from [facility] [employer] office. Just calling to see how you are doing after your recent visit to the Emergency Room. [facility] [employer] wants to make sure you were able to fill any prescriptions and that you understand your discharge instructions. Please return our call if you need to make a follow up appointment with your provider or have any further needs. Our phone number is [phone]. Have a great day.

## 2021-10-26 NOTE — ED PROVIDER NOTES
[address] [place] [address] ED  eMERGENCY dEPARTMENT eNCOUnter      Pt Name: Sea Amado  MRN: [id number]  [place] 1975  Date of evaluation: 10/25/2021  Provider: [address] [place] [address] NP, ELIZABETH - SAMRA    CHIEF COMPLAINT       Chief Complaint   Patient presents with    Cough     recently finished steroid/inhaler  still has symptoms    Chest Pain    Leg Pain     cesar leg pain concerned for dvt has h/o of this         HISTORY OF PRESENT ILLNESS  (Location/Symptom, Timing/Onset, Context/Setting, Quality, Duration, Modifying Factors, Severity.)   Sea Amado is a 42 y.o. female who presents to the emergency department private vehicle for evaluation of bilateral leg pain. Patient states that she has been having bilateral leg pain for the last week or so. She states she is concerned because she has a history of DVTs and PEs and is not on any blood thinners. She states that the pain in her legs feels like when she had DVTs before. She also has a cough. She states that she recently was put on steroids and inhalers by her primary care physician and the cough persist.  Denies any nausea or vomiting. Nursing Notes were reviewed.     ALLERGIES     Penicillins, Amoxil [amoxicillin], Bee pollen, Bee venom, Ciprofloxacin, Clindamycin/lincomycin, Flonase [fluticasone], Keflex [cephalexin], Levaquin [levofloxacin in d5w], Levofloxacin, Macrobid [nitrofurantoin monohyd macro], Nitrofurantoin, Sulfa antibiotics, and Sulfur    CURRENT MEDICATIONS       Discharge Medication List as of 10/25/2021  1:03 PM      CONTINUE these medications which have NOT CHANGED    Details   Lactobacillus (ACIDOPHILUS) CAPS capsule take 1 tablet by mouth twice a dayHistorical Med      levETIRAcetam (KEPPRA) 750 MG tablet Take 750 mg by mouth 2 times dailyHistorical Med      fluticasone-salmeterol (ADVAIR) 250-50 MCG/DOSE AEPB Inhale 1 puff into the lungs every 12 hours, Disp-60 each, R-3Normal      Cyanocobalamin (B-12) 1000 MCG SUBL Place 1 tablet under the tongue daily, Disp-90 tablet, [id number]      folic acid (FOLVITE) 1 MG tablet Take 1 tablet by mouth daily, Disp-90 tablet, R-5Normal      vitamin D (ERGOCALCIFEROL) 1.25 MG (40514 UT) CAPS capsule Take 1 capsule by mouth once a week, Disp-12 capsule, R-1Normal      cloNIDine (CATAPRES) 0.1 MG/24HR PTWK apply 1 patch every week as directed, Disp-4 patch, R-0Normal      triamterene-hydroCHLOROthiazide (DYAZIDE) 37.5-25 MG per capsule take 1 capsule by mouth every morning, Disp-30 capsule, R-3Normal      omeprazole (PRILOSEC) 20 MG delayed release capsule Take 1 capsule by mouth 2 times daily (before meals), Disp-180 capsule, R-1Normal      Calcium-Magnesium 200-50 MG TABS Take 1 tablet by mouth daily, Disp-90 tablet, R-2Normal      apixaban (ELIQUIS) 5 MG TABS tablet take 1 tablet by mouth twice a day, Disp-60 tablet, R-1Normal      albuterol sulfate HFA (PROVENTIL HFA) 108 (90 Base) MCG/ACT inhaler Inhale 2 puffs into the lungs every 4 hours as needed for Wheezing or Shortness of Breath (Space out to every 6 hours as symptoms improve) Space out to every 6 hours as symptoms improve., Disp-1 Inhaler, R-0Normal      atorvastatin (LIPITOR) 10 MG tablet take 1 tablet by mouth once daily, Disp-90 tablet, R-1Normal      losartan (COZAAR) 50 MG tablet take 1 tablet by mouth once daily, Disp-90 tablet, R-1Normal      VRAYLAR 3 MG CAPS capsule take 1 tablet by mouth once daily, DAWHistorical Med      escitalopram (LEXAPRO) 10 MG tablet Historical Med      cetirizine (ZYRTEC) 10 MG tablet Take 1 tablet by mouth daily, Disp-30 tablet,R-0Normal      benztropine (COGENTIN) 0.5 MG tablet take 1 tablet by mouth twice a day, R-0Historical Med             PAST MEDICAL HISTORY         Diagnosis Date    Anemia     Asthma     Moderate persistent asthma without complication    Axillary hidradenitis suppurativa 5/6/2020    Bipolar 1 disorder (HCC)     CHF (congestive heart failure) ([address]     When child was delivered 2005  Chiari I malformation ([facility] 2017    A MRI of the brain in  was suspicious for pseudotumor cerebri but a spinal tap revealed a normal opening pressure of 16 cm water. An EEG was normal.  A MRI of the thoracic spine in  was normal. Relevant history of cervical myelopathy with an abnormal MRI of the cervical spine in 2015. A follow-up MRI of the brain in 2017 was unchanged. A MRI of the cervical spine in     Chiari I malformation ([facility] 2017    A MRI of the brain in  was suspicious for pseudotumor cerebri but a spinal tap revealed a normal opening pressure of 16 cm water. An EEG was normal.  A MRI of the thoracic spine in  was normal. Relevant history of cervical myelopathy with an abnormal MRI of the cervical spine in 2015. A follow-up MRI of the brain in 2017 was unchanged. A MRI of the cervical spine in     Chronic headache disorder 2017    Deep vein thrombosis (DVT) of lower extremity ([address] 2018    Diverticulitis of sigmoid colon 7/3/2017    DVT of deep femoral vein, bilateral (HCC)     Excessive consumption of soda pop 2021    Excessive consumption of soda pop 2021    Family history of diabetes mellitus 3/22/2016    Family history of malignant neoplasm of breast 3/22/2016    Family history of malignant neoplasm of uterus 3/22/2016    Family history of throat cancer 3/22/2016    FH: breast cancer 3/22/2016    FH: uterine cancer 3/22/2016    GERD (gastroesophageal reflux disease)     History of abnormal uterine bleeding 7/3/2017    History of diabetes mellitus 3/22/2016    History of hysterectomy for benign disease 3/22/2016    History of pulmonary embolism 10/4/2019    History of pulmonary embolism 10/4/2019    Hyperlipidemia     Irritable bowel syndrome     MDRO (multiple drug resistant organisms) resistance 2005    MRSA in  per pt.     Non-compliance with treatment 2017    Obesity  Saddle embolus of pulmonary artery without acute cor pulmonale (HCC)     Seizures ([address]     Last Seizure 18    Suicidal intent     . Denies 18    Tobacco abuse disorder 3/22/2016       SURGICAL HISTORY           Procedure Laterality Date    ABSCESS DRAINAGE      abdominal abscess RLQ    AXILLARY SURGERY Right 2018    Excision of hidradenitis cysts, right axilla    BREAST LUMPECTOMY Bilateral     BREAST LUMPECTOMY       SECTION      x2    COLONOSCOPY N/A 2021    COLONOSCOPY DIAGNOSTIC performed by Dolores Grimes MD at [address] [place] [address], DIAGNOSTIC  2014    HERNIA REPAIR      HYSTERECTOMY      LYMPH NODE DISSECTION Left 2019    LEFT AXILLARY HYDRADENITIS EXCISION performed by Shirley Long DO at [address] [place] [address] Left 2019    LEFT AXILLARY HYDRADENITIS EXCISION (Left )    MO EXC SKIN BENIG <5MM TRUNK,ARM,LEG Right 2018    EXCISION HIDRADENITIS RIGHT AXILLA performed by Shirley Long DO at [facility]      UPPER GASTROINTESTINAL ENDOSCOPY  2021    EGD BIOPSY performed by Dolores Grimes MD at [place] [address]           Problem Relation Age of Onset    Asthma Mother     High Blood Pressure Mother     Mental Illness Mother     Stroke Other     Other Sister         blood clotting disorder, ms    Depression Sister     Cancer Maternal Grandmother     Diabetes Maternal Grandmother     Cancer Maternal Aunt     Diabetes Maternal Grandfather      Family Status   Relation Name Status    Mother  Alive    Other  (Not Specified)    Father  Alive    Sister  Alive    MGM  (Not Specified)    MAunt  (Not Specified)    MGF  (Not Specified)        SOCIAL HISTORY      reports that she has been smoking cigarettes. She started smoking about 40 years ago.  She has a 26.00 pack-year smoking history. She has never used smokeless tobacco. She reports current alcohol use. She reports current drug use. Drug: Marijuana. REVIEW OF SYSTEMS    (2-9 systems for level 4, 10 or more for level 5)     Review of Systems   Constitutional: Negative for chills, fever and unexpected weight change. HENT: Negative for congestion, rhinorrhea, sinus pressure and sore throat. Respiratory: Negative for cough, shortness of breath and wheezing. Cardiovascular: Negative for chest pain and palpitations. Gastrointestinal: Negative for abdominal pain, constipation, diarrhea, nausea and vomiting. Genitourinary: Negative for dysuria and hematuria. Musculoskeletal: Negative for arthralgias and myalgias. Skin: Negative for color change and rash. Neurological: Negative for dizziness, weakness and headaches. Hematological: Negative for adenopathy. All other systems reviewed and are negative. Except as noted above the remainder of the review of systems was reviewed and negative. PHYSICAL EXAM    (up to 7 for level 4, 8 or more for level 5)     ED Triage Vitals [10/25/21 1026]   BP Temp Temp Source Pulse Resp SpO2 Height Weight   (!) 131/110 98.2 °F (36.8 °C) Oral 85 18 97 % 5' 5\" (1.651 m) 230 lb (104.3 kg)       Physical Exam  Vitals reviewed. Constitutional:       Appearance: She is well-developed. HENT:      Head: Normocephalic and atraumatic. Eyes:      Conjunctiva/sclera: Conjunctivae normal.      Pupils: Pupils are equal, round, and reactive to light. Cardiovascular:      Rate and Rhythm: Normal rate and regular rhythm. Pulmonary:      Effort: Pulmonary effort is normal. No respiratory distress. Breath sounds: Normal breath sounds. No stridor. Abdominal:      General: Bowel sounds are normal.      Palpations: Abdomen is soft. Musculoskeletal:         General: Normal range of motion. Cervical back: Normal range of motion and neck supple.    Lymphadenopathy:      Cervical: No cervical adenopathy. Skin:     General: Skin is warm and dry. Findings: No rash. Neurological:      Mental Status: She is alert and oriented to person, place, and time. RADIOLOGY:   Non-plain film images such as CT, Ultrasound and MRI are read by the radiologist. Radha Fernandez radiographic images are visualized and preliminarily interpreted by the emergency physician with the below findings:    XR CHEST PORTABLE    Result Date: 10/25/2021  EXAMINATION: ONE XRAY VIEW OF THE CHEST 10/25/2021 10:41 am COMPARISON: July 30, 2021 HISTORY: ORDERING SYSTEM PROVIDED HISTORY: Chest Pain TECHNOLOGIST PROVIDED HISTORY: Chest Pain Reason for Exam: States chest congestion and possible blood clot in leg. Acuity: Acute Type of Exam: Initial FINDINGS: The lungs are without acute focal process. There is no effusion or pneumothorax. The cardiomediastinal silhouette is without acute process. The osseous structures are without acute process. No acute process. CT CHEST PULMONARY EMBOLISM W CONTRAST    Result Date: 10/25/2021  EXAMINATION: CTA OF THE CHEST 10/25/2021 11:41 am TECHNIQUE: CTA of the chest was performed after the administration of intravenous contrast.  Multiplanar reformatted images are provided for review. MIP images are provided for review. Dose modulation, iterative reconstruction, and/or weight based adjustment of the mA/kV was utilized to reduce the radiation dose to as low as reasonably achievable. COMPARISON: 01/27/2021 HISTORY: ORDERING SYSTEM PROVIDED HISTORY: SOB TECHNOLOGIST PROVIDED HISTORY: SOB Decision Support Exception - unselect if not a suspected or confirmed emergency medical condition->Emergency Medical Condition (MA) Reason for Exam: Worsening cough and SOB x 3 weeks; bilateral leg pain. Hx prior PE, currently on blood thinners.  Acuity: Acute Type of Exam: Initial 72-year-old female with worsening cough and shortness of breath for 3 weeks FINDINGS: Pulmonary Arteries: No obvious filling defect in the main, right main, or left main pulmonary arteries. Evaluation of the distal segmental and subsegmental pulmonary arterial vasculature is limited. Linear areas of low density corresponding to the distal subsegmental/segmental right lower lobar pulmonary arteries likely represents streak artifact given the planar orientation. Stable relative enlargement of the main pulmonary artery. Mediastinum: No axillary, mediastinal, or hilar lymphadenopathy. Visualized thyroid gland grossly unremarkable in appearance. No dissection flap within the visualized thoracic aorta. No periaortic or mediastinal hemorrhage. No pericardial or pleural effusions. Lungs/pleura: Trachea and proximal central airways appear patent. Mild dependent atelectasis and respiratory motion. No lobar airspace consolidation. No pneumothorax. Upper Abdomen: Fatty liver. Stable bilateral adrenal adenomas measuring 2.5 cm on the right and 2.1 cm on the left. Soft Tissues/Bones: Mild degenerative changes throughout the spine. Mild levoscoliosis of the upper thoracic spine. 1. No clear evidence for central pulmonary embolus. 2. Mild dependent atelectasis and respiratory motion. No lobar airspace consolidation. 3. Fatty liver. 4. Stable bilateral adrenal adenomas. 5. Relatively stable enlargement of the main pulmonary artery. 6. Mild upper thoracic levoscoliosis.      VL DUP LOWER EXTREMITY VENOUS BILATERAL    Result Date: 10/25/2021    [facility]  Vascular Lower Extremities DVT Study Procedure   Patient Name      Gregorio Horta     Date of Study             10/25/2021                    Martha PAVON   Date of Birth     1975   Gender                    Female   Age               42 year(s)   Race                      Black   Room Number       23   Corporate ID #    [id number]   Patient Acct #    [de-identified]   MR #              [id number]      Sonographer               Lata Andrew   Accession #       [id number]   Interpreting Physician    Vivek Lynch   Referring Nurse                Referring Physician       ER MD *  Practitioner  Procedure Type of Study:   Veins: Lower Extremities DVT Study, Venous Scan Lower Bilateral.  Indications for Study:R/O DVT. Patient Status:ER. Technical Quality:Good visualization. Conclusions   Summary   No evidence of superficial or deep venous thrombosis in both lower  extremities. Signature   ----------------------------------------------------------------  Electronically signed by Lata Andrew(Sonographer) on  10/25/2021 12:36 PM  ----------------------------------------------------------------   ----------------------------------------------------------------  Electronically signed by Vivek Lynch(Interpreting physician)  on 10/25/2021 06:28 PM  ----------------------------------------------------------------  Findings:   Right Impression:                    Left Impression:  The common femoral, femoral,         The common femoral, femoral,  popliteal, tibials and saphenous     popliteal, tibials and saphenous  veins are compressible with normal   veins are compressible with normal  doppler responses. doppler responses. Velocities are measured in cm/s ; Diameters are measured in cm Right Lower Extremities DVT Study Measurements Right 2D Measurements +------------------------------------+----------+---------------+----------+ ! Location                            ! Visualized! Compressibility! Thrombosis! +------------------------------------+----------+---------------+----------+ ! Common Femoral                      !Yes       ! Yes            ! None      ! +------------------------------------+----------+---------------+----------+ ! Prox Femoral                        !Yes       ! Yes            ! None      ! +------------------------------------+----------+---------------+----------+ ! Mid Femoral                         !Yes       ! Yes            ! None      ! +------------------------------------+----------+---------------+----------+ ! Dist Femoral                        !Yes       ! Yes            ! None      ! +------------------------------------+----------+---------------+----------+ ! Deep Femoral                        !Yes       ! Yes            ! None      ! +------------------------------------+----------+---------------+----------+ ! Popliteal                           !Yes       ! Yes            ! None      ! +------------------------------------+----------+---------------+----------+ ! Sapheno Femoral Junction            ! Yes       ! Yes            ! None      ! +------------------------------------+----------+---------------+----------+ ! PTV                                 ! Yes       ! Yes            ! None      ! +------------------------------------+----------+---------------+----------+ ! Peroneal                            !Yes       ! Yes            ! None      ! +------------------------------------+----------+---------------+----------+ ! Gastroc                             ! Yes       ! Yes            ! None      ! +------------------------------------+----------+---------------+----------+ ! GSV Thigh                           ! Yes       ! Yes            ! None      ! +------------------------------------+----------+---------------+----------+ ! GSV Knee                            ! Yes       ! Yes            ! None      ! +------------------------------------+----------+---------------+----------+ ! GSV Ankle                           ! Yes       ! Yes            ! None      ! +------------------------------------+----------+---------------+----------+ ! SSV                                 ! Yes       ! Yes            ! None      ! +------------------------------------+----------+---------------+----------+ Right Doppler Measurements +---------------------------+------+------+--------------------------------+ ! Location                   ! Signal!Reflux! Reflux (msec) ! +---------------------------+------+------+--------------------------------+ ! Common Femoral             !Phasic!      !                                ! +---------------------------+------+------+--------------------------------+ ! Prox Femoral               !Phasic!      !                                ! +---------------------------+------+------+--------------------------------+ ! Popliteal                  !Phasic!      !                                ! +---------------------------+------+------+--------------------------------+ Left Lower Extremities DVT Study Measurements Left 2D Measurements +------------------------------------+----------+---------------+----------+ ! Location                            ! Visualized! Compressibility! Thrombosis! +------------------------------------+----------+---------------+----------+ ! Common Femoral                      !Yes       ! Yes            ! None      ! +------------------------------------+----------+---------------+----------+ ! Prox Femoral                        !Yes       ! Yes            ! None      ! +------------------------------------+----------+---------------+----------+ ! Mid Femoral                         !Yes       ! Yes            ! None      ! +------------------------------------+----------+---------------+----------+ ! Dist Femoral                        !Yes       ! Yes            ! None      ! +------------------------------------+----------+---------------+----------+ ! Deep Femoral                        !Yes       ! Yes            ! None      ! +------------------------------------+----------+---------------+----------+ ! Popliteal                           !Yes       ! Yes            ! None      ! +------------------------------------+----------+---------------+----------+ ! Sapheno Femoral Junction            ! Yes       ! Yes            ! None      ! +------------------------------------+----------+---------------+----------+ ! PTV !Yes       !Yes            ! None      ! +------------------------------------+----------+---------------+----------+ ! Peroneal                            !Yes       ! Yes            ! None      ! +------------------------------------+----------+---------------+----------+ ! Gastroc                             ! Yes       ! Yes            ! None      ! +------------------------------------+----------+---------------+----------+ ! GSV Thigh                           ! Yes       ! Yes            ! None      ! +------------------------------------+----------+---------------+----------+ ! GSV Knee                            ! Yes       ! Yes            ! None      ! +------------------------------------+----------+---------------+----------+ ! GSV Ankle                           ! Yes       ! Yes            ! None      ! +------------------------------------+----------+---------------+----------+ ! SSV                                 ! Yes       ! Yes            ! None      ! +------------------------------------+----------+---------------+----------+ Left Doppler Measurements +---------------------------+------+------+--------------------------------+ ! Location                   ! Signal!Reflux! Reflux (msec)                   ! +---------------------------+------+------+--------------------------------+ ! Common Femoral             !Phasic!      !                                ! +---------------------------+------+------+--------------------------------+ ! Prox Femoral               !Phasic!      !                                ! +---------------------------+------+------+--------------------------------+ ! Popliteal                  !Phasic!      !                                ! +---------------------------+------+------+--------------------------------+  Interpretation per the Radiologist below, if available at the time of this note:    VL DUP LOWER EXTREMITY VENOUS BILATERAL   Final Result      CT CHEST PULMONARY EMBOLISM W CONTRAST Final Result   1. No clear evidence for central pulmonary embolus. 2. Mild dependent atelectasis and respiratory motion. No lobar airspace   consolidation. 3. Fatty liver. 4. Stable bilateral adrenal adenomas. 5. Relatively stable enlargement of the main pulmonary artery. 6. Mild upper thoracic levoscoliosis. XR CHEST PORTABLE   Final Result   No acute process. LABS:  Labs Reviewed   CBC WITH AUTO DIFFERENTIAL - Abnormal; Notable for the following components:       Result Value    RBC 5.13 (*)     MCV 71.9 (*)     MCH 23.4 (*)     RDW 14.9 (*)     Lymphocytes 45 (*)     Immature Granulocytes 1 (*)     All other components within normal limits   BASIC METABOLIC PANEL - Abnormal; Notable for the following components:    Glucose 104 (*)     All other components within normal limits   D-DIMER, QUANTITATIVE - Abnormal; Notable for the following components:    D-Dimer, Quant 1.14 (*)     All other components within normal limits   BRAIN NATRIURETIC PEPTIDE       All other labs were within normal range or not returned as of this dictation. EMERGENCY DEPARTMENT COURSE and DIFFERENTIAL DIAGNOSIS/MDM:   Vitals:    Vitals:    10/25/21 1026   BP: (!) 131/110   Pulse: 85   Resp: 18   Temp: 98.2 °F (36.8 °C)   TempSrc: Oral   SpO2: 97%   Weight: 104.3 kg (230 lb)   Height: 5' 5\" (1.651 m)       Medical Decision Making: Patient's venous Doppler is negative for DVT. Her labs and imaging are unremarkable. She is able to be discharged home. Follow-up with primary care physician for recheck reevaluation. Return for worsening symptoms or concerns    FINAL IMPRESSION      1. Pain in both lower extremities    2.  Cough          DISPOSITION/PLAN   DISPOSITION Decision To Discharge 10/25/2021 01:01:46 PM      PATIENT REFERRED TO:   Hieu Katz MD  [address] [place] [address]  [phone]    Schedule an appointment as soon as possible for a visit       [facility] ED  [address] [facility]  [phone]    If symptoms worsen      DISCHARGE MEDICATIONS:     Discharge Medication List as of 10/25/2021  1:03 PM      START taking these medications    Details   HYDROcodone-acetaminophen (NORCO) 5-325 MG per tablet Take 1 tablet by mouth every 6 hours as needed for Pain for up to 3 days. , Disp-10 tablet, R-0Print      azithromycin (ZITHROMAX) 250 MG tablet Take 2 tablets (500 mg) on Day 1, followed by 1 tablet (250 mg) once daily on Days 2 through 5., Disp-1 packet, R-0Print                 (Please note that portions of this note were completed with a voice recognition program.  Efforts were made to edit the dictations but occasionally words are mis-transcribed.)    Ferdinand Roth NP, APRN - CNP  Certified Nurse Practitioner        ELIZABETH Izaguirre CNP  10/26/21 1919

## 2021-11-02 DIAGNOSIS — I26.92 SADDLE EMBOLUS OF PULMONARY ARTERY WITHOUT ACUTE COR PULMONALE, UNSPECIFIED CHRONICITY (HCC): ICD-10-CM

## 2021-11-02 NOTE — TELEPHONE ENCOUNTER
Dr Rhianna Reese, patient is current, has cancelled last three appointments and is now scheduled for follow up on 11/18/21. Per last dictation lifelong anticoagulation was recommended. Please sign for refill if ok. Thank you.

## 2021-11-14 DIAGNOSIS — I10 ESSENTIAL HYPERTENSION: ICD-10-CM

## 2021-11-14 RX ORDER — ATORVASTATIN CALCIUM 10 MG/1
TABLET, FILM COATED ORAL
Qty: 90 TABLET | Refills: 1 | Status: SHIPPED | OUTPATIENT
Start: 2021-11-14 | End: 2022-06-03 | Stop reason: SDUPTHER

## 2021-11-14 RX ORDER — LOSARTAN POTASSIUM 50 MG/1
TABLET ORAL
Qty: 90 TABLET | Refills: 1 | Status: ON HOLD | OUTPATIENT
Start: 2021-11-14 | End: 2021-12-17 | Stop reason: HOSPADM

## 2021-11-30 DIAGNOSIS — I26.92 SADDLE EMBOLUS OF PULMONARY ARTERY WITHOUT ACUTE COR PULMONALE, UNSPECIFIED CHRONICITY (HCC): ICD-10-CM

## 2021-11-30 NOTE — TELEPHONE ENCOUNTER
Dr Priscila Alcala, patient last seen on 12/16/20, has cancelled last four appointments and is now scheduled for follow up on 2/23/22. Per last dictation lifelong anticoagulation was recommended. I called patient and left a message on machine asking for a call back to schedule a sooner appointment if possible. Please sign for refill if ok. Thank you.

## 2021-12-01 NOTE — TELEPHONE ENCOUNTER
Cat Barrett MD  You 11 hours ago (9:17 PM)     SK    Please have her see me in January 2022.  Thank you      I spoke with patient and rescheduled her follow up with Dr Cheryl Ozuna to 1/6/22. I stressed to patient the importance of showing for this appointment as it has been over a year since she has been seen. Patient voiced understanding.

## 2021-12-15 ENCOUNTER — APPOINTMENT (OUTPATIENT)
Dept: GENERAL RADIOLOGY | Age: 46
DRG: 301 | End: 2021-12-15
Payer: COMMERCIAL

## 2021-12-15 ENCOUNTER — HOSPITAL ENCOUNTER (INPATIENT)
Age: 46
LOS: 2 days | Discharge: HOME | DRG: 301 | End: 2021-12-17
Attending: EMERGENCY MEDICINE | Admitting: FAMILY MEDICINE
Payer: COMMERCIAL

## 2021-12-15 DIAGNOSIS — I82.431 ACUTE DEEP VEIN THROMBOSIS (DVT) OF POPLITEAL VEIN OF RIGHT LOWER EXTREMITY (HCC): Primary | ICD-10-CM

## 2021-12-15 DIAGNOSIS — Z78.9 FAILURE OF OUTPATIENT TREATMENT: ICD-10-CM

## 2021-12-15 DIAGNOSIS — K04.7 DENTAL INFECTION: ICD-10-CM

## 2021-12-15 PROBLEM — I82.4Y9 DEEP VEIN THROMBOSIS (DVT) OF PROXIMAL LOWER EXTREMITY (HCC): Status: ACTIVE | Noted: 2018-06-01

## 2021-12-15 PROBLEM — I82.409 DEEP VEIN THROMBOSIS (DVT) WITH PULMONARY EMBOLISM PRESENT ON ADMISSION (HCC): Status: ACTIVE | Noted: 2021-12-15

## 2021-12-15 PROBLEM — I26.99 DEEP VEIN THROMBOSIS (DVT) WITH PULMONARY EMBOLISM PRESENT ON ADMISSION (HCC): Status: ACTIVE | Noted: 2021-12-15

## 2021-12-15 PROBLEM — O22.30 DVT (DEEP VEIN THROMBOSIS) IN PREGNANCY: Status: ACTIVE | Noted: 2021-12-15

## 2021-12-15 PROCEDURE — 2060000000 HC ICU INTERMEDIATE R&B

## 2021-12-15 PROCEDURE — 99285 EMERGENCY DEPT VISIT HI MDM: CPT

## 2021-12-15 PROCEDURE — 6370000000 HC RX 637 (ALT 250 FOR IP): Performed by: FAMILY MEDICINE

## 2021-12-15 PROCEDURE — 6360000002 HC RX W HCPCS: Performed by: EMERGENCY MEDICINE

## 2021-12-15 PROCEDURE — 6370000000 HC RX 637 (ALT 250 FOR IP): Performed by: NURSE PRACTITIONER

## 2021-12-15 PROCEDURE — 99223 1ST HOSP IP/OBS HIGH 75: CPT | Performed by: FAMILY MEDICINE

## 2021-12-15 PROCEDURE — 93971 EXTREMITY STUDY: CPT

## 2021-12-15 PROCEDURE — 73562 X-RAY EXAM OF KNEE 3: CPT

## 2021-12-15 RX ORDER — PANTOPRAZOLE SODIUM 40 MG/1
40 TABLET, DELAYED RELEASE ORAL
Status: DISCONTINUED | OUTPATIENT
Start: 2021-12-16 | End: 2021-12-17 | Stop reason: HOSPADM

## 2021-12-15 RX ORDER — LEVETIRACETAM 750 MG/1
750 TABLET ORAL 2 TIMES DAILY
Status: DISCONTINUED | OUTPATIENT
Start: 2021-12-15 | End: 2021-12-17 | Stop reason: HOSPADM

## 2021-12-15 RX ORDER — AZITHROMYCIN 250 MG/1
250 TABLET, FILM COATED ORAL DAILY
Status: DISCONTINUED | OUTPATIENT
Start: 2021-12-15 | End: 2021-12-17 | Stop reason: HOSPADM

## 2021-12-15 RX ORDER — HEPARIN SODIUM 1000 [USP'U]/ML
80 INJECTION, SOLUTION INTRAVENOUS; SUBCUTANEOUS PRN
Status: DISCONTINUED | OUTPATIENT
Start: 2021-12-15 | End: 2021-12-17 | Stop reason: ALTCHOICE

## 2021-12-15 RX ORDER — HEPARIN SODIUM 1000 [USP'U]/ML
40 INJECTION, SOLUTION INTRAVENOUS; SUBCUTANEOUS PRN
Status: DISCONTINUED | OUTPATIENT
Start: 2021-12-15 | End: 2021-12-17 | Stop reason: ALTCHOICE

## 2021-12-15 RX ORDER — CETIRIZINE HYDROCHLORIDE 10 MG/1
10 TABLET ORAL DAILY
Status: DISCONTINUED | OUTPATIENT
Start: 2021-12-16 | End: 2021-12-17 | Stop reason: HOSPADM

## 2021-12-15 RX ORDER — BUDESONIDE AND FORMOTEROL FUMARATE DIHYDRATE 160; 4.5 UG/1; UG/1
2 AEROSOL RESPIRATORY (INHALATION) 2 TIMES DAILY
Status: DISCONTINUED | OUTPATIENT
Start: 2021-12-15 | End: 2021-12-17 | Stop reason: HOSPADM

## 2021-12-15 RX ORDER — ALBUTEROL SULFATE 90 UG/1
2 AEROSOL, METERED RESPIRATORY (INHALATION) EVERY 6 HOURS PRN
Status: DISCONTINUED | OUTPATIENT
Start: 2021-12-15 | End: 2021-12-17 | Stop reason: HOSPADM

## 2021-12-15 RX ORDER — ATORVASTATIN CALCIUM 10 MG/1
10 TABLET, FILM COATED ORAL NIGHTLY
Status: DISCONTINUED | OUTPATIENT
Start: 2021-12-15 | End: 2021-12-17 | Stop reason: HOSPADM

## 2021-12-15 RX ORDER — FOLIC ACID 1 MG/1
1 TABLET ORAL DAILY
Status: DISCONTINUED | OUTPATIENT
Start: 2021-12-16 | End: 2021-12-17 | Stop reason: HOSPADM

## 2021-12-15 RX ORDER — CLINDAMYCIN HYDROCHLORIDE 150 MG/1
300 CAPSULE ORAL ONCE
Status: COMPLETED | OUTPATIENT
Start: 2021-12-15 | End: 2021-12-15

## 2021-12-15 RX ORDER — LANOLIN ALCOHOL/MO/W.PET/CERES
1000 CREAM (GRAM) TOPICAL DAILY
Status: DISCONTINUED | OUTPATIENT
Start: 2021-12-16 | End: 2021-12-17 | Stop reason: HOSPADM

## 2021-12-15 RX ORDER — TRIAMTERENE AND HYDROCHLOROTHIAZIDE 37.5; 25 MG/1; MG/1
1 TABLET ORAL EVERY MORNING
Status: DISCONTINUED | OUTPATIENT
Start: 2021-12-16 | End: 2021-12-17 | Stop reason: HOSPADM

## 2021-12-15 RX ORDER — ESCITALOPRAM OXALATE 10 MG/1
10 TABLET ORAL DAILY
Status: DISCONTINUED | OUTPATIENT
Start: 2021-12-16 | End: 2021-12-17 | Stop reason: HOSPADM

## 2021-12-15 RX ORDER — HYDROCODONE BITARTRATE AND ACETAMINOPHEN 5; 325 MG/1; MG/1
1 TABLET ORAL ONCE
Status: COMPLETED | OUTPATIENT
Start: 2021-12-15 | End: 2021-12-15

## 2021-12-15 RX ORDER — HEPARIN SODIUM 10000 [USP'U]/100ML
5-30 INJECTION, SOLUTION INTRAVENOUS CONTINUOUS
Status: DISCONTINUED | OUTPATIENT
Start: 2021-12-16 | End: 2021-12-17

## 2021-12-15 RX ORDER — MORPHINE SULFATE 4 MG/ML
4 INJECTION, SOLUTION INTRAMUSCULAR; INTRAVENOUS ONCE
Status: COMPLETED | OUTPATIENT
Start: 2021-12-15 | End: 2021-12-15

## 2021-12-15 RX ORDER — ERGOCALCIFEROL 1.25 MG/1
50000 CAPSULE ORAL WEEKLY
Status: DISCONTINUED | OUTPATIENT
Start: 2021-12-15 | End: 2021-12-16

## 2021-12-15 RX ORDER — BENZTROPINE MESYLATE 1 MG/1
0.5 TABLET ORAL 2 TIMES DAILY
Status: DISCONTINUED | OUTPATIENT
Start: 2021-12-15 | End: 2021-12-17 | Stop reason: HOSPADM

## 2021-12-15 RX ORDER — LOSARTAN POTASSIUM 50 MG/1
50 TABLET ORAL DAILY
Status: DISCONTINUED | OUTPATIENT
Start: 2021-12-16 | End: 2021-12-16

## 2021-12-15 RX ORDER — CLONIDINE 0.1 MG/24H
1 PATCH, EXTENDED RELEASE TRANSDERMAL WEEKLY
Status: DISCONTINUED | OUTPATIENT
Start: 2021-12-15 | End: 2021-12-17 | Stop reason: HOSPADM

## 2021-12-15 RX ADMIN — LEVETIRACETAM 750 MG: 750 TABLET ORAL at 22:01

## 2021-12-15 RX ADMIN — PROBIOTIC PRODUCT - TAB 1 TABLET: TAB at 22:01

## 2021-12-15 RX ADMIN — AZITHROMYCIN MONOHYDRATE 250 MG: 250 TABLET ORAL at 18:27

## 2021-12-15 RX ADMIN — MORPHINE SULFATE 4 MG: 4 INJECTION INTRAVENOUS at 20:40

## 2021-12-15 RX ADMIN — ATORVASTATIN CALCIUM 10 MG: 10 TABLET, FILM COATED ORAL at 22:01

## 2021-12-15 RX ADMIN — HYDROCODONE BITARTRATE AND ACETAMINOPHEN 1 TABLET: 5; 325 TABLET ORAL at 14:10

## 2021-12-15 RX ADMIN — CLINDAMYCIN HYDROCHLORIDE 300 MG: 150 CAPSULE ORAL at 14:10

## 2021-12-15 RX ADMIN — BENZTROPINE MESYLATE 0.5 MG: 1 TABLET ORAL at 22:01

## 2021-12-15 ASSESSMENT — ENCOUNTER SYMPTOMS
COUGH: 0
EYE REDNESS: 0
FACIAL SWELLING: 1
VOICE CHANGE: 0
SINUS PRESSURE: 0
NAUSEA: 0
CONSTIPATION: 0
SHORTNESS OF BREATH: 0
ABDOMINAL DISTENTION: 0
ABDOMINAL PAIN: 0
DIARRHEA: 0
BACK PAIN: 0
TROUBLE SWALLOWING: 0
VOMITING: 0
EYE DISCHARGE: 0
COLOR CHANGE: 0
EYE PAIN: 0
CHEST TIGHTNESS: 0
RECTAL PAIN: 0
ANAL BLEEDING: 0
BLOOD IN STOOL: 0

## 2021-12-15 ASSESSMENT — PAIN SCALES - GENERAL
PAINLEVEL_OUTOF10: 10
PAINLEVEL_OUTOF10: 8
PAINLEVEL_OUTOF10: 10
PAINLEVEL_OUTOF10: 10

## 2021-12-15 ASSESSMENT — PAIN DESCRIPTION - DESCRIPTORS
DESCRIPTORS: CONSTANT;HEAVINESS
DESCRIPTORS: SHARP;CONSTANT

## 2021-12-15 ASSESSMENT — PAIN DESCRIPTION - ORIENTATION
ORIENTATION: RIGHT;LEFT
ORIENTATION: RIGHT

## 2021-12-15 ASSESSMENT — PAIN DESCRIPTION - FREQUENCY
FREQUENCY: CONTINUOUS
FREQUENCY: CONTINUOUS

## 2021-12-15 NOTE — ED PROVIDER NOTES
[address] [place] [address] ED  EMERGENCY DEPARTMENT ENCOUNTER      Pt Name: Nahed Perez  MRN: [id number]  [place] 1975  Date of evaluation: 12/15/2021  Provider: Jose Juan Ochoa       Chief Complaint   Patient presents with    Facial Swelling     right    Dental Pain     right upper    Leg Pain     right, onset 1.5 week, states pain mainly behind knee, taking eliquis for blood clots         HISTORY OFPRESENT ILLNESS  (Location/Symptom, Timing/Onset, Context/Setting, Quality, Duration, Modifying Factors, Severity.)   Nahed Perez is a 42 y.o. female who presents to the emergency department by private auto for evaluation of posterior right knee pain and right upper dental pain and facial swelling. Patient states she has a history of DVT and PEs. Currently on Eliquis 5 mg twice daily which she has been taking for the past several years. No chest pain or shortness of breath. Denies injury to the right leg. Right leg pain is an 8 out of 10. Described as a throbbing sensation. Worse with ambulation. No hip or back pain. Patient states she developed right upper dental pain and facial swelling several days ago. Denies difficulty swallowing. No fever or chills. Dental pain is a 5 out of 10 described as an aching sensation. She does smoke cigarettes daily. Nursing Notes were reviewed. PASTMEDICAL HISTORY     Past Medical History:   Diagnosis Date    Anemia     Asthma     Moderate persistent asthma without complication    Axillary hidradenitis suppurativa 5/6/2020    Bipolar 1 disorder ([address]     CHF (congestive heart failure) ([facility]     When child was delivered 2005    Chiari I malformation ([facility] 6/21/2017    A MRI of the brain in 2014 was suspicious for pseudotumor cerebri but a spinal tap revealed a normal opening pressure of 16 cm water.   An EEG was normal.  A MRI of the thoracic spine in 2015 was normal. Relevant history of cervical myelopathy with an abnormal MRI of the cervical spine in 2015. A follow-up MRI of the brain in 2017 was unchanged. A MRI of the cervical spine in     Chiari I malformation ([address] 2017    A MRI of the brain in  was suspicious for pseudotumor cerebri but a spinal tap revealed a normal opening pressure of 16 cm water. An EEG was normal.  A MRI of the thoracic spine in  was normal. Relevant history of cervical myelopathy with an abnormal MRI of the cervical spine in 2015. A follow-up MRI of the brain in 2017 was unchanged. A MRI of the cervical spine in     Chronic headache disorder 2017    Deep vein thrombosis (DVT) of lower extremity ([address] 2018    Diverticulitis of sigmoid colon 7/3/2017    DVT of deep femoral vein, bilateral (HCC)     Excessive consumption of soda pop 2021    Excessive consumption of soda pop 2021    Family history of diabetes mellitus 3/22/2016    Family history of malignant neoplasm of breast 3/22/2016    Family history of malignant neoplasm of uterus 3/22/2016    Family history of throat cancer 3/22/2016    FH: breast cancer 3/22/2016    FH: uterine cancer 3/22/2016    GERD (gastroesophageal reflux disease)     History of abnormal uterine bleeding 7/3/2017    History of diabetes mellitus 3/22/2016    History of hysterectomy for benign disease 3/22/2016    History of pulmonary embolism 10/4/2019    History of pulmonary embolism 10/4/2019    Hyperlipidemia     Irritable bowel syndrome     MDRO (multiple drug resistant organisms) resistance 2005    MRSA in  per pt.  Non-compliance with treatment 2017    Obesity     Saddle embolus of pulmonary artery without acute cor pulmonale (HCC)     Seizures ([address]     Last Seizure 18    Suicidal intent     .  Denies 18    Tobacco abuse disorder 3/22/2016         SURGICAL HISTORY       Past Surgical History:   Procedure Laterality Date    ABSCESS DRAINAGE abdominal abscess RLQ    AXILLARY SURGERY Right 2018    Excision of hidradenitis cysts, right axilla    BREAST LUMPECTOMY Bilateral     BREAST LUMPECTOMY       SECTION      x2    COLONOSCOPY N/A 2021    COLONOSCOPY DIAGNOSTIC performed by Fernanda Patterson MD at [address] [place], COLON, DIAGNOSTIC  2014    HERNIA REPAIR      HYSTERECTOMY      LYMPH NODE DISSECTION Left 2019    LEFT AXILLARY HYDRADENITIS EXCISION performed by Jignesh Vyas DO at [facility] Left 2019    LEFT AXILLARY HYDRADENITIS EXCISION (Left )    OH EXC SKIN BENIG <5MM TRUNK,ARM,LEG Right 2018    EXCISION HIDRADENITIS RIGHT AXILLA performed by Jignesh Vyas DO at [facility] ENDOSCOPY  2021    EGD BIOPSY performed by Fernanda Patterson MD at [facility]     Previous Medications    ALBUTEROL SULFATE HFA (PROVENTIL HFA) 108 (90 BASE) MCG/ACT INHALER    Inhale 2 puffs into the lungs every 4 hours as needed for Wheezing or Shortness of Breath (Space out to every 6 hours as symptoms improve) Space out to every 6 hours as symptoms improve.     APIXABAN (ELIQUIS) 5 MG TABS TABLET    take 1 tablet by mouth twice a day    ATORVASTATIN (LIPITOR) 10 MG TABLET    take 1 tablet by mouth once daily    BENZTROPINE (COGENTIN) 0.5 MG TABLET    take 1 tablet by mouth twice a day    CALCIUM-MAGNESIUM 200-50 MG TABS    Take 1 tablet by mouth daily    CETIRIZINE (ZYRTEC) 10 MG TABLET    Take 1 tablet by mouth daily    CLONIDINE (CATAPRES) 0.1 MG/24HR PTWK    apply 1 patch every week as directed    CYANOCOBALAMIN (B-12) 1000 MCG SUBL    Place 1 tablet under the tongue daily    ESCITALOPRAM (LEXAPRO) 10 MG TABLET        FLUTICASONE-SALMETEROL (ADVAIR) 250-50 MCG/DOSE AEPB    Inhale 1 puff into the lungs every 12 hours FOLIC ACID (FOLVITE) 1 MG TABLET    Take 1 tablet by mouth daily    LACTOBACILLUS (ACIDOPHILUS) CAPS CAPSULE    take 1 tablet by mouth twice a day    LEVETIRACETAM (KEPPRA) 750 MG TABLET    Take 750 mg by mouth 2 times daily    LOSARTAN (COZAAR) 50 MG TABLET    take 1 tablet by mouth once daily    OMEPRAZOLE (PRILOSEC) 20 MG DELAYED RELEASE CAPSULE    Take 1 capsule by mouth 2 times daily (before meals)    TRIAMTERENE-HYDROCHLOROTHIAZIDE (DYAZIDE) 37.5-25 MG PER CAPSULE    take 1 capsule by mouth every morning    VITAMIN D (ERGOCALCIFEROL) 1.25 MG (62524 UT) CAPS CAPSULE    Take 1 capsule by mouth once a week    VRAYLAR 3 MG CAPS CAPSULE    take 1 tablet by mouth once daily       ALLERGIES     Penicillins, Amoxil [amoxicillin], Bee pollen, Bee venom, Ciprofloxacin, Clindamycin/lincomycin, Flonase [fluticasone], Keflex [cephalexin], Levaquin [levofloxacin in d5w], Levofloxacin, Macrobid [nitrofurantoin monohyd macro], Nitrofurantoin, Sulfa antibiotics, and Sulfur    FAMILY HISTORY       Family History   Problem Relation Age of Onset    Asthma Mother     High Blood Pressure Mother     Mental Illness Mother     Stroke Other     Other Sister         blood clotting disorder, ms    Depression Sister     Cancer Maternal Grandmother     Diabetes Maternal Grandmother     Cancer Maternal Aunt     Diabetes Maternal Grandfather           SOCIAL HISTORY       Social History     Socioeconomic History    Marital status: Single     Spouse name: None    Number of children: None    Years of education: None    Highest education level: None   Occupational History    None   Tobacco Use    Smoking status: Current Every Day Smoker     Packs/day: 1.00     Years: 26.00     Pack years: 26.00     Types: Cigarettes     Start date: 0    Smokeless tobacco: Never Used    Tobacco comment: wants help- adviced about smoking cessation plans   Vaping Use    Vaping Use: Never used   Substance and Sexual Activity    Alcohol use: Yes     Comment: occ    Drug use: Yes     Types: Marijuana Eileen Kugel)    Sexual activity: Yes     Partners: Male   Other Topics Concern    None   Social History Narrative    None     Social Determinants of Health     Financial Resource Strain: Low Risk     Difficulty of Paying Living Expenses: Not hard at all   Food Insecurity: No Food Insecurity    Worried About Running Out of Food in the Last Year: Never true    [address] in the Last Year: Never true   Transportation Needs:     Lack of Transportation (Medical): Not on file    Lack of Transportation (Non-Medical): Not on file   Physical Activity:     Days of Exercise per Week: Not on file    Minutes of Exercise per Session: Not on file   Stress:     Feeling of Stress : Not on file   Social Connections:     Frequency of Communication with Friends and Family: Not on file    Frequency of Social Gatherings with Friends and Family: Not on file    Attends Anabaptist Services: Not on file    Active Member of [employer] or Organizations: Not on file    Attends Club or Organization Meetings: Not on file    Marital Status: Not on file   Intimate Partner Violence:     Fear of Current or Ex-Partner: Not on file    Emotionally Abused: Not on file    Physically Abused: Not on file    Sexually Abused: Not on file   Housing Stability:     Unable to Pay for Housing in the Last Year: Not on file    Number of [place] in the Last Year: Not on file    Unstable Housing in the Last Year: Not on file         REVIEW OF SYSTEMS    (2-9 systems for level 4, 10 or more for level 5)     Review of Systems   Constitutional: Positive for activity change. Negative for chills and fever. HENT: Positive for dental problem and facial swelling. Negative for trouble swallowing. Respiratory: Negative for cough and shortness of breath. Cardiovascular: Positive for leg swelling. Negative for chest pain.    Gastrointestinal: Negative for abdominal pain, nausea and vomiting. Musculoskeletal: Positive for arthralgias and gait problem. Negative for joint swelling. Skin: Negative for color change. All other systems reviewed and are negative. Except as noted above the remainder of the review of systems was reviewed and negative. PHYSICAL EXAM    (up to 7 for level 4, 8 or more for level 5)     ED Triage Vitals [12/15/21 1208]   BP Temp Temp Source Pulse Resp SpO2 Height Weight   131/85 98.2 °F (36.8 °C) Oral 90 16 100 % 5' 6\" (1.676 m) 232 lb (105.2 kg)       Physical Exam  Constitutional:       Appearance: Normal appearance. She is well-developed, well-groomed and overweight. HENT:      Head: Normocephalic. Right Ear: Hearing and external ear normal.      Left Ear: Hearing and external ear normal.      Nose: Nose normal.      Mouth/Throat:      Lips: Pink. Mouth: Mucous membranes are moist.      Dentition: Abnormal dentition. Dental tenderness, gingival swelling, dental caries and dental abscesses present. Pharynx: Oropharynx is clear. Uvula midline. No pharyngeal swelling or uvula swelling. Tonsils: 1+ on the right. 1+ on the left. Comments: Exam shows right upper gingival swelling particularly around tooth 3 and 4. No abscess noted. Patient is controlling her secretions. No throat swelling. Eyes:      Extraocular Movements: Extraocular movements intact. Conjunctiva/sclera: Conjunctivae normal.      Pupils: Pupils are equal, round, and reactive to light. Cardiovascular:      Pulses:           Dorsalis pedis pulses are 2+ on the right side and 2+ on the left side. Posterior tibial pulses are 2+ on the right side and 2+ on the left side. Pulmonary:      Effort: Pulmonary effort is normal. No respiratory distress. Musculoskeletal:      Cervical back: Normal range of motion and neck supple. Right hip: Normal.      Right upper leg: Normal.      Right knee: No swelling, deformity, erythema or ecchymosis.  Decreased range of motion. Tenderness present. Right lower leg: Tenderness present. No swelling. No edema. Left lower leg: No edema. Legs:       Comments: Patient exhibits tenderness to the posterior knee and right calf. Mild swelling present. No erythema. Pedal pulses palpable. Patient is able to flex and extend her knee. Skin:     General: Skin is warm and dry. Capillary Refill: Capillary refill takes less than 2 seconds. Findings: No bruising or erythema. Neurological:      Mental Status: She is alert and oriented to person, place, and time. Psychiatric:         Mood and Affect: Mood normal.         Behavior: Behavior normal.         Thought Content: Thought content normal.         Judgment: Judgment normal.           DIAGNOSTIC RESULTS     EKG:All EKG's are interpreted by the Emergency Department Physician who either signs or Co-signs this chart in the absence of a cardiologist.      RADIOLOGY:   Non-plain film images such as CT, Ultrasound and MRI are read by theradiologist. Plain radiographic images are visualized and preliminarily interpreted by the emergency physician with the below findings:  XR KNEE RIGHT (3 VIEWS)    Result Date: 12/15/2021  EXAMINATION: THREE XRAY VIEWS OF THE RIGHT KNEE 12/15/2021 11:09 am COMPARISON: None. HISTORY: ORDERING SYSTEM PROVIDED HISTORY: posterior knee pain TECHNOLOGIST PROVIDED HISTORY: posterior knee pain Reason for Exam: right knee pain FINDINGS: No evidence of acute fracture or dislocation. No focal osseous lesion. No evidence of joint effusion. No focal soft tissue abnormality. No acute abnormality of the knee. Interpretation per the Radiologist below, if available at the time of this note:    VL Lower Extremity Venous Right   Final Result      XR KNEE RIGHT (3 VIEWS)   Final Result   No acute abnormality of the knee.                EDBEDSIDE ULTRASOUND:   Performed by Brittany [place] - none    LABS:  Labs Reviewed - No data to display    All other labs were within normal range or not returned as of this dictation. EMERGENCY DEPARTMENT COURSE andDIFFERENTIAL DIAGNOSIS/MDM:   Patient evaluated in conjunction with attending physician. Examination shows dental infection. I do not see an abscess. Patient given oral clindamycin in the ED. X-ray of right knee shows no acute abnormality. Venous Doppler right lower extremity shows popliteal vein noncompressible with echoes. Patient states he is currently on Eliquis 5 mg twice daily for history of DVTs and PEs. Patient initially stated she was taking her medication as scheduled but then later told me that sometimes she misses a night dose if she falls asleep early. No chest pain or shortness of breath. I spoke with patient's PCP Dr. Chavez Desai who recommends discussing case with Dr. Bel Bell who is also seen the patient. I spoke with Dr. Bel Bell and he recommends patient be admitted to the hospital under internal medicine for further evaluation due to failure of outpatient treatment of Eliquis. I spoke with patient's PCP Dr. Chavez Desai who is also concerned that patient may be noncompliant with the medication at times and she will admit the patient to the hospital. I discussed test results and admission to the hospital the patient. She is agreeable to this. Vitals:    Vitals:    12/15/21 1208   BP: 131/85   Pulse: 90   Resp: 16   Temp: 98.2 °F (36.8 °C)   TempSrc: Oral   SpO2: 100%   Weight: 232 lb (105.2 kg)   Height: 5' 6\" (1.676 m)         CONSULTS:  IP CONSULT TO INTERNAL MEDICINE    RES:  Procedures    FINAL IMPRESSION      1. Acute deep vein thrombosis (DVT) of popliteal vein of right lower extremity (HCC)    2. Failure of outpatient treatment    3. Dental infection          DISPOSITION/PLAN   DISPOSITION        PATIENT REFERRED TO:   No follow-up provider specified.     DISCHARGE MEDICATIONS:     New Prescriptions    No medications on file     Electronically signed by ELIZABETH Chen CNPon 12/15/2021 at 3:50 PM           ELIZABETH Caro - CNP  12/15/21 [id number]

## 2021-12-15 NOTE — Clinical Note
Patient Class: Inpatient [101]   REQUIRED: Diagnosis: DVT (deep vein thrombosis) in pregnancy [242154]   Estimated Length of Stay: Estimated stay of more than 2 midnights

## 2021-12-15 NOTE — H&P
HISTORY AND PHYSICAL             Date: 12/15/2021        Patient Name: Ena Funes     YOB: 1975      Age:  42 y.o. Chief Complaint     Chief Complaint   Patient presents with    Facial Swelling     right    Dental Pain     right upper    Leg Pain     right, onset 1.5 week, states pain mainly behind knee, taking eliquis for blood clots      42year old AA female with H/O Multiple medical problems camr to R today with CC of dental pain RUJ x few days as well as pain and swelling to RLE - calf and post knee areas for a few days. No fever chills, cough, CP, SOB, palpitations. States has H/O seizures, DVT, PE as well as uncontrolled HTN, COPD and Bipolar disorder. She has been on Eliquis after her PE several years ago- sees Dr Janneth Stratton at [facility] for monitoring of this. She states she has been compliant with eliquis and may have missed one evening dose in the past week. No N/V/D , no abdo pains, no dizziness, falls or local trauma to the leg. She has had chronic dental issues but cannot get in to see a dentist till Jan.  She ha smultiple drug allergies and intolerances  History Obtained From   patient    History of Present Illness   See above      Past Medical History     Past Medical History:   Diagnosis Date    Anemia     Asthma     Moderate persistent asthma without complication    Axillary hidradenitis suppurativa 5/6/2020    Bipolar 1 disorder ([address]     CHF (congestive heart failure) ([facility] [address]     When child was delivered 2005    Chiari I malformation ([facility] [address] 6/21/2017    A MRI of the brain in 2014 was suspicious for pseudotumor cerebri but a spinal tap revealed a normal opening pressure of 16 cm water. An EEG was normal.  A MRI of the thoracic spine in 2015 was normal. Relevant history of cervical myelopathy with an abnormal MRI of the cervical spine in January 2015. A follow-up MRI of the brain in April 2017 was unchanged.   A MRI of the cervical spine in June 2    Chiari I malformation ([address] 2017    A MRI of the brain in  was suspicious for pseudotumor cerebri but a spinal tap revealed a normal opening pressure of 16 cm water. An EEG was normal.  A MRI of the thoracic spine in  was normal. Relevant history of cervical myelopathy with an abnormal MRI of the cervical spine in 2015. A follow-up MRI of the brain in 2017 was unchanged. A MRI of the cervical spine in     Chronic headache disorder 2017    Deep vein thrombosis (DVT) of lower extremity ([address] 2018    Diverticulitis of sigmoid colon 7/3/2017    DVT of deep femoral vein, bilateral (HCC)     Excessive consumption of soda pop 2021    Excessive consumption of soda pop 2021    Family history of diabetes mellitus 3/22/2016    Family history of malignant neoplasm of breast 3/22/2016    Family history of malignant neoplasm of uterus 3/22/2016    Family history of throat cancer 3/22/2016    FH: breast cancer 3/22/2016    FH: uterine cancer 3/22/2016    GERD (gastroesophageal reflux disease)     History of abnormal uterine bleeding 7/3/2017    History of diabetes mellitus 3/22/2016    History of hysterectomy for benign disease 3/22/2016    History of pulmonary embolism 10/4/2019    History of pulmonary embolism 10/4/2019    Hyperlipidemia     Irritable bowel syndrome     MDRO (multiple drug resistant organisms) resistance 2005    MRSA in  per pt.  Non-compliance with treatment 2017    Obesity     Saddle embolus of pulmonary artery without acute cor pulmonale (HCC)     Seizures ([address]     Last Seizure 18    Suicidal intent     .  Denies 18    Tobacco abuse disorder 3/22/2016        Past Surgical History     Past Surgical History:   Procedure Laterality Date    ABSCESS DRAINAGE      abdominal abscess RLQ    AXILLARY SURGERY Right 2018    Excision of hidradenitis cysts, right axilla    BREAST LUMPECTOMY Bilateral     BREAST LUMPECTOMY       SECTION      x2    COLONOSCOPY N/A 2021    COLONOSCOPY DIAGNOSTIC performed by Angel Quezada MD at [address], DIAGNOSTIC  2014    HERNIA REPAIR      HYSTERECTOMY      LYMPH NODE DISSECTION Left 2019    LEFT AXILLARY HYDRADENITIS EXCISION performed by Gary Scruggs DO at [address] Left 2019    LEFT AXILLARY HYDRADENITIS EXCISION (Left )    MN EXC SKIN BENIG <5MM TRUNK,ARM,LEG Right 2018    EXCISION HIDRADENITIS RIGHT AXILLA performed by Gary Scruggs DO at [facility]      UPPER GASTROINTESTINAL ENDOSCOPY  2021    EGD BIOPSY performed by Angel Quezada MD at [address] [place] [address]        Medications Prior to Admission     Prior to Admission medications    Medication Sig Start Date End Date Taking?  Authorizing Provider   apixaban (ELIQUIS) 5 MG TABS tablet take 1 tablet by mouth twice a day 21  Yes Danya Casarez MD   atorvastatin (LIPITOR) 10 MG tablet take 1 tablet by mouth once daily 21  Yes Bro Cannon MD   losartan (COZAAR) 50 MG tablet take 1 tablet by mouth once daily 21  Yes Bro Cannon MD   cloNIDine (CATAPRES) 0.1 MG/24HR PTWK apply 1 patch every week as directed 21  Yes Bro Cannon MD   Lactobacillus (ACIDOPHILUS) CAPS capsule take 1 tablet by mouth twice a day 9/15/21  Yes Historical Provider, MD   levETIRAcetam (KEPPRA) 750 MG tablet Take 750 mg by mouth 2 times daily 21  Yes Historical Provider, MD   fluticasone-salmeterol (ADVAIR) 250-50 MCG/DOSE AEPB Inhale 1 puff into the lungs every 12 hours 10/15/21  Yes Bro Cannon MD   Cyanocobalamin (B-12) 1000 MCG SUBL Place 1 tablet under the tongue daily 10/15/21  Yes Bro Cannon MD   folic acid (FOLVITE) 1 MG tablet Take 1 tablet by mouth daily 10/15/21  Yes Bro Cannon MD Comment  wants help- adviced about smoking cessation plans          Alcohol History     Alcohol Use Status  Yes Comment  occ          Drug Use     Drug Use Status  Yes Types  Marijuana Valdene Brock)          Sexual Activity     Sexually Active  Yes Partners  Male                Family History     Family History   Problem Relation Age of Onset    Asthma Mother     High Blood Pressure Mother     Mental Illness Mother     Stroke Other     Other Sister         blood clotting disorder, ms    Depression Sister     Cancer Maternal Grandmother     Diabetes Maternal Grandmother     Cancer Maternal Aunt     Diabetes Maternal Grandfather        Review of Systems   Review of Systems   Constitutional: Positive for activity change and appetite change. Negative for fatigue. HENT: Positive for dental problem. Negative for ear pain, hearing loss, postnasal drip, sinus pressure, sneezing, tinnitus, trouble swallowing and voice change. Eyes: Negative for pain, discharge, redness and visual disturbance. Respiratory: Negative for cough, chest tightness and shortness of breath. Cardiovascular: Positive for leg swelling. Negative for chest pain and palpitations. Gastrointestinal: Negative for abdominal distention, abdominal pain, anal bleeding, blood in stool, constipation, diarrhea, nausea, rectal pain and vomiting. Endocrine: Negative for cold intolerance, heat intolerance, polydipsia, polyphagia and polyuria. Genitourinary: Negative for decreased urine volume, difficulty urinating, dyspareunia, dysuria, enuresis, flank pain, frequency, genital sores, hematuria, menstrual problem, pelvic pain, urgency, vaginal bleeding and vaginal discharge. Musculoskeletal: Positive for gait problem. Negative for arthralgias, back pain, joint swelling, myalgias, neck pain and neck stiffness. Skin: Negative for color change, pallor and rash.    Allergic/Immunologic: Negative for environmental allergies, food allergies and immunocompromised state. Neurological: Negative for dizziness, tremors, seizures, syncope, facial asymmetry, speech difficulty, weakness, light-headedness, numbness and headaches. Hematological: Negative for adenopathy. Does not bruise/bleed easily. Psychiatric/Behavioral: Negative for agitation, behavioral problems, confusion, decreased concentration, sleep disturbance and suicidal ideas. The patient is not nervous/anxious. Physical Exam   /85   Pulse 90   Temp 98.2 °F (36.8 °C) (Oral)   Resp 16   Ht 5' 6\" (1.676 m)   Wt 232 lb (105.2 kg)   SpO2 100%   BMI 37.45 kg/m²     Physical Exam  Constitutional:       General: She is not in acute distress. Appearance: She is obese. HENT:      Head: Normocephalic and atraumatic. Comments: Right upper jaw swelling and tenderness     Right Ear: External ear normal.      Left Ear: External ear normal.      Nose: Nose normal.      Mouth/Throat:      Mouth: Mucous membranes are moist.   Eyes:      Conjunctiva/sclera: Conjunctivae normal.      Pupils: Pupils are equal, round, and reactive to light. Neck:      Thyroid: No thyromegaly. Trachea: No tracheal deviation. Cardiovascular:      Rate and Rhythm: Normal rate and regular rhythm. Pulses: Normal pulses. Heart sounds: Normal heart sounds. No murmur heard. No friction rub. No gallop. Pulmonary:      Effort: Pulmonary effort is normal. No respiratory distress. Breath sounds: Normal breath sounds. No stridor. No wheezing or rales. Chest:      Chest wall: No tenderness. Abdominal:      General: Bowel sounds are normal. There is no distension. Palpations: Abdomen is soft. Tenderness: There is no abdominal tenderness. There is no rebound. Musculoskeletal:         General: Normal range of motion. Cervical back: Normal range of motion.       Comments: Right post knee and upper calf with STS and tenderness   Lymphadenopathy:      Cervical: No cervical adenopathy. Skin:     General: Skin is warm. Coloration: Skin is not pale. Findings: No erythema or rash. Neurological:      General: No focal deficit present. Mental Status: She is alert and oriented to person, place, and time. Mental status is at baseline. Cranial Nerves: No cranial nerve deficit. Motor: No abnormal muscle tone. Deep Tendon Reflexes: Reflexes normal.   Psychiatric:         Mood and Affect: Mood normal.         Behavior: Behavior normal.         Thought Content: Thought content normal.         Judgment: Judgment normal.         Labs    No results found for this or any previous visit (from the past 24 hour(s)). Imaging/Diagnostics Last 24 Hours   XR KNEE RIGHT (3 VIEWS)    Result Date: 12/15/2021  EXAMINATION: THREE XRAY VIEWS OF THE RIGHT KNEE 12/15/2021 11:09 am COMPARISON: None. HISTORY: ORDERING SYSTEM PROVIDED HISTORY: posterior knee pain TECHNOLOGIST PROVIDED HISTORY: posterior knee pain Reason for Exam: right knee pain FINDINGS: No evidence of acute fracture or dislocation. No focal osseous lesion. No evidence of joint effusion. No focal soft tissue abnormality. No acute abnormality of the knee. VL Lower Extremity Venous Right    Result Date: 12/15/2021    [address]  Vascular Lower Extremities DVT Study Procedure   Patient Name   Gregorio Horta     Date of Study           12/15/2021                 CARLTON PAVON   Date of Birth  1975   Gender                  Female   Age            42 year(s)   Race                    Black   Room Number    05   Corporate ID # [id number]   Patient Acct # [de-identified]   MR #           [id number]      [facility]   Accession #    [id number]   Interpreting Physician  Vangie Coins   Referring                   Referring Physician  Nurse  Practitioner  Procedure Type of Study:   Veins: Lower Extremities DVT Study, Venous Scan Lower Right.   Indications for Study:Hx of DVT and Pain, leg. Patient Status:ER. Technical Quality:Adequate visualization.   - Critical Result: In er. . Comments:rt knee pain hx of rt popliteal DVT previous scan 10/25/2021 normal  Conclusions   Summary   Acute deep vein thrombosis of the right leg involving the popliteal vein. Signature   ----------------------------------------------------------------  Electronically signed by Kiel Ahumada(Sonographer) on  12/15/2021 02:30 PM  ----------------------------------------------------------------   ----------------------------------------------------------------  Electronically signed by Burnie Joe Reyes,Arthur(Interpreting  physician) on 12/15/2021 03:42 PM  ----------------------------------------------------------------  Findings:   Right Impression:  The common femoral, femoral, and tibial veins demonstrate normal  compressibility and augmentation. The popliteal vein is non compressible with echoes. Velocities are measured in cm/s ; Diameters are measured in cm Right Lower Extremities DVT Study Measurements Right 2D Measurements +------------------------------------+----------+---------------+----------+ ! Location                            ! Visualized! Compressibility! Thrombosis! +------------------------------------+----------+---------------+----------+ ! Common Femoral                      !Yes       ! Yes            ! None      ! +------------------------------------+----------+---------------+----------+ ! Prox Femoral                        !Yes       ! Yes            ! None      ! +------------------------------------+----------+---------------+----------+ ! Mid Femoral                         !Yes       ! Yes            ! None      ! +------------------------------------+----------+---------------+----------+ ! Dist Femoral                        !Yes       ! Yes            ! None      ! +------------------------------------+----------+---------------+----------+ ! Deep Femoral                        !Yes       ! Yes !None      ! +------------------------------------+----------+---------------+----------+ ! Popliteal                           !Yes       ! No             !Acute     ! +------------------------------------+----------+---------------+----------+ ! Sapheno Femoral Junction            ! Yes       ! Yes            ! None      ! +------------------------------------+----------+---------------+----------+ ! PTV                                 ! Yes       ! Yes            ! None      ! +------------------------------------+----------+---------------+----------+ ! Peroneal                            !Yes       ! Partial        !None      ! +------------------------------------+----------+---------------+----------+ ! Gastroc                             ! Yes       ! Yes            ! None      ! +------------------------------------+----------+---------------+----------+ ! GSV Thigh                           ! Yes       ! Yes            ! None      ! +------------------------------------+----------+---------------+----------+ ! GSV Knee                            ! Yes       ! Yes            ! None      ! +------------------------------------+----------+---------------+----------+ ! GSV Ankle                           ! Yes       ! Yes            ! None      ! +------------------------------------+----------+---------------+----------+ ! SSV                                 ! Yes       ! Yes            ! None      ! +------------------------------------+----------+---------------+----------+ Right Doppler Measurements +---------------------------+------+------+--------------------------------+ ! Location                   ! Signal!Reflux! Reflux (msec)                   ! +---------------------------+------+------+--------------------------------+ ! Common Femoral             !Phasic! No    !                                ! +---------------------------+------+------+--------------------------------+ ! Prox Femoral               !Phasic! No    ! ! +---------------------------+------+------+--------------------------------+ ! Popliteal                  !Phasic! No    !                                ! +---------------------------+------+------+--------------------------------+      Assessment    DVT RLE ,with H/P PE in the past  Failed anticoagulation with [facility] Problems           Last Modified POA    Tobacco abuse disorder 12/15/2021 Yes    Seizures ([facility] [address] 12/15/2021 Yes    Overview Signed 11/14/2017  6:37 PM by Heather Del Real MD     History of a seizure disorder since January 2015 when she was admitted to the hospital with hypertensive urgency. She describes intermittent episodes of seizures which begin with an aura of tingling and numbness followed by eyes rolling to the back of the head and twitching in all extremities. There is no bowel or bladder incontinence nor tongue biting. She is amnestic for the episodes and they occur almost once or twice a month. A breakthrough seizure in May 2017 prompted a visit to the [facility] emergency department where she was found to have a subtherapeutic Keppra level. Evaluation included a CT brain scan and CT of the cervical spine which were negative. Seizures are exacerbated by stress and anxiety and there is a question of nonepileptic seizures.   An EEG was normal.  Consider an LTME.     Treated with Keppra 500 mg twice a day but dose boosted in July 2017 to 500 mg AM and 1000 mg PM.         Bipolar 1 disorder ([facility] [address] 12/15/2021 Yes    Chronic idiopathic constipation 12/15/2021 Yes    Long term current use of anticoagulant therapy 12/15/2021 Yes    Asthma 12/15/2021 Yes    History of pulmonary embolism 12/15/2021 Yes    Essential hypertension 12/15/2021 Yes    Hyperlipidemia 12/15/2021 Yes    Tobacco abuse counseling 12/15/2021 Yes    Borderline diabetes mellitus 12/15/2021 Yes    Chronic obstructive lung disease ([facility] [address] 12/15/2021 Yes    Deep vein thrombosis (DVT) of proximal lower extremity ([address] 12/15/2021 Yes    Obesity 12/15/2021 Yes    Erosive gastritis 12/15/2021 Yes    Dental infection 12/15/2021 Yes          Plan     Orders Placed This Encounter   Procedures    XR KNEE RIGHT (3 VIEWS)    VL Lower Extremity Venous Right    Call Doctor    Call Doctor    Inpatient consult to Internal Medicine   IV heparin protocol  Pulm consult  Hematology Consult  Home meds as reconciled  GI prophylaxis  PO clindamycin for dental infection as ordered by ER  CBC,BMP daily      Consultations Ordered:  IP CONSULT TO INTERNAL MEDICINE  IP CONSULT TO PULMONOLOGY  IP CONSULT TO ONCOLOGY    Electronically signed by Opal Zhang MD on 12/15/21 at 5:58 PM EST

## 2021-12-16 ENCOUNTER — APPOINTMENT (OUTPATIENT)
Dept: CT IMAGING | Age: 46
DRG: 301 | End: 2021-12-16
Payer: COMMERCIAL

## 2021-12-16 LAB
ABSOLUTE EOS #: 0.1 K/UL (ref 0–0.44)
ABSOLUTE IMMATURE GRANULOCYTE: <0.03 K/UL (ref 0–0.3)
ABSOLUTE LYMPH #: 3.24 K/UL (ref 1.1–3.7)
ABSOLUTE MONO #: 0.81 K/UL (ref 0.1–1.2)
ABSOLUTE RETIC #: 0.07 M/UL (ref 0.03–0.08)
ANION GAP SERPL CALCULATED.3IONS-SCNC: 12 MMOL/L (ref 9–17)
BASOPHILS # BLD: 0 % (ref 0–2)
BASOPHILS ABSOLUTE: 0.03 K/UL (ref 0–0.2)
BUN BLDV-MCNC: 9 MG/DL (ref 6–20)
BUN/CREAT BLD: 12 (ref 9–20)
CALCIUM SERPL-MCNC: 8.5 MG/DL (ref 8.6–10.4)
CHLORIDE BLD-SCNC: 105 MMOL/L (ref 98–107)
CO2: 21 MMOL/L (ref 20–31)
CREAT SERPL-MCNC: 0.74 MG/DL (ref 0.5–0.9)
DIFFERENTIAL TYPE: ABNORMAL
EKG ATRIAL RATE: 83 BPM
EKG P AXIS: 26 DEGREES
EKG P-R INTERVAL: 204 MS
EKG Q-T INTERVAL: 404 MS
EKG QRS DURATION: 74 MS
EKG QTC CALCULATION (BAZETT): 474 MS
EKG R AXIS: -3 DEGREES
EKG T AXIS: 9 DEGREES
EKG VENTRICULAR RATE: 83 BPM
EOSINOPHILS RELATIVE PERCENT: 1 % (ref 1–4)
FERRITIN: 155 UG/L (ref 13–150)
FOLATE: 11.4 NG/ML
FREE KAPPA/LAMBDA RATIO: 0.95 (ref 0.26–1.65)
GFR AFRICAN AMERICAN: >60 ML/MIN
GFR NON-AFRICAN AMERICAN: >60 ML/MIN
GFR SERPL CREATININE-BSD FRML MDRD: ABNORMAL ML/MIN/{1.73_M2}
GFR SERPL CREATININE-BSD FRML MDRD: ABNORMAL ML/MIN/{1.73_M2}
GLUCOSE BLD-MCNC: 112 MG/DL (ref 70–99)
HCT VFR BLD CALC: 35.5 % (ref 36.3–47.1)
HCT VFR BLD CALC: 37.4 % (ref 36.3–47.1)
HEMOGLOBIN: 11.6 G/DL (ref 11.9–15.1)
HEMOGLOBIN: 11.6 G/DL (ref 11.9–15.1)
IMMATURE GRANULOCYTES: 0 %
IMMATURE RETIC FRACT: 16.8 % (ref 2.7–18.3)
INR BLD: 1.1
IRON SATURATION: 31 % (ref 20–55)
IRON: 80 UG/DL (ref 37–145)
KAPPA FREE LIGHT CHAINS QNT: 1.52 MG/DL (ref 0.37–1.94)
LAMBDA FREE LIGHT CHAINS QNT: 1.6 MG/DL (ref 0.57–2.63)
LYMPHOCYTES # BLD: 47 % (ref 24–43)
MCH RBC QN AUTO: 23.5 PG (ref 25.2–33.5)
MCH RBC QN AUTO: 24.4 PG (ref 25.2–33.5)
MCHC RBC AUTO-ENTMCNC: 31 G/DL (ref 28.4–34.8)
MCHC RBC AUTO-ENTMCNC: 32.7 G/DL (ref 28.4–34.8)
MCV RBC AUTO: 74.7 FL (ref 82.6–102.9)
MCV RBC AUTO: 75.7 FL (ref 82.6–102.9)
MONOCYTES # BLD: 12 % (ref 3–12)
NRBC AUTOMATED: 0 PER 100 WBC
NRBC AUTOMATED: ABNORMAL PER 100 WBC
PARTIAL THROMBOPLASTIN TIME: 72.3 SEC (ref 23.9–33.8)
PARTIAL THROMBOPLASTIN TIME: 78.6 SEC (ref 23.9–33.8)
PDW BLD-RTO: 16.7 % (ref 11.8–14.4)
PDW BLD-RTO: 17.1 % (ref 11.8–14.4)
PLATELET # BLD: 238 K/UL (ref 138–453)
PLATELET # BLD: 247 K/UL (ref 138–453)
PLATELET ESTIMATE: ABNORMAL
PMV BLD AUTO: 10.4 FL (ref 8.1–13.5)
PMV BLD AUTO: 10.8 FL (ref 8.1–13.5)
POTASSIUM SERPL-SCNC: 3.6 MMOL/L (ref 3.7–5.3)
PROTHROMBIN TIME: 13.7 SEC (ref 11.5–14.2)
RBC # BLD: 4.75 M/UL (ref 3.95–5.11)
RBC # BLD: 4.94 M/UL (ref 3.95–5.11)
RBC # BLD: ABNORMAL 10*6/UL
RETIC %: 1.5 % (ref 0.5–1.9)
RETIC HEMOGLOBIN: 26.4 PG (ref 28.2–35.7)
SEG NEUTROPHILS: 40 % (ref 36–65)
SEGMENTED NEUTROPHILS ABSOLUTE COUNT: 2.78 K/UL (ref 1.5–8.1)
SODIUM BLD-SCNC: 138 MMOL/L (ref 135–144)
TOTAL IRON BINDING CAPACITY: 254 UG/DL (ref 250–450)
TROPONIN INTERP: NORMAL
TROPONIN T: NORMAL NG/ML
TROPONIN, HIGH SENSITIVITY: <6 NG/L (ref 0–14)
UNSATURATED IRON BINDING CAPACITY: 174 UG/DL (ref 112–347)
VITAMIN B-12: 687 PG/ML (ref 232–1245)
WBC # BLD: 6.7 K/UL (ref 3.5–11.3)
WBC # BLD: 7 K/UL (ref 3.5–11.3)
WBC # BLD: ABNORMAL 10*3/UL

## 2021-12-16 PROCEDURE — 85045 AUTOMATED RETICULOCYTE COUNT: CPT

## 2021-12-16 PROCEDURE — 6360000004 HC RX CONTRAST MEDICATION: Performed by: INTERNAL MEDICINE

## 2021-12-16 PROCEDURE — 36415 COLL VENOUS BLD VENIPUNCTURE: CPT

## 2021-12-16 PROCEDURE — 6360000002 HC RX W HCPCS: Performed by: FAMILY MEDICINE

## 2021-12-16 PROCEDURE — 86147 CARDIOLIPIN ANTIBODY EA IG: CPT

## 2021-12-16 PROCEDURE — 94761 N-INVAS EAR/PLS OXIMETRY MLT: CPT

## 2021-12-16 PROCEDURE — 6370000000 HC RX 637 (ALT 250 FOR IP): Performed by: FAMILY MEDICINE

## 2021-12-16 PROCEDURE — 83020 HEMOGLOBIN ELECTROPHORESIS: CPT

## 2021-12-16 PROCEDURE — 85613 RUSSELL VIPER VENOM DILUTED: CPT

## 2021-12-16 PROCEDURE — 94640 AIRWAY INHALATION TREATMENT: CPT

## 2021-12-16 PROCEDURE — 84484 ASSAY OF TROPONIN QUANT: CPT

## 2021-12-16 PROCEDURE — 85730 THROMBOPLASTIN TIME PARTIAL: CPT

## 2021-12-16 PROCEDURE — 84155 ASSAY OF PROTEIN SERUM: CPT

## 2021-12-16 PROCEDURE — 6360000002 HC RX W HCPCS: Performed by: INTERNAL MEDICINE

## 2021-12-16 PROCEDURE — 82607 VITAMIN B-12: CPT

## 2021-12-16 PROCEDURE — 93005 ELECTROCARDIOGRAM TRACING: CPT | Performed by: INTERNAL MEDICINE

## 2021-12-16 PROCEDURE — 83540 ASSAY OF IRON: CPT

## 2021-12-16 PROCEDURE — 83883 ASSAY NEPHELOMETRY NOT SPEC: CPT

## 2021-12-16 PROCEDURE — 99233 SBSQ HOSP IP/OBS HIGH 50: CPT | Performed by: FAMILY MEDICINE

## 2021-12-16 PROCEDURE — 71260 CT THORAX DX C+: CPT

## 2021-12-16 PROCEDURE — 99223 1ST HOSP IP/OBS HIGH 75: CPT | Performed by: INTERNAL MEDICINE

## 2021-12-16 PROCEDURE — 2060000000 HC ICU INTERMEDIATE R&B

## 2021-12-16 PROCEDURE — 85027 COMPLETE CBC AUTOMATED: CPT

## 2021-12-16 PROCEDURE — 2580000003 HC RX 258: Performed by: INTERNAL MEDICINE

## 2021-12-16 PROCEDURE — 84165 PROTEIN E-PHORESIS SERUM: CPT

## 2021-12-16 PROCEDURE — 80048 BASIC METABOLIC PNL TOTAL CA: CPT

## 2021-12-16 PROCEDURE — 85025 COMPLETE CBC W/AUTO DIFF WBC: CPT

## 2021-12-16 PROCEDURE — 83550 IRON BINDING TEST: CPT

## 2021-12-16 PROCEDURE — 85610 PROTHROMBIN TIME: CPT

## 2021-12-16 PROCEDURE — 82746 ASSAY OF FOLIC ACID SERUM: CPT

## 2021-12-16 PROCEDURE — 82728 ASSAY OF FERRITIN: CPT

## 2021-12-16 PROCEDURE — 86146 BETA-2 GLYCOPROTEIN ANTIBODY: CPT

## 2021-12-16 RX ORDER — SODIUM CHLORIDE 9 MG/ML
INJECTION, SOLUTION INTRAVENOUS CONTINUOUS
Status: DISCONTINUED | OUTPATIENT
Start: 2021-12-16 | End: 2021-12-17 | Stop reason: HOSPADM

## 2021-12-16 RX ORDER — HYDRALAZINE HYDROCHLORIDE 20 MG/ML
10 INJECTION INTRAMUSCULAR; INTRAVENOUS EVERY 6 HOURS PRN
Status: DISCONTINUED | OUTPATIENT
Start: 2021-12-16 | End: 2021-12-17 | Stop reason: HOSPADM

## 2021-12-16 RX ORDER — LOSARTAN POTASSIUM 100 MG/1
100 TABLET ORAL DAILY
Status: DISCONTINUED | OUTPATIENT
Start: 2021-12-17 | End: 2021-12-17 | Stop reason: HOSPADM

## 2021-12-16 RX ORDER — DOCUSATE SODIUM 100 MG/1
200 CAPSULE, LIQUID FILLED ORAL DAILY PRN
Status: DISCONTINUED | OUTPATIENT
Start: 2021-12-16 | End: 2021-12-17 | Stop reason: HOSPADM

## 2021-12-16 RX ORDER — ERGOCALCIFEROL 1.25 MG/1
50000 CAPSULE ORAL WEEKLY
Status: DISCONTINUED | OUTPATIENT
Start: 2021-12-21 | End: 2021-12-17 | Stop reason: HOSPADM

## 2021-12-16 RX ORDER — LOSARTAN POTASSIUM 50 MG/1
50 TABLET ORAL ONCE
Status: COMPLETED | OUTPATIENT
Start: 2021-12-16 | End: 2021-12-16

## 2021-12-16 RX ORDER — MORPHINE SULFATE 4 MG/ML
4 INJECTION, SOLUTION INTRAMUSCULAR; INTRAVENOUS EVERY 4 HOURS PRN
Status: DISCONTINUED | OUTPATIENT
Start: 2021-12-16 | End: 2021-12-17 | Stop reason: HOSPADM

## 2021-12-16 RX ORDER — ACETAMINOPHEN AND CODEINE PHOSPHATE 300; 30 MG/1; MG/1
1 TABLET ORAL EVERY 6 HOURS PRN
Status: DISCONTINUED | OUTPATIENT
Start: 2021-12-16 | End: 2021-12-17 | Stop reason: HOSPADM

## 2021-12-16 RX ORDER — SODIUM CHLORIDE 0.9 % (FLUSH) 0.9 %
10 SYRINGE (ML) INJECTION PRN
Status: DISCONTINUED | OUTPATIENT
Start: 2021-12-16 | End: 2021-12-17 | Stop reason: HOSPADM

## 2021-12-16 RX ORDER — MORPHINE SULFATE 2 MG/ML
2 INJECTION, SOLUTION INTRAMUSCULAR; INTRAVENOUS EVERY 4 HOURS PRN
Status: DISCONTINUED | OUTPATIENT
Start: 2021-12-16 | End: 2021-12-17 | Stop reason: HOSPADM

## 2021-12-16 RX ORDER — 0.9 % SODIUM CHLORIDE 0.9 %
80 INTRAVENOUS SOLUTION INTRAVENOUS ONCE
Status: DISCONTINUED | OUTPATIENT
Start: 2021-12-16 | End: 2021-12-17 | Stop reason: HOSPADM

## 2021-12-16 RX ADMIN — LEVETIRACETAM 750 MG: 750 TABLET ORAL at 09:00

## 2021-12-16 RX ADMIN — CARIPRAZINE 3 MG: 1.5 CAPSULE, GELATIN COATED ORAL at 09:00

## 2021-12-16 RX ADMIN — PROBIOTIC PRODUCT - TAB 1 TABLET: TAB at 20:39

## 2021-12-16 RX ADMIN — ACETAMINOPHEN AND CODEINE PHOSPHATE 1 TABLET: 300; 30 TABLET ORAL at 09:27

## 2021-12-16 RX ADMIN — FOLIC ACID 1 MG: 1 TABLET ORAL at 08:59

## 2021-12-16 RX ADMIN — Medication 80 ML: at 16:11

## 2021-12-16 RX ADMIN — MORPHINE SULFATE 4 MG: 4 INJECTION INTRAVENOUS at 00:29

## 2021-12-16 RX ADMIN — BUDESONIDE AND FORMOTEROL FUMARATE DIHYDRATE 2 PUFF: 160; 4.5 AEROSOL RESPIRATORY (INHALATION) at 19:56

## 2021-12-16 RX ADMIN — Medication 18 UNITS/KG/HR: at 00:44

## 2021-12-16 RX ADMIN — LEVETIRACETAM 750 MG: 750 TABLET ORAL at 20:39

## 2021-12-16 RX ADMIN — BUDESONIDE AND FORMOTEROL FUMARATE DIHYDRATE 2 PUFF: 160; 4.5 AEROSOL RESPIRATORY (INHALATION) at 09:39

## 2021-12-16 RX ADMIN — PANTOPRAZOLE SODIUM 40 MG: 40 TABLET, DELAYED RELEASE ORAL at 06:18

## 2021-12-16 RX ADMIN — LOSARTAN POTASSIUM 50 MG: 50 TABLET, FILM COATED ORAL at 08:59

## 2021-12-16 RX ADMIN — AZITHROMYCIN MONOHYDRATE 250 MG: 250 TABLET ORAL at 08:59

## 2021-12-16 RX ADMIN — TRIAMTERENE AND HYDROCHLOROTHIAZIDE 1 TABLET: 37.5; 25 TABLET ORAL at 09:00

## 2021-12-16 RX ADMIN — ATORVASTATIN CALCIUM 10 MG: 10 TABLET, FILM COATED ORAL at 20:39

## 2021-12-16 RX ADMIN — SODIUM CHLORIDE: 9 INJECTION, SOLUTION INTRAVENOUS at 00:42

## 2021-12-16 RX ADMIN — LOSARTAN POTASSIUM 50 MG: 50 TABLET, FILM COATED ORAL at 09:10

## 2021-12-16 RX ADMIN — SODIUM CHLORIDE, PRESERVATIVE FREE 10 ML: 5 INJECTION INTRAVENOUS at 16:11

## 2021-12-16 RX ADMIN — CETIRIZINE HYDROCHLORIDE 10 MG: 10 TABLET, FILM COATED ORAL at 08:59

## 2021-12-16 RX ADMIN — BENZTROPINE MESYLATE 0.5 MG: 1 TABLET ORAL at 20:40

## 2021-12-16 RX ADMIN — ESCITALOPRAM OXALATE 10 MG: 10 TABLET ORAL at 08:59

## 2021-12-16 RX ADMIN — MORPHINE SULFATE 4 MG: 4 INJECTION INTRAVENOUS at 23:07

## 2021-12-16 RX ADMIN — Medication 17.97 UNITS/KG/HR: at 15:08

## 2021-12-16 RX ADMIN — MORPHINE SULFATE 4 MG: 4 INJECTION INTRAVENOUS at 06:03

## 2021-12-16 RX ADMIN — MORPHINE SULFATE 2 MG: 2 INJECTION, SOLUTION INTRAMUSCULAR; INTRAVENOUS at 15:07

## 2021-12-16 RX ADMIN — PROBIOTIC PRODUCT - TAB 1 TABLET: TAB at 08:59

## 2021-12-16 RX ADMIN — BENZTROPINE MESYLATE 0.5 MG: 1 TABLET ORAL at 09:00

## 2021-12-16 RX ADMIN — CYANOCOBALAMIN TAB 1000 MCG 1000 MCG: 1000 TAB at 08:59

## 2021-12-16 RX ADMIN — IOPAMIDOL 75 ML: 755 INJECTION, SOLUTION INTRAVENOUS at 16:10

## 2021-12-16 ASSESSMENT — PAIN DESCRIPTION - LOCATION
LOCATION: FACE;LEG
LOCATION: LEG;FACE

## 2021-12-16 ASSESSMENT — ENCOUNTER SYMPTOMS
ANAL BLEEDING: 0
COLOR CHANGE: 0
BACK PAIN: 0
COUGH: 0
VOICE CHANGE: 0
NAUSEA: 0
SHORTNESS OF BREATH: 0
VOMITING: 0
DIARRHEA: 0
ABDOMINAL DISTENTION: 0
BLOOD IN STOOL: 0
SINUS PRESSURE: 0
EYE REDNESS: 0
TROUBLE SWALLOWING: 0
CONSTIPATION: 0
RECTAL PAIN: 0
EYE DISCHARGE: 0
CHEST TIGHTNESS: 0
ABDOMINAL PAIN: 0
EYE PAIN: 0

## 2021-12-16 ASSESSMENT — PAIN SCALES - GENERAL
PAINLEVEL_OUTOF10: 8
PAINLEVEL_OUTOF10: 3
PAINLEVEL_OUTOF10: 3
PAINLEVEL_OUTOF10: 7
PAINLEVEL_OUTOF10: 3
PAINLEVEL_OUTOF10: 10
PAINLEVEL_OUTOF10: 8
PAINLEVEL_OUTOF10: 8
PAINLEVEL_OUTOF10: 3
PAINLEVEL_OUTOF10: 8
PAINLEVEL_OUTOF10: 7
PAINLEVEL_OUTOF10: 8

## 2021-12-16 ASSESSMENT — PAIN DESCRIPTION - DESCRIPTORS: DESCRIPTORS: CONSTANT

## 2021-12-16 ASSESSMENT — PAIN DESCRIPTION - ORIENTATION
ORIENTATION: RIGHT;POSTERIOR
ORIENTATION: RIGHT

## 2021-12-16 ASSESSMENT — PAIN DESCRIPTION - FREQUENCY: FREQUENCY: CONTINUOUS

## 2021-12-16 NOTE — CONSULTS
Pulmonary Medicine and [address] V[address] MD      Patient - Parvin Perea   MRN -  [id number]   [facility]   - 1975      Date of Admission -  12/15/2021 12:52 PM  Date of evaluation -  2021  Room - 1018/1018-   Hospital Day - 1  Renae Rock MD Primary Care Physician - Annia Munroe MD     Reason for Consult    DVT, history of PE    Assessment   · Acute DVT of right popliteal vein, on Eliquis secondary to history of DVT and PE  · Asthma  · Active smoking, ~30-pack-year history  · Facial swelling  · Chest pain  · Obesity  · Bipolar, GERD, HLD HTN    Recommendations   · Continue IV heparin drip for now.   Oral anticoagulation per hematology oncology recommendation  · Check CTA of the chest  · Hematology /oncology on consult  · Cardiology on consult  · Symbicort  · Albuterol HFA Q 6 hours prn  · X-ray chest in am  · Labs: CBC and BMP in am  · DVT prophylaxis, heparin drip  · Will follow with you    Problem List      Patient Active Problem List   Diagnosis    H/O hysterectomy for benign disease    Borderline diabetes    FH: uterine cancer    FH: breast cancer    Neck pain, chronic    Tobacco abuse disorder    Seizures ([address]    Non-compliance with treatment    Anemia    Cobalamin deficiency    Bipolar 1 disorder ([address]    PTSD (post-traumatic stress disorder)    IBS (irritable bowel syndrome)    Chronic idiopathic constipation    Long term current use of anticoagulant therapy    Asthma    GERD (gastroesophageal reflux disease)    History of pulmonary embolism    Folate deficiency    Vitamin D deficiency    Essential hypertension    Hyperlipidemia    Tobacco abuse counseling    Bacterial vaginitis    Diverticulitis of sigmoid colon    Borderline diabetes mellitus    Chiari malformation type I ([address]    Irritable bowel syndrome    Chronic obstructive lung disease ([address]    Noncompliance with treatment    Deep vein thrombosis (DVT) of proximal lower extremity (HCC)    Marijuana use    Obesity    Abnormal menstrual cycle    Recurrent major depressive episodes, moderate (HCC)    Umbilical hernia    Trichomonal infection    Gastroesophageal reflux disease with esophagitis without hemorrhage    Erosive gastritis    Smoker    Dental infection    DVT (deep vein thrombosis) in pregnancy    Deep vein thrombosis (DVT) with pulmonary embolism present on admission [facility])       OLEGARIO Read is 42 y.o., female who presented yesterday to the emergency room for complaints of right knee pain, right upper dental pain and facial swelling. She denied any shortness of breath, cough. She does have some chest discomfort, feels as if something is stuck in her chest she states. She has a history of DVT and PE, currently on Eliquis 5 mg twice daily which she has been taking for several years. She does admit to occasionally missing doses of her Eliquis. She has followed with [address] [place] [address],[address] for her PE with a Dr. Annamarie Chaudhry. She does have a history of asthma, on albuterol HFA and Advair. She is an active smoker, smoking around a pack a day for the past 30 years. She follows with Dr. Renee Owen as an outpatient. She has been tested for sleep apnea and does not have it according to her PSG. PMHx   Past Medical History      Diagnosis Date    Anemia     Asthma     Moderate persistent asthma without complication    Axillary hidradenitis suppurativa 5/6/2020    Bipolar 1 disorder (HCC)     CHF (congestive heart failure) [facility]     When child was delivered 2005    Chiari I malformation ([facility] 6/21/2017    A MRI of the brain in 2014 was suspicious for pseudotumor cerebri but a spinal tap revealed a normal opening pressure of 16 cm water. An EEG was normal.  A MRI of the thoracic spine in 2015 was normal. Relevant history of cervical myelopathy with an abnormal MRI of the cervical spine in January 2015.   A follow-up MRI of the brain in April axilla    BREAST LUMPECTOMY Bilateral     BREAST LUMPECTOMY       SECTION      x2    COLONOSCOPY N/A 2021    COLONOSCOPY DIAGNOSTIC performed by Jonnathan Clarke MD at [address], DIAGNOSTIC  2014    HERNIA REPAIR      HYSTERECTOMY      LYMPH NODE DISSECTION Left 2019    LEFT AXILLARY HYDRADENITIS EXCISION [facility] Matilda Fernandez DO [address] Left 2019    LEFT AXILLARY HYDRADENITIS EXCISION (Left )    MT EXC SKIN BENIG <5MM TRUNK,ARM,LEG Right 2018    EXCISION HIDRADENITIS RIGHT AXILLA performed by Matilda Fernandez DO at [address]      UMBILICAL HERNIA REPAIR      UPPER GASTROINTESTINAL ENDOSCOPY  2021    EGD BIOPSY performed by Jonnathan Clarke MD at [address] [facility]    Current Medications    [START ON 2021] vitamin D  50,000 Units Oral Weekly    [START ON 2021] losartan  100 mg Oral Daily    atorvastatin  10 mg Oral Nightly    benztropine  0.5 mg Oral BID    cetirizine  10 mg Oral Daily    cloNIDine  1 patch TransDERmal Weekly    vitamin B-12  1,000 mcg Oral Daily    escitalopram  10 mg Oral Daily    budesonide-formoterol  2 puff Inhalation BID    folic acid  1 mg Oral Daily    lactobacillus  1 tablet Oral BID    levETIRAcetam  750 mg Oral BID    pantoprazole  40 mg Oral QAM AC    triamterene-hydroCHLOROthiazide  1 tablet Oral QAM    cariprazine hcl  3 mg Oral Daily    azithromycin  250 mg Oral Daily     morphine **OR** morphine, acetaminophen-codeine, docusate sodium, albuterol sulfate HFA, heparin (porcine), heparin (porcine)  IV Drips/Infusions   sodium chloride 10 mL/hr at 21 0042    heparin (PORCINE) Infusion 18 Units/kg/hr (21 0044)     Home Medications  Medications Prior to Admission: apixaban (ELIQUIS) 5 MG TABS tablet, take 1 tablet by mouth twice a day  atorvastatin (LIPITOR) 10 MG tablet, take 1 tablet by mouth once daily  losartan (COZAAR) 50 MG tablet, take 1 tablet by mouth once daily  cloNIDine (CATAPRES) 0.1 MG/24HR PTWK, apply 1 patch every week as directed  Lactobacillus (ACIDOPHILUS) CAPS capsule, take 1 tablet by mouth twice a day  levETIRAcetam (KEPPRA) 750 MG tablet, Take 750 mg by mouth 2 times daily  fluticasone-salmeterol (ADVAIR) 250-50 MCG/DOSE AEPB, Inhale 1 puff into the lungs every 12 hours  Cyanocobalamin (B-12) 1000 MCG SUBL, Place 1 tablet under the tongue daily  folic acid (FOLVITE) 1 MG tablet, Take 1 tablet by mouth daily  triamterene-hydroCHLOROthiazide (DYAZIDE) 37.5-25 MG per capsule, take 1 capsule by mouth every morning  omeprazole (PRILOSEC) 20 MG delayed release capsule, Take 1 capsule by mouth 2 times daily (before meals)  Calcium-Magnesium 200-50 MG TABS, Take 1 tablet by mouth daily  VRAYLAR 3 MG CAPS capsule, take 1 tablet by mouth once daily  escitalopram (LEXAPRO) 10 MG tablet,   cetirizine (ZYRTEC) 10 MG tablet, Take 1 tablet by mouth daily  benztropine (COGENTIN) 0.5 MG tablet, take 1 tablet by mouth twice a day  vitamin D (ERGOCALCIFEROL) 1.25 MG (99800 UT) CAPS capsule, Take 1 capsule by mouth once a week    Allergies    Penicillins, Amoxil [amoxicillin], Bee pollen, Bee venom, Ciprofloxacin, Clindamycin/lincomycin, Flonase [fluticasone], Keflex [cephalexin], Levaquin [levofloxacin in d5w], Levofloxacin, Macrobid [nitrofurantoin monohyd macro], Nitrofurantoin, Sulfa antibiotics, and Sulfur  Social History     Social History     Tobacco Use    Smoking status: Current Every Day Smoker     Packs/day: 1.00     Years: 26.00     Pack years: 26.00     Types: Cigarettes     Start date: 1981    Smokeless tobacco: Never Used    Tobacco comment: wants help- adviced about smoking cessation plans   Substance Use Topics    Alcohol use: Yes     Comment: occ     Family History          Problem Relation Age of Onset    Asthma Mother    Claros High Blood Pressure Mother     Mental Illness Mother     Stroke Other     Other Sister         blood clotting disorder, ms    Depression Sister     Cancer Maternal Grandmother     Diabetes Maternal Grandmother     Cancer Maternal Aunt     Diabetes Maternal Grandfather      ROS - 11 systems   General Denies any fever or chills  HEENT Denies any diplopia, tinnitus or vertigo  Resp positive for  pleuritic pain  Cardiac Denies any chest pain, palpitations, claudication or edema  GI Denies any melena, hematochezia, hematemesis or pyrosis   Denies any frequency, urgency, hesitancy or incontinence  Heme Denies bruising or bleeding easily  Endocrine Denies any history of diabetes or thyroid disease  Neuro Denies any focal motor or sensory deficits  Psychiatric Denies anxiety, depression, suicidal ideation  Skin Denies rashes, itching, open sores    Vitals     height is 5' 6\" (1.676 m) and weight is 226 lb 6.4 oz (102.7 kg). Her oral temperature is 98.1 °F (36.7 °C). Her blood pressure is 161/102 (abnormal) and her pulse is 80. Her respiration is 18 and oxygen saturation is 90%. Body mass index is 36.54 kg/m². I/O        Intake/Output Summary (Last 24 hours) at 12/16/2021 0916  Last data filed at 12/16/2021 0654  Gross per 24 hour   Intake 832.23 ml   Output 100 ml   Net 732.23 ml     I/O last 3 completed shifts: In: 832.2 [P.O.:200; I.V.:632.2]  Out: 100 [Urine:100]   Patient Vitals for the past 96 hrs (Last 3 readings):   Weight   12/15/21 2345 226 lb 6.4 oz (102.7 kg)   12/15/21 1208 232 lb (105.2 kg)     Exam   General Appearance  Awake, alert, oriented, in no acute distress  HEENT - Head is normocephalic, atraumatic. Pupil reactive to light  Neck - Supple,  trachea midline and straight  Lungs -moderate air exchange, no crackles or wheezing  Cardiovascular - Heart sounds are normal.  Regular rhythm normal rate without murmur, gallop or rub.   Abdomen - Soft, nontender, nondistended, no masses or organomegaly  Neurologic - CN II-XII are grossly intact. There are no focal motor or sensory deficits  Skin - No bruising or bleeding  Extremities - No cyanosis, clubbing. Positive edema right lower extremity  Labs  - Old records and notes have been reviewed in [facility]   CBC     Lab Results   Component Value Date    WBC 6.7 12/16/2021    RBC 4.94 12/16/2021    HGB 11.6 12/16/2021    HCT 37.4 12/16/2021     12/16/2021    MCV 75.7 12/16/2021    MCH 23.5 12/16/2021    MCHC 31.0 12/16/2021    RDW 17.1 12/16/2021    LYMPHOPCT 45 10/25/2021    MONOPCT 10 10/25/2021    BASOPCT 0 10/25/2021    MONOSABS 0.77 10/25/2021    LYMPHSABS 3.37 10/25/2021    EOSABS 0.14 10/25/2021    BASOSABS <0.03 10/25/2021    DIFFTYPE NOT REPORTED 10/25/2021     BMP   Lab Results   Component Value Date     10/25/2021    K 3.9 10/25/2021     10/25/2021    CO2 21 10/25/2021    BUN 9 10/25/2021    CREATININE 0.87 10/25/2021    GLUCOSE 104 10/25/2021    CALCIUM 8.6 10/25/2021    MG 1.9 11/04/2019     LFTS  Lab Results   Component Value Date    ALKPHOS 118 05/26/2021    ALT 20 05/26/2021    AST 18 05/26/2021    PROT 6.9 05/26/2021    BILITOT 0.20 05/26/2021    BILIDIR <0.08 10/03/2016    IBILI CANNOT BE CALCULATED 10/03/2016    LABALBU 3.6 05/26/2021     PTT  Lab Results   Component Value Date    APTT 78.6 (H) 12/16/2021     INR   Lab Results   Component Value Date    INR 1.1 12/16/2021    INR 0.9 10/21/2020    INR 1.0 04/01/2019    PROTIME 13.7 12/16/2021    PROTIME 12.2 10/21/2020    PROTIME 10.7 04/01/2019       Radiology    Right lower extremity Doppler  Summary        Acute deep vein thrombosis of the right leg involving the popliteal vein. \"Thank you for asking us to see this patient\"    Case discussed with nurse and patient. Questions and concerns addressed.   Electronically signed by     Cynthia Dee MD on 12/16/2021 at 12:37 PM  Pulmonary Critical Care and Sleep Medicine,  [facility]  Cell: [phone]  Office: [phone]

## 2021-12-16 NOTE — PROGRESS NOTES
Patient started to complain about right sided chest pain. Dr. Wallace Lynn contacted and orders received including cardiology consult. EKG was normal. BP was elevated 181/110 twice and Dr. Edd Segura notified. No orders received. Morphine 4mg given for leg pain as well.

## 2021-12-16 NOTE — FLOWSHEET NOTE
Patient receives Sacrament of the Sick (anointing) from [facility].    [facility] will follow as needed. (writer charting for [employer]     65/27/09 [id number]   Encounter Summary   Services provided to: Patient   Referral/Consult From: Round[facility]   [facility] of [address] [facility]   (12/16/21 anointed)   Complexity of Encounter Low   Length of Encounter 15 minutes   Routine   Type Initial   Sacraments   Sacrament of Sick-Anointing Anointed  (12/16/21 Fr. Radha Bryant)

## 2021-12-16 NOTE — CONSULTS
Today's Date: 12/16/2021  Patient Name: Jeffrey Hussein  Date of admission: 12/15/2021 12:52 PM  Patient's age: 42 y.o., 1975  Admission Dx: Dental infection [K04.7]  DVT (deep vein thrombosis) in pregnancy [O22.30]  Acute deep vein thrombosis (DVT) of popliteal vein of right lower extremity (HCC) [I82.431]  Failure of outpatient treatment [Z78.9]  Deep vein thrombosis (DVT) with pulmonary embolism present on admission ([facility] [I82.409, I26.99]    Reason for Consult: management recommendations  Requesting Physician: Roosevelt Sosa MD    CHIEF COMPLAINT: Leg pain. Facial swelling    History Obtained From:  patient, electronic medical record    HISTORY OF PRESENT ILLNESS:      The patient is a 42 y.o.  female who is admitted to the hospital for chief complaints of right knee pain and facial swelling. Patient has a history of DVT and pulmonary embolism. Patient is currently on Eliquis 5 mg twice daily which she has been taking for several years. Patient states that she is compliant with the medication. Denies any chest pain or shortness of breath. Denies injury to right leg. Describes pain as throbbing in nature. Patient swelling has been going on for a few days. Patient does smoke cigarettes daily. Work-up reveals hemoglobin of 11.6 with MCV 75. Iron stores from April 2021 were adequate. Patient did have microcytosis back in April. Patient's ultrasound right lower extremity shows acute DVT involving the popliteal vein. Ultrasound Doppler from 6/2/2018 also showed left popliteal and gastrocnemius vein DVT. Ultrasound from 8/30/2019 showed chronic thrombotic changes with recanalization of the left lower extremity veins. CT chest from 6/2/2018 showed saddle pulmonary embolism extending to the right and left pulmonary arteries.     Past Medical History:   has a past medical history of Anemia, Asthma, Axillary hidradenitis suppurativa, B[facility] 1 disorder ([facility] CHF (congestive heart failure) ([facility] Chiari I malformation ([address] Chiari I malformation ([facility] Chronic headache disorder, Deep vein thrombosis (DVT) of lower extremity ([address] Diverticulitis of sigmoid colon, DVT of deep femoral vein, bilateral (HCC), Excessive consumption of soda pop, Excessive consumption of soda pop, Family history of diabetes mellitus, Family history of malignant neoplasm of breast, Family history of malignant neoplasm of uterus, Family history of throat cancer, FH: breast cancer, FH: uterine cancer, GERD (gastroesophageal reflux disease), History of abnormal uterine bleeding, History of diabetes mellitus, History of hysterectomy for benign disease, History of pulmonary embolism, History of pulmonary embolism, Hyperlipidemia, Irritable bowel syndrome, MDRO (multiple drug resistant organisms) resistance, Non-compliance with treatment, Obesity, Saddle embolus of pulmonary artery without acute cor pulmonale ([address] Seizures ([facility] Suicidal intent, and Tobacco abuse disorder. Past Surgical History:   has a past surgical history that includes  section; Umbilical hernia repair; Endometrial ablation; Hysterectomy; Tonsillectomy; Tubal ligation; Breast lumpectomy (Bilateral); Abscess Drainage; hernia repair; Breast lumpectomy; Axillary Surgery (Right, 2018); pr exc skin benig <5mm trunk,arm,leg (Right, 2018); Tonsillectomy and adenoidectomy (); Endoscopy, colon, diagnostic (); other surgical history (Left, 2019); lymph node dissection (Left, 2019); Colonoscopy (N/A, 2021); and Upper gastrointestinal endoscopy (2021). Medications:    Prior to Admission medications    Medication Sig Start Date End Date Taking?  Authorizing Provider   apixaban (ELIQUIS) 5 MG TABS tablet take 1 tablet by mouth twice a day 21  Yes Kalie José MD   atorvastatin (LIPITOR) 10 MG tablet take 1 tablet by mouth once daily 21  Yes Marlow Meckel, MD   losartan (COZAAR) 50 MG tablet take 1 tablet by mouth once daily 11/14/21  Yes Bro Cannon MD   cloNIDine (CATAPRES) 0.1 MG/24HR PTWK apply 1 patch every week as directed 11/8/21  Yes Bro Cannon MD   Lactobacillus (ACIDOPHILUS) CAPS capsule take 1 tablet by mouth twice a day 9/15/21  Yes Historical Provider, MD   levETIRAcetam (KEPPRA) 750 MG tablet Take 750 mg by mouth 2 times daily 8/5/21  Yes Historical Provider, MD   fluticasone-salmeterol (ADVAIR) 250-50 MCG/DOSE AEPB Inhale 1 puff into the lungs every 12 hours 10/15/21  Yes Bro Cannon MD   Cyanocobalamin (B-12) 1000 MCG SUBL Place 1 tablet under the tongue daily 10/15/21  Yes Bro Cannon MD   folic acid (FOLVITE) 1 MG tablet Take 1 tablet by mouth daily 10/15/21  Yes Bro Cannon MD   triamterene-hydroCHLOROthiazide (DYAZIDE) 37.5-25 MG per capsule take 1 capsule by mouth every morning 9/21/21  Yes Bro Cannon MD   omeprazole (PRILOSEC) 20 MG delayed release capsule Take 1 capsule by mouth 2 times daily (before meals) 9/15/21  Yes Angel Quezada MD   Calcium-Magnesium 200-50 MG TABS Take 1 tablet by mouth daily 9/15/21  Yes Angel Quezada MD   VRAYLAR 3 MG CAPS capsule take 1 tablet by mouth once daily 4/26/21  Yes Historical Provider, MD   escitalopram (LEXAPRO) 10 MG tablet  5/18/21  Yes Historical Provider, MD   cetirizine (ZYRTEC) 10 MG tablet Take 1 tablet by mouth daily 7/24/20  Yes Bro Cannon MD   benztropine (COGENTIN) 0.5 MG tablet take 1 tablet by mouth twice a day 10/7/19  Yes Historical Provider, MD   vitamin D (ERGOCALCIFEROL) 1.25 MG (32225 UT) CAPS capsule Take 1 capsule by mouth once a week 10/15/21   Bro Cannon MD   cloNIDine (CATAPRES) 0.1 MG/24HR PTWK apply 1 patch every week as directed 7/19/21   Bro Cannon MD   cloNIDine (CATAPRES) 0.1 MG/24HR PTWK apply 1 patch every week as directed 5/24/21   Bro Cannon MD     Current Facility-Administered Medications   Medication Dose Route Frequency Provider Last Rate Last Admin    morphine (PF) injection 2 mg  2 mg IntraVENous Q4H PRN Arthur Lozoya MD        Or    morphine sulfate (PF) injection 4 mg  4 mg IntraVENous Q4H PRN Arthur Lozoya MD   4 mg at 12/16/21 0603    [START ON 12/21/2021] vitamin D (ERGOCALCIFEROL) capsule 50,000 Units  50,000 Units Oral Weekly Arthur Lozoya MD        0.9 % sodium chloride infusion   IntraVENous Continuous Hemalatha Dmuont MD 10 mL/hr at 12/16/21 0042 New Bag at 12/16/21 0042    atorvastatin (LIPITOR) tablet 10 mg  10 mg Oral Nightly Arthur Lozoya MD   10 mg at 12/15/21 2201    benztropine (COGENTIN) tablet 0.5 mg  0.5 mg Oral BID Arthur Lozoya MD   0.5 mg at 12/15/21 2201    cetirizine (ZYRTEC) tablet 10 mg  10 mg Oral Daily Arthur Lozoya MD        cloNIDine (CATAPRES) 0.1 MG/24HR 1 patch  1 patch TransDERmal Weekly Arthur Lozoya MD   1 patch at 12/15/21 2336    vitamin B-12 (CYANOCOBALAMIN) tablet 1,000 mcg  1,000 mcg Oral Daily Arthur Lozoya MD        escitalopram (LEXAPRO) tablet 10 mg  10 mg Oral Daily Arthur Lozoya MD        budesonide-formoterol [facility]) 160-4.5 MCG/ACT inhaler 2 puff  2 puff Inhalation BID Arthur Lozoya MD        folic acid (FOLVITE) tablet 1 mg  1 mg Oral Daily Arthur Lozoya MD        lactobacillus [facility]) tablet 1 tablet  1 tablet Oral BID Arthur Lozoya MD   1 tablet at 12/15/21 2201    levETIRAcetam (KEPPRA) tablet 750 mg  750 mg Oral BID Arthur Lozoya MD   750 mg at 12/15/21 2201    losartan (COZAAR) tablet 50 mg  50 mg Oral Daily Arthur Lozoya MD        pantoprazole (PROTONIX) tablet 40 mg  40 mg Oral QAM AC Arthur Lozoya MD   40 mg at 12/16/21 0618    triamterene-hydroCHLOROthiazide (MAXZIDE-25) 37.5-25 MG per tablet 1 tablet  1 tablet Oral QAM Arthur Lozoya MD        cariprazine hcl (VRAYLAR) capsule 3 mg  3 mg Oral Daily Arthur Lozoya MD        albuterol sulfate  (90 Base) MCG/ACT inhaler 2 puff  2 puff Inhalation Q6H PRN Arthur Lozoya MD        azithromycin [facility]) tablet 250 mg  250 mg Oral Daily Yue Redman MD   250 mg at 12/15/21 1827    heparin (porcine) injection 8,420 Units  80 Units/kg IntraVENous PRN Suleman Olivera MD        heparin (porcine) injection 4,210 Units  40 Units/kg IntraVENous PRN Suleman Olivera MD        heparin 25,000 units in dextrose 5% 250 mL (premix) infusion  5-30 Units/kg/hr IntraVENous Continuous Suleman Olivera MD 18.9 mL/hr at 12/16/21 0044 18 Units/kg/hr at 12/16/21 0044       Allergies:  Penicillins, Amoxil [amoxicillin], Bee pollen, Bee venom, Ciprofloxacin, Clindamycin/lincomycin, Flonase [fluticasone], Keflex [cephalexin], Levaquin [levofloxacin in d5w], Levofloxacin, Macrobid [nitrofurantoin monohyd macro], Nitrofurantoin, Sulfa antibiotics, and Sulfur    Social History:   reports that she has been smoking cigarettes. She started smoking about 40 years ago. She has a 26.00 pack-year smoking history. She has never used smokeless tobacco. She reports current alcohol use. She reports current drug use. Drug: Marijuana Berneta Daniel). Family History: family history includes Asthma in her mother; Cancer in her maternal aunt and maternal grandmother; Depression in her sister; Diabetes in her maternal grandfather and maternal grandmother; High Blood Pressure in her mother; Mental Illness in her mother; Other in her sister; Stroke in an other family member. REVIEW OF SYSTEMS:      Constitutional: No fever or chills.  No night sweats, no weight loss   Eyes: No eye discharge, double vision, or eye pain   HEENT: negative for sore mouth, sore throat, hoarseness and voice change   Respiratory: negative for cough , sputum, dyspnea, wheezing, hemoptysis, chest pain   Cardiovascular: negative for chest pain, dyspnea, palpitations, orthopnea, PND   Gastrointestinal: negative for nausea, vomiting, diarrhea, constipation, abdominal pain, Dysphagia, hematemesis and hematochezia   Genitourinary: negative for frequency, dysuria, nocturia, urinary incontinence, and hematuria   Integument: negative for rash, skin lesions, bruises.    Hematologic/Lymphatic: negative for easy bruising, bleeding, lymphadenopathy, or petechiae   Endocrine: negative for heat or cold intolerance,weight changes, change in bowel habits and hair loss   Musculoskeletal: Positive for leg pain  Neurological: negative for headaches, dizziness, seizures, weakness, numbness    PHYSICAL EXAM:        BP (!) 161/102   Pulse 80   Temp 98.1 °F (36.7 °C) (Oral)   Resp 18   Ht 5' 6\" (1.676 m)   Wt 226 lb 6.4 oz (102.7 kg)   SpO2 90%   BMI 36.54 kg/m²    Temp (24hrs), Av.2 °F (36.8 °C), Min:98.1 °F (36.7 °C), Max:98.4 °F (36.9 °C)      General appearance - well appearing, no in pain or distress   Mental status - alert and cooperative   Eyes - pupils equal and reactive, extraocular eye movements intact   Ears - bilateral TM's and external ear canals normal   Mouth - mucous membranes moist, pharynx normal without lesions   Neck - supple, no significant adenopathy   Lymphatics - no palpable lymphadenopathy, no hepatosplenomegaly   Chest - clear to auscultation, no wheezes, rales or rhonchi, symmetric air entry   Heart - normal rate, regular rhythm, normal S1, S2, no murmurs  Abdomen - soft, nontender, nondistended, no masses or organomegaly   Neurological - alert, oriented, normal speech, no focal findings or movement disorder noted   Musculoskeletal -positive for leg pain  Extremities - peripheral pulses normal, no pedal edema, no clubbing or cyanosis   Skin - normal coloration and turgor, no rashes, no suspicious skin lesions noted ,      DATA:      Labs:     Results for orders placed or performed during the hospital encounter of 12/15/21   CBC   Result Value Ref Range    WBC 6.7 3.5 - 11.3 k/uL    RBC 4.94 3.95 - 5.11 m/uL    Hemoglobin 11.6 (L) 11.9 - 15.1 g/dL    Hematocrit 37.4 36.3 - 47.1 %    MCV 75.7 (L) 82.6 - 102.9 fL    MCH 23.5 (L) 25.2 - 33.5 pg    MCHC 31.0 28.4 - 34.8 g/dL    RDW 17.1 (H) 11.8 - 14.4 %    Platelets 755 313 - 049 k/uL    MPV 10.4 8.1 - 13.5 fL    NRBC Automated NOT REPORTED 0.0 per 100 WBC   Protime-INR   Result Value Ref Range    Protime 13.7 11.5 - 14.2 sec    INR 1.1    APTT   Result Value Ref Range    PTT 78.6 (H) 23.9 - 33.8 sec   Troponin   Result Value Ref Range    Troponin, High Sensitivity <6 0 - 14 ng/L    Troponin T NOT REPORTED <0.03 ng/mL    Troponin Interp NOT REPORTED    EKG 12 Lead   Result Value Ref Range    Ventricular Rate 83 BPM    Atrial Rate 83 BPM    P-R Interval 204 ms    QRS Duration 74 ms    Q-T Interval 404 ms    QTc Calculation (Bazett) 474 ms    P Axis 26 degrees    R Axis -3 degrees    T Axis 9 degrees         IMAGING DATA:    XR KNEE RIGHT (3 VIEWS)    Result Date: 12/15/2021  EXAMINATION: THREE XRAY VIEWS OF THE RIGHT KNEE 12/15/2021 11:09 am COMPARISON: None. HISTORY: ORDERING SYSTEM PROVIDED HISTORY: posterior knee pain TECHNOLOGIST PROVIDED HISTORY: posterior knee pain Reason for Exam: right knee pain FINDINGS: No evidence of acute fracture or dislocation. No focal osseous lesion. No evidence of joint effusion. No focal soft tissue abnormality. No acute abnormality of the knee. VL Lower Extremity Venous Right    Result Date: 12/15/2021    [facility]  Vascular Lower Extremities DVT Study Procedure   Patient Name   Gregorio Vidal.     Date of Study           12/15/2021                 CARLTON PAVON   Date of Birth  1975   Gender                  Female   Age            42 year(s)   Race                    Black   Room Number    05   Corporate ID # [id number]   Patient Acct # [de-identified]   MR #           [id number]      [facility]   Accession #    [id number]   Interpreting Physician  [address] [place] [address]   Referring                   Referring Physician  Nurse  Practitioner  Procedure Type of Study:   Veins: Lower Extremities DVT Study, Venous Scan Lower Right.   Indications for Study:Hx of DVT and Pain, leg. Patient Status:ER. Technical Quality:Adequate visualization.   - Critical Result: In er. . Comments:rt knee pain hx of rt popliteal DVT previous scan 10/25/2021 normal  Conclusions   Summary   Acute deep vein thrombosis of the right leg involving the popliteal vein. Signature   ----------------------------------------------------------------  Electronically signed by Kiel Ahumada(Sonographer) on  12/15/2021 02:30 PM  ----------------------------------------------------------------   ----------------------------------------------------------------  Electronically signed by Loura Ravens Reyes,Arthur(Interpreting  physician) on 12/15/2021 03:42 PM  ----------------------------------------------------------------  Findings:   Right Impression:  The common femoral, femoral, and tibial veins demonstrate normal  compressibility and augmentation. The popliteal vein is non compressible with echoes. Velocities are measured in cm/s ; Diameters are measured in cm Right Lower Extremities DVT Study Measurements Right 2D Measurements +------------------------------------+----------+---------------+----------+ ! Location                            ! Visualized! Compressibility! Thrombosis! +------------------------------------+----------+---------------+----------+ ! Common Femoral                      !Yes       ! Yes            ! None      ! +------------------------------------+----------+---------------+----------+ ! Prox Femoral                        !Yes       ! Yes            ! None      ! +------------------------------------+----------+---------------+----------+ ! Mid Femoral                         !Yes       ! Yes            ! None      ! +------------------------------------+----------+---------------+----------+ ! Dist Femoral                        !Yes       ! Yes            ! None      ! +------------------------------------+----------+---------------+----------+ ! Deep Femoral                        !Yes       ! Yes !None      ! +------------------------------------+----------+---------------+----------+ ! Popliteal                           !Yes       ! No             !Acute     ! +------------------------------------+----------+---------------+----------+ ! Sapheno Femoral Junction            ! Yes       ! Yes            ! None      ! +------------------------------------+----------+---------------+----------+ ! PTV                                 ! Yes       ! Yes            ! None      ! +------------------------------------+----------+---------------+----------+ ! Peroneal                            !Yes       ! Partial        !None      ! +------------------------------------+----------+---------------+----------+ ! Gastroc                             ! Yes       ! Yes            ! None      ! +------------------------------------+----------+---------------+----------+ ! GSV Thigh                           ! Yes       ! Yes            ! None      ! +------------------------------------+----------+---------------+----------+ ! GSV Knee                            ! Yes       ! Yes            ! None      ! +------------------------------------+----------+---------------+----------+ ! GSV Ankle                           ! Yes       ! Yes            ! None      ! +------------------------------------+----------+---------------+----------+ ! SSV                                 ! Yes       ! Yes            ! None      ! +------------------------------------+----------+---------------+----------+ Right Doppler Measurements +---------------------------+------+------+--------------------------------+ ! Location                   ! Signal!Reflux! Reflux (msec)                   ! +---------------------------+------+------+--------------------------------+ ! Common Femoral             !Phasic! No    !                                ! +---------------------------+------+------+--------------------------------+ ! Prox Femoral               !Phasic! No    ! ! +---------------------------+------+------+--------------------------------+ ! Popliteal                  !Phasic! No    !                                ! +---------------------------+------+------+--------------------------------+        IMPRESSION:   Primary Problem  <principal problem not specified>    Active Hospital Problems    Diagnosis Date Noted    Dental infection [K04.7] 12/15/2021    DVT (deep vein thrombosis) in pregnancy [O22.30] 12/15/2021    Deep vein thrombosis (DVT) with pulmonary embolism present on admission ([facility] [I82.409, I26.99] 12/15/2021    Erosive gastritis [K29.60] 09/15/2021    Obesity [E66.9] 05/12/2021    Hyperlipidemia [E78.5] 01/27/2020    Tobacco abuse counseling [Z71.6] 01/27/2020    Essential hypertension [I10] 10/18/2019    History of pulmonary embolism [Z86.711] 10/04/2019    Long term current use of anticoagulant therapy [Z79.01] 07/24/2018    Chronic idiopathic constipation [K59.04] 06/07/2018    Chronic obstructive lung disease ([facility] [J44.9] 06/02/2018    Deep vein thrombosis (DVT) of proximal lower extremity (HCC) [I82.4Y9] 06/01/2018    Bipolar 1 disorder ([facility] [F31.9] 01/27/2017    Asthma [J45.909] 01/27/2017    Seizures ([facility] [R56.9] 10/21/2016    Borderline diabetes mellitus [R73.03] 03/22/2016    Tobacco abuse disorder [Z72.0] 03/22/2016       Left lower extremity DVT, 6/2/2018  Saddle pulmonary embolism,6/2/2108  Right lower extremity DVT, 12/15/2021  Microcytic anemia      RECOMMENDATIONS:  I personally reviewed results of lab work-up imaging studies and other relevant clinical data. Reviewed previous medical records  I discussed with the patient that failure of DOAC is very rare and usual conditions associated with DOAC failure include noncompliance, drug interaction, HIT, MPN's, antiphospholipid antibody syndrome and malignancy.      Patient admits that she has been noncompliant with Eliquis which is likely the etiology of

## 2021-12-16 NOTE — CARE COORDINATION
Case Management Initial Discharge Plan  Alexandr Kinney,         Readmission Risk              Risk of Unplanned Readmission:  27             Met with:patient to discuss discharge plans. Information verified: address, contacts, phone number, , insurance Yes  PCP: Arthur Cardenas MD  Date of last visit: 10/15/21     Insurance Provider: Ines Golden     Discharge Planning  Current Residence:  -3rd floor [address] 2 children 16/12yo   Living Arrangements:  Sury Oconnell has 1 stories/3 flights to climb  Support Systems:  Children, Family Members  Current Services PTA:  Na  Agency: na   Patient able to perform ADL's:Independent  DME in home:  Na   DME used to aid ambulation prior to admission:   Na   DME used during admission:  tbd     Potential Assistance Needed:  N/A    Pharmacy: [facility] [place] Medications:  No  Does patient want to participate in local refill/ meds to beds program?       Patient agreeable to home care: No  [place] of choice provided:  n/a      Type of Home Care Services:  None  Patient expects to be discharged to:       Prior SNF/Rehab Placement and Facility: no   Agreeable to SNF/Rehab: No  [place] of choice provided: n/a   Evaluation: n/a    Expected Discharge date:  21  Follow Up Appointment: Best Day/ Time: Monday PM    Transportation provider: family or medical cab   Transportation arrangements needed for discharge: No    Discharge Plan: Pt is independent, non . No DME. Not taking Eliquis as directed hx PE/DVT. Hep gtt watch for possible new AC recs. Pt is independent, no . She has a hx DVT/PE on Eliquis pt does admit \"I wasn't taking my medication right I skipped a lot of my night doses, I would just be tired to take it\"     Currently on Heparin gtt-Hem/onc consult. Discussed possibility of coumadin and she relays she does NOT want to be on Coumadin.      Also discussed Xarelto briefly-will need to watch for possible new AC recs.      DVT scan shows acute DVT right leg popliteal vein      Electronically signed by Ted Aden RN on 12/16/21 at 11:14 AM EST

## 2021-12-16 NOTE — PROGRESS NOTES
Progress Note  Date:12/16/2021       Room:1018/1018-02  Patient Leonor Zimmer     YOB: 1975     Age:46 y.o. Subjective    Subjective:  Symptoms:  Stable. She reports anxiety. No shortness of breath, malaise, cough, chest pain, weakness, headache, chest pressure, anorexia or diarrhea.  (C/O Leg pain right calf area, not controlled by morphine). Diet:  Adequate intake. No nausea or vomiting. Activity level: Impaired due to pain. Pain:  She complains of pain that is moderate. She reports pain is unchanged. Pain is partially controlled. Review of Systems   Constitutional: Positive for activity change and fatigue. Negative for appetite change and fever. HENT: Negative for dental problem, ear pain, hearing loss, postnasal drip, sinus pressure, sneezing, tinnitus, trouble swallowing and voice change. Eyes: Negative for pain, discharge, redness and visual disturbance. Respiratory: Negative for cough, chest tightness and shortness of breath. Cardiovascular: Positive for leg swelling. Negative for chest pain and palpitations. Gastrointestinal: Negative for abdominal distention, abdominal pain, anal bleeding, anorexia, blood in stool, constipation, diarrhea, nausea, rectal pain and vomiting. Endocrine: Negative for cold intolerance, heat intolerance, polydipsia, polyphagia and polyuria. Genitourinary: Negative for decreased urine volume, difficulty urinating, dyspareunia, dysuria, enuresis, flank pain, frequency, genital sores, hematuria, menstrual problem, pelvic pain, urgency, vaginal bleeding and vaginal discharge. Musculoskeletal: Positive for gait problem. Negative for arthralgias, back pain, joint swelling, myalgias, neck pain and neck stiffness. Skin: Negative for color change, pallor and rash. Allergic/Immunologic: Negative for environmental allergies, food allergies and immunocompromised state.    Neurological: Negative for dizziness, tremors, seizures, syncope, facial asymmetry, speech difficulty, weakness, light-headedness, numbness and headaches. Hematological: Negative for adenopathy. Does not bruise/bleed easily. Psychiatric/Behavioral: Negative for agitation, behavioral problems, confusion, decreased concentration, sleep disturbance and suicidal ideas. The patient is not nervous/anxious. Objective         Vitals Last 24 Hours:  TEMPERATURE:  Temp  Av.2 °F (36.8 °C)  Min: 98.1 °F (36.7 °C)  Max: 98.4 °F (36.9 °C)  RESPIRATIONS RANGE: Resp  Av  Min: 15  Max: 21  PULSE OXIMETRY RANGE: SpO2  Av %  Min: 90 %  Max: 100 %  PULSE RANGE: Pulse  Av.3  Min: 72  Max: 90  BLOOD PRESSURE RANGE: Systolic (71RZU), WNV:126 , Min:131 , DYZ:566   ; Diastolic (11HIN), SIT:52, Min:85, Max:116    I/O (24Hr): Intake/Output Summary (Last 24 hours) at 2021 1040  Last data filed at 2021 0654  Gross per 24 hour   Intake 832.23 ml   Output 100 ml   Net 732.23 ml     Objective:  General Appearance:  Comfortable, in no acute distress and ill-appearing. Vital signs: (most recent): Blood pressure (!) 161/102, pulse 80, temperature 98.1 °F (36.7 °C), temperature source Oral, resp. rate 18, height 5' 6\" (1.676 m), weight 226 lb 6.4 oz (102.7 kg), SpO2 99 %, not currently breastfeeding. Vital signs are normal.  No fever. Output: Producing urine and producing stool. HEENT: Normal HEENT exam.    Lungs:  Normal effort and normal respiratory rate. Breath sounds clear to auscultation. She is not in respiratory distress. No rales, wheezes or rhonchi. Heart: Tachycardia. Regular rhythm. S1 normal and S2 normal.    Abdomen: Abdomen is soft. Bowel sounds are normal.   There is no abdominal tenderness. Extremities: Normal range of motion. There is no dependent edema or local swelling.  (Mild tenderness and swelling post calf and post knee right )  Pulses: Distal pulses are intact. There are no decreased pulses. Neurological: Patient is alert and oriented to person, place and time. Pupils:  Pupils are equal, round, and reactive to light. Skin:  Warm and dry. No ulceration. Labs/Imaging/Diagnostics    Labs:  CBC:  Recent Labs     12/16/21 0035   WBC 6.7   RBC 4.94   HGB 11.6*   HCT 37.4   MCV 75.7*   RDW 17.1*        CHEMISTRIES:  Recent Labs     12/16/21 0519      K 3.6*      CO2 21   BUN 9   CREATININE 0.74   GLUCOSE 112*     PT/INR:  Recent Labs     12/16/21 0035   PROTIME 13.7   INR 1.1     APTT:  Recent Labs     12/16/21 0519   APTT 78.6*     LIVER PROFILE:No results for input(s): AST, ALT, BILIDIR, BILITOT, ALKPHOS in the last 72 hours. Imaging Last 24 Hours:  XR KNEE RIGHT (3 VIEWS)    Result Date: 12/15/2021  EXAMINATION: THREE XRAY VIEWS OF THE RIGHT KNEE 12/15/2021 11:09 am COMPARISON: None. HISTORY: ORDERING SYSTEM PROVIDED HISTORY: posterior knee pain TECHNOLOGIST PROVIDED HISTORY: posterior knee pain Reason for Exam: right knee pain FINDINGS: No evidence of acute fracture or dislocation. No focal osseous lesion. No evidence of joint effusion. No focal soft tissue abnormality. No acute abnormality of the knee. VL Lower Extremity Venous Right    Result Date: 12/15/2021    [facility]  Vascular Lower Extremities DVT Study Procedure   Patient Name   Gregorio Vidal.     Date of Study           12/15/2021                 CARLTON PAVON   Date of Birth  1975   Gender                  Female   Age            42 year(s)   Race                    Black   Room Number    05   Corporate ID # [id number]   Patient Acct # [de-identified]   MR #           [id number]      [facility]   Accession #    [id number]   Interpreting Physician  [address] [place] [address]   Referring                   Referring Physician  Nurse  Practitioner  Procedure Type of Study:   Veins: Lower Extremities DVT Study, Venous Scan Lower Right. Indications for Study:Hx of DVT and Pain, leg. Patient Status:ER. Technical Quality:Adequate visualization.   - Critical Result: In er. . Comments:rt knee pain hx of rt popliteal DVT previous scan 10/25/2021 normal  Conclusions   Summary   Acute deep vein thrombosis of the right leg involving the popliteal vein. Signature   ----------------------------------------------------------------  Electronically signed by Kiel Ahumada(Sonographer) on  12/15/2021 02:30 PM  ----------------------------------------------------------------   ----------------------------------------------------------------  Electronically signed by Bronwen Chang Reyes,Arthur(Interpreting  physician) on 12/15/2021 03:42 PM  ----------------------------------------------------------------  Findings:   Right Impression:  The common femoral, femoral, and tibial veins demonstrate normal  compressibility and augmentation. The popliteal vein is non compressible with echoes. Velocities are measured in cm/s ; Diameters are measured in cm Right Lower Extremities DVT Study Measurements Right 2D Measurements +------------------------------------+----------+---------------+----------+ ! Location                            ! Visualized! Compressibility! Thrombosis! +------------------------------------+----------+---------------+----------+ ! Common Femoral                      !Yes       ! Yes            ! None      ! +------------------------------------+----------+---------------+----------+ ! Prox Femoral                        !Yes       ! Yes            ! None      ! +------------------------------------+----------+---------------+----------+ ! Mid Femoral                         !Yes       ! Yes            ! None      ! +------------------------------------+----------+---------------+----------+ ! Dist Femoral                        !Yes       ! Yes            ! None      ! +------------------------------------+----------+---------------+----------+ ! Deep Femoral                        !Yes       ! Yes !None      ! +------------------------------------+----------+---------------+----------+ ! Popliteal                           !Yes       ! No             !Acute     ! +------------------------------------+----------+---------------+----------+ ! Sapheno Femoral Junction            ! Yes       ! Yes            ! None      ! +------------------------------------+----------+---------------+----------+ ! PTV                                 ! Yes       ! Yes            ! None      ! +------------------------------------+----------+---------------+----------+ ! Peroneal                            !Yes       ! Partial        !None      ! +------------------------------------+----------+---------------+----------+ ! Gastroc                             ! Yes       ! Yes            ! None      ! +------------------------------------+----------+---------------+----------+ ! GSV Thigh                           ! Yes       ! Yes            ! None      ! +------------------------------------+----------+---------------+----------+ ! GSV Knee                            ! Yes       ! Yes            ! None      ! +------------------------------------+----------+---------------+----------+ ! GSV Ankle                           ! Yes       ! Yes            ! None      ! +------------------------------------+----------+---------------+----------+ ! SSV                                 ! Yes       ! Yes            ! None      ! +------------------------------------+----------+---------------+----------+ Right Doppler Measurements +---------------------------+------+------+--------------------------------+ ! Location                   ! Signal!Reflux! Reflux (msec)                   ! +---------------------------+------+------+--------------------------------+ ! Common Femoral             !Phasic! No    !                                ! +---------------------------+------+------+--------------------------------+ ! Prox Femoral               !Phasic! No    ! ! +---------------------------+------+------+--------------------------------+ ! Popliteal                  !Phasic! No    !                                ! +---------------------------+------+------+--------------------------------+    Assessment//Plan           Hospital Problems           Last Modified POA    Tobacco abuse disorder 12/15/2021 Yes    Seizures ([facility] 12/15/2021 Yes    Overview Signed 11/14/2017  6:37 PM by Mary Ann Castro MD     History of a seizure disorder since January 2015 when she was admitted to the hospital with hypertensive urgency. She describes intermittent episodes of seizures which begin with an aura of tingling and numbness followed by eyes rolling to the back of the head and twitching in all extremities. There is no bowel or bladder incontinence nor tongue biting. She is amnestic for the episodes and they occur almost once or twice a month. A breakthrough seizure in May 2017 prompted a visit to the [facility] emergency department where she was found to have a subtherapeutic Keppra level. Evaluation included a CT brain scan and CT of the cervical spine which were negative. Seizures are exacerbated by stress and anxiety and there is a question of nonepileptic seizures.   An EEG was normal.  Consider an LTME.     Treated with Keppra 500 mg twice a day but dose boosted in July 2017 to 500 mg AM and 1000 mg PM.         Bipolar 1 disorder ([facility] 12/15/2021 Yes    Chronic idiopathic constipation 12/15/2021 Yes    Long term current use of anticoagulant therapy 12/15/2021 Yes    Asthma 12/15/2021 Yes    History of pulmonary embolism 12/15/2021 Yes    Essential hypertension 12/15/2021 Yes    Hyperlipidemia 12/15/2021 Yes    Tobacco abuse counseling 12/15/2021 Yes    Borderline diabetes mellitus 12/15/2021 Yes    Chronic obstructive lung disease ([facility] 12/15/2021 Yes    Deep vein [facility] (DVT) [facility] proximal [facility] 12/15/2021 Yes    Obesity 12/15/2021 Yes Erosive gastritis 12/15/2021 Yes    Dental infection 12/15/2021 Yes    DVT (deep vein thrombosis) in pregnancy 12/15/2021 Yes    Deep vein thrombosis (DVT) with pulmonary embolism present on admission [facility]) 12/15/2021 Yes        Assessment:    Condition: In serious condition. Unchanged. (Acute DVT RLE  Failure of Oral anticoagulation   Uncontrolled HTN  H/O PE in the past  Seizure disorder  Morbid Obesity  Tobacco abuse disorder  H/O Erosive gastritis  Hyperlipidemia  COPD  Dental Infection -right upper jaw  ). Plan: Activity Plan: bed rest x 24 hours. Start/continue incentive spirometry. Consults: pulmonology (Hematology). Advance diet as tolerated and restricted diet. Administer medications as ordered. (Cont IV Heparin -  Pulm to monitor  Increase losartan dose to 100 mg as BP is OOC despite multiple meds  Home meds as reconciled  GI prophylaxis  Daily labs  There is concern about patient being compliant on eliquis - she admits to missing a dose here and there but not regularly. If in fact she has failed Eliquis as OP anticoagulation therapy, she will have to be switched to a diff med. Coumadin will be a difficult choice as patient may not follow up with INR regularly. Will see if hematology can put some light to why she did fail eliquis if in fact she has been compliant with the meds. ).        Electronically signed by Anat Leggett MD on 12/16/21 at 10:40 AM EST

## 2021-12-16 NOTE — RT PROTOCOL NOTE
RT Inhaler-Nebulizer Bronchodilator Protocol Note    There is a bronchodilator order in the chart from a provider indicating to follow the RT Bronchodilator Protocol and there is an Initiate RT Inhaler-Nebulizer Bronchodilator Protocol order as well (see protocol at bottom of note). CXR Findings:  No results found. The findings from the last RT Protocol Assessment were as follows:   History Pulmonary Disease: Smoker 15 pack years or more  Respiratory Pattern: Regular pattern and RR 12-20 bpm  Breath Sounds: Clear breath sounds  Cough: Strong, spontaneous, non-productive  Indication for Bronchodilator Therapy:    Bronchodilator Assessment Score: 1    Aerosolized bronchodilator medication orders have been revised according to the RT Inhaler-Nebulizer Bronchodilator Protocol below. Respiratory Therapist to perform RT Therapy Protocol Assessment initially then follow the protocol. Repeat RT Therapy Protocol Assessment PRN for score 0-3 or on second treatment, BID, and PRN for scores above 3. No Indications  adjust the frequency to every 6 hours PRN wheezing or bronchospasm, if no treatments needed after 48 hours then discontinue using Per Protocol order mode. If indication present, adjust the RT bronchodilator orders based on the Bronchodilator Assessment Score as indicated below. Use Inhaler orders unless patient has one or more of the following: on home nebulizer, not able to hold breath for 10 seconds, is not alert and oriented, cannot activate and use MDI correctly, or respiratory rate 25 breaths per minute or more, then use the equivalent nebulizer order(s) with same Frequency and PRN reasons based on the score. If a patient is on this medication at home then do not decrease Frequency below that used at home.     0-3  enter or revise RT bronchodilator order(s) to equivalent RT Bronchodilator order with Frequency of every 4 hours PRN for wheezing or increased work of breathing using Per Protocol order mode. 4-6  enter or revise RT Bronchodilator order(s) to two equivalent RT bronchodilator orders with one order with BID Frequency and one order with Frequency of every 4 hours PRN wheezing or increased work of breathing using Per Protocol order mode. 7-10  enter or revise RT Bronchodilator order(s) to two equivalent RT bronchodilator orders with one order with TID Frequency and one order with Frequency of every 4 hours PRN wheezing or increased work of breathing using Per Protocol order mode. 11-13  enter or revise RT Bronchodilator order(s) to one equivalent RT bronchodilator order with QID Frequency and an Albuterol order with Frequency of every 4 hours PRN wheezing or increased work of breathing using Per Protocol order mode. Greater than 13  enter or revise RT Bronchodilator order(s) to one equivalent RT bronchodilator order with every 4 hours Frequency and an Albuterol order with Frequency of every 2 hours PRN wheezing or increased work of breathing using Per Protocol order mode. RT to enter RT Home Evaluation for COPD & MDI Assessment order using Per Protocol order mode.     Electronically signed by Kal Lozoya RCP on 12/15/2021 at 11:54 PM

## 2021-12-16 NOTE — PLAN OF CARE
Problem: Venous Thromboembolism:  Goal: Will show no signs or symptoms of venous thromboembolism  Description: Will show no signs or symptoms of venous thromboembolism  Outcome: Ongoing  Goal: Absence of signs or symptoms of impaired coagulation  Description: Absence of signs or symptoms of impaired coagulation  Outcome: Ongoing  History of blood clots

## 2021-12-16 NOTE — ACP (ADVANCE CARE PLANNING)
Advance Care Planning     Advance Care Planning Activator (Inpatient)  Conversation Note      Date of ACP Conversation: 12/16/2021     Conversation Conducted with: Patient with Decision Making Capacity    ACP Activator: Constanza Estrada RN        Health Care Decision Maker: self     Current Designated Health Care Decision Maker: self     Click here to complete Healthcare Decision Makers including section of the Healthcare Decision Maker Relationship (ie \"Primary\")  Today we documented Decision Maker(s) consistent with Legal Next of Kin hierarchy. Care Preferences    Ventilation: \"If you were in your present state of health and suddenly became very ill and were unable to breathe on your own, what would your preference be about the use of a ventilator (breathing machine) if it were available to you? \"      Would the patient desire the use of ventilator (breathing machine)?: yes    \"If your health worsens and it becomes clear that your chance of recovery is unlikely, what would your preference be about the use of a ventilator (breathing machine) if it were available to you? \"     Would the patient desire the use of ventilator (breathing machine)?: \"If I'm brain dead let me go\"       Resuscitation  \"CPR works best to restart the heart when there is a sudden event, like a heart attack, in someone who is otherwise healthy. Unfortunately, CPR does not typically restart the heart for people who have serious health conditions or who are very sick. \"    \"In the event your heart stopped as a result of an underlying serious health condition, would you want attempts to be made to restart your heart (answer \"yes\" for attempt to resuscitate) or would you prefer a natural death (answer \"no\" for do not attempt to resuscitate)? \" \"If I'm brain dead let me go\"        [] Yes   [] No   Educated Patient / Decision Maker regarding differences between Advance Directives and portable DNR orders.     Length of ACP Conversation in minutes: 15  Conversation Outcomes:  [x] ACP discussion completed  [] Existing advance directive reviewed with patient; no changes to patient's previously recorded wishes  [] New Advance Directive completed  [] Portable Do Not Rescitate prepared for Provider review and signature  [] POLST/POST/MOLST/MOST prepared for Provider review and signature      Follow-up plan:    [] Schedule follow-up conversation to continue planning  [x] Referred individual to Provider for additional questions/concerns   [] Advised patient/agent/surrogate to review completed ACP document and update if needed with changes in condition, patient preferences or care setting    [] This note routed to one or more involved healthcare providers

## 2021-12-16 NOTE — CONSULTS
Reason for Consult:  Chest Pain  Requesting Physician: Junito Amos MD    CHIEF COMPLAINT:  Right knee pain and facial swelling    History Obtained From:  Patient    HISTORY OF PRESENT ILLNESS:      The patient is a 42 y.o. female with significant past medical history of Asthma, HTN, Chiari 1 Malformation, Bipolar, prior DVT with PE, Obesity. She presents with right knee pain and dental pain and facial swelling. She has been on Eliquis since her prior DVT/PE. Denied CP/SOB on admission. There was some concern for noncompliance of medications. CTA/CTAP are pending. US of right leg showed acute DVT of popliteal veins. Labs showed no acute abnormalities. EKG showed NSR with borderline LVH. Troponins negative. Vitals showed BP up to 181/110 and HR was WNL. Today, she reports a central sharp chest pain. She has been having it for many months. It comes and goes, mostly at rest. Worse with inspiration, coughing or other movements. Nothing makes it better. She does not take anything for it. No exertional component. It is pleuritic, palpable. Denies dizziness. Has some mild LE edema. No palpitations. BP has also been elevated over the day      Past Medical History:    Past Medical History:   Diagnosis Date    Anemia     Asthma     Moderate persistent asthma without complication    Axillary hidradenitis suppurativa 5/6/2020    Bipolar 1 disorder (HCC)     CHF (congestive heart failure) ([facility]     When child was delivered 2005    Chiari I malformation ([facility] [address] 6/21/2017    A MRI of the brain in 2014 was suspicious for pseudotumor cerebri but a spinal tap revealed a normal opening pressure of 16 cm water. An EEG was normal.  A MRI of the thoracic spine in 2015 was normal. Relevant history of cervical myelopathy with an abnormal MRI of the cervical spine in January 2015. A follow-up MRI of the brain in April 2017 was unchanged.   A MRI of the cervical spine in June 2    Chiari I malformation ([facility] [address] 2017    A MRI of the brain in  was suspicious for pseudotumor cerebri but a spinal tap revealed a normal opening pressure of 16 cm water. An EEG was normal.  A MRI of the thoracic spine in  was normal. Relevant history of cervical myelopathy with an abnormal MRI of the cervical spine in 2015. A follow-up MRI of the brain in 2017 was unchanged. A MRI of the cervical spine in     Chronic headache disorder 2017    Deep vein thrombosis (DVT) of lower extremity ([address] 2018    Diverticulitis of sigmoid colon 7/3/2017    DVT of deep femoral vein, bilateral (HCC)     Excessive consumption of soda pop 2021    Excessive consumption of soda pop 2021    Family history of diabetes mellitus 3/22/2016    Family history of malignant neoplasm of breast 3/22/2016    Family history of malignant neoplasm of uterus 3/22/2016    Family history of throat cancer 3/22/2016    FH: breast cancer 3/22/2016    FH: uterine cancer 3/22/2016    GERD (gastroesophageal reflux disease)     History of abnormal uterine bleeding 7/3/2017    History of diabetes mellitus 3/22/2016    History of hysterectomy for benign disease 3/22/2016    History of pulmonary embolism 10/4/2019    History of pulmonary embolism 10/4/2019    Hyperlipidemia     Irritable bowel syndrome     MDRO (multiple drug resistant organisms) resistance 2005    MRSA in  per pt.  Non-compliance with treatment 2017    Obesity     Saddle embolus of pulmonary artery without acute cor pulmonale (HCC)     Seizures ([address]     Last Seizure 18    Suicidal intent     .  Denies 18    Tobacco abuse disorder 3/22/2016     Past Surgical History:    Past Surgical History:   Procedure Laterality Date    ABSCESS DRAINAGE      abdominal abscess RLQ    AXILLARY SURGERY Right 2018    Excision of hidradenitis cysts, right axilla    BREAST LUMPECTOMY Bilateral     BREAST LUMPECTOMY       SECTION      x2    COLONOSCOPY N/A 2021    COLONOSCOPY DIAGNOSTIC performed by Bridget Kaur MD at [facility], DIAGNOSTIC  2014    HERNIA REPAIR      HYSTERECTOMY      LYMPH NODE DISSECTION Left 2019    LEFT AXILLARY HYDRADENITIS EXCISION performed by Tori Sharpe DO at [address] Left 2019    LEFT AXILLARY HYDRADENITIS EXCISION (Left )    [place] EXC SKIN BENIG <5MM TRUNK,ARM,LEG Right 2018    EXCISION HIDRADENITIS RIGHT AXILLA performed by Tori Sharpe DO at [facility]      UPPER GASTROINTESTINAL ENDOSCOPY  2021    EGD BIOPSY performed by Bridget Kaur MD at [facility] Medications:  Prior to Admission medications    Medication Sig Start Date End Date Taking?  Authorizing Provider   apixaban (ELIQUIS) 5 MG TABS tablet take 1 tablet by mouth twice a day 21  Yes Isidra Buck MD   atorvastatin (LIPITOR) 10 MG tablet take 1 tablet by mouth once daily 21  Yes Mirian To MD   losartan (COZAAR) 50 MG tablet take 1 tablet by mouth once daily 21  Yes Mirian To MD   cloNIDine (CATAPRES) 0.1 MG/24HR PTWK apply 1 patch every week as directed 21  Yes Mirian To MD   Lactobacillus (ACIDOPHILUS) CAPS capsule take 1 tablet by mouth twice a day 9/15/21  Yes Historical Provider, MD   levETIRAcetam (KEPPRA) 750 MG tablet Take 750 mg by mouth 2 times daily 21  Yes Historical Provider, MD   fluticasone-salmeterol (ADVAIR) 250-50 MCG/DOSE AEPB Inhale 1 puff into the lungs every 12 hours 10/15/21  Yes Mirian To MD   Cyanocobalamin (B-12) 1000 MCG SUBL Place 1 tablet under the tongue daily 10/15/21  Yes Mirian To MD   folic acid (FOLVITE) 1 MG tablet Take 1 tablet by mouth daily 10/15/21  Yes Mirian To MD   triamterene-hydroCHLOROthiazide [facility]) 37.5-25 MG per capsule take 1 capsule by mouth every morning 9/21/21  Yes Katarina Lombardi MD   omeprazole (PRILOSEC) 20 MG delayed release capsule Take 1 capsule by mouth 2 times daily (before meals) 9/15/21  Yes Kamryn Santana MD   Calcium-Magnesium 200-50 MG TABS Take 1 tablet by mouth daily 9/15/21  Yes Kamryn Santana MD   VRAYLAR 3 MG CAPS capsule take 1 tablet by mouth once daily 4/26/21  Yes Historical Provider, MD   escitalopram (LEXAPRO) 10 MG tablet  5/18/21  Yes Historical Provider, MD   cetirizine (ZYRTEC) 10 MG tablet Take 1 tablet by mouth daily 7/24/20  Yes Katarina Lombardi MD   benztropine (COGENTIN) 0.5 MG tablet take 1 tablet by mouth twice a day 10/7/19  Yes Historical Provider, MD   vitamin D (ERGOCALCIFEROL) 1.25 MG (33911 UT) CAPS capsule Take 1 capsule by mouth once a week 10/15/21   Katarina Lombardi MD   cloNIDine (CATAPRES) 0.1 MG/24HR PTWK apply 1 patch every week as directed 7/19/21   Katarina Lombardi MD   cloNIDine (CATAPRES) 0.1 MG/24HR PTWK apply 1 patch every week as directed 5/24/21   Katarina Lombardi MD     Current Medications:    Current Facility-Administered Medications   Medication Dose Route Frequency Provider Last Rate Last Admin    morphine (PF) injection 2 mg  2 mg IntraVENous Q4H PRN Katarina Lombardi MD        Or    morphine sulfate (PF) injection 4 mg  4 mg IntraVENous Q4H PRN Katarina Lombardi MD   4 mg at 12/16/21 0603    [START ON 12/21/2021] vitamin D (ERGOCALCIFEROL) capsule 50,000 Units  50,000 Units Oral Weekly Katarina Lombardi MD        0.9 % sodium chloride infusion   IntraVENous Continuous Yesenia Scruggs MD 10 mL/hr at 12/16/21 0042 New Bag at 12/16/21 0042    acetaminophen-codeine (TYLENOL #3) 300-30 MG per tablet 1 tablet  1 tablet Oral Q6H PRN Katarina Lombardi MD        atorvastatin (LIPITOR) tablet 10 mg  10 mg Oral Nightly Katarina Lombardi MD   10 mg at 12/15/21 2201    benztropine (COGENTIN) tablet 0.5 mg  0.5 mg Oral BID Katarina Lombardi MD   0.5 mg at 12/15/21 2201    cetirizine (ZYRTEC) tablet 10 mg  10 mg Oral Daily Thais Fairchild MD        cloNIDine (CATAPRES) 0.1 MG/24HR 1 patch  1 patch TransDERmal Weekly Thais Fairchild MD   1 patch at 12/15/21 2336    vitamin B-12 (CYANOCOBALAMIN) tablet 1,000 mcg  1,000 mcg Oral Daily Thais Fairchild MD        escitalopram (LEXAPRO) tablet 10 mg  10 mg Oral Daily Thais Fairchild MD        budesonide-formoterol [facility]) 160-4.5 MCG/ACT inhaler 2 puff  2 puff Inhalation BID Thais Fairchild MD        folic acid (FOLVITE) tablet 1 mg  1 mg Oral Daily Thais Fairchild MD        lactobacillus [facility]) tablet 1 tablet  1 tablet Oral BID Thais Fairchild MD   1 tablet at 12/15/21 2201    levETIRAcetam (KEPPRA) tablet 750 mg  750 mg Oral BID Thais Fairchild MD   750 mg at 12/15/21 2201    losartan (COZAAR) tablet 50 mg  50 mg Oral Daily Thais Fairchild MD        pantoprazole (PROTONIX) tablet 40 mg  40 mg Oral QAM AC Thais Fairchild MD   40 mg at 12/16/21 0618    triamterene-hydroCHLOROthiazide (MAXZIDE-25) 37.5-25 MG per tablet 1 tablet  1 tablet Oral QAM Thais Fairchild MD        cariprazine hcl (VRAYLAR) capsule 3 mg  3 mg Oral Daily Thais Fairchild MD        albuterol sulfate  (90 Base) MCG/ACT inhaler 2 puff  2 puff Inhalation Q6H PRN Thais Fairchild MD        azithromycin [facility]) tablet 250 mg  250 mg Oral Daily Thais Fairchild MD   250 mg at 12/15/21 1827    heparin (porcine) injection 8,420 Units  80 Units/kg IntraVENous PRN Essie Tovar MD        heparin (porcine) injection 4,210 Units  40 Units/kg IntraVENous PRN Essie Tovar MD        heparin 25,000 units in dextrose 5% 250 mL (premix) infusion  5-30 Units/kg/hr IntraVENous Continuous Essie Tovar MD 18.9 mL/hr at 12/16/21 0044 18 Units/kg/hr at 12/16/21 0044     Allergies:  Penicillins, Amoxil [amoxicillin], Bee pollen, Bee venom, Ciprofloxacin, Clindamycin/lincomycin, Flonase [fluticasone], Keflex [cephalexin], Levaquin [levofloxacin in d5w], Levofloxacin, Macrobid [nitrofurantoin monohyd macro], Nitrofurantoin, Sulfa antibiotics, and Sulfur    Social History:    Social History     Socioeconomic History    Marital status: Single     Spouse name: Not on file    Number of children: Not on file    Years of education: Not on file    Highest education level: Not on file   Occupational History    Not on file   Tobacco Use    Smoking status: Current Every Day Smoker     Packs/day: 1.00     Years: 26.00     Pack years: 26.00     Types: Cigarettes     Start date: 0    Smokeless tobacco: Never Used    Tobacco comment: wants help- adviced about smoking cessation plans   Vaping Use    Vaping Use: Never used   Substance and Sexual Activity    Alcohol use: Yes     Comment: occ    Drug use: Yes     Types: Marijuana Charmayne Stai)    Sexual activity: Yes     Partners: Male   Other Topics Concern    Not on file   Social History Narrative    Not on file     Social Determinants of Health     Financial Resource Strain: Low Risk     Difficulty of Paying Living Expenses: Not hard at all   Food Insecurity: No Food Insecurity    Worried About Running Out of Food in the Last Year: Never true    Alesia of Food in the Last Year: Never true   Transportation Needs:     Lack of Transportation (Medical): Not on file    Lack of Transportation (Non-Medical):  Not on file   Physical Activity:     Days of Exercise per Week: Not on file    Minutes of Exercise per Session: Not on file   Stress:     Feeling of Stress : Not on file   Social Connections:     Frequency of Communication with Friends and Family: Not on file    Frequency of Social Gatherings with Friends and Family: Not on file    Attends Orthodoxy Services: Not on file    Active Member of Clubs or Organizations: Not on file    Attends Club or Organization Meetings: Not on file    Marital Status: Not on file   Intimate Partner Violence:     Fear of Current or Ex-Partner: Not on file  Emotionally Abused: Not on file    Physically Abused: Not on file    Sexually Abused: Not on file   Housing Stability:     Unable to Pay for Housing in the Last Year: Not on file    Number of Places Lived in the Last Year: Not on file    Unstable Housing in the Last Year: Not on file     Family History:   Family History   Problem Relation Age of Onset    Asthma Mother     High Blood Pressure Mother     Mental Illness Mother     Stroke Other     Other Sister         blood clotting disorder, ms    Depression Sister     Cancer Maternal Grandmother     Diabetes Maternal Grandmother     Cancer Maternal Aunt     Diabetes Maternal Grandfather        · REVIEW OF SYSTEMS   CONSTITUTIONAL: negative  EYES:  negative  HEENT:  negative  RESPIRATORY: positive for  dry cough   CARDIOVASCULAR:  positive for  chest pain  GASTROINTESTINAL:  negative  GENITOURINARY:  negative  INTEGUMENT/BREAST:  negative  HEMATOLOGIC/LYMPHATIC:  negative  ALLERGIC/IMMUNOLOGIC:  negative  ENDOCRINE:  negative  MUSCULOSKELETAL:  positive for  Leg swelling  NEUROLOGICAL:  negative  BEHAVIOR/PSYCH:  negative    PHYSICAL EXAM:    Vitals:    VITALS:  BP (!) 161/102   Pulse 80   Temp 98.1 °F (36.7 °C) (Oral)   Resp 18   Ht 5' 6\" (1.676 m)   Wt 226 lb 6.4 oz (102.7 kg)   SpO2 90%   BMI 36.54 kg/m²   24HR INTAKE/OUTPUT:      Intake/Output Summary (Last 24 hours) at 12/16/2021 0859  Last data filed at 12/16/2021 0654  Gross per 24 hour   Intake 832.23 ml   Output 100 ml   Net 732.23 ml       CONSTITUTIONAL:  awake, alert, cooperative, no apparent distress, and appears stated age  EYES: Pupils equal, round and reactive to light, extra ocular muscles intact, sclera clear, conjunctiva normal  ENT:  normocepalic, without obvious abnormality  LUNGS: Non-labored, good air exchange, clear to auscultation bilaterally, no crackles or wheezing  CARDIOVASCULAR:  Normal apical impulse, regular rate and rhythm, normal S1 and S2, no S3 or S4, and no murmur noted, no rub.  dorsalis pedis, posterior tibial and bilateralpresent 2+  ABDOMEN:  No scars, normal bowel sounds, soft, non-distended, non-tender, no masses palpated, no hepatosplenomegally, no bruit. MUSCULOSKELETAL:  there is no redness, warmth, or swelling of the joints   No leg edema. Palpable central sternal chest discomfort, reproduces described pain, very tender  NEUROLOGIC:  Awake, alert, oriented to name, place and time. SKIN:  no bruising or bleeding, normal skin color, texture, turgor and no jaundice    DATA:   ECG:  NSR, borderline LVH      ECHO: 2008   Left ventricle is normal in size and wall thickness. Global left ventricular systolic function is normal with an estimated  ejection fraction of 65 % . No obvious wall motion abnormality seen. Mild mitral regurgitation. Mild tricuspid regurgitation. No pulmonary hypertension. No significant pericardial effusion is seen.     Stress Test:  none  Angiography:  none    Cardiology Labs:  Recent Labs     12/16/21  0519   TROPHS <6   TROPONINT NOT REPORTED     Warfarin PT/INR:  Lab Results   Component Value Date    PROTIME 13.7 12/16/2021    INR 1.1 12/16/2021     CBC:  Lab Results   Component Value Date    WBC 6.7 12/16/2021    RBC 4.94 12/16/2021    HGB 11.6 12/16/2021    HCT 37.4 12/16/2021    MCV 75.7 12/16/2021    MCH 23.5 12/16/2021    MCHC 31.0 12/16/2021    RDW 17.1 12/16/2021     12/16/2021    MPV 10.4 12/16/2021     CMP:  Lab Results   Component Value Date     10/25/2021    K 3.9 10/25/2021     10/25/2021    CO2 21 10/25/2021    BUN 9 10/25/2021    CREATININE 0.87 10/25/2021    GFRAA >60 10/25/2021    LABGLOM >60 10/25/2021    GLUCOSE 104 10/25/2021    CALCIUM 8.6 10/25/2021     Magnesium:    Lab Results   Component Value Date    MG 1.9 11/04/2019     PTT:    Lab Results   Component Value Date    APTT 78.6 12/16/2021     TSH:    Lab Results   Component Value Date    TSH 0.84 04/28/2021     BNP: No results for input(s): BNP, PROBNP in the last 72 hours. BMP:  Lab Results   Component Value Date     10/25/2021    K 3.9 10/25/2021     10/25/2021    CO2 21 10/25/2021    BUN 9 10/25/2021    LABALBU 3.6 05/26/2021    CREATININE 0.87 10/25/2021    CALCIUM 8.6 10/25/2021    GFRAA >60 10/25/2021    LABGLOM >60 10/25/2021    GLUCOSE 104 10/25/2021     LIVER PROFILE:No results for input(s): AST, ALT, LABALBU, ALKPHOS, BILITOT, BILIDIR, IBILI, PROT, GLOB, ALBUMIN in the last 72 hours. FLP:    Lab Results   Component Value Date    CHOL 177 04/28/2021    TRIG 68 04/28/2021    HDL 59 04/28/2021    LDLCHOLESTEROL 104 04/28/2021         IMPRESSION  · Acute Right Popliteal Vein DVT  · Musculoskeletal Chest Pain  · Prior DVT/PE (on Eliquis)  · Medication Noncompliance  · Essential Hypertension        RECOMMENDATIONS:     1. Pt describes reproducible sharp chest pain with normal labs, EKG, tele and prior cardiac testing. It is tender to palpate and has been going on for months, mostly at rest. No concern for cardiac etiology. No further testing  2. Bps have been elevated. Agree with increase in losartan dose. Nursing states primary team have a relationship with patient and wish to titrate meds  3. Can use Hyralazine 10mg IV Q6 PRN as needed for extreme BP elevations >009 systolic. Treat pain or other causes of high BP also  4. Cont. Treatment of DVT and workup for possible PE.   5. Medication noncompliance to be address as ongoing discussion with patient as OP  6.  Please call back for cardiology assistance if there are further questions or concerns    Discussed with patient and nursing    Electronically signed by Hernesto Caceres MD on 12/16/2021 at 8:59 AM     CC: Chino Tirado MD

## 2021-12-16 NOTE — ED NOTES
RN spoke w/MD Jesus, updated that pt states R side chest pain that is worse w/inspiration; MD states issue w/digitally signing orders. Niurka España MD agrees to put in pain med orders.       Eliu Contreras RN  12/15/21 [id number]

## 2021-12-17 VITALS
TEMPERATURE: 97.7 F | OXYGEN SATURATION: 92 % | BODY MASS INDEX: 36.38 KG/M2 | RESPIRATION RATE: 16 BRPM | WEIGHT: 226.4 LBS | HEIGHT: 66 IN | HEART RATE: 108 BPM | SYSTOLIC BLOOD PRESSURE: 131 MMHG | DIASTOLIC BLOOD PRESSURE: 93 MMHG

## 2021-12-17 LAB
HCT VFR BLD CALC: 37.2 % (ref 36.3–47.1)
HEMOGLOBIN: 11.8 G/DL (ref 11.9–15.1)
MCH RBC QN AUTO: 23.3 PG (ref 25.2–33.5)
MCHC RBC AUTO-ENTMCNC: 31.7 G/DL (ref 28.4–34.8)
MCV RBC AUTO: 73.5 FL (ref 82.6–102.9)
NRBC AUTOMATED: 0 PER 100 WBC
PARTIAL THROMBOPLASTIN TIME: 118.1 SEC (ref 23.9–33.8)
PARTIAL THROMBOPLASTIN TIME: 36.5 SEC (ref 23.9–33.8)
PDW BLD-RTO: 16 % (ref 11.8–14.4)
PLATELET # BLD: 245 K/UL (ref 138–453)
PMV BLD AUTO: 10.3 FL (ref 8.1–13.5)
RBC # BLD: 5.06 M/UL (ref 3.95–5.11)
SURGICAL PATHOLOGY REPORT: NORMAL
WBC # BLD: 6.7 K/UL (ref 3.5–11.3)

## 2021-12-17 PROCEDURE — 85730 THROMBOPLASTIN TIME PARTIAL: CPT

## 2021-12-17 PROCEDURE — 99232 SBSQ HOSP IP/OBS MODERATE 35: CPT | Performed by: INTERNAL MEDICINE

## 2021-12-17 PROCEDURE — 6360000002 HC RX W HCPCS: Performed by: STUDENT IN AN ORGANIZED HEALTH CARE EDUCATION/TRAINING PROGRAM

## 2021-12-17 PROCEDURE — 94761 N-INVAS EAR/PLS OXIMETRY MLT: CPT

## 2021-12-17 PROCEDURE — 99233 SBSQ HOSP IP/OBS HIGH 50: CPT | Performed by: FAMILY MEDICINE

## 2021-12-17 PROCEDURE — 36415 COLL VENOUS BLD VENIPUNCTURE: CPT

## 2021-12-17 PROCEDURE — 85027 COMPLETE CBC AUTOMATED: CPT

## 2021-12-17 PROCEDURE — 94640 AIRWAY INHALATION TREATMENT: CPT

## 2021-12-17 PROCEDURE — 2580000003 HC RX 258: Performed by: INTERNAL MEDICINE

## 2021-12-17 PROCEDURE — 6370000000 HC RX 637 (ALT 250 FOR IP): Performed by: FAMILY MEDICINE

## 2021-12-17 PROCEDURE — 6360000002 HC RX W HCPCS: Performed by: INTERNAL MEDICINE

## 2021-12-17 PROCEDURE — 6370000000 HC RX 637 (ALT 250 FOR IP): Performed by: NURSE PRACTITIONER

## 2021-12-17 RX ORDER — DOCUSATE SODIUM 100 MG/1
200 CAPSULE, LIQUID FILLED ORAL DAILY PRN
Qty: 60 CAPSULE | Refills: 0 | Status: SHIPPED | OUTPATIENT
Start: 2021-12-17 | End: 2022-06-03

## 2021-12-17 RX ORDER — HYDRALAZINE HYDROCHLORIDE 10 MG/1
10 TABLET, FILM COATED ORAL 3 TIMES DAILY PRN
Qty: 90 TABLET | Refills: 3 | Status: SHIPPED | OUTPATIENT
Start: 2021-12-17 | End: 2022-03-24

## 2021-12-17 RX ORDER — AZITHROMYCIN 250 MG/1
250 TABLET, FILM COATED ORAL DAILY
Qty: 3 TABLET | Refills: 0 | Status: SHIPPED | OUTPATIENT
Start: 2021-12-18 | End: 2021-12-21

## 2021-12-17 RX ORDER — LOSARTAN POTASSIUM 100 MG/1
100 TABLET ORAL DAILY
Qty: 30 TABLET | Refills: 3 | Status: SHIPPED | OUTPATIENT
Start: 2021-12-18 | End: 2022-04-11

## 2021-12-17 RX ORDER — ALBUTEROL SULFATE 90 UG/1
2 AEROSOL, METERED RESPIRATORY (INHALATION) EVERY 6 HOURS PRN
Qty: 18 G | Refills: 3 | Status: SHIPPED | OUTPATIENT
Start: 2021-12-17

## 2021-12-17 RX ADMIN — Medication 18 UNITS/KG/HR: at 02:45

## 2021-12-17 RX ADMIN — ESCITALOPRAM OXALATE 10 MG: 10 TABLET ORAL at 08:34

## 2021-12-17 RX ADMIN — PROBIOTIC PRODUCT - TAB 1 TABLET: TAB at 08:35

## 2021-12-17 RX ADMIN — PANTOPRAZOLE SODIUM 40 MG: 40 TABLET, DELAYED RELEASE ORAL at 05:56

## 2021-12-17 RX ADMIN — ACETAMINOPHEN AND CODEINE PHOSPHATE 1 TABLET: 300; 30 TABLET ORAL at 13:40

## 2021-12-17 RX ADMIN — HYDRALAZINE HYDROCHLORIDE 10 MG: 20 INJECTION INTRAMUSCULAR; INTRAVENOUS at 13:05

## 2021-12-17 RX ADMIN — BUDESONIDE AND FORMOTEROL FUMARATE DIHYDRATE 2 PUFF: 160; 4.5 AEROSOL RESPIRATORY (INHALATION) at 10:37

## 2021-12-17 RX ADMIN — LOSARTAN POTASSIUM 100 MG: 100 TABLET, FILM COATED ORAL at 08:34

## 2021-12-17 RX ADMIN — LEVETIRACETAM 750 MG: 750 TABLET ORAL at 08:34

## 2021-12-17 RX ADMIN — SODIUM CHLORIDE, PRESERVATIVE FREE 10 ML: 5 INJECTION INTRAVENOUS at 08:35

## 2021-12-17 RX ADMIN — TRIAMTERENE AND HYDROCHLOROTHIAZIDE 1 TABLET: 37.5; 25 TABLET ORAL at 08:34

## 2021-12-17 RX ADMIN — CETIRIZINE HYDROCHLORIDE 10 MG: 10 TABLET, FILM COATED ORAL at 08:35

## 2021-12-17 RX ADMIN — CARIPRAZINE 3 MG: 1.5 CAPSULE, GELATIN COATED ORAL at 08:35

## 2021-12-17 RX ADMIN — BENZTROPINE MESYLATE 0.5 MG: 1 TABLET ORAL at 08:36

## 2021-12-17 RX ADMIN — AZITHROMYCIN MONOHYDRATE 250 MG: 250 TABLET ORAL at 08:34

## 2021-12-17 RX ADMIN — RIVAROXABAN 15 MG: 15 TABLET, FILM COATED ORAL at 11:23

## 2021-12-17 RX ADMIN — CYANOCOBALAMIN TAB 1000 MCG 1000 MCG: 1000 TAB at 08:34

## 2021-12-17 RX ADMIN — FOLIC ACID 1 MG: 1 TABLET ORAL at 08:35

## 2021-12-17 ASSESSMENT — ENCOUNTER SYMPTOMS
ANAL BLEEDING: 0
BACK PAIN: 0
EYE PAIN: 0
EYE REDNESS: 0
VOICE CHANGE: 0
CHEST TIGHTNESS: 0
ABDOMINAL PAIN: 0
COUGH: 0
SINUS PRESSURE: 0
COLOR CHANGE: 0
DIARRHEA: 0
CONSTIPATION: 0
BLOOD IN STOOL: 0
NAUSEA: 0
SHORTNESS OF BREATH: 0
VOMITING: 0
EYE DISCHARGE: 0
RECTAL PAIN: 0
ABDOMINAL DISTENTION: 0
TROUBLE SWALLOWING: 0

## 2021-12-17 ASSESSMENT — PAIN DESCRIPTION - LOCATION: LOCATION: FACE;LEG

## 2021-12-17 ASSESSMENT — PAIN SCALES - GENERAL
PAINLEVEL_OUTOF10: 6
PAINLEVEL_OUTOF10: 4
PAINLEVEL_OUTOF10: 0
PAINLEVEL_OUTOF10: 8
PAINLEVEL_OUTOF10: 0

## 2021-12-17 ASSESSMENT — PAIN DESCRIPTION - ORIENTATION: ORIENTATION: RIGHT

## 2021-12-17 ASSESSMENT — PAIN DESCRIPTION - DESCRIPTORS: DESCRIPTORS: CONSTANT;RADIATING

## 2021-12-17 NOTE — PROGRESS NOTES
CLINICAL PHARMACY NOTE: MEDS TO BEDS    Total # of Prescriptions Filled: 1   The following medications were delivered to the patient:  · [address]    Additional Documentation:

## 2021-12-17 NOTE — PROGRESS NOTES
and left pulmonary arteries. Past Medical History:   has a past medical history of Anemia, Asthma, Axillary hidradenitis suppurativa, Bipolar 1 disorder ([facility] CHF (congestive heart failure) ([facility] Chiari I malformation ([facility] Chiari I malformation ([facility] Chronic headache disorder, Deep vein thrombosis (DVT) of lower extremity ([facility] Diverticulitis of sigmoid colon, DVT of deep femoral vein, bilateral (HCC), Excessive consumption of soda pop, Excessive consumption of soda pop, Family history of diabetes mellitus, Family history of malignant neoplasm of breast, Family history of malignant neoplasm of uterus, Family history of throat cancer, FH: breast cancer, FH: uterine cancer, GERD (gastroesophageal reflux disease), History of abnormal uterine bleeding, History of diabetes mellitus, History of hysterectomy for benign disease, History of pulmonary embolism, History of pulmonary embolism, Hyperlipidemia, Irritable bowel syndrome, MDRO (multiple drug resistant organisms) resistance, Non-compliance with treatment, Obesity, Saddle embolus of pulmonary artery without acute cor pulmonale ([facility] [address] Seizures ([facility] Suicidal intent, and Tobacco abuse disorder. Past Surgical History:   has a past surgical history that includes  section; Umbilical hernia repair; Endometrial ablation; Hysterectomy; Tonsillectomy; Tubal ligation; Breast lumpectomy (Bilateral); Abscess Drainage; hernia repair; Breast lumpectomy; Axillary Surgery (Right, 2018); pr exc skin benig <5mm trunk,arm,leg (Right, 2018); Tonsillectomy and adenoidectomy (); Endoscopy, colon, diagnostic (); other surgical history (Left, 2019); lymph node dissection (Left, 2019); Colonoscopy (N/A, 2021); and Upper gastrointestinal endoscopy (2021). Medications:    Prior to Admission medications    Medication Sig Start Date End Date Taking?  Authorizing Provider   apixaban (ELIQUIS) 5 MG TABS tablet take 1 tablet by mouth twice a day 11/30/21  Yes Jacques Green MD   atorvastatin (LIPITOR) 10 MG tablet take 1 tablet by mouth once daily 11/14/21  Yes Yue Mcconnell MD   losartan (COZAAR) 50 MG tablet take 1 tablet by mouth once daily 11/14/21  Yes Yue Mcconnell MD   cloNIDine (CATAPRES) 0.1 MG/24HR PTWK apply 1 patch every week as directed 11/8/21  Yes Yue Mcconnell MD   Lactobacillus (ACIDOPHILUS) CAPS capsule take 1 tablet by mouth twice a day 9/15/21  Yes Historical Provider, MD   levETIRAcetam (KEPPRA) 750 MG tablet Take 750 mg by mouth 2 times daily 8/5/21  Yes Historical Provider, MD   fluticasone-salmeterol (ADVAIR) 250-50 MCG/DOSE AEPB Inhale 1 puff into the lungs every 12 hours 10/15/21  Yes Yue Mcconnell MD   Cyanocobalamin (B-12) 1000 MCG SUBL Place 1 tablet under the tongue daily 10/15/21  Yes Yue Mcconnell MD   folic acid (FOLVITE) 1 MG tablet Take 1 tablet by mouth daily 10/15/21  Yes Yue Mcconnell MD   triamterene-hydroCHLOROthiazide (DYAZIDE) 37.5-25 MG per capsule take 1 capsule by mouth every morning 9/21/21  Yes Yue Mcconnell MD   omeprazole (PRILOSEC) 20 MG delayed release capsule Take 1 capsule by mouth 2 times daily (before meals) 9/15/21  Yes Yeison Turner MD   Calcium-Magnesium 200-50 MG TABS Take 1 tablet by mouth daily 9/15/21  Yes Yeison Turner MD   VRAYLAR 3 MG CAPS capsule take 1 tablet by mouth once daily 4/26/21  Yes Historical Provider, MD   escitalopram (LEXAPRO) 10 MG tablet  5/18/21  Yes Historical Provider, MD   cetirizine (ZYRTEC) 10 MG tablet Take 1 tablet by mouth daily 7/24/20  Yes Yue Mcconnell MD   benztropine (COGENTIN) 0.5 MG tablet take 1 tablet by mouth twice a day 10/7/19  Yes Historical Provider, MD   vitamin D (ERGOCALCIFEROL) 1.25 MG (17359 UT) CAPS capsule Take 1 capsule by mouth once a week 10/15/21   Yue Mcconnell MD   cloNIDine (CATAPRES) 0.1 MG/24HR PTWK apply 1 patch every week as directed 7/19/21   Yue Mcconnell MD   cloNIDine (CATAPRES) 0.1 MG/24HR [facility] apply 1 patch every week as directed 5/24/21   Saúl Horta MD     Current Facility-Administered Medications   Medication Dose Route Frequency Provider Last Rate Last Admin    morphine (PF) injection 2 mg  2 mg IntraVENous Q4H PRN Saúl Horta MD   2 mg at 12/16/21 1507    Or    morphine sulfate (PF) injection 4 mg  4 mg IntraVENous Q4H PRN Saúl Horta MD   4 mg at 12/16/21 2307    [START ON 12/21/2021] vitamin D (ERGOCALCIFEROL) capsule 50,000 Units  50,000 Units Oral Weekly Saúl Horta MD        0.9 % sodium chloride infusion   IntraVENous Continuous Ene Cardenas MD 10 mL/hr at 12/16/21 1845 Rate Verify at 12/16/21 1845    acetaminophen-codeine (TYLENOL #3) 300-30 MG per tablet 1 tablet  1 tablet Oral Q6H PRN Saúl Horta MD   1 tablet at 12/16/21 9337    losartan (COZAAR) tablet 100 mg  100 mg Oral Daily Saúl Horta MD        docusate sodium (COLACE) capsule 200 mg  200 mg Oral Daily PRN Saúl Horta MD        hydrALAZINE (APRESOLINE) injection 10 mg  10 mg IntraVENous Q6H PRN Tamika Gilman MD        0.9 % sodium chloride bolus  80 mL IntraVENous Once Ene Cardenas MD        sodium chloride flush 0.9 % injection 10 mL  10 mL IntraVENous PRN Ene Cardenas MD   10 mL at 12/16/21 1611    atorvastatin (LIPITOR) tablet 10 mg  10 mg Oral Nightly Saúl Horta MD   10 mg at 12/16/21 2039    benztropine (COGENTIN) tablet 0.5 mg  0.5 mg Oral BID Saúl Horta MD   0.5 mg at 12/16/21 2040    cetirizine (ZYRTEC) tablet 10 mg  10 mg Oral Daily Saúl Horta MD   10 mg at 12/16/21 0859    cloNIDine (CATAPRES) 0.1 MG/24HR 1 patch  1 patch TransDERmal Weekly Saúl Horta MD   1 patch at 12/15/21 2336    vitamin B-12 (CYANOCOBALAMIN) tablet 1,000 mcg  1,000 mcg Oral Daily Saúl Horta MD   1,000 mcg at 12/16/21 0859    escitalopram (LEXAPRO) tablet 10 mg  10 mg Oral Daily Saúl Horta MD   10 mg at 12/16/21 0859    budesonide-formoterol (SYMBICORT) 160-4.5 MCG/ACT inhaler 2 puff  2 puff Inhalation BID Melani Gleason MD   2 puff at 54/80/46 0693    folic acid (FOLVITE) tablet 1 mg  1 mg Oral Daily Mealni Gleason MD   1 mg at 12/16/21 8517    lactobacillus (BACID) tablet 1 tablet  1 tablet Oral BID Melani Gleason MD   1 tablet at 12/16/21 2039    levETIRAcetam (KEPPRA) tablet 750 mg  750 mg Oral BID Melani Gleason MD   750 mg at 12/16/21 2039    pantoprazole (PROTONIX) tablet 40 mg  40 mg Oral QAM AC Melani Gleason MD   40 mg at 12/17/21 0556    triamterene-hydroCHLOROthiazide (MAXZIDE-25) 37.5-25 MG per tablet 1 tablet  1 tablet Oral QAM Melani Gleason MD   1 tablet at 12/16/21 0900    cariprazine hcl (VRAYLAR) capsule 3 mg  3 mg Oral Daily Melani Gleason MD   3 mg at 12/16/21 0900    albuterol sulfate  (90 Base) MCG/ACT inhaler 2 puff  2 puff Inhalation Q6H PRN Melani Gleason MD        azithromycin [facility]) tablet 250 mg  250 mg Oral Daily Melani Gleason MD   250 mg at 12/16/21 0859    heparin (porcine) injection 8,420 Units  80 Units/kg IntraVENous PRN Vikram Neely MD        heparin (porcine) injection 4,210 Units  40 Units/kg IntraVENous PRN Vikram Neely MD        heparin 25,000 units in dextrose 5% 250 mL (premix) infusion  5-30 Units/kg/hr IntraVENous Continuous Vikram Neely MD 18.9 mL/hr at 12/17/21 0245 18 Units/kg/hr at 12/17/21 0245       Allergies:  Penicillins, Amoxil [amoxicillin], Bee pollen, Bee venom, Ciprofloxacin, Clindamycin/lincomycin, Flonase [fluticasone], Keflex [cephalexin], Levaquin [levofloxacin in d5w], Levofloxacin, Macrobid [nitrofurantoin monohyd macro], Nitrofurantoin, Sulfa antibiotics, and Sulfur    Social History:   reports that she has been smoking cigarettes. She started smoking about 40 years ago. She has a 26.00 pack-year smoking history. She has never used smokeless tobacco. She reports current alcohol use. She reports current drug use. Drug: Marijuana Estil Catena).      Family History: hepatosplenomegaly   Chest - clear to auscultation, no wheezes, rales or rhonchi, symmetric air entry   Heart - normal rate, regular rhythm, normal S1, S2, no murmurs  Abdomen - soft, nontender, nondistended, no masses or organomegaly   Neurological - alert, oriented, normal speech, no focal findings or movement disorder noted   Musculoskeletal -positive for leg pain  Extremities - peripheral pulses normal, no pedal edema, no clubbing or cyanosis   Skin - normal coloration and turgor, no rashes, no suspicious skin lesions noted ,      DATA:      Labs:     Results for orders placed or performed during the hospital encounter of 12/15/21   CBC   Result Value Ref Range    WBC 6.7 3.5 - 11.3 k/uL    RBC 4.94 3.95 - 5.11 m/uL    Hemoglobin 11.6 (L) 11.9 - 15.1 g/dL    Hematocrit 37.4 36.3 - 47.1 %    MCV 75.7 (L) 82.6 - 102.9 fL    MCH 23.5 (L) 25.2 - 33.5 pg    MCHC 31.0 28.4 - 34.8 g/dL    RDW 17.1 (H) 11.8 - 14.4 %    Platelets 529 450 - 479 k/uL    MPV 10.4 8.1 - 13.5 fL    NRBC Automated NOT REPORTED 0.0 per 100 WBC   Protime-INR   Result Value Ref Range    Protime 13.7 11.5 - 14.2 sec    INR 1.1    APTT   Result Value Ref Range    PTT 78.6 (H) 23.9 - 33.8 sec   Troponin   Result Value Ref Range    Troponin, High Sensitivity <6 0 - 14 ng/L    Troponin T NOT REPORTED <0.03 ng/mL    Troponin Interp NOT REPORTED    APTT   Result Value Ref Range    PTT 72.3 (H) 23.9 - 33.8 sec   Basic Metabolic Panel   Result Value Ref Range    Glucose 112 (H) 70 - 99 mg/dL    BUN 9 6 - 20 mg/dL    CREATININE 0.74 0.50 - 0.90 mg/dL    Bun/Cre Ratio 12 9 - 20    Calcium 8.5 (L) 8.6 - 10.4 mg/dL    Sodium 138 135 - 144 mmol/L    Potassium 3.6 (L) 3.7 - 5.3 mmol/L    Chloride 105 98 - 107 mmol/L    CO2 21 20 - 31 mmol/L    Anion Gap 12 9 - 17 mmol/L    GFR Non-African American >60 >60 mL/min    GFR African American >60 >60 mL/min    GFR Comment          GFR Staging NOT REPORTED    Electrophoresis Protein, Serum without Reflex to Immunofixation   Result Value Ref Range    Total Protein 6.6 6.4 - 8.3 g/dL    Albumin (calculated) PENDING g/dL    Albumin % PENDING %    Alpha-1-Globulin PENDING g/dL    Alpha 1 % PENDING %    Alpha-2-Globulin PENDING g/dL    Alpha 2 % PENDING %    Beta Globulin PENDING g/dL    Beta Percent PENDING %    Gamma Globulin PENDING g/dL    Gamma Globulin % PENDING %    Total Prot. Sum PENDING g/dL    Total Prot.  Sum,% PENDING %    Protein Electrophoresis, Serum PENDING     Pathologist PENDING    KAPPA/LAMBDA QUANT FREE LIGHT CHAINS SERUM   Result Value Ref Range    Kappa Free Light Chains QNT 1.52 0.37 - 1.94 mg/dL    Lambda Free Light Chains QNT 1.60 0.57 - 2.63 mg/dL    Free Kappa/Lambda Ratio 0.95 0.26 - 1.65   FERRITIN   Result Value Ref Range    Ferritin 155 (H) 13 - 150 ug/L   IRON AND TIBC   Result Value Ref Range    Iron 80 37 - 145 ug/dL    TIBC 254 250 - 450 ug/dL    Iron Saturation 31 20 - 55 %    UIBC 174 112 - 347 ug/dL   Vitamin B12 & Folate   Result Value Ref Range    Vitamin B-12 687 232 - 1245 pg/mL    Folate 11.4 >4.8 ng/mL   LUPUS ANTICOAGULANT   Result Value Ref Range    Anticardiolipin IgA PENDING APL    Anticardiolipin IgG PENDING GPL    Cardiolipin Ab IgM PENDING MPL    Dilute Viper Venom Time PENDING     Lupus Anticoag PENDING     Protime 13.5 11.5 - 14.2 sec    INR 1.0     PTT 74.4 (H) 23.9 - 33.8 sec   CBC Auto Differential   Result Value Ref Range    WBC 7.0 3.5 - 11.3 k/uL    RBC 4.75 3.95 - 5.11 m/uL    Hemoglobin 11.6 (L) 11.9 - 15.1 g/dL    Hematocrit 35.5 (L) 36.3 - 47.1 %    MCV 74.7 (L) 82.6 - 102.9 fL    MCH 24.4 (L) 25.2 - 33.5 pg    MCHC 32.7 28.4 - 34.8 g/dL    RDW 16.7 (H) 11.8 - 14.4 %    Platelets 772 919 - 902 k/uL    MPV 10.8 8.1 - 13.5 fL    NRBC Automated 0.0 0.0 per 100 WBC    Differential Type NOT REPORTED     Seg Neutrophils 40 36 - 65 %    Lymphocytes 47 (H) 24 - 43 %    Monocytes 12 3 - 12 %    Eosinophils % 1 1 - 4 %    Basophils 0 0 - 2 %    Immature Granulocytes 0 0 % Segs Absolute 2.78 1.50 - 8.10 k/uL    Absolute Lymph # 3.24 1.10 - 3.70 k/uL    Absolute Mono # 0.81 0.10 - 1.20 k/uL    Absolute Eos # 0.10 0.00 - 0.44 k/uL    Basophils Absolute 0.03 0.00 - 0.20 k/uL    Absolute Immature Granulocyte <0.03 0.00 - 0.30 k/uL    WBC Morphology NOT REPORTED     RBC Morphology ANISOCYTOSIS PRESENT     Platelet Estimate NOT REPORTED    Reticulocytes   Result Value Ref Range    Retic % 1.5 0.5 - 1.9 %    Absolute Retic # 0.070 0.030 - 0.080 M/uL    Immature Retic Fract 16.800 2.7 - 18.3 %    Retic Hemoglobin 26.4 (L) 28.2 - 35.7 pg   CBC   Result Value Ref Range    WBC 6.7 3.5 - 11.3 k/uL    RBC 5.06 3.95 - 5.11 m/uL    Hemoglobin 11.8 (L) 11.9 - 15.1 g/dL    Hematocrit 37.2 36.3 - 47.1 %    MCV 73.5 (L) 82.6 - 102.9 fL    MCH 23.3 (L) 25.2 - 33.5 pg    MCHC 31.7 28.4 - 34.8 g/dL    RDW 16.0 (H) 11.8 - 14.4 %    Platelets 891 648 - 762 k/uL    MPV 10.3 8.1 - 13.5 fL    NRBC Automated 0.0 0.0 per 100 WBC   APTT   Result Value Ref Range    .1 (HH) 23.9 - 33.8 sec   EKG 12 Lead   Result Value Ref Range    Ventricular Rate 83 BPM    Atrial Rate 83 BPM    P-R Interval 204 ms    QRS Duration 74 ms    Q-T Interval 404 ms    QTc Calculation (Bazett) 474 ms    P Axis 26 degrees    R Axis -3 degrees    T Axis 9 degrees         IMAGING DATA:    XR KNEE RIGHT (3 VIEWS)    Result Date: 12/15/2021  EXAMINATION: THREE XRAY VIEWS OF THE RIGHT KNEE 12/15/2021 11:09 am COMPARISON: None. HISTORY: ORDERING SYSTEM PROVIDED HISTORY: posterior knee pain TECHNOLOGIST PROVIDED HISTORY: posterior knee pain Reason for Exam: right knee pain FINDINGS: No evidence of acute fracture or dislocation. No focal osseous lesion. No evidence of joint effusion. No focal soft tissue abnormality. No acute abnormality of the knee.      VL Lower Extremity Venous Right    Result Date: 12/15/2021    [facility]  Vascular Lower Extremities DVT Study Procedure   Patient Name   Gregorio Horta     Date of Study 12/15/2021                 CARLTON PAVON   Date of Birth  1975   Gender                  Female   Age            42 year(s)   Race                    Black   Room Number    05   Corporate ID # [id number]   Patient Acct # [de-identified]   MR #           [id number]      Amna Thomas   Accession #    [id number]   Interpreting Physician  [address] [place] [address]   Referring                   Referring Physician  Nurse  Practitioner  Procedure Type of Study:   Veins: Lower Extremities DVT Study, Venous Scan Lower Right. Indications for Study:Hx of DVT and Pain, leg. Patient Status:ER. Technical Quality:Adequate visualization.   - Critical Result: In er. . Comments:rt knee pain hx of rt popliteal DVT previous scan 10/25/2021 normal  Conclusions   Summary   Acute deep vein thrombosis of the right leg involving the popliteal vein. Signature   ----------------------------------------------------------------  Electronically signed by Kiel Ahumada(Sonographer) on  12/15/2021 02:30 PM  ----------------------------------------------------------------   ----------------------------------------------------------------  Electronically signed by Bronwen Chang Reyes,Arthur(Interpreting  physician) on 12/15/2021 03:42 PM  ----------------------------------------------------------------  Findings:   Right Impression:  The common femoral, femoral, and tibial veins demonstrate normal  compressibility and augmentation. The popliteal vein is non compressible with echoes. Velocities are measured in cm/s ; Diameters are measured in cm Right Lower Extremities DVT Study Measurements Right 2D Measurements +------------------------------------+----------+---------------+----------+ ! Location                            ! Visualized! Compressibility! Thrombosis! +------------------------------------+----------+---------------+----------+ ! Common Femoral                      !Yes       ! Yes            ! None      ! +------------------------------------+----------+---------------+----------+ ! Prox Femoral                        !Yes       ! Yes            ! None      ! +------------------------------------+----------+---------------+----------+ ! Mid Femoral                         !Yes       ! Yes            ! None      ! +------------------------------------+----------+---------------+----------+ ! Dist Femoral                        !Yes       ! Yes            ! None      ! +------------------------------------+----------+---------------+----------+ ! Deep Femoral                        !Yes       ! Yes            ! None      ! +------------------------------------+----------+---------------+----------+ ! Popliteal                           !Yes       ! No             !Acute     ! +------------------------------------+----------+---------------+----------+ ! Sapheno Femoral Junction            ! Yes       ! Yes            ! None      ! +------------------------------------+----------+---------------+----------+ ! PTV                                 ! Yes       ! Yes            ! None      ! +------------------------------------+----------+---------------+----------+ ! Peroneal                            !Yes       ! Partial        !None      ! +------------------------------------+----------+---------------+----------+ ! Gastroc                             ! Yes       ! Yes            ! None      ! +------------------------------------+----------+---------------+----------+ ! GSV Thigh                           ! Yes       ! Yes            ! None      ! +------------------------------------+----------+---------------+----------+ ! GSV Knee                            ! Yes       ! Yes            ! None      ! +------------------------------------+----------+---------------+----------+ ! GSV Ankle                           ! Yes       ! Yes            ! None      ! +------------------------------------+----------+---------------+----------+ ! SSV !Yes       !Yes            ! None      ! +------------------------------------+----------+---------------+----------+ Right Doppler Measurements +---------------------------+------+------+--------------------------------+ ! Location                   ! Signal!Reflux! Reflux (msec)                   ! +---------------------------+------+------+--------------------------------+ ! Common Femoral             !Phasic! No    !                                ! +---------------------------+------+------+--------------------------------+ ! Prox Femoral               !Phasic! No    !                                ! +---------------------------+------+------+--------------------------------+ ! Popliteal                  !Phasic! No    !                                ! +---------------------------+------+------+--------------------------------+        IMPRESSION:   Primary Problem  <principal problem not specified>    Active Hospital Problems    Diagnosis Date Noted    Acute deep vein thrombosis (DVT) of popliteal vein of right lower extremity (HCC) [I82.431]     Failure of outpatient treatment [Z78.9]     Dental infection [K04.7] 12/15/2021    DVT (deep vein thrombosis) in pregnancy [O22.30] 12/15/2021    Deep vein thrombosis (DVT) with pulmonary embolism present on admission ([facility] [I82.409, I26.99] 12/15/2021    Erosive gastritis [K29.60] 09/15/2021    Obesity [E66.9] 05/12/2021    Hyperlipidemia [E78.5] 01/27/2020    Tobacco abuse counseling [Z71.6] 01/27/2020    Essential hypertension [I10] 10/18/2019    History of pulmonary embolism [Z86.711] 10/04/2019    Long term current use of anticoagulant therapy [Z79.01] 07/24/2018    Chronic idiopathic constipation [K59.04] 06/07/2018    Chronic obstructive lung disease ([facility] [J44.9] 06/02/2018    Deep vein thrombosis (DVT) of proximal lower extremity ([facility] [I82.4Y9] 06/01/2018    Bipolar 1 disorder ([facility] [F31.9] 01/27/2017    Asthma [J45.909] 01/27/2017    Seizures ([facility] [R56.9] 10/21/2016    Borderline diabetes mellitus [R73.03] 03/22/2016    Tobacco abuse disorder [Z72.0] 03/22/2016       Left lower extremity DVT, 6/2/2018  Saddle pulmonary embolism,6/2/2108  Right lower extremity DVT, 12/15/2021  Microcytic anemia      RECOMMENDATIONS:  I personally reviewed results of lab work-up imaging studies and other relevant clinical data. Reviewed previous medical records    I discussed with the patient that failure of DOAC is very rare and usual conditions associated with DOAC failure include noncompliance, drug interaction, HIT, MPN's, antiphospholipid antibody syndrome and malignancy. Patient admits that she has been noncompliant with Eliquis which is likely the etiology of patient's recurrent DVT. I will however check work-up for antiphospholipid antibody syndrome since it will have implications of the drug of choice for anticoagulation. Follow-up on results of beta glycoprotein antibodies     patient states that she forgets to take her blood thinner and other medication at nighttime. Recommend switching patient to Xarelto which would be once a day once patient is on maintenance    At this point do not recommend thrombophilia work-up since is not going to change treatment recommendation which is going to be lifelong anticoagulation. Patient has manifested that she cannot stay off anticoagulation as she developed a blood clot once she was noncompliant with Eliquis    Patient has adequate iron stores. Has history of sickle cell trait in her mother. 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 Follow-up on results of hemoglobin electrophoresis    Okay to transition to oral anticoagulant once no intervention is needed. Recommend starting Xarelto on starter pack and then transition to maintenance dose per protocol    If patient is discharged before the above results are back she will need outpatient follow-up. She expressed understanding.   I also discussed with the nurse    Patient counseled on tobacco cessation      Thank you for asking us to see this patient. Selina Amin MD          This note is created with the assistance of a speech recognition program.  While intending to generate a document that actually reflects the content of the visit, the document can still have some errors including those of syntax and sound a like substitutions which may escape proof reading. It such instances, actual meaning can be extrapolated by contextual diversion.

## 2021-12-17 NOTE — PROGRESS NOTES
Pulmonary Critical Care Progress Note  ELIZABETH Mcconnell-SAMRA/Nunu Lux MD     Patient seen for the follow up of acute DVT, asthma, active smoking    Subjective:  She is just getting back to the bed from the restroom. She denies shortness of breath, cough or chest pain. She is feeling much better. She is anxious to hopefully be getting home today. Examination:  Vitals: BP (!) 164/110   Pulse 75   Temp 97.7 °F (36.5 °C) (Oral)   Resp 16   Ht 5' 6\" (1.676 m)   Wt 226 lb 6.4 oz (102.7 kg)   SpO2 91%   BMI 36.54 kg/m²   General appearance: alert and cooperative with exam, up in her room  Neck: No JVD  Lungs: Moderate air exchange, no crackles or wheezing  Heart: regular rate and rhythm, S1, S2 normal, no gallop  Abdomen: Soft, non tender, + BS  Extremities: no cyanosis or clubbing. No significant edema    LABs:  CBC:   Recent Labs     12/16/21  1134 12/17/21  0528   WBC 7.0 6.7   HGB 11.6* 11.8*   HCT 35.5* 37.2    245     BMP:   Recent Labs     12/16/21  0519      K 3.6*   CO2 21   BUN 9   CREATININE 0.74   LABGLOM >60   GLUCOSE 112*     PT/INR:   Recent Labs     12/16/21  0035 12/16/21  1134   PROTIME 13.7 13.5   INR 1.1 1.0     APTT:  Recent Labs     12/16/21  1134 12/17/21  0528   APTT 74.4* 118.1*     Radiology:  Right lower extremity Doppler  Summary        Acute deep vein thrombosis of the right leg involving the popliteal vein. Impression:  · Acute DVT of right popliteal vein, on Eliquis secondary to history of DVT and PE  · Asthma  · Active smoking, ~30-pack-year history  · Facial swelling  · Chest pain  · Obesity  · Bipolar, GERD, HLD HTN  · Mild hepatic steatosis  · Left adrenal adenoma    Recommendations:  · CTA of the chest negative for PE  · Incentive spirometry every hour while awake   · Hematology /oncology on consult, no objections to transitioning to Xarelto at discharge.   · We will discontinue heparin drip and start her on Xarelto, 15 mg twice daily x3 weeks, followed by once daily dosing  · Cardiology on consult  · Symbicort  · Smoking cessation  · Albuterol HFA Q 6 hours prn  · DVT prophylaxis, discontinue heparin drip and start Xarelto  · Okay to discharge from pulmonary standpoint  · Outpatient follow-up with her pulmonologist Dr. Janneth Stratton.       Ariela Oden APRN-CNP   Pulmonary Critical Care and Sleep Medicine,  Patient seen under the supervision of Arlen Ya MD, [facility]

## 2021-12-17 NOTE — DISCHARGE SUMMARY
Discharge Summary    Date: 12/17/2021  Patient Name: Mi Mcadams YOB: 1975 Age: 42 y.o. Admit Date: 12/15/2021  Discharge Date: 12/17/2021  Discharge Condition: Stable    Admission Diagnosis  Dental infection (K04.7);DVT (deep vein thrombosis) in pregnancy (O22.30); Acute deep vein thrombosis (DVT) of popliteal vein of right lower extremity (HCC) (I82.431); Failure of outpatient treatment (Z78.9); Deep vein thrombosis (DVT) with pulmonary embolism present on admission ([facility] [address] (I82.409, I26.99)     Discharge Diagnosis  Active Problems: Tobacco abuse disorder  Seizures (HCC)  Bipolar 1 disorder (HCC)  Chronic idiopathic constipation  Long term current use of anticoagulant therapy  Asthma  History of pulmonary embolism  Essential hypertension  Hyperlipidemia  Tobacco abuse counseling  Borderline diabetes mellitus  Chronic obstructive lung disease (HCC)  Deep vein thrombosis (DVT) of proximal lower extremity (HCC)  Obesity  Erosive gastritis  Dental infection  DVT (deep vein thrombosis) in pregnancy  Deep vein thrombosis (DVT) with pulmonary embolism present on admission [facility])  Acute deep vein thrombosis (DVT) of popliteal vein of right lower extremity (HCC)  Failure of outpatient treatmentResolved Problems:  * No resolved hospital problems. [facility] Stay  Narrative of Hospital Course:  Improved on IV heparin   Switched to Xarelto as she can be more compliant with Once a day dosing of this med. Hematology and Pulm consulted and will need follow up with Both specialties    Consultants:  [facility] TO PULMONOLOGYIP CONSULT TO ONCOLOGYIP CONSULT TO PULMONOLOGYIP CONSULT TO HEM/ONCIP CONSULT TO CARDIOLOGY    Surgeries/procedures Performed:  NA     Treatments:    Cardiac Medications, Antibiotics, Anticoagulation and Respiratory Therapy    Azithromycin, Heparin and Other, Calcium Channel Blocker, Beta-Blocker, Ace Inhibitor and Other    Discharge Plan/Disposition:  Home    Hospital/Incidental Findings Requiring Follow Up:    Patient Instructions:    Diet: Cardiac Diet    Activity:Activity as Tolerated  For number of days (if applicable): Other Instructions:    Provider Follow-Up:   No follow-ups on file.      Significant Diagnostic Studies:    Recent Labs:  Admission on 12/15/2021WBC                                           Date: 12/16/2021Value: 6.7         Ref range: 3.5 - 11.3 k/uL    Status: FinalRBC                                           Date: 12/16/2021Value: 4.94        Ref range: 3.95 - 5.11 m/uL   Status: FinalHemoglobin                                    Date: 12/16/2021Value: 11.6*       Ref range: 11.9 - 15.1 g/dL   Status: FinalHematocrit                                    Date: 12/16/2021Value: 37.4        Ref range: 36.3 - 47.1 %      Status: FinalMCV                                           Date: 12/16/2021Value: 75.7*       Ref range: 82.6 - 102.9 fL    Status: [address] [place]                                           Date: 12/16/2021Value: 23.5*       Ref range: 25.2 - 33.5 pg     Status: [address]                                          Date: 12/16/2021Value: 31.0        Ref range: 28.4 - 34.8 g/dL   Status: FinalRDW                                           Date: 12/16/2021Value: 17.1*       Ref range: 11.8 - 14.4 %      Status: FinalPlatelets                                     Date: 12/16/2021Value: 247         Ref range: 138 - 453 k/uL     Status: FinalMPV                                           Date: 12/16/2021Value: 10.4        Ref range: 8.1 - 13.5 fL      Status: FinalNRBC Automated                                Date: 12/16/2021Value: NOT REPORTED                   Ref range: 0.0 per 100 WBC    Status: FinalProtime                                       Date: 12/16/2021Value: 13.7        Ref range: 11.5 - 14.2 sec    Status: FinalINR                                           Date: 12/16/2021Value: 1.1           Status: Final Comment:     Non-therapeutic Range:   INR = 0.9-1.2Therapeutic Range: Moderate Anticoagulant Intensity:   INR = 2.0-3.0 High Anticoagulant Intensity:   INR = 2.5-3.5    PTT                                           Date: 12/16/2021Value: 78.6*       Ref range: 23.9 - 33.8 sec    Status: Final              Comment:     IV Heparin Therapy Range:    62.0-94.0    Ventricular Rate                              Date: 12/16/2021Value: 83          Ref range: BPM                Status: FinalAtrial Rate                                   Date: 12/16/2021Value: 83          Ref range: BPM                Status: FinalP-R Interval                                  Date: 12/16/2021Value: 204         Ref range: ms                 Status: FinalQRS Duration                                  Date: 12/16/2021Value: 74          Ref range: ms                 Status: FinalQ-T Interval                                  Date: 12/16/2021Value: 404         Ref range: ms                 Status: FinalQTc Calculation (Bazett)                      Date: 12/16/2021Value: 474         Ref range: ms                 Status: FinalP Axis                                        Date: 12/16/2021Value: 26          Ref range: degrees            Status: FinalR Axis                                        Date: 12/16/2021Value: -3          Ref range: degrees            Status: FinalT Axis                                        Date: 12/16/2021Value: 9           Ref range: degrees            Status: FinalTroponin, High Sensitivity                    Date: 12/16/2021Value: <6          Ref range: 0 - 14 ng/L        Status: Final              Comment:     High Sensitivity Troponin values cannot be compared with other Troponin methodologies. Patients with high levels of Biotin oral intake (i.e >5mg/day) may have falsely decreased Troponin levels. Samples collected within 8 hours of biotin intake may require additional information for diagnosis. Troponin T Date: 12/16/2021Value: NOT REPORTED                   Ref range: <0.03 ng/mL        Status: FinalTroponin Interp                               Date: 12/16/2021Value: NOT REPORTED                     Status: FinalPTT                                           Date: 12/16/2021Value: 72.3*       Ref range: 23.9 - 33.8 sec    Status: Final              Comment:     IV Heparin Therapy Range:    62.0-94.0    Glucose                                       Date: 12/16/2021Value: 112*        Ref range: 70 - 99 mg/dL      Status: FinalBUN                                           Date: 12/16/2021Value: 9           Ref range: 6 - 20 mg/dL       Status: FinalCREATININE                                    Date: 12/16/2021Value: 0.74        Ref range: 0.50 - 0.90 mg/dL  Status: FinalBun/Cre Ratio                                 Date: 12/16/2021Value: 12          Ref range: 9 - 20             Status: FinalCalcium                                       Date: 12/16/2021Value: 8.5*        Ref range: 8.6 - 10.4 mg/dL   Status: FinalSodium                                        Date: 12/16/2021Value: 138         Ref range: 135 - 144 mmol/L   Status: FinalPotassium                                     Date: 12/16/2021Value: 3.6*        Ref range: 3.7 - 5.3 mmol/L   Status: FinalChloride                                      Date: 12/16/2021Value: 105         Ref range: 98 - 107 mmol/L    Status: FinalCO2                                           Date: 12/16/2021Value: 21          Ref range: 20 - 31 mmol/L     Status: FinalAnion Gap                                     Date: 12/16/2021Value: 12          Ref range: 9 - 17 mmol/L      Status: FinalGFR Non-                      Date: 12/16/2021Value: >60         Ref range: >60 mL/min         Status: FinalGFR                           Date: 12/16/2021Value: >60         Ref range: >60 mL/min         Status: Christine Romero Date: 12/16/2021Value:               Status: Final              Comment: Average GFR for 38-51 years old: 80 mL/min/1.73sq mChronic Kidney Disease: <60 mL/min/1.73sq mKidney failure: <15 mL/min/1.73sq m        eGFR calculated using average adult body mass. Additional eGFR calculator available at:    [employer]    GFR Staging                                   Date: 12/16/2021Value: NOT REPORTED                     Status: FinalTotal Protein                                 Date: 12/16/2021Value: 6.6         Ref range: 6.4 - 8.3 g/dL     Status: FinalAlbumin (calculated)                          Date: 12/16/2021Value: PENDING     Ref range: g/dL               Status: IncompleteAlbumin %                                     Date: 12/16/2021Value: PENDING     Ref range: %                  Status: IncompleteAlpha-1-Globulin                              Date: 12/16/2021Value: PENDING     Ref range: g/dL               Status: IncompleteAlpha 1 %                                     Date: 12/16/2021Value: PENDING     Ref range: %                  Status: IncompleteAlpha-2-Globulin                              Date: 12/16/2021Value: PENDING     Ref range: g/dL               Status: IncompleteAlpha 2 %                                     Date: 12/16/2021Value: PENDING     Ref range: %                  Status: IncompleteBeta Globulin                                 Date: 12/16/2021Value: PENDING     Ref range: g/dL               Status: IncompleteBeta Percent                                  Date: 12/16/2021Value: PENDING     Ref range: %                  Status: IncompleteGamma Globulin                                Date: 12/16/2021Value: PENDING     Ref range: g/dL               Status: IncompleteGamma Globulin %                              Date: 12/16/2021Value: PENDING     Ref range: %                  Status: IncompleteTotal Prot.  Sum Date: 12/16/2021Value: PENDING     Ref range: g/dL               Status: IncompleteTotal Prot. Sum,%                             Date: 12/16/2021Value: PENDING     Ref range: %                  Status: IncompleteProtein Electrophoresis, Serum                Date: 12/16/2021Value: PENDING       Status: IncompletePathologist                                   Date: 12/16/2021Value: PENDING       Status: IncompleteKappa Free Light Chains QNT                   Date: 12/16/2021Value: 1.52        Ref range: 0.37 - 1.94 mg/dL  Status: FinalLambda Free Light Chains QNT                  Date: 12/16/2021Value: 1.60        Ref range: 0.57 - 2.63 mg/dL  Status: FinalFree Kappa/Lambda Ratio                       Date: 12/16/2021Value: 0.95        Ref range: 0.26 - 1.65        Status: FinalFerritin                                      Date: 12/16/2021Value: 155*        Ref range: 13 - 150 ug/L      Status: FinalIron                                          Date: 12/16/2021Value: 80          Ref range: 37 - 145 ug/dL     Status: FinalTIBC                                          Date: 12/16/2021Value: 254         Ref range: 250 - 450 ug/dL    Status: FinalIron Saturation                               Date: 12/16/2021Value: 31          Ref range: 20 - 55 %          Status: FinalUIBC                                          Date: 12/16/2021Value: 174         Ref range: 112 - 347 ug/dL    Status: FinalVitamin B-12                                  Date: 12/16/2021Value: 687         Ref range: 232 - 1245 pg/mL   Status: FinalFolate                                        Date: 12/16/2021Value: 11.4        Ref range: >4.8 ng/mL         Status: FinalAnticardiolipin IgA                           Date: 12/16/2021Value: PENDING     Ref range:  APL                Status: IncompleteAnticardiolipin IgG                           Date: 12/16/2021Value: PENDING     Ref range: GPL                Status: IncompleteCardiolipin Ab IgM Date: 12/16/2021Value: PENDING     Ref range: MPL                Status: IncompleteDilute Viper Venom Time                       Date: 12/16/2021Value: NEGATIVE for Lupus Anticoagulant                   Ref range: NEGATIVE for Lup*  Status: FinalLupus Anticoag                                Date: 12/16/2021Value: NOT REPORTED                     Status: FinalProtime                                       Date: 12/16/2021Value: 13.5        Ref range: 11.5 - 14.2 sec    Status: FinalINR                                           Date: 12/16/2021Value: 1.0           Status: Final              Comment:     Non-therapeutic Range:   INR = 0.9-1.2Therapeutic Range:  Moderate Anticoagulant Intensity:   INR = 2.0-3.0 High Anticoagulant Intensity:   INR = 2.5-3.5    PTT                                           Date: 12/16/2021Value: 74.4*       Ref range: 23.9 - 33.8 sec    Status: Final              Comment:     IV Heparin Therapy Range:    62.0-94.0    WBC                                           Date: 12/16/2021Value: 7.0         Ref range: 3.5 - 11.3 k/uL    Status: FinalRBC                                           Date: 12/16/2021Value: 4.75        Ref range: 3.95 - 5.11 m/uL   Status: FinalHemoglobin                                    Date: 12/16/2021Value: 11.6*       Ref range: 11.9 - 15.1 g/dL   Status: FinalHematocrit                                    Date: 12/16/2021Value: 35.5*       Ref range: 36.3 - 47.1 %      Status: FinalMCV                                           Date: 12/16/2021Value: 74.7*       Ref range: 82.6 - 102.9 fL    Status: [address]                                           Date: 12/16/2021Value: 24.4*       Ref range: 25.2 - 33.5 pg     Status: [address]                                          Date: 12/16/2021Value: 32.7        Ref range: 28.4 - 34.8 g/dL   Status: FinalRDW                                           Date: 12/16/2021Value: 16.7*       Ref range: 11.8 - 14.4 % Status: FinalPlatelets                                     Date: 12/16/2021Value: 238         Ref range: 138 - 453 k/uL     Status: FinalMPV                                           Date: 12/16/2021Value: 10.8        Ref range: 8.1 - 13.5 fL      Status: FinalNRBC Automated                                Date: 12/16/2021Value: 0.0         Ref range: 0.0 per 100 WBC    Status: FinalDifferential Type                             Date: 12/16/2021Value: NOT REPORTED                     Status: FinalSeg Neutrophils                               Date: 12/16/2021Value: 40          Ref range: 36 - 65 %          Status: FinalLymphocytes                                   Date: 12/16/2021Value: 47*         Ref range: 24 - 43 %          Status: FinalMonocytes                                     Date: 12/16/2021Value: 12          Ref range: 3 - 12 %           Status: FinalEosinophils %                                 Date: 12/16/2021Value: 1           Ref range: 1 - 4 %            Status: FinalBasophils                                     Date: 12/16/2021Value: 0           Ref range: 0 - 2 %            Status: FinalImmature Granulocytes                         Date: 12/16/2021Value: 0           Ref range: 0 %                Status: FinalSegs Absolute                                 Date: 12/16/2021Value: 2.78        Ref range: 1.50 - 8.10 k/uL   Status: FinalAbsolute Lymph #                              Date: 12/16/2021Value: 3.24        Ref range: 1.10 - 3.70 k/uL   Status: FinalAbsolute Mono #                               Date: 12/16/2021Value: 0.81        Ref range: 0.10 - 1.20 k/uL   Status: FinalAbsolute Eos #                                Date: 12/16/2021Value: 0.10        Ref range: 0.00 - 0.44 k/uL   Status: FinalBasophils Absolute                            Date: 12/16/2021Value: 0.03        Ref range: 0.00 - 0.20 k/uL   Status: FinalAbsolute Immature Granulocyte                 Date: 12/16/2021Value: <0.03       Ref range: 0.00 - 0.30 k/uL   Status: [facility] Morphology                                Date: 12/16/2021Value: NOT REPORTED                     Status: FinalRBC Morphology                                Date: 12/16/2021Value: ANISOCYTOSIS PRESENT                     Status: Final              Comment: MICROCYTOSIS PRESENTPlatelet Estimate                             Date: 12/16/2021Value: NOT REPORTED                     Status: FinalRetic %                                       Date: 12/16/2021Value: 1.5         Ref range: 0.5 - 1.9 %        Status: FinalAbsolute Retic #                              Date: 12/16/2021Value: 0.070       Ref range: 0.030 - 0.080 M/*  Status: FinalImmature Retic Fract                          Date: 12/16/2021Value: 16.800      Ref range: 2.7 - 18.3 %       Status: FinalRetic Hemoglobin                              Date: 12/16/2021Value: 26.4*       Ref range: 28.2 - 35.7 pg     Status: [facility]                                           Date: 12/17/2021Value: 6.7         Ref range: 3.5 - 11.3 k/uL    Status: FinalRBC                                           Date: 12/17/2021Value: 5.06        Ref range: 3.95 - 5.11 m/uL   Status: FinalHemoglobin                                    Date: 12/17/2021Value: 11.8*       Ref range: 11.9 - 15.1 g/dL   Status: FinalHematocrit                                    Date: 12/17/2021Value: 37.2        Ref range: 36.3 - 47.1 %      Status: FinalMCV                                           Date: 12/17/2021Value: 73.5*       Ref range: 82.6 - 102.9 fL    Status: [address] [place]                                           Date: 12/17/2021Value: 23.3*       Ref range: 25.2 - 33.5 pg     Status: [address]                                          Date: 12/17/2021Value: 31.7        Ref range: 28.4 - 34.8 g/dL   Status: FinalRDW                                           Date: 12/17/2021Value: 16.0*       Ref range: 11.8 - 14.4 %      Status: FinalPlatelets Date: 12/17/2021Value: 245         Ref range: 138 - 453 k/uL     Status: FinalMPV                                           Date: 12/17/2021Value: 10.3        Ref range: 8.1 - 13.5 fL      Status: FinalNRBC Automated                                Date: 12/17/2021Value: 0.0         Ref range: 0.0 per 100 WBC    Status: FinalPTT                                           Date: 12/17/2021Value: 118.1*      Ref range: 23.9 - 33.8 sec    Status: Final              Comment:     IV Heparin Therapy Range:    62.0-94.0    ------------    Radiology last 7 days:  XR KNEE RIGHT (3 VIEWS)Result Date: 12/15/2021No acute abnormality of the knee. CT CHEST PULMONARY EMBOLISM W CONTRASTResult Date: 12/16/20211. No evidence of pulmonary embolus. 2.  Mild dilatation of the main pulmonary artery suggesting pulmonary arterial hypertension. 3.  Mild hepatic steatosis. 4.  Hypodense left adrenal mass consistent with adenoma. Prior right adrenal mass not included in the field of view. 5.  Basilar atelectasis. Little change from prior study.  RECOMMENDATIONS: Unavailable      Pending Labs   Order Current Status  BETA-2 GLYCOPROTEIN ANTIBODIES In process  HEMOGLOBINOPATHY EVALUATION In process  PERIPHERAL BLOOD SMEAR, PATH REVIEW In process  Electrophoresis Protein, Serum without Reflex to Immunofixation Preliminary result  LUPUS ANTICOAGULANT Preliminary result      Discharge Medications    Current Discharge Medication ListSTART taking these medicationsrivaroxaban (XARELTO) 15 MG TABS tabletTake 1 tablet by mouth 2 times daily (with meals) for 41 dosesQty: 42 tablet Refills: 0hydrALAZINE (APRESOLINE) 10 MG tabletTake 1 tablet by mouth 3 times daily as needed (SBP > 150)Qty: 90 tablet Refills: 3docusate sodium (COLACE) 100 MG capsuleTake 2 capsules by mouth daily as needed for ConstipationQty: 60 capsule Refills: 0azithromycin (ZITHROMAX) 250 MG tabletTake 1 tablet by mouth daily for 3 daysQty: 3 tablet Refills: rhinitis, unspecified seasonality, unspecified triggerbenztropine (COGENTIN) 0.5 MG tablettake 1 tablet by mouth twice a dayRefills: 0vitamin D (ERGOCALCIFEROL) 1.25 MG (72104 UT) CAPS capsuleTake 1 capsule by mouth once a weekQty: 12 capsule Refills: 1Associated Diagnoses:Vitamin D deficiency; Iron deficiency anemia, unspecified iron deficiency anemia type    Current Discharge Medication ListSTOP taking these medicationsapixaban (ELIQUIS) 5 MG TABS tabletComments:Reason for Stopping:    Time Spent on Discharge:4E] minutes were spent in patient examination, evaluation, counseling as well as medication reconciliation, prescriptions for required medications, discharge plan, and follow up.     Electronically signed by Colt Solano MD on 12/17/21 at 12:57 PM EST

## 2021-12-17 NOTE — PROGRESS NOTES
Direct oral anticoagulant (DOAC) - Initial Pharmacy Review  PLAN    No obvious intervention needed. Larry Vinson, [facility]  2021  9:38 AM    ASSESSMENT   Patient chart reviewed for indication and appropriateness of dose and therapy of Rivaroxaban.:  Indication: Acute PE. PATIENT INFORMATION   Body mass index is 36.54 kg/m². Wt Readings from Last 1 Encounters:   12/15/21 226 lb 6.4 oz (102.7 kg)     Some sources recommend that DOACs be avoided in weight < 50 or > 120 kg, or BMI > 40 whenever possible; however. some smaller studies suggest that DOACs may be safe and efficacious in this population. Recent Labs     21  0519   CREATININE 0.74     Recent Labs     21  0035 21  1134 21  0528   HGB 11.6* 11.6* 11.8*   HCT 37.4 35.5* 37.2    238 245     Recent Labs     21  0035 21  1134   INR 1.1 1.0         Weight  kg ? BMI Less than   40 kg/m2 Calculated CrCl  Using ABW (mL/min) Other anticoagulants on MAR Drug interactions  (since admission) reviewed   yes    Wt Readings from Last 1 Encounters:   12/15/21 226 lb 6.4 oz (102.7 kg)    yes    Body mass index is 36.54 kg/m². 117ml/min        (140-age)*ABW    72 * sCr    X 0.85 for females No concurrent anticoagulants ordered.  No interactions/no new drug interactions identified requiring action.                 -----------------------------------------------------------------------------------------------------------------    CRCL Calculation for DOACs  (use ABW)    CrCl male:  (140-Age) * ABW           For female:  (140-Age) * ABW * 0.85                       72  *  sCr                                              72 * sCr           Rivaroxaban (Xarelto®)  - (CrCl calculated on ABW)  Indication Dose (Oral)  Adjustment  (CrCl calculated on ABW) Drug Interactions   NVAF 20mg daily   (with evening meal) CrCl <  50 mL/min = 15mg/day (with evening meal)   Strong P-gp/CY inducers (Avoid combination  Apalutamide, CarBAMazepine, Enzalutamide, Fosphenytoin, Lumacaftor, Mitotane, PHENobarbital, Phenytoin, Primidone, RifAMPin    P-gp/CY inhibitors (Avoid combination)  Clarithromycin, Itraconazole, Ketoconazole (systemic), Ombitasvir, Paritaprevir, Ritonavir, [place]'s wort   DVT/PE Treatment 15mg BID x21d, then 20mg daily (take with food) CrCl< 15 =Avoid use    DVT/PE  Recurrence Preventions 20mg daily (or 10 mg daily  after at least 6 months of treatment) CrCl< 15 =Avoid use    DVT prevention PostOp Knee: 10mg daily x 12-14d   (up to 35 days suggested)  Hip: 10mg daily x 35 days CrCl <15 =Avoid use    CAD/PAD  (Chronic, stable) 2.5mg BID with once daily ASA 75-100mg No adjustment for > 15 mL/min. Use with caution in severe renal impairment. * Ree CULLEN et al. J Am Soc Nephrol 2017. doi:10.1681/ASN. [id number]  *Kiana TADEO, et al. Circulation 2018.  NJK:15.1413/RPIURCLMZLDUEL  Seymour Co, et al. [facility] 2019  Chyrl Nilton M, et al. Blood 7087;514:449  Elvia White et al. CHEST 2018  Giselle Padilla et al. Elva Pharmacother 2019  [facility]/rolf GODOY. Circulation 2018

## 2021-12-17 NOTE — PROGRESS NOTES
Progress Note  Date:12/17/2021       Room:1018/1018-02  Patient Nedra Jimenez     YOB: 1975     Age:46 y.o. Subjective    Subjective:  Symptoms:  Stable. No shortness of breath, malaise, cough, chest pain, weakness, headache, chest pressure, anorexia, diarrhea or anxiety. Diet:  Adequate intake. No nausea or vomiting. Activity level: Returning to normal.    Pain:  She complains of pain that is mild. She reports pain is improving. Pain is partially controlled. Review of Systems   Constitutional: Negative for activity change, appetite change, fatigue and fever. HENT: Positive for dental problem. Negative for ear pain, hearing loss, postnasal drip, sinus pressure, sneezing, tinnitus, trouble swallowing and voice change. Eyes: Negative for pain, discharge, redness and visual disturbance. Respiratory: Negative for cough, chest tightness and shortness of breath. Cardiovascular: Positive for leg swelling. Negative for chest pain and palpitations. Gastrointestinal: Negative for abdominal distention, abdominal pain, anal bleeding, anorexia, blood in stool, constipation, diarrhea, nausea, rectal pain and vomiting. Endocrine: Negative for cold intolerance, heat intolerance, polydipsia, polyphagia and polyuria. Genitourinary: Negative for decreased urine volume, difficulty urinating, dyspareunia, dysuria, enuresis, flank pain, frequency, genital sores, hematuria, menstrual problem, pelvic pain, urgency, vaginal bleeding and vaginal discharge. Musculoskeletal: Negative for arthralgias, back pain, gait problem, joint swelling, myalgias, neck pain and neck stiffness. Skin: Negative for color change, pallor and rash. Allergic/Immunologic: Negative for environmental allergies, food allergies and immunocompromised state.    Neurological: Negative for dizziness, tremors, seizures, syncope, facial asymmetry, speech difficulty, weakness, light-headedness, numbness and ulceration. Labs/Imaging/Diagnostics    Labs:  CBC:  Recent Labs     12/16/21  0035 12/16/21  1134 12/17/21  0528   WBC 6.7 7.0 6.7   RBC 4.94 4.75 5.06   HGB 11.6* 11.6* 11.8*   HCT 37.4 35.5* 37.2   MCV 75.7* 74.7* 73.5*   RDW 17.1* 16.7* 16.0*    238 245     CHEMISTRIES:  Recent Labs     12/16/21  0519      K 3.6*      CO2 21   BUN 9   CREATININE 0.74   GLUCOSE 112*     PT/INR:  Recent Labs     12/16/21  0035 12/16/21  1134   PROTIME 13.7 13.5   INR 1.1 1.0     APTT:  Recent Labs     12/16/21  1059 12/16/21  1134 12/17/21  0528   APTT 72.3* 74.4* 118.1*     LIVER PROFILE:No results for input(s): AST, ALT, BILIDIR, BILITOT, ALKPHOS in the last 72 hours. Imaging Last 24 Hours:  XR KNEE RIGHT (3 VIEWS)    Result Date: 12/15/2021  EXAMINATION: THREE XRAY VIEWS OF THE RIGHT KNEE 12/15/2021 11:09 am COMPARISON: None. HISTORY: ORDERING SYSTEM PROVIDED HISTORY: posterior knee pain TECHNOLOGIST PROVIDED HISTORY: posterior knee pain Reason for Exam: right knee pain FINDINGS: No evidence of acute fracture or dislocation. No focal osseous lesion. No evidence of joint effusion. No focal soft tissue abnormality. No acute abnormality of the knee. CT CHEST PULMONARY EMBOLISM W CONTRAST    Result Date: 12/16/2021  EXAMINATION: CTA OF THE CHEST 12/16/2021 4:12 pm TECHNIQUE: CTA of the chest was performed after the administration of intravenous contrast.  Multiplanar reformatted images are provided for review. MIP images are provided for review. Dose modulation, iterative reconstruction, and/or weight based adjustment of the mA/kV was utilized to reduce the radiation dose to as low as reasonably achievable. COMPARISON: 25 October 2021 HISTORY: ORDERING SYSTEM PROVIDED HISTORY: right sided chest pain TECHNOLOGIST PROVIDED HISTORY: right sided chest pain Reason for Exam: sob, leg pain, chest pain, DVT in leg FINDINGS: Pulmonary Arteries: Pulmonary arteries are adequately opacified for evaluation. No evidence of intraluminal filling defect to suggest pulmonary embolism. Main pulmonary artery is dilated at 3.6 mm, appearance favoring pulmonary arterial hypertension. Mediastinum: No mediastinal adenopathy. Mild cardiomegaly is noted without pericardial effusion or epicardial adenopathy. Tiny hiatal hernia is demonstrated. Lungs/pleura: Dependent atelectasis is present. No gross effusion, consolidation, extrapleural air or significant nodules are present. Tracheobronchial tree is patent. Upper Abdomen: Hepatic steatosis is noted without mass lesion. Stable hypodense left adrenal mass of 1.6 cm is noted with Hounsfield numbers most compatible with adenoma. Soft Tissues/Bones: No acute osseous or soft tissue abnormality. Mild degenerative changes in the spine. 1.  No evidence of pulmonary embolus. 2.  Mild dilatation of the main pulmonary artery suggesting pulmonary arterial hypertension. 3.  Mild hepatic steatosis. 4.  Hypodense left adrenal mass consistent with adenoma. Prior right adrenal mass not included in the field of view. 5.  Basilar atelectasis. Little change from prior study. RECOMMENDATIONS: Unavailable     VL Lower Extremity Venous Right    Result Date: 12/15/2021    [facility]  Vascular Lower Extremities DVT Study Procedure   Patient Name   Gregorio Horta     Date of Study           12/15/2021                 CARLTON PAVON   Date of Birth  1975   Gender                  Female   Age            42 year(s)   Race                    Black   Room Number    05   Corporate ID # [id number]   Patient Acct # [de-identified]   MR #           [id number]      [facility]   Accession #    [id number]   Interpreting Physician  [address] [place] [address]   Referring                   Referring Physician  Nurse  Practitioner  Procedure Type of Study:   Veins: Lower Extremities DVT Study, Venous Scan Lower Right. Indications for Study:Hx of DVT and Pain, leg. Patient Status:ER.  Technical Quality:Adequate visualization.   - Critical Result: In er. . Comments:rt knee pain hx of rt popliteal DVT previous scan 10/25/2021 normal  Conclusions   Summary   Acute deep vein thrombosis of the right leg involving the popliteal vein. Signature   ----------------------------------------------------------------  Electronically signed by Kiel Ahumada(Sonographer) on  12/15/2021 02:30 PM  ----------------------------------------------------------------   ----------------------------------------------------------------  Electronically signed by Vance Razor Reyes,Arthur(Interpreting  physician) on 12/15/2021 03:42 PM  ----------------------------------------------------------------  Findings:   Right Impression:  The common femoral, femoral, and tibial veins demonstrate normal  compressibility and augmentation. The popliteal vein is non compressible with echoes. Velocities are measured in cm/s ; Diameters are measured in cm Right Lower Extremities DVT Study Measurements Right 2D Measurements +------------------------------------+----------+---------------+----------+ ! Location                            ! Visualized! Compressibility! Thrombosis! +------------------------------------+----------+---------------+----------+ ! Common Femoral                      !Yes       ! Yes            ! None      ! +------------------------------------+----------+---------------+----------+ ! Prox Femoral                        !Yes       ! Yes            ! None      ! +------------------------------------+----------+---------------+----------+ ! Mid Femoral                         !Yes       ! Yes            ! None      ! +------------------------------------+----------+---------------+----------+ ! Dist Femoral                        !Yes       ! Yes            ! None      ! +------------------------------------+----------+---------------+----------+ ! Deep Femoral                        !Yes       ! Yes            ! None      ! +------------------------------------+----------+---------------+----------+ ! Popliteal                           !Yes       ! No             !Acute     ! +------------------------------------+----------+---------------+----------+ ! Sapheno Femoral Junction            ! Yes       ! Yes            ! None      ! +------------------------------------+----------+---------------+----------+ ! PTV                                 ! Yes       ! Yes            ! None      ! +------------------------------------+----------+---------------+----------+ ! Peroneal                            !Yes       ! Partial        !None      ! +------------------------------------+----------+---------------+----------+ ! Gastroc                             ! Yes       ! Yes            ! None      ! +------------------------------------+----------+---------------+----------+ ! GSV Thigh                           ! Yes       ! Yes            ! None      ! +------------------------------------+----------+---------------+----------+ ! GSV Knee                            ! Yes       ! Yes            ! None      ! +------------------------------------+----------+---------------+----------+ ! GSV Ankle                           ! Yes       ! Yes            ! None      ! +------------------------------------+----------+---------------+----------+ ! SSV                                 ! Yes       ! Yes            ! None      ! +------------------------------------+----------+---------------+----------+ Right Doppler Measurements +---------------------------+------+------+--------------------------------+ ! Location                   ! Signal!Reflux! Reflux (msec)                   ! +---------------------------+------+------+--------------------------------+ ! Common Femoral             !Phasic! No    !                                ! +---------------------------+------+------+--------------------------------+ ! Prox Femoral               !Phasic! No    ! gastritis 12/15/2021 Yes    Dental infection 12/15/2021 Yes    DVT (deep vein thrombosis) in pregnancy 12/15/2021 Yes    Deep vein thrombosis (DVT) with pulmonary embolism present on admission [facility]) 12/15/2021 Yes    Acute deep vein thrombosis (DVT) of popliteal vein of right lower extremity ([address] 12/16/2021 Yes    Failure of outpatient treatment 12/16/2021 Yes        Assessment:  (Acute DVT RLE  H/O PE  Chest Pain- CT chest neg for new PE  Dental Infection  Failed Long Term Anticoagulation  H/O Seizure Disorder  HTN  Dyslipidemia  COPD  Tobacco abuse disorder  Gastritis  ). Plan:   Ad hernando activity and per physical therapy. Start/continue incentive spirometry. Consults: pulmonology (Hematology). Restricted diet. Administer medications as ordered. (IV heparin dose to be adjusted by Pharmacy due to critical PTT  Hematology consult appreciated- watson eof med from eliquis to Xarelto recommended - so patient can do QD dosing instead of BID after loading dose - for better med compliance   Home meds as reconciled  GI prophylaxis  Tobacco abuse councelling  Hematology and Pulm Consults).        Electronically signed by Opal Zhang MD on 12/17/21 at 9:08 AM EST

## 2021-12-17 NOTE — DISCHARGE INSTR - DIET
Good nutrition is important when healing from an illness, injury, or surgery. Follow any nutrition recommendations given to you during your hospital stay. If you were given an oral nutrition supplement while in the hospital, continue to take this supplement at home. You can take it with meals, in-between meals, and/or before bedtime. These supplements can be purchased at most local grocery stores, pharmacies, and chain [employer]-stores. If you have any questions about your diet or nutrition, call the hospital and ask for the dietitian. Low sodium diet.

## 2021-12-17 NOTE — PLAN OF CARE
Problem: Falls - Risk of:  Goal: Will remain free from falls  Description: Will remain free from falls  Outcome: Ongoing   Pt fall risk, fall band present, falling star, safety alarm activated and in use as needed. Hourly rounding performed. Pt encouraged to use call light. See Dona Aviles fall risk assessment.

## 2021-12-17 NOTE — PLAN OF CARE
Problem: Venous Thromboembolism:  Goal: Will show no signs or symptoms of venous thromboembolism  Description: Will show no signs or symptoms of venous thromboembolism  Outcome: Ongoing     Problem: Venous Thromboembolism:  Goal: Absence of signs or symptoms of impaired coagulation  Description: Absence of signs or symptoms of impaired coagulation  Outcome: Ongoing     Still on Heparin drip with stable APTTs

## 2021-12-17 NOTE — RT PROTOCOL NOTE
RT Inhaler-Nebulizer Bronchodilator Protocol Note    There is a bronchodilator order in the chart from a provider indicating to follow the RT Bronchodilator Protocol and there is an Initiate RT Inhaler-Nebulizer Bronchodilator Protocol order as well (see protocol at bottom of note). CXR Findings:  No results found. The findings from the last RT Protocol Assessment were as follows:   History Pulmonary Disease: Smoker 15 pack years or more  Respiratory Pattern: Regular pattern and RR 12-20 bpm  Breath Sounds: Clear breath sounds  Cough: Strong, spontaneous, non-productive  Indication for Bronchodilator Therapy: On home bronchodilators  Bronchodilator Assessment Score: 1    Aerosolized bronchodilator medication orders have been revised according to the RT Inhaler-Nebulizer Bronchodilator Protocol below. Respiratory Therapist to perform RT Therapy Protocol Assessment initially then follow the protocol. Repeat RT Therapy Protocol Assessment PRN for score 0-3 or on second treatment, BID, and PRN for scores above 3. No Indications  adjust the frequency to every 6 hours PRN wheezing or bronchospasm, if no treatments needed after 48 hours then discontinue using Per Protocol order mode. If indication present, adjust the RT bronchodilator orders based on the Bronchodilator Assessment Score as indicated below. Use Inhaler orders unless patient has one or more of the following: on home nebulizer, not able to hold breath for 10 seconds, is not alert and oriented, cannot activate and use MDI correctly, or respiratory rate 25 breaths per minute or more, then use the equivalent nebulizer order(s) with same Frequency and PRN reasons based on the score. If a patient is on this medication at home then do not decrease Frequency below that used at home.     0-3  enter or revise RT bronchodilator order(s) to equivalent RT Bronchodilator order with Frequency of every 4 hours PRN for wheezing or increased work of breathing using Per Protocol order mode. 4-6  enter or revise RT Bronchodilator order(s) to two equivalent RT bronchodilator orders with one order with BID Frequency and one order with Frequency of every 4 hours PRN wheezing or increased work of breathing using Per Protocol order mode. 7-10  enter or revise RT Bronchodilator order(s) to two equivalent RT bronchodilator orders with one order with TID Frequency and one order with Frequency of every 4 hours PRN wheezing or increased work of breathing using Per Protocol order mode. 11-13  enter or revise RT Bronchodilator order(s) to one equivalent RT bronchodilator order with QID Frequency and an Albuterol order with Frequency of every 4 hours PRN wheezing or increased work of breathing using Per Protocol order mode. Greater than 13  enter or revise RT Bronchodilator order(s) to one equivalent RT bronchodilator order with every 4 hours Frequency and an Albuterol order with Frequency of every 2 hours PRN wheezing or increased work of breathing using Per Protocol order mode. RT to enter RT Home Evaluation for COPD & MDI Assessment order using Per Protocol order mode.     Electronically signed by Steve Chow RCP on 12/16/2021 at 7:59 PM

## 2021-12-17 NOTE — PROGRESS NOTES
Patient visiting with her son and daughter in room. Family brought a lot of outside food and soda pop for her. Discussed diet and limiting pop.

## 2021-12-17 NOTE — CARE COORDINATION
Discharge planning    Patient admitted with DVT with non compliance with eliquis at home. Spoke with pulmonary and verbal order obtained for xarelto starter pack then 20 mg daily. Call to [facility] at [phone] and rx for maintenance dosage called  In. No PA needed no cost. Starter pack rx with voucher brought down to pharmacy     0649  Patient called me into room stating [facility] needed PA for xarelto but it was for the starter pack and that has already been filled. Call to pharmacy at [facility] to verify that they have ran the starter pack.   The starter pack has been filled and tech is bringing up to room

## 2021-12-19 LAB
ALBUMIN (CALCULATED): 3.7 G/DL (ref 3.2–5.2)
ALBUMIN PERCENT: 56 % (ref 45–65)
ALPHA 1 PERCENT: 3 % (ref 3–6)
ALPHA 2 PERCENT: 13 % (ref 6–13)
ALPHA-1-GLOBULIN: 0.2 G/DL (ref 0.1–0.4)
ALPHA-2-GLOBULIN: 0.8 G/DL (ref 0.5–0.9)
BETA GLOBULIN: 0.8 G/DL (ref 0.5–1.1)
BETA PERCENT: 12 % (ref 11–19)
GAMMA GLOBULIN %: 17 % (ref 9–20)
GAMMA GLOBULIN: 1.1 G/DL (ref 0.5–1.5)
HGB ELECTROPHORESIS INTERP: NORMAL
PATHOLOGIST REVIEW: NORMAL
PATHOLOGIST: NORMAL
PATHOLOGIST: NORMAL
PROTEIN ELECTROPHORESIS, SERUM: NORMAL
TOTAL PROT. SUM,%: 101 % (ref 98–102)
TOTAL PROT. SUM: 6.6 G/DL (ref 6.3–8.2)
TOTAL PROTEIN: 6.6 G/DL (ref 6.4–8.3)

## 2021-12-20 ENCOUNTER — TELEPHONE (OUTPATIENT)
Dept: FAMILY MEDICINE CLINIC | Age: 46
End: 2021-12-20

## 2021-12-20 NOTE — TELEPHONE ENCOUNTER
[facility]) ED Follow up Call    Reason for ED visit: blood clots      12/20/2021     Hi Alexandr , this is candelaria  from Dr. Lissette Martin office, just calling to see how you are doing after your recent ED visit. Did you receive discharge instructions? Yes  Do you understand the discharge instructions? Yes  Did the ED give you any new prescriptions? Yes  Were you able to fill your prescriptions? Yes      Do you have one of our red, yellow and green  Zone sheets that help you to determine when you should go to the ED? Not Applicable      Do you need or want to make a follow up appt with your PCP? Yes    Do you have any further needs in the home, e.g. equipment?   No  Pt has a appt on 12/21/2021       FU appts/Provider:    Future Appointments   Date Time Provider Miguelito Busby   12/21/2021  3:15 PM Katarina Lombardi MD [id number]   1/6/2022  4:00 PM Kaitlin Beck MD Resp Spec Shayy Simon   4/5/2022 10:00 AM Katarina Lombardi MD [facility] Shayy Simon

## 2021-12-20 NOTE — TELEPHONE ENCOUNTER
Pt was DC from [facility] Fri but she is not any better. She can hardly walk she has severe right leg pain. Asking to be seen today but can not come this morning she is coming by taxi.

## 2021-12-20 NOTE — TELEPHONE ENCOUNTER
There is nothing to do for her other than taking the new medication as prescribed. She can be seen any day she can come in

## 2021-12-21 ENCOUNTER — TELEPHONE (OUTPATIENT)
Dept: FAMILY MEDICINE CLINIC | Age: 46
End: 2021-12-21

## 2021-12-21 LAB
ANTICARDIOLIPIN IGA ANTIBODY: 2 APL (ref 0–14)
ANTICARDIOLIPIN IGG ANTIBODY: 1.3 GPL (ref 0–10)
BETA 2 GLYCOPROT.1 IGA AB: 1.9 ELISA U/ML (ref 0–7)
BETA 2 GLYCOPROT.1 IGG AB: 1.4 ELISA U/ML (ref 0–7)
BETA 2 GLYCOPROT.1 IGM AB: <0.9 ELISA U/ML (ref 0–7)
CARDIOLIPIN AB IGM: 0.8 MPL (ref 0–10)
DILUTE RUSSELL VIPER VENOM TIME: ABNORMAL
INR BLD: 1
LUPUS ANTICOAG: ABNORMAL
PARTIAL THROMBOPLASTIN TIME: 74.4 SEC (ref 23.9–33.8)
PROTHROMBIN TIME: 13.5 SEC (ref 11.5–14.2)

## 2021-12-21 NOTE — TELEPHONE ENCOUNTER
----- Message from Agustingreg Sal sent at 12/21/2021  2:54 PM EST -----  Subject: Appointment Request    Reason for Call: Routine ED Follow Up Visit    QUESTIONS  Type of Appointment? Established Patient  Reason for appointment request? No appointments available during search  Additional Information for Provider? pt had to cancel her appointment for   12/21 due to ride canceling on her last minute there were no times   available pt screened green / ed follow up for right leg pain   ---------------------------------------------------------------------------  --------------  CALL BACK INFO  What is the best way for the office to contact you? OK to leave message on   voicemail  Preferred Call Back Phone Number? [phone]  ---------------------------------------------------------------------------  --------------  SCRIPT ANSWERS  Relationship to Patient? Self  (Patient requests to see provider urgently. )? No  Do you have any questions for your primary care provider that need to be   answered prior to your appointment? No  Have you been diagnosed with, awaiting test results for, or told that you   are suspected of having COVID-19 (Coronavirus)? (If patient has tested   negative or was tested as a requirement for work, school, or travel and   not based on symptoms, answer no)? No  Within the past two weeks have you developed any of the following symptoms   (answer no if symptoms have been present longer than 2 weeks or began   more than 2 weeks ago)? Fever or Chills, Cough, Shortness of breath or   difficulty breathing, Loss of taste or smell, Sore throat, Nasal   congestion, Sneezing or runny nose, Fatigue or generalized body aches   (answer no if pain is specific to a body part e.g. back pain), Diarrhea,   Headache? No  Have you had close contact with someone with COVID-19 in the last 14 days? No  (Service Expert  click yes below to proceed with [employer] As Usual   Scheduling)?  Yes

## 2022-02-05 DIAGNOSIS — I10 ESSENTIAL HYPERTENSION: ICD-10-CM

## 2022-02-07 RX ORDER — TRIAMTERENE AND HYDROCHLOROTHIAZIDE 37.5; 25 MG/1; MG/1
1 CAPSULE ORAL EVERY MORNING
Qty: 30 CAPSULE | Refills: 3 | Status: SHIPPED | OUTPATIENT
Start: 2022-02-07 | End: 2022-05-27 | Stop reason: SDUPTHER

## 2022-03-04 RX ORDER — OMEPRAZOLE 20 MG/1
CAPSULE, DELAYED RELEASE ORAL
Qty: 180 CAPSULE | Refills: 1 | Status: SHIPPED | OUTPATIENT
Start: 2022-03-04 | End: 2022-08-25

## 2022-03-14 DIAGNOSIS — D50.9 IRON DEFICIENCY ANEMIA, UNSPECIFIED IRON DEFICIENCY ANEMIA TYPE: ICD-10-CM

## 2022-03-14 DIAGNOSIS — E55.9 VITAMIN D DEFICIENCY: ICD-10-CM

## 2022-03-14 RX ORDER — ERGOCALCIFEROL 1.25 MG/1
CAPSULE ORAL
Qty: 12 CAPSULE | Refills: 1 | Status: SHIPPED | OUTPATIENT
Start: 2022-03-14 | End: 2022-06-16 | Stop reason: SDUPTHER

## 2022-03-24 ENCOUNTER — TELEPHONE (OUTPATIENT)
Dept: FAMILY MEDICINE CLINIC | Age: 47
End: 2022-03-24

## 2022-03-24 RX ORDER — HYDRALAZINE HYDROCHLORIDE 10 MG/1
TABLET, FILM COATED ORAL
Qty: 90 TABLET | Refills: 3 | Status: SHIPPED | OUTPATIENT
Start: 2022-03-24 | End: 2022-06-27

## 2022-03-24 NOTE — TELEPHONE ENCOUNTER
Call from pt she is out of seizure med and had 2 bad seizures yesterday. She was discharged from her neurologist and has been out of med for 2 months. Her feet have been swollen for 2 weeks. Per Dr John Bowers pt can come to the office today to be seen or go to the ER/ED if she feels there is an urgency. Pt says she will go to [facility] ER and follow up in the office afterwards.

## 2022-04-06 ENCOUNTER — TELEPHONE (OUTPATIENT)
Dept: FAMILY MEDICINE CLINIC | Age: 47
End: 2022-04-06

## 2022-04-06 NOTE — TELEPHONE ENCOUNTER
----- Message from [facility] sent at 4/5/2022  8:11 AM EDT -----  Subject: Appointment Request    Reason for Call: Routine Medicare AWV    QUESTIONS  Type of Appointment? Established Patient  Reason for appointment request? No appointments available during search  Additional Information for Provider? PT needed to reschedule AWV with PAP   can do any day after 10 am Please call PT back with any avail dates or   times   ---------------------------------------------------------------------------  --------------  CALL BACK INFO  What is the best way for the office to contact you? OK to leave message on   voicemail  Preferred Call Back Phone Number? [phone]  ---------------------------------------------------------------------------  --------------  SCRIPT ANSWERS  Relationship to Patient? Self  Have your symptoms changed? No  (If the patient has Medicare as their primary insurance coverage ask this   question) Are you requesting a Medicare Annual Wellness Visit? Yes   (Is the patient requesting a pap smear with their physical exam?)? No  (Is the patient requesting their annual physical and does not need PAP or   AWV per above?)? No  Have you been diagnosed with, awaiting test results for, or told that you   are suspected of having COVID-19 (Coronavirus)? (If patient has tested   negative or was tested as a requirement for work, school, or travel and   not based on symptoms, answer no)? No  Within the past 10 days have you developed any of the following symptoms   (answer no if symptoms have been present longer than 10 days or began   more than 10 days ago)? Fever or Chills, Cough, Shortness of breath or   difficulty breathing, Loss of taste or smell, Sore throat, Nasal   congestion, Sneezing or runny nose, Fatigue or generalized body aches   (answer no if pain is specific to a body part e.g. back pain), Diarrhea,   Headache? No  Have you had close contact with someone with COVID-19 in the last 7 days? No  (Service Expert  click yes below to proceed with [employer] As Usual   Scheduling)?  Yes

## 2022-04-10 DIAGNOSIS — I10 ESSENTIAL HYPERTENSION: ICD-10-CM

## 2022-04-11 RX ORDER — CLONIDINE 0.1 MG/24H
PATCH, EXTENDED RELEASE TRANSDERMAL
Qty: 4 PATCH | Refills: 5 | Status: SHIPPED | OUTPATIENT
Start: 2022-04-11 | End: 2022-06-03 | Stop reason: SDUPTHER

## 2022-04-11 RX ORDER — LOSARTAN POTASSIUM 100 MG/1
TABLET ORAL
Qty: 30 TABLET | Refills: 3 | Status: SHIPPED | OUTPATIENT
Start: 2022-04-11 | End: 2022-06-03 | Stop reason: SDUPTHER

## 2022-05-14 DIAGNOSIS — I10 ESSENTIAL HYPERTENSION: ICD-10-CM

## 2022-05-16 RX ORDER — ATORVASTATIN CALCIUM 10 MG/1
TABLET, FILM COATED ORAL
Qty: 90 TABLET | Refills: 1 | OUTPATIENT
Start: 2022-05-16

## 2022-05-16 RX ORDER — MONTELUKAST SODIUM 10 MG/1
TABLET ORAL
Qty: 90 TABLET | Refills: 0 | OUTPATIENT
Start: 2022-05-16

## 2022-05-27 DIAGNOSIS — I10 ESSENTIAL HYPERTENSION: ICD-10-CM

## 2022-05-27 RX ORDER — TRIAMTERENE AND HYDROCHLOROTHIAZIDE 37.5; 25 MG/1; MG/1
1 CAPSULE ORAL EVERY MORNING
Qty: 30 CAPSULE | Refills: 3 | Status: SHIPPED | OUTPATIENT
Start: 2022-05-27 | End: 2022-09-26

## 2022-06-03 ENCOUNTER — OFFICE VISIT (OUTPATIENT)
Dept: FAMILY MEDICINE CLINIC | Age: 47
End: 2022-06-03
Payer: COMMERCIAL

## 2022-06-03 VITALS
DIASTOLIC BLOOD PRESSURE: 100 MMHG | HEIGHT: 66 IN | OXYGEN SATURATION: 96 % | HEART RATE: 81 BPM | TEMPERATURE: 98.6 F | BODY MASS INDEX: 37.16 KG/M2 | WEIGHT: 231.2 LBS | SYSTOLIC BLOOD PRESSURE: 155 MMHG

## 2022-06-03 DIAGNOSIS — K58.9 IRRITABLE BOWEL SYNDROME, UNSPECIFIED TYPE: ICD-10-CM

## 2022-06-03 DIAGNOSIS — J45.40 MODERATE PERSISTENT ASTHMA WITHOUT COMPLICATION: ICD-10-CM

## 2022-06-03 DIAGNOSIS — I10 ESSENTIAL HYPERTENSION: ICD-10-CM

## 2022-06-03 DIAGNOSIS — F31.9 BIPOLAR 1 DISORDER (HCC): ICD-10-CM

## 2022-06-03 DIAGNOSIS — Z72.0 TOBACCO ABUSE DISORDER: ICD-10-CM

## 2022-06-03 DIAGNOSIS — R56.9 SEIZURES (HCC): ICD-10-CM

## 2022-06-03 DIAGNOSIS — Z71.6 TOBACCO ABUSE COUNSELING: ICD-10-CM

## 2022-06-03 DIAGNOSIS — Z86.711 HISTORY OF PULMONARY EMBOLISM: ICD-10-CM

## 2022-06-03 DIAGNOSIS — Z79.01 LONG TERM CURRENT USE OF ANTICOAGULANT THERAPY: ICD-10-CM

## 2022-06-03 DIAGNOSIS — R73.03 BORDERLINE DIABETES: Primary | ICD-10-CM

## 2022-06-03 DIAGNOSIS — E78.5 HYPERLIPIDEMIA, UNSPECIFIED HYPERLIPIDEMIA TYPE: ICD-10-CM

## 2022-06-03 DIAGNOSIS — F43.10 PTSD (POST-TRAUMATIC STRESS DISORDER): ICD-10-CM

## 2022-06-03 DIAGNOSIS — D64.9 ANEMIA, UNSPECIFIED TYPE: ICD-10-CM

## 2022-06-03 DIAGNOSIS — E53.8 FOLATE DEFICIENCY: ICD-10-CM

## 2022-06-03 DIAGNOSIS — E55.9 VITAMIN D DEFICIENCY: ICD-10-CM

## 2022-06-03 DIAGNOSIS — J44.9 CHRONIC OBSTRUCTIVE PULMONARY DISEASE, UNSPECIFIED COPD TYPE (HCC): ICD-10-CM

## 2022-06-03 DIAGNOSIS — E53.8 COBALAMIN DEFICIENCY: ICD-10-CM

## 2022-06-03 DIAGNOSIS — K21.9 GASTROESOPHAGEAL REFLUX DISEASE, UNSPECIFIED WHETHER ESOPHAGITIS PRESENT: ICD-10-CM

## 2022-06-03 DIAGNOSIS — Z13.29 SCREENING FOR THYROID DISORDER: ICD-10-CM

## 2022-06-03 DIAGNOSIS — R73.03 BORDERLINE DIABETES MELLITUS: ICD-10-CM

## 2022-06-03 PROBLEM — K59.04 CHRONIC IDIOPATHIC CONSTIPATION: Status: RESOLVED | Noted: 2018-06-07 | Resolved: 2022-06-03

## 2022-06-03 PROBLEM — I26.99 DEEP VEIN THROMBOSIS (DVT) WITH PULMONARY EMBOLISM PRESENT ON ADMISSION (HCC): Status: RESOLVED | Noted: 2021-12-15 | Resolved: 2022-06-03

## 2022-06-03 PROBLEM — Z91.199 NONCOMPLIANCE WITH TREATMENT: Status: RESOLVED | Noted: 2017-01-27 | Resolved: 2022-06-03

## 2022-06-03 PROBLEM — N76.0 BACTERIAL VAGINITIS: Status: RESOLVED | Noted: 2020-04-22 | Resolved: 2022-06-03

## 2022-06-03 PROBLEM — I82.4Y9 DEEP VEIN THROMBOSIS (DVT) OF PROXIMAL LOWER EXTREMITY (HCC): Status: RESOLVED | Noted: 2018-06-01 | Resolved: 2022-06-03

## 2022-06-03 PROBLEM — K42.9 UMBILICAL HERNIA: Status: RESOLVED | Noted: 2018-01-19 | Resolved: 2022-06-03

## 2022-06-03 PROBLEM — K04.7 DENTAL INFECTION: Status: RESOLVED | Noted: 2021-12-15 | Resolved: 2022-06-03

## 2022-06-03 PROBLEM — I82.409 DEEP VEIN THROMBOSIS (DVT) WITH PULMONARY EMBOLISM PRESENT ON ADMISSION (HCC): Status: RESOLVED | Noted: 2021-12-15 | Resolved: 2022-06-03

## 2022-06-03 PROBLEM — N92.6 ABNORMAL MENSTRUAL CYCLE: Status: RESOLVED | Noted: 2018-01-19 | Resolved: 2022-06-03

## 2022-06-03 PROBLEM — K57.32 DIVERTICULITIS OF SIGMOID COLON: Status: RESOLVED | Noted: 2017-07-03 | Resolved: 2022-06-03

## 2022-06-03 PROBLEM — B96.89 BACTERIAL VAGINITIS: Status: RESOLVED | Noted: 2020-04-22 | Resolved: 2022-06-03

## 2022-06-03 PROBLEM — O22.30 DVT (DEEP VEIN THROMBOSIS) IN PREGNANCY: Status: RESOLVED | Noted: 2021-12-15 | Resolved: 2022-06-03

## 2022-06-03 PROBLEM — A59.9 TRICHOMONAL INFECTION: Status: RESOLVED | Noted: 2021-06-02 | Resolved: 2022-06-03

## 2022-06-03 PROBLEM — G93.5 CHIARI MALFORMATION TYPE I (HCC): Status: RESOLVED | Noted: 2017-06-21 | Resolved: 2022-06-03

## 2022-06-03 PROBLEM — K29.60 EROSIVE GASTRITIS: Status: RESOLVED | Noted: 2021-09-15 | Resolved: 2022-06-03

## 2022-06-03 PROBLEM — Z91.199 NON-COMPLIANCE WITH TREATMENT: Status: RESOLVED | Noted: 2017-01-27 | Resolved: 2022-06-03

## 2022-06-03 PROCEDURE — G8427 DOCREV CUR MEDS BY ELIG CLIN: HCPCS | Performed by: FAMILY MEDICINE

## 2022-06-03 PROCEDURE — 99214 OFFICE O/P EST MOD 30 MIN: CPT | Performed by: FAMILY MEDICINE

## 2022-06-03 PROCEDURE — G8417 CALC BMI ABV UP PARAM F/U: HCPCS | Performed by: FAMILY MEDICINE

## 2022-06-03 PROCEDURE — 4004F PT TOBACCO SCREEN RCVD TLK: CPT | Performed by: FAMILY MEDICINE

## 2022-06-03 PROCEDURE — 3023F SPIROM DOC REV: CPT | Performed by: FAMILY MEDICINE

## 2022-06-03 RX ORDER — MAGNESIUM 200 MG
1 TABLET ORAL DAILY
Qty: 90 TABLET | Refills: 5 | Status: SHIPPED | OUTPATIENT
Start: 2022-06-03

## 2022-06-03 RX ORDER — ATORVASTATIN CALCIUM 10 MG/1
10 TABLET, FILM COATED ORAL DAILY
Qty: 30 TABLET | Refills: 0 | Status: SHIPPED | OUTPATIENT
Start: 2022-06-03 | End: 2022-06-27

## 2022-06-03 RX ORDER — LOSARTAN POTASSIUM 100 MG/1
100 TABLET ORAL DAILY
Qty: 30 TABLET | Refills: 0 | Status: SHIPPED | OUTPATIENT
Start: 2022-06-03 | End: 2022-06-27

## 2022-06-03 RX ORDER — FOLIC ACID 1 MG/1
1 TABLET ORAL DAILY
Qty: 90 TABLET | Refills: 1 | Status: SHIPPED | OUTPATIENT
Start: 2022-06-03

## 2022-06-03 RX ORDER — CLONIDINE 0.2 MG/24H
PATCH, EXTENDED RELEASE TRANSDERMAL
COMMUNITY

## 2022-06-03 SDOH — ECONOMIC STABILITY: FOOD INSECURITY: WITHIN THE PAST 12 MONTHS, YOU WORRIED THAT YOUR FOOD WOULD RUN OUT BEFORE YOU GOT MONEY TO BUY MORE.: NEVER TRUE

## 2022-06-03 SDOH — ECONOMIC STABILITY: FOOD INSECURITY: WITHIN THE PAST 12 MONTHS, THE FOOD YOU BOUGHT JUST DIDN'T LAST AND YOU DIDN'T HAVE MONEY TO GET MORE.: NEVER TRUE

## 2022-06-03 ASSESSMENT — ENCOUNTER SYMPTOMS
ANAL BLEEDING: 0
VOMITING: 0
NAUSEA: 0
DIARRHEA: 0
COLOR CHANGE: 0
BLOOD IN STOOL: 0
TROUBLE SWALLOWING: 0
ABDOMINAL DISTENTION: 0
SINUS PRESSURE: 0
EYE PAIN: 0
VOICE CHANGE: 0
ABDOMINAL PAIN: 0
EYE DISCHARGE: 0
CONSTIPATION: 0
CHEST TIGHTNESS: 0
RECTAL PAIN: 0
EYE REDNESS: 0
BACK PAIN: 0
SHORTNESS OF BREATH: 0
COUGH: 0

## 2022-06-03 ASSESSMENT — PATIENT HEALTH QUESTIONNAIRE - PHQ9
SUM OF ALL RESPONSES TO PHQ QUESTIONS 1-9: 13
2. FEELING DOWN, DEPRESSED OR HOPELESS: 3
6. FEELING BAD ABOUT YOURSELF - OR THAT YOU ARE A FAILURE OR HAVE LET YOURSELF OR YOUR FAMILY DOWN: 3
8. MOVING OR SPEAKING SO SLOWLY THAT OTHER PEOPLE COULD HAVE NOTICED. OR THE OPPOSITE, BEING SO FIGETY OR RESTLESS THAT YOU HAVE BEEN MOVING AROUND A LOT MORE THAN USUAL: 0
5. POOR APPETITE OR OVEREATING: 2
9. THOUGHTS THAT YOU WOULD BE BETTER OFF DEAD, OR OF HURTING YOURSELF: 0
SUM OF ALL RESPONSES TO PHQ QUESTIONS 1-9: 13
10. IF YOU CHECKED OFF ANY PROBLEMS, HOW DIFFICULT HAVE THESE PROBLEMS MADE IT FOR YOU TO DO YOUR WORK, TAKE CARE OF THINGS AT HOME, OR GET ALONG WITH OTHER PEOPLE: 1
SUM OF ALL RESPONSES TO PHQ9 QUESTIONS 1 & 2: 3
3. TROUBLE FALLING OR STAYING ASLEEP: 2
4. FEELING TIRED OR HAVING LITTLE ENERGY: 3
1. LITTLE INTEREST OR PLEASURE IN DOING THINGS: 0
SUM OF ALL RESPONSES TO PHQ QUESTIONS 1-9: 13
SUM OF ALL RESPONSES TO PHQ QUESTIONS 1-9: 13
7. TROUBLE CONCENTRATING ON THINGS, SUCH AS READING THE NEWSPAPER OR WATCHING TELEVISION: 0

## 2022-06-03 ASSESSMENT — SOCIAL DETERMINANTS OF HEALTH (SDOH): HOW HARD IS IT FOR YOU TO PAY FOR THE VERY BASICS LIKE FOOD, HOUSING, MEDICAL CARE, AND HEATING?: SOMEWHAT HARD

## 2022-06-03 NOTE — PROGRESS NOTES
Subjective:      Patient ID: Saul Larson is a 42 y.o. female. States ran out of meds- due to problems with transportation was unable to follow up with neuro - so has to find a new neuro. Mood ok, sleep poor  Sees psych and will follow up. Sees GI for IBS- states has appt coming up. Last seizure was a few months ago. BP is up - off losartan, others recently refilled/  .  Due for annual labs  States sleep poor as she wakes up freq at night- drinks a lot of pepsi- advised to quit  She is also n a water pill. States Asthma is stable, still uses tobacco and weed- states for pain and tobacco to keep her calm from all the stress- has been trying to cut down,  Review of Systems   Constitutional: Negative for activity change, appetite change and fatigue. HENT: Negative for dental problem, ear pain, hearing loss, postnasal drip, sinus pressure, sneezing, tinnitus, trouble swallowing and voice change. Eyes: Negative for pain, discharge, redness and visual disturbance. Respiratory: Negative for cough, chest tightness and shortness of breath. Cardiovascular: Negative for chest pain, palpitations and leg swelling. Gastrointestinal: Negative for abdominal distention, abdominal pain, anal bleeding, blood in stool, constipation, diarrhea, nausea, rectal pain and vomiting. Endocrine: Negative for cold intolerance, heat intolerance, polydipsia, polyphagia and polyuria. Genitourinary: Negative for decreased urine volume, difficulty urinating, dyspareunia, dysuria, enuresis, flank pain, frequency, genital sores, hematuria, menstrual problem, pelvic pain, urgency, vaginal bleeding and vaginal discharge. Musculoskeletal: Negative for arthralgias, back pain, gait problem, joint swelling, myalgias, neck pain and neck stiffness. Skin: Negative for color change, pallor and rash. Allergic/Immunologic: Negative for environmental allergies, food allergies and immunocompromised state.    Neurological: Negative for dizziness, tremors, seizures, syncope, facial asymmetry, speech difficulty, weakness, light-headedness, numbness and headaches. Hematological: Negative for adenopathy. Does not bruise/bleed easily. Psychiatric/Behavioral: Negative for agitation, behavioral problems, confusion, decreased concentration, sleep disturbance and suicidal ideas. The patient is not nervous/anxious. Objective:   Physical Exam  Constitutional:       General: She is not in acute distress. HENT:      Head: Normocephalic and atraumatic. Right Ear: External ear normal.      Left Ear: External ear normal.      Nose: Nose normal.   Eyes:      Conjunctiva/sclera: Conjunctivae normal.      Pupils: Pupils are equal, round, and reactive to light. Neck:      Thyroid: No thyromegaly. Trachea: No tracheal deviation. Cardiovascular:      Rate and Rhythm: Normal rate and regular rhythm. Pulses: Normal pulses. Heart sounds: Normal heart sounds. No murmur heard. No friction rub. No gallop. Pulmonary:      Effort: Pulmonary effort is normal. No respiratory distress. Breath sounds: Normal breath sounds. No stridor. No wheezing or rales. Chest:      Chest wall: No tenderness. Abdominal:      General: Bowel sounds are normal. There is no distension. Palpations: Abdomen is soft. Tenderness: There is no abdominal tenderness. There is no rebound. Musculoskeletal:         General: Normal range of motion. Cervical back: Normal range of motion. Lymphadenopathy:      Cervical: No cervical adenopathy. Skin:     General: Skin is warm. Coloration: Skin is not pale. Findings: No erythema or rash. Neurological:      General: No focal deficit present. Mental Status: She is alert and oriented to person, place, and time. Mental status is at baseline. Cranial Nerves: No cranial nerve deficit. Motor: No abnormal muscle tone.       Deep Tendon Reflexes: Reflexes normal.   Psychiatric: Mood and Affect: Mood normal.         Behavior: Behavior normal.         Thought Content: Thought content normal.         Judgment: Judgment normal.          Vitals:    06/03/22 1618   BP: (!) 155/100   Pulse: 81   Temp:    SpO2:          Assessment:      Diagnosis Orders   1. Borderline diabetes  Comprehensive Metabolic Panel    Hemoglobin A1C    Microalbumin, Ur   2. Tobacco abuse disorder     3. Long term current use of anticoagulant therapy     4. Irritable bowel syndrome, unspecified type     5. PTSD (post-traumatic stress disorder)     6. Anemia, unspecified type     7. Moderate persistent asthma without complication     8. Gastroesophageal reflux disease, unspecified whether esophagitis present     9. History of pulmonary embolism     10. Bipolar 1 disorder ([facility]     11. Cobalamin deficiency  Vitamin B12    Cyanocobalamin (B-12) 1000 MCG SUBL   12. Seizures ([facility]  External Referral To Neurology   13. Tobacco abuse counseling     14. Chronic obstructive pulmonary disease, unspecified COPD type ([facility]     15. Borderline diabetes mellitus     16. Hyperlipidemia, unspecified hyperlipidemia type  Comprehensive Metabolic Panel    Lipid Panel    atorvastatin (LIPITOR) 10 MG tablet   17. Essential hypertension  losartan (COZAAR) 100 MG tablet   18. Vitamin D deficiency  Vitamin D 25 Hydroxy   19. Folate deficiency  CBC    Folate    Vitamin W27    folic acid (FOLVITE) 1 MG tablet   20.  Screening for thyroid disorder  T4, Free    TSH         Plan:     Orders Placed This Encounter   Procedures    Comprehensive Metabolic Panel    CBC    Folate    Hemoglobin A1C    Lipid Panel    T4, Free    TSH    Vitamin B12    Vitamin D 25 Hydroxy    Microalbumin, Ur    External Referral To Neurology       Outpatient Encounter Medications as of 6/3/2022   Medication Sig Dispense Refill    cloNIDine (CATAPRES-TTS-2) 0.2 MG/24HR PTWK       losartan (COZAAR) 100 MG tablet Take 1 tablet by mouth daily 30 tablet 0    atorvastatin (LIPITOR) 10 MG tablet Take 1 tablet by mouth daily 30 tablet 0    Cyanocobalamin (B-12) 1000 MCG SUBL Place 1 tablet under the tongue daily 90 tablet 5    folic acid (FOLVITE) 1 MG tablet Take 1 tablet by mouth daily 90 tablet 1    triamterene-hydroCHLOROthiazide (DYAZIDE) 37.5-25 MG per capsule take 1 capsule by mouth every morning 30 capsule 3    hydrALAZINE (APRESOLINE) 10 MG tablet take 1 tablet by mouth three times a day if needed FOR SYSTOLIC BLOOD PRESSURE GREATER THAN 150 90 tablet 3    vitamin D (ERGOCALCIFEROL) 1.25 MG (15144 UT) CAPS capsule take 1 capsule by mouth ONCE EVERY WEEK 12 capsule 1    omeprazole (PRILOSEC) 20 MG delayed release capsule take 1 capsule by mouth twice a day BEFORE A MEAL 180 capsule 1    montelukast (SINGULAIR) 10 MG tablet take 1 tablet by mouth every evening 90 tablet 0    albuterol sulfate  (90 Base) MCG/ACT inhaler Inhale 2 puffs into the lungs every 6 hours as needed for Wheezing or Shortness of Breath 18 g 3    Lactobacillus (ACIDOPHILUS) CAPS capsule take 1 tablet by mouth twice a day      levETIRAcetam (KEPPRA) 750 MG tablet Take 750 mg by mouth 2 times daily      fluticasone-salmeterol (ADVAIR) 250-50 MCG/DOSE AEPB Inhale 1 puff into the lungs every 12 hours 60 each 3    VRAYLAR 3 MG CAPS capsule take 1 tablet by mouth once daily      escitalopram (LEXAPRO) 10 MG tablet       cetirizine (ZYRTEC) 10 MG tablet Take 1 tablet by mouth daily 30 tablet 0    benztropine (COGENTIN) 0.5 MG tablet take 1 tablet by mouth twice a day  0    [DISCONTINUED] losartan (COZAAR) 100 MG tablet take 1 tablet by mouth once daily 30 tablet 3    [DISCONTINUED] cloNIDine (CATAPRES) 0.1 MG/24HR PTWK apply 1 patch every week as directed 4 patch 5    rivaroxaban (XARELTO) 15 MG TABS tablet Take 1 tablet by mouth 2 times daily (with meals) for 41 doses 42 tablet 0    [DISCONTINUED] docusate sodium (COLACE) 100 MG capsule Take 2 capsules by mouth daily as needed for Constipation (Patient not taking: Reported on 6/3/2022) 60 capsule 0    [DISCONTINUED] atorvastatin (LIPITOR) 10 MG tablet take 1 tablet by mouth once daily 90 tablet 1    [DISCONTINUED] Cyanocobalamin (B-12) 1000 MCG SUBL Place 1 tablet under the tongue daily 90 tablet 5    [DISCONTINUED] folic acid (FOLVITE) 1 MG tablet Take 1 tablet by mouth daily 90 tablet 5    [DISCONTINUED] Calcium-Magnesium 200-50 MG TABS Take 1 tablet by mouth daily (Patient not taking: Reported on 6/3/2022) 90 tablet 2    [DISCONTINUED] cloNIDine (CATAPRES) 0.1 MG/24HR PTWK apply 1 patch every week as directed 4 patch 0    [DISCONTINUED] cloNIDine (CATAPRES) 0.1 MG/24HR PTWK apply 1 patch every week as directed 4 patch 0     No facility-administered encounter medications on file as of 6/3/2022.     20 minutes spent with patient counseling/educating on acute/chronic medical health problems, medications,  along with treatment options. Reviewed multiple labs/imaging/medications with patient during this time. Following Diet discussion/education was done on Dash Diet, Diabetic Diet and Cholesterol Diet . In addition Exercise plan and depression screen were discussed. Recent labs/imaging were discussed and reviewed with patient.

## 2022-06-16 ENCOUNTER — HOSPITAL ENCOUNTER (OUTPATIENT)
Age: 47
Discharge: HOME | End: 2022-06-16
Payer: COMMERCIAL

## 2022-06-16 DIAGNOSIS — R73.03 BORDERLINE DIABETES: ICD-10-CM

## 2022-06-16 DIAGNOSIS — Z13.29 SCREENING FOR THYROID DISORDER: ICD-10-CM

## 2022-06-16 DIAGNOSIS — E53.8 FOLATE DEFICIENCY: ICD-10-CM

## 2022-06-16 DIAGNOSIS — E55.9 VITAMIN D DEFICIENCY: ICD-10-CM

## 2022-06-16 DIAGNOSIS — E53.8 COBALAMIN DEFICIENCY: ICD-10-CM

## 2022-06-16 DIAGNOSIS — E78.5 HYPERLIPIDEMIA, UNSPECIFIED HYPERLIPIDEMIA TYPE: ICD-10-CM

## 2022-06-16 DIAGNOSIS — E55.9 VITAMIN D DEFICIENCY: Primary | ICD-10-CM

## 2022-06-16 LAB
ALBUMIN SERPL-MCNC: 3.8 G/DL (ref 3.5–5.2)
ALBUMIN/GLOBULIN RATIO: 1 (ref 1–2.5)
ALP BLD-CCNC: 139 U/L (ref 35–104)
ALT SERPL-CCNC: 26 U/L (ref 5–33)
ANION GAP SERPL CALCULATED.3IONS-SCNC: 15 MMOL/L (ref 9–17)
AST SERPL-CCNC: 22 U/L
BILIRUB SERPL-MCNC: 0.19 MG/DL (ref 0.3–1.2)
BUN BLDV-MCNC: 12 MG/DL (ref 6–20)
CALCIUM SERPL-MCNC: 9.3 MG/DL (ref 8.6–10.4)
CHLORIDE BLD-SCNC: 104 MMOL/L (ref 98–107)
CHOLESTEROL/HDL RATIO: 3.9
CHOLESTEROL: 174 MG/DL
CO2: 19 MMOL/L (ref 20–31)
CREAT SERPL-MCNC: 0.74 MG/DL (ref 0.5–0.9)
CREATININE URINE: 258.1 MG/DL (ref 28–217)
ESTIMATED AVERAGE GLUCOSE: 128 MG/DL
FOLATE: 13.7 NG/ML
GFR AFRICAN AMERICAN: >60 ML/MIN
GFR NON-AFRICAN AMERICAN: >60 ML/MIN
GFR SERPL CREATININE-BSD FRML MDRD: ABNORMAL ML/MIN/{1.73_M2}
GLUCOSE BLD-MCNC: 94 MG/DL (ref 70–99)
HBA1C MFR BLD: 6.1 % (ref 4–6)
HCT VFR BLD CALC: 38.6 % (ref 36.3–47.1)
HDLC SERPL-MCNC: 45 MG/DL
HEMOGLOBIN: 12.3 G/DL (ref 11.9–15.1)
LDL CHOLESTEROL: 111 MG/DL (ref 0–130)
MCH RBC QN AUTO: 23.7 PG (ref 25.2–33.5)
MCHC RBC AUTO-ENTMCNC: 31.9 G/DL (ref 28.4–34.8)
MCV RBC AUTO: 74.5 FL (ref 82.6–102.9)
MICROALBUMIN/CREAT 24H UR: <12 MG/L
MICROALBUMIN/CREAT UR-RTO: ABNORMAL MCG/MG CREAT
NRBC AUTOMATED: 0 PER 100 WBC
PDW BLD-RTO: 15.9 % (ref 11.8–14.4)
PLATELET # BLD: ABNORMAL K/UL (ref 138–453)
PLATELET, FLUORESCENCE: 212 K/UL (ref 138–453)
PLATELET, IMMATURE FRACTION: 5.5 % (ref 1.1–10.3)
POTASSIUM SERPL-SCNC: 5.2 MMOL/L (ref 3.7–5.3)
RBC # BLD: 5.18 M/UL (ref 3.95–5.11)
SODIUM BLD-SCNC: 138 MMOL/L (ref 135–144)
THYROXINE, FREE: 1.15 NG/DL (ref 0.93–1.7)
TOTAL PROTEIN: 7.5 G/DL (ref 6.4–8.3)
TRIGL SERPL-MCNC: 92 MG/DL
TSH SERPL DL<=0.05 MIU/L-ACNC: 1.32 UIU/ML (ref 0.3–5)
VITAMIN B-12: 593 PG/ML (ref 232–1245)
VITAMIN D 25-HYDROXY: 24.7 NG/ML
WBC # BLD: 8 K/UL (ref 3.5–11.3)

## 2022-06-16 PROCEDURE — 82043 UR ALBUMIN QUANTITATIVE: CPT

## 2022-06-16 PROCEDURE — 36415 COLL VENOUS BLD VENIPUNCTURE: CPT

## 2022-06-16 PROCEDURE — 84439 ASSAY OF FREE THYROXINE: CPT

## 2022-06-16 PROCEDURE — 84443 ASSAY THYROID STIM HORMONE: CPT

## 2022-06-16 PROCEDURE — 83036 HEMOGLOBIN GLYCOSYLATED A1C: CPT

## 2022-06-16 PROCEDURE — 85055 RETICULATED PLATELET ASSAY: CPT

## 2022-06-16 PROCEDURE — 82746 ASSAY OF FOLIC ACID SERUM: CPT

## 2022-06-16 PROCEDURE — 85027 COMPLETE CBC AUTOMATED: CPT

## 2022-06-16 PROCEDURE — 82306 VITAMIN D 25 HYDROXY: CPT

## 2022-06-16 PROCEDURE — 80053 COMPREHEN METABOLIC PANEL: CPT

## 2022-06-16 PROCEDURE — 82570 ASSAY OF URINE CREATININE: CPT

## 2022-06-16 PROCEDURE — 80061 LIPID PANEL: CPT

## 2022-06-16 PROCEDURE — 82607 VITAMIN B-12: CPT

## 2022-06-16 RX ORDER — ERGOCALCIFEROL 1.25 MG/1
50000 CAPSULE ORAL WEEKLY
Qty: 12 CAPSULE | Refills: 1 | Status: SHIPPED | OUTPATIENT
Start: 2022-06-16

## 2022-06-27 ENCOUNTER — OFFICE VISIT (OUTPATIENT)
Dept: FAMILY MEDICINE CLINIC | Age: 47
End: 2022-06-27
Payer: COMMERCIAL

## 2022-06-27 VITALS
SYSTOLIC BLOOD PRESSURE: 120 MMHG | WEIGHT: 228 LBS | DIASTOLIC BLOOD PRESSURE: 83 MMHG | HEART RATE: 74 BPM | TEMPERATURE: 97.2 F | HEIGHT: 65 IN | OXYGEN SATURATION: 96 % | BODY MASS INDEX: 37.99 KG/M2

## 2022-06-27 DIAGNOSIS — I10 ESSENTIAL HYPERTENSION: ICD-10-CM

## 2022-06-27 DIAGNOSIS — E11.9 NEW ONSET TYPE 2 DIABETES MELLITUS (HCC): ICD-10-CM

## 2022-06-27 DIAGNOSIS — I27.82 CHRONIC PULMONARY EMBOLISM, UNSPECIFIED PULMONARY EMBOLISM TYPE, UNSPECIFIED WHETHER ACUTE COR PULMONALE PRESENT (HCC): Primary | ICD-10-CM

## 2022-06-27 DIAGNOSIS — J44.9 CHRONIC OBSTRUCTIVE PULMONARY DISEASE, UNSPECIFIED COPD TYPE (HCC): ICD-10-CM

## 2022-06-27 DIAGNOSIS — K21.9 GASTROESOPHAGEAL REFLUX DISEASE, UNSPECIFIED WHETHER ESOPHAGITIS PRESENT: ICD-10-CM

## 2022-06-27 DIAGNOSIS — Z72.0 TOBACCO ABUSE DISORDER: ICD-10-CM

## 2022-06-27 DIAGNOSIS — E11.69 TYPE 2 DIABETES MELLITUS WITH OTHER SPECIFIED COMPLICATION, WITHOUT LONG-TERM CURRENT USE OF INSULIN (HCC): ICD-10-CM

## 2022-06-27 DIAGNOSIS — E78.5 HYPERLIPIDEMIA, UNSPECIFIED HYPERLIPIDEMIA TYPE: ICD-10-CM

## 2022-06-27 DIAGNOSIS — Z71.6 TOBACCO ABUSE COUNSELING: ICD-10-CM

## 2022-06-27 DIAGNOSIS — Z86.711 HISTORY OF PULMONARY EMBOLISM: ICD-10-CM

## 2022-06-27 DIAGNOSIS — F43.10 PTSD (POST-TRAUMATIC STRESS DISORDER): ICD-10-CM

## 2022-06-27 DIAGNOSIS — R79.89 ELEVATED LFTS: ICD-10-CM

## 2022-06-27 DIAGNOSIS — Z79.01 LONG TERM CURRENT USE OF ANTICOAGULANT THERAPY: ICD-10-CM

## 2022-06-27 DIAGNOSIS — F31.9 BIPOLAR 1 DISORDER (HCC): ICD-10-CM

## 2022-06-27 DIAGNOSIS — Z86.32 HISTORY OF GESTATIONAL DIABETES: ICD-10-CM

## 2022-06-27 DIAGNOSIS — E53.8 FOLATE DEFICIENCY: ICD-10-CM

## 2022-06-27 DIAGNOSIS — E55.9 VITAMIN D DEFICIENCY: ICD-10-CM

## 2022-06-27 PROCEDURE — 3023F SPIROM DOC REV: CPT | Performed by: FAMILY MEDICINE

## 2022-06-27 PROCEDURE — 3044F HG A1C LEVEL LT 7.0%: CPT | Performed by: FAMILY MEDICINE

## 2022-06-27 PROCEDURE — 4004F PT TOBACCO SCREEN RCVD TLK: CPT | Performed by: FAMILY MEDICINE

## 2022-06-27 PROCEDURE — G8427 DOCREV CUR MEDS BY ELIG CLIN: HCPCS | Performed by: FAMILY MEDICINE

## 2022-06-27 PROCEDURE — 2022F DILAT RTA XM EVC RTNOPTHY: CPT | Performed by: FAMILY MEDICINE

## 2022-06-27 PROCEDURE — 99214 OFFICE O/P EST MOD 30 MIN: CPT | Performed by: FAMILY MEDICINE

## 2022-06-27 PROCEDURE — G8417 CALC BMI ABV UP PARAM F/U: HCPCS | Performed by: FAMILY MEDICINE

## 2022-06-27 RX ORDER — LOSARTAN POTASSIUM 100 MG/1
TABLET ORAL
Qty: 30 TABLET | Refills: 0 | Status: SHIPPED | OUTPATIENT
Start: 2022-06-27 | End: 2022-07-28

## 2022-06-27 RX ORDER — ACETAMINOPHEN 160 MG
1 TABLET,DISINTEGRATING ORAL DAILY
Qty: 90 CAPSULE | Refills: 5 | Status: SHIPPED | OUTPATIENT
Start: 2022-06-27

## 2022-06-27 RX ORDER — METFORMIN HYDROCHLORIDE 500 MG/1
500 TABLET, EXTENDED RELEASE ORAL
Qty: 90 TABLET | Refills: 1 | Status: SHIPPED | OUTPATIENT
Start: 2022-06-27

## 2022-06-27 RX ORDER — ATORVASTATIN CALCIUM 10 MG/1
TABLET, FILM COATED ORAL
Qty: 30 TABLET | Refills: 0 | Status: SHIPPED | OUTPATIENT
Start: 2022-06-27 | End: 2022-07-28

## 2022-06-27 RX ORDER — HYDRALAZINE HYDROCHLORIDE 10 MG/1
TABLET, FILM COATED ORAL
Qty: 90 TABLET | Refills: 3 | Status: SHIPPED | OUTPATIENT
Start: 2022-06-27

## 2022-06-27 RX ORDER — ALBUTEROL SULFATE 90 UG/1
2 AEROSOL, METERED RESPIRATORY (INHALATION) EVERY 6 HOURS PRN
Qty: 18 G | Refills: 3 | Status: CANCELLED | OUTPATIENT
Start: 2022-06-27

## 2022-06-27 ASSESSMENT — ENCOUNTER SYMPTOMS
VOICE CHANGE: 0
EYE PAIN: 0
ANAL BLEEDING: 0
TROUBLE SWALLOWING: 0
BLOOD IN STOOL: 0
ABDOMINAL PAIN: 0
CONSTIPATION: 0
EYE REDNESS: 0
BACK PAIN: 0
COUGH: 0
DIARRHEA: 0
ABDOMINAL DISTENTION: 0
NAUSEA: 0
VOMITING: 0
CHEST TIGHTNESS: 0
EYE DISCHARGE: 0
RECTAL PAIN: 0
SINUS PRESSURE: 0
COLOR CHANGE: 0
SHORTNESS OF BREATH: 0

## 2022-06-27 ASSESSMENT — ANXIETY QUESTIONNAIRES
7. FEELING AFRAID AS IF SOMETHING AWFUL MIGHT HAPPEN: 0
1. FEELING NERVOUS, ANXIOUS, OR ON EDGE: 0
3. WORRYING TOO MUCH ABOUT DIFFERENT THINGS: 0
IF YOU CHECKED OFF ANY PROBLEMS ON THIS QUESTIONNAIRE, HOW DIFFICULT HAVE THESE PROBLEMS MADE IT FOR YOU TO DO YOUR WORK, TAKE CARE OF THINGS AT HOME, OR GET ALONG WITH OTHER PEOPLE: NOT DIFFICULT AT ALL
2. NOT BEING ABLE TO STOP OR CONTROL WORRYING: 0
6. BECOMING EASILY ANNOYED OR IRRITABLE: 0
5. BEING SO RESTLESS THAT IT IS HARD TO SIT STILL: 0
GAD7 TOTAL SCORE: 0
4. TROUBLE RELAXING: 0

## 2022-06-27 ASSESSMENT — PATIENT HEALTH QUESTIONNAIRE - PHQ9
SUM OF ALL RESPONSES TO PHQ QUESTIONS 1-9: 0
4. FEELING TIRED OR HAVING LITTLE ENERGY: 0
3. TROUBLE FALLING OR STAYING ASLEEP: 0
9. THOUGHTS THAT YOU WOULD BE BETTER OFF DEAD, OR OF HURTING YOURSELF: 0
2. FEELING DOWN, DEPRESSED OR HOPELESS: 0
7. TROUBLE CONCENTRATING ON THINGS, SUCH AS READING THE NEWSPAPER OR WATCHING TELEVISION: 0
10. IF YOU CHECKED OFF ANY PROBLEMS, HOW DIFFICULT HAVE THESE PROBLEMS MADE IT FOR YOU TO DO YOUR WORK, TAKE CARE OF THINGS AT HOME, OR GET ALONG WITH OTHER PEOPLE: 0
1. LITTLE INTEREST OR PLEASURE IN DOING THINGS: 0
6. FEELING BAD ABOUT YOURSELF - OR THAT YOU ARE A FAILURE OR HAVE LET YOURSELF OR YOUR FAMILY DOWN: 0
8. MOVING OR SPEAKING SO SLOWLY THAT OTHER PEOPLE COULD HAVE NOTICED. OR THE OPPOSITE, BEING SO FIGETY OR RESTLESS THAT YOU HAVE BEEN MOVING AROUND A LOT MORE THAN USUAL: 0
SUM OF ALL RESPONSES TO PHQ QUESTIONS 1-9: 0
SUM OF ALL RESPONSES TO PHQ9 QUESTIONS 1 & 2: 0
SUM OF ALL RESPONSES TO PHQ QUESTIONS 1-9: 0
5. POOR APPETITE OR OVEREATING: 0
SUM OF ALL RESPONSES TO PHQ QUESTIONS 1-9: 0

## 2022-06-27 NOTE — PROGRESS NOTES
Subjective:      Patient ID: Hussein Marquis is a 42 y.o. female. HERE FOR FOLLOW UP OF LABS. STATES LABS COMPLETED. BP IS UNDER CONTROL. STATES less stress lately and BP is better and mood and sleep as well as breathing is better. States sees Dr Tamar Johnson for follow up of her COPD and [place] . She was started on loading dose xarelto in dec- was taking same dose till now. Will reduce dose to 20 mg daily an dhave Pulm take over this. States stopped singulair as she did notthink it was helping. States she is ok excepet when she walks a lot. States not used rescue in a while, uses advair daily  GERD sx are also under control on PPI. Review of Systems   Constitutional: Negative for activity change, appetite change and fatigue. HENT: Negative for dental problem, ear pain, hearing loss, postnasal drip, sinus pressure, sneezing, tinnitus, trouble swallowing and voice change. Eyes: Negative for pain, discharge, redness and visual disturbance. Respiratory: Negative for cough, chest tightness and shortness of breath. Cardiovascular: Negative for chest pain, palpitations and leg swelling. Gastrointestinal: Negative for abdominal distention, abdominal pain, anal bleeding, blood in stool, constipation, diarrhea, nausea, rectal pain and vomiting. Endocrine: Negative for cold intolerance, heat intolerance, polydipsia, polyphagia and polyuria. Genitourinary: Negative for decreased urine volume, difficulty urinating, dyspareunia, dysuria, enuresis, flank pain, frequency, genital sores, hematuria, menstrual problem, pelvic pain, urgency, vaginal bleeding and vaginal discharge. Musculoskeletal: Negative for arthralgias, back pain, gait problem, joint swelling, myalgias, neck pain and neck stiffness. Skin: Negative for color change, pallor and rash. Allergic/Immunologic: Negative for environmental allergies, food allergies and immunocompromised state.    Neurological: Negative for dizziness, tremors, seizures, syncope, facial asymmetry, speech difficulty, weakness, light-headedness, numbness and headaches. Hematological: Negative for adenopathy. Does not bruise/bleed easily. Psychiatric/Behavioral: Negative for agitation, behavioral problems, confusion, decreased concentration, sleep disturbance and suicidal ideas. The patient is not nervous/anxious. Objective:   Physical Exam  Constitutional:       General: She is not in acute distress. HENT:      Head: Normocephalic and atraumatic. Right Ear: External ear normal.      Left Ear: External ear normal.      Nose: Nose normal.      Mouth/Throat:      Mouth: Mucous membranes are moist.   Eyes:      Conjunctiva/sclera: Conjunctivae normal.      Pupils: Pupils are equal, round, and reactive to light. Neck:      Thyroid: No thyromegaly. Trachea: No tracheal deviation. Cardiovascular:      Rate and Rhythm: Normal rate and regular rhythm. Heart sounds: No murmur heard. No friction rub. No gallop. Pulmonary:      Effort: Pulmonary effort is normal. No respiratory distress. Breath sounds: Normal breath sounds. No stridor. No wheezing or rales. Chest:      Chest wall: No tenderness. Abdominal:      General: Bowel sounds are normal. There is no distension. Palpations: Abdomen is soft. Tenderness: There is no abdominal tenderness. There is no rebound. Musculoskeletal:         General: Normal range of motion. Cervical back: Normal range of motion. Lymphadenopathy:      Cervical: No cervical adenopathy. Skin:     General: Skin is warm. Coloration: Skin is not pale. Findings: No erythema or rash. Neurological:      General: No focal deficit present. Mental Status: She is alert and oriented to person, place, and time. Mental status is at baseline. Cranial Nerves: No cranial nerve deficit. Motor: No abnormal muscle tone.       Deep Tendon Reflexes: Reflexes normal.   Psychiatric: Mood and Affect: Mood normal.         Behavior: Behavior normal.         Thought Content: Thought content normal.         Judgment: Judgment normal.      Visual inspection:  Deformity/amputation: absent  Skin lesions/pre-ulcerative calluses: absent  Edema: right- negative, left- negative    Sensory exam:  Monofilament sensation: normal  (minimum of 5 random plantar locations tested, avoiding callused areas - > 1 area with absence of sensation is + for neuropathy)    Plus at least one of the following:  Pulses: normal,   Pinprick: N/A  Proprioception: Intact  Vibration (128 Hz): N/A    Vitals:    06/27/22 1333   BP: 120/83   Pulse: 74   Temp: 97.2 °F (36.2 °C)   SpO2: 96%         Assessment:      Diagnosis Orders   1. Chronic pulmonary embolism, unspecified pulmonary embolism type, unspecified whether acute cor pulmonale present (HCC)  rivaroxaban (XARELTO) 20 MG TABS tablet   2. Type 2 diabetes mellitus with other specified complication, without long-term current use of insulin (HCC)  metFORMIN (GLUCOPHAGE-XR) 500 MG extended release tablet    Hemoglobin A1C    Microalbumin, Ur   3. New onset type 2 diabetes mellitus (HCC)  metFORMIN (GLUCOPHAGE-XR) 500 MG extended release tablet   4. History of gestational diabetes  metFORMIN (GLUCOPHAGE-XR) 500 MG extended release tablet   5. Vitamin D deficiency  Cholecalciferol (VITAMIN D3) 50 MCG (2000 UT) CAPS   6.  Elevated LFTs  Comprehensive Metabolic Panel         Plan:     Orders Placed This Encounter   Procedures    Hemoglobin A1C    Comprehensive Metabolic Panel    Microalbumin, Ur       Outpatient Encounter Medications as of 6/27/2022   Medication Sig Dispense Refill    hydrALAZINE (APRESOLINE) 10 MG tablet take 1 tablet by mouth three times a day if needed FOR SYSTOLIC BLOOD PRESSURE GREATER THAN 150 90 tablet 3    losartan (COZAAR) 100 MG tablet take 1 tablet by mouth once daily 30 tablet 0    atorvastatin (LIPITOR) 10 MG tablet take 1 tablet by mouth once daily 30 tablet 0    rivaroxaban (XARELTO) 20 MG TABS tablet Take 1 tablet by mouth daily (with breakfast) 10 tablet 0    metFORMIN (GLUCOPHAGE-XR) 500 MG extended release tablet Take 1 tablet by mouth daily (with breakfast) 90 tablet 1    Cholecalciferol (VITAMIN D3) 50 MCG (2000 UT) CAPS Take 1 capsule by mouth daily 90 capsule 5    vitamin D (ERGOCALCIFEROL) 1.25 MG (69400 UT) CAPS capsule Take 1 capsule by mouth once a week 12 capsule 1    cloNIDine (CATAPRES-TTS-2) 0.2 MG/24HR PTWK       Cyanocobalamin (B-12) 1000 MCG SUBL Place 1 tablet under the tongue daily 90 tablet 5    folic acid (FOLVITE) 1 MG tablet Take 1 tablet by mouth daily 90 tablet 1    triamterene-hydroCHLOROthiazide (DYAZIDE) 37.5-25 MG per capsule take 1 capsule by mouth every morning 30 capsule 3    omeprazole (PRILOSEC) 20 MG delayed release capsule take 1 capsule by mouth twice a day BEFORE A MEAL 180 capsule 1    montelukast (SINGULAIR) 10 MG tablet take 1 tablet by mouth every evening 90 tablet 0    albuterol sulfate  (90 Base) MCG/ACT inhaler Inhale 2 puffs into the lungs every 6 hours as needed for Wheezing or Shortness of Breath 18 g 3    Lactobacillus (ACIDOPHILUS) CAPS capsule take 1 tablet by mouth twice a day      fluticasone-salmeterol (ADVAIR) 250-50 MCG/DOSE AEPB Inhale 1 puff into the lungs every 12 hours 60 each 3    VRAYLAR 3 MG CAPS capsule take 1 tablet by mouth once daily      escitalopram (LEXAPRO) 10 MG tablet       cetirizine (ZYRTEC) 10 MG tablet Take 1 tablet by mouth daily 30 tablet 0    benztropine (COGENTIN) 0.5 MG tablet take 1 tablet by mouth twice a day  0    [DISCONTINUED] rivaroxaban (XARELTO) 15 MG TABS tablet Take 1 tablet by mouth 2 times daily (with meals) for 41 doses 42 tablet 0    levETIRAcetam (KEPPRA) 750 MG tablet Take 750 mg by mouth 2 times daily (Patient not taking: Reported on 6/27/2022)      [DISCONTINUED] cloNIDine (CATAPRES) 0.1 MG/24HR PTWK apply 1 patch every week as directed 4 patch 0    [DISCONTINUED] cloNIDine (CATAPRES) 0.1 MG/24HR PTWK apply 1 patch every week as directed 4 patch 0     No facility-administered encounter medications on file as of 6/27/2022.     20 minutes spent with patient counseling/educating on acute/chronic medical health problems, medications,  along with treatment options. Reviewed multiple labs/imaging/medications with patient during this time. Following Diet discussion/education was done on Diabetic Diet. In addition Exercise plan and depression screen were discussed. Recent labs/imaging were discussed and reviewed with patient.

## 2022-07-06 DIAGNOSIS — I27.82 CHRONIC PULMONARY EMBOLISM, UNSPECIFIED PULMONARY EMBOLISM TYPE, UNSPECIFIED WHETHER ACUTE COR PULMONALE PRESENT (HCC): ICD-10-CM

## 2022-07-06 NOTE — TELEPHONE ENCOUNTER
Pt called and wanted to know if you would put a refill in for her blood thinners called and asked PCP and she was told to ask you to fill     Med is pending to sign please let me know when done or if not  thanks

## 2022-07-11 ENCOUNTER — HOSPITAL ENCOUNTER (EMERGENCY)
Age: 47
Discharge: HOME | End: 2022-07-12
Attending: EMERGENCY MEDICINE
Payer: COMMERCIAL

## 2022-07-11 ENCOUNTER — APPOINTMENT (OUTPATIENT)
Dept: CT IMAGING | Age: 47
End: 2022-07-11
Payer: COMMERCIAL

## 2022-07-11 ENCOUNTER — APPOINTMENT (OUTPATIENT)
Dept: GENERAL RADIOLOGY | Age: 47
End: 2022-07-11
Payer: COMMERCIAL

## 2022-07-11 VITALS
HEART RATE: 74 BPM | RESPIRATION RATE: 22 BRPM | DIASTOLIC BLOOD PRESSURE: 94 MMHG | TEMPERATURE: 98.2 F | HEIGHT: 66 IN | SYSTOLIC BLOOD PRESSURE: 143 MMHG | BODY MASS INDEX: 37.51 KG/M2 | WEIGHT: 233.4 LBS | OXYGEN SATURATION: 98 %

## 2022-07-11 DIAGNOSIS — R51.9 ACUTE NONINTRACTABLE HEADACHE, UNSPECIFIED HEADACHE TYPE: ICD-10-CM

## 2022-07-11 DIAGNOSIS — R07.9 CHEST PAIN, UNSPECIFIED TYPE: Primary | ICD-10-CM

## 2022-07-11 LAB
ABSOLUTE EOS #: 0.09 K/UL (ref 0–0.44)
ABSOLUTE IMMATURE GRANULOCYTE: 0.01 K/UL (ref 0–0.3)
ABSOLUTE LYMPH #: 3.79 K/UL (ref 1.1–3.7)
ABSOLUTE MONO #: 0.56 K/UL (ref 0.1–1.2)
ANION GAP SERPL CALCULATED.3IONS-SCNC: 12 MMOL/L (ref 9–17)
BASOPHILS # BLD: 0 % (ref 0–2)
BASOPHILS ABSOLUTE: <0.03 K/UL (ref 0–0.2)
BUN BLDV-MCNC: 6 MG/DL (ref 6–20)
BUN/CREAT BLD: 7 (ref 9–20)
CALCIUM SERPL-MCNC: 8.6 MG/DL (ref 8.6–10.4)
CHLORIDE BLD-SCNC: 103 MMOL/L (ref 98–107)
CO2: 24 MMOL/L (ref 20–31)
CREAT SERPL-MCNC: 0.88 MG/DL (ref 0.5–0.9)
EOSINOPHILS RELATIVE PERCENT: 1 % (ref 1–4)
GFR AFRICAN AMERICAN: >60 ML/MIN
GFR NON-AFRICAN AMERICAN: >60 ML/MIN
GFR SERPL CREATININE-BSD FRML MDRD: ABNORMAL ML/MIN/{1.73_M2}
GLUCOSE BLD-MCNC: 98 MG/DL (ref 70–99)
HCT VFR BLD CALC: 36.6 % (ref 36.3–47.1)
HEMOGLOBIN: 11.6 G/DL (ref 11.9–15.1)
IMMATURE GRANULOCYTES: 0 %
LYMPHOCYTES # BLD: 57 % (ref 24–43)
MAGNESIUM: 1.8 MG/DL (ref 1.6–2.6)
MCH RBC QN AUTO: 23.6 PG (ref 25.2–33.5)
MCHC RBC AUTO-ENTMCNC: 31.7 G/DL (ref 28.4–34.8)
MCV RBC AUTO: 74.5 FL (ref 82.6–102.9)
MONOCYTES # BLD: 8 % (ref 3–12)
NRBC AUTOMATED: 0 PER 100 WBC
PDW BLD-RTO: 15.5 % (ref 11.8–14.4)
PLATELET # BLD: 245 K/UL (ref 138–453)
PMV BLD AUTO: 10.6 FL (ref 8.1–13.5)
POTASSIUM SERPL-SCNC: 3.8 MMOL/L (ref 3.7–5.3)
PRO-BNP: 431 PG/ML
RBC # BLD: 4.91 M/UL (ref 3.95–5.11)
RBC # BLD: ABNORMAL 10*6/UL
SEG NEUTROPHILS: 34 % (ref 36–65)
SEGMENTED NEUTROPHILS ABSOLUTE COUNT: 2.27 K/UL (ref 1.5–8.1)
SODIUM BLD-SCNC: 139 MMOL/L (ref 135–144)
TROPONIN, HIGH SENSITIVITY: <6 NG/L (ref 0–14)
WBC # BLD: 6.7 K/UL (ref 3.5–11.3)

## 2022-07-11 PROCEDURE — 80048 BASIC METABOLIC PNL TOTAL CA: CPT

## 2022-07-11 PROCEDURE — 36415 COLL VENOUS BLD VENIPUNCTURE: CPT

## 2022-07-11 PROCEDURE — 96375 TX/PRO/DX INJ NEW DRUG ADDON: CPT

## 2022-07-11 PROCEDURE — 96374 THER/PROPH/DIAG INJ IV PUSH: CPT

## 2022-07-11 PROCEDURE — 83880 ASSAY OF NATRIURETIC PEPTIDE: CPT

## 2022-07-11 PROCEDURE — 70450 CT HEAD/BRAIN W/O DYE: CPT

## 2022-07-11 PROCEDURE — 6370000000 HC RX 637 (ALT 250 FOR IP): Performed by: NURSE PRACTITIONER

## 2022-07-11 PROCEDURE — 85025 COMPLETE CBC W/AUTO DIFF WBC: CPT

## 2022-07-11 PROCEDURE — 99285 EMERGENCY DEPT VISIT HI MDM: CPT

## 2022-07-11 PROCEDURE — 83735 ASSAY OF MAGNESIUM: CPT

## 2022-07-11 PROCEDURE — 84484 ASSAY OF TROPONIN QUANT: CPT

## 2022-07-11 PROCEDURE — 71045 X-RAY EXAM CHEST 1 VIEW: CPT

## 2022-07-11 PROCEDURE — 93005 ELECTROCARDIOGRAM TRACING: CPT | Performed by: EMERGENCY MEDICINE

## 2022-07-11 PROCEDURE — 6360000002 HC RX W HCPCS: Performed by: NURSE PRACTITIONER

## 2022-07-11 RX ORDER — ACETAMINOPHEN 500 MG
1000 TABLET ORAL ONCE
Status: COMPLETED | OUTPATIENT
Start: 2022-07-12 | End: 2022-07-11

## 2022-07-11 RX ORDER — HYDRALAZINE HYDROCHLORIDE 20 MG/ML
10 INJECTION INTRAMUSCULAR; INTRAVENOUS ONCE
Status: COMPLETED | OUTPATIENT
Start: 2022-07-11 | End: 2022-07-11

## 2022-07-11 RX ORDER — MORPHINE SULFATE 4 MG/ML
4 INJECTION, SOLUTION INTRAMUSCULAR; INTRAVENOUS ONCE
Status: COMPLETED | OUTPATIENT
Start: 2022-07-11 | End: 2022-07-11

## 2022-07-11 RX ORDER — ONDANSETRON 2 MG/ML
4 INJECTION INTRAMUSCULAR; INTRAVENOUS ONCE
Status: COMPLETED | OUTPATIENT
Start: 2022-07-11 | End: 2022-07-11

## 2022-07-11 RX ADMIN — ACETAMINOPHEN 1000 MG: 500 TABLET ORAL at 23:58

## 2022-07-11 RX ADMIN — ONDANSETRON 4 MG: 2 INJECTION INTRAMUSCULAR; INTRAVENOUS at 21:24

## 2022-07-11 RX ADMIN — HYDRALAZINE HYDROCHLORIDE 10 MG: 20 INJECTION INTRAMUSCULAR; INTRAVENOUS at 22:52

## 2022-07-11 RX ADMIN — MORPHINE SULFATE 4 MG: 4 INJECTION, SOLUTION INTRAMUSCULAR; INTRAVENOUS at 21:24

## 2022-07-11 ASSESSMENT — PAIN SCALES - GENERAL
PAINLEVEL_OUTOF10: 8

## 2022-07-11 ASSESSMENT — ENCOUNTER SYMPTOMS
WHEEZING: 0
VOMITING: 0
NAUSEA: 0
BACK PAIN: 0
COUGH: 0
ABDOMINAL PAIN: 0
SHORTNESS OF BREATH: 0
PHOTOPHOBIA: 0

## 2022-07-11 ASSESSMENT — PAIN DESCRIPTION - LOCATION: LOCATION: CHEST

## 2022-07-11 ASSESSMENT — PAIN - FUNCTIONAL ASSESSMENT: PAIN_FUNCTIONAL_ASSESSMENT: 0-10

## 2022-07-11 ASSESSMENT — VISUAL ACUITY: OU: 1

## 2022-07-12 LAB
EKG ATRIAL RATE: 69 BPM
EKG P AXIS: 43 DEGREES
EKG P-R INTERVAL: 190 MS
EKG Q-T INTERVAL: 414 MS
EKG QRS DURATION: 76 MS
EKG QTC CALCULATION (BAZETT): 443 MS
EKG R AXIS: -12 DEGREES
EKG T AXIS: 12 DEGREES
EKG VENTRICULAR RATE: 69 BPM
TROPONIN, HIGH SENSITIVITY: <6 NG/L (ref 0–14)

## 2022-07-12 PROCEDURE — 93010 ELECTROCARDIOGRAM REPORT: CPT | Performed by: INTERNAL MEDICINE

## 2022-07-12 NOTE — ED PROVIDER NOTES
[address] [facility] ED  EMERGENCY DEPARTMENT ENCOUNTER   ATTENDING ATTESTATION     Pt Name: Shree Barrios  MRN: [id number]  [place] 1975  Date of evaluation: 7/12/22       Shree Barrios is a 42 y.o. female who presents with Chest Pain (started yesterday), Headache, and Hypertension      MDM:     53-year-old female presents with complaints of chest pain and a headache. The patient's blood pressure and chest pain have improved, she has troponins x2 that are unremarkable, we will discharge her home, outpatient follow-up with her PCP, return if symptoms worsen or change. Impression: Chest pain, headache    Vitals:   Vitals:    07/11/22 2245 07/11/22 2300 07/11/22 2315 07/11/22 2330   BP: (!) 167/107 (!) 148/93 (!) 141/96 (!) 143/94   Pulse: 65 68 71 74   Resp: 21 22 24 22   Temp:       TempSrc:       SpO2:       Weight:       Height: This visit was performed by both a physician and an APC. I personally evaluated and examined the patient.  I performed all aspects of the MDM as documented     Mee Gibbons MD  Attending Emergency  Physician                  Raman Medina MD  07/12/22 3452

## 2022-07-12 NOTE — ED PROVIDER NOTES
EEG was normal.  A MRI of the thoracic spine in 2015 was normal. Relevant history of cervical myelopathy with an abnormal MRI of the cervical spine in January 2015. A follow-up MRI of the brain in April 2017 was unchanged. A MRI of the cervical spine in June 2    Chiari I malformation ([address] 6/21/2017    A MRI of the brain in 2014 was suspicious for pseudotumor cerebri but a spinal tap revealed a normal opening pressure of 16 cm water. An EEG was normal.  A MRI of the thoracic spine in 2015 was normal. Relevant history of cervical myelopathy with an abnormal MRI of the cervical spine in January 2015. A follow-up MRI of the brain in April 2017 was unchanged.   A MRI of the cervical spine in June 2    Chiari malformation type I ([address] 6/21/2017    Chronic headache disorder 11/14/2017    Chronic idiopathic constipation 6/7/2018    Deep vein thrombosis (DVT) of lower extremity ([address] 6/1/2018    Deep vein thrombosis (DVT) of proximal lower extremity ([address] 6/1/2018    Deep vein thrombosis (DVT) with pulmonary embolism present on admission ([address] 12/15/2021    Dental infection 12/15/2021    Diverticulitis of sigmoid colon 7/3/2017    Diverticulitis of sigmoid colon 7/3/2017    DVT (deep vein thrombosis) in pregnancy 12/15/2021    DVT of deep femoral vein, bilateral (HCC)     Erosive gastritis 9/15/2021    Excessive consumption of soda pop 5/12/2021    Excessive consumption of soda pop 5/12/2021    Failure of outpatient treatment     Family history of diabetes mellitus 3/22/2016    Family history of malignant neoplasm of breast 3/22/2016    Family history of malignant neoplasm of uterus 3/22/2016    Family history of throat cancer 3/22/2016    FH: breast cancer 3/22/2016    FH: uterine cancer 3/22/2016    Gastroesophageal reflux disease with esophagitis without hemorrhage     GERD (gastroesophageal reflux disease)     History of abnormal uterine bleeding 7/3/2017    History of diabetes mellitus 3/22/2016    History of hysterectomy for benign disease 3/22/2016    History of pulmonary embolism 10/4/2019    History of pulmonary embolism 10/4/2019    Hyperlipidemia     Irritable bowel syndrome     MDRO (multiple drug resistant organisms) resistance     MRSA in  per pt.  Non-compliance with treatment 2017    Non-compliance with treatment 2017    Noncompliance with treatment 2017    Obesity     Recurrent major depressive episodes, moderate ([address] 2014    Saddle embolus of pulmonary artery without acute cor pulmonale (HCC)     Seizures ([address]     Last Seizure 18    Suicidal intent     2003.  Denies 18    Tobacco abuse disorder 3/22/2016    Trichomonal infection 5901    Umbilical hernia          SURGICAL HISTORY       Past Surgical History:   Procedure Laterality Date    ABSCESS DRAINAGE      abdominal abscess RLQ    AXILLARY SURGERY Right 2018    Excision of hidradenitis cysts, right axilla    BREAST LUMPECTOMY Bilateral     BREAST LUMPECTOMY       SECTION      x2    COLONOSCOPY N/A 2021    COLONOSCOPY DIAGNOSTIC performed by Bladimir Benedict MD at [address] [place] [address], DIAGNOSTIC  2014    HERNIA REPAIR      HYSTERECTOMY (CERVIX STATUS UNKNOWN)      LYMPH NODE DISSECTION Left 2019    LEFT AXILLARY HYDRADENITIS EXCISION [facility]    LEFT AXILLARY HYDRADENITIS EXCISION (Left )    UT EXC SKIN BENIG <5MM TRUNK,ARM,LEG Right 2018    EXCISION HIDRADENITIS RIGHT AXILLA performed by Edin Levin DO at [facility]      UPPER GASTROINTESTINAL ENDOSCOPY  2021    EGD BIOPSY performed by Bladimir Benedict MD at [address] [place] [address]     Discharge Medication List as of 2022  1:35 AM CONTINUE these medications which have NOT CHANGED    Details   hydrALAZINE (APRESOLINE) 10 MG tablet take 1 tablet by mouth three times a day if needed FOR SYSTOLIC BLOOD PRESSURE GREATER THAN 150, Disp-90 tablet, R-3Normal      losartan (COZAAR) 100 MG tablet take 1 tablet by mouth once daily, Disp-30 tablet, R-0Normal      atorvastatin (LIPITOR) 10 MG tablet take 1 tablet by mouth once daily, Disp-30 tablet, R-0Normal      rivaroxaban (XARELTO) 20 MG TABS tablet Take 1 tablet by mouth daily (with breakfast), Disp-10 tablet, R-0Normal      metFORMIN (GLUCOPHAGE-XR) 500 MG extended release tablet Take 1 tablet by mouth daily (with breakfast), Disp-90 tablet, R-1Normal      Cholecalciferol (VITAMIN D3) 50 MCG (2000 UT) CAPS Take 1 capsule by mouth daily, Disp-90 capsule, R-5Normal      vitamin D (ERGOCALCIFEROL) 1.25 MG (08995 UT) CAPS capsule Take 1 capsule by mouth once a week, Disp-12 capsule, R-1Normal      cloNIDine (CATAPRES-TTS-2) 0.2 MG/24HR PTWK Historical Med      Cyanocobalamin (B-12) 1000 MCG SUBL Place 1 tablet under the tongue daily, Disp-90 tablet, [id number]      folic acid (FOLVITE) 1 MG tablet Take 1 tablet by mouth daily, Disp-90 tablet, R-1Normal      triamterene-hydroCHLOROthiazide (DYAZIDE) 37.5-25 MG per capsule take 1 capsule by mouth every morning, Disp-30 capsule, R-3Normal      omeprazole (PRILOSEC) 20 MG delayed release capsule take 1 capsule by mouth twice a day BEFORE A MEAL, Disp-180 capsule, R-1Normal      montelukast (SINGULAIR) 10 MG tablet take 1 tablet by mouth every evening, Disp-90 tablet, R-0Normal      albuterol sulfate  (90 Base) MCG/ACT inhaler Inhale 2 puffs into the lungs every 6 hours as needed for Wheezing or Shortness of Breath, Disp-18 g, R-3Normal      Lactobacillus (ACIDOPHILUS) CAPS capsule take 1 tablet by mouth twice a dayHistorical Med      levETIRAcetam (KEPPRA) 750 MG tablet Take 750 mg by mouth 2 times dailyHistorical Med      fluticasone-salmeterol (ADVAIR) 250-50 MCG/DOSE AEPB Inhale 1 puff into the lungs every 12 hours, Disp-60 each, R-3Normal      VRAYLAR 3 MG CAPS capsule take 1 tablet by mouth once daily, DAWHistorical Med      escitalopram (LEXAPRO) 10 MG tablet Historical Med      cetirizine (ZYRTEC) 10 MG tablet Take 1 tablet by mouth daily, Disp-30 tablet,R-0Normal      benztropine (COGENTIN) 0.5 MG tablet take 1 tablet by mouth twice a day, R-0Historical Med             ALLERGIES     Penicillins, Amoxil [amoxicillin], Bee pollen, Bee venom, Ciprofloxacin, Clindamycin/lincomycin, Elemental sulfur, Flonase [fluticasone], Keflex [cephalexin], Levaquin [levofloxacin in d5w], Levofloxacin, Macrobid [nitrofurantoin monohyd macro], Nitrofurantoin, Other, and Sulfa antibiotics    FAMILY HISTORY       Family History   Problem Relation Age of Onset    Asthma Mother     High Blood Pressure Mother     Mental Illness Mother     Stroke Other     Other Sister         blood clotting disorder, ms    Depression Sister     Cancer Maternal Grandmother     Diabetes Maternal Grandmother     Cancer Maternal Aunt     Diabetes Maternal Grandfather           SOCIAL HISTORY       Social History     Socioeconomic History    Marital status: Single     Spouse name: None    Number of children: None    Years of education: None    Highest education level: None   Occupational History    None   Tobacco Use    Smoking status: Current Every Day Smoker     Packs/day: 0.50     Years: 26.00     Pack years: 13.00     Types: Cigarettes     Start date: 0    Smokeless tobacco: Never Used    Tobacco comment: wants help- adviced about smoking cessation plans   Vaping Use    Vaping Use: Never used   Substance and Sexual Activity    Alcohol use: Yes     Comment: occ    Drug use: Yes     Types: Marijuana Cari Stephen)    Sexual activity: Yes     Partners: Male   Other Topics Concern    None   Social History Narrative    None     Social Determinants of Health     Financial Resource Strain: Medium Risk    Difficulty of Paying Living Expenses: Somewhat hard   Food Insecurity: No Food Insecurity    Worried About Running Out of Food in the Last Year: Never true    [address] of Food in the Last Year: Never true   Transportation Needs:     Lack of Transportation (Medical): Not on file    Lack of Transportation (Non-Medical): Not on file   Physical Activity:     Days of Exercise per Week: Not on file    Minutes of Exercise per Session: Not on file   Stress:     Feeling of Stress : Not on file   Social Connections:     Frequency of Communication with Friends and Family: Not on file    Frequency of Social Gatherings with Friends and Family: Not on file    Attends Orthodoxy Services: Not on file    Active Member of [employer] Group or Organizations: Not on file    Attends Club or Organization Meetings: Not on file    Marital Status: Not on file   Intimate Partner Violence:     Fear of Current or Ex-Partner: Not on file    Emotionally Abused: Not on file    Physically Abused: Not on file    Sexually Abused: Not on file   Housing Stability:     Unable to Pay for Housing in the Last Year: Not on file    Number of [place] in the Last Year: Not on file    Unstable Housing in the Last Year: Not on file         REVIEW OF SYSTEMS    (2-9 systems for level 4, 10 or more for level 5)     Review of Systems   Constitutional: Positive for activity change and fatigue. Negative for fever. Eyes: Negative for photophobia and visual disturbance. Respiratory: Negative for cough, shortness of breath and wheezing. Cardiovascular: Positive for chest pain. Negative for leg swelling. Gastrointestinal: Negative for abdominal pain, nausea and vomiting. Musculoskeletal: Negative for back pain. Neurological: Positive for headaches. Negative for dizziness, facial asymmetry, speech difficulty, weakness, light-headedness and numbness. Psychiatric/Behavioral: Negative for confusion.    All other systems reviewed and are negative. Except as noted above the remainder of the review of systems was reviewed and negative. PHYSICAL EXAM    (up to 7 for level 4, 8 or more for level 5)     ED Triage Vitals [07/11/22 1921]   BP Temp Temp Source Heart Rate Resp SpO2 Height Weight   (!) 183/97 98.2 °F (36.8 °C) Oral 69 16 98 % 5' 6\" (1.676 m) 233 lb 6.4 oz (105.9 kg)       Physical Exam  Constitutional:       General: She is not in acute distress. Appearance: Normal appearance. She is well-developed, well-groomed and overweight. HENT:      Head: Normocephalic. Right Ear: Hearing and external ear normal.      Left Ear: Hearing and external ear normal.      Nose: Nose normal.      Mouth/Throat:      Mouth: Mucous membranes are moist.   Eyes:      General: Lids are normal. Vision grossly intact. Extraocular Movements: Extraocular movements intact. Conjunctiva/sclera: Conjunctivae normal.      Pupils: Pupils are equal, round, and reactive to light. Cardiovascular:      Rate and Rhythm: Normal rate and regular rhythm. Pulses: Normal pulses. Heart sounds: Normal heart sounds, S1 normal and S2 normal.   Pulmonary:      Effort: Pulmonary effort is normal.      Breath sounds: Normal breath sounds. No decreased breath sounds, wheezing, rhonchi or rales. Abdominal:      General: Bowel sounds are normal.      Palpations: Abdomen is soft. Tenderness: There is no abdominal tenderness. Musculoskeletal:         General: Normal range of motion. Cervical back: Full passive range of motion without pain, normal range of motion and neck supple. Right lower leg: No edema. Left lower leg: No edema. Skin:     General: Skin is warm and dry. Neurological:      General: No focal deficit present. Mental Status: She is alert and oriented to person, place, and time. GCS: GCS eye subscore is 4. GCS verbal subscore is 5. GCS motor subscore is 6.       Cranial Nerves: Cranial SOFT TISSUES/SKULL:  No acute abnormality of the visualized skull or soft tissues. No acute intracranial abnormality. XR CHEST PORTABLE    Result Date: 7/11/2022  EXAMINATION: ONE XRAY VIEW OF THE CHEST 7/11/2022 6:22 pm COMPARISON: 10/25/2021 HISTORY: ORDERING SYSTEM PROVIDED HISTORY: chest pain TECHNOLOGIST PROVIDED HISTORY: chest pain Reason for Exam: Pt states chest pain, high BP x 1 day FINDINGS: . The cardiac size is normal. No acute infiltrates or pleural effusions are seen. Pulmonary vascularity appears normal. There is mild ectasia of the thoracic aorta. . No acute bony abnormalities. The hilar structures are normal.     No acute cardiopulmonary disease     Interpretation per the Radiologist below, if available at the time of this note:    CT HEAD WO CONTRAST   Final Result   No acute intracranial abnormality. XR CHEST PORTABLE   Final Result   No acute cardiopulmonary disease               EDBEDSIDE ULTRASOUND:   Performed by Yury Mosqueda - none    LABS:  Labs Reviewed   BASIC METABOLIC PANEL - Abnormal; Notable for the following components:       Result Value    Bun/Cre Ratio 7 (*)     All other components within normal limits   CBC WITH AUTO DIFFERENTIAL - Abnormal; Notable for the following components:    Hemoglobin 11.6 (*)     MCV 74.5 (*)     MCH 23.6 (*)     RDW 15.5 (*)     Seg Neutrophils 34 (*)     Lymphocytes 57 (*)     Absolute Lymph # 3.79 (*)     All other components within normal limits   BRAIN NATRIURETIC PEPTIDE - Abnormal; Notable for the following components:    Pro- (*)     All other components within normal limits   MAGNESIUM   TROPONIN   TROPONIN       All other labs were within normal range or not returned as of this dictation. EMERGENCY DEPARTMENT COURSE andDIFFERENTIAL DIAGNOSIS/MDM:   CT head chest x-ray no acute findings per radiologist.  Labs reviewed. Chest x-ray no acute disease. No neurological deficits. Patient was given pain, Tylenol, Zofran. She was given IV hydralazine and her blood pressure improved. Patient was signed out to Dr. Tray Aguilar at the end of my shift. Vitals:    Vitals:    07/11/22 2245 07/11/22 2300 07/11/22 2315 07/11/22 2330   BP: (!) 167/107 (!) 148/93 (!) 141/96 (!) 143/94   Pulse: 65 68 71 74   Resp: 21 22 24 22   Temp:       TempSrc:       SpO2:       Weight:       Height:             CONSULTS:  None    RES:  Procedures    FINAL IMPRESSION      1. Chest pain, unspecified type    2.  Acute nonintractable headache, unspecified headache type          DISPOSITION/PLAN   DISPOSITION Decision To Discharge 07/12/2022 01:34:57 AM      PATIENT REFERRED TO:   Shahzad Baird MD  [address] [place] [address]  [phone]    Schedule an appointment as soon as possible for a visit in 2 days        DISCHARGE MEDICATIONS:     Discharge Medication List as of 7/12/2022  1:35 AM        Electronically signed by ELIZABETH Odell 7/13/2022 at 3:02 PM            ELIZABETH Odell CNP  07/13/22 1503

## 2022-07-14 ENCOUNTER — TELEPHONE (OUTPATIENT)
Dept: FAMILY MEDICINE CLINIC | Age: 47
End: 2022-07-14

## 2022-07-14 NOTE — TELEPHONE ENCOUNTER
[facility]) ED Follow up Call    Reason for ED visit:  BP    7/14/2022     Hi Alexandr , this is Luis A Martel  from Dr. Kaylah Carrillo office, just calling to see how you are doing after your recent ED visit. Did you receive discharge instructions? Yes  Do you understand the discharge instructions? Yes  Did the ED give you any new prescriptions? No:   Were you able to fill your prescriptions? No:       Do you have one of our red, yellow and green  Zone sheets that help you to determine when you should go to the ED? Not Applicable      Do you need or want to make a follow up appt with your PCP? Yes    Do you have any further needs in the home, e.g. equipment?   No        FU appts/Provider:    Future Appointments   Date Time Provider Migueilto Busby   8/2/2022 10:00 AM ELIZABETH Dumont - CNP Resp Spec [id number]   10/4/2022  1:15 PM Melani Gleason MD [address] [place] [address]

## 2022-07-20 ENCOUNTER — OFFICE VISIT (OUTPATIENT)
Dept: FAMILY MEDICINE CLINIC | Age: 47
End: 2022-07-20
Payer: COMMERCIAL

## 2022-07-20 VITALS
BODY MASS INDEX: 35.97 KG/M2 | OXYGEN SATURATION: 99 % | WEIGHT: 223.8 LBS | SYSTOLIC BLOOD PRESSURE: 115 MMHG | DIASTOLIC BLOOD PRESSURE: 84 MMHG | HEIGHT: 66 IN | TEMPERATURE: 98.1 F | HEART RATE: 83 BPM

## 2022-07-20 DIAGNOSIS — Z79.01 LONG TERM CURRENT USE OF ANTICOAGULANT THERAPY: ICD-10-CM

## 2022-07-20 DIAGNOSIS — Z86.711 HISTORY OF PULMONARY EMBOLISM: ICD-10-CM

## 2022-07-20 DIAGNOSIS — J44.9 CHRONIC OBSTRUCTIVE PULMONARY DISEASE, UNSPECIFIED COPD TYPE (HCC): ICD-10-CM

## 2022-07-20 DIAGNOSIS — K58.9 IRRITABLE BOWEL SYNDROME, UNSPECIFIED TYPE: ICD-10-CM

## 2022-07-20 DIAGNOSIS — Z71.6 TOBACCO ABUSE COUNSELING: ICD-10-CM

## 2022-07-20 DIAGNOSIS — D64.9 ANEMIA, UNSPECIFIED TYPE: ICD-10-CM

## 2022-07-20 DIAGNOSIS — N39.0 URINARY TRACT INFECTION WITHOUT HEMATURIA, SITE UNSPECIFIED: ICD-10-CM

## 2022-07-20 DIAGNOSIS — E53.8 FOLATE DEFICIENCY: ICD-10-CM

## 2022-07-20 DIAGNOSIS — E66.9 OBESITY WITH SERIOUS COMORBIDITY, UNSPECIFIED CLASSIFICATION, UNSPECIFIED OBESITY TYPE: ICD-10-CM

## 2022-07-20 DIAGNOSIS — F43.10 PTSD (POST-TRAUMATIC STRESS DISORDER): ICD-10-CM

## 2022-07-20 DIAGNOSIS — E55.9 VITAMIN D DEFICIENCY: ICD-10-CM

## 2022-07-20 DIAGNOSIS — F31.9 BIPOLAR 1 DISORDER (HCC): ICD-10-CM

## 2022-07-20 DIAGNOSIS — K21.9 GASTROESOPHAGEAL REFLUX DISEASE, UNSPECIFIED WHETHER ESOPHAGITIS PRESENT: ICD-10-CM

## 2022-07-20 DIAGNOSIS — Z72.0 TOBACCO ABUSE DISORDER: ICD-10-CM

## 2022-07-20 DIAGNOSIS — I10 ESSENTIAL HYPERTENSION: ICD-10-CM

## 2022-07-20 DIAGNOSIS — E78.5 HYPERLIPIDEMIA, UNSPECIFIED HYPERLIPIDEMIA TYPE: ICD-10-CM

## 2022-07-20 DIAGNOSIS — E11.9 NEW ONSET TYPE 2 DIABETES MELLITUS (HCC): Primary | ICD-10-CM

## 2022-07-20 DIAGNOSIS — E53.8 COBALAMIN DEFICIENCY: ICD-10-CM

## 2022-07-20 LAB
BILIRUBIN, POC: NORMAL
BLOOD URINE, POC: NORMAL
CLARITY, POC: NORMAL
COLOR, POC: NORMAL
GLUCOSE URINE, POC: NORMAL
KETONES, POC: NORMAL
LEUKOCYTE EST, POC: NORMAL
NITRITE, POC: NORMAL
PH, POC: 6.5
PROTEIN, POC: 30
SPECIFIC GRAVITY, POC: 1.02
UROBILINOGEN, POC: 1

## 2022-07-20 PROCEDURE — 2022F DILAT RTA XM EVC RTNOPTHY: CPT | Performed by: FAMILY MEDICINE

## 2022-07-20 PROCEDURE — 99214 OFFICE O/P EST MOD 30 MIN: CPT | Performed by: FAMILY MEDICINE

## 2022-07-20 PROCEDURE — 4004F PT TOBACCO SCREEN RCVD TLK: CPT | Performed by: FAMILY MEDICINE

## 2022-07-20 PROCEDURE — 3023F SPIROM DOC REV: CPT | Performed by: FAMILY MEDICINE

## 2022-07-20 PROCEDURE — 3044F HG A1C LEVEL LT 7.0%: CPT | Performed by: FAMILY MEDICINE

## 2022-07-20 PROCEDURE — G8427 DOCREV CUR MEDS BY ELIG CLIN: HCPCS | Performed by: FAMILY MEDICINE

## 2022-07-20 PROCEDURE — G8417 CALC BMI ABV UP PARAM F/U: HCPCS | Performed by: FAMILY MEDICINE

## 2022-07-20 PROCEDURE — 81003 URINALYSIS AUTO W/O SCOPE: CPT | Performed by: FAMILY MEDICINE

## 2022-07-20 RX ORDER — CEPHALEXIN 500 MG/1
CAPSULE ORAL
COMMUNITY
Start: 2022-07-16

## 2022-07-20 RX ORDER — METRONIDAZOLE 500 MG/1
TABLET ORAL
COMMUNITY
Start: 2022-07-16

## 2022-07-20 ASSESSMENT — ANXIETY QUESTIONNAIRES
IF YOU CHECKED OFF ANY PROBLEMS ON THIS QUESTIONNAIRE, HOW DIFFICULT HAVE THESE PROBLEMS MADE IT FOR YOU TO DO YOUR WORK, TAKE CARE OF THINGS AT HOME, OR GET ALONG WITH OTHER PEOPLE: NOT DIFFICULT AT ALL
7. FEELING AFRAID AS IF SOMETHING AWFUL MIGHT HAPPEN: 0
6. BECOMING EASILY ANNOYED OR IRRITABLE: 0
2. NOT BEING ABLE TO STOP OR CONTROL WORRYING: 0
5. BEING SO RESTLESS THAT IT IS HARD TO SIT STILL: 0
4. TROUBLE RELAXING: 0
1. FEELING NERVOUS, ANXIOUS, OR ON EDGE: 0
3. WORRYING TOO MUCH ABOUT DIFFERENT THINGS: 0
GAD7 TOTAL SCORE: 0

## 2022-07-20 ASSESSMENT — PATIENT HEALTH QUESTIONNAIRE - PHQ9
3. TROUBLE FALLING OR STAYING ASLEEP: 0
10. IF YOU CHECKED OFF ANY PROBLEMS, HOW DIFFICULT HAVE THESE PROBLEMS MADE IT FOR YOU TO DO YOUR WORK, TAKE CARE OF THINGS AT HOME, OR GET ALONG WITH OTHER PEOPLE: 1
SUM OF ALL RESPONSES TO PHQ QUESTIONS 1-9: 1
SUM OF ALL RESPONSES TO PHQ QUESTIONS 1-9: 1
7. TROUBLE CONCENTRATING ON THINGS, SUCH AS READING THE NEWSPAPER OR WATCHING TELEVISION: 0
SUM OF ALL RESPONSES TO PHQ9 QUESTIONS 1 & 2: 0
1. LITTLE INTEREST OR PLEASURE IN DOING THINGS: 0
3. TROUBLE FALLING OR STAYING ASLEEP: 3
SUM OF ALL RESPONSES TO PHQ QUESTIONS 1-9: 23
5. POOR APPETITE OR OVEREATING: 0
5. POOR APPETITE OR OVEREATING: 3
2. FEELING DOWN, DEPRESSED OR HOPELESS: 0
9. THOUGHTS THAT YOU WOULD BE BETTER OFF DEAD, OR OF HURTING YOURSELF: 0
SUM OF ALL RESPONSES TO PHQ9 QUESTIONS 1 & 2: 5
SUM OF ALL RESPONSES TO PHQ QUESTIONS 1-9: 1
SUM OF ALL RESPONSES TO PHQ QUESTIONS 1-9: 23
4. FEELING TIRED OR HAVING LITTLE ENERGY: 3
2. FEELING DOWN, DEPRESSED OR HOPELESS: 3
8. MOVING OR SPEAKING SO SLOWLY THAT OTHER PEOPLE COULD HAVE NOTICED. OR THE OPPOSITE, BEING SO FIGETY OR RESTLESS THAT YOU HAVE BEEN MOVING AROUND A LOT MORE THAN USUAL: 0
10. IF YOU CHECKED OFF ANY PROBLEMS, HOW DIFFICULT HAVE THESE PROBLEMS MADE IT FOR YOU TO DO YOUR WORK, TAKE CARE OF THINGS AT HOME, OR GET ALONG WITH OTHER PEOPLE: 1
7. TROUBLE CONCENTRATING ON THINGS, SUCH AS READING THE NEWSPAPER OR WATCHING TELEVISION: 3
9. THOUGHTS THAT YOU WOULD BE BETTER OFF DEAD, OR OF HURTING YOURSELF: 0
SUM OF ALL RESPONSES TO PHQ QUESTIONS 1-9: 23
6. FEELING BAD ABOUT YOURSELF - OR THAT YOU ARE A FAILURE OR HAVE LET YOURSELF OR YOUR FAMILY DOWN: 3
SUM OF ALL RESPONSES TO PHQ QUESTIONS 1-9: 1
8. MOVING OR SPEAKING SO SLOWLY THAT OTHER PEOPLE COULD HAVE NOTICED. OR THE OPPOSITE, BEING SO FIGETY OR RESTLESS THAT YOU HAVE BEEN MOVING AROUND A LOT MORE THAN USUAL: 3
6. FEELING BAD ABOUT YOURSELF - OR THAT YOU ARE A FAILURE OR HAVE LET YOURSELF OR YOUR FAMILY DOWN: 0
4. FEELING TIRED OR HAVING LITTLE ENERGY: 1
SUM OF ALL RESPONSES TO PHQ QUESTIONS 1-9: 23
1. LITTLE INTEREST OR PLEASURE IN DOING THINGS: 2

## 2022-07-20 ASSESSMENT — COLUMBIA-SUICIDE SEVERITY RATING SCALE - C-SSRS
6. HAVE YOU EVER DONE ANYTHING, STARTED TO DO ANYTHING, OR PREPARED TO DO ANYTHING TO END YOUR LIFE?: NO
2. HAVE YOU ACTUALLY HAD ANY THOUGHTS OF KILLING YOURSELF?: NO
2. HAVE YOU ACTUALLY HAD ANY THOUGHTS OF KILLING YOURSELF?: NO
6. HAVE YOU EVER DONE ANYTHING, STARTED TO DO ANYTHING, OR PREPARED TO DO ANYTHING TO END YOUR LIFE?: NO
1. WITHIN THE PAST MONTH, HAVE YOU WISHED YOU WERE DEAD OR WISHED YOU COULD GO TO SLEEP AND NOT WAKE UP?: NO
1. WITHIN THE PAST MONTH, HAVE YOU WISHED YOU WERE DEAD OR WISHED YOU COULD GO TO SLEEP AND NOT WAKE UP?: NO

## 2022-07-20 ASSESSMENT — ENCOUNTER SYMPTOMS
CONSTIPATION: 0
SHORTNESS OF BREATH: 0
BACK PAIN: 0
DIARRHEA: 0
COUGH: 0
COLOR CHANGE: 0
ABDOMINAL PAIN: 0

## 2022-07-20 NOTE — PROGRESS NOTES
Subjective:      Patient ID: Ryan Stone is a 42 y.o. female.  was under a lot of stress and had a bad HA x 2 days and went to the ER  and was treated. States labs completed -CT was done and discharged.  was supposed to see neuro but has not seen or called them yet. Also was told to see Dr Vasquez Last - has appt on august 2 nd. States mood, sleep, appetite ok now. Bowels and bladder ok. GERD sx are ok as well.  recently had UTI and was treated for UTI last weekend , sx resolved   Review of Systems   Constitutional:  Negative for activity change, appetite change, chills, diaphoresis and fatigue. HENT:  Negative for dental problem. Eyes:  Negative for visual disturbance. Respiratory:  Negative for cough and shortness of breath. Cardiovascular:  Negative for chest pain and leg swelling. Gastrointestinal:  Negative for abdominal pain, constipation and diarrhea. Endocrine: Negative for polydipsia, polyphagia and polyuria. Genitourinary:  Negative for decreased urine volume. Musculoskeletal:  Negative for arthralgias, back pain and myalgias. Skin:  Negative for color change, pallor and rash. Neurological:  Negative for dizziness, weakness, light-headedness and headaches. Psychiatric/Behavioral:  Negative for behavioral problems, confusion and sleep disturbance. The patient is not nervous/anxious. Objective:   Physical Exam  Constitutional:       General: She is not in acute distress. HENT:      Head: Normocephalic and atraumatic. Right Ear: External ear normal.      Left Ear: External ear normal.      Nose: Nose normal.      Mouth/Throat:      Mouth: Mucous membranes are moist.   Eyes:      Conjunctiva/sclera: Conjunctivae normal.      Pupils: Pupils are equal, round, and reactive to light. Neck:      Thyroid: No thyromegaly. Trachea: No tracheal deviation. Cardiovascular:      Rate and Rhythm: Normal rate and regular rhythm.       Pulses: Normal pulses. Heart sounds: Normal heart sounds. No murmur heard. No friction rub. No gallop. Pulmonary:      Effort: Pulmonary effort is normal. No respiratory distress. Breath sounds: Normal breath sounds. No stridor. No wheezing or rales. Chest:      Chest wall: No tenderness. Abdominal:      General: Bowel sounds are normal. There is no distension. Palpations: Abdomen is soft. Tenderness: There is no abdominal tenderness. There is no rebound. Musculoskeletal:         General: Normal range of motion. Cervical back: Normal range of motion. Lymphadenopathy:      Cervical: No cervical adenopathy. Skin:     General: Skin is warm. Coloration: Skin is not pale. Findings: No erythema or rash. Neurological:      General: No focal deficit present. Mental Status: She is alert and oriented to person, place, and time. Mental status is at baseline. Cranial Nerves: No cranial nerve deficit. Motor: No abnormal muscle tone. Deep Tendon Reflexes: Reflexes normal.   Psychiatric:         Mood and Affect: Mood normal.         Behavior: Behavior normal.         Thought Content: Thought content normal.         Judgment: Judgment normal.        Vitals:    07/20/22 1034   BP: 115/84   Pulse: 83   Temp: 98.1 °F (36.7 °C)   SpO2: 99%         Assessment:      Diagnosis Orders   1. New onset type 2 diabetes mellitus ([facility]        2. Tobacco abuse disorder        3. Essential hypertension        4. Vitamin D deficiency        5. Folate deficiency        6. Gastroesophageal reflux disease, unspecified whether esophagitis present        7. Cobalamin deficiency        8. Anemia, unspecified type        9. Bipolar 1 disorder ([address]        10. PTSD (post-traumatic stress disorder)        11. Irritable bowel syndrome, unspecified type        12. Long term current use of anticoagulant therapy        13. History of pulmonary embolism        14. Tobacco abuse counseling        15. Hyperlipidemia, unspecified hyperlipidemia type        16. Chronic obstructive pulmonary disease, unspecified COPD type ([facility]        17.  Obesity with serious comorbidity, unspecified classification, unspecified obesity type              Plan:     Orders Placed This Encounter   Procedures    POCT Urinalysis No Micro (Auto)       Outpatient Encounter Medications as of 7/20/2022   Medication Sig Dispense Refill    cephALEXin (KEFLEX) 500 MG capsule take 1 capsule by mouth twice a day      metroNIDAZOLE (FLAGYL) 500 MG tablet take 1 tablet by mouth twice a day      XARELTO 20 MG TABS tablet take 1 tablet by mouth EVERY MORNING WITH BREAKFAST 20 tablet 0    hydrALAZINE (APRESOLINE) 10 MG tablet take 1 tablet by mouth three times a day if needed FOR SYSTOLIC BLOOD PRESSURE GREATER THAN 150 90 tablet 3    losartan (COZAAR) 100 MG tablet take 1 tablet by mouth once daily 30 tablet 0    atorvastatin (LIPITOR) 10 MG tablet take 1 tablet by mouth once daily 30 tablet 0    metFORMIN (GLUCOPHAGE-XR) 500 MG extended release tablet Take 1 tablet by mouth daily (with breakfast) 90 tablet 1    Cholecalciferol (VITAMIN D3) 50 MCG (2000 UT) CAPS Take 1 capsule by mouth daily 90 capsule 5    vitamin D (ERGOCALCIFEROL) 1.25 MG (71980 UT) CAPS capsule Take 1 capsule by mouth once a week 12 capsule 1    cloNIDine (CATAPRES) 0.2 MG/24HR PTWK       Cyanocobalamin (B-12) 1000 MCG SUBL Place 1 tablet under the tongue daily 90 tablet 5    folic acid (FOLVITE) 1 MG tablet Take 1 tablet by mouth daily 90 tablet 1    triamterene-hydroCHLOROthiazide (DYAZIDE) 37.5-25 MG per capsule take 1 capsule by mouth every morning 30 capsule 3    omeprazole (PRILOSEC) 20 MG delayed release capsule take 1 capsule by mouth twice a day BEFORE A MEAL 180 capsule 1    montelukast (SINGULAIR) 10 MG tablet take 1 tablet by mouth every evening 90 tablet 0    albuterol sulfate  (90 Base) MCG/ACT inhaler Inhale 2 puffs into the lungs every 6 hours as needed for Wheezing or Shortness of Breath 18 g 3    Lactobacillus (ACIDOPHILUS) CAPS capsule take 1 tablet by mouth twice a day      levETIRAcetam (KEPPRA) 750 MG tablet Take 750 mg by mouth in the morning and 750 mg before bedtime. fluticasone-salmeterol (ADVAIR) 250-50 MCG/DOSE AEPB Inhale 1 puff into the lungs every 12 hours 60 each 3    VRAYLAR 3 MG CAPS capsule take 1 tablet by mouth once daily      escitalopram (LEXAPRO) 10 MG tablet       cetirizine (ZYRTEC) 10 MG tablet Take 1 tablet by mouth daily 30 tablet 0    benztropine (COGENTIN) 0.5 MG tablet take 1 tablet by mouth twice a day  0    [DISCONTINUED] cloNIDine (CATAPRES) 0.1 MG/24HR PTWK apply 1 patch every week as directed 4 patch 0    [DISCONTINUED] cloNIDine (CATAPRES) 0.1 MG/24HR PTWK apply 1 patch every week as directed 4 patch 0     No facility-administered encounter medications on file as of 7/20/2022.      20 minutes spent with patient counseling/educating on acute/chronic medical health problems, medications,  along with treatment options. Reviewed multiple labs/imaging/medications with patient during this time. Following Diet discussion/education was done on Dash Diet and Diabetic Diet. In addition Exercise plan and depression screen were discussed. Recent labs/imaging were discussed and reviewed with patient.

## 2022-07-28 DIAGNOSIS — E78.5 HYPERLIPIDEMIA, UNSPECIFIED HYPERLIPIDEMIA TYPE: ICD-10-CM

## 2022-07-28 DIAGNOSIS — I10 ESSENTIAL HYPERTENSION: ICD-10-CM

## 2022-07-28 RX ORDER — LOSARTAN POTASSIUM 100 MG/1
TABLET ORAL
Qty: 30 TABLET | Refills: 0 | Status: SHIPPED | OUTPATIENT
Start: 2022-07-28

## 2022-07-28 RX ORDER — ATORVASTATIN CALCIUM 10 MG/1
TABLET, FILM COATED ORAL
Qty: 30 TABLET | Refills: 0 | Status: SHIPPED | OUTPATIENT
Start: 2022-07-28

## 2022-07-30 DIAGNOSIS — I27.82 CHRONIC PULMONARY EMBOLISM, UNSPECIFIED PULMONARY EMBOLISM TYPE, UNSPECIFIED WHETHER ACUTE COR PULMONALE PRESENT (HCC): ICD-10-CM

## 2022-07-30 RX ORDER — RIVAROXABAN 20 MG/1
TABLET, FILM COATED ORAL
Qty: 20 TABLET | Refills: 0 | Status: SHIPPED | OUTPATIENT
Start: 2022-07-30 | End: 2022-08-04 | Stop reason: SDUPTHER

## 2022-08-04 ENCOUNTER — OFFICE VISIT (OUTPATIENT)
Dept: PULMONOLOGY | Age: 47
End: 2022-08-04
Payer: COMMERCIAL

## 2022-08-04 VITALS
SYSTOLIC BLOOD PRESSURE: 138 MMHG | HEART RATE: 75 BPM | HEIGHT: 66 IN | WEIGHT: 231 LBS | BODY MASS INDEX: 37.12 KG/M2 | DIASTOLIC BLOOD PRESSURE: 96 MMHG | RESPIRATION RATE: 16 BRPM

## 2022-08-04 DIAGNOSIS — J45.40 MODERATE PERSISTENT ASTHMA WITHOUT COMPLICATION: ICD-10-CM

## 2022-08-04 DIAGNOSIS — R06.83 SNORING: ICD-10-CM

## 2022-08-04 DIAGNOSIS — I27.82 CHRONIC PULMONARY EMBOLISM, UNSPECIFIED PULMONARY EMBOLISM TYPE, UNSPECIFIED WHETHER ACUTE COR PULMONALE PRESENT (HCC): ICD-10-CM

## 2022-08-04 DIAGNOSIS — F17.200 SMOKING: ICD-10-CM

## 2022-08-04 DIAGNOSIS — G47.33 OSA (OBSTRUCTIVE SLEEP APNEA): ICD-10-CM

## 2022-08-04 DIAGNOSIS — I82.413 DVT OF DEEP FEMORAL VEIN, BILATERAL (HCC): ICD-10-CM

## 2022-08-04 DIAGNOSIS — I26.92 SADDLE EMBOLUS OF PULMONARY ARTERY WITHOUT ACUTE COR PULMONALE, UNSPECIFIED CHRONICITY (HCC): Primary | ICD-10-CM

## 2022-08-04 DIAGNOSIS — E66.09 OBESITY DUE TO EXCESS CALORIES, UNSPECIFIED OBESITY SEVERITY: ICD-10-CM

## 2022-08-04 DIAGNOSIS — I27.24 CTEPH (CHRONIC THROMBOEMBOLIC PULMONARY HYPERTENSION) (HCC): ICD-10-CM

## 2022-08-04 DIAGNOSIS — I87.009 POSTPHLEBITIC SYNDROME WITHOUT COMPLICATION: ICD-10-CM

## 2022-08-04 DIAGNOSIS — Z79.01 ANTICOAGULANT LONG-TERM USE: ICD-10-CM

## 2022-08-04 PROCEDURE — 99214 OFFICE O/P EST MOD 30 MIN: CPT | Performed by: INTERNAL MEDICINE

## 2022-08-04 PROCEDURE — G8417 CALC BMI ABV UP PARAM F/U: HCPCS | Performed by: INTERNAL MEDICINE

## 2022-08-04 PROCEDURE — G8427 DOCREV CUR MEDS BY ELIG CLIN: HCPCS | Performed by: INTERNAL MEDICINE

## 2022-08-04 PROCEDURE — 4004F PT TOBACCO SCREEN RCVD TLK: CPT | Performed by: INTERNAL MEDICINE

## 2022-08-04 RX ORDER — FLUTICASONE PROPIONATE AND SALMETEROL 250; 50 UG/1; UG/1
1 POWDER RESPIRATORY (INHALATION) EVERY 12 HOURS
Qty: 60 EACH | Refills: 5 | Status: SHIPPED | OUTPATIENT
Start: 2022-08-04

## 2022-08-04 NOTE — PROGRESS NOTES
PULMONARY OP progress note          REFERRED BY: [id number] [facility],[address], MD    REASON FOR CONSULTATION: Recent pulmonary embolism with history of COPD    Patient is being seen in follow-up for-  1. Saddle embolus of pulmonary artery without acute cor pulmonale, unspecified chronicity ([address]    2. Moderate persistent asthma without complication    3. Obesity due to excess calories, unspecified obesity severity    4. Postphlebitic syndrome without complication    5. Smoking    6. Anticoagulant long-term use    7. DVT of deep femoral vein, bilateral ([address]    8. ELIZABETH (obstructive sleep apnea)    9. CTEPH (chronic thromboembolic pulmonary hypertension) ([facility]    10. Snoring        HISTORY OF PRESENT ILLNESS:    Parvin Perea is a 42y.o. year old female here for evaluation of above problems. Patient is on anticoagulants  Denied any shortness of breath or wheezing  No associated cough or sputum production. Denied any wheezing  No fever, chills, night sweats. No orthopnea or PND. She did complain of having increasing pedal edema  No epistaxis or sore throat. No hemoptysis, hematemesis, melena, hematochezia, hematuria or vaginal bleeding that is significant. She continues to smoke.   Denied much of daytime sleepiness but has some fatigue and tiredness  Patient has been in bilateral lower extremity pain associated with increasing swelling  She has been followed at [facility] for her PE in the past but because of the Covid she could not follow-up  She needs follow-up with Dr. Ione Bumpers because of her pulmonary artery hypertension  She complains of chronic pain in bilateral lower extremities        PAST MEDICAL HISTORY:       Diagnosis Date    Abnormal menstrual cycle 1/19/2018    Acute deep vein thrombosis (DVT) of popliteal vein of right lower extremity (HCC)     Anemia     Asthma     Moderate persistent asthma without complication    Axillary hidradenitis suppurativa 5/6/2020    Bacterial vaginitis 4/22/2020    Bipolar 1 disorder ([address]     Borderline diabetes 3/22/2016    CHF (congestive heart failure) ([address]     When child was delivered 2005    Chiari I malformation ([address] 6/21/2017    A MRI of the brain in 2014 was suspicious for pseudotumor cerebri but a spinal tap revealed a normal opening pressure of 16 cm water. An EEG was normal.  A MRI of the thoracic spine in 2015 was normal. Relevant history of cervical myelopathy with an abnormal MRI of the cervical spine in January 2015. A follow-up MRI of the brain in April 2017 was unchanged. A MRI of the cervical spine in June 2    Chiari I malformation ([address] 6/21/2017    A MRI of the brain in 2014 was suspicious for pseudotumor cerebri but a spinal tap revealed a normal opening pressure of 16 cm water. An EEG was normal.  A MRI of the thoracic spine in 2015 was normal. Relevant history of cervical myelopathy with an abnormal MRI of the cervical spine in January 2015. A follow-up MRI of the brain in April 2017 was unchanged.   A MRI of the cervical spine in June 2    Chiari malformation type I ([address] 6/21/2017    Chronic headache disorder 11/14/2017    Chronic idiopathic constipation 6/7/2018    Deep vein thrombosis (DVT) of lower extremity ([address] 6/1/2018    Deep vein thrombosis (DVT) of proximal lower extremity ([address] 6/1/2018    Deep vein thrombosis (DVT) with pulmonary embolism present on admission ([address] 12/15/2021    Dental infection 12/15/2021    Diverticulitis of sigmoid colon 7/3/2017    Diverticulitis of sigmoid colon 7/3/2017    DVT (deep vein thrombosis) in pregnancy 12/15/2021    DVT of deep femoral vein, bilateral (HCC)     Erosive gastritis 9/15/2021    Excessive consumption of soda pop 5/12/2021    Excessive consumption of soda pop 5/12/2021    Failure of outpatient treatment     Family history of diabetes mellitus 3/22/2016    Family history of malignant neoplasm of breast 3/22/2016    Family history of malignant neoplasm of uterus 3/22/2016 Family history of throat cancer 3/22/2016    FH: breast cancer 3/22/2016    FH: uterine cancer 3/22/2016    Gastroesophageal reflux disease with esophagitis without hemorrhage     GERD (gastroesophageal reflux disease)     H/O hysterectomy for benign disease 3/22/2016    History of abnormal uterine bleeding 7/3/2017    History of diabetes mellitus 3/22/2016    History of hysterectomy for benign disease 3/22/2016    History of pulmonary embolism 10/4/2019    History of pulmonary embolism 10/4/2019    Hyperlipidemia     Irritable bowel syndrome     MDRO (multiple drug resistant organisms) resistance     MRSA in  per pt. Non-compliance with treatment 2017    Non-compliance with treatment 2017    Noncompliance with treatment 2017    Obesity     Recurrent major depressive episodes, moderate ([address] 2014    Saddle embolus of pulmonary artery without acute cor pulmonale ([address]     Seizures ([address]     Last Seizure 18    Suicidal intent     .  Denies 18    Tobacco abuse disorder 3/22/2016    Trichomonal infection 0/3/4762    Umbilical hernia        SURGICAL HISTORY:    Past Surgical History:   Procedure Laterality Date    ABSCESS DRAINAGE      abdominal abscess RLQ    AXILLARY SURGERY Right 2018    Excision of hidradenitis cysts, right axilla    BREAST LUMPECTOMY Bilateral     BREAST LUMPECTOMY       SECTION      x2    COLONOSCOPY N/A 2021    COLON[address] DIAGNOSTIC [address]qu[address]      HERNIA REPAIR      HYSTERECTOMY (CERVIX STATUS UNKNOWN)      LYMPH NODE DISSECTION Left 2019    LEFT AXILLARY HYDRADENITIS EXCISION performed by Yareli Griffin DO at [address] [facility] Left 2019    LEFT AXILLARY HYDRADENITIS EXCISION (Left )    TX EXC SKIN BENIG <5MM TRUNK,ARM,LEG Right 2018    EXCISION HIDRADENITIS RIGHT AXILLA performed by Sae Richard DO Félix at [address]     TUBAL LIGATION      UMBILICAL HERNIA REPAIR      UPPER GASTROINTESTINAL ENDOSCOPY  6/16/2021    EGD BIOPSY performed by Fernanda Patterson MD at [address]:    Allergies   Allergen Reactions    Penicillins Anaphylaxis and Rash    Amoxil [Amoxicillin]      Swelling of throat, rash and cough    Bee Pollen     Bee Venom Hives and Other (See Comments)    Ciprofloxacin     Clindamycin/Lincomycin      rash    Elemental Sulfur     Flonase [Fluticasone]      Headaches      Keflex [Cephalexin]      itching    Levaquin [Levofloxacin In D5w] Hives    Levofloxacin     Macrobid [Nitrofurantoin Monohyd Macro] Hives    Nitrofurantoin     Other Other (See Comments)    Sulfa Antibiotics Hives       MEDICATIONS:   Current Outpatient Medications   Medication Sig Dispense Refill    XARELTO 20 MG TABS tablet take 1 tablet by mouth EVERY MORNING WITH BREAKFAST 20 tablet 0    losartan (COZAAR) 100 MG tablet take 1 tablet by mouth once daily 30 tablet 0    atorvastatin (LIPITOR) 10 MG tablet take 1 tablet by mouth once daily 30 tablet 0    cephALEXin (KEFLEX) 500 MG capsule take 1 capsule by mouth twice a day      metroNIDAZOLE (FLAGYL) 500 MG tablet take 1 tablet by mouth twice a day      hydrALAZINE (APRESOLINE) 10 MG tablet take 1 tablet by mouth three times a day if needed FOR SYSTOLIC BLOOD PRESSURE GREATER THAN 150 90 tablet 3    metFORMIN (GLUCOPHAGE-XR) 500 MG extended release tablet Take 1 tablet by mouth daily (with breakfast) 90 tablet 1    Cholecalciferol (VITAMIN D3) 50 MCG (2000 UT) CAPS Take 1 capsule by mouth daily 90 capsule 5    vitamin D (ERGOCALCIFEROL) 1.25 MG (60449 UT) CAPS capsule Take 1 capsule by mouth once a week 12 capsule 1    cloNIDine (CATAPRES) 0.2 MG/24HR PTWK       Cyanocobalamin (B-12) 1000 MCG SUBL Place 1 tablet under the tongue daily 90 tablet 5    folic acid (FOLVITE) 1 MG tablet Take 1 tablet by mouth daily 90 tablet 1    triamterene-hydroCHLOROthiazide (DYAZIDE) 37.5-25 MG per capsule take 1 capsule by mouth every morning 30 capsule 3    omeprazole (PRILOSEC) 20 MG delayed release capsule take 1 capsule by mouth twice a day BEFORE A MEAL 180 capsule 1    montelukast (SINGULAIR) 10 MG tablet take 1 tablet by mouth every evening 90 tablet 0    albuterol sulfate  (90 Base) MCG/ACT inhaler Inhale 2 puffs into the lungs every 6 hours as needed for Wheezing or Shortness of Breath 18 g 3    Lactobacillus (ACIDOPHILUS) CAPS capsule take 1 tablet by mouth twice a day      levETIRAcetam (KEPPRA) 750 MG tablet Take 750 mg by mouth in the morning and 750 mg before bedtime. fluticasone-salmeterol (ADVAIR) 250-50 MCG/DOSE AEPB Inhale 1 puff into the lungs every 12 hours 60 each 3    VRAYLAR 3 MG CAPS capsule take 1 tablet by mouth once daily      escitalopram (LEXAPRO) 10 MG tablet       cetirizine (ZYRTEC) 10 MG tablet Take 1 tablet by mouth daily 30 tablet 0    benztropine (COGENTIN) 0.5 MG tablet take 1 tablet by mouth twice a day  0     No current facility-administered medications for this visit. FAMILY HISTORY: family history includes Asthma in her mother; Cancer in her maternal aunt and maternal grandmother; Depression in her sister; Diabetes in her maternal grandfather and maternal grandmother; High Blood Pressure in her mother; Mental Illness in her mother; Other in her sister; Stroke in an other family member. SOCIAL AND OCCUPATIONAL HEALTH:  The patient is a Current smoker. There  is not history of TB or TB exposure. There is not asbestos or silica dust exposure. The patient reports does not have coal, foundry, quarry or [employer] exposure. Travel history reveals no significant history of risk factors for pulm disease. There is not  history of recreational or IV drug use. The patient does not pets, dogs, cats turtles or exotic birds.         Review of Systems:  Review of Systems -   General ROS: Completed and except as mentioned above were negative   Psychological ROS:  Completed and except as mentioned above were negative  Ophthalmic ROS:  Completed and except as mentioned above were negative  ENT ROS:  Completed and except as mentioned above were negative  Allergy and Immunology ROS:  Completed and except as mentioned above were negative  Hematological and Lymphatic ROS:  Completed and except as mentioned above were negative  Endocrine ROS: Completed and except as mentioned above were negative  Breast ROS:  Completed and except as mentioned above were negative  Respiratory ROS:  Completed and except as mentioned above were negative  Cardiovascular ROS:  Completed and except as mentioned above were negative  Gastrointestinal ROS: Completed and except as mentioned above were negative  Genito-Urinary ROS:  Completed and except as mentioned above were negative  Musculoskeletal ROS:  Completed and except as mentioned above were negative  Neurological ROS:  Completed and except as mentioned above were negative  Dermatological ROS:  Completed and except as mentioned above were negative          PHYSICAL EXAMINATION:  Vitals:    08/04/22 1515 08/04/22 1519   BP: (!) 132/93 (!) 138/96   Site: Right Upper Arm Left Upper Arm   Position: Sitting Sitting   Cuff Size: Large Adult Large Adult   Pulse: 75    Resp: 16    Weight: 231 lb (104.8 kg)    Height: 5' 6\" (1.676 m)      PHYSICAL EXAMINATION:  Vitals:    08/04/22 1515 08/04/22 1519   BP: (!) 132/93 (!) 138/96   Site: Right Upper Arm Left Upper Arm   Position: Sitting Sitting   Cuff Size: Large Adult Large Adult   Pulse: 75    Resp: 16    Weight: 231 lb (104.8 kg)    Height: 5' 6\" (1.676 m)      Constitutional: Appears well, no distress,, obese  EENT: PERRLA, sclera clear, anicteric, oropharynx clear, no lesions, neck supple with midline trachea.   Neck: Supple, symmetrical, trachea midline, no adenopathy, no jvd skin normal  Respiratory: clear to auscultation, no wheezes or rales and unlabored breathing. No intercostal tenderness  Cardiovascular: regular rate and rhythm, normal S1, S2, no murmur noted  Abdomen: soft, nontender, nondistended, no masses or organomegaly  Extremities: Has bilateral pedal edema, no clubbing or cyanosis       DATA:     Venous Dopplers in August 2019 with recanalized chronic lower extremity clot in the left lower extremity    Venous Dopplers of bilateral lower extremities in August 2020 without any DVT    CT scan of the chest for pulmonary embolism did not show any evidence of acute pulmonary emboli in October 2020. There was partial resolution of chronic pulmonary emboli when compared to the previous scan along with evidence of mild to moderate pulmonary artery hypertension    Patient had a sleep study in October 23 of 2020 and it did not show any evidence of sleep apnea but showed some snoring      IMPRESSION:   1. Saddle embolus of pulmonary artery without acute cor pulmonale, unspecified chronicity ([address]    2. Moderate persistent asthma without complication    3. Obesity due to excess calories, unspecified obesity severity    4. Postphlebitic syndrome without complication    5. Smoking    6. Anticoagulant long-term use    7. DVT of deep femoral vein, bilateral ([address]    8. ELIZABETH (obstructive sleep apnea)    9. CTEPH (chronic thromboembolic pulmonary hypertension) ([address]    10. Snoring                   PLAN:         Echocardiogram to obtain annually as recommended by [address] [place] [address],[address]  She is being followed by Dr. Ione Bumpers at [facility]  She needs to be followed by Dr. Ione Bumpers as she is one of the world's authority for pulmonary artery hypertension.   Patient was informed of such and she will make an appointment to see Dr. Josefa Goltz were provided-Advair discus 250, Xarelto 20 mg  Patient was recommended to have prednisone and an antibiotic available for use during an exacerbation  Educated and clarified the medication

## 2022-08-25 RX ORDER — OMEPRAZOLE 20 MG/1
CAPSULE, DELAYED RELEASE ORAL
Qty: 180 CAPSULE | Refills: 0 | Status: SHIPPED | OUTPATIENT
Start: 2022-08-25

## 2022-09-24 DIAGNOSIS — I10 ESSENTIAL HYPERTENSION: ICD-10-CM

## 2022-09-26 RX ORDER — TRIAMTERENE AND HYDROCHLOROTHIAZIDE 37.5; 25 MG/1; MG/1
1 CAPSULE ORAL EVERY MORNING
Qty: 30 CAPSULE | Refills: 3 | Status: SHIPPED | OUTPATIENT
Start: 2022-09-26

## 2022-10-18 ENCOUNTER — HOSPITAL ENCOUNTER (OUTPATIENT)
Age: 47
Setting detail: SPECIMEN
Discharge: HOME | End: 2022-10-18
Payer: COMMERCIAL

## 2022-10-18 DIAGNOSIS — E11.69 TYPE 2 DIABETES MELLITUS WITH OTHER SPECIFIED COMPLICATION, WITHOUT LONG-TERM CURRENT USE OF INSULIN (HCC): ICD-10-CM

## 2022-10-18 DIAGNOSIS — R79.89 ELEVATED LFTS: ICD-10-CM

## 2022-10-18 LAB
ALBUMIN SERPL-MCNC: 3.8 G/DL (ref 3.5–5.2)
ALBUMIN/GLOBULIN RATIO: 1.1 (ref 1–2.5)
ALP BLD-CCNC: 117 U/L (ref 35–104)
ALT SERPL-CCNC: 13 U/L (ref 5–33)
ANION GAP SERPL CALCULATED.3IONS-SCNC: 14 MMOL/L (ref 9–17)
AST SERPL-CCNC: 12 U/L
BILIRUB SERPL-MCNC: 0.3 MG/DL (ref 0.3–1.2)
BUN BLDV-MCNC: 9 MG/DL (ref 6–20)
CALCIUM SERPL-MCNC: 8.6 MG/DL (ref 8.6–10.4)
CHLORIDE BLD-SCNC: 101 MMOL/L (ref 98–107)
CO2: 22 MMOL/L (ref 20–31)
CREAT SERPL-MCNC: 0.73 MG/DL (ref 0.5–0.9)
CREATININE URINE: 257.7 MG/DL (ref 28–217)
ESTIMATED AVERAGE GLUCOSE: 114 MG/DL
GFR SERPL CREATININE-BSD FRML MDRD: >60 ML/MIN/1.73M2
GLUCOSE BLD-MCNC: 99 MG/DL (ref 70–99)
HBA1C MFR BLD: 5.6 % (ref 4–6)
MICROALBUMIN/CREAT 24H UR: 21 MG/L
MICROALBUMIN/CREAT UR-RTO: 8 MCG/MG CREAT
POTASSIUM SERPL-SCNC: 3.3 MMOL/L (ref 3.7–5.3)
SODIUM BLD-SCNC: 137 MMOL/L (ref 135–144)
TOTAL PROTEIN: 7.3 G/DL (ref 6.4–8.3)

## 2022-10-18 PROCEDURE — 82043 UR ALBUMIN QUANTITATIVE: CPT

## 2022-10-18 PROCEDURE — 82570 ASSAY OF URINE CREATININE: CPT

## 2022-10-18 PROCEDURE — 36415 COLL VENOUS BLD VENIPUNCTURE: CPT

## 2022-10-18 PROCEDURE — 80053 COMPREHEN METABOLIC PANEL: CPT

## 2022-10-18 PROCEDURE — 83527 ASSAY OF INSULIN: CPT

## 2022-10-18 PROCEDURE — 83036 HEMOGLOBIN GLYCOSYLATED A1C: CPT

## 2022-10-18 PROCEDURE — 83525 ASSAY OF INSULIN: CPT

## 2022-10-22 LAB
INSULIN FREE: 15 UIU/ML (ref 3–25)
INSULIN: 21 UIU/ML (ref 3–25)

## 2022-11-11 ENCOUNTER — OFFICE VISIT (OUTPATIENT)
Dept: FAMILY MEDICINE CLINIC | Age: 47
End: 2022-11-11
Payer: COMMERCIAL

## 2022-11-11 VITALS
BODY MASS INDEX: 35.81 KG/M2 | OXYGEN SATURATION: 97 % | SYSTOLIC BLOOD PRESSURE: 139 MMHG | HEIGHT: 66 IN | HEART RATE: 81 BPM | TEMPERATURE: 98.1 F | WEIGHT: 222.8 LBS | DIASTOLIC BLOOD PRESSURE: 89 MMHG

## 2022-11-11 DIAGNOSIS — J45.40 MODERATE PERSISTENT ASTHMA WITHOUT COMPLICATION: ICD-10-CM

## 2022-11-11 DIAGNOSIS — F43.10 PTSD (POST-TRAUMATIC STRESS DISORDER): ICD-10-CM

## 2022-11-11 DIAGNOSIS — F12.90 MARIJUANA USE: ICD-10-CM

## 2022-11-11 DIAGNOSIS — F31.9 BIPOLAR 1 DISORDER (HCC): ICD-10-CM

## 2022-11-11 DIAGNOSIS — Z86.711 HISTORY OF PULMONARY EMBOLISM: ICD-10-CM

## 2022-11-11 DIAGNOSIS — K58.9 IRRITABLE BOWEL SYNDROME, UNSPECIFIED TYPE: ICD-10-CM

## 2022-11-11 DIAGNOSIS — E55.9 VITAMIN D DEFICIENCY: ICD-10-CM

## 2022-11-11 DIAGNOSIS — Z79.01 LONG TERM CURRENT USE OF ANTICOAGULANT THERAPY: ICD-10-CM

## 2022-11-11 DIAGNOSIS — I10 ESSENTIAL HYPERTENSION: ICD-10-CM

## 2022-11-11 DIAGNOSIS — R56.9 SEIZURES (HCC): ICD-10-CM

## 2022-11-11 DIAGNOSIS — E53.8 FOLATE DEFICIENCY: ICD-10-CM

## 2022-11-11 DIAGNOSIS — K21.9 GASTROESOPHAGEAL REFLUX DISEASE, UNSPECIFIED WHETHER ESOPHAGITIS PRESENT: ICD-10-CM

## 2022-11-11 DIAGNOSIS — E66.9 OBESITY WITH SERIOUS COMORBIDITY, UNSPECIFIED CLASSIFICATION, UNSPECIFIED OBESITY TYPE: ICD-10-CM

## 2022-11-11 DIAGNOSIS — Z23 NEED FOR INFLUENZA VACCINATION: ICD-10-CM

## 2022-11-11 DIAGNOSIS — E11.9 NEW ONSET TYPE 2 DIABETES MELLITUS (HCC): Primary | ICD-10-CM

## 2022-11-11 DIAGNOSIS — Z71.6 TOBACCO ABUSE COUNSELING: ICD-10-CM

## 2022-11-11 DIAGNOSIS — E87.6 HYPOKALEMIA: ICD-10-CM

## 2022-11-11 DIAGNOSIS — K21.00 GASTROESOPHAGEAL REFLUX DISEASE WITH ESOPHAGITIS WITHOUT HEMORRHAGE: ICD-10-CM

## 2022-11-11 DIAGNOSIS — Z72.0 TOBACCO ABUSE DISORDER: ICD-10-CM

## 2022-11-11 DIAGNOSIS — E78.5 DYSLIPIDEMIA: ICD-10-CM

## 2022-11-11 PROCEDURE — 3044F HG A1C LEVEL LT 7.0%: CPT | Performed by: FAMILY MEDICINE

## 2022-11-11 PROCEDURE — 3074F SYST BP LT 130 MM HG: CPT | Performed by: FAMILY MEDICINE

## 2022-11-11 PROCEDURE — 3078F DIAST BP <80 MM HG: CPT | Performed by: FAMILY MEDICINE

## 2022-11-11 PROCEDURE — G8427 DOCREV CUR MEDS BY ELIG CLIN: HCPCS | Performed by: FAMILY MEDICINE

## 2022-11-11 PROCEDURE — G8482 FLU IMMUNIZE ORDER/ADMIN: HCPCS | Performed by: FAMILY MEDICINE

## 2022-11-11 PROCEDURE — 99214 OFFICE O/P EST MOD 30 MIN: CPT | Performed by: FAMILY MEDICINE

## 2022-11-11 PROCEDURE — 2022F DILAT RTA XM EVC RTNOPTHY: CPT | Performed by: FAMILY MEDICINE

## 2022-11-11 PROCEDURE — G0008 ADMIN INFLUENZA VIRUS VAC: HCPCS | Performed by: FAMILY MEDICINE

## 2022-11-11 PROCEDURE — G8417 CALC BMI ABV UP PARAM F/U: HCPCS | Performed by: FAMILY MEDICINE

## 2022-11-11 PROCEDURE — 4004F PT TOBACCO SCREEN RCVD TLK: CPT | Performed by: FAMILY MEDICINE

## 2022-11-11 PROCEDURE — 90674 CCIIV4 VAC NO PRSV 0.5 ML IM: CPT | Performed by: FAMILY MEDICINE

## 2022-11-11 RX ORDER — ERGOCALCIFEROL 1.25 MG/1
50000 CAPSULE ORAL WEEKLY
Qty: 12 CAPSULE | Refills: 1 | Status: SHIPPED | OUTPATIENT
Start: 2022-11-11

## 2022-11-11 RX ORDER — SPIRONOLACTONE 100 MG/1
100 TABLET, FILM COATED ORAL DAILY
Qty: 90 TABLET | Refills: 0 | Status: SHIPPED | OUTPATIENT
Start: 2022-11-11

## 2022-11-11 RX ORDER — HYDROXYZINE HYDROCHLORIDE 25 MG/1
TABLET, FILM COATED ORAL
COMMUNITY
Start: 2022-11-10

## 2022-11-11 ASSESSMENT — PATIENT HEALTH QUESTIONNAIRE - PHQ9
4. FEELING TIRED OR HAVING LITTLE ENERGY: 1
6. FEELING BAD ABOUT YOURSELF - OR THAT YOU ARE A FAILURE OR HAVE LET YOURSELF OR YOUR FAMILY DOWN: 3
SUM OF ALL RESPONSES TO PHQ QUESTIONS 1-9: 11
SUM OF ALL RESPONSES TO PHQ QUESTIONS 1-9: 5
5. POOR APPETITE OR OVEREATING: 0
2. FEELING DOWN, DEPRESSED OR HOPELESS: 1
5. POOR APPETITE OR OVEREATING: 2
7. TROUBLE CONCENTRATING ON THINGS, SUCH AS READING THE NEWSPAPER OR WATCHING TELEVISION: 1
SUM OF ALL RESPONSES TO PHQ9 QUESTIONS 1 & 2: 2
2. FEELING DOWN, DEPRESSED OR HOPELESS: 1
8. MOVING OR SPEAKING SO SLOWLY THAT OTHER PEOPLE COULD HAVE NOTICED. OR THE OPPOSITE, BEING SO FIGETY OR RESTLESS THAT YOU HAVE BEEN MOVING AROUND A LOT MORE THAN USUAL: 0
3. TROUBLE FALLING OR STAYING ASLEEP: 1
SUM OF ALL RESPONSES TO PHQ QUESTIONS 1-9: 5
10. IF YOU CHECKED OFF ANY PROBLEMS, HOW DIFFICULT HAVE THESE PROBLEMS MADE IT FOR YOU TO DO YOUR WORK, TAKE CARE OF THINGS AT HOME, OR GET ALONG WITH OTHER PEOPLE: 1
9. THOUGHTS THAT YOU WOULD BE BETTER OFF DEAD, OR OF HURTING YOURSELF: 0
SUM OF ALL RESPONSES TO PHQ QUESTIONS 1-9: 5
3. TROUBLE FALLING OR STAYING ASLEEP: 1
10. IF YOU CHECKED OFF ANY PROBLEMS, HOW DIFFICULT HAVE THESE PROBLEMS MADE IT FOR YOU TO DO YOUR WORK, TAKE CARE OF THINGS AT HOME, OR GET ALONG WITH OTHER PEOPLE: 0
1. LITTLE INTEREST OR PLEASURE IN DOING THINGS: 1
7. TROUBLE CONCENTRATING ON THINGS, SUCH AS READING THE NEWSPAPER OR WATCHING TELEVISION: 1
SUM OF ALL RESPONSES TO PHQ QUESTIONS 1-9: 11
SUM OF ALL RESPONSES TO PHQ9 QUESTIONS 1 & 2: 2
9. THOUGHTS THAT YOU WOULD BE BETTER OFF DEAD, OR OF HURTING YOURSELF: 0
1. LITTLE INTEREST OR PLEASURE IN DOING THINGS: 1
SUM OF ALL RESPONSES TO PHQ QUESTIONS 1-9: 11
6. FEELING BAD ABOUT YOURSELF - OR THAT YOU ARE A FAILURE OR HAVE LET YOURSELF OR YOUR FAMILY DOWN: 0
8. MOVING OR SPEAKING SO SLOWLY THAT OTHER PEOPLE COULD HAVE NOTICED. OR THE OPPOSITE, BEING SO FIGETY OR RESTLESS THAT YOU HAVE BEEN MOVING AROUND A LOT MORE THAN USUAL: 0
SUM OF ALL RESPONSES TO PHQ QUESTIONS 1-9: 5
4. FEELING TIRED OR HAVING LITTLE ENERGY: 2
SUM OF ALL RESPONSES TO PHQ QUESTIONS 1-9: 11

## 2022-11-11 ASSESSMENT — ENCOUNTER SYMPTOMS
BACK PAIN: 0
BLOOD IN STOOL: 0
DIARRHEA: 0
SORE THROAT: 0
EYE REDNESS: 0
CONSTIPATION: 0
NAUSEA: 0
PHOTOPHOBIA: 0
EYE DISCHARGE: 0
SHORTNESS OF BREATH: 0
STRIDOR: 0
EYE PAIN: 0
VOMITING: 0
COUGH: 0
ABDOMINAL PAIN: 0

## 2022-11-11 ASSESSMENT — ANXIETY QUESTIONNAIRES
GAD7 TOTAL SCORE: 1
2. NOT BEING ABLE TO STOP OR CONTROL WORRYING: 0
7. FEELING AFRAID AS IF SOMETHING AWFUL MIGHT HAPPEN: 0
4. TROUBLE RELAXING: 0
3. WORRYING TOO MUCH ABOUT DIFFERENT THINGS: 0
IF YOU CHECKED OFF ANY PROBLEMS ON THIS QUESTIONNAIRE, HOW DIFFICULT HAVE THESE PROBLEMS MADE IT FOR YOU TO DO YOUR WORK, TAKE CARE OF THINGS AT HOME, OR GET ALONG WITH OTHER PEOPLE: SOMEWHAT DIFFICULT
5. BEING SO RESTLESS THAT IT IS HARD TO SIT STILL: 0
6. BECOMING EASILY ANNOYED OR IRRITABLE: 0
1. FEELING NERVOUS, ANXIOUS, OR ON EDGE: 1

## 2022-11-11 NOTE — PROGRESS NOTES
Subjective:      Patient ID: Kayy Black is a 41 y.o. female. [facility] [place] lost a family member to violence -  nephew got shot /murdered in June. Mom was in hosp with blood clots, son was also involved in some injury. [facility] [place] has been seeing Dr Cynthia Paul- [facility] saw her yesterday. Her mood and anxiety and sleep have been poor after these family  events. Was told to start taking meds slightly diff. No new meds.  does not use inhalers regularly- mostly with moderate exertion going up and down the stairs   She feels SOB- advised to see Dr Richard Mace for her PE and COPD/Asthma- last visit was couple   Of months ago .  has diff remembering taking meds-  was in the process of finding a neuro -    currently off antiseizure meds as she has not found a new neuro.  bowels have been ok.  16year old is graduating this year and wants to be a midwife. Will call neuro office and see if she can be seen soon as she is off her seizure meds and anyday may have recurrence though no recent ones per patient. Review of Systems   Constitutional:  Negative for chills and fever. HENT:  Negative for congestion, ear pain, hearing loss, nosebleeds, sore throat and tinnitus. Eyes:  Negative for photophobia, pain, discharge and redness. Respiratory:  Negative for cough, shortness of breath and stridor. Cardiovascular:  Negative for chest pain, palpitations and leg swelling. Gastrointestinal:  Negative for abdominal pain, blood in stool, constipation, diarrhea, nausea and vomiting. Endocrine: Negative for polydipsia. Genitourinary:  Negative for dysuria, flank pain, frequency, hematuria and urgency. Musculoskeletal:  Negative for back pain, myalgias and neck pain. Skin:  Negative for rash. Allergic/Immunologic: Negative for environmental allergies. Neurological:  Negative for dizziness, tremors, seizures, weakness and headaches.    Hematological:  Does not bruise/bleed easily. Psychiatric/Behavioral:  Negative for hallucinations and suicidal ideas. The patient is not nervous/anxious. Objective:   Physical Exam  Constitutional:       General: She is not in acute distress. Appearance: She is well-developed. HENT:      Head: Normocephalic and atraumatic. Right Ear: External ear normal.      Left Ear: External ear normal.      Nose: Nose normal.      Mouth/Throat:      Mouth: Mucous membranes are moist.   Eyes:      Conjunctiva/sclera: Conjunctivae normal.      Pupils: Pupils are equal, round, and reactive to light. Cardiovascular:      Rate and Rhythm: Normal rate and regular rhythm. Pulses: Normal pulses. Heart sounds: Normal heart sounds. Pulmonary:      Effort: Pulmonary effort is normal.      Breath sounds: Normal breath sounds. Abdominal:      General: Bowel sounds are normal. There is no distension. Palpations: Abdomen is soft. Tenderness: There is no abdominal tenderness. Musculoskeletal:         General: Normal range of motion. Cervical back: Normal range of motion and neck supple. Skin:     General: Skin is warm and dry. Neurological:      Mental Status: She is alert and oriented to person, place, and time. Psychiatric:         Behavior: Behavior normal.         Thought Content: Thought content normal.         Judgment: Judgment normal.        Vitals:    11/11/22 1028   BP: (!) 145/95   Pulse: 81   Temp:    SpO2:          Assessment:      Diagnosis Orders   1. New onset type 2 diabetes mellitus ([address]        2. Essential hypertension        3. History of pulmonary embolism        4. Long term current use of anticoagulant therapy        5. Bipolar 1 disorder ([address]        6. Seizures ([address]        7. Tobacco abuse disorder        8. Tobacco abuse counseling        9. Moderate persistent asthma without complication        10. Gastroesophageal reflux disease, unspecified whether esophagitis present        11.  Vitamin D deficiency        12. Folate deficiency        13. PTSD (post-traumatic stress disorder)        14. Irritable bowel syndrome, unspecified type        15. Need for influenza vaccination  Influenza, FLUCELVAX, (age 10 mo+), IM, Preservative Free, 0.5 mL      16. Hypokalemia        17. Gastroesophageal reflux disease with esophagitis without hemorrhage        18. Obesity with serious comorbidity, unspecified classification, unspecified obesity type        19. Dyslipidemia        20. Marijuana use              Plan:     Orders Placed This Encounter   Procedures    Influenza, FLUCELVAX, (age 10 mo+), IM, Preservative Free, 0.5 mL    Basic Metabolic Panel    Christopher Hills MD, Neurology, [place] [id number], [facility], Neurology, [facility]       Outpatient Encounter Medications as of 11/11/2022   Medication Sig Dispense Refill    spironolactone (ALDACTONE) 100 MG tablet Take 1 tablet by mouth daily 90 tablet 0    vitamin D (ERGOCALCIFEROL) 1.25 MG (58232 UT) CAPS capsule Take 1 capsule by mouth once a week 12 capsule 1    hydrOXYzine HCl (ATARAX) 25 MG tablet       [DISCONTINUED] triamterene-hydroCHLOROthiazide (DYAZIDE) 37.5-25 MG per capsule take 1 capsule by mouth every morning 30 capsule 3    omeprazole (PRILOSEC) 20 MG delayed release capsule take 1 capsule by mouth twice a day BEFORE A MEAL 180 capsule 0    rivaroxaban (XARELTO) 20 MG TABS tablet take 1 tablet by mouth EVERY MORNING WITH BREAKFAST 20 tablet 5    fluticasone-salmeterol (ADVAIR DISKUS) 250-50 MCG/ACT AEPB diskus inhaler Inhale 1 puff into the lungs in the morning and 1 puff in the evening.  60 each 5    losartan (COZAAR) 100 MG tablet take 1 tablet by mouth once daily 30 tablet 0    atorvastatin (LIPITOR) 10 MG tablet take 1 tablet by mouth once daily 30 tablet 0    metroNIDAZOLE (FLAGYL) 500 MG tablet take 1 tablet by mouth twice a day      [DISCONTINUED] cephALEXin (KEFLEX) 500 MG capsule take 1 capsule by mouth twice a day hydrALAZINE (APRESOLINE) 10 MG tablet take 1 tablet by mouth three times a day if needed FOR SYSTOLIC BLOOD PRESSURE GREATER THAN 150 90 tablet 3    metFORMIN (GLUCOPHAGE-XR) 500 MG extended release tablet Take 1 tablet by mouth daily (with breakfast) 90 tablet 1    Cholecalciferol (VITAMIN D3) 50 MCG (2000 UT) CAPS Take 1 capsule by mouth daily 90 capsule 5    [DISCONTINUED] vitamin D (ERGOCALCIFEROL) 1.25 MG (03595 UT) CAPS capsule Take 1 capsule by mouth once a week 12 capsule 1    cloNIDine (CATAPRES) 0.2 MG/24HR PTWK       Cyanocobalamin (B-12) 1000 MCG SUBL Place 1 tablet under the tongue daily 90 tablet 5    folic acid (FOLVITE) 1 MG tablet Take 1 tablet by mouth daily 90 tablet 1    montelukast (SINGULAIR) 10 MG tablet take 1 tablet by mouth every evening 90 tablet 0    albuterol sulfate  (90 Base) MCG/ACT inhaler Inhale 2 puffs into the lungs every 6 hours as needed for Wheezing or Shortness of Breath 18 g 3    Lactobacillus (ACIDOPHILUS) CAPS capsule take 1 tablet by mouth twice a day      levETIRAcetam (KEPPRA) 750 MG tablet Take 750 mg by mouth in the morning and 750 mg before bedtime. fluticasone-salmeterol (ADVAIR) 250-50 MCG/DOSE AEPB Inhale 1 puff into the lungs every 12 hours 60 each 3    [DISCONTINUED] cloNIDine (CATAPRES) 0.1 MG/24HR PTWK apply 1 patch every week as directed 4 patch 0    VRAYLAR 3 MG CAPS capsule take 1 tablet by mouth once daily      escitalopram (LEXAPRO) 10 MG tablet       [DISCONTINUED] cloNIDine (CATAPRES) 0.1 MG/24HR PTWK apply 1 patch every week as directed 4 patch 0    cetirizine (ZYRTEC) 10 MG tablet Take 1 tablet by mouth daily 30 tablet 0    benztropine (COGENTIN) 0.5 MG tablet take 1 tablet by mouth twice a day  0     No facility-administered encounter medications on file as of 11/11/2022.      20 minutes spent with patient counseling/educating on acute/chronic medical health problems, medications,  along with treatment options.   Reviewed multiple labs/imaging/medications with patient during this time. Following Diet discussion/education was done on Dash Diet, Diabetic Diet, and Cholesterol Diet . In addition Exercise plan and depression screen were discussed. Recent labs/imaging were discussed and reviewed with patient.

## 2022-11-11 NOTE — PROGRESS NOTES
Vaccine Information Sheet, \"Influenza - Inactivated\"  given to Alexandr Kinney, or parent/legal guardian of  Ulysses Palau and verbalized understanding. Patient responses:    Have you ever had a reaction to a flu vaccine? No  Are you able to eat eggs without adverse effects? Yes  Do you have any current illness? No  Have you ever had Guillian Richmond Syndrome? No    Flu vaccine given per order. Please see immunization tab.

## 2022-11-23 RX ORDER — OMEPRAZOLE 20 MG/1
CAPSULE, DELAYED RELEASE ORAL
Qty: 180 CAPSULE | Refills: 0 | OUTPATIENT
Start: 2022-11-23

## 2022-12-22 DIAGNOSIS — E11.9 NEW ONSET TYPE 2 DIABETES MELLITUS (HCC): ICD-10-CM

## 2022-12-22 DIAGNOSIS — Z86.32 HISTORY OF GESTATIONAL DIABETES: ICD-10-CM

## 2022-12-22 DIAGNOSIS — E11.69 TYPE 2 DIABETES MELLITUS WITH OTHER SPECIFIED COMPLICATION, WITHOUT LONG-TERM CURRENT USE OF INSULIN (HCC): ICD-10-CM

## 2022-12-22 RX ORDER — METFORMIN HYDROCHLORIDE 500 MG/1
TABLET, EXTENDED RELEASE ORAL
Qty: 90 TABLET | Refills: 1 | Status: SHIPPED | OUTPATIENT
Start: 2022-12-22

## 2023-01-02 ENCOUNTER — HOSPITAL ENCOUNTER (EMERGENCY)
Age: 48
Discharge: HOME | End: 2023-01-02
Attending: EMERGENCY MEDICINE
Payer: COMMERCIAL

## 2023-01-02 ENCOUNTER — APPOINTMENT (OUTPATIENT)
Dept: GENERAL RADIOLOGY | Age: 48
End: 2023-01-02
Payer: COMMERCIAL

## 2023-01-02 ENCOUNTER — APPOINTMENT (OUTPATIENT)
Dept: CT IMAGING | Age: 48
End: 2023-01-02
Payer: COMMERCIAL

## 2023-01-02 VITALS
TEMPERATURE: 98.1 F | HEART RATE: 83 BPM | RESPIRATION RATE: 18 BRPM | DIASTOLIC BLOOD PRESSURE: 96 MMHG | WEIGHT: 220.3 LBS | HEIGHT: 66 IN | SYSTOLIC BLOOD PRESSURE: 168 MMHG | BODY MASS INDEX: 35.41 KG/M2 | OXYGEN SATURATION: 99 %

## 2023-01-02 DIAGNOSIS — J40 BRONCHITIS: ICD-10-CM

## 2023-01-02 DIAGNOSIS — R07.89 CHEST WALL PAIN: Primary | ICD-10-CM

## 2023-01-02 DIAGNOSIS — I10 HYPERTENSION, UNSPECIFIED TYPE: ICD-10-CM

## 2023-01-02 LAB
ABSOLUTE EOS #: 0.03 K/UL (ref 0–0.44)
ABSOLUTE IMMATURE GRANULOCYTE: 0.01 K/UL (ref 0–0.3)
ABSOLUTE LYMPH #: 3.57 K/UL (ref 1.1–3.7)
ABSOLUTE MONO #: 0.63 K/UL (ref 0.1–1.2)
ANION GAP SERPL CALCULATED.3IONS-SCNC: 11 MMOL/L (ref 9–17)
BASOPHILS # BLD: 0 % (ref 0–2)
BASOPHILS ABSOLUTE: <0.03 K/UL (ref 0–0.2)
BUN BLDV-MCNC: 9 MG/DL (ref 6–20)
BUN/CREAT BLD: 11 (ref 9–20)
CALCIUM SERPL-MCNC: 8.8 MG/DL (ref 8.6–10.4)
CHLORIDE BLD-SCNC: 101 MMOL/L (ref 98–107)
CO2: 25 MMOL/L (ref 20–31)
CREAT SERPL-MCNC: 0.79 MG/DL (ref 0.5–0.9)
EOSINOPHILS RELATIVE PERCENT: 0 % (ref 1–4)
FLU A ANTIGEN: NEGATIVE
FLU B ANTIGEN: NEGATIVE
GFR SERPL CREATININE-BSD FRML MDRD: >60 ML/MIN/1.73M2
GLUCOSE BLD-MCNC: 84 MG/DL (ref 70–99)
HCT VFR BLD CALC: 36.3 % (ref 36.3–47.1)
HEMOGLOBIN: 11.8 G/DL (ref 11.9–15.1)
IMMATURE GRANULOCYTES: 0 %
LYMPHOCYTES # BLD: 53 % (ref 24–43)
MCH RBC QN AUTO: 23.6 PG (ref 25.2–33.5)
MCHC RBC AUTO-ENTMCNC: 32.5 G/DL (ref 28.4–34.8)
MCV RBC AUTO: 72.6 FL (ref 82.6–102.9)
MONOCYTES # BLD: 9 % (ref 3–12)
MYOGLOBIN: 23 NG/ML (ref 25–58)
NRBC AUTOMATED: 0 PER 100 WBC
PDW BLD-RTO: 16.1 % (ref 11.8–14.4)
PLATELET # BLD: 240 K/UL (ref 138–453)
PMV BLD AUTO: 11.2 FL (ref 8.1–13.5)
POTASSIUM SERPL-SCNC: 3.5 MMOL/L (ref 3.7–5.3)
RBC # BLD: 5 M/UL (ref 3.95–5.11)
RBC # BLD: ABNORMAL 10*6/UL
SARS-COV-2, RAPID: NOT DETECTED
SEG NEUTROPHILS: 38 % (ref 36–65)
SEGMENTED NEUTROPHILS ABSOLUTE COUNT: 2.64 K/UL (ref 1.5–8.1)
SODIUM BLD-SCNC: 137 MMOL/L (ref 135–144)
SPECIMEN DESCRIPTION: NORMAL
TOTAL CK: 57 U/L (ref 26–192)
TROPONIN, HIGH SENSITIVITY: <6 NG/L (ref 0–14)
WBC # BLD: 6.9 K/UL (ref 3.5–11.3)

## 2023-01-02 PROCEDURE — 71260 CT THORAX DX C+: CPT | Performed by: EMERGENCY MEDICINE

## 2023-01-02 PROCEDURE — 96375 TX/PRO/DX INJ NEW DRUG ADDON: CPT

## 2023-01-02 PROCEDURE — 84484 ASSAY OF TROPONIN QUANT: CPT

## 2023-01-02 PROCEDURE — 96374 THER/PROPH/DIAG INJ IV PUSH: CPT

## 2023-01-02 PROCEDURE — 6360000002 HC RX W HCPCS: Performed by: EMERGENCY MEDICINE

## 2023-01-02 PROCEDURE — 82550 ASSAY OF CK (CPK): CPT

## 2023-01-02 PROCEDURE — 6360000004 HC RX CONTRAST MEDICATION: Performed by: EMERGENCY MEDICINE

## 2023-01-02 PROCEDURE — 87635 SARS-COV-2 COVID-19 AMP PRB: CPT

## 2023-01-02 PROCEDURE — 71045 X-RAY EXAM CHEST 1 VIEW: CPT

## 2023-01-02 PROCEDURE — 2580000003 HC RX 258: Performed by: EMERGENCY MEDICINE

## 2023-01-02 PROCEDURE — 80048 BASIC METABOLIC PNL TOTAL CA: CPT

## 2023-01-02 PROCEDURE — 85025 COMPLETE CBC W/AUTO DIFF WBC: CPT

## 2023-01-02 PROCEDURE — 6370000000 HC RX 637 (ALT 250 FOR IP): Performed by: EMERGENCY MEDICINE

## 2023-01-02 PROCEDURE — 93005 ELECTROCARDIOGRAM TRACING: CPT

## 2023-01-02 PROCEDURE — 99285 EMERGENCY DEPT VISIT HI MDM: CPT

## 2023-01-02 PROCEDURE — 83874 ASSAY OF MYOGLOBIN: CPT

## 2023-01-02 PROCEDURE — 87804 INFLUENZA ASSAY W/OPTIC: CPT

## 2023-01-02 RX ORDER — HYDRALAZINE HYDROCHLORIDE 25 MG/1
25 TABLET, FILM COATED ORAL ONCE
Status: COMPLETED | OUTPATIENT
Start: 2023-01-02 | End: 2023-01-02

## 2023-01-02 RX ORDER — NAPROXEN 500 MG/1
500 TABLET ORAL 2 TIMES DAILY WITH MEALS
Qty: 180 TABLET | Refills: 1 | Status: SHIPPED | OUTPATIENT
Start: 2023-01-02

## 2023-01-02 RX ORDER — GUAIFENESIN AND CODEINE PHOSPHATE 100; 10 MG/5ML; MG/5ML
5 SOLUTION ORAL 3 TIMES DAILY PRN
Qty: 45 ML | Refills: 0 | Status: SHIPPED | OUTPATIENT
Start: 2023-01-02 | End: 2023-01-05

## 2023-01-02 RX ORDER — CLONIDINE HYDROCHLORIDE 0.1 MG/1
0.1 TABLET ORAL ONCE
Status: COMPLETED | OUTPATIENT
Start: 2023-01-02 | End: 2023-01-02

## 2023-01-02 RX ORDER — KETOROLAC TROMETHAMINE 30 MG/ML
30 INJECTION, SOLUTION INTRAMUSCULAR; INTRAVENOUS ONCE
Status: COMPLETED | OUTPATIENT
Start: 2023-01-02 | End: 2023-01-02

## 2023-01-02 RX ORDER — 0.9 % SODIUM CHLORIDE 0.9 %
80 INTRAVENOUS SOLUTION INTRAVENOUS ONCE
Status: DISCONTINUED | OUTPATIENT
Start: 2023-01-02 | End: 2023-01-02 | Stop reason: HOSPADM

## 2023-01-02 RX ORDER — HYDRALAZINE HYDROCHLORIDE 20 MG/ML
5 INJECTION INTRAMUSCULAR; INTRAVENOUS ONCE
Status: COMPLETED | OUTPATIENT
Start: 2023-01-02 | End: 2023-01-02

## 2023-01-02 RX ORDER — MORPHINE SULFATE 2 MG/ML
2 INJECTION, SOLUTION INTRAMUSCULAR; INTRAVENOUS ONCE
Status: COMPLETED | OUTPATIENT
Start: 2023-01-02 | End: 2023-01-02

## 2023-01-02 RX ORDER — 0.9 % SODIUM CHLORIDE 0.9 %
1000 INTRAVENOUS SOLUTION INTRAVENOUS ONCE
Status: COMPLETED | OUTPATIENT
Start: 2023-01-02 | End: 2023-01-02

## 2023-01-02 RX ORDER — BENZONATATE 100 MG/1
100 CAPSULE ORAL 3 TIMES DAILY PRN
Status: DISCONTINUED | OUTPATIENT
Start: 2023-01-02 | End: 2023-01-02 | Stop reason: HOSPADM

## 2023-01-02 RX ORDER — METHYLPREDNISOLONE 4 MG/1
TABLET ORAL
Qty: 1 KIT | Refills: 0 | Status: SHIPPED | OUTPATIENT
Start: 2023-01-02 | End: 2023-01-08

## 2023-01-02 RX ORDER — SODIUM CHLORIDE 0.9 % (FLUSH) 0.9 %
10 SYRINGE (ML) INJECTION PRN
Status: DISCONTINUED | OUTPATIENT
Start: 2023-01-02 | End: 2023-01-02 | Stop reason: HOSPADM

## 2023-01-02 RX ADMIN — HYDRALAZINE HYDROCHLORIDE 25 MG: 25 TABLET, FILM COATED ORAL at 13:01

## 2023-01-02 RX ADMIN — IOPAMIDOL 75 ML: 755 INJECTION, SOLUTION INTRAVENOUS at 11:50

## 2023-01-02 RX ADMIN — Medication 100 ML: at 11:49

## 2023-01-02 RX ADMIN — SODIUM CHLORIDE, PRESERVATIVE FREE 10 ML: 5 INJECTION INTRAVENOUS at 11:49

## 2023-01-02 RX ADMIN — KETOROLAC TROMETHAMINE 30 MG: 30 INJECTION, SOLUTION INTRAMUSCULAR; INTRAVENOUS at 10:54

## 2023-01-02 RX ADMIN — MORPHINE SULFATE 2 MG: 2 INJECTION, SOLUTION INTRAMUSCULAR; INTRAVENOUS at 10:54

## 2023-01-02 RX ADMIN — BENZONATATE 100 MG: 100 CAPSULE ORAL at 10:55

## 2023-01-02 RX ADMIN — SODIUM CHLORIDE 1000 ML: 9 INJECTION, SOLUTION INTRAVENOUS at 10:53

## 2023-01-02 RX ADMIN — CLONIDINE HYDROCHLORIDE 0.1 MG: 0.1 TABLET ORAL at 13:00

## 2023-01-02 RX ADMIN — HYDRALAZINE HYDROCHLORIDE 5 MG: 20 INJECTION INTRAMUSCULAR; INTRAVENOUS at 11:04

## 2023-01-02 ASSESSMENT — PAIN - FUNCTIONAL ASSESSMENT: PAIN_FUNCTIONAL_ASSESSMENT: 0-10

## 2023-01-02 ASSESSMENT — PAIN SCALES - GENERAL: PAINLEVEL_OUTOF10: 10

## 2023-01-02 ASSESSMENT — ENCOUNTER SYMPTOMS: COUGH: 1

## 2023-01-02 NOTE — DISCHARGE INSTRUCTIONS
I recommend starting steroids and taking as prescribed. Naproxen can be used twice a day. The cough medication here has codeine in it which does help with cough suppressant and pain as well. This medication can cause some drowsiness do not take within 4 hours of driving or operating machinery. I recommend follow-up with your primary care for reevaluation and repeat blood pressure check.

## 2023-01-02 NOTE — ED PROVIDER NOTES
EMERGENCY DEPARTMENT ENCOUNTER    Pt Name: Jeffrey Hussein  MRN: [id number]  [place] 1975  Date of evaluation: 1/2/23  CHIEF COMPLAINT       Chief Complaint   Patient presents with    Cough    Chest Pain     HISTORY OF PRESENT ILLNESS   77-year-old female presents emergency room for cough chest discomfort and bilateral leg pain. Patient reports she has had this harsh cough for few weeks. She thought maybe over the holidays symptoms would get better but they did not. No fevers at home. She sometimes has sputum production. Biggest concern is this chest pain. She does have history of pulmonary embolism. She is on Xarelto long-term. She also has high blood pressure and did not take her blood pressure meds today. REVIEW OF SYSTEMS     Review of Systems   Respiratory:  Positive for cough. Cardiovascular:  Positive for chest pain. Musculoskeletal:  Positive for arthralgias. PASTMEDICAL HISTORY     Past Medical History:   Diagnosis Date    Abnormal menstrual cycle 1/19/2018    Abnormal menstrual cycle 1/19/2018    Acute deep vein thrombosis (DVT) of popliteal vein of right lower extremity (HCC)     Acute deep vein thrombosis (DVT) of popliteal vein of right lower extremity (HCC)     Anemia     Asthma     Moderate persistent asthma without complication    Axillary hidradenitis suppurativa 5/6/2020    Bacterial vaginitis 4/22/2020    Bipolar 1 disorder ([address]     Borderline diabetes 3/22/2016    CHF (congestive heart failure) ([address]     When child was delivered 2005    Chiari I malformation ([facility] 6/21/2017    A MRI of the brain in 2014 was suspicious for pseudotumor cerebri but a spinal tap revealed a normal opening pressure of 16 cm water. An EEG was normal.  A MRI of the thoracic spine in 2015 was normal. Relevant history of cervical myelopathy with an abnormal MRI of the cervical spine in January 2015. A follow-up MRI of the brain in April 2017 was unchanged.   A MRI of the cervical spine in June 2    Chiari I malformation ([address] 6/21/2017    A MRI of the brain in 2014 was suspicious for pseudotumor cerebri but a spinal tap revealed a normal opening pressure of 16 cm water. An EEG was normal.  A MRI of the thoracic spine in 2015 was normal. Relevant history of cervical myelopathy with an abnormal MRI of the cervical spine in January 2015. A follow-up MRI of the brain in April 2017 was unchanged. A MRI of the cervical spine in June 2    Chiari I malformation ([address] 6/21/2017    A MRI of the brain in 2014 was suspicious for pseudotumor cerebri but a spinal tap revealed a normal opening pressure of 16 cm water. An EEG was normal.  A MRI of the thoracic spine in 2015 was normal. Relevant history of cervical myelopathy with an abnormal MRI of the cervical spine in January 2015. A follow-up MRI of the brain in April 2017 was unchanged.   A MRI of the cervical spine in June 2    Chiari malformation type I ([address] 6/21/2017    Chronic headache disorder 11/14/2017    Chronic idiopathic constipation 6/7/2018    Deep vein thrombosis (DVT) of lower extremity ([address] 6/1/2018    Deep vein thrombosis (DVT) of proximal lower extremity ([address] 6/1/2018    Deep vein thrombosis (DVT) of proximal lower extremity ([address] 6/1/2018    Deep vein thrombosis (DVT) with pulmonary embolism present on admission ([address] 12/15/2021    Deep vein thrombosis (DVT) with pulmonary embolism present on admission ([address] 12/15/2021    Dental infection 12/15/2021    Diverticulitis of sigmoid colon 7/3/2017    Diverticulitis of sigmoid colon 7/3/2017    Diverticulitis of sigmoid colon 7/3/2017    DVT (deep vein thrombosis) in pregnancy 12/15/2021    DVT (deep vein thrombosis) in pregnancy 12/15/2021    DVT of deep femoral vein, bilateral (HCC)     Erosive gastritis 9/15/2021    Erosive gastritis 9/15/2021    Excessive consumption of soda pop 5/12/2021    Excessive consumption of soda pop 5/12/2021    Failure of outpatient treatment     Family history of diabetes mellitus 3/22/2016    Family history of malignant neoplasm of breast 3/22/2016    Family history of malignant neoplasm of uterus 3/22/2016    Family history of malignant neoplasm of uterus 3/22/2016    Family history of throat cancer 3/22/2016    FH: breast cancer 3/22/2016    FH: breast cancer 3/22/2016    FH: uterine cancer 3/22/2016    FH: uterine cancer 3/22/2016    Gastroesophageal reflux disease with esophagitis without hemorrhage     GERD (gastroesophageal reflux disease)     H/O gestational diabetes mellitus, not currently pregnant 3/22/2016    H/O hysterectomy for benign disease 3/22/2016    H/O hysterectomy for benign disease 3/22/2016    History of abnormal uterine bleeding 7/3/2017    History of diabetes mellitus 3/22/2016    History of hysterectomy for benign disease 3/22/2016    History of pulmonary embolism 10/4/2019    History of pulmonary embolism 10/4/2019    Hyperlipidemia     Ileus of unspecified type ([address] 2017     Updating Deprecated Diagnoses    Irritable bowel syndrome     MDRO (multiple drug resistant organisms) resistance     MRSA in  per pt. Non-compliance with treatment 2017    Non-compliance with treatment 2017    Noncompliance with treatment 2017    Obesity     Recurrent major depressive episodes, moderate ([address] 2014    Recurrent major depressive episodes, moderate ([address] 2014    Saddle embolus of pulmonary artery without acute cor pulmonale (HCC)     Saddle embolus of pulmonary artery without acute cor pulmonale ([address] 2018    Seizures ([address]     Last Seizure 18    Suicidal intent     .  Denies 18    Tobacco abuse disorder 3/22/2016    Trichomonal infection 96    Umbilical hernia     Umbilical hernia 3/39/7307     Past Problem List  Patient Active Problem List   Diagnosis Code    FH: uterine cancer Z80.49    FH: breast cancer Z80.3    Neck pain, chronic M54.2, G89.29    Tobacco abuse disorder Z72.0 Seizures (HCC) R56.9    Anemia D64.9    Cobalamin deficiency E53.8    Bipolar 1 disorder (HCC) F31.9    PTSD (post-traumatic stress disorder) F43.10    IBS (irritable bowel syndrome) K58.9    Long term current use of anticoagulant therapy Z79.01    Asthma J45.909    GERD (gastroesophageal reflux disease) K21.9    History of pulmonary embolism Z86.711    Folate deficiency E53.8    Vitamin D deficiency E55.9    Essential hypertension I10    Hyperlipidemia E78.5    Tobacco abuse counseling Z71.6    Irritable bowel syndrome K58.9    Chronic obstructive lung disease (HCC) J44.9    Marijuana use F12.90    Obesity E66.9    New onset type 2 diabetes mellitus ([facility] E11.9    Elevated LFTs R79.89    Hypokalemia E87.6     SURGICAL HISTORY       Past Surgical History:   Procedure Laterality Date    ABSCESS DRAINAGE      abdominal abscess RLQ    AXILLARY SURGERY Right 2018    Excision of hidradenitis cysts, right axilla    BREAST LUMPECTOMY Bilateral     BREAST LUMPECTOMY       SECTION      x2    COLONOSCOPY N/A 2021    COLONOSCOPY DIAGNOSTIC performed by Gina Amaya MD at [address] [place] [address], COLON, DIAGNOSTIC  2014    HERNIA REPAIR      HYSTERECTOMY (CERVIX STATUS UNKNOWN)      LYMPH NODE DISSECTION Left 2019    LEFT AXILLARY HYDRADENITIS EXCISION performed by Oza Schilder, DO at [address] [place] [address] Left 2019    LEFT AXILLARY HYDRADENITIS EXCISION (Left )    RI EXC B9 LESION MRGN XCP SK TG T/A/L 0.5 CM/< Right 2018    EXCISION HIDRADENITIS RIGHT AXILLA performed by Oza Schilder, DO at [facility] ENDOSCOPY  2021    EGD BIOPSY performed by Gina Amaya MD at [facility]       Previous Medications    ALBUTEROL SULFATE  (90 BASE) MCG/ACT INHALER    Inhale 2 puffs into the lungs every 6 hours as needed for Wheezing or Shortness of Breath    ATORVASTATIN (LIPITOR) 10 MG TABLET    take 1 tablet by mouth once daily    BENZTROPINE (COGENTIN) 0.5 MG TABLET    take 1 tablet by mouth twice a day    CETIRIZINE (ZYRTEC) 10 MG TABLET    Take 1 tablet by mouth daily    CHOLECALCIFEROL (VITAMIN D3) 50 MCG (2000 UT) CAPS    Take 1 capsule by mouth daily    CLONIDINE (CATAPRES) 0.2 MG/24HR PTWK        CYANOCOBALAMIN (B-12) 1000 MCG SUBL    Place 1 tablet under the tongue daily    ESCITALOPRAM (LEXAPRO) 10 MG TABLET        FLUTICASONE-SALMETEROL (ADVAIR DISKUS) 250-50 MCG/ACT AEPB DISKUS INHALER    Inhale 1 puff into the lungs in the morning and 1 puff in the evening. FLUTICASONE-SALMETEROL (ADVAIR) 250-50 MCG/DOSE AEPB    Inhale 1 puff into the lungs every 12 hours    FOLIC ACID (FOLVITE) 1 MG TABLET    Take 1 tablet by mouth daily    HYDRALAZINE (APRESOLINE) 10 MG TABLET    take 1 tablet by mouth three times a day if needed FOR SYSTOLIC BLOOD PRESSURE GREATER THAN 150    HYDROXYZINE HCL (ATARAX) 25 MG TABLET        LACTOBACILLUS (ACIDOPHILUS) CAPS CAPSULE    take 1 tablet by mouth twice a day    LEVETIRACETAM (KEPPRA) 750 MG TABLET    Take 750 mg by mouth in the morning and 750 mg before bedtime.     LOSARTAN (COZAAR) 100 MG TABLET    take 1 tablet by mouth once daily    METFORMIN (GLUCOPHAGE-XR) 500 MG EXTENDED RELEASE TABLET    take 1 tablet by mouth EVERY MORNING WITH BREAKFAST    METRONIDAZOLE (FLAGYL) 500 MG TABLET    take 1 tablet by mouth twice a day    MONTELUKAST (SINGULAIR) 10 MG TABLET    take 1 tablet by mouth every evening    OMEPRAZOLE (PRILOSEC) 20 MG DELAYED RELEASE CAPSULE    take 1 capsule by mouth twice a day BEFORE A MEAL    RIVAROXABAN (XARELTO) 20 MG TABS TABLET    take 1 tablet by mouth EVERY MORNING WITH BREAKFAST    SPIRONOLACTONE (ALDACTONE) 100 MG TABLET    Take 1 tablet by mouth daily    VITAMIN D (ERGOCALCIFEROL) 1.25 MG (01901 UT) CAPS CAPSULE    Take 1 capsule by mouth once a week    VRAYLAR 3 MG CAPS CAPSULE    take 1 tablet by mouth once daily     ALLERGIES     is allergic to penicillins, amoxil [amoxicillin], bee pollen, bee venom, ciprofloxacin, clindamycin/lincomycin, elemental sulfur, flonase [fluticasone], keflex [cephalexin], levaquin [levofloxacin in d5w], levofloxacin, macrobid [nitrofurantoin monohyd macro], nitrofurantoin, other, and sulfa antibiotics. FAMILY HISTORY     She indicated that her mother is alive. She indicated that her father is alive. She indicated that her sister is alive. She indicated that the status of her maternal grandmother is unknown. She indicated that the status of her maternal grandfather is unknown. She indicated that the status of her maternal aunt is unknown. She indicated that the status of her other is unknown. SOCIAL HISTORY       Social History     Tobacco Use    Smoking status: Every Day     Packs/day: 0.50     Years: 26.00     Pack years: 13.00     Types: Cigarettes     Start date: 0    Smokeless tobacco: Never    Tobacco comments:     wants help- adviced about smoking cessation plans   Vaping Use    Vaping Use: Never used   Substance Use Topics    Alcohol use: Yes     Comment: occ    Drug use: Yes     Types: Marijuana (Weed)     PHYSICAL EXAM     INITIAL VITALS: BP (!) 174/106   Pulse 83   Temp 98.1 °F (36.7 °C) (Oral)   Resp 18   Ht 5' 6\" (1.676 m)   Wt 220 lb 4.8 oz (99.9 kg)   SpO2 99%   BMI 35.56 kg/m²    Physical Exam  Constitutional:       General: She is not in acute distress. Appearance: She is well-developed. HENT:      Head: Normocephalic. Eyes:      Pupils: Pupils are equal, round, and reactive to light. Cardiovascular:      Rate and Rhythm: Normal rate and regular rhythm. Heart sounds: Normal heart sounds. Pulmonary:      Effort: Pulmonary effort is normal. No respiratory distress. Breath sounds: Normal breath sounds.    Abdominal:      General: Bowel sounds are normal.      Palpations: Abdomen is soft. Tenderness: There is no abdominal tenderness. Musculoskeletal:         General: Normal range of motion. Skin:     General: Skin is warm and dry. Neurological:      Mental Status: She is alert and oriented to person, place, and time. MEDICAL DECISION MAKING / ED COURSE:   Summary of Patient Presentation:      57-year-old female presenting to the emergency room for cough and chest pain. Patient has reproducible chest wall pain on exam.  She does have history of pulmonary embolism is on Xarelto and has been taking this medication appropriately. Clinically I feel patient's chest pain is musculoskeletal from cough. Due to patient concerns however CT chest will be completed here in the ED today. She is also reporting bilateral feet and leg pain. She does not have any considerable swelling to the legs. She has generalized tenderness to her calfs. Exam is not consistent with DVT patient is already on appropriate management for pulmonary embolism on Xarelto. 1)  Number and Complexity of Problems  Problem List This Visit: Cough with reproducible chest pain    Differential Diagnosis: Viral URI musculoskeletal chest pain pulmonary embolism pneumonia bronchitis    Diagnoses Considered but Do Not Suspect: Pulmonary embolism    Pertinent Comorbid Conditions: Hypertension history of pulmonary embolism obesity smoker    2)  Data Reviewed  My EKG interpretation: EKG sinus rhythm ventricular rate 76  Nonspecific T wave flattening throughout multiple leads  No STEMI criteria          HEART SCORE: NA    Patient has reproducible chest wall pain on examination. Chest pain is not clinically from cardiac etiology. Patient has had harsh cough for several weeks. See more data below for the lab and radiology tests and orders. 3)  Treatment and Disposition    Patient repeat assessment: 12 PM patient still having chest discomfort.   We discussed negative imaging results normal white count. Patient clinically likely has bronchitis with musculoskeletal chest pain. Plan is for steroids anti-inflammatory and Mucinex codeine combo for cough suppressant. 1:00 pm She is still significantly hypertensive here in the ED with blood pressure of 199/104. Plan is to attempt oral medication and reevaluate. 1:30 blood pressure has improved. Patient encouraged to follow-up with primary care and take blood pressure meds at home as prescribed. Disposition discussion with patient/family: Discharged with PCP follow-up      \"ED Course\" Notes From Epic Narrator:         CRITICAL CARE:       PROCEDURES:    Procedures      DATA FOR LAB AND RADIOLOGY TESTS ORDERED BELOW ARE REVIEWED BY THE ED CLINICIAN:    RADIOLOGY: All x-rays, CT, MRI, and formal ultrasound images (except ED bedside ultrasound) are read by the radiologist, see reports below, unless otherwise noted in MDM or here. Reports below are reviewed by myself. CT CHEST PULMONARY EMBOLISM W CONTRAST   Final Result   Negative examination for pulmonary embolism. No focal infiltrates or pleural effusions. XR CHEST PORTABLE   Final Result   No acute cardiopulmonary findings             LABS: Lab orders shown below, the results are reviewed by myself, and all abnormals are listed below.   Labs Reviewed   CBC WITH AUTO DIFFERENTIAL - Abnormal; Notable for the following components:       Result Value    Hemoglobin 11.8 (*)     MCV 72.6 (*)     MCH 23.6 (*)     RDW 16.1 (*)     Lymphocytes 53 (*)     Eosinophils % 0 (*)     All other components within normal limits   BASIC METABOLIC PANEL - Abnormal; Notable for the following components:    Potassium 3.5 (*)     All other components within normal limits   MYOGLOBIN, BLOOD - Abnormal; Notable for the following components:    Myoglobin 23 (*)     All other components within normal limits   COVID-19, RAPID   RAPID INFLUENZA A/B ANTIGENS   CK   TROPONIN       Vitals Reviewed:    Vitals: 01/02/23 1104 01/02/23 1235 01/02/23 1300 01/02/23 1325   BP: (!) 176/109 (!) 195/110 (!) 199/104 (!) 174/106   Pulse:       Resp:       Temp:       TempSrc:       SpO2:       Weight:       Height:         MEDICATIONS GIVEN TO PATIENT THIS ENCOUNTER:  Orders Placed This Encounter   Medications    morphine (PF) injection 2 mg    ketorolac (TORADOL) injection 30 mg    benzonatate (TESSALON) capsule 100 mg    0.9 % sodium chloride bolus    hydrALAZINE (APRESOLINE) injection 5 mg    iopamidol (ISOVUE-370) 76 % injection 75 mL    sodium chloride flush 0.9 % injection 10 mL    0.9 % sodium chloride bolus    hydrALAZINE (APRESOLINE) tablet 25 mg    cloNIDine (CATAPRES) tablet 0.1 mg    methylPREDNISolone (MEDROL, STEPHANIE,) 4 MG tablet     Sig: Take by mouth. Dispense:  1 kit     Refill:  0    guaiFENesin-codeine (TUSSI-ORGANIDIN NR) 100-10 MG/5ML syrup     Sig: Take 5 mLs by mouth 3 times daily as needed for Cough for up to 3 days. Max Daily Amount: 15 mLs     Dispense:  45 mL     Refill:  0    naproxen (NAPROSYN) 500 MG tablet     Sig: Take 1 tablet by mouth 2 times daily (with meals)     Dispense:  180 tablet     Refill:  1     DISCHARGE PRESCRIPTIONS:  New Prescriptions    GUAIFENESIN-CODEINE (TUSSI-ORGANIDIN NR) 100-10 MG/5ML SYRUP    Take 5 mLs by mouth 3 times daily as needed for Cough for up to 3 days. Max Daily Amount: 15 mLs    METHYLPREDNISOLONE (MEDROL, STEPHANIE,) 4 MG TABLET    Take by mouth. NAPROXEN (NAPROSYN) 500 MG TABLET    Take 1 tablet by mouth 2 times daily (with meals)     PHYSICIAN CONSULTS ORDERED THIS ENCOUNTER:  None  FINAL IMPRESSION      1. Chest wall pain    2. Bronchitis    3.  Hypertension, unspecified type          DISPOSITION/PLAN   DISPOSITION Decision To Discharge 01/02/2023 01:26:19 PM      OUTPATIENT FOLLOW UP THE PATIENT:  Arthur Lozoya MD  [address] [place] [address]  [phone]    Schedule an appointment as soon as possible for a visit in 1 week      [facility] MD Praneeth Tipton MD  01/02/23 1430

## 2023-01-02 NOTE — ED NOTES
Pt states she has had body aches, fatigue, cough, and SOB for 2 weeks. Pt denies known exposure to flu or covid. Respirations are regular and non labored. Warm blanket provided. Call light within reach. Will continue to monitor.      Godwin Collins RN  01/02/23 3901

## 2023-01-04 LAB
EKG ATRIAL RATE: 76 BPM
EKG P AXIS: 49 DEGREES
EKG P-R INTERVAL: 192 MS
EKG Q-T INTERVAL: 422 MS
EKG QRS DURATION: 76 MS
EKG QTC CALCULATION (BAZETT): 474 MS
EKG R AXIS: -14 DEGREES
EKG T AXIS: 10 DEGREES
EKG VENTRICULAR RATE: 76 BPM

## 2023-01-06 ENCOUNTER — TELEPHONE (OUTPATIENT)
Dept: FAMILY MEDICINE CLINIC | Age: 48
End: 2023-01-06

## 2023-01-06 DIAGNOSIS — I26.92 SADDLE EMBOLUS OF PULMONARY ARTERY WITHOUT ACUTE COR PULMONALE, UNSPECIFIED CHRONICITY (HCC): ICD-10-CM

## 2023-01-06 DIAGNOSIS — I27.82 CHRONIC PULMONARY EMBOLISM, UNSPECIFIED PULMONARY EMBOLISM TYPE, UNSPECIFIED WHETHER ACUTE COR PULMONALE PRESENT (HCC): ICD-10-CM

## 2023-01-06 DIAGNOSIS — I27.24 CTEPH (CHRONIC THROMBOEMBOLIC PULMONARY HYPERTENSION) (HCC): ICD-10-CM

## 2023-01-06 NOTE — TELEPHONE ENCOUNTER
[facility]) ED Follow up Call    Reason for ED visit:  chest pain and leg pain     1/6/2023     Jerel Bautistana , this is candelaria from Dr. Leggett Every office, just calling to see how you are doing after your recent ED visit. Did you receive discharge instructions? Yes  Do you understand the discharge instructions? Yes  Did the ED give you any new prescriptions? Yes  Were you able to fill your prescriptions? Yes      Do you have one of our red, yellow and green  Zone sheets that help you to determine when you should go to the ED? Not Applicable      Do you need or want to make a follow up appt with your PCP? Yes    Do you have any further needs in the home, e.g. equipment?   No        FU appts/Provider:    Future Appointments   Date Time Provider Miguelito Busby   1/10/2023  3:00 PM Junito Amos MD [facility]   2/2/2023  3:15 PM Anna Caicedo MD Resp Spec [facility]

## 2023-01-06 NOTE — TELEPHONE ENCOUNTER
LAST VISIT: 8/4/22  NEXT VISIT: 2/2/23    Per last dictation patient is on this medication. Please sign for refill if ok. Thank you.

## 2023-01-10 ENCOUNTER — OFFICE VISIT (OUTPATIENT)
Dept: FAMILY MEDICINE CLINIC | Age: 48
End: 2023-01-10
Payer: COMMERCIAL

## 2023-01-10 VITALS
WEIGHT: 218.4 LBS | SYSTOLIC BLOOD PRESSURE: 195 MMHG | HEART RATE: 85 BPM | BODY MASS INDEX: 35.1 KG/M2 | DIASTOLIC BLOOD PRESSURE: 122 MMHG | OXYGEN SATURATION: 96 % | HEIGHT: 66 IN | TEMPERATURE: 96.8 F

## 2023-01-10 DIAGNOSIS — J44.9 CHRONIC OBSTRUCTIVE PULMONARY DISEASE, UNSPECIFIED COPD TYPE (HCC): ICD-10-CM

## 2023-01-10 DIAGNOSIS — M79.672 PAIN IN BOTH FEET: ICD-10-CM

## 2023-01-10 DIAGNOSIS — E11.9 NEW ONSET TYPE 2 DIABETES MELLITUS (HCC): ICD-10-CM

## 2023-01-10 DIAGNOSIS — Z79.01 LONG TERM CURRENT USE OF ANTICOAGULANT THERAPY: ICD-10-CM

## 2023-01-10 DIAGNOSIS — K21.00 GASTROESOPHAGEAL REFLUX DISEASE WITH ESOPHAGITIS WITHOUT HEMORRHAGE: ICD-10-CM

## 2023-01-10 DIAGNOSIS — Z71.6 TOBACCO ABUSE COUNSELING: ICD-10-CM

## 2023-01-10 DIAGNOSIS — R56.9 SEIZURES (HCC): ICD-10-CM

## 2023-01-10 DIAGNOSIS — I10 ESSENTIAL HYPERTENSION: ICD-10-CM

## 2023-01-10 DIAGNOSIS — E87.6 HYPOKALEMIA: Primary | ICD-10-CM

## 2023-01-10 DIAGNOSIS — F31.9 BIPOLAR 1 DISORDER (HCC): ICD-10-CM

## 2023-01-10 DIAGNOSIS — J20.9 ACUTE TRACHEOBRONCHITIS: ICD-10-CM

## 2023-01-10 DIAGNOSIS — F39 MOOD DISORDER (HCC): ICD-10-CM

## 2023-01-10 DIAGNOSIS — Z72.0 TOBACCO ABUSE DISORDER: ICD-10-CM

## 2023-01-10 DIAGNOSIS — I10 UNCONTROLLED HYPERTENSION: ICD-10-CM

## 2023-01-10 DIAGNOSIS — Z91.199 NON-COMPLIANCE WITH TREATMENT: ICD-10-CM

## 2023-01-10 DIAGNOSIS — M79.671 PAIN IN BOTH FEET: ICD-10-CM

## 2023-01-10 DIAGNOSIS — E78.5 HYPERLIPIDEMIA, UNSPECIFIED HYPERLIPIDEMIA TYPE: ICD-10-CM

## 2023-01-10 PROBLEM — E66.812 CLASS 2 OBESITY IN ADULT: Status: ACTIVE | Noted: 2021-05-12

## 2023-01-10 PROBLEM — J44.1 COPD EXACERBATION (HCC): Status: ACTIVE | Noted: 2018-06-02

## 2023-01-10 PROCEDURE — 3077F SYST BP >= 140 MM HG: CPT | Performed by: FAMILY MEDICINE

## 2023-01-10 PROCEDURE — 3023F SPIROM DOC REV: CPT | Performed by: FAMILY MEDICINE

## 2023-01-10 PROCEDURE — G8417 CALC BMI ABV UP PARAM F/U: HCPCS | Performed by: FAMILY MEDICINE

## 2023-01-10 PROCEDURE — G8427 DOCREV CUR MEDS BY ELIG CLIN: HCPCS | Performed by: FAMILY MEDICINE

## 2023-01-10 PROCEDURE — 99214 OFFICE O/P EST MOD 30 MIN: CPT | Performed by: FAMILY MEDICINE

## 2023-01-10 PROCEDURE — 3046F HEMOGLOBIN A1C LEVEL >9.0%: CPT | Performed by: FAMILY MEDICINE

## 2023-01-10 PROCEDURE — 2022F DILAT RTA XM EVC RTNOPTHY: CPT | Performed by: FAMILY MEDICINE

## 2023-01-10 PROCEDURE — 3080F DIAST BP >= 90 MM HG: CPT | Performed by: FAMILY MEDICINE

## 2023-01-10 PROCEDURE — 4004F PT TOBACCO SCREEN RCVD TLK: CPT | Performed by: FAMILY MEDICINE

## 2023-01-10 PROCEDURE — G8482 FLU IMMUNIZE ORDER/ADMIN: HCPCS | Performed by: FAMILY MEDICINE

## 2023-01-10 RX ORDER — PREDNISONE 20 MG/1
20 TABLET ORAL
Qty: 10 TABLET | Refills: 0 | Status: SHIPPED | OUTPATIENT
Start: 2023-01-10 | End: 2023-01-15

## 2023-01-10 RX ORDER — CLONIDINE 0.2 MG/24H
PATCH, EXTENDED RELEASE TRANSDERMAL
COMMUNITY
End: 2023-01-10 | Stop reason: ALTCHOICE

## 2023-01-10 RX ORDER — CLONIDINE 0.2 MG/24H
1 PATCH, EXTENDED RELEASE TRANSDERMAL
Qty: 12 PATCH | Refills: 5 | Status: SHIPPED | OUTPATIENT
Start: 2023-01-10 | End: 2024-01-10

## 2023-01-10 RX ORDER — CANDESARTAN 32 MG/1
32 TABLET ORAL DAILY
Qty: 90 TABLET | Refills: 1 | Status: SHIPPED | OUTPATIENT
Start: 2023-01-10 | End: 2024-01-10

## 2023-01-10 RX ORDER — ALBUTEROL SULFATE 90 UG/1
2 AEROSOL, METERED RESPIRATORY (INHALATION) EVERY 4 HOURS PRN
Qty: 54 G | Refills: 1 | Status: SHIPPED | OUTPATIENT
Start: 2023-01-10

## 2023-01-10 RX ORDER — DOXYCYCLINE HYCLATE 100 MG
100 TABLET ORAL 2 TIMES DAILY
Qty: 20 TABLET | Refills: 0 | Status: SHIPPED | OUTPATIENT
Start: 2023-01-10 | End: 2023-01-20

## 2023-01-10 RX ORDER — CODEINE PHOSPHATE AND GUAIFENESIN 10; 100 MG/5ML; MG/5ML
SOLUTION ORAL
COMMUNITY
Start: 2023-01-06

## 2023-01-10 RX ORDER — BENZONATATE 100 MG/1
200 CAPSULE ORAL 3 TIMES DAILY PRN
Qty: 30 CAPSULE | Refills: 0 | Status: SHIPPED | OUTPATIENT
Start: 2023-01-10 | End: 2023-01-17

## 2023-01-10 ASSESSMENT — PATIENT HEALTH QUESTIONNAIRE - PHQ9
2. FEELING DOWN, DEPRESSED OR HOPELESS: 3
5. POOR APPETITE OR OVEREATING: 2
1. LITTLE INTEREST OR PLEASURE IN DOING THINGS: 1
SUM OF ALL RESPONSES TO PHQ QUESTIONS 1-9: 4
2. FEELING DOWN, DEPRESSED OR HOPELESS: 1
9. THOUGHTS THAT YOU WOULD BE BETTER OFF DEAD, OR OF HURTING YOURSELF: 0
9. THOUGHTS THAT YOU WOULD BE BETTER OFF DEAD, OR OF HURTING YOURSELF: 0
4. FEELING TIRED OR HAVING LITTLE ENERGY: 3
SUM OF ALL RESPONSES TO PHQ QUESTIONS 1-9: 15
SUM OF ALL RESPONSES TO PHQ9 QUESTIONS 1 & 2: 2
SUM OF ALL RESPONSES TO PHQ QUESTIONS 1-9: 15
6. FEELING BAD ABOUT YOURSELF - OR THAT YOU ARE A FAILURE OR HAVE LET YOURSELF OR YOUR FAMILY DOWN: 0
SUM OF ALL RESPONSES TO PHQ QUESTIONS 1-9: 4
SUM OF ALL RESPONSES TO PHQ QUESTIONS 1-9: 15
10. IF YOU CHECKED OFF ANY PROBLEMS, HOW DIFFICULT HAVE THESE PROBLEMS MADE IT FOR YOU TO DO YOUR WORK, TAKE CARE OF THINGS AT HOME, OR GET ALONG WITH OTHER PEOPLE: 2
7. TROUBLE CONCENTRATING ON THINGS, SUCH AS READING THE NEWSPAPER OR WATCHING TELEVISION: 0
3. TROUBLE FALLING OR STAYING ASLEEP: 1
SUM OF ALL RESPONSES TO PHQ QUESTIONS 1-9: 15
7. TROUBLE CONCENTRATING ON THINGS, SUCH AS READING THE NEWSPAPER OR WATCHING TELEVISION: 0
5. POOR APPETITE OR OVEREATING: 0
SUM OF ALL RESPONSES TO PHQ QUESTIONS 1-9: 4
3. TROUBLE FALLING OR STAYING ASLEEP: 3
SUM OF ALL RESPONSES TO PHQ9 QUESTIONS 1 & 2: 4
8. MOVING OR SPEAKING SO SLOWLY THAT OTHER PEOPLE COULD HAVE NOTICED. OR THE OPPOSITE, BEING SO FIGETY OR RESTLESS THAT YOU HAVE BEEN MOVING AROUND A LOT MORE THAN USUAL: 0
1. LITTLE INTEREST OR PLEASURE IN DOING THINGS: 1
8. MOVING OR SPEAKING SO SLOWLY THAT OTHER PEOPLE COULD HAVE NOTICED. OR THE OPPOSITE, BEING SO FIGETY OR RESTLESS THAT YOU HAVE BEEN MOVING AROUND A LOT MORE THAN USUAL: 0
6. FEELING BAD ABOUT YOURSELF - OR THAT YOU ARE A FAILURE OR HAVE LET YOURSELF OR YOUR FAMILY DOWN: 3
4. FEELING TIRED OR HAVING LITTLE ENERGY: 1
SUM OF ALL RESPONSES TO PHQ QUESTIONS 1-9: 4
10. IF YOU CHECKED OFF ANY PROBLEMS, HOW DIFFICULT HAVE THESE PROBLEMS MADE IT FOR YOU TO DO YOUR WORK, TAKE CARE OF THINGS AT HOME, OR GET ALONG WITH OTHER PEOPLE: 2

## 2023-01-10 ASSESSMENT — ENCOUNTER SYMPTOMS
EYE PAIN: 0
VOMITING: 0
EYE DISCHARGE: 0
STRIDOR: 0
CONSTIPATION: 0
BACK PAIN: 0
SHORTNESS OF BREATH: 0
DIARRHEA: 0
NAUSEA: 0
WHEEZING: 1
SORE THROAT: 0
COUGH: 1
CHEST TIGHTNESS: 1
PHOTOPHOBIA: 0
BLOOD IN STOOL: 0
EYE REDNESS: 0
ABDOMINAL PAIN: 0

## 2023-01-10 ASSESSMENT — ANXIETY QUESTIONNAIRES
GAD7 TOTAL SCORE: 0
2. NOT BEING ABLE TO STOP OR CONTROL WORRYING: 0
5. BEING SO RESTLESS THAT IT IS HARD TO SIT STILL: 0
7. FEELING AFRAID AS IF SOMETHING AWFUL MIGHT HAPPEN: 0
3. WORRYING TOO MUCH ABOUT DIFFERENT THINGS: 0
IF YOU CHECKED OFF ANY PROBLEMS ON THIS QUESTIONNAIRE, HOW DIFFICULT HAVE THESE PROBLEMS MADE IT FOR YOU TO DO YOUR WORK, TAKE CARE OF THINGS AT HOME, OR GET ALONG WITH OTHER PEOPLE: NOT DIFFICULT AT ALL
1. FEELING NERVOUS, ANXIOUS, OR ON EDGE: 0
4. TROUBLE RELAXING: 0
6. BECOMING EASILY ANNOYED OR IRRITABLE: 0

## 2023-01-10 NOTE — PROGRESS NOTES
Subjective:      Patient ID: Yvan Spangler is a 41 y.o. female. States for the last 2 weeks has been feeling sick, SOB, chest tightness as well as a cough with scanty sputum. States chest hurts on the right all the time from coughing. Also pain in left foot > Right- the feet hurts to walk, worse when she first steps down from bed in am.    BP has been up as well- states out of losartan as well as clonidine patch for many weeks  BP is high- instructed to call me if this happens as she is high risk,will refer to nephrology as well. States cut down smoking to 1/2 PPD. Also sees Dr Sadia Cee at [facility]- will need follow up for her mood. Review of Systems   Constitutional:  Positive for activity change, appetite change and fatigue. Negative for chills and fever. HENT:  Negative for congestion, ear pain, hearing loss, nosebleeds, sore throat and tinnitus. Eyes:  Negative for photophobia, pain, discharge and redness. Respiratory:  Positive for cough, chest tightness and wheezing. Negative for shortness of breath and stridor. Cardiovascular:  Negative for chest pain, palpitations and leg swelling. Gastrointestinal:  Negative for abdominal pain, blood in stool, constipation, diarrhea, nausea and vomiting. Endocrine: Negative for polydipsia. Genitourinary:  Negative for dysuria, flank pain, frequency, hematuria and urgency. Musculoskeletal:  Negative for back pain, myalgias and neck pain. Skin:  Negative for rash. Allergic/Immunologic: Negative for environmental allergies. Neurological:  Negative for dizziness, tremors, seizures, weakness and headaches. Hematological:  Does not bruise/bleed easily. Psychiatric/Behavioral:  Negative for hallucinations and suicidal ideas. The patient is not nervous/anxious. Objective:   Physical Exam  Constitutional:       General: She is not in acute distress. Appearance: She is well-developed. HENT:      Head: Normocephalic and atraumatic. Right Ear: External ear normal.      Left Ear: External ear normal.      Nose: Nose normal.      Mouth/Throat:      Mouth: Mucous membranes are moist.   Eyes:      Conjunctiva/sclera: Conjunctivae normal.      Pupils: Pupils are equal, round, and reactive to light. Cardiovascular:      Rate and Rhythm: Normal rate and regular rhythm. Heart sounds: Normal heart sounds. Pulmonary:      Effort: Pulmonary effort is normal.      Breath sounds: Normal breath sounds. Abdominal:      General: Bowel sounds are normal. There is no distension. Palpations: Abdomen is soft. Tenderness: There is no abdominal tenderness. Musculoskeletal:         General: Normal range of motion. Cervical back: Normal range of motion and neck supple. Skin:     General: Skin is warm and dry. Neurological:      General: No focal deficit present. Mental Status: She is alert and oriented to person, place, and time. Mental status is at baseline. Psychiatric:         Behavior: Behavior normal.         Thought Content: Thought content normal.         Judgment: Judgment normal.        Vitals:    01/10/23 1507   BP: (!) 195/122   Pulse: 85   Temp:    SpO2:          Assessment:      Diagnosis Orders   1. Hypokalemia  Basic Metabolic Panel      2. Uncontrolled hypertension  cloNIDine (CATAPRES-TTS-2) 0.2 MG/24HR PTWK    candesartan (ATACAND) 32 MG tablet    AFL (Epic) - Vania Baig MD, Nephrology, [place]      3. New onset type 2 diabetes mellitus (HCC)  Microalbumin, Ur    Microalbumin / Creatinine Urine Ratio    Hemoglobin I0J    Basic Metabolic Panel      4. Acute tracheobronchitis  doxycycline hyclate (VIBRA-TABS) 100 MG tablet    predniSONE (DELTASONE) 20 MG tablet    albuterol sulfate HFA (VENTOLIN HFA) 108 (90 Base) MCG/ACT inhaler      5. Mood disorder ([address]        6. Gastroesophageal reflux disease with esophagitis without hemorrhage        7. Tobacco abuse disorder        8. Seizures ([address]        9.  Bipolar 1 disorder ([facility]        10. Long term current use of anticoagulant therapy        11. Essential hypertension        12. Hyperlipidemia, unspecified hyperlipidemia type        13. Tobacco abuse counseling  benzonatate (TESSALON PERLES) 100 MG capsule      14. Chronic obstructive pulmonary disease, unspecified COPD type ([facility]        15. Pain in both [address], [place], [place], [address], [address] [place] [address]:     Orders Placed This Encounter   Procedures    Microalbumin, Ur    Microalbumin / Creatinine Urine Ratio    Hemoglobin Q7K    Basic Metabolic Panel    Earline - Aristides Sosa DPM, Clementine Gibson Barnhart) - Casandra Jarquin MD, Nephrology, [facility] Encounter Medications as of 1/10/2023   Medication Sig Dispense Refill    guaiFENesin-codeine (GUAIFENESIN AC) 100-10 MG/5ML liquid take 5 milliliters by mouth three times a day if needed for cough. ..  (REFER TO PRESCRIPTION NOTES).       doxycycline hyclate (VIBRA-TABS) 100 MG tablet Take 1 tablet by mouth 2 times daily for 10 days 20 tablet 0    predniSONE (DELTASONE) 20 MG tablet Take 1 tablet by mouth 3 times daily (with meals) for 5 days 10 tablet 0    cloNIDine (CATAPRES-TTS-2) 0.2 MG/24HR PTWK Place 1 patch onto the skin every 7 days 12 patch 5    candesartan (ATACAND) 32 MG tablet Take 1 tablet by mouth daily 90 tablet 1    albuterol sulfate HFA (VENTOLIN HFA) 108 (90 Base) MCG/ACT inhaler Inhale 2 puffs into the lungs every 4 hours as needed for Wheezing 54 g 1    benzonatate (TESSALON PERLES) 100 MG capsule Take 2 capsules by mouth 3 times daily as needed for Cough 30 capsule 0    rivaroxaban (XARELTO) 20 MG TABS tablet take 1 tablet by mouth every morning with breakfast 30 tablet 1    naproxen (NAPROSYN) 500 MG tablet Take 1 tablet by mouth 2 times daily (with meals) 180 tablet 1    metFORMIN (GLUCOPHAGE-XR) 500 MG extended release tablet take 1 tablet by mouth EVERY MORNING WITH BREAKFAST 90 tablet 1    hydrOXYzine HCl (ATARAX) 25 MG tablet       spironolactone (ALDACTONE) 100 MG tablet Take 1 tablet by mouth daily 90 tablet 0    vitamin D (ERGOCALCIFEROL) 1.25 MG (35995 UT) CAPS capsule Take 1 capsule by mouth once a week 12 capsule 1    omeprazole (PRILOSEC) 20 MG delayed release capsule take 1 capsule by mouth twice a day BEFORE A MEAL 180 capsule 0    fluticasone-salmeterol (ADVAIR DISKUS) 250-50 MCG/ACT AEPB diskus inhaler Inhale 1 puff into the lungs in the morning and 1 puff in the evening. 60 each 5    atorvastatin (LIPITOR) 10 MG tablet take 1 tablet by mouth once daily 30 tablet 0    metroNIDAZOLE (FLAGYL) 500 MG tablet take 1 tablet by mouth twice a day      hydrALAZINE (APRESOLINE) 10 MG tablet take 1 tablet by mouth three times a day if needed FOR SYSTOLIC BLOOD PRESSURE GREATER THAN 150 90 tablet 3    Cholecalciferol (VITAMIN D3) 50 MCG (2000 UT) CAPS Take 1 capsule by mouth daily 90 capsule 5    Cyanocobalamin (B-12) 1000 MCG SUBL Place 1 tablet under the tongue daily 90 tablet 5    folic acid (FOLVITE) 1 MG tablet Take 1 tablet by mouth daily 90 tablet 1    montelukast (SINGULAIR) 10 MG tablet take 1 tablet by mouth every evening 90 tablet 0    albuterol sulfate  (90 Base) MCG/ACT inhaler Inhale 2 puffs into the lungs every 6 hours as needed for Wheezing or Shortness of Breath 18 g 3    Lactobacillus (ACIDOPHILUS) CAPS capsule take 1 tablet by mouth twice a day      levETIRAcetam (KEPPRA) 750 MG tablet Take 750 mg by mouth in the morning and 750 mg before bedtime.       fluticasone-salmeterol (ADVAIR) 250-50 MCG/DOSE AEPB Inhale 1 puff into the lungs every 12 hours 60 each 3    VRAYLAR 3 MG CAPS capsule take 1 tablet by mouth once daily      escitalopram (LEXAPRO) 10 MG tablet       cetirizine (ZYRTEC) 10 MG tablet Take 1 tablet by mouth daily 30 tablet 0    benztropine (COGENTIN) 0.5 MG tablet take 1 tablet by mouth twice a day  0    [DISCONTINUED] cloNIDine (CATAPRES) 0.2 MG/24HR PTWK Transdermal      [DISCONTINUED] losartan (COZAAR) 100 MG tablet take 1 tablet by mouth once daily 30 tablet 0    [DISCONTINUED] cloNIDine (CATAPRES) 0.2 MG/24HR PTWK       [DISCONTINUED] cloNIDine (CATAPRES) 0.1 MG/24HR PTWK apply 1 patch every week as directed 4 patch 0    [DISCONTINUED] cloNIDine (CATAPRES) 0.1 MG/24HR PTWK apply 1 patch every week as directed 4 patch 0     No facility-administered encounter medications on file as of 1/10/2023.      30 minutes spent with patient counseling/educating on acute/chronic medical health problems, medications,  along with treatment options. Reviewed multiple labs/imaging/medications with patient during this time. Following Diet discussion/education was done on Dash Diet, Diabetic Diet, and Cholesterol Diet . In addition Exercise plan and depression screen were discussed. Recent labs/imaging were discussed and reviewed with patient.

## 2023-01-13 DIAGNOSIS — J20.9 ACUTE TRACHEOBRONCHITIS: ICD-10-CM

## 2023-01-13 RX ORDER — PREDNISONE 20 MG/1
20 TABLET ORAL
Qty: 10 TABLET | Refills: 0 | OUTPATIENT
Start: 2023-01-13 | End: 2023-01-18

## 2023-01-13 NOTE — TELEPHONE ENCOUNTER
Glenroy Canales is calling to request a refill on the following medication(s):    Last Visit Date (If Applicable):  3/33/5719    Next Visit Date:    1/17/2023    Medication Request:  Requested Prescriptions     Pending Prescriptions Disp Refills    predniSONE (DELTASONE) 20 MG tablet 10 tablet 0     Sig: Take 1 tablet by mouth 3 times daily (with meals) for 5 days

## 2023-01-17 PROBLEM — I15.8 OTHER SECONDARY HYPERTENSION: Status: ACTIVE | Noted: 2023-01-10

## 2023-01-17 PROBLEM — I15.8 OTHER SECONDARY HYPERTENSION: Chronic | Status: ACTIVE | Noted: 2023-01-10

## 2023-01-17 PROBLEM — E87.6 HYPOKALEMIA: Chronic | Status: ACTIVE | Noted: 2022-11-11

## 2023-01-30 ENCOUNTER — OFFICE VISIT (OUTPATIENT)
Dept: FAMILY MEDICINE CLINIC | Age: 48
End: 2023-01-30
Payer: COMMERCIAL

## 2023-01-30 VITALS
BODY MASS INDEX: 34.62 KG/M2 | HEIGHT: 66 IN | OXYGEN SATURATION: 98 % | TEMPERATURE: 97.3 F | SYSTOLIC BLOOD PRESSURE: 146 MMHG | HEART RATE: 80 BPM | DIASTOLIC BLOOD PRESSURE: 96 MMHG | WEIGHT: 215.4 LBS

## 2023-01-30 DIAGNOSIS — E78.5 HYPERLIPIDEMIA, UNSPECIFIED HYPERLIPIDEMIA TYPE: ICD-10-CM

## 2023-01-30 DIAGNOSIS — Z79.01 LONG TERM CURRENT USE OF ANTICOAGULANT THERAPY: ICD-10-CM

## 2023-01-30 DIAGNOSIS — J45.40 MODERATE PERSISTENT ASTHMA WITHOUT COMPLICATION: ICD-10-CM

## 2023-01-30 DIAGNOSIS — K21.00 GASTROESOPHAGEAL REFLUX DISEASE WITH ESOPHAGITIS WITHOUT HEMORRHAGE: ICD-10-CM

## 2023-01-30 DIAGNOSIS — E11.9 NEW ONSET TYPE 2 DIABETES MELLITUS (HCC): ICD-10-CM

## 2023-01-30 DIAGNOSIS — E87.6 HYPOKALEMIA: Chronic | ICD-10-CM

## 2023-01-30 DIAGNOSIS — F31.9 BIPOLAR 1 DISORDER (HCC): ICD-10-CM

## 2023-01-30 DIAGNOSIS — I15.8 OTHER SECONDARY HYPERTENSION: Chronic | ICD-10-CM

## 2023-01-30 DIAGNOSIS — Z86.711 HISTORY OF PULMONARY EMBOLISM: ICD-10-CM

## 2023-01-30 DIAGNOSIS — F39 MOOD DISORDER (HCC): ICD-10-CM

## 2023-01-30 DIAGNOSIS — Z71.6 TOBACCO ABUSE COUNSELING: ICD-10-CM

## 2023-01-30 DIAGNOSIS — E66.01 CLASS 2 SEVERE OBESITY WITH SERIOUS COMORBIDITY IN ADULT, UNSPECIFIED BMI, UNSPECIFIED OBESITY TYPE (HCC): ICD-10-CM

## 2023-01-30 DIAGNOSIS — Z72.0 TOBACCO ABUSE DISORDER: ICD-10-CM

## 2023-01-30 DIAGNOSIS — R56.9 SEIZURES (HCC): ICD-10-CM

## 2023-01-30 DIAGNOSIS — I10 ESSENTIAL HYPERTENSION: Primary | ICD-10-CM

## 2023-01-30 PROBLEM — J44.1 COPD EXACERBATION (HCC): Status: RESOLVED | Noted: 2018-06-02 | Resolved: 2023-01-30

## 2023-01-30 PROBLEM — J20.9 ACUTE TRACHEOBRONCHITIS: Status: RESOLVED | Noted: 2023-01-10 | Resolved: 2023-01-30

## 2023-01-30 PROCEDURE — G8427 DOCREV CUR MEDS BY ELIG CLIN: HCPCS | Performed by: FAMILY MEDICINE

## 2023-01-30 PROCEDURE — 3077F SYST BP >= 140 MM HG: CPT | Performed by: FAMILY MEDICINE

## 2023-01-30 PROCEDURE — 99214 OFFICE O/P EST MOD 30 MIN: CPT | Performed by: FAMILY MEDICINE

## 2023-01-30 PROCEDURE — 3080F DIAST BP >= 90 MM HG: CPT | Performed by: FAMILY MEDICINE

## 2023-01-30 PROCEDURE — 2022F DILAT RTA XM EVC RTNOPTHY: CPT | Performed by: FAMILY MEDICINE

## 2023-01-30 PROCEDURE — G8482 FLU IMMUNIZE ORDER/ADMIN: HCPCS | Performed by: FAMILY MEDICINE

## 2023-01-30 PROCEDURE — 3046F HEMOGLOBIN A1C LEVEL >9.0%: CPT | Performed by: FAMILY MEDICINE

## 2023-01-30 PROCEDURE — G8417 CALC BMI ABV UP PARAM F/U: HCPCS | Performed by: FAMILY MEDICINE

## 2023-01-30 PROCEDURE — 4004F PT TOBACCO SCREEN RCVD TLK: CPT | Performed by: FAMILY MEDICINE

## 2023-01-30 RX ORDER — LEVETIRACETAM 500 MG/1
500 TABLET ORAL 2 TIMES DAILY
Qty: 60 TABLET | Refills: 1 | Status: SHIPPED | OUTPATIENT
Start: 2023-01-30

## 2023-01-30 RX ORDER — AMLODIPINE BESYLATE 10 MG/1
10 TABLET ORAL DAILY
Qty: 30 TABLET | Refills: 5 | Status: SHIPPED | OUTPATIENT
Start: 2023-01-30

## 2023-01-30 ASSESSMENT — ANXIETY QUESTIONNAIRES
IF YOU CHECKED OFF ANY PROBLEMS ON THIS QUESTIONNAIRE, HOW DIFFICULT HAVE THESE PROBLEMS MADE IT FOR YOU TO DO YOUR WORK, TAKE CARE OF THINGS AT HOME, OR GET ALONG WITH OTHER PEOPLE: SOMEWHAT DIFFICULT
GAD7 TOTAL SCORE: 1
2. NOT BEING ABLE TO STOP OR CONTROL WORRYING: 0
5. BEING SO RESTLESS THAT IT IS HARD TO SIT STILL: 0
7. FEELING AFRAID AS IF SOMETHING AWFUL MIGHT HAPPEN: 0
4. TROUBLE RELAXING: 0
1. FEELING NERVOUS, ANXIOUS, OR ON EDGE: 1
3. WORRYING TOO MUCH ABOUT DIFFERENT THINGS: 0
6. BECOMING EASILY ANNOYED OR IRRITABLE: 0

## 2023-01-30 ASSESSMENT — ENCOUNTER SYMPTOMS
ABDOMINAL PAIN: 0
EYE PAIN: 0
DIARRHEA: 0
BLOOD IN STOOL: 0
PHOTOPHOBIA: 0
SORE THROAT: 0
EYE REDNESS: 0
CONSTIPATION: 0
BACK PAIN: 0
COUGH: 0
EYE DISCHARGE: 0
NAUSEA: 0
SHORTNESS OF BREATH: 0
VOMITING: 0
STRIDOR: 0

## 2023-01-30 ASSESSMENT — PATIENT HEALTH QUESTIONNAIRE - PHQ9
SUM OF ALL RESPONSES TO PHQ9 QUESTIONS 1 & 2: 4
1. LITTLE INTEREST OR PLEASURE IN DOING THINGS: 1
5. POOR APPETITE OR OVEREATING: 2
6. FEELING BAD ABOUT YOURSELF - OR THAT YOU ARE A FAILURE OR HAVE LET YOURSELF OR YOUR FAMILY DOWN: 3
SUM OF ALL RESPONSES TO PHQ QUESTIONS 1-9: 14
8. MOVING OR SPEAKING SO SLOWLY THAT OTHER PEOPLE COULD HAVE NOTICED. OR THE OPPOSITE, BEING SO FIGETY OR RESTLESS THAT YOU HAVE BEEN MOVING AROUND A LOT MORE THAN USUAL: 0
8. MOVING OR SPEAKING SO SLOWLY THAT OTHER PEOPLE COULD HAVE NOTICED. OR THE OPPOSITE, BEING SO FIGETY OR RESTLESS THAT YOU HAVE BEEN MOVING AROUND A LOT MORE THAN USUAL: 0
SUM OF ALL RESPONSES TO PHQ QUESTIONS 1-9: 3
9. THOUGHTS THAT YOU WOULD BE BETTER OFF DEAD, OR OF HURTING YOURSELF: 0
SUM OF ALL RESPONSES TO PHQ9 QUESTIONS 1 & 2: 1
SUM OF ALL RESPONSES TO PHQ QUESTIONS 1-9: 14
3. TROUBLE FALLING OR STAYING ASLEEP: 1
10. IF YOU CHECKED OFF ANY PROBLEMS, HOW DIFFICULT HAVE THESE PROBLEMS MADE IT FOR YOU TO DO YOUR WORK, TAKE CARE OF THINGS AT HOME, OR GET ALONG WITH OTHER PEOPLE: 1
6. FEELING BAD ABOUT YOURSELF - OR THAT YOU ARE A FAILURE OR HAVE LET YOURSELF OR YOUR FAMILY DOWN: 0
5. POOR APPETITE OR OVEREATING: 0
2. FEELING DOWN, DEPRESSED OR HOPELESS: 3
7. TROUBLE CONCENTRATING ON THINGS, SUCH AS READING THE NEWSPAPER OR WATCHING TELEVISION: 0
4. FEELING TIRED OR HAVING LITTLE ENERGY: 2
1. LITTLE INTEREST OR PLEASURE IN DOING THINGS: 0
SUM OF ALL RESPONSES TO PHQ QUESTIONS 1-9: 14
SUM OF ALL RESPONSES TO PHQ QUESTIONS 1-9: 14
3. TROUBLE FALLING OR STAYING ASLEEP: 2
SUM OF ALL RESPONSES TO PHQ QUESTIONS 1-9: 3
7. TROUBLE CONCENTRATING ON THINGS, SUCH AS READING THE NEWSPAPER OR WATCHING TELEVISION: 1
SUM OF ALL RESPONSES TO PHQ QUESTIONS 1-9: 3
SUM OF ALL RESPONSES TO PHQ QUESTIONS 1-9: 3
9. THOUGHTS THAT YOU WOULD BE BETTER OFF DEAD, OR OF HURTING YOURSELF: 0
4. FEELING TIRED OR HAVING LITTLE ENERGY: 1
10. IF YOU CHECKED OFF ANY PROBLEMS, HOW DIFFICULT HAVE THESE PROBLEMS MADE IT FOR YOU TO DO YOUR WORK, TAKE CARE OF THINGS AT HOME, OR GET ALONG WITH OTHER PEOPLE: 2
2. FEELING DOWN, DEPRESSED OR HOPELESS: 1

## 2023-01-30 NOTE — PROGRESS NOTES
Subjective:      Patient ID: Jacques Trimble is a 41 y.o. female. States under a lot of stress. States seeing therapist Q 2 weeks but not seen psychiatrist for 5-6 months but has an appt coming up at [facility] soon per patient. States seen Nephro and was started on a new pill for BP- has lots of tests ordered and states lots of appt coming up. \[facility]\"" has been feeling sick , kids sick, lost her income- kids dad lost job so does not get 700 dollars child support. \[facility]\"" kids are expensive and unable to cope. \[facility]\"" the daniel who killed her nephew got off due to pleading - self defense. \[facility]\"" has been putting in application for rental help and [employer] but so far no help. Last night has 2 seizures - has appt with neuro march 23 rd. States off keppra for a while. Was seeing a neuro on [facility] but was stopped due to non compliance. Review of Systems   Constitutional:  Negative for chills and fever. HENT:  Negative for congestion, ear pain, hearing loss, nosebleeds, sore throat and tinnitus. Eyes:  Negative for photophobia, pain, discharge and redness. Respiratory:  Negative for cough, shortness of breath and stridor. Cardiovascular:  Negative for chest pain, palpitations and leg swelling. Gastrointestinal:  Negative for abdominal pain, blood in stool, constipation, diarrhea, nausea and vomiting. Endocrine: Negative for polydipsia. Genitourinary:  Negative for dysuria, flank pain, frequency, hematuria and urgency. Musculoskeletal:  Negative for back pain, myalgias and neck pain. Skin:  Negative for rash. Allergic/Immunologic: Negative for environmental allergies. Neurological:  Negative for dizziness, tremors, seizures, weakness and headaches. Hematological:  Does not bruise/bleed easily. Psychiatric/Behavioral:  Positive for sleep disturbance. Negative for hallucinations and suicidal ideas. The patient is not nervous/anxious.       Objective:   Physical Exam  Constitutional: General: She is not in acute distress. Appearance: She is well-developed. HENT:      Head: Normocephalic and atraumatic. Right Ear: External ear normal.      Left Ear: External ear normal.      Nose: Nose normal.      Mouth/Throat:      Mouth: Mucous membranes are moist.   Eyes:      Conjunctiva/sclera: Conjunctivae normal.      Pupils: Pupils are equal, round, and reactive to light. Cardiovascular:      Rate and Rhythm: Normal rate and regular rhythm. Heart sounds: Normal heart sounds. Pulmonary:      Effort: Pulmonary effort is normal.      Breath sounds: Normal breath sounds. Abdominal:      General: Bowel sounds are normal. There is no distension. Palpations: Abdomen is soft. Tenderness: There is no abdominal tenderness. Musculoskeletal:         General: Normal range of motion. Cervical back: Normal range of motion and neck supple. Skin:     General: Skin is warm and dry. Neurological:      General: No focal deficit present. Mental Status: She is alert and oriented to person, place, and time. Mental status is at baseline. Psychiatric:         Behavior: Behavior normal.         Thought Content: Thought content normal.         Judgment: Judgment normal.        Vitals:    01/30/23 1320   BP: (!) 158/103   Pulse: 80   Temp:    SpO2:          Assessment:      Diagnosis Orders   1. Essential hypertension  amLODIPine (NORVASC) 10 MG tablet      2. Long term current use of anticoagulant therapy        3. Seizures (HCC)  levETIRAcetam (KEPPRA) 500 MG tablet      4. Tobacco abuse disorder        5. New onset type 2 diabetes mellitus ([address]        6. Gastroesophageal reflux disease with esophagitis without hemorrhage        7. Class 2 severe obesity with serious comorbidity in adult, unspecified BMI, unspecified obesity type ([address]        8. Mood disorder ([address]        9. Hypokalemia        10. Other secondary hypertension        11. Bipolar 1 disorder ([address]        12. Moderate persistent asthma without complication        13. History of pulmonary embolism        14. Hyperlipidemia, unspecified hyperlipidemia type        15. Tobacco abuse counseling               Plan:   No orders of the defined types were placed in this encounter. Outpatient Encounter Medications as of 1/30/2023   Medication Sig Dispense Refill    amLODIPine (NORVASC) 10 MG tablet Take 1 tablet by mouth daily 30 tablet 5    levETIRAcetam (KEPPRA) 500 MG tablet Take 1 tablet by mouth 2 times daily 60 tablet 1    cloNIDine (CATAPRES-TTS-2) 0.2 MG/24HR PTWK Place 1 patch onto the skin every 7 days 12 patch 5    candesartan (ATACAND) 32 MG tablet Take 1 tablet by mouth daily 90 tablet 1    albuterol sulfate HFA (VENTOLIN HFA) 108 (90 Base) MCG/ACT inhaler Inhale 2 puffs into the lungs every 4 hours as needed for Wheezing 54 g 1    rivaroxaban (XARELTO) 20 MG TABS tablet take 1 tablet by mouth every morning with breakfast 30 tablet 1    metFORMIN (GLUCOPHAGE-XR) 500 MG extended release tablet take 1 tablet by mouth EVERY MORNING WITH BREAKFAST 90 tablet 1    hydrOXYzine HCl (ATARAX) 25 MG tablet       spironolactone (ALDACTONE) 100 MG tablet Take 1 tablet by mouth daily 90 tablet 0    vitamin D (ERGOCALCIFEROL) 1.25 MG (34886 UT) CAPS capsule Take 1 capsule by mouth once a week 12 capsule 1    omeprazole (PRILOSEC) 20 MG delayed release capsule take 1 capsule by mouth twice a day BEFORE A MEAL 180 capsule 0    fluticasone-salmeterol (ADVAIR DISKUS) 250-50 MCG/ACT AEPB diskus inhaler Inhale 1 puff into the lungs in the morning and 1 puff in the evening.  60 each 5    atorvastatin (LIPITOR) 10 MG tablet take 1 tablet by mouth once daily 30 tablet 0    hydrALAZINE (APRESOLINE) 10 MG tablet take 1 tablet by mouth three times a day if needed FOR SYSTOLIC BLOOD PRESSURE GREATER THAN 150 90 tablet 3    Cholecalciferol (VITAMIN D3) 50 MCG (2000 UT) CAPS Take 1 capsule by mouth daily 90 capsule 5    Cyanocobalamin (B-12) [address] 1 tablet under the tongue daily 90 tablet 5    folic acid (FOLVITE) 1 MG tablet Take 1 tablet by mouth daily 90 tablet 1    montelukast (SINGULAIR) 10 MG tablet take 1 tablet by mouth every evening 90 tablet 0    albuterol sulfate  (90 Base) MCG/ACT inhaler Inhale 2 puffs into the lungs every 6 hours as needed for Wheezing or Shortness of Breath 18 g 3    VRAYLAR 3 MG CAPS capsule take 1 tablet by mouth once daily      escitalopram (LEXAPRO) 10 MG tablet       cetirizine (ZYRTEC) 10 MG tablet Take 1 tablet by mouth daily 30 tablet 0    benztropine (COGENTIN) 0.5 MG tablet take 1 tablet by mouth twice a day  0    [DISCONTINUED] amLODIPine (NORVASC) 5 MG tablet Take 1 tablet by mouth daily 30 tablet 0    [DISCONTINUED] guaiFENesin-codeine (GUAIFENESIN AC) 100-10 MG/5ML liquid take 5 milliliters by mouth three times a day if needed for cough. ..  (REFER TO PRESCRIPTION NOTES). (Patient not taking: No sig reported)      [DISCONTINUED] naproxen (NAPROSYN) 500 MG tablet Take 1 tablet by mouth 2 times daily (with meals) (Patient not taking: No sig reported) 180 tablet 1    [DISCONTINUED] metroNIDAZOLE (FLAGYL) 500 MG tablet take 1 tablet by mouth twice a day (Patient not taking: No sig reported)      [DISCONTINUED] Lactobacillus (ACIDOPHILUS) CAPS capsule take 1 tablet by mouth twice a day (Patient not taking: No sig reported)      [DISCONTINUED] levETIRAcetam (KEPPRA) 750 MG tablet Take 750 mg by mouth in the morning and 750 mg before bedtime.  (Patient not taking: No sig reported)      [DISCONTINUED] fluticasone-salmeterol (ADVAIR) 250-50 MCG/DOSE AEPB Inhale 1 puff into the lungs every 12 hours (Patient not taking: No sig reported) 60 each 3    [DISCONTINUED] cloNIDine (CATAPRES) 0.1 MG/24HR PTWK apply 1 patch every week as directed 4 patch 0    [DISCONTINUED] cloNIDine (CATAPRES) 0.1 MG/24HR PTWK apply 1 patch every week as directed 4 patch 0     No facility-administered encounter medications on file as of 1/30/2023.      20 minutes spent with patient counseling/educating on acute/chronic medical health problems, medications,  along with treatment options. Reviewed multiple labs/imaging/medications with patient during this time. Following Diet discussion/education was done on Dash Diet, Diabetic Diet, Cholesterol Diet , and Weight Lose Diet. In addition Exercise plan and depression screen were discussed. Recent labs/imaging were discussed and reviewed with patient.

## 2023-02-03 ENCOUNTER — HOSPITAL ENCOUNTER (OUTPATIENT)
Age: 48
Discharge: HOME | End: 2023-02-03
Payer: COMMERCIAL

## 2023-02-03 DIAGNOSIS — E87.6 HYPOKALEMIA: ICD-10-CM

## 2023-02-03 DIAGNOSIS — I10 HYPERTENSION, UNSPECIFIED TYPE: ICD-10-CM

## 2023-02-03 DIAGNOSIS — E11.9 NEW ONSET TYPE 2 DIABETES MELLITUS (HCC): ICD-10-CM

## 2023-02-03 LAB
ANION GAP SERPL CALCULATED.3IONS-SCNC: 19 MMOL/L (ref 9–17)
BUN SERPL-MCNC: 9 MG/DL (ref 6–20)
CALCIUM SERPL-MCNC: 9 MG/DL (ref 8.6–10.4)
CHLORIDE SERPL-SCNC: 103 MMOL/L (ref 98–107)
CO2 SERPL-SCNC: 20 MMOL/L (ref 20–31)
CORTISOL: 9 UG/DL (ref 2.7–18.4)
CREAT SERPL-MCNC: 0.74 MG/DL (ref 0.5–0.9)
CREATININE URINE: 347.7 MG/DL (ref 28–217)
EST. AVERAGE GLUCOSE BLD GHB EST-MCNC: 111 MG/DL
GFR SERPL CREATININE-BSD FRML MDRD: >60 ML/MIN/1.73M2
GLUCOSE SERPL-MCNC: 94 MG/DL (ref 70–99)
HBA1C MFR BLD: 5.5 % (ref 4–6)
MICROALBUMIN/CREAT 24H UR: 39 MG/L
MICROALBUMIN/CREAT UR-RTO: 11 MCG/MG CREAT
POTASSIUM SERPL-SCNC: 3 MMOL/L (ref 3.7–5.3)
SODIUM SERPL-SCNC: 142 MMOL/L (ref 135–144)

## 2023-02-03 PROCEDURE — 80048 BASIC METABOLIC PNL TOTAL CA: CPT

## 2023-02-03 PROCEDURE — 83835 ASSAY OF METANEPHRINES: CPT

## 2023-02-03 PROCEDURE — 36415 COLL VENOUS BLD VENIPUNCTURE: CPT

## 2023-02-03 PROCEDURE — 82533 TOTAL CORTISOL: CPT

## 2023-02-03 PROCEDURE — 82043 UR ALBUMIN QUANTITATIVE: CPT

## 2023-02-03 PROCEDURE — 83036 HEMOGLOBIN GLYCOSYLATED A1C: CPT

## 2023-02-03 PROCEDURE — 82088 ASSAY OF ALDOSTERONE: CPT

## 2023-02-03 PROCEDURE — 82570 ASSAY OF URINE CREATININE: CPT

## 2023-02-03 PROCEDURE — 84244 ASSAY OF RENIN: CPT

## 2023-02-05 LAB
ALDOSTERONE COMMENT: NORMAL
ALDOSTERONE: <3 NG/DL

## 2023-02-07 DIAGNOSIS — I10 ESSENTIAL HYPERTENSION: ICD-10-CM

## 2023-02-07 DIAGNOSIS — E87.6 HYPOKALEMIA: Primary | ICD-10-CM

## 2023-02-07 DIAGNOSIS — E87.6 HYPOKALEMIA: ICD-10-CM

## 2023-02-07 RX ORDER — SPIRONOLACTONE 100 MG/1
TABLET, FILM COATED ORAL
Qty: 90 TABLET | Refills: 0 | Status: SHIPPED | OUTPATIENT
Start: 2023-02-07

## 2023-02-07 RX ORDER — POTASSIUM CHLORIDE 20 MEQ/1
40 TABLET, EXTENDED RELEASE ORAL DAILY
Qty: 6 TABLET | Refills: 0 | Status: SHIPPED | OUTPATIENT
Start: 2023-02-07 | End: 2023-02-09

## 2023-02-15 ENCOUNTER — HOSPITAL ENCOUNTER (OUTPATIENT)
Dept: CT IMAGING | Age: 48
Discharge: HOME | End: 2023-02-17
Payer: COMMERCIAL

## 2023-02-15 DIAGNOSIS — I10 HYPERTENSION, UNSPECIFIED TYPE: ICD-10-CM

## 2023-02-15 PROCEDURE — 74150 CT ABDOMEN W/O CONTRAST: CPT

## 2023-03-01 ENCOUNTER — OFFICE VISIT (OUTPATIENT)
Dept: PODIATRY | Age: 48
End: 2023-03-01

## 2023-03-01 VITALS — BODY MASS INDEX: 34.72 KG/M2 | HEIGHT: 66 IN | WEIGHT: 216 LBS

## 2023-03-01 DIAGNOSIS — M79.604 PAIN IN BOTH LOWER EXTREMITIES: ICD-10-CM

## 2023-03-01 DIAGNOSIS — M79.605 PAIN IN BOTH LOWER EXTREMITIES: ICD-10-CM

## 2023-03-01 DIAGNOSIS — M72.2 PLANTAR FASCIITIS, BILATERAL: Primary | ICD-10-CM

## 2023-03-01 DIAGNOSIS — D23.72 BENIGN NEOPLASM OF SKIN OF LEFT FOOT: ICD-10-CM

## 2023-03-01 NOTE — PROGRESS NOTES
[address] [facility] PODIATRY [facility]  [address]  [address]  Dept: [phone]  Dept Fax: [phone]    NEW PATIENT PROGRESS NOTE  Date of patient's visit: 3/1/2023  Patient's Name:  Donaldo Randolph YOB: 1975            Patient Care Team:  Chung Leung MD as PCP - General (Family Medicine)  Chung Leung MD as PCP - Empaneled Provider  Catina Handy MD as Consulting Physician (Gastroenterology)  Bel Macias MD as Consulting Physician (Pulmonology)  Juany López MD as Consulting Physician (Vascular Surgery)  Harleen Maier DPM as Surgeon (Podiatry)        Chief Complaint   Patient presents with    New Patient     To establish care    Foot Pain     Bilateral feet x6 months         HPI:   Donaldo Randolph is a 41 y.o. female who presents to the office today complaining of bilateral foot pain. Symptoms began 6 month(s) ago. Patient relates pain is Present. Pain is rated 8 out of 10 and is described as constant, moderate, severe, excruciating. Treatments prior to today's visit include: none. Currently denies F/C/N/V.  Pt's primary care physician is Wes Blue MD last seen 01/30/2023     Allergies   Allergen Reactions    Penicillins Anaphylaxis and Rash    Amoxil [Amoxicillin]      Swelling of throat, rash and cough    Bee Pollen     Bee Venom Hives and Other (See Comments)    Ciprofloxacin     Clindamycin/Lincomycin      rash    Elemental Sulfur     Flonase [Fluticasone]      Headaches      Keflex [Cephalexin]      itching    Levaquin [Levofloxacin In D5w] Hives    Levofloxacin Other (See Comments)    Macrobid [Nitrofurantoin Monohyd Macro] Hives    Nitrofurantoin Other (See Comments)    Other Other (See Comments)    Sulfa Antibiotics Hives       Past Medical History:   Diagnosis Date    Abnormal menstrual cycle 1/19/2018    Abnormal menstrual cycle 1/19/2018    Acute deep vein thrombosis (DVT) of popliteal vein of right lower extremity (HCC)     Acute deep vein thrombosis (DVT) of popliteal vein of right lower extremity (HCC)     Acute tracheobronchitis 1/10/2023    Anemia     Asthma     Moderate persistent asthma without complication    Axillary hidradenitis suppurativa 5/6/2020    Bacterial vaginitis 4/22/2020    Bipolar 1 disorder ([address]     Borderline diabetes 3/22/2016    CHF (congestive heart failure) ([address]     When child was delivered 2005    Chiari I malformation ([address] 6/21/2017    A MRI of the brain in 2014 was suspicious for pseudotumor cerebri but a spinal tap revealed a normal opening pressure of 16 cm water. An EEG was normal.  A MRI of the thoracic spine in 2015 was normal. Relevant history of cervical myelopathy with an abnormal MRI of the cervical spine in January 2015. A follow-up MRI of the brain in April 2017 was unchanged. A MRI of the cervical spine in June 2    Chiari I malformation ([facility] 6/21/2017    A MRI of the brain in 2014 was suspicious for pseudotumor cerebri but a spinal tap revealed a normal opening pressure of 16 cm water. An EEG was normal.  A MRI of the thoracic spine in 2015 was normal. Relevant history of cervical myelopathy with an abnormal MRI of the cervical spine in January 2015. A follow-up MRI of the brain in April 2017 was unchanged. A MRI of the cervical spine in June 2    Chiari I malformation ([address] 6/21/2017    A MRI of the brain in 2014 was suspicious for pseudotumor cerebri but a spinal tap revealed a normal opening pressure of 16 cm water. An EEG was normal.  A MRI of the thoracic spine in 2015 was normal. Relevant history of cervical myelopathy with an abnormal MRI of the cervical spine in January 2015. A follow-up MRI of the brain in April 2017 was unchanged.   A MRI of the cervical spine in June 2    Chiari malformation type I ([address] 6/21/2017    Chronic headache disorder 11/14/2017    Chronic idiopathic constipation 6/7/2018    COPD exacerbation ([address] 6/2/2018    Deep vein thrombosis (DVT) of lower extremity ([address] 6/1/2018    Deep vein thrombosis (DVT) of proximal lower extremity ([address] 6/1/2018    Deep vein thrombosis (DVT) of proximal lower extremity ([address] 6/1/2018    Deep vein thrombosis (DVT) of proximal lower extremity ([address] 6/1/2018    Deep vein thrombosis (DVT) with pulmonary embolism present on admission ([address] 12/15/2021    Deep vein thrombosis (DVT) with pulmonary embolism present on admission ([address] 12/15/2021    Deep vein thrombosis (DVT) with pulmonary embolism present on admission ([address] 12/15/2021    Dental infection 12/15/2021    Diverticulitis of sigmoid colon 7/3/2017    Diverticulitis of sigmoid colon 7/3/2017    Diverticulitis of sigmoid colon 7/3/2017    DVT (deep vein thrombosis) in pregnancy 12/15/2021    DVT (deep vein thrombosis) in pregnancy 12/15/2021    DVT of deep femoral vein, bilateral (HCC)     Erosive gastritis 9/15/2021    Erosive gastritis 9/15/2021    Excessive consumption of soda pop 5/12/2021    Excessive consumption of soda pop 5/12/2021    Failure of outpatient treatment     Family history of diabetes mellitus 3/22/2016    Family history of malignant neoplasm of breast 3/22/2016    Family history of malignant neoplasm of uterus 3/22/2016    Family history of malignant neoplasm of uterus 3/22/2016    Family history of throat cancer 3/22/2016    FH: breast cancer 3/22/2016    FH: breast cancer 3/22/2016    FH: uterine cancer 3/22/2016    FH: uterine cancer 3/22/2016    Gastroesophageal reflux disease with esophagitis without hemorrhage     GERD (gastroesophageal reflux disease)     H/O gestational diabetes mellitus, not currently pregnant 3/22/2016    H/O hysterectomy for benign disease 3/22/2016    H/O hysterectomy for benign disease 3/22/2016    History of abnormal uterine bleeding 7/3/2017    History of diabetes mellitus 3/22/2016    History of hysterectomy for benign disease 3/22/2016    History of pulmonary embolism 10/4/2019    History of pulmonary embolism 10/4/2019    Hyperlipidemia     Ileus of unspecified type ([address] 2017     Updating Deprecated Diagnoses    Irritable bowel syndrome     MDRO (multiple drug resistant organisms) resistance     MRSA in  per pt. Non-compliance with treatment 2017    Non-compliance with treatment 2017    Noncompliance with treatment 2017    Obesity     Recurrent major depressive episodes, moderate ([address] 2014    Recurrent major depressive episodes, moderate ([address] 2014    Saddle embolus of pulmonary artery without acute cor pulmonale (HCC)     Saddle embolus of pulmonary artery without acute cor pulmonale ([address] 2018    Seizures ([facility]     Last Seizure 18    Suicidal intent     . Denies 18    Tobacco abuse disorder 3/22/2016    Trichomonal infection     Umbilical hernia 8/10/6756    Umbilical hernia        Prior to Admission medications    Medication Sig Start Date End Date Taking?  Authorizing Provider   potassium chloride (KLOR-CON M) 10 MEQ extended release tablet Take 1 tablet by mouth daily 23  Yes Berto العلي MD   spironolactone (ALDACTONE) 100 MG tablet take 1 tablet by mouth once daily 23  Yes Berto العلي MD   amLODIPine (NORVASC) 10 MG tablet Take 1 tablet by mouth daily 23  Yes Berto العلي MD   levETIRAcetam (KEPPRA) 500 MG tablet Take 1 tablet by mouth 2 times daily 23  Yes Berto العلي MD   cloNIDine (CATAPRES-TTS-2) 0.2 MG/24HR PTWK Place 1 patch onto the skin every 7 days 1/10/23 1/10/24 Yes Berto العلي MD   candesartan (ATACAND) 32 MG tablet Take 1 tablet by mouth daily 1/10/23 1/10/24 Yes Berto العلي MD   albuterol sulfate HFA (VENTOLIN HFA) 108 (90 Base) MCG/ACT inhaler Inhale 2 puffs into the lungs every 4 hours as needed for Wheezing 1/10/23  Yes Berto العلي MD   rivaroxaban (XARELTO) 20 MG TABS tablet take 1 tablet by mouth every morning with breakfast 1/6/23  Yes Summer Coleman MD   metFORMIN (GLUCOPHAGE-XR) 500 MG extended release tablet take 1 tablet by mouth EVERY MORNING WITH BREAKFAST 12/22/22  Yes Cassie Mata MD   hydrOXYzine HCl (ATARAX) 25 MG tablet  11/10/22  Yes Historical Provider, MD   vitamin D (ERGOCALCIFEROL) 1.25 MG (91144 UT) CAPS capsule Take 1 capsule by mouth once a week 11/11/22  Yes Cassie Mata MD   omeprazole (PRILOSEC) 20 MG delayed release capsule take 1 capsule by mouth twice a day BEFORE A MEAL 8/25/22  Yes Laurent Villa MD   fluticasone-salmeterol (ADVAIR DISKUS) 250-50 MCG/ACT AEPB diskus inhaler Inhale 1 puff into the lungs in the morning and 1 puff in the evening.  8/4/22  Yes Summer Coleman MD   atorvastatin (LIPITOR) 10 MG tablet take 1 tablet by mouth once daily 7/28/22  Yes Cassie Mata MD   hydrALAZINE (APRESOLINE) 10 MG tablet take 1 tablet by mouth three times a day if needed FOR SYSTOLIC BLOOD PRESSURE GREATER THAN 150 6/27/22  Yes Cassie Mata MD   Cholecalciferol (VITAMIN D3) 50 MCG (2000 UT) CAPS Take 1 capsule by mouth daily 6/27/22  Yes Cassie Mata MD   Cyanocobalamin (B-12) 1000 MCG SUBL Place 1 tablet under the tongue daily 6/3/22  Yes Cassie Mata MD   folic acid (FOLVITE) 1 MG tablet Take 1 tablet by mouth daily 6/3/22  Yes Cassie Mata MD   montelukast (SINGULAIR) 10 MG tablet take 1 tablet by mouth every evening 2/14/22  Yes Cassie Mata MD   albuterol sulfate  (90 Base) MCG/ACT inhaler Inhale 2 puffs into the lungs every 6 hours as needed for Wheezing or Shortness of Breath 12/17/21  Yes Cassie Mata MD   VRAYLAR 3 MG CAPS capsule take 1 tablet by mouth once daily 4/26/21  Yes Historical Provider, MD   escitalopram (LEXAPRO) 10 MG tablet  5/18/21  Yes Historical Provider, MD   cetirizine (ZYRTEC) 10 MG tablet Take 1 tablet by mouth daily 7/24/20  Yes Cassie Mata MD   benztropine (COGENTIN) 0.5 MG tablet take 1 tablet by mouth twice a day 10/7/19  Yes Historical Provider, MD   cloNIDine (CATAPRES) 0.1 MG/24HR PTWK apply 1 patch every week as directed 21   Chung Leung MD   cloNIDine (CATAPRES) 0.1 MG/24HR PTWK apply 1 patch every week as directed 21   Chung Leung MD       Past Surgical History:   Procedure Laterality Date    ABSCESS DRAINAGE      abdominal abscess RLQ    AXILLARY SURGERY Right 2018    Excision of hidradenitis cysts, right axilla    BREAST LUMPECTOMY Bilateral     BREAST LUMPECTOMY       SECTION      x2    COLONOSCOPY N/A 2021    COLONOSCOPY DIAGNOSTIC performed by Catina Handy MD at [address], DIAGNOSTIC      HERNIA REPAIR      HYSTERECTOMY (CERVIX STATUS UNKNOWN)      LYMPH NODE DISSECTION Left 2019    LEFT AXILLARY HYDRADENITIS EXCISION performed by Ana Vyas DO at [facility] Left 2019    LEFT AXILLARY HYDRADENITIS EXCISION (Left )    WI EXC B9 LESION MRGN XCP SK TG T/A/L 0.5 CM/< Right 2018    EXCISION HIDRADENITIS RIGHT AXILLA performed by Ana Vyas DO at [facility]  2021    EGD BIOPSY performed by Catina Handy MD at [address] [place] [address] History   Problem Relation Age of Onset    Asthma Mother     High Blood Pressure Mother     Mental Illness Mother     Stroke Other     Other Sister         blood clotting disorder, ms    Depression Sister     Cancer Maternal Grandmother     Diabetes Maternal Grandmother     Cancer Maternal Aunt     Diabetes Maternal Grandfather        Social History     Tobacco Use    Smoking status: Every Day     Packs/day: 0.50     Years: 26.00     Pack years: 13.00     Types: Cigarettes     Start date:     Smokeless tobacco: Never    Tobacco comments:     wants help- adviced about smoking cessation plans   Substance Use Topics    Alcohol use:  Yes Comment: occ       Review of Systems    Review of Systems:   History obtained from chart review and the patient  General ROS: negative for - chills, fatigue, fever, night sweats or weight gain  Constitutional: Negative for chills, diaphoresis, fatigue, fever and unexpected weight change. Musculoskeletal: Positive for arthralgias, gait problem and joint swelling. Neurological ROS: negative for - behavioral changes, confusion, headaches or seizures. Negative for weakness and numbness. Dermatological ROS: negative for - mole changes, rash  Cardiovascular: Negative for leg swelling. Gastrointestinal: Negative for constipation, diarrhea, nausea and vomiting. Lower Extremity Physical Examination:   Vitals: There were no vitals filed for this visit. General: AAO x 3 in NAD. Dermatologic Exam:  Soft tissue lesion to the plantar left foot with central core and petechiae. Pain on palpation of lesion. Musculoskeletal:     1st MPJ ROM decreased, Bilateral.  Muscle strength 5/5, Bilateral. POP of the right and left plantar medial calcaneal tuberosity. Pain increased with Dorsiflexion of the right and left lesser toes. Negative pain on compression of the calcaneus bilaterally Medial longitudinal arch, Bilateral WNL.   Ankle ROM WNL,Bilateral.    Dorsally contracted digits absent digits 1-5 Bilateral.     Vascular: DP and PT pulses palpable 2/4, Bilateral.  CFT <3 seconds, Bilateral.  Hair growth present to the level of the digits, Bilateral.  Edema absent, Bilateral.  Varicosities absent, Bilateral. Erythema absent, Bilateral    Neurological: Sensation intact to light touch to level of digits, Bilateral.  Protective sensation intact 10/10 sites via 5.07/10g Nelson-Sabra Monofilament, Bilateral.  negative Tinel's, Bilateral.  negative Valleix sign, Bilateral.      Integument: Warm, dry, supple, Bilateral.  Open lesion absent, Bilateral.  Interdigital maceration absent to web spaces 1-4, Bilateral. Nails are normal in length, thickness and color 1-5 bilateral.  Fissures absent, Bilateral.         Asessment: Patient is a 41 y.o. female with:    Diagnosis Orders   1. Plantar fasciitis, bilateral        2. Benign neoplasm of skin of left foot  49451 - DC DESTRUCTION BENIGN LESIONS UP TO 14      3. Pain in both lower extremities  44906 - DC DESTRUCTION BENIGN LESIONS UP TO 14            Plan: Patient examined and evaluated. Current condition and treatment options discussed in detail. Discussed conservative and surgical options with the patient. The lesions were partially excised via 15 blade and silver nitrate was applied under occlusion. The patient tolerated the procedure well and without complication. Advised patient to use vasoline to the area after tomorrow to prevent surrounding tissue irritation. Patient Instructions: Plantar Fasciitis    Plantar Fasciitis is inflammation of the long fibrous band on the bottom of the foot (plantar fascia), especially in the area of its attachment to the heel bone (calcaneus). The plantar fascia is also intimately related to the Achilles tendon and therefore stretching of the calf muscles is also important for treatment. 1. Stretching: Perform 3-4 times daily, 2-3 minutes per side      -Before getting out of bed, place a towel around the ball of your foot and       pull back, holding for 30 seconds. Repeat with other foot.      -Place a tennis ball on the floor. Roll the tennis ball under your foot for      10-15 minutes. Repeat with other foot. -Perform calf stretches: stand facing a wall with your hands on the wall,      place one leg a step behind the other. Keeping your back heel on the      floor, bend your front knee (lean into the wall), holding for 30 seconds. Repeat with other leg. 2. Icing: Perform 2-3 times daily      -Place a 12 oz plastic water bottle in the freezer.   Once frozen, remove     and roll the bottle under your foot for 10-15 min. 3. Anti-inflammatory Medication      -Begin daily regimen of anti-inflammatory medication (Ibuprofen,       Naproxen, Naprosyn) as discussed during your visit. Verbal and written instructions given to patient. Contact office with any questions/problems/concerns. RTC in 2month(s).     3/1/2023    Electronically signed by Esperanza Palencia DPM on 3/1/2023 at 2:49 PM  3/1/2023

## 2023-03-07 ENCOUNTER — HOSPITAL ENCOUNTER (OUTPATIENT)
Dept: VASCULAR LAB | Age: 48
Discharge: HOME | End: 2023-03-07
Payer: COMMERCIAL

## 2023-03-07 DIAGNOSIS — I10 HYPERTENSION, UNSPECIFIED TYPE: ICD-10-CM

## 2023-03-07 PROCEDURE — 93975 VASCULAR STUDY: CPT

## 2023-03-15 DIAGNOSIS — I27.82 CHRONIC PULMONARY EMBOLISM, UNSPECIFIED PULMONARY EMBOLISM TYPE, UNSPECIFIED WHETHER ACUTE COR PULMONALE PRESENT (HCC): ICD-10-CM

## 2023-03-15 DIAGNOSIS — I26.92 SADDLE EMBOLUS OF PULMONARY ARTERY WITHOUT ACUTE COR PULMONALE, UNSPECIFIED CHRONICITY (HCC): ICD-10-CM

## 2023-03-15 DIAGNOSIS — I27.24 CTEPH (CHRONIC THROMBOEMBOLIC PULMONARY HYPERTENSION) (HCC): ICD-10-CM

## 2023-03-16 NOTE — TELEPHONE ENCOUNTER
Pt current, last seen Aug 2022- pt had to cancel her Feb 2023 f/u appt -  Now scheduled May 2023   Please sign pending Rx

## 2023-03-23 DIAGNOSIS — R56.9 SEIZURES (HCC): ICD-10-CM

## 2023-03-23 RX ORDER — LEVETIRACETAM 500 MG/1
TABLET ORAL
Qty: 60 TABLET | Refills: 1 | OUTPATIENT
Start: 2023-03-23

## 2023-03-30 DIAGNOSIS — I10 ESSENTIAL HYPERTENSION: Primary | ICD-10-CM

## 2023-03-30 RX ORDER — CANDESARTAN 32 MG/1
32 TABLET ORAL DAILY
Qty: 90 TABLET | Refills: 1 | Status: SHIPPED | OUTPATIENT
Start: 2023-03-30

## 2023-04-19 ENCOUNTER — OFFICE VISIT (OUTPATIENT)
Dept: FAMILY MEDICINE CLINIC | Age: 48
End: 2023-04-19

## 2023-04-19 VITALS
HEIGHT: 66 IN | DIASTOLIC BLOOD PRESSURE: 89 MMHG | SYSTOLIC BLOOD PRESSURE: 120 MMHG | WEIGHT: 210.4 LBS | BODY MASS INDEX: 33.82 KG/M2 | HEART RATE: 75 BPM | TEMPERATURE: 99.5 F | OXYGEN SATURATION: 98 %

## 2023-04-19 DIAGNOSIS — E87.6 HYPOKALEMIA: Chronic | ICD-10-CM

## 2023-04-19 DIAGNOSIS — I82.4Z9 DEEP VEIN THROMBOSIS (DVT) OF DISTAL VEIN OF LOWER EXTREMITY, UNSPECIFIED CHRONICITY, UNSPECIFIED LATERALITY (HCC): ICD-10-CM

## 2023-04-19 DIAGNOSIS — J45.40 MODERATE PERSISTENT ASTHMA WITHOUT COMPLICATION: ICD-10-CM

## 2023-04-19 DIAGNOSIS — E53.8 FOLATE DEFICIENCY: ICD-10-CM

## 2023-04-19 DIAGNOSIS — Z72.0 TOBACCO ABUSE DISORDER: ICD-10-CM

## 2023-04-19 DIAGNOSIS — F43.10 PTSD (POST-TRAUMATIC STRESS DISORDER): ICD-10-CM

## 2023-04-19 DIAGNOSIS — Z79.01 LONG TERM CURRENT USE OF ANTICOAGULANT THERAPY: ICD-10-CM

## 2023-04-19 DIAGNOSIS — E53.8 COBALAMIN DEFICIENCY: ICD-10-CM

## 2023-04-19 DIAGNOSIS — Z71.6 TOBACCO ABUSE COUNSELING: ICD-10-CM

## 2023-04-19 DIAGNOSIS — E11.9 NEW ONSET TYPE 2 DIABETES MELLITUS (HCC): ICD-10-CM

## 2023-04-19 DIAGNOSIS — I10 ESSENTIAL HYPERTENSION: Primary | ICD-10-CM

## 2023-04-19 DIAGNOSIS — E78.5 HYPERLIPIDEMIA, UNSPECIFIED HYPERLIPIDEMIA TYPE: ICD-10-CM

## 2023-04-19 DIAGNOSIS — K21.00 GASTROESOPHAGEAL REFLUX DISEASE WITH ESOPHAGITIS WITHOUT HEMORRHAGE: ICD-10-CM

## 2023-04-19 DIAGNOSIS — E55.9 VITAMIN D DEFICIENCY: ICD-10-CM

## 2023-04-19 DIAGNOSIS — F39 MOOD DISORDER (HCC): ICD-10-CM

## 2023-04-19 DIAGNOSIS — E66.01 CLASS 2 SEVERE OBESITY WITH SERIOUS COMORBIDITY IN ADULT, UNSPECIFIED BMI, UNSPECIFIED OBESITY TYPE (HCC): ICD-10-CM

## 2023-04-19 PROBLEM — I82.409 DEEP VEIN THROMBOSIS (DVT) OF LOWER EXTREMITY (HCC): Status: ACTIVE | Noted: 2018-06-01

## 2023-04-19 SDOH — ECONOMIC STABILITY: INCOME INSECURITY: HOW HARD IS IT FOR YOU TO PAY FOR THE VERY BASICS LIKE FOOD, HOUSING, MEDICAL CARE, AND HEATING?: SOMEWHAT HARD

## 2023-04-19 SDOH — ECONOMIC STABILITY: FOOD INSECURITY: WITHIN THE PAST 12 MONTHS, THE FOOD YOU BOUGHT JUST DIDN'T LAST AND YOU DIDN'T HAVE MONEY TO GET MORE.: SOMETIMES TRUE

## 2023-04-19 SDOH — ECONOMIC STABILITY: HOUSING INSECURITY
IN THE LAST 12 MONTHS, WAS THERE A TIME WHEN YOU DID NOT HAVE A STEADY PLACE TO SLEEP OR SLEPT IN A SHELTER (INCLUDING NOW)?: NO

## 2023-04-19 SDOH — ECONOMIC STABILITY: FOOD INSECURITY: WITHIN THE PAST 12 MONTHS, YOU WORRIED THAT YOUR FOOD WOULD RUN OUT BEFORE YOU GOT MONEY TO BUY MORE.: SOMETIMES TRUE

## 2023-04-19 ASSESSMENT — ENCOUNTER SYMPTOMS
EYE DISCHARGE: 0
NAUSEA: 0
ABDOMINAL PAIN: 0
EYE REDNESS: 0
STRIDOR: 0
PHOTOPHOBIA: 0
EYE PAIN: 0
SHORTNESS OF BREATH: 0
DIARRHEA: 0
VOMITING: 0
BACK PAIN: 0
SORE THROAT: 0
COUGH: 0
BLOOD IN STOOL: 0
CONSTIPATION: 0

## 2023-04-19 ASSESSMENT — PATIENT HEALTH QUESTIONNAIRE - PHQ9
5. POOR APPETITE OR OVEREATING: 0
3. TROUBLE FALLING OR STAYING ASLEEP: 1
SUM OF ALL RESPONSES TO PHQ9 QUESTIONS 1 & 2: 0
SUM OF ALL RESPONSES TO PHQ QUESTIONS 1-9: 2
SUM OF ALL RESPONSES TO PHQ QUESTIONS 1-9: 2
6. FEELING BAD ABOUT YOURSELF - OR THAT YOU ARE A FAILURE OR HAVE LET YOURSELF OR YOUR FAMILY DOWN: 0
6. FEELING BAD ABOUT YOURSELF - OR THAT YOU ARE A FAILURE OR HAVE LET YOURSELF OR YOUR FAMILY DOWN: 1
SUM OF ALL RESPONSES TO PHQ QUESTIONS 1-9: 2
7. TROUBLE CONCENTRATING ON THINGS, SUCH AS READING THE NEWSPAPER OR WATCHING TELEVISION: 0
SUM OF ALL RESPONSES TO PHQ QUESTIONS 1-9: 7
7. TROUBLE CONCENTRATING ON THINGS, SUCH AS READING THE NEWSPAPER OR WATCHING TELEVISION: 1
SUM OF ALL RESPONSES TO PHQ QUESTIONS 1-9: 7
SUM OF ALL RESPONSES TO PHQ QUESTIONS 1-9: 2
8. MOVING OR SPEAKING SO SLOWLY THAT OTHER PEOPLE COULD HAVE NOTICED. OR THE OPPOSITE, BEING SO FIGETY OR RESTLESS THAT YOU HAVE BEEN MOVING AROUND A LOT MORE THAN USUAL: 0
SUM OF ALL RESPONSES TO PHQ9 QUESTIONS 1 & 2: 2
4. FEELING TIRED OR HAVING LITTLE ENERGY: 1
2. FEELING DOWN, DEPRESSED OR HOPELESS: 1
8. MOVING OR SPEAKING SO SLOWLY THAT OTHER PEOPLE COULD HAVE NOTICED. OR THE OPPOSITE, BEING SO FIGETY OR RESTLESS THAT YOU HAVE BEEN MOVING AROUND A LOT MORE THAN USUAL: 0
2. FEELING DOWN, DEPRESSED OR HOPELESS: 0
9. THOUGHTS THAT YOU WOULD BE BETTER OFF DEAD, OR OF HURTING YOURSELF: 0
9. THOUGHTS THAT YOU WOULD BE BETTER OFF DEAD, OR OF HURTING YOURSELF: 0
5. POOR APPETITE OR OVEREATING: 1
3. TROUBLE FALLING OR STAYING ASLEEP: 1
1. LITTLE INTEREST OR PLEASURE IN DOING THINGS: 0
4. FEELING TIRED OR HAVING LITTLE ENERGY: 1
10. IF YOU CHECKED OFF ANY PROBLEMS, HOW DIFFICULT HAVE THESE PROBLEMS MADE IT FOR YOU TO DO YOUR WORK, TAKE CARE OF THINGS AT HOME, OR GET ALONG WITH OTHER PEOPLE: 1
10. IF YOU CHECKED OFF ANY PROBLEMS, HOW DIFFICULT HAVE THESE PROBLEMS MADE IT FOR YOU TO DO YOUR WORK, TAKE CARE OF THINGS AT HOME, OR GET ALONG WITH OTHER PEOPLE: 1
1. LITTLE INTEREST OR PLEASURE IN DOING THINGS: 1

## 2023-04-19 ASSESSMENT — ANXIETY QUESTIONNAIRES
6. BECOMING EASILY ANNOYED OR IRRITABLE: 0
4. TROUBLE RELAXING: 0
GAD7 TOTAL SCORE: 0
IF YOU CHECKED OFF ANY PROBLEMS ON THIS QUESTIONNAIRE, HOW DIFFICULT HAVE THESE PROBLEMS MADE IT FOR YOU TO DO YOUR WORK, TAKE CARE OF THINGS AT HOME, OR GET ALONG WITH OTHER PEOPLE: NOT DIFFICULT AT ALL
3. WORRYING TOO MUCH ABOUT DIFFERENT THINGS: 0
2. NOT BEING ABLE TO STOP OR CONTROL WORRYING: 0
1. FEELING NERVOUS, ANXIOUS, OR ON EDGE: 0
5. BEING SO RESTLESS THAT IT IS HARD TO SIT STILL: 0
7. FEELING AFRAID AS IF SOMETHING AWFUL MIGHT HAPPEN: 0

## 2023-04-19 NOTE — PROGRESS NOTES
Base) MCG/ACT inhaler Inhale 2 puffs into the lungs every 6 hours as needed for Wheezing or Shortness of Breath 18 g 3    VRAYLAR 3 MG CAPS capsule take 1 tablet by mouth once daily      escitalopram (LEXAPRO) 10 MG tablet       cetirizine (ZYRTEC) 10 MG tablet Take 1 tablet by mouth daily 30 tablet 0    benztropine (COGENTIN) 0.5 MG tablet take 1 tablet by mouth twice a day  0    [DISCONTINUED] atorvastatin (LIPITOR) 10 MG tablet take 1 tablet by mouth once daily (Patient not taking: Reported on 4/19/2023) 30 tablet 0    [DISCONTINUED] Cholecalciferol (VITAMIN D3) 50 MCG (2000 UT) CAPS Take 1 capsule by mouth daily (Patient not taking: Reported on 4/19/2023) 90 capsule 5    [DISCONTINUED] Cyanocobalamin (B-12) 1000 MCG SUBL Place 1 tablet under the tongue daily (Patient not taking: Reported on 4/19/2023) 90 tablet 5    [DISCONTINUED] montelukast (SINGULAIR) 10 MG tablet take 1 tablet by mouth every evening (Patient not taking: Reported on 4/19/2023) 90 tablet 0    [DISCONTINUED] cloNIDine (CATAPRES) 0.1 MG/24HR PTWK apply 1 patch every week as directed 4 patch 0    [DISCONTINUED] cloNIDine (CATAPRES) 0.1 MG/24HR PTWK apply 1 patch every week as directed 4 patch 0     No facility-administered encounter medications on file as of 4/19/2023.      20 minutes spent with patient counseling/educating on acute/chronic medical health problems, medications,  along with treatment options. Reviewed multiple labs/imaging/medications with patient during this time. Following Diet discussion/education was done on Dash Diet, Cholesterol Diet , and Weight Lose Diet. In addition Exercise plan and depression screen were discussed. Recent labs/imaging were discussed and reviewed with patient. No

## 2023-04-27 DIAGNOSIS — E55.9 VITAMIN D DEFICIENCY: ICD-10-CM

## 2023-04-27 RX ORDER — ERGOCALCIFEROL 1.25 MG/1
CAPSULE ORAL
Qty: 12 CAPSULE | Refills: 1 | OUTPATIENT
Start: 2023-04-27

## 2023-05-18 DIAGNOSIS — I10 ESSENTIAL HYPERTENSION: ICD-10-CM

## 2023-05-18 DIAGNOSIS — E87.6 HYPOKALEMIA: ICD-10-CM

## 2023-05-18 RX ORDER — SPIRONOLACTONE 100 MG/1
TABLET, FILM COATED ORAL
Qty: 90 TABLET | Refills: 0 | Status: SHIPPED | OUTPATIENT
Start: 2023-05-18

## 2023-06-21 DIAGNOSIS — E11.69 TYPE 2 DIABETES MELLITUS WITH OTHER SPECIFIED COMPLICATION, WITHOUT LONG-TERM CURRENT USE OF INSULIN (HCC): ICD-10-CM

## 2023-06-21 DIAGNOSIS — Z86.32 HISTORY OF GESTATIONAL DIABETES: ICD-10-CM

## 2023-06-21 DIAGNOSIS — E11.9 NEW ONSET TYPE 2 DIABETES MELLITUS (HCC): ICD-10-CM

## 2023-06-21 RX ORDER — METFORMIN HYDROCHLORIDE 500 MG/1
TABLET, EXTENDED RELEASE ORAL
Qty: 90 TABLET | Refills: 1 | Status: SHIPPED | OUTPATIENT
Start: 2023-06-21

## 2023-06-22 DIAGNOSIS — E78.5 HYPERLIPIDEMIA, UNSPECIFIED HYPERLIPIDEMIA TYPE: ICD-10-CM

## 2023-06-22 DIAGNOSIS — E66.01 CLASS 2 SEVERE OBESITY WITH SERIOUS COMORBIDITY IN ADULT, UNSPECIFIED BMI, UNSPECIFIED OBESITY TYPE (HCC): ICD-10-CM

## 2023-06-22 DIAGNOSIS — E78.5 DYSLIPIDEMIA: ICD-10-CM

## 2023-06-22 DIAGNOSIS — E55.9 VITAMIN D DEFICIENCY: ICD-10-CM

## 2023-06-22 DIAGNOSIS — E11.9 NEW ONSET TYPE 2 DIABETES MELLITUS (HCC): Primary | ICD-10-CM

## 2023-06-22 DIAGNOSIS — E53.9 VITAMIN B DEFICIENCY: ICD-10-CM

## 2023-06-22 DIAGNOSIS — Z13.29 SCREENING FOR THYROID DISORDER: ICD-10-CM

## 2023-06-22 RX ORDER — ROSUVASTATIN CALCIUM 5 MG/1
5 TABLET, COATED ORAL DAILY
Qty: 90 TABLET | Refills: 1 | Status: SHIPPED | OUTPATIENT
Start: 2023-06-22

## 2023-06-22 RX ORDER — ATORVASTATIN CALCIUM 10 MG/1
TABLET, FILM COATED ORAL
Qty: 30 TABLET | Refills: 0 | OUTPATIENT
Start: 2023-06-22

## 2023-06-23 NOTE — TELEPHONE ENCOUNTER
Spoke with pt and she is no longer taking this med but she will go and get labs done next week and do she need to come in before her appt on 07/19/2023 please advise

## 2023-07-13 DIAGNOSIS — I10 ESSENTIAL HYPERTENSION: ICD-10-CM

## 2023-07-17 RX ORDER — AMLODIPINE BESYLATE 10 MG/1
TABLET ORAL
Qty: 30 TABLET | Refills: 5 | Status: SHIPPED | OUTPATIENT
Start: 2023-07-17

## 2023-07-17 NOTE — TELEPHONE ENCOUNTER
Amanda Taylor is calling to request a refill on the following medication(s):    Medication Request:  Requested Prescriptions     Pending Prescriptions Disp Refills    amLODIPine (NORVASC) 10 MG tablet [Pharmacy Med Name: AMLODIPINE BESYLATE 10 MG TAB] 30 tablet 5     Sig: take 1 tablet by mouth once daily       Last Visit Date (If Applicable):  4/34/2552    Next Visit Date:    7/19/2023

## 2023-07-21 DIAGNOSIS — I27.24 CTEPH (CHRONIC THROMBOEMBOLIC PULMONARY HYPERTENSION) (HCC): ICD-10-CM

## 2023-07-21 DIAGNOSIS — I26.92 SADDLE EMBOLUS OF PULMONARY ARTERY WITHOUT ACUTE COR PULMONALE, UNSPECIFIED CHRONICITY (HCC): ICD-10-CM

## 2023-07-21 DIAGNOSIS — I27.82 CHRONIC PULMONARY EMBOLISM, UNSPECIFIED PULMONARY EMBOLISM TYPE, UNSPECIFIED WHETHER ACUTE COR PULMONALE PRESENT (HCC): ICD-10-CM

## 2023-07-21 NOTE — TELEPHONE ENCOUNTER
LAST VISIT: 8/4/22  NEXT VISIT: 7/26/23 (patient has cancelled last two appointments)    Unsure if you are willing to fill prior to appointment. Thank you.

## 2023-07-24 NOTE — TELEPHONE ENCOUNTER
Writer called patient on mobile number and left a message on machine informing of Dr Ni Cantor message and reminding of appointment on 7/26/23. Office phone number was provided for any questions.

## 2023-07-26 ENCOUNTER — OFFICE VISIT (OUTPATIENT)
Dept: PULMONOLOGY | Age: 48
End: 2023-07-26
Payer: COMMERCIAL

## 2023-07-26 VITALS
RESPIRATION RATE: 12 BRPM | BODY MASS INDEX: 34.32 KG/M2 | SYSTOLIC BLOOD PRESSURE: 123 MMHG | HEART RATE: 66 BPM | WEIGHT: 206 LBS | OXYGEN SATURATION: 97 % | DIASTOLIC BLOOD PRESSURE: 89 MMHG | HEIGHT: 65 IN

## 2023-07-26 DIAGNOSIS — I87.009 POSTPHLEBITIC SYNDROME WITHOUT COMPLICATION: ICD-10-CM

## 2023-07-26 DIAGNOSIS — R06.83 SNORING: ICD-10-CM

## 2023-07-26 DIAGNOSIS — I27.24 CTEPH (CHRONIC THROMBOEMBOLIC PULMONARY HYPERTENSION) (HCC): ICD-10-CM

## 2023-07-26 DIAGNOSIS — Z79.01 ANTICOAGULANT LONG-TERM USE: ICD-10-CM

## 2023-07-26 DIAGNOSIS — J45.40 MODERATE PERSISTENT ASTHMA WITHOUT COMPLICATION: ICD-10-CM

## 2023-07-26 DIAGNOSIS — I26.92 SADDLE EMBOLUS OF PULMONARY ARTERY WITHOUT ACUTE COR PULMONALE, UNSPECIFIED CHRONICITY (HCC): Primary | ICD-10-CM

## 2023-07-26 DIAGNOSIS — G47.33 OSA (OBSTRUCTIVE SLEEP APNEA): ICD-10-CM

## 2023-07-26 DIAGNOSIS — I50.32 CHRONIC DIASTOLIC (CONGESTIVE) HEART FAILURE (HCC): ICD-10-CM

## 2023-07-26 DIAGNOSIS — E66.09 OBESITY DUE TO EXCESS CALORIES, UNSPECIFIED OBESITY SEVERITY: ICD-10-CM

## 2023-07-26 DIAGNOSIS — F17.200 SMOKING: ICD-10-CM

## 2023-07-26 DIAGNOSIS — I82.413 DVT OF DEEP FEMORAL VEIN, BILATERAL (HCC): ICD-10-CM

## 2023-07-26 DIAGNOSIS — I27.82 CHRONIC PULMONARY EMBOLISM, UNSPECIFIED PULMONARY EMBOLISM TYPE, UNSPECIFIED WHETHER ACUTE COR PULMONALE PRESENT (HCC): ICD-10-CM

## 2023-07-26 PROCEDURE — 3074F SYST BP LT 130 MM HG: CPT | Performed by: INTERNAL MEDICINE

## 2023-07-26 PROCEDURE — G8417 CALC BMI ABV UP PARAM F/U: HCPCS | Performed by: INTERNAL MEDICINE

## 2023-07-26 PROCEDURE — 3078F DIAST BP <80 MM HG: CPT | Performed by: INTERNAL MEDICINE

## 2023-07-26 PROCEDURE — 99214 OFFICE O/P EST MOD 30 MIN: CPT | Performed by: INTERNAL MEDICINE

## 2023-07-26 PROCEDURE — 4004F PT TOBACCO SCREEN RCVD TLK: CPT | Performed by: INTERNAL MEDICINE

## 2023-07-26 PROCEDURE — G8427 DOCREV CUR MEDS BY ELIG CLIN: HCPCS | Performed by: INTERNAL MEDICINE

## 2023-07-26 RX ORDER — FLUTICASONE PROPIONATE AND SALMETEROL 250; 50 UG/1; UG/1
1 POWDER RESPIRATORY (INHALATION) EVERY 12 HOURS
Qty: 60 EACH | Refills: 5 | Status: SHIPPED | OUTPATIENT
Start: 2023-07-26

## 2023-07-26 NOTE — PATIENT INSTRUCTIONS
FAXED [facility] request for appointment to Dr Annabel Berger -   [phone]    Phone number provided with AVS   Pt cab was arriving in 2 min - she could not wait to schedule Echo - provided scheduling phone number. She will call.    LS

## 2023-07-26 NOTE — PROGRESS NOTES
was recommended to have prednisone and an antibiotic available for use during an exacerbation  Educated and clarified the medication use. Recommended lifelong anticoagulation in view of the unprovoked pulmonary embolism, which was very significant  Discussed use, benefit, and side effects of prescribed medications. Barriers to medication compliance addressed. Abbey Mosqueda received counseling on the following healthy behaviors: nutrition, exercise and medication adherence  Recommend flu vaccination in the fall annually. Recommendations given regarding pneumococcal vaccinations. Patient had the COVID-19 vaccination. Recommend the Bi Valent booster  Patient is up-to-date with vaccinations from pulmonary perspective. Maintain an active lifestyle. Recommend smoking cessation. Abbey Mosqueda was instructed on smoking cessation for greater than 10 minutes. I discussed with this patient today the risks and benefits of various tobacco cessation strategies  Patient was educated on how to use the respiratory medications. All the questions that the patient  has had were answered to  Her satisfaction. Home O2 evaluation was done at the last visit. Supplemental oxygen was not needed. Pt is not to drive if sleepy. After reviewing the patient's smoking history and her age patient does not meet the criteria for lung cancer screening as she is less than 48years of age and her smoking history is only for 13 pack years. We'll see the patient back in 6 months  Thank you for having us involved in the care of your patient. Please call us if you have any questions or concerns.               Kendy Medina MD MD  8/1/2023 8:01 AM

## 2023-08-09 ENCOUNTER — HOSPITAL ENCOUNTER (OUTPATIENT)
Age: 48
Discharge: HOME | End: 2023-08-09
Payer: COMMERCIAL

## 2023-08-09 DIAGNOSIS — E53.9 VITAMIN B DEFICIENCY: ICD-10-CM

## 2023-08-09 DIAGNOSIS — E11.9 NEW ONSET TYPE 2 DIABETES MELLITUS (HCC): ICD-10-CM

## 2023-08-09 DIAGNOSIS — E53.8 FOLATE DEFICIENCY: Primary | ICD-10-CM

## 2023-08-09 DIAGNOSIS — E55.9 VITAMIN D DEFICIENCY: Primary | ICD-10-CM

## 2023-08-09 DIAGNOSIS — E55.9 VITAMIN D DEFICIENCY: ICD-10-CM

## 2023-08-09 DIAGNOSIS — E78.5 DYSLIPIDEMIA: ICD-10-CM

## 2023-08-09 DIAGNOSIS — Z13.29 SCREENING FOR THYROID DISORDER: ICD-10-CM

## 2023-08-09 LAB
25(OH)D3 SERPL-MCNC: 28.5 NG/ML
ALBUMIN SERPL-MCNC: 3.9 G/DL (ref 3.5–5.2)
ALBUMIN/GLOB SERPL: 1.2 {RATIO} (ref 1–2.5)
ALP SERPL-CCNC: 107 U/L (ref 35–104)
ALT SERPL-CCNC: 23 U/L (ref 5–33)
ANION GAP SERPL CALCULATED.3IONS-SCNC: 13 MMOL/L (ref 9–17)
AST SERPL-CCNC: 19 U/L
BILIRUB SERPL-MCNC: 0.2 MG/DL (ref 0.3–1.2)
BUN SERPL-MCNC: 10 MG/DL (ref 6–20)
CALCIUM SERPL-MCNC: 8.7 MG/DL (ref 8.6–10.4)
CHLORIDE SERPL-SCNC: 104 MMOL/L (ref 98–107)
CHOLEST SERPL-MCNC: 152 MG/DL
CHOLESTEROL/HDL RATIO: 3.8
CO2 SERPL-SCNC: 22 MMOL/L (ref 20–31)
CREAT SERPL-MCNC: 0.9 MG/DL (ref 0.5–0.9)
CREAT UR-MCNC: 242.3 MG/DL (ref 28–217)
CREAT UR-MCNC: 250.2 MG/DL (ref 28–217)
ERYTHROCYTE [DISTWIDTH] IN BLOOD BY AUTOMATED COUNT: 15.7 % (ref 11.8–14.4)
EST. AVERAGE GLUCOSE BLD GHB EST-MCNC: 128 MG/DL
FOLATE SERPL-MCNC: 2.5 NG/ML
GFR SERPL CREATININE-BSD FRML MDRD: >60 ML/MIN/1.73M2
GLUCOSE SERPL-MCNC: 99 MG/DL (ref 70–99)
HBA1C MFR BLD: 6.1 % (ref 4–6)
HCT VFR BLD AUTO: 36.4 % (ref 36.3–47.1)
HDLC SERPL-MCNC: 40 MG/DL
HGB BLD-MCNC: 11.4 G/DL (ref 11.9–15.1)
LDLC SERPL CALC-MCNC: 98 MG/DL (ref 0–130)
MCH RBC QN AUTO: 23.7 PG (ref 25.2–33.5)
MCHC RBC AUTO-ENTMCNC: 31.3 G/DL (ref 28.4–34.8)
MCV RBC AUTO: 75.5 FL (ref 82.6–102.9)
MICROALBUMIN UR-MCNC: 15 MG/L
MICROALBUMIN/CREAT UR-RTO: 6 MCG/MG CREAT
NRBC BLD-RTO: 0 PER 100 WBC
PLATELET # BLD AUTO: 276 K/UL (ref 138–453)
PMV BLD AUTO: 10.7 FL (ref 8.1–13.5)
POTASSIUM SERPL-SCNC: 4.3 MMOL/L (ref 3.7–5.3)
PROT SERPL-MCNC: 7.2 G/DL (ref 6.4–8.3)
RBC # BLD AUTO: 4.82 M/UL (ref 3.95–5.11)
SODIUM SERPL-SCNC: 139 MMOL/L (ref 135–144)
T4 FREE SERPL-MCNC: 1.2 NG/DL (ref 0.9–1.7)
TOTAL PROTEIN, URINE: 25 MG/DL
TRIGL SERPL-MCNC: 71 MG/DL
TSH SERPL DL<=0.05 MIU/L-ACNC: 0.93 UIU/ML (ref 0.3–5)
VIT B12 SERPL-MCNC: 446 PG/ML (ref 232–1245)
WBC OTHER # BLD: 7.4 K/UL (ref 3.5–11.3)

## 2023-08-09 PROCEDURE — 82043 UR ALBUMIN QUANTITATIVE: CPT

## 2023-08-09 PROCEDURE — 84439 ASSAY OF FREE THYROXINE: CPT

## 2023-08-09 PROCEDURE — 80061 LIPID PANEL: CPT

## 2023-08-09 PROCEDURE — 83036 HEMOGLOBIN GLYCOSYLATED A1C: CPT

## 2023-08-09 PROCEDURE — 83527 ASSAY OF INSULIN: CPT

## 2023-08-09 PROCEDURE — 83525 ASSAY OF INSULIN: CPT

## 2023-08-09 PROCEDURE — 36415 COLL VENOUS BLD VENIPUNCTURE: CPT

## 2023-08-09 PROCEDURE — 82306 VITAMIN D 25 HYDROXY: CPT

## 2023-08-09 PROCEDURE — 84443 ASSAY THYROID STIM HORMONE: CPT

## 2023-08-09 PROCEDURE — 80048 BASIC METABOLIC PNL TOTAL CA: CPT

## 2023-08-09 PROCEDURE — 82607 VITAMIN B-12: CPT

## 2023-08-09 PROCEDURE — 82570 ASSAY OF URINE CREATININE: CPT

## 2023-08-09 PROCEDURE — 82746 ASSAY OF FOLIC ACID SERUM: CPT

## 2023-08-09 PROCEDURE — 80053 COMPREHEN METABOLIC PANEL: CPT

## 2023-08-09 PROCEDURE — 84156 ASSAY OF PROTEIN URINE: CPT

## 2023-08-09 PROCEDURE — 85027 COMPLETE CBC AUTOMATED: CPT

## 2023-08-09 RX ORDER — FOLIC ACID 1 MG/1
1 TABLET ORAL DAILY
Qty: 90 TABLET | Refills: 5 | Status: SHIPPED | OUTPATIENT
Start: 2023-08-09

## 2023-08-09 RX ORDER — ERGOCALCIFEROL 1.25 MG/1
50000 CAPSULE ORAL WEEKLY
Qty: 12 CAPSULE | Refills: 1 | Status: SHIPPED | OUTPATIENT
Start: 2023-08-09

## 2023-08-11 LAB
INSULIN FREE: 13 UIU/ML (ref 3–25)
INSULIN: 20 UIU/ML (ref 3–25)

## 2023-08-12 LAB
ANION GAP SERPL CALCULATED.3IONS-SCNC: 13 MMOL/L (ref 9–17)
BUN SERPL-MCNC: 10 MG/DL (ref 6–20)
CALCIUM SERPL-MCNC: 8.7 MG/DL (ref 8.6–10.4)
CHLORIDE SERPL-SCNC: 104 MMOL/L (ref 98–107)
CO2 SERPL-SCNC: 22 MMOL/L (ref 20–31)
CREAT SERPL-MCNC: 0.9 MG/DL (ref 0.5–0.9)
GFR SERPL CREATININE-BSD FRML MDRD: >60 ML/MIN/1.73M2
GLUCOSE SERPL-MCNC: 99 MG/DL (ref 70–99)
POTASSIUM SERPL-SCNC: 4.3 MMOL/L (ref 3.7–5.3)
SODIUM SERPL-SCNC: 139 MMOL/L (ref 135–144)

## 2023-08-14 ENCOUNTER — HOSPITAL ENCOUNTER (OUTPATIENT)
Age: 48
Setting detail: SPECIMEN
Discharge: HOME | End: 2023-08-14

## 2023-08-14 ENCOUNTER — OFFICE VISIT (OUTPATIENT)
Dept: FAMILY MEDICINE CLINIC | Age: 48
End: 2023-08-14
Payer: COMMERCIAL

## 2023-08-14 VITALS
WEIGHT: 211.4 LBS | BODY MASS INDEX: 35.22 KG/M2 | DIASTOLIC BLOOD PRESSURE: 78 MMHG | OXYGEN SATURATION: 97 % | SYSTOLIC BLOOD PRESSURE: 111 MMHG | HEART RATE: 73 BPM | HEIGHT: 65 IN | TEMPERATURE: 97.2 F

## 2023-08-14 DIAGNOSIS — N89.8 VAGINAL DISCHARGE: ICD-10-CM

## 2023-08-14 DIAGNOSIS — E53.8 FOLATE DEFICIENCY: ICD-10-CM

## 2023-08-14 DIAGNOSIS — E11.9 NEW ONSET TYPE 2 DIABETES MELLITUS (HCC): ICD-10-CM

## 2023-08-14 DIAGNOSIS — F43.10 PTSD (POST-TRAUMATIC STRESS DISORDER): ICD-10-CM

## 2023-08-14 DIAGNOSIS — E53.8 COBALAMIN DEFICIENCY: ICD-10-CM

## 2023-08-14 DIAGNOSIS — F31.9 BIPOLAR 1 DISORDER (HCC): ICD-10-CM

## 2023-08-14 DIAGNOSIS — E55.9 VITAMIN D DEFICIENCY: ICD-10-CM

## 2023-08-14 DIAGNOSIS — I10 ESSENTIAL HYPERTENSION: Primary | ICD-10-CM

## 2023-08-14 DIAGNOSIS — F39 MOOD DISORDER (HCC): ICD-10-CM

## 2023-08-14 DIAGNOSIS — K58.9 IRRITABLE BOWEL SYNDROME, UNSPECIFIED TYPE: ICD-10-CM

## 2023-08-14 DIAGNOSIS — E87.6 HYPOKALEMIA: Chronic | ICD-10-CM

## 2023-08-14 DIAGNOSIS — R79.89 ELEVATED LFTS: ICD-10-CM

## 2023-08-14 DIAGNOSIS — Z72.0 TOBACCO ABUSE DISORDER: ICD-10-CM

## 2023-08-14 DIAGNOSIS — E66.01 CLASS 2 SEVERE OBESITY WITH SERIOUS COMORBIDITY AND BODY MASS INDEX (BMI) OF 35.0 TO 35.9 IN ADULT, UNSPECIFIED OBESITY TYPE (HCC): ICD-10-CM

## 2023-08-14 DIAGNOSIS — E78.5 HYPERLIPIDEMIA, UNSPECIFIED HYPERLIPIDEMIA TYPE: ICD-10-CM

## 2023-08-14 DIAGNOSIS — F12.90 MARIJUANA USE: ICD-10-CM

## 2023-08-14 DIAGNOSIS — K21.00 GASTROESOPHAGEAL REFLUX DISEASE WITH ESOPHAGITIS WITHOUT HEMORRHAGE: ICD-10-CM

## 2023-08-14 DIAGNOSIS — E78.5 DYSLIPIDEMIA: ICD-10-CM

## 2023-08-14 DIAGNOSIS — R56.9 SEIZURES (HCC): ICD-10-CM

## 2023-08-14 DIAGNOSIS — Z71.6 TOBACCO ABUSE COUNSELING: ICD-10-CM

## 2023-08-14 PROCEDURE — 3074F SYST BP LT 130 MM HG: CPT | Performed by: FAMILY MEDICINE

## 2023-08-14 PROCEDURE — G8417 CALC BMI ABV UP PARAM F/U: HCPCS | Performed by: FAMILY MEDICINE

## 2023-08-14 PROCEDURE — 3044F HG A1C LEVEL LT 7.0%: CPT | Performed by: FAMILY MEDICINE

## 2023-08-14 PROCEDURE — 4004F PT TOBACCO SCREEN RCVD TLK: CPT | Performed by: FAMILY MEDICINE

## 2023-08-14 PROCEDURE — 99214 OFFICE O/P EST MOD 30 MIN: CPT | Performed by: FAMILY MEDICINE

## 2023-08-14 PROCEDURE — G8427 DOCREV CUR MEDS BY ELIG CLIN: HCPCS | Performed by: FAMILY MEDICINE

## 2023-08-14 PROCEDURE — 2022F DILAT RTA XM EVC RTNOPTHY: CPT | Performed by: FAMILY MEDICINE

## 2023-08-14 PROCEDURE — 3078F DIAST BP <80 MM HG: CPT | Performed by: FAMILY MEDICINE

## 2023-08-14 RX ORDER — FLUTICASONE PROPIONATE AND SALMETEROL 250; 50 UG/1; UG/1
1 POWDER RESPIRATORY (INHALATION)
COMMUNITY
Start: 2022-08-04

## 2023-08-14 RX ORDER — METRONIDAZOLE 500 MG/1
500 TABLET ORAL 2 TIMES DAILY
Qty: 14 TABLET | Refills: 0 | Status: SHIPPED | OUTPATIENT
Start: 2023-08-14 | End: 2023-08-21

## 2023-08-14 ASSESSMENT — PATIENT HEALTH QUESTIONNAIRE - PHQ9
1. LITTLE INTEREST OR PLEASURE IN DOING THINGS: 1
SUM OF ALL RESPONSES TO PHQ QUESTIONS 1-9: 5
SUM OF ALL RESPONSES TO PHQ QUESTIONS 1-9: 5
9. THOUGHTS THAT YOU WOULD BE BETTER OFF DEAD, OR OF HURTING YOURSELF: 0
4. FEELING TIRED OR HAVING LITTLE ENERGY: 1
SUM OF ALL RESPONSES TO PHQ9 QUESTIONS 1 & 2: 2
3. TROUBLE FALLING OR STAYING ASLEEP: 1
1. LITTLE INTEREST OR PLEASURE IN DOING THINGS: 2
SUM OF ALL RESPONSES TO PHQ QUESTIONS 1-9: 14
6. FEELING BAD ABOUT YOURSELF - OR THAT YOU ARE A FAILURE OR HAVE LET YOURSELF OR YOUR FAMILY DOWN: 2
SUM OF ALL RESPONSES TO PHQ QUESTIONS 1-9: 14
SUM OF ALL RESPONSES TO PHQ QUESTIONS 1-9: 14
SUM OF ALL RESPONSES TO PHQ9 QUESTIONS 1 & 2: 4
8. MOVING OR SPEAKING SO SLOWLY THAT OTHER PEOPLE COULD HAVE NOTICED. OR THE OPPOSITE, BEING SO FIGETY OR RESTLESS THAT YOU HAVE BEEN MOVING AROUND A LOT MORE THAN USUAL: 0
5. POOR APPETITE OR OVEREATING: 1
2. FEELING DOWN, DEPRESSED OR HOPELESS: 2
3. TROUBLE FALLING OR STAYING ASLEEP: 3
2. FEELING DOWN, DEPRESSED OR HOPELESS: 1
4. FEELING TIRED OR HAVING LITTLE ENERGY: 2
SUM OF ALL RESPONSES TO PHQ QUESTIONS 1-9: 5
6. FEELING BAD ABOUT YOURSELF - OR THAT YOU ARE A FAILURE OR HAVE LET YOURSELF OR YOUR FAMILY DOWN: 0
SUM OF ALL RESPONSES TO PHQ QUESTIONS 1-9: 5
8. MOVING OR SPEAKING SO SLOWLY THAT OTHER PEOPLE COULD HAVE NOTICED. OR THE OPPOSITE, BEING SO FIGETY OR RESTLESS THAT YOU HAVE BEEN MOVING AROUND A LOT MORE THAN USUAL: 0
10. IF YOU CHECKED OFF ANY PROBLEMS, HOW DIFFICULT HAVE THESE PROBLEMS MADE IT FOR YOU TO DO YOUR WORK, TAKE CARE OF THINGS AT HOME, OR GET ALONG WITH OTHER PEOPLE: 2
5. POOR APPETITE OR OVEREATING: 0
10. IF YOU CHECKED OFF ANY PROBLEMS, HOW DIFFICULT HAVE THESE PROBLEMS MADE IT FOR YOU TO DO YOUR WORK, TAKE CARE OF THINGS AT HOME, OR GET ALONG WITH OTHER PEOPLE: 1
7. TROUBLE CONCENTRATING ON THINGS, SUCH AS READING THE NEWSPAPER OR WATCHING TELEVISION: 2
SUM OF ALL RESPONSES TO PHQ QUESTIONS 1-9: 14
7. TROUBLE CONCENTRATING ON THINGS, SUCH AS READING THE NEWSPAPER OR WATCHING TELEVISION: 1
9. THOUGHTS THAT YOU WOULD BE BETTER OFF DEAD, OR OF HURTING YOURSELF: 0

## 2023-08-14 ASSESSMENT — ANXIETY QUESTIONNAIRES
1. FEELING NERVOUS, ANXIOUS, OR ON EDGE: 1
6. BECOMING EASILY ANNOYED OR IRRITABLE: 0
IF YOU CHECKED OFF ANY PROBLEMS ON THIS QUESTIONNAIRE, HOW DIFFICULT HAVE THESE PROBLEMS MADE IT FOR YOU TO DO YOUR WORK, TAKE CARE OF THINGS AT HOME, OR GET ALONG WITH OTHER PEOPLE: SOMEWHAT DIFFICULT
GAD7 TOTAL SCORE: 1
5. BEING SO RESTLESS THAT IT IS HARD TO SIT STILL: 0
3. WORRYING TOO MUCH ABOUT DIFFERENT THINGS: 0
4. TROUBLE RELAXING: 0
2. NOT BEING ABLE TO STOP OR CONTROL WORRYING: 0
7. FEELING AFRAID AS IF SOMETHING AWFUL MIGHT HAPPEN: 0

## 2023-08-14 ASSESSMENT — ENCOUNTER SYMPTOMS
DIARRHEA: 0
EYE DISCHARGE: 0
CONSTIPATION: 0
NAUSEA: 0
BLOOD IN STOOL: 0
EYE PAIN: 0
ABDOMINAL PAIN: 0
SHORTNESS OF BREATH: 0
BACK PAIN: 0
STRIDOR: 0
PHOTOPHOBIA: 0
SORE THROAT: 0
EYE REDNESS: 0
VOMITING: 0
COUGH: 0

## 2023-08-14 NOTE — PROGRESS NOTES
Subjective:      Patient ID: Clinton Griffin is a 41 y.o. female. Seeing Dr Gisel Martinez - a week ago - was under stress - wanted stress to be better before adjuting meds. Sleep has been poor- states sleeping in day but can sleep at night. Feet and legs are hurting. States vaginal discharge x 1 week and also has had odor- single partner- acting weird last few weeks. Breathing ok, states trying to quit smoking but still at 1/2 PPD to sometime smore. BP stable. Last seizure over 4 months ago- sees Neuro. States HA are also better - at least 3 months. Flagyl being prescribed- check wit pharmacy for interactions, no use of alcohol. Review of Systems   Constitutional:  Negative for chills and fever. HENT:  Negative for congestion, ear pain, hearing loss, nosebleeds, sore throat and tinnitus. Eyes:  Negative for photophobia, pain, discharge and redness. Respiratory:  Negative for cough, shortness of breath and stridor. Cardiovascular:  Negative for chest pain, palpitations and leg swelling. Gastrointestinal:  Negative for abdominal pain, blood in stool, constipation, diarrhea, nausea and vomiting. Endocrine: Negative for polydipsia. Genitourinary:  Negative for dysuria, flank pain, frequency, hematuria and urgency. Musculoskeletal:  Negative for back pain, myalgias and neck pain. Skin:  Negative for rash. Allergic/Immunologic: Negative for environmental allergies. Neurological:  Negative for dizziness, tremors, seizures, weakness and headaches. Hematological:  Does not bruise/bleed easily. Psychiatric/Behavioral:  Negative for hallucinations and suicidal ideas. The patient is not nervous/anxious. Objective:   Physical Exam  Constitutional:       General: She is not in acute distress. Appearance: She is well-developed. HENT:      Head: Normocephalic and atraumatic.       Right Ear: External ear normal.      Left Ear: External ear normal.      Nose: Nose normal.

## 2023-08-15 DIAGNOSIS — I10 ESSENTIAL HYPERTENSION: ICD-10-CM

## 2023-08-15 DIAGNOSIS — E87.6 HYPOKALEMIA: ICD-10-CM

## 2023-08-15 LAB
C TRACH DNA SPEC QL PROBE+SIG AMP: NEGATIVE
CANDIDA SPECIES: NEGATIVE
GARDNERELLA VAGINALIS: POSITIVE
N GONORRHOEA DNA SPEC QL PROBE+SIG AMP: NEGATIVE
SOURCE: ABNORMAL
SPECIMEN DESCRIPTION: NORMAL
TRICHOMONAS: NEGATIVE

## 2023-08-15 RX ORDER — SPIRONOLACTONE 100 MG/1
TABLET, FILM COATED ORAL
Qty: 90 TABLET | Refills: 0 | Status: SHIPPED | OUTPATIENT
Start: 2023-08-15

## 2023-08-17 ENCOUNTER — TELEPHONE (OUTPATIENT)
Dept: PULMONOLOGY | Age: 48
End: 2023-08-17

## 2023-08-17 NOTE — TELEPHONE ENCOUNTER
Referral was sent to [facility] at last visit- pt calling office today stating she is having trouble scheduling appt - has been transferred to [facility] - told referral was not found. Per Care Everywhere [facility] note from Jhon Rolle:   Progress Notes  - documented in this encounter  Francine Echavarria NP - 08/17/2023 9:34 AM EDT  Formatting of this note might be different from the original.  Per message from [address] [place] [address], her pulmonologist recommended returning to our clinic. Last visit was a phone visit in January of 2021. She has been lost to follow up. Will schedule RV with gurrola walk, labs, and echo. Electronically signed by Francine Echavarria NP at 08/17/2023 9:38 AM EDT        Informed pt of this message - provided phone number that was listed at the bottom of this note. She will call back, asked her to f/u with writer if any further problems. Pt voicemed understanding. Will refax referral to fax number listed on the bottom of this prog note.

## 2023-09-23 DIAGNOSIS — I10 ESSENTIAL HYPERTENSION: ICD-10-CM

## 2023-09-25 RX ORDER — CANDESARTAN 32 MG/1
32 TABLET ORAL DAILY
Qty: 90 TABLET | Refills: 1 | Status: SHIPPED | OUTPATIENT
Start: 2023-09-25

## 2023-09-25 NOTE — TELEPHONE ENCOUNTER
Last Visit:  8/14/2023     Next Visit Date:  Future Appointments   Date Time Provider [address]   9/29/2023 11:30 AM Meg De La Torre DPM [facility]   10/3/2023  3:20 PM Praneeth Jama MD Neuro [facility] Neurology -   1/24/2024 10:15 AM Nahomi Ibarra MD [facility] Maintenance   Topic Date Due    Diabetic foot exam  06/27/2023    Flu vaccine (1) 08/09/2024 (Originally 8/1/2023)    Pneumococcal 0-64 years Vaccine (2 - PCV) 06/30/2030 (Originally 12/22/2017)    Diabetic retinal exam  08/31/2030 (Originally 9/29/1995)    COVID-19 Vaccine (3 - Pfizer series) 05/01/2032 (Originally 8/3/2021)    Hepatitis B vaccine (1 of 3 - 3-dose series) 07/31/2032 (Originally 1975)    A1C test (Diabetic or Prediabetic)  08/09/2024    Diabetic Alb to Cr ratio (uACR) test  08/09/2024    Lipids  08/09/2024    GFR test (Diabetes, CKD 3-4, OR last GFR 15-59)  08/09/2024    Depression Monitoring  08/14/2024    DTaP/Tdap/Td vaccine (2 - Td or Tdap) 10/25/2026    Colorectal Cancer Screen  06/16/2029    Hepatitis C screen  Completed    HIV screen  Completed    Hepatitis A vaccine  Aged Out    Hib vaccine  Aged Out    HPV vaccine  Aged Out    Meningococcal (ACWY) vaccine  Aged Out       Hemoglobin A1C (%)   Date Value   08/09/2023 6.1 (H)   02/03/2023 5.5   10/18/2022 5.6             ( goal A1C is < 7)   No components found for: \"LABMICR\"  LDL Cholesterol (mg/dL)   Date Value   08/09/2023 98   06/16/2022 111       (goal LDL is <100)   AST (U/L)   Date Value   08/09/2023 19     ALT (U/L)   Date Value   08/09/2023 23     BUN (mg/dL)   Date Value   08/09/2023 10     BP Readings from Last 3 Encounters:   08/14/23 111/78   07/26/23 123/89   04/19/23 120/89          (goal 120/80)    All Future Testing planned in [facility]  Lab Frequency Next Occurrence   Echo (TTE) Complete Once 08/26/2023               Patient Active Problem List:     FH: uterine cancer     FH: breast cancer     Neck pain, chronic     Tobacco

## 2023-09-29 ENCOUNTER — OFFICE VISIT (OUTPATIENT)
Dept: PODIATRY | Age: 48
End: 2023-09-29
Payer: COMMERCIAL

## 2023-09-29 VITALS — HEIGHT: 65 IN | WEIGHT: 211 LBS | BODY MASS INDEX: 35.16 KG/M2

## 2023-09-29 DIAGNOSIS — M79.604 PAIN IN BOTH LOWER EXTREMITIES: ICD-10-CM

## 2023-09-29 DIAGNOSIS — M79.605 PAIN IN BOTH LOWER EXTREMITIES: ICD-10-CM

## 2023-09-29 DIAGNOSIS — M72.2 PLANTAR FASCIITIS, BILATERAL: Primary | ICD-10-CM

## 2023-09-29 PROCEDURE — 99213 OFFICE O/P EST LOW 20 MIN: CPT | Performed by: PODIATRIST

## 2023-09-29 PROCEDURE — G8427 DOCREV CUR MEDS BY ELIG CLIN: HCPCS | Performed by: PODIATRIST

## 2023-09-29 PROCEDURE — 4004F PT TOBACCO SCREEN RCVD TLK: CPT | Performed by: PODIATRIST

## 2023-09-29 PROCEDURE — G8417 CALC BMI ABV UP PARAM F/U: HCPCS | Performed by: PODIATRIST

## 2023-09-29 RX ORDER — ASCORBIC ACID 500 MG
TABLET ORAL
COMMUNITY
Start: 2017-03-02

## 2023-09-29 NOTE — PROGRESS NOTES
[facility] [address] [address]  Dept: [phone]  Dept Fax: [phone]    RETURN PATIENT PROGRESS NOTE  Date of patient's visit: 9/29/2023  Patient's Name:  Noemi Collazo YOB: 1975            Patient Care Team:  Snehal Hickey MD as PCP - General (Family Medicine)  Snehal Hickey MD as PCP - Empaneled Provider  Jenny Ulloa MD as Consulting Physician (Gastroenterology)  Yusuf Dickinson MD as Consulting Physician (Pulmonology)  Joey Otto MD as Consulting Physician (Vascular Surgery)  Dylan Boss DPM as Physician (Podiatry)       Celso Kinney 57 y.o. female that presents for follow-up of   Chief Complaint   Patient presents with    Foot Pain     Bl feet x 1 year     Pt's primary care physician is Snehal Hickey MD last seen august 14 2023  Symptoms began 1 year(s) ago and are increased . Patient relates pain is Present to cesar heel and arch. Pain is rated 8 out of 10 and is described as constant, stretching exercises. Treatments prior to today's visit include: previous podiatry treatment. Currently denies F/C/N/V.      Allergies   Allergen Reactions    Penicillins Anaphylaxis and Rash    Amoxil [Amoxicillin]      Swelling of throat, rash and cough    Bee Pollen     Bee Venom Hives and Other (See Comments)    Ciprofloxacin     Clindamycin/Lincomycin      rash    Elemental Sulfur     Flonase [Fluticasone]      Headaches      Keflex [Cephalexin]      itching    Levaquin [Levofloxacin In D5w] Hives    Levofloxacin Other (See Comments)    Macrobid [Nitrofurantoin Monohyd Macro] Hives    Nitrofurantoin Other (See Comments)    Other Other (See Comments)    Sulfa Antibiotics Hives       Past Medical History:   Diagnosis Date    Abnormal menstrual cycle 01/19/2018    Abnormal menstrual cycle 01/19/2018    Acute deep vein thrombosis (DVT) of popliteal vein of right lower extremity

## 2023-10-26 ENCOUNTER — HOSPITAL ENCOUNTER (EMERGENCY)
Age: 48
Discharge: HOME | End: 2023-10-26
Attending: EMERGENCY MEDICINE
Payer: COMMERCIAL

## 2023-10-26 ENCOUNTER — APPOINTMENT (OUTPATIENT)
Dept: CT IMAGING | Age: 48
End: 2023-10-26
Payer: COMMERCIAL

## 2023-10-26 VITALS
WEIGHT: 215 LBS | BODY MASS INDEX: 34.55 KG/M2 | TEMPERATURE: 98.2 F | HEART RATE: 80 BPM | DIASTOLIC BLOOD PRESSURE: 96 MMHG | RESPIRATION RATE: 27 BRPM | SYSTOLIC BLOOD PRESSURE: 138 MMHG | HEIGHT: 66 IN | OXYGEN SATURATION: 98 %

## 2023-10-26 DIAGNOSIS — G40.919 BREAKTHROUGH SEIZURE (HCC): Primary | ICD-10-CM

## 2023-10-26 LAB
ALBUMIN SERPL-MCNC: 4.1 G/DL (ref 3.5–5.2)
ALP SERPL-CCNC: 108 U/L (ref 35–104)
ALT SERPL-CCNC: 15 U/L (ref 5–33)
ANION GAP SERPL CALCULATED.3IONS-SCNC: 14 MMOL/L (ref 9–17)
AST SERPL-CCNC: 12 U/L
BASOPHILS # BLD: <0.03 K/UL (ref 0–0.2)
BASOPHILS NFR BLD: 0 % (ref 0–2)
BILIRUB SERPL-MCNC: 0.3 MG/DL (ref 0.3–1.2)
BUN SERPL-MCNC: 11 MG/DL (ref 6–20)
BUN/CREAT SERPL: 14 (ref 9–20)
CALCIUM SERPL-MCNC: 9 MG/DL (ref 8.6–10.4)
CHLORIDE SERPL-SCNC: 97 MMOL/L (ref 98–107)
CO2 SERPL-SCNC: 23 MMOL/L (ref 20–31)
CREAT SERPL-MCNC: 0.8 MG/DL (ref 0.5–0.9)
EKG ATRIAL RATE: 75 BPM
EKG P AXIS: 26 DEGREES
EKG P-R INTERVAL: 190 MS
EKG Q-T INTERVAL: 420 MS
EKG QRS DURATION: 74 MS
EKG QTC CALCULATION (BAZETT): 469 MS
EKG R AXIS: -6 DEGREES
EKG T AXIS: 12 DEGREES
EKG VENTRICULAR RATE: 75 BPM
EOSINOPHIL # BLD: 0.07 K/UL (ref 0–0.44)
EOSINOPHILS RELATIVE PERCENT: 1 % (ref 1–4)
ERYTHROCYTE [DISTWIDTH] IN BLOOD BY AUTOMATED COUNT: 15.7 % (ref 11.8–14.4)
GFR SERPL CREATININE-BSD FRML MDRD: >60 ML/MIN/1.73M2
GLUCOSE SERPL-MCNC: 117 MG/DL (ref 70–99)
HCT VFR BLD AUTO: 37.3 % (ref 36.3–47.1)
HGB BLD-MCNC: 11.7 G/DL (ref 11.9–15.1)
IMM GRANULOCYTES # BLD AUTO: 0.02 K/UL (ref 0–0.3)
IMM GRANULOCYTES NFR BLD: 0 %
LYMPHOCYTES NFR BLD: 4.41 K/UL (ref 1.1–3.7)
LYMPHOCYTES RELATIVE PERCENT: 46 % (ref 24–43)
MCH RBC QN AUTO: 23.1 PG (ref 25.2–33.5)
MCHC RBC AUTO-ENTMCNC: 31.4 G/DL (ref 28.4–34.8)
MCV RBC AUTO: 73.6 FL (ref 82.6–102.9)
MONOCYTES NFR BLD: 1.01 K/UL (ref 0.1–1.2)
MONOCYTES NFR BLD: 11 % (ref 3–12)
NEUTROPHILS NFR BLD: 42 % (ref 36–65)
NEUTS SEG NFR BLD: 3.94 K/UL (ref 1.5–8.1)
NRBC BLD-RTO: 0 PER 100 WBC
PLATELET # BLD AUTO: 264 K/UL (ref 138–453)
PMV BLD AUTO: 10 FL (ref 8.1–13.5)
POTASSIUM SERPL-SCNC: 3.8 MMOL/L (ref 3.7–5.3)
PROT SERPL-MCNC: 7.5 G/DL (ref 6.4–8.3)
RBC # BLD AUTO: 5.07 M/UL (ref 3.95–5.11)
RBC # BLD: ABNORMAL 10*6/UL
SODIUM SERPL-SCNC: 134 MMOL/L (ref 135–144)
WBC OTHER # BLD: 9.5 K/UL (ref 3.5–11.3)

## 2023-10-26 PROCEDURE — 80053 COMPREHEN METABOLIC PANEL: CPT

## 2023-10-26 PROCEDURE — 6360000002 HC RX W HCPCS: Performed by: EMERGENCY MEDICINE

## 2023-10-26 PROCEDURE — 99284 EMERGENCY DEPT VISIT MOD MDM: CPT

## 2023-10-26 PROCEDURE — 85025 COMPLETE CBC W/AUTO DIFF WBC: CPT

## 2023-10-26 PROCEDURE — 96365 THER/PROPH/DIAG IV INF INIT: CPT

## 2023-10-26 PROCEDURE — 6370000000 HC RX 637 (ALT 250 FOR IP): Performed by: EMERGENCY MEDICINE

## 2023-10-26 PROCEDURE — 70450 CT HEAD/BRAIN W/O DYE: CPT

## 2023-10-26 RX ORDER — LEVETIRACETAM 15 MG/ML
1500 INJECTION INTRAVASCULAR ONCE
Status: COMPLETED | OUTPATIENT
Start: 2023-10-26 | End: 2023-10-26

## 2023-10-26 RX ORDER — HYDROCODONE BITARTRATE AND ACETAMINOPHEN 5; 325 MG/1; MG/1
1 TABLET ORAL ONCE
Status: COMPLETED | OUTPATIENT
Start: 2023-10-26 | End: 2023-10-26

## 2023-10-26 RX ORDER — LEVETIRACETAM 500 MG/1
500 TABLET ORAL 2 TIMES DAILY
Qty: 60 TABLET | Refills: 1 | Status: SHIPPED | OUTPATIENT
Start: 2023-10-26

## 2023-10-26 RX ADMIN — LEVETIRACETAM 1500 MG: 15 INJECTION INTRAVENOUS at 01:57

## 2023-10-26 RX ADMIN — HYDROCODONE BITARTRATE AND ACETAMINOPHEN 1 TABLET: 5; 325 TABLET ORAL at 02:48

## 2023-10-26 ASSESSMENT — PAIN DESCRIPTION - LOCATION: LOCATION: HEAD;NECK

## 2023-10-26 ASSESSMENT — PAIN - FUNCTIONAL ASSESSMENT: PAIN_FUNCTIONAL_ASSESSMENT: 0-10

## 2023-10-26 ASSESSMENT — PAIN SCALES - GENERAL
PAINLEVEL_OUTOF10: 8
PAINLEVEL_OUTOF10: 8

## 2023-10-26 NOTE — ED NOTES
Pt presents to the er per ems for c/o seizures pt states that she has had three seizures today.  Upon is alert oriented x 4 and seizure free upon arrival      Dayne Gosselin, [place]  10/26/23 9623

## 2023-10-26 NOTE — DISCHARGE INSTRUCTIONS
Start taking your [address] [place] again as prescribed. If you have additional seizures or any other concerning symptoms come back to the ER. Follow-up with your neurologist and primary care doctor.

## 2023-10-26 NOTE — ED PROVIDER NOTES
EMERGENCY DEPARTMENT ENCOUNTER    Pt Name: Nicolas Johnson  MRN: [id number]  [address] [facility] [place] 1975  Date of evaluation: 10/26/23  CHIEF COMPLAINT       Chief Complaint   Patient presents with    Seizures     X 3 today. Out of keppra x 1 month     HISTORY OF PRESENT ILLNESS   HPI  60-year-old female with a history of DVT on Xarelto, breast cancer in remission, hypertension, seizure disorder who presents to the ED for evaluation after having 3 seizures today. Patient states that she has been out of her [address] [place] [address] for over a month because she has switched neurologist and has not had her prescription refilled, she has an upcoming appointment with a new neurologist in about 1 week. Patient states that she felt mildly unwell today, she had 3 seizures, she states that she returned to baseline after each seizure, does not recall anything about the seizures. After the third seizure EMS was called and patient was brought to the ED. Patient states that she has otherwise been compliant with all of her other medications including Xarelto, she does not know whether she hit her head or not during any of the seizures. She is having some pain to her head, neck. She denies any fever/chills, nausea/vomiting, cough, chest pain, abdominal pain, diarrhea, dysuria or any other acute complaints. REVIEW OF SYSTEMS     Review of Systems   All other systems reviewed and are negative.     PASTMEDICAL HISTORY     Past Medical History:   Diagnosis Date    Abnormal menstrual cycle 01/19/2018    Abnormal menstrual cycle 01/19/2018    Acute deep vein thrombosis (DVT) of popliteal vein of right lower extremity (HCC)     Acute deep vein thrombosis (DVT) of popliteal vein of right lower extremity (HCC)     Acute tracheobronchitis 01/10/2023    Anemia     Asthma     Moderate persistent asthma without complication    Axillary hidradenitis suppurativa 05/06/2020    Bacterial vaginitis 04/22/2020    Bipolar 1 disorder (HCC)     Borderline diabetes

## 2023-10-27 ENCOUNTER — TELEPHONE (OUTPATIENT)
Dept: FAMILY MEDICINE CLINIC | Age: 48
End: 2023-10-27

## 2023-10-27 NOTE — TELEPHONE ENCOUNTER
[facility]) ED Follow up Call    Reason for ED visit:  Jose Alberto Jo     10/27/2023     Jerel Strange , this is candelaria  from Dr. Rimma DEL RIO's office, just calling to see how you are doing after your recent ED visit. Did you receive discharge instructions? Yes  Do you understand the discharge instructions? Yes  Did the ED give you any new prescriptions? Yes  Were you able to fill your prescriptions? Yes      Do you have one of our red, yellow and green  Zone sheets that help you to determine when you should go to the ED? Not Applicable      Do you need or want to make a follow up appt with your PCP? Yes    Do you have any further needs in the home, e.g. equipment?   No        FU appts/Provider:    Future Appointments   Date Time Provider [facility] [address]   11/2/2023  9:20 AM Connie Calvin MD [facility] Neurology -   11/10/2023 10:45 AM Jody Núñez DPM [facility]   1/24/2024 10:15 AM Mary Colon MD Resp Spec Ramo [facility]

## 2023-11-14 DIAGNOSIS — I10 ESSENTIAL HYPERTENSION: ICD-10-CM

## 2023-11-14 DIAGNOSIS — E87.6 HYPOKALEMIA: ICD-10-CM

## 2023-11-14 RX ORDER — SPIRONOLACTONE 100 MG/1
TABLET, FILM COATED ORAL
Qty: 90 TABLET | Refills: 0 | Status: SHIPPED | OUTPATIENT
Start: 2023-11-14

## 2023-12-08 ENCOUNTER — HOSPITAL ENCOUNTER (EMERGENCY)
Age: 48
Discharge: HOME | End: 2023-12-08
Attending: EMERGENCY MEDICINE
Payer: COMMERCIAL

## 2023-12-08 VITALS
DIASTOLIC BLOOD PRESSURE: 86 MMHG | OXYGEN SATURATION: 99 % | RESPIRATION RATE: 16 BRPM | TEMPERATURE: 98.4 F | SYSTOLIC BLOOD PRESSURE: 132 MMHG | HEART RATE: 86 BPM

## 2023-12-08 DIAGNOSIS — A59.9 TRICHIMONIASIS: ICD-10-CM

## 2023-12-08 DIAGNOSIS — K64.9 HEMORRHOIDS, UNSPECIFIED HEMORRHOID TYPE: Primary | ICD-10-CM

## 2023-12-08 DIAGNOSIS — N89.8 VAGINAL DISCHARGE: ICD-10-CM

## 2023-12-08 LAB
BILIRUB UR QL STRIP: NEGATIVE
C TRACH DNA SPEC QL PROBE+SIG AMP: NEGATIVE
CANDIDA SPECIES: NEGATIVE
CLARITY UR: ABNORMAL
COLOR UR: YELLOW
EPI CELLS #/AREA URNS HPF: ABNORMAL /HPF (ref 0–5)
GARDNERELLA VAGINALIS: POSITIVE
GLUCOSE UR STRIP-MCNC: NEGATIVE MG/DL
HGB UR QL STRIP.AUTO: NEGATIVE
KETONES UR STRIP-MCNC: NEGATIVE MG/DL
LEUKOCYTE ESTERASE UR QL STRIP: ABNORMAL
N GONORRHOEA DNA SPEC QL PROBE+SIG AMP: NEGATIVE
NITRITE UR QL STRIP: NEGATIVE
PH UR STRIP: 5.5 [PH] (ref 5–8)
PROT UR STRIP-MCNC: NEGATIVE MG/DL
RBC #/AREA URNS HPF: ABNORMAL /HPF (ref 0–2)
SOURCE: ABNORMAL
SP GR UR STRIP: 1.02 (ref 1–1.03)
SPECIMEN DESCRIPTION: NORMAL
TRICHOMONAS #/AREA URNS HPF: POSITIVE /[HPF]
TRICHOMONAS: POSITIVE
UROBILINOGEN UR STRIP-ACNC: NORMAL EU/DL (ref 0–1)
WBC #/AREA URNS HPF: ABNORMAL /HPF (ref 0–5)

## 2023-12-08 PROCEDURE — 87591 N.GONORRHOEAE DNA AMP PROB: CPT

## 2023-12-08 PROCEDURE — 6370000000 HC RX 637 (ALT 250 FOR IP): Performed by: EMERGENCY MEDICINE

## 2023-12-08 PROCEDURE — 87491 CHLMYD TRACH DNA AMP PROBE: CPT

## 2023-12-08 PROCEDURE — 99283 EMERGENCY DEPT VISIT LOW MDM: CPT

## 2023-12-08 PROCEDURE — 87480 CANDIDA DNA DIR PROBE: CPT

## 2023-12-08 PROCEDURE — 87510 GARDNER VAG DNA DIR PROBE: CPT

## 2023-12-08 PROCEDURE — 87660 TRICHOMONAS VAGIN DIR PROBE: CPT

## 2023-12-08 PROCEDURE — 81001 URINALYSIS AUTO W/SCOPE: CPT

## 2023-12-08 RX ORDER — METRONIDAZOLE 500 MG/1
500 TABLET ORAL 2 TIMES DAILY
Qty: 13 TABLET | Refills: 0 | Status: SHIPPED | OUTPATIENT
Start: 2023-12-08 | End: 2023-12-15

## 2023-12-08 RX ORDER — METRONIDAZOLE 500 MG/1
500 TABLET ORAL ONCE
Status: COMPLETED | OUTPATIENT
Start: 2023-12-08 | End: 2023-12-08

## 2023-12-08 RX ADMIN — METRONIDAZOLE 500 MG: 500 TABLET ORAL at 08:34

## 2023-12-08 ASSESSMENT — ENCOUNTER SYMPTOMS
ABDOMINAL PAIN: 0
FACIAL SWELLING: 0
PHOTOPHOBIA: 0
RECTAL PAIN: 1
SHORTNESS OF BREATH: 0
VOICE CHANGE: 0
BACK PAIN: 0
CHEST TIGHTNESS: 0
VOMITING: 0
NAUSEA: 0
EYE PAIN: 0
TROUBLE SWALLOWING: 0
COLOR CHANGE: 0

## 2023-12-08 ASSESSMENT — PAIN SCALES - GENERAL: PAINLEVEL_OUTOF10: 6

## 2023-12-08 ASSESSMENT — PAIN - FUNCTIONAL ASSESSMENT: PAIN_FUNCTIONAL_ASSESSMENT: 0-10

## 2023-12-08 NOTE — ED PROVIDER NOTES
EMERGENCY DEPARTMENT ENCOUNTER    Pt Name: Andree Schwartz  MRN: [id number]  [address] [place] 1975  Date of evaluation: 12/8/23  CHIEF COMPLAINT       Chief Complaint   Patient presents with    Exposure to STD    Vaginal Discharge    Hemorrhoids     HISTORY OF PRESENT ILLNESS   57-year-old female presenting to the ER complaining of 3 days of vaginal discharge and odor. Patient states her boyfriend cheated on her and since then she has been having the symptoms. Patient is also complaining of something hanging from her rectum and causing rectal bleeding for the last few weeks. Patient states she does have a GI physician but did not reach out to them. The history is provided by the patient. Vaginal Discharge  Quality:  Malodorous  Associated symptoms: no abdominal pain, no dysuria, no nausea and no vomiting            REVIEW OF SYSTEMS     Review of Systems   Constitutional:  Negative for activity change, appetite change and fatigue. HENT:  Negative for facial swelling, trouble swallowing and voice change. Eyes:  Negative for photophobia and pain. Respiratory:  Negative for chest tightness and shortness of breath. Cardiovascular:  Negative for chest pain and palpitations. Gastrointestinal:  Positive for rectal pain. Negative for abdominal pain, nausea and vomiting. Genitourinary:  Positive for vaginal discharge. Negative for dysuria and urgency. Musculoskeletal:  Negative for arthralgias and back pain. Skin:  Negative for color change and rash. Neurological:  Negative for dizziness, syncope and headaches. Psychiatric/Behavioral:  Negative for behavioral problems and hallucinations.       PASTMEDICAL HISTORY     Past Medical History:   Diagnosis Date    Abnormal menstrual cycle 01/19/2018    Abnormal menstrual cycle 01/19/2018    Acute deep vein thrombosis (DVT) of popliteal vein of right lower extremity (HCC)     Acute deep vein thrombosis (DVT) of popliteal vein of right lower extremity ([address])

## 2023-12-08 NOTE — ED NOTES
Pt presenting to the ED with complaints of being exposed to possible STDs. Pt reports that her boyfriend cheated. PT is A&ox4.       Leslee Walker RN  12/08/23 7934

## 2023-12-12 ENCOUNTER — TELEPHONE (OUTPATIENT)
Dept: FAMILY MEDICINE CLINIC | Age: 48
End: 2023-12-12

## 2023-12-12 NOTE — TELEPHONE ENCOUNTER
[address] ED Follow up Call    Reason for ED visit:  Hemorid and std covid    12/12/2023     Jerel Bautistana , this is candelaria from Dr. Silvia DEL RIO's office, just calling to see how you are doing after your recent ED visit. Did you receive discharge instructions? Yes  Do you understand the discharge instructions? Yes  Did the ED give you any new prescriptions? Yes  Were you able to fill your prescriptions? Yes      Do you have one of our red, yellow and green  Zone sheets that help you to determine when you should go to the ED? Not Applicable      Do you need or want to make a follow up appt with your PCP? No    Do you have any further needs in the home, e.g. equipment?   No        FU appts/Provider:    Future Appointments   Date Time Provider [address]   1/8/2024  8:15 AM Meg De La Torre DPM [facility]   1/11/2024  9:40 AM Praneeth Jama MD [facility] Neurology -   1/24/2024 10:15 AM Nahomi Ibarra MD Resp Spec Martín Craft

## 2023-12-23 DIAGNOSIS — E11.9 NEW ONSET TYPE 2 DIABETES MELLITUS (HCC): ICD-10-CM

## 2023-12-23 DIAGNOSIS — E11.69 TYPE 2 DIABETES MELLITUS WITH OTHER SPECIFIED COMPLICATION, WITHOUT LONG-TERM CURRENT USE OF INSULIN (HCC): ICD-10-CM

## 2023-12-23 DIAGNOSIS — Z86.32 HISTORY OF GESTATIONAL DIABETES: ICD-10-CM

## 2023-12-23 DIAGNOSIS — E78.5 DYSLIPIDEMIA: ICD-10-CM

## 2023-12-26 RX ORDER — ROSUVASTATIN CALCIUM 5 MG/1
5 TABLET, COATED ORAL DAILY
Qty: 90 TABLET | Refills: 1 | Status: SHIPPED | OUTPATIENT
Start: 2023-12-26

## 2023-12-26 RX ORDER — METFORMIN HYDROCHLORIDE 500 MG/1
TABLET, EXTENDED RELEASE ORAL
Qty: 90 TABLET | Refills: 1 | Status: SHIPPED | OUTPATIENT
Start: 2023-12-26

## 2024-01-03 DIAGNOSIS — I10 ESSENTIAL HYPERTENSION: ICD-10-CM

## 2024-01-03 DIAGNOSIS — I10 ESSENTIAL HYPERTENSION: Primary | ICD-10-CM

## 2024-01-03 RX ORDER — AMLODIPINE BESYLATE 10 MG/1
TABLET ORAL
Qty: 30 TABLET | Refills: 5 | Status: SHIPPED | OUTPATIENT
Start: 2024-01-03 | End: 2024-01-03 | Stop reason: CLARIF

## 2024-01-03 RX ORDER — AMLODIPINE BESYLATE 10 MG/1
10 TABLET ORAL DAILY
Qty: 90 TABLET | Refills: 3 | Status: SHIPPED | OUTPATIENT
Start: 2024-01-03

## 2024-01-05 ENCOUNTER — HOSPITAL ENCOUNTER (EMERGENCY)
Age: 49
Discharge: HOME | End: 2024-01-05
Attending: EMERGENCY MEDICINE
Payer: COMMERCIAL

## 2024-01-05 VITALS
SYSTOLIC BLOOD PRESSURE: 146 MMHG | BODY MASS INDEX: 34.07 KG/M2 | TEMPERATURE: 98.1 F | HEART RATE: 78 BPM | WEIGHT: 212 LBS | RESPIRATION RATE: 16 BRPM | OXYGEN SATURATION: 97 % | DIASTOLIC BLOOD PRESSURE: 90 MMHG | HEIGHT: 66 IN

## 2024-01-05 DIAGNOSIS — N30.00 ACUTE CYSTITIS WITHOUT HEMATURIA: ICD-10-CM

## 2024-01-05 DIAGNOSIS — L73.2 HIDRADENITIS SUPPURATIVA: ICD-10-CM

## 2024-01-05 DIAGNOSIS — K59.00 CONSTIPATION, UNSPECIFIED CONSTIPATION TYPE: Primary | ICD-10-CM

## 2024-01-05 LAB
ANION GAP SERPL CALCULATED.3IONS-SCNC: 11 MMOL/L (ref 9–17)
BACTERIA URNS QL MICRO: ABNORMAL
BASOPHILS # BLD: <0.03 K/UL (ref 0–0.2)
BASOPHILS NFR BLD: 0 % (ref 0–2)
BILIRUB UR QL STRIP: NEGATIVE
BUN SERPL-MCNC: 9 MG/DL (ref 6–20)
BUN/CREAT SERPL: 11 (ref 9–20)
CALCIUM SERPL-MCNC: 9.1 MG/DL (ref 8.6–10.4)
CHLORIDE SERPL-SCNC: 102 MMOL/L (ref 98–107)
CLARITY UR: ABNORMAL
CO2 SERPL-SCNC: 23 MMOL/L (ref 20–31)
COLOR UR: YELLOW
CREAT SERPL-MCNC: 0.8 MG/DL (ref 0.5–0.9)
EOSINOPHIL # BLD: 0.05 K/UL (ref 0–0.44)
EOSINOPHILS RELATIVE PERCENT: 1 % (ref 1–4)
EPI CELLS #/AREA URNS HPF: ABNORMAL /HPF (ref 0–5)
ERYTHROCYTE [DISTWIDTH] IN BLOOD BY AUTOMATED COUNT: 16.3 % (ref 11.8–14.4)
GFR SERPL CREATININE-BSD FRML MDRD: >60 ML/MIN/1.73M2
GLUCOSE SERPL-MCNC: 91 MG/DL (ref 70–99)
GLUCOSE UR STRIP-MCNC: NEGATIVE MG/DL
HCT VFR BLD AUTO: 37.3 % (ref 36.3–47.1)
HGB BLD-MCNC: 11.9 G/DL (ref 11.9–15.1)
HGB UR QL STRIP.AUTO: NEGATIVE
IMM GRANULOCYTES # BLD AUTO: 0.02 K/UL (ref 0–0.3)
IMM GRANULOCYTES NFR BLD: 0 %
KETONES UR STRIP-MCNC: NEGATIVE MG/DL
LEUKOCYTE ESTERASE UR QL STRIP: ABNORMAL
LYMPHOCYTES NFR BLD: 2.57 K/UL (ref 1.1–3.7)
LYMPHOCYTES RELATIVE PERCENT: 32 % (ref 24–43)
MCH RBC QN AUTO: 22.9 PG (ref 25.2–33.5)
MCHC RBC AUTO-ENTMCNC: 31.9 G/DL (ref 28.4–34.8)
MCV RBC AUTO: 71.7 FL (ref 82.6–102.9)
MONOCYTES NFR BLD: 0.76 K/UL (ref 0.1–1.2)
MONOCYTES NFR BLD: 10 % (ref 3–12)
NEUTROPHILS NFR BLD: 57 % (ref 36–65)
NEUTS SEG NFR BLD: 4.56 K/UL (ref 1.5–8.1)
NITRITE UR QL STRIP: POSITIVE
NRBC BLD-RTO: 0 PER 100 WBC
PH UR STRIP: 6 [PH] (ref 5–8)
PLATELET # BLD AUTO: 246 K/UL (ref 138–453)
PMV BLD AUTO: 9.9 FL (ref 8.1–13.5)
POTASSIUM SERPL-SCNC: 3.8 MMOL/L (ref 3.7–5.3)
PROT UR STRIP-MCNC: NEGATIVE MG/DL
RBC # BLD AUTO: 5.2 M/UL (ref 3.95–5.11)
RBC # BLD: ABNORMAL 10*6/UL
RBC #/AREA URNS HPF: ABNORMAL /HPF (ref 0–2)
SODIUM SERPL-SCNC: 136 MMOL/L (ref 135–144)
SP GR UR STRIP: 1.02 (ref 1–1.03)
UROBILINOGEN UR STRIP-ACNC: NORMAL EU/DL (ref 0–1)
WBC #/AREA URNS HPF: ABNORMAL /HPF (ref 0–5)
WBC OTHER # BLD: 8 K/UL (ref 3.5–11.3)

## 2024-01-05 PROCEDURE — 99284 EMERGENCY DEPT VISIT MOD MDM: CPT

## 2024-01-05 PROCEDURE — 96374 THER/PROPH/DIAG INJ IV PUSH: CPT

## 2024-01-05 PROCEDURE — 87086 URINE CULTURE/COLONY COUNT: CPT

## 2024-01-05 PROCEDURE — 87186 SC STD MICRODIL/AGAR DIL: CPT

## 2024-01-05 PROCEDURE — 85025 COMPLETE CBC W/AUTO DIFF WBC: CPT

## 2024-01-05 PROCEDURE — 87077 CULTURE AEROBIC IDENTIFY: CPT

## 2024-01-05 PROCEDURE — 81001 URINALYSIS AUTO W/SCOPE: CPT

## 2024-01-05 PROCEDURE — 96372 THER/PROPH/DIAG INJ SC/IM: CPT

## 2024-01-05 PROCEDURE — 80048 BASIC METABOLIC PNL TOTAL CA: CPT

## 2024-01-05 PROCEDURE — 6370000000 HC RX 637 (ALT 250 FOR IP): Performed by: EMERGENCY MEDICINE

## 2024-01-05 PROCEDURE — 6360000002 HC RX W HCPCS: Performed by: EMERGENCY MEDICINE

## 2024-01-05 RX ORDER — DOCUSATE SODIUM 100 MG/1
100 CAPSULE, LIQUID FILLED ORAL 2 TIMES DAILY
Qty: 30 CAPSULE | Refills: 0 | Status: SHIPPED | OUTPATIENT
Start: 2024-01-05

## 2024-01-05 RX ORDER — ENEMA 19; 7 G/133ML; G/133ML
118 ENEMA RECTAL ONCE
Status: COMPLETED | OUTPATIENT
Start: 2024-01-05 | End: 2024-01-05

## 2024-01-05 RX ORDER — DICYCLOMINE HYDROCHLORIDE 10 MG/ML
20 INJECTION INTRAMUSCULAR ONCE
Status: COMPLETED | OUTPATIENT
Start: 2024-01-05 | End: 2024-01-05

## 2024-01-05 RX ORDER — KETOROLAC TROMETHAMINE 15 MG/ML
15 INJECTION, SOLUTION INTRAMUSCULAR; INTRAVENOUS ONCE
Status: COMPLETED | OUTPATIENT
Start: 2024-01-05 | End: 2024-01-05

## 2024-01-05 RX ORDER — DOXYCYCLINE HYCLATE 100 MG
100 TABLET ORAL 2 TIMES DAILY
Qty: 20 TABLET | Refills: 0 | Status: SHIPPED | OUTPATIENT
Start: 2024-01-05 | End: 2024-01-15

## 2024-01-05 RX ORDER — CEPHALEXIN 500 MG/1
500 CAPSULE ORAL 2 TIMES DAILY
Qty: 14 CAPSULE | Refills: 0 | Status: SHIPPED | OUTPATIENT
Start: 2024-01-05 | End: 2024-01-12

## 2024-01-05 RX ORDER — HYDROCODONE BITARTRATE AND ACETAMINOPHEN 5; 325 MG/1; MG/1
1 TABLET ORAL EVERY 6 HOURS PRN
Qty: 10 TABLET | Refills: 0 | Status: SHIPPED | OUTPATIENT
Start: 2024-01-05 | End: 2024-01-08

## 2024-01-05 RX ADMIN — KETOROLAC TROMETHAMINE 15 MG: 15 INJECTION, SOLUTION INTRAMUSCULAR; INTRAVENOUS at 09:35

## 2024-01-05 RX ADMIN — SODIUM PHOSPHATE, DIBASIC AND SODIUM PHOSPHATE, MONOBASIC 1 ENEMA: 7; 19 ENEMA RECTAL at 07:30

## 2024-01-05 RX ADMIN — DICYCLOMINE HYDROCHLORIDE 20 MG: 10 INJECTION, SOLUTION INTRAMUSCULAR at 09:21

## 2024-01-05 ASSESSMENT — ENCOUNTER SYMPTOMS
NAUSEA: 0
DIARRHEA: 0
COUGH: 0
EYE REDNESS: 0
COLOR CHANGE: 0
SHORTNESS OF BREATH: 0
SORE THROAT: 0
CONSTIPATION: 1
RHINORRHEA: 0
VOMITING: 0
EYE DISCHARGE: 0

## 2024-01-05 ASSESSMENT — PAIN SCALES - GENERAL: PAINLEVEL_OUTOF10: 10

## 2024-01-05 ASSESSMENT — PAIN - FUNCTIONAL ASSESSMENT: PAIN_FUNCTIONAL_ASSESSMENT: 0-10

## 2024-01-05 NOTE — ED NOTES
Pt called out c/o being hot and dizzy. Pt found with increased rr @ 24. Pt instructed to slow breathing down. Bathroom door opened. Placed pt on 2 lpm O2 via NC. Pt quickly sts improved condition. Dr Plaza notified. No n.o. at present.

## 2024-01-05 NOTE — ED PROVIDER NOTES
DISPOSITION Decision To Discharge 01/05/2024 10:15:00 AM      OUTPATIENT FOLLOW UP THE PATIENT:  Mignon Lee MD  [address] [facility]  [facility] [address] [phone]    Schedule an appointment as soon as possible for a visit in 2 days      Vivek Russ DO  [address] [facility] [address] [phone]    Schedule an appointment as soon as possible for a visit in 2 days  Hidradenitis Suppurativa      MD Mela Archibald Rory T, MD  01/05/24 1022

## 2024-01-05 NOTE — ED NOTES
Pt taken to  9 bathroom and given instruction on enema self administration. Folded blankets placed on floor for cushioning. Pt denies mobility issues that would prevent her from kneeling on floor. Pt dispensed fluid. Safety maintained.

## 2024-01-05 NOTE — ED NOTES
Patient education flyer \"[facility] What [facility] need to know\" was provided and reviewed.  Questions answered and understanding was verbalized by the patient and/or family.

## 2024-01-05 NOTE — ED NOTES
Pt to commode. Pt c/o abdominal cramping and rectal pain. Will continue to monitor, call light within reach.

## 2024-01-05 NOTE — ED NOTES
Pt called out after passing stool post enema. Small amount of stool passed, which was tinged with bright red blood. Pt having increased pain after passing stool. Pt was assisted back into bed. Dr. Plaza and YEIMI RN informed.

## 2024-01-06 DIAGNOSIS — E55.9 VITAMIN D DEFICIENCY: ICD-10-CM

## 2024-01-06 LAB
MICROORGANISM SPEC CULT: ABNORMAL
SPECIMEN DESCRIPTION: ABNORMAL

## 2024-01-08 ENCOUNTER — TELEPHONE (OUTPATIENT)
Dept: FAMILY MEDICINE CLINIC | Age: 49
End: 2024-01-08

## 2024-01-08 RX ORDER — ERGOCALCIFEROL 1.25 MG/1
50000 CAPSULE ORAL WEEKLY
Qty: 12 CAPSULE | Refills: 1 | OUTPATIENT
Start: 2024-01-08

## 2024-01-08 NOTE — TELEPHONE ENCOUNTER
[facility] ED Follow up Call    Reason for ED visit:  UTI     1/8/2024     Jerel Strange , this is candelaria from Mignon Perea's office, just calling to see how you are doing after your recent ED visit.    Did you receive discharge instructions?  Yes  Do you understand the discharge instructions? Yes  Did the ED give you any new prescriptions? Yes  Were you able to fill your prescriptions? Yes      Do you have one of our red, yellow and green  Zone sheets that help you to determine when you should go to the ED?  Not Applicable      Do you need or want to make a follow up appt with your PCP?  Yes    Do you have any further needs in the home, e.g. equipment?  No        FU appts/Provider:    Future Appointments   Date Time Provider Department Center   1/24/2024 10:15 AM [facility]   3/4/2024  3:00 PM Petrona Green DPM [facility]   3/21/2024  1:20 PM Tony Lopez MD Neuro Spec Neurology -

## 2024-01-16 SDOH — HEALTH STABILITY: PHYSICAL HEALTH: ON AVERAGE, HOW MANY MINUTES DO YOU ENGAGE IN EXERCISE AT THIS LEVEL?: 30 MIN

## 2024-01-16 SDOH — HEALTH STABILITY: PHYSICAL HEALTH: ON AVERAGE, HOW MANY DAYS PER WEEK DO YOU ENGAGE IN MODERATE TO STRENUOUS EXERCISE (LIKE A BRISK WALK)?: 3 DAYS

## 2024-01-16 ASSESSMENT — PATIENT HEALTH QUESTIONNAIRE - PHQ9
10. IF YOU CHECKED OFF ANY PROBLEMS, HOW DIFFICULT HAVE THESE PROBLEMS MADE IT FOR YOU TO DO YOUR WORK, TAKE CARE OF THINGS AT HOME, OR GET ALONG WITH OTHER PEOPLE: 2
6. FEELING BAD ABOUT YOURSELF - OR THAT YOU ARE A FAILURE OR HAVE LET YOURSELF OR YOUR FAMILY DOWN: 2
7. TROUBLE CONCENTRATING ON THINGS, SUCH AS READING THE NEWSPAPER OR WATCHING TELEVISION: 0
SUM OF ALL RESPONSES TO PHQ QUESTIONS 1-9: 9
9. THOUGHTS THAT YOU WOULD BE BETTER OFF DEAD, OR OF HURTING YOURSELF: 0
1. LITTLE INTEREST OR PLEASURE IN DOING THINGS: 1
2. FEELING DOWN, DEPRESSED OR HOPELESS: 1
3. TROUBLE FALLING OR STAYING ASLEEP: 1
SUM OF ALL RESPONSES TO PHQ QUESTIONS 1-9: 9
SUM OF ALL RESPONSES TO PHQ QUESTIONS 1-9: 9
4. FEELING TIRED OR HAVING LITTLE ENERGY: 2
SUM OF ALL RESPONSES TO PHQ9 QUESTIONS 1 & 2: 2
SUM OF ALL RESPONSES TO PHQ QUESTIONS 1-9: 9
8. MOVING OR SPEAKING SO SLOWLY THAT OTHER PEOPLE COULD HAVE NOTICED. OR THE OPPOSITE, BEING SO FIGETY OR RESTLESS THAT YOU HAVE BEEN MOVING AROUND A LOT MORE THAN USUAL: 0
5. POOR APPETITE OR OVEREATING: 2

## 2024-01-16 ASSESSMENT — LIFESTYLE VARIABLES
HAVE YOU OR SOMEONE ELSE BEEN INJURED AS A RESULT OF YOUR DRINKING: 0
HOW OFTEN DURING THE LAST YEAR HAVE YOU FAILED TO DO WHAT WAS NORMALLY EXPECTED FROM YOU BECAUSE OF DRINKING: 0
HOW OFTEN DO YOU HAVE A DRINK CONTAINING ALCOHOL: MONTHLY OR LESS
HOW OFTEN DURING THE LAST YEAR HAVE YOU HAD A FEELING OF GUILT OR REMORSE AFTER DRINKING: 0
HOW OFTEN DURING THE LAST YEAR HAVE YOU FOUND THAT YOU WERE NOT ABLE TO STOP DRINKING ONCE YOU HAD STARTED: 0
HOW MANY STANDARD DRINKS CONTAINING ALCOHOL DO YOU HAVE ON A TYPICAL DAY: 1 OR 2
HOW OFTEN DURING THE LAST YEAR HAVE YOU BEEN UNABLE TO REMEMBER WHAT HAPPENED THE NIGHT BEFORE BECAUSE YOU HAD BEEN DRINKING: 0
HAS A RELATIVE, FRIEND, DOCTOR, OR ANOTHER HEALTH PROFESSIONAL EXPRESSED CONCERN ABOUT YOUR DRINKING OR SUGGESTED YOU CUT DOWN: 0
HOW OFTEN DURING THE LAST YEAR HAVE YOU NEEDED AN ALCOHOLIC DRINK FIRST THING IN THE MORNING TO GET YOURSELF GOING AFTER A NIGHT OF HEAVY DRINKING: 0

## 2024-01-19 ENCOUNTER — OFFICE VISIT (OUTPATIENT)
Dept: FAMILY MEDICINE CLINIC | Age: 49
End: 2024-01-19
Payer: COMMERCIAL

## 2024-01-19 VITALS
BODY MASS INDEX: 32.14 KG/M2 | SYSTOLIC BLOOD PRESSURE: 128 MMHG | WEIGHT: 200 LBS | TEMPERATURE: 98.1 F | DIASTOLIC BLOOD PRESSURE: 82 MMHG | RESPIRATION RATE: 16 BRPM | HEART RATE: 78 BPM | HEIGHT: 66 IN

## 2024-01-19 DIAGNOSIS — Z00.00 MEDICARE ANNUAL WELLNESS VISIT, SUBSEQUENT: Primary | ICD-10-CM

## 2024-01-19 DIAGNOSIS — Z79.01 ANTICOAGULATED: ICD-10-CM

## 2024-01-19 DIAGNOSIS — E11.69 TYPE 2 DIABETES MELLITUS WITH OTHER SPECIFIED COMPLICATION, WITHOUT LONG-TERM CURRENT USE OF INSULIN (HCC): ICD-10-CM

## 2024-01-19 DIAGNOSIS — Z00.00 ENCOUNTER FOR ANNUAL WELLNESS VISIT (AWV) IN MEDICARE PATIENT: ICD-10-CM

## 2024-01-19 DIAGNOSIS — F39 MOOD DISORDER (HCC): ICD-10-CM

## 2024-01-19 DIAGNOSIS — Z71.6 TOBACCO ABUSE COUNSELING: ICD-10-CM

## 2024-01-19 DIAGNOSIS — E55.9 VITAMIN D DEFICIENCY: ICD-10-CM

## 2024-01-19 DIAGNOSIS — I10 ESSENTIAL HYPERTENSION: ICD-10-CM

## 2024-01-19 DIAGNOSIS — K21.00 GASTROESOPHAGEAL REFLUX DISEASE WITH ESOPHAGITIS WITHOUT HEMORRHAGE: ICD-10-CM

## 2024-01-19 DIAGNOSIS — F12.90 MARIJUANA USE: ICD-10-CM

## 2024-01-19 DIAGNOSIS — Z86.19 HISTORY OF TRICHOMONAL VAGINITIS: ICD-10-CM

## 2024-01-19 DIAGNOSIS — Z86.711 HISTORY OF PULMONARY EMBOLISM: ICD-10-CM

## 2024-01-19 DIAGNOSIS — E87.6 HYPOKALEMIA: Chronic | ICD-10-CM

## 2024-01-19 DIAGNOSIS — B37.31 YEAST VAGINITIS: ICD-10-CM

## 2024-01-19 DIAGNOSIS — R56.9 SEIZURES (HCC): ICD-10-CM

## 2024-01-19 DIAGNOSIS — E78.5 DYSLIPIDEMIA: ICD-10-CM

## 2024-01-19 DIAGNOSIS — Z72.0 TOBACCO ABUSE DISORDER: ICD-10-CM

## 2024-01-19 PROCEDURE — 2022F DILAT RTA XM EVC RTNOPTHY: CPT | Performed by: FAMILY MEDICINE

## 2024-01-19 PROCEDURE — G8427 DOCREV CUR MEDS BY ELIG CLIN: HCPCS | Performed by: FAMILY MEDICINE

## 2024-01-19 PROCEDURE — 99213 OFFICE O/P EST LOW 20 MIN: CPT | Performed by: FAMILY MEDICINE

## 2024-01-19 PROCEDURE — 3046F HEMOGLOBIN A1C LEVEL >9.0%: CPT | Performed by: FAMILY MEDICINE

## 2024-01-19 PROCEDURE — G0439 PPPS, SUBSEQ VISIT: HCPCS | Performed by: FAMILY MEDICINE

## 2024-01-19 PROCEDURE — 3079F DIAST BP 80-89 MM HG: CPT | Performed by: FAMILY MEDICINE

## 2024-01-19 PROCEDURE — G8484 FLU IMMUNIZE NO ADMIN: HCPCS | Performed by: FAMILY MEDICINE

## 2024-01-19 PROCEDURE — 4004F PT TOBACCO SCREEN RCVD TLK: CPT | Performed by: FAMILY MEDICINE

## 2024-01-19 PROCEDURE — 3074F SYST BP LT 130 MM HG: CPT | Performed by: FAMILY MEDICINE

## 2024-01-19 PROCEDURE — G8417 CALC BMI ABV UP PARAM F/U: HCPCS | Performed by: FAMILY MEDICINE

## 2024-01-19 RX ORDER — FLUCONAZOLE 150 MG/1
150 TABLET ORAL ONCE
Qty: 1 TABLET | Refills: 0 | Status: SHIPPED | OUTPATIENT
Start: 2024-01-19 | End: 2024-01-19

## 2024-01-19 SDOH — HEALTH STABILITY: PHYSICAL HEALTH: ON AVERAGE, HOW MANY MINUTES DO YOU ENGAGE IN EXERCISE AT THIS LEVEL?: 30 MIN

## 2024-01-19 SDOH — HEALTH STABILITY: PHYSICAL HEALTH: ON AVERAGE, HOW MANY DAYS PER WEEK DO YOU ENGAGE IN MODERATE TO STRENUOUS EXERCISE (LIKE A BRISK WALK)?: 3 DAYS

## 2024-01-19 ASSESSMENT — LIFESTYLE VARIABLES
HOW OFTEN DURING THE LAST YEAR HAVE YOU FOUND THAT YOU WERE NOT ABLE TO STOP DRINKING ONCE YOU HAD STARTED: 0
HOW MANY STANDARD DRINKS CONTAINING ALCOHOL DO YOU HAVE ON A TYPICAL DAY: 1 OR 2
HOW OFTEN DURING THE LAST YEAR HAVE YOU FOUND THAT YOU WERE NOT ABLE TO STOP DRINKING ONCE YOU HAD STARTED: NEVER
HOW OFTEN DURING THE LAST YEAR HAVE YOU BEEN UNABLE TO REMEMBER WHAT HAPPENED THE NIGHT BEFORE BECAUSE YOU HAD BEEN DRINKING: NEVER
HAVE YOU OR SOMEONE ELSE BEEN INJURED AS A RESULT OF YOUR DRINKING: NO
HOW OFTEN DO YOU HAVE A DRINK CONTAINING ALCOHOL: MONTHLY OR LESS
HOW MANY STANDARD DRINKS CONTAINING ALCOHOL DO YOU HAVE ON A TYPICAL DAY: 1
HOW OFTEN DURING THE LAST YEAR HAVE YOU NEEDED AN ALCOHOLIC DRINK FIRST THING IN THE MORNING TO GET YOURSELF GOING AFTER A NIGHT OF HEAVY DRINKING: NEVER
HAS A RELATIVE, FRIEND, DOCTOR, OR ANOTHER HEALTH PROFESSIONAL EXPRESSED CONCERN ABOUT YOUR DRINKING OR SUGGESTED YOU CUT DOWN: NO
HOW OFTEN DURING THE LAST YEAR HAVE YOU HAD A FEELING OF GUILT OR REMORSE AFTER DRINKING: 0
HOW OFTEN DURING THE LAST YEAR HAVE YOU BEEN UNABLE TO REMEMBER WHAT HAPPENED THE NIGHT BEFORE BECAUSE YOU HAD BEEN DRINKING: 0
HOW OFTEN DO YOU HAVE SIX OR MORE DRINKS ON ONE OCCASION: 2
HAS A RELATIVE, FRIEND, DOCTOR, OR ANOTHER HEALTH PROFESSIONAL EXPRESSED CONCERN ABOUT YOUR DRINKING OR SUGGESTED YOU CUT DOWN: 0
HAVE YOU OR SOMEONE ELSE BEEN INJURED AS A RESULT OF YOUR DRINKING: 0
HOW OFTEN DURING THE LAST YEAR HAVE YOU FAILED TO DO WHAT WAS NORMALLY EXPECTED FROM YOU BECAUSE OF DRINKING: NEVER
HOW OFTEN DO YOU HAVE A DRINK CONTAINING ALCOHOL: 2
HOW OFTEN DURING THE LAST YEAR HAVE YOU NEEDED AN ALCOHOLIC DRINK FIRST THING IN THE MORNING TO GET YOURSELF GOING AFTER A NIGHT OF HEAVY DRINKING: 0
HOW OFTEN DURING THE LAST YEAR HAVE YOU FAILED TO DO WHAT WAS NORMALLY EXPECTED FROM YOU BECAUSE OF DRINKING: 0
HOW OFTEN DURING THE LAST YEAR HAVE YOU HAD A FEELING OF GUILT OR REMORSE AFTER DRINKING: NEVER

## 2024-01-19 ASSESSMENT — PATIENT HEALTH QUESTIONNAIRE - PHQ9
SUM OF ALL RESPONSES TO PHQ QUESTIONS 1-9: 2
SUM OF ALL RESPONSES TO PHQ QUESTIONS 1-9: 1
2. FEELING DOWN, DEPRESSED OR HOPELESS: 1
2. FEELING DOWN, DEPRESSED OR HOPELESS: 0
SUM OF ALL RESPONSES TO PHQ QUESTIONS 1-9: 1
1. LITTLE INTEREST OR PLEASURE IN DOING THINGS: 1
8. MOVING OR SPEAKING SO SLOWLY THAT OTHER PEOPLE COULD HAVE NOTICED. OR THE OPPOSITE, BEING SO FIGETY OR RESTLESS THAT YOU HAVE BEEN MOVING AROUND A LOT MORE THAN USUAL: 0
SUM OF ALL RESPONSES TO PHQ QUESTIONS 1-9: 2
SUM OF ALL RESPONSES TO PHQ QUESTIONS 1-9: 2
9. THOUGHTS THAT YOU WOULD BE BETTER OFF DEAD, OR OF HURTING YOURSELF: 0
7. TROUBLE CONCENTRATING ON THINGS, SUCH AS READING THE NEWSPAPER OR WATCHING TELEVISION: 0
10. IF YOU CHECKED OFF ANY PROBLEMS, HOW DIFFICULT HAVE THESE PROBLEMS MADE IT FOR YOU TO DO YOUR WORK, TAKE CARE OF THINGS AT HOME, OR GET ALONG WITH OTHER PEOPLE: 1
1. LITTLE INTEREST OR PLEASURE IN DOING THINGS: 1
SUM OF ALL RESPONSES TO PHQ QUESTIONS 1-9: 1
5. POOR APPETITE OR OVEREATING: 0
SUM OF ALL RESPONSES TO PHQ QUESTIONS 1-9: 2
SUM OF ALL RESPONSES TO PHQ9 QUESTIONS 1 & 2: 1
4. FEELING TIRED OR HAVING LITTLE ENERGY: 0
SUM OF ALL RESPONSES TO PHQ9 QUESTIONS 1 & 2: 2
SUM OF ALL RESPONSES TO PHQ QUESTIONS 1-9: 1
6. FEELING BAD ABOUT YOURSELF - OR THAT YOU ARE A FAILURE OR HAVE LET YOURSELF OR YOUR FAMILY DOWN: 0
3. TROUBLE FALLING OR STAYING ASLEEP: 0

## 2024-01-19 ASSESSMENT — ANXIETY QUESTIONNAIRES
IF YOU CHECKED OFF ANY PROBLEMS ON THIS QUESTIONNAIRE, HOW DIFFICULT HAVE THESE PROBLEMS MADE IT FOR YOU TO DO YOUR WORK, TAKE CARE OF THINGS AT HOME, OR GET ALONG WITH OTHER PEOPLE: NOT DIFFICULT AT ALL
GAD7 TOTAL SCORE: 0
1. FEELING NERVOUS, ANXIOUS, OR ON EDGE: 0
5. BEING SO RESTLESS THAT IT IS HARD TO SIT STILL: 0
7. FEELING AFRAID AS IF SOMETHING AWFUL MIGHT HAPPEN: 0
2. NOT BEING ABLE TO STOP OR CONTROL WORRYING: 0
6. BECOMING EASILY ANNOYED OR IRRITABLE: 0
4. TROUBLE RELAXING: 0
3. WORRYING TOO MUCH ABOUT DIFFERENT THINGS: 0

## 2024-01-19 NOTE — PROGRESS NOTES
Medicare Annual Wellness Visit    Alexandr Kinney is here for Medicare AWV and Follow-up (01/05/2024)    Assessment & Plan   Essential hypertension  History of trichomonal vaginitis  Yeast vaginitis  The following orders have not been finalized:  -     fluconazole (DIFLUCAN) 150 MG tablet  Tobacco abuse counseling  Tobacco abuse disorder  Seizures (HCC)  Vitamin D deficiency  Mood disorder (HCC)  Marijuana use  Hypokalemia  History of pulmonary embolism  Dyslipidemia  Gastroesophageal reflux disease with esophagitis without hemorrhage  Anticoagulated  Type 2 diabetes mellitus with other specified complication, without long-term current use of insulin (HCC)  Encounter for annual wellness visit (AWV) in Medicare patient  AWV  Recommendations for Preventive Services Due: see orders and patient instructions/AVS.  Recommended screening schedule for the next 5-10 years is provided to the patient in written form: see Patient Instructions/AVS.     No follow-ups on file.     Subjective   The following acute and/or chronic problems were also addressed today:  States mood is irritable due to new stressors- having problems with kids father, Problem getting money from Govt for food and clothing, also saw Psych but meds not adjusted yet as sx are from stress per patient- more angry and irritable recently.State slast seizure as a couple of weeks ago- prior to that was months ago. Compliant with all meds - states BP is now control.Not having any more headaches as well.  States cut down smoking from 1.5 PPD to 1/2 PPD x almost a year.  States went to ER was diag with Trichomonas- now has a yeast infection - very itchy .  States eye exam last summer was WNL- adviced Diabetic exam every time, next dentist is in Feb 25 as it got rescheduled.  Patient's complete Health Risk Assessment and screening values have been reviewed and are found in Flowsheets. The following problems were reviewed today and where indicated follow up

## 2024-01-23 ENCOUNTER — TELEPHONE (OUTPATIENT)
Dept: PULMONOLOGY | Age: 49
End: 2024-01-23

## 2024-01-23 NOTE — TELEPHONE ENCOUNTER
Patient had to reschedule her appt with you, she has yet to see [facility]. She was provided their number and will get back in touch with them. FIORELLA Cowan.     Patient is also asking for a refill of her Xarelto? She is currently scheduled with you 6/26/24.        RSS 10 with tachypnea, biphasic wheeze, spo2 92 and diffuse retractions

## 2024-01-26 DIAGNOSIS — E87.6 HYPOKALEMIA: ICD-10-CM

## 2024-01-26 RX ORDER — POTASSIUM CHLORIDE 750 MG/1
10 TABLET, EXTENDED RELEASE ORAL DAILY
Qty: 90 TABLET | Refills: 3 | Status: SHIPPED | OUTPATIENT
Start: 2024-01-26

## 2024-02-01 ENCOUNTER — HOSPITAL ENCOUNTER (OUTPATIENT)
Age: 49
Discharge: HOME | End: 2024-02-01
Payer: COMMERCIAL

## 2024-02-01 DIAGNOSIS — E11.69 TYPE 2 DIABETES MELLITUS WITH OTHER SPECIFIED COMPLICATION, WITHOUT LONG-TERM CURRENT USE OF INSULIN (HCC): ICD-10-CM

## 2024-02-01 LAB
ANION GAP SERPL CALCULATED.3IONS-SCNC: 7 MMOL/L (ref 9–17)
BUN SERPL-MCNC: 12 MG/DL (ref 6–20)
CALCIUM SERPL-MCNC: 8.5 MG/DL (ref 8.6–10.4)
CHLORIDE SERPL-SCNC: 108 MMOL/L (ref 98–107)
CO2 SERPL-SCNC: 23 MMOL/L (ref 20–31)
CREAT SERPL-MCNC: 0.9 MG/DL (ref 0.5–0.9)
CREAT UR-MCNC: 307.3 MG/DL (ref 28–217)
EST. AVERAGE GLUCOSE BLD GHB EST-MCNC: 114 MG/DL
GFR SERPL CREATININE-BSD FRML MDRD: >60 ML/MIN/1.73M2
GLUCOSE SERPL-MCNC: 99 MG/DL (ref 70–99)
HBA1C MFR BLD: 5.6 % (ref 4–6)
MICROALBUMIN UR-MCNC: 32 MG/L
MICROALBUMIN/CREAT UR-RTO: 10 MCG/MG CREAT
POTASSIUM SERPL-SCNC: 4 MMOL/L (ref 3.7–5.3)
SODIUM SERPL-SCNC: 138 MMOL/L (ref 135–144)

## 2024-02-01 PROCEDURE — 82043 UR ALBUMIN QUANTITATIVE: CPT

## 2024-02-01 PROCEDURE — 82570 ASSAY OF URINE CREATININE: CPT

## 2024-02-01 PROCEDURE — 36415 COLL VENOUS BLD VENIPUNCTURE: CPT

## 2024-02-01 PROCEDURE — 80048 BASIC METABOLIC PNL TOTAL CA: CPT

## 2024-02-01 PROCEDURE — 83036 HEMOGLOBIN GLYCOSYLATED A1C: CPT

## 2024-02-07 ENCOUNTER — TELEPHONE (OUTPATIENT)
Dept: FAMILY MEDICINE CLINIC | Age: 49
End: 2024-02-07

## 2024-02-07 NOTE — TELEPHONE ENCOUNTER
Call from pt she received an email to make an appt to be seen for her DM but she was never told that she was diabetic.

## 2024-02-13 ENCOUNTER — OFFICE VISIT (OUTPATIENT)
Dept: FAMILY MEDICINE CLINIC | Age: 49
End: 2024-02-13
Payer: COMMERCIAL

## 2024-02-13 VITALS
SYSTOLIC BLOOD PRESSURE: 110 MMHG | WEIGHT: 201 LBS | DIASTOLIC BLOOD PRESSURE: 80 MMHG | HEIGHT: 66 IN | OXYGEN SATURATION: 100 % | BODY MASS INDEX: 32.3 KG/M2 | TEMPERATURE: 97.7 F | HEART RATE: 77 BPM

## 2024-02-13 DIAGNOSIS — K58.1 IRRITABLE BOWEL SYNDROME WITH CONSTIPATION: ICD-10-CM

## 2024-02-13 DIAGNOSIS — K21.00 GASTROESOPHAGEAL REFLUX DISEASE WITH ESOPHAGITIS WITHOUT HEMORRHAGE: ICD-10-CM

## 2024-02-13 DIAGNOSIS — E53.8 COBALAMIN DEFICIENCY: ICD-10-CM

## 2024-02-13 DIAGNOSIS — F31.9 BIPOLAR 1 DISORDER (HCC): ICD-10-CM

## 2024-02-13 DIAGNOSIS — F39 MOOD DISORDER (HCC): ICD-10-CM

## 2024-02-13 DIAGNOSIS — Z86.711 HISTORY OF PULMONARY EMBOLISM: ICD-10-CM

## 2024-02-13 DIAGNOSIS — E78.5 DYSLIPIDEMIA: ICD-10-CM

## 2024-02-13 DIAGNOSIS — Z86.718 HISTORY OF DVT (DEEP VEIN THROMBOSIS): ICD-10-CM

## 2024-02-13 DIAGNOSIS — E66.9 CLASS 1 OBESITY WITH SERIOUS COMORBIDITY AND BODY MASS INDEX (BMI) OF 32.0 TO 32.9 IN ADULT, UNSPECIFIED OBESITY TYPE: Primary | ICD-10-CM

## 2024-02-13 DIAGNOSIS — I10 ESSENTIAL HYPERTENSION: ICD-10-CM

## 2024-02-13 DIAGNOSIS — E87.6 HYPOKALEMIA: Chronic | ICD-10-CM

## 2024-02-13 DIAGNOSIS — Z79.01 ANTICOAGULATED: ICD-10-CM

## 2024-02-13 DIAGNOSIS — E53.8 FOLATE DEFICIENCY: ICD-10-CM

## 2024-02-13 PROBLEM — E66.811 CLASS 1 OBESITY IN ADULT: Status: ACTIVE | Noted: 2021-05-12

## 2024-02-13 PROCEDURE — 3079F DIAST BP 80-89 MM HG: CPT | Performed by: FAMILY MEDICINE

## 2024-02-13 PROCEDURE — G8484 FLU IMMUNIZE NO ADMIN: HCPCS | Performed by: FAMILY MEDICINE

## 2024-02-13 PROCEDURE — 3074F SYST BP LT 130 MM HG: CPT | Performed by: FAMILY MEDICINE

## 2024-02-13 PROCEDURE — G8417 CALC BMI ABV UP PARAM F/U: HCPCS | Performed by: FAMILY MEDICINE

## 2024-02-13 PROCEDURE — 4004F PT TOBACCO SCREEN RCVD TLK: CPT | Performed by: FAMILY MEDICINE

## 2024-02-13 PROCEDURE — G8427 DOCREV CUR MEDS BY ELIG CLIN: HCPCS | Performed by: FAMILY MEDICINE

## 2024-02-13 PROCEDURE — 99213 OFFICE O/P EST LOW 20 MIN: CPT | Performed by: FAMILY MEDICINE

## 2024-02-13 NOTE — PROGRESS NOTES
Subjective:      Patient ID: Alexandr Kinney is a 48 y.o. female.    Speaks to therapist Q 2 weeks but not seen Dr mendez @ [facility] for 2 months. States been stressed recently-   Daughter has a back fracture- not healing . Son has a heart problem- NC syncope and not doing well.  Mood is down.  Recently labs reviewed- BS are stable, K ok, renal funtcions k.  BP has been good, states headaches are a lot less frequent- usually triggered by stress.  Has been walking a lot but also eating less- cut down on chocolate  States GERD sx better and also lost a lot of weight.  States also trying to cut down pepsi.  Advised more water- diet also discussed.  Review of Systems   Constitutional:  Negative for chills and fever.   HENT:  Negative for congestion, ear pain, hearing loss, nosebleeds, sore throat and tinnitus.    Eyes:  Negative for photophobia, pain, discharge and redness.   Respiratory:  Negative for cough, shortness of breath and stridor.    Cardiovascular:  Negative for chest pain, palpitations and leg swelling.   Gastrointestinal:  Negative for abdominal pain, blood in stool, constipation, diarrhea, nausea and vomiting.   Endocrine: Negative for polydipsia.   Genitourinary:  Negative for dysuria, flank pain, frequency, hematuria and urgency.   Musculoskeletal:  Negative for back pain, myalgias and neck pain.   Skin:  Negative for rash.   Allergic/Immunologic: Negative for environmental allergies.   Neurological:  Negative for dizziness, tremors, seizures, weakness and headaches.   Hematological:  Does not bruise/bleed easily.   Psychiatric/Behavioral:  Negative for hallucinations and suicidal ideas. The patient is not nervous/anxious.        Objective:   Physical Exam  Constitutional:       General: She is not in acute distress.     Appearance: She is well-developed.   HENT:      Head: Normocephalic and atraumatic.      Right Ear: External ear normal.      Left Ear: External ear normal.      Nose: Nose normal.

## 2024-02-17 DIAGNOSIS — E87.6 HYPOKALEMIA: ICD-10-CM

## 2024-02-17 DIAGNOSIS — I10 ESSENTIAL HYPERTENSION: ICD-10-CM

## 2024-02-17 RX ORDER — SPIRONOLACTONE 100 MG/1
TABLET, FILM COATED ORAL
Qty: 90 TABLET | Refills: 0 | Status: SHIPPED | OUTPATIENT
Start: 2024-02-17

## 2024-02-23 DIAGNOSIS — I10 UNCONTROLLED HYPERTENSION: ICD-10-CM

## 2024-02-23 RX ORDER — CLONIDINE 0.2 MG/24H
1 PATCH, EXTENDED RELEASE TRANSDERMAL
Qty: 12 PATCH | Refills: 5 | Status: SHIPPED | OUTPATIENT
Start: 2024-02-23 | End: 2025-02-22

## 2024-02-27 ENCOUNTER — HOSPITAL ENCOUNTER (EMERGENCY)
Age: 49
Discharge: HOME | End: 2024-02-27
Attending: EMERGENCY MEDICINE
Payer: COMMERCIAL

## 2024-02-27 ENCOUNTER — APPOINTMENT (OUTPATIENT)
Dept: CT IMAGING | Age: 49
End: 2024-02-27
Payer: COMMERCIAL

## 2024-02-27 VITALS
HEIGHT: 66 IN | OXYGEN SATURATION: 93 % | RESPIRATION RATE: 16 BRPM | HEART RATE: 77 BPM | BODY MASS INDEX: 30.53 KG/M2 | SYSTOLIC BLOOD PRESSURE: 117 MMHG | TEMPERATURE: 97.5 F | DIASTOLIC BLOOD PRESSURE: 78 MMHG | WEIGHT: 190 LBS

## 2024-02-27 DIAGNOSIS — K62.5 RECTAL BLEEDING: Primary | ICD-10-CM

## 2024-02-27 DIAGNOSIS — K92.0 HEMATEMESIS WITH NAUSEA: ICD-10-CM

## 2024-02-27 DIAGNOSIS — R31.9 HEMATURIA, UNSPECIFIED TYPE: ICD-10-CM

## 2024-02-27 LAB
ALBUMIN SERPL-MCNC: 3.7 G/DL (ref 3.5–5.2)
ALP SERPL-CCNC: 96 U/L (ref 35–104)
ALT SERPL-CCNC: 20 U/L (ref 5–33)
ANION GAP SERPL CALCULATED.3IONS-SCNC: 10 MMOL/L (ref 9–17)
AST SERPL-CCNC: 16 U/L
BASOPHILS # BLD: <0.03 K/UL (ref 0–0.2)
BASOPHILS NFR BLD: 0 % (ref 0–2)
BILIRUB DIRECT SERPL-MCNC: <0.1 MG/DL
BILIRUB INDIRECT SERPL-MCNC: ABNORMAL MG/DL (ref 0–1)
BILIRUB SERPL-MCNC: 0.2 MG/DL (ref 0.3–1.2)
BILIRUB UR QL STRIP: NEGATIVE
BUN SERPL-MCNC: 8 MG/DL (ref 6–20)
BUN/CREAT SERPL: 10 (ref 9–20)
CALCIUM SERPL-MCNC: 8.8 MG/DL (ref 8.6–10.4)
CHLORIDE SERPL-SCNC: 105 MMOL/L (ref 98–107)
CLARITY UR: CLEAR
CO2 SERPL-SCNC: 22 MMOL/L (ref 20–31)
COLOR UR: YELLOW
CREAT SERPL-MCNC: 0.8 MG/DL (ref 0.5–0.9)
D DIMER PPP FEU-MCNC: 0.27 UG/ML FEU (ref 0–0.59)
EOSINOPHIL # BLD: 0.06 K/UL (ref 0–0.44)
EOSINOPHILS RELATIVE PERCENT: 1 % (ref 1–4)
ERYTHROCYTE [DISTWIDTH] IN BLOOD BY AUTOMATED COUNT: 17 % (ref 11.8–14.4)
GFR SERPL CREATININE-BSD FRML MDRD: >60 ML/MIN/1.73M2
GLUCOSE SERPL-MCNC: 98 MG/DL (ref 70–99)
GLUCOSE UR STRIP-MCNC: NEGATIVE MG/DL
HCG SERPL QL: NEGATIVE
HCT VFR BLD AUTO: 34.9 % (ref 36.3–47.1)
HGB BLD-MCNC: 10.8 G/DL (ref 11.9–15.1)
HGB UR QL STRIP.AUTO: NEGATIVE
IMM GRANULOCYTES # BLD AUTO: 0.02 K/UL (ref 0–0.3)
IMM GRANULOCYTES NFR BLD: 0 %
INR PPP: 1
KETONES UR STRIP-MCNC: NEGATIVE MG/DL
LEUKOCYTE ESTERASE UR QL STRIP: NEGATIVE
LIPASE SERPL-CCNC: 22 U/L (ref 13–60)
LYMPHOCYTES NFR BLD: 2.94 K/UL (ref 1.1–3.7)
LYMPHOCYTES RELATIVE PERCENT: 48 % (ref 24–43)
MCH RBC QN AUTO: 22.8 PG (ref 25.2–33.5)
MCHC RBC AUTO-ENTMCNC: 30.9 G/DL (ref 28.4–34.8)
MCV RBC AUTO: 73.8 FL (ref 82.6–102.9)
MONOCYTES NFR BLD: 0.7 K/UL (ref 0.1–1.2)
MONOCYTES NFR BLD: 11 % (ref 3–12)
NEUTROPHILS NFR BLD: 40 % (ref 36–65)
NEUTS SEG NFR BLD: 2.47 K/UL (ref 1.5–8.1)
NITRITE UR QL STRIP: NEGATIVE
NRBC BLD-RTO: 0 PER 100 WBC
PH UR STRIP: 5.5 [PH] (ref 5–8)
PLATELET # BLD AUTO: 220 K/UL (ref 138–453)
PMV BLD AUTO: 9.7 FL (ref 8.1–13.5)
POTASSIUM SERPL-SCNC: 4.3 MMOL/L (ref 3.7–5.3)
PROT SERPL-MCNC: 7.1 G/DL (ref 6.4–8.3)
PROT UR STRIP-MCNC: NEGATIVE MG/DL
PROTHROMBIN TIME: 13.3 SEC (ref 11.5–14.2)
RBC # BLD AUTO: 4.73 M/UL (ref 3.95–5.11)
RBC # BLD: ABNORMAL 10*6/UL
SODIUM SERPL-SCNC: 137 MMOL/L (ref 135–144)
SP GR UR STRIP: 1.02 (ref 1–1.03)
UROBILINOGEN UR STRIP-ACNC: NORMAL EU/DL (ref 0–1)
WBC OTHER # BLD: 6.2 K/UL (ref 3.5–11.3)

## 2024-02-27 PROCEDURE — 80076 HEPATIC FUNCTION PANEL: CPT

## 2024-02-27 PROCEDURE — 85379 FIBRIN DEGRADATION QUANT: CPT

## 2024-02-27 PROCEDURE — 96374 THER/PROPH/DIAG INJ IV PUSH: CPT

## 2024-02-27 PROCEDURE — 80048 BASIC METABOLIC PNL TOTAL CA: CPT

## 2024-02-27 PROCEDURE — 74177 CT ABD & PELVIS W/CONTRAST: CPT

## 2024-02-27 PROCEDURE — 6360000004 HC RX CONTRAST MEDICATION: Performed by: EMERGENCY MEDICINE

## 2024-02-27 PROCEDURE — 85025 COMPLETE CBC W/AUTO DIFF WBC: CPT

## 2024-02-27 PROCEDURE — 81003 URINALYSIS AUTO W/O SCOPE: CPT

## 2024-02-27 PROCEDURE — 96376 TX/PRO/DX INJ SAME DRUG ADON: CPT

## 2024-02-27 PROCEDURE — 96375 TX/PRO/DX INJ NEW DRUG ADDON: CPT

## 2024-02-27 PROCEDURE — 2580000003 HC RX 258: Performed by: EMERGENCY MEDICINE

## 2024-02-27 PROCEDURE — 83690 ASSAY OF LIPASE: CPT

## 2024-02-27 PROCEDURE — 6360000002 HC RX W HCPCS: Performed by: EMERGENCY MEDICINE

## 2024-02-27 PROCEDURE — 85610 PROTHROMBIN TIME: CPT

## 2024-02-27 PROCEDURE — 99285 EMERGENCY DEPT VISIT HI MDM: CPT

## 2024-02-27 PROCEDURE — 84703 CHORIONIC GONADOTROPIN ASSAY: CPT

## 2024-02-27 RX ORDER — SODIUM CHLORIDE 0.9 % (FLUSH) 0.9 %
10 SYRINGE (ML) INJECTION ONCE
Status: COMPLETED | OUTPATIENT
Start: 2024-02-27 | End: 2024-02-27

## 2024-02-27 RX ORDER — 0.9 % SODIUM CHLORIDE 0.9 %
80 INTRAVENOUS SOLUTION INTRAVENOUS ONCE
Status: DISCONTINUED | OUTPATIENT
Start: 2024-02-27 | End: 2024-02-27 | Stop reason: HOSPADM

## 2024-02-27 RX ORDER — ACETAMINOPHEN 500 MG
500 TABLET ORAL 4 TIMES DAILY PRN
Qty: 30 TABLET | Refills: 0 | Status: SHIPPED | OUTPATIENT
Start: 2024-02-27

## 2024-02-27 RX ORDER — ONDANSETRON 4 MG/1
4 TABLET, ORALLY DISINTEGRATING ORAL 3 TIMES DAILY PRN
Qty: 21 TABLET | Refills: 0 | Status: SHIPPED | OUTPATIENT
Start: 2024-02-27

## 2024-02-27 RX ORDER — 0.9 % SODIUM CHLORIDE 0.9 %
1000 INTRAVENOUS SOLUTION INTRAVENOUS ONCE
Status: COMPLETED | OUTPATIENT
Start: 2024-02-27 | End: 2024-02-27

## 2024-02-27 RX ORDER — MORPHINE SULFATE 2 MG/ML
2 INJECTION, SOLUTION INTRAMUSCULAR; INTRAVENOUS ONCE
Status: COMPLETED | OUTPATIENT
Start: 2024-02-27 | End: 2024-02-27

## 2024-02-27 RX ORDER — ONDANSETRON 2 MG/ML
4 INJECTION INTRAMUSCULAR; INTRAVENOUS ONCE
Status: COMPLETED | OUTPATIENT
Start: 2024-02-27 | End: 2024-02-27

## 2024-02-27 RX ADMIN — SODIUM CHLORIDE, PRESERVATIVE FREE 10 ML: 5 INJECTION INTRAVENOUS at 10:11

## 2024-02-27 RX ADMIN — SODIUM CHLORIDE 1000 ML: 9 INJECTION, SOLUTION INTRAVENOUS at 09:10

## 2024-02-27 RX ADMIN — MORPHINE SULFATE 2 MG: 2 INJECTION, SOLUTION INTRAMUSCULAR; INTRAVENOUS at 09:09

## 2024-02-27 RX ADMIN — ONDANSETRON 4 MG: 2 INJECTION INTRAMUSCULAR; INTRAVENOUS at 09:09

## 2024-02-27 RX ADMIN — IOPAMIDOL 75 ML: 755 INJECTION, SOLUTION INTRAVENOUS at 10:11

## 2024-02-27 RX ADMIN — MORPHINE SULFATE 2 MG: 2 INJECTION, SOLUTION INTRAMUSCULAR; INTRAVENOUS at 11:30

## 2024-02-27 RX ADMIN — Medication 100 ML: at 10:11

## 2024-02-27 ASSESSMENT — ENCOUNTER SYMPTOMS
VOMITING: 1
BLOOD IN STOOL: 1
CHEST TIGHTNESS: 0
COLOR CHANGE: 0
BACK PAIN: 0
VOICE CHANGE: 0
PHOTOPHOBIA: 0
FACIAL SWELLING: 0
EYE PAIN: 0
ABDOMINAL PAIN: 1
NAUSEA: 1
SHORTNESS OF BREATH: 0
TROUBLE SWALLOWING: 0

## 2024-02-27 ASSESSMENT — PAIN SCALES - GENERAL
PAINLEVEL_OUTOF10: 10
PAINLEVEL_OUTOF10: 9
PAINLEVEL_OUTOF10: 10

## 2024-02-27 ASSESSMENT — PAIN - FUNCTIONAL ASSESSMENT: PAIN_FUNCTIONAL_ASSESSMENT: 0-10

## 2024-02-27 NOTE — ED PROVIDER NOTES
CLINICIAN:    RADIOLOGY: All x-rays, CT, MRI, and formal ultrasound images (except ED bedside ultrasound) are read by the radiologist, see reports below, unless otherwise noted in MDM or here.  Reports below are reviewed by myself.  CT ABDOMEN PELVIS W IV CONTRAST Additional Contrast? None   Preliminary Result   1. Normal gas-filled appendix.   2. Moderate stool burden.  Mild colonic diverticulosis.   3. Stable bilateral adrenal masses likely adrenal adenomas given stability   dating back to 10/21/2020.   4. Contracted gallbladder.   5. Prior hysterectomy.  Bilateral ovarian follicles and cysts.   6. Small midline fat containing periumbilical hernia.             LABS: Lab orders shown below, the results are reviewed by myself, and all abnormals are listed below.  Labs Reviewed   HEPATIC FUNCTION PANEL - Abnormal; Notable for the following components:       Result Value    Total Bilirubin 0.2 (*)     All other components within normal limits   CBC WITH AUTO DIFFERENTIAL - Abnormal; Notable for the following components:    Hemoglobin 10.8 (*)     Hematocrit 34.9 (*)     MCV 73.8 (*)     MCH 22.8 (*)     RDW 17.0 (*)     Lymphocytes % 48 (*)     All other components within normal limits   URINALYSIS WITH REFLEX TO CULTURE   PROTIME-INR   LIPASE   BASIC METABOLIC PANEL   HCG, SERUM, QUALITATIVE   D-DIMER, QUANTITATIVE       Vitals Reviewed:    Vitals:    02/27/24 0832 02/27/24 0833   BP: 117/78    Pulse: 77    Resp: 16    Temp:  97.5 °F (36.4 °C)   SpO2: 93%    Weight: 86.2 kg (190 lb)    Height: 1.676 m (5' 6\")      MEDICATIONS GIVEN TO PATIENT THIS ENCOUNTER:  Orders Placed This Encounter   Medications    sodium chloride 0.9 % bolus 1,000 mL    morphine (PF) injection 2 mg    ondansetron (ZOFRAN) injection 4 mg    sodium chloride 0.9 % bolus 80 mL    iopamidol (ISOVUE-370) 76 % injection 75 mL    sodium chloride flush 0.9 % injection 10 mL    morphine (PF) injection 2 mg    ondansetron (ZOFRAN-ODT) 4 MG disintegrating

## 2024-02-28 ENCOUNTER — TELEPHONE (OUTPATIENT)
Dept: FAMILY MEDICINE CLINIC | Age: 49
End: 2024-02-28

## 2024-02-28 NOTE — TELEPHONE ENCOUNTER
Pt called crying and stated that she is on her bathroom floor in severe pain she stated it feel like something busted in her stomach when she go poop its blood clots also she is urinating blood and vomiting blood she went to [facility]  on 02/27/2024 and they did nothing and sent her home with tylenol and she made a appt with Dr Lee for 02/29/2024 please advise

## 2024-02-28 NOTE — TELEPHONE ENCOUNTER
[facility] ED Follow up Call    Reason for ED visit: vomiting blood blood in urine and stool     2/28/2024     Jerel Strange , this is Chase  from Mignon Perea's office, just calling to see how you are doing after your recent ED visit.    Did you receive discharge instructions?  Yes  Do you understand the discharge instructions? Yes  Did the ED give you any new prescriptions? Yes  Were you able to fill your prescriptions? Yes      Do you have one of our red, yellow and green  Zone sheets that help you to determine when you should go to the ED?  Not Applicable      Do you need or want to make a follow up appt with your PCP?  Yes    Do you have any further needs in the home, e.g. equipment?  No        FU appts/Provider:    Future Appointments   Date Time Provider Department Center   3/4/2024  3:00 PM Petrona Green DPM [facility]   3/13/2024  1:45 PM Ramya Moreira MD [facility]   3/21/2024  1:20 PM Tony Lopez MD Neuro Spec Neurology -   5/14/2024  9:15 AM Mignon Lee MD [facility] MED [facility]   6/26/2024 11:00 AM Gil Barahona MD Resp Spec [facility]   1/20/2025 11:00 AM Mignon Lee MD [facility]

## 2024-03-05 ENCOUNTER — OFFICE VISIT (OUTPATIENT)
Dept: FAMILY MEDICINE CLINIC | Age: 49
End: 2024-03-05
Payer: COMMERCIAL

## 2024-03-05 VITALS
HEART RATE: 80 BPM | BODY MASS INDEX: 33.3 KG/M2 | DIASTOLIC BLOOD PRESSURE: 80 MMHG | HEIGHT: 66 IN | SYSTOLIC BLOOD PRESSURE: 130 MMHG | WEIGHT: 207.2 LBS | OXYGEN SATURATION: 94 % | TEMPERATURE: 99.1 F

## 2024-03-05 DIAGNOSIS — I15.8 OTHER SECONDARY HYPERTENSION: Chronic | ICD-10-CM

## 2024-03-05 DIAGNOSIS — F41.1 GAD (GENERALIZED ANXIETY DISORDER): ICD-10-CM

## 2024-03-05 DIAGNOSIS — K21.00 GASTROESOPHAGEAL REFLUX DISEASE WITH ESOPHAGITIS WITHOUT HEMORRHAGE: ICD-10-CM

## 2024-03-05 DIAGNOSIS — K58.9 IRRITABLE BOWEL SYNDROME, UNSPECIFIED TYPE: ICD-10-CM

## 2024-03-05 DIAGNOSIS — Z86.711 HISTORY OF PULMONARY EMBOLISM: ICD-10-CM

## 2024-03-05 DIAGNOSIS — Z72.0 TOBACCO ABUSE DISORDER: ICD-10-CM

## 2024-03-05 DIAGNOSIS — E11.69 TYPE 2 DIABETES MELLITUS WITH OTHER SPECIFIED COMPLICATION, WITHOUT LONG-TERM CURRENT USE OF INSULIN (HCC): ICD-10-CM

## 2024-03-05 DIAGNOSIS — Z86.718 HISTORY OF DVT (DEEP VEIN THROMBOSIS): ICD-10-CM

## 2024-03-05 DIAGNOSIS — Z71.6 TOBACCO ABUSE COUNSELING: ICD-10-CM

## 2024-03-05 DIAGNOSIS — R10.84 GENERALIZED ABDOMINAL PAIN: Primary | ICD-10-CM

## 2024-03-05 DIAGNOSIS — M79.10 MYALGIA: ICD-10-CM

## 2024-03-05 DIAGNOSIS — E53.8 COBALAMIN DEFICIENCY: ICD-10-CM

## 2024-03-05 DIAGNOSIS — G89.29 NECK PAIN, CHRONIC: ICD-10-CM

## 2024-03-05 DIAGNOSIS — E87.6 HYPOKALEMIA: Chronic | ICD-10-CM

## 2024-03-05 DIAGNOSIS — Z79.01 ANTICOAGULATED: ICD-10-CM

## 2024-03-05 DIAGNOSIS — F39 MOOD DISORDER (HCC): ICD-10-CM

## 2024-03-05 DIAGNOSIS — E66.9 CLASS 1 OBESITY WITH SERIOUS COMORBIDITY AND BODY MASS INDEX (BMI) OF 32.0 TO 32.9 IN ADULT, UNSPECIFIED OBESITY TYPE: ICD-10-CM

## 2024-03-05 DIAGNOSIS — E53.8 FOLATE DEFICIENCY: ICD-10-CM

## 2024-03-05 DIAGNOSIS — M54.2 NECK PAIN, CHRONIC: ICD-10-CM

## 2024-03-05 DIAGNOSIS — E55.9 VITAMIN D DEFICIENCY: ICD-10-CM

## 2024-03-05 DIAGNOSIS — I10 ESSENTIAL HYPERTENSION: ICD-10-CM

## 2024-03-05 DIAGNOSIS — R56.9 SEIZURES (HCC): ICD-10-CM

## 2024-03-05 PROBLEM — N89.8 VAGINAL DISCHARGE: Status: RESOLVED | Noted: 2023-08-14 | Resolved: 2024-03-05

## 2024-03-05 PROBLEM — I82.409 DEEP VEIN THROMBOSIS (DVT) OF LOWER EXTREMITY (HCC): Status: RESOLVED | Noted: 2018-06-01 | Resolved: 2024-03-05

## 2024-03-05 PROCEDURE — G8484 FLU IMMUNIZE NO ADMIN: HCPCS | Performed by: FAMILY MEDICINE

## 2024-03-05 PROCEDURE — 3044F HG A1C LEVEL LT 7.0%: CPT | Performed by: FAMILY MEDICINE

## 2024-03-05 PROCEDURE — G8417 CALC BMI ABV UP PARAM F/U: HCPCS | Performed by: FAMILY MEDICINE

## 2024-03-05 PROCEDURE — 99214 OFFICE O/P EST MOD 30 MIN: CPT | Performed by: FAMILY MEDICINE

## 2024-03-05 PROCEDURE — G8427 DOCREV CUR MEDS BY ELIG CLIN: HCPCS | Performed by: FAMILY MEDICINE

## 2024-03-05 PROCEDURE — 2022F DILAT RTA XM EVC RTNOPTHY: CPT | Performed by: FAMILY MEDICINE

## 2024-03-05 PROCEDURE — 3075F SYST BP GE 130 - 139MM HG: CPT | Performed by: FAMILY MEDICINE

## 2024-03-05 PROCEDURE — 3079F DIAST BP 80-89 MM HG: CPT | Performed by: FAMILY MEDICINE

## 2024-03-05 PROCEDURE — 4004F PT TOBACCO SCREEN RCVD TLK: CPT | Performed by: FAMILY MEDICINE

## 2024-03-05 ASSESSMENT — PATIENT HEALTH QUESTIONNAIRE - PHQ9
7. TROUBLE CONCENTRATING ON THINGS, SUCH AS READING THE NEWSPAPER OR WATCHING TELEVISION: 0
SUM OF ALL RESPONSES TO PHQ QUESTIONS 1-9: 5
3. TROUBLE FALLING OR STAYING ASLEEP: 1
SUM OF ALL RESPONSES TO PHQ QUESTIONS 1-9: 5
5. POOR APPETITE OR OVEREATING: 2
9. THOUGHTS THAT YOU WOULD BE BETTER OFF DEAD, OR OF HURTING YOURSELF: 0
SUM OF ALL RESPONSES TO PHQ9 QUESTIONS 1 & 2: 4
SUM OF ALL RESPONSES TO PHQ9 QUESTIONS 1 & 2: 2
5. POOR APPETITE OR OVEREATING: 1
SUM OF ALL RESPONSES TO PHQ QUESTIONS 1-9: 5
10. IF YOU CHECKED OFF ANY PROBLEMS, HOW DIFFICULT HAVE THESE PROBLEMS MADE IT FOR YOU TO DO YOUR WORK, TAKE CARE OF THINGS AT HOME, OR GET ALONG WITH OTHER PEOPLE: 1
SUM OF ALL RESPONSES TO PHQ QUESTIONS 1-9: 16
3. TROUBLE FALLING OR STAYING ASLEEP: 2
8. MOVING OR SPEAKING SO SLOWLY THAT OTHER PEOPLE COULD HAVE NOTICED. OR THE OPPOSITE, BEING SO FIGETY OR RESTLESS THAT YOU HAVE BEEN MOVING AROUND A LOT MORE THAN USUAL: 0
SUM OF ALL RESPONSES TO PHQ QUESTIONS 1-9: 16
1. LITTLE INTEREST OR PLEASURE IN DOING THINGS: 1
7. TROUBLE CONCENTRATING ON THINGS, SUCH AS READING THE NEWSPAPER OR WATCHING TELEVISION: 2
SUM OF ALL RESPONSES TO PHQ QUESTIONS 1-9: 5
2. FEELING DOWN, DEPRESSED OR HOPELESS: 1
8. MOVING OR SPEAKING SO SLOWLY THAT OTHER PEOPLE COULD HAVE NOTICED. OR THE OPPOSITE, BEING SO FIGETY OR RESTLESS THAT YOU HAVE BEEN MOVING AROUND A LOT MORE THAN USUAL: 0
6. FEELING BAD ABOUT YOURSELF - OR THAT YOU ARE A FAILURE OR HAVE LET YOURSELF OR YOUR FAMILY DOWN: 0
2. FEELING DOWN, DEPRESSED OR HOPELESS: 2
4. FEELING TIRED OR HAVING LITTLE ENERGY: 2
10. IF YOU CHECKED OFF ANY PROBLEMS, HOW DIFFICULT HAVE THESE PROBLEMS MADE IT FOR YOU TO DO YOUR WORK, TAKE CARE OF THINGS AT HOME, OR GET ALONG WITH OTHER PEOPLE: 2
4. FEELING TIRED OR HAVING LITTLE ENERGY: 1
1. LITTLE INTEREST OR PLEASURE IN DOING THINGS: 2
SUM OF ALL RESPONSES TO PHQ QUESTIONS 1-9: 15
6. FEELING BAD ABOUT YOURSELF - OR THAT YOU ARE A FAILURE OR HAVE LET YOURSELF OR YOUR FAMILY DOWN: 3
9. THOUGHTS THAT YOU WOULD BE BETTER OFF DEAD, OR OF HURTING YOURSELF: 1
SUM OF ALL RESPONSES TO PHQ QUESTIONS 1-9: 16

## 2024-03-05 ASSESSMENT — ENCOUNTER SYMPTOMS
BACK PAIN: 0
COUGH: 0
EYE DISCHARGE: 0
EYE REDNESS: 0
PHOTOPHOBIA: 0
ABDOMINAL PAIN: 1
VOMITING: 1
DIARRHEA: 1
EYE PAIN: 0
STRIDOR: 0
SORE THROAT: 0
SHORTNESS OF BREATH: 0
BLOOD IN STOOL: 0
CONSTIPATION: 1
NAUSEA: 1

## 2024-03-05 ASSESSMENT — ANXIETY QUESTIONNAIRES
1. FEELING NERVOUS, ANXIOUS, OR ON EDGE: 1
4. TROUBLE RELAXING: 0
2. NOT BEING ABLE TO STOP OR CONTROL WORRYING: 1
GAD7 TOTAL SCORE: 2
5. BEING SO RESTLESS THAT IT IS HARD TO SIT STILL: 0
3. WORRYING TOO MUCH ABOUT DIFFERENT THINGS: 0
6. BECOMING EASILY ANNOYED OR IRRITABLE: 0
IF YOU CHECKED OFF ANY PROBLEMS ON THIS QUESTIONNAIRE, HOW DIFFICULT HAVE THESE PROBLEMS MADE IT FOR YOU TO DO YOUR WORK, TAKE CARE OF THINGS AT HOME, OR GET ALONG WITH OTHER PEOPLE: SOMEWHAT DIFFICULT
7. FEELING AFRAID AS IF SOMETHING AWFUL MIGHT HAPPEN: 0

## 2024-03-05 NOTE — PROGRESS NOTES
Subjective:      Patient ID: Alexandr Kinney is a 48 y.o. female.    Here for follow up from ER for generalized abdo pain.H/O IBS - seen GI in th epast .  States has had this abdo pain mostly mid abdo but also spreads all over- states appt with GI on 13 th.  States feels irritable and anxious ans mood is off due to being in pain all the time  Labs reviewed from ER - was WNL.    No fever , no chills, nausea when she was at ER and a couple  of days after. Was vomiting and diarrhea at that time  Since then N/V resolved.  States BM are sometimes diarrhea followed buy constipation. No recent change in her diet .  States no recent change in weight .    States last seen Gyn last at [facility] last year for pap- advised to call for follow up - may need repeat pelvi as pt feels the pain is similar to uterine cramping  No signs of infection. No vaginal discharge or pelvic pain at this time.    Gyn- will need follow up for pap and pelvic  Will refer back to GI for eval as sx are persistent.Also sees Psych- mood low recently and   more anxious as well per patient . Will need to discuss with Psych .  Review of Systems   Constitutional:  Positive for activity change and fatigue. Negative for chills and fever.   HENT:  Negative for congestion, ear pain, hearing loss, nosebleeds, sore throat and tinnitus.    Eyes:  Negative for photophobia, pain, discharge and redness.   Respiratory:  Negative for cough, shortness of breath and stridor.    Cardiovascular:  Negative for chest pain, palpitations and leg swelling.   Gastrointestinal:  Positive for abdominal pain, constipation, diarrhea, nausea and vomiting. Negative for blood in stool.   Endocrine: Negative for polydipsia.   Genitourinary:  Negative for dysuria, flank pain, frequency, hematuria and urgency.   Musculoskeletal:  Negative for back pain, myalgias and neck pain.   Skin:  Negative for rash.   Allergic/Immunologic: Negative for environmental allergies.   Neurological:

## 2024-03-05 NOTE — PROGRESS NOTES
Thank you for choosing [facility].  We know you have options when it comes to your healthcare; we appreciate that you chose us. Our goal is to provide exceptional  service and world class care to every patient.  You will be receiving a survey via email or text message asking for your feedback.  Please take a few minutes to share your thoughts about your recent visit. Your comments helps us understand what we do well and ways we can improve.  Thank you in advance for your valuable feedback.

## 2024-03-13 ENCOUNTER — OFFICE VISIT (OUTPATIENT)
Dept: GASTROENTEROLOGY | Age: 49
End: 2024-03-13
Payer: COMMERCIAL

## 2024-03-13 VITALS
DIASTOLIC BLOOD PRESSURE: 89 MMHG | SYSTOLIC BLOOD PRESSURE: 123 MMHG | TEMPERATURE: 97.5 F | WEIGHT: 205 LBS | BODY MASS INDEX: 33.09 KG/M2

## 2024-03-13 DIAGNOSIS — K58.2 IRRITABLE BOWEL SYNDROME WITH BOTH CONSTIPATION AND DIARRHEA: ICD-10-CM

## 2024-03-13 DIAGNOSIS — E66.8 MODERATE OBESITY: ICD-10-CM

## 2024-03-13 DIAGNOSIS — K21.00 GASTROESOPHAGEAL REFLUX DISEASE WITH ESOPHAGITIS WITHOUT HEMORRHAGE: Primary | ICD-10-CM

## 2024-03-13 DIAGNOSIS — F17.200 SMOKING: ICD-10-CM

## 2024-03-13 DIAGNOSIS — K62.5 RECTAL BLEEDING: ICD-10-CM

## 2024-03-13 DIAGNOSIS — F12.90 MARIJUANA USE: ICD-10-CM

## 2024-03-13 PROCEDURE — 99214 OFFICE O/P EST MOD 30 MIN: CPT | Performed by: INTERNAL MEDICINE

## 2024-03-13 PROCEDURE — G8427 DOCREV CUR MEDS BY ELIG CLIN: HCPCS | Performed by: INTERNAL MEDICINE

## 2024-03-13 PROCEDURE — G8484 FLU IMMUNIZE NO ADMIN: HCPCS | Performed by: INTERNAL MEDICINE

## 2024-03-13 PROCEDURE — 3079F DIAST BP 80-89 MM HG: CPT | Performed by: INTERNAL MEDICINE

## 2024-03-13 PROCEDURE — G8417 CALC BMI ABV UP PARAM F/U: HCPCS | Performed by: INTERNAL MEDICINE

## 2024-03-13 PROCEDURE — 4004F PT TOBACCO SCREEN RCVD TLK: CPT | Performed by: INTERNAL MEDICINE

## 2024-03-13 PROCEDURE — 3074F SYST BP LT 130 MM HG: CPT | Performed by: INTERNAL MEDICINE

## 2024-03-13 ASSESSMENT — ENCOUNTER SYMPTOMS
BLOOD IN STOOL: 0
ABDOMINAL DISTENTION: 0
DIARRHEA: 0
VOICE CHANGE: 0
COUGH: 1
ANAL BLEEDING: 0
WHEEZING: 0
VOMITING: 1
SHORTNESS OF BREATH: 0
CONSTIPATION: 0
ABDOMINAL PAIN: 1
TROUBLE SWALLOWING: 0
RECTAL PAIN: 0
CHOKING: 0
NAUSEA: 1
SORE THROAT: 0

## 2024-03-13 NOTE — PROGRESS NOTES
GI CLINIC FOLLOW UP    NTERVAL HISTORY:   Mignon Lee MD  [address], [address]  [place] [address]    Chief Complaint   Patient presents with    Gastroesophageal Reflux     Pt is here today for an f/u last seen on 9/15/21 for GERD, IBS, internal hemorrhoids, & erosive gastritis.       1. Gastroesophageal reflux disease with esophagitis without hemorrhage    2. Marijuana use    3. Rectal bleeding    4. Irritable bowel syndrome with both constipation and diarrhea    5. Moderate obesity    6. Smoking      This patient was seen in 2021  She has history significant for obesity history for GERD history for irritable bowel syndrome like symptoms  Patient has been complaining of some abdominal pains, off and on cramping  Also complains of abdominal bloating and gas  Has off and on nausea without any sig vomiting  Has some alternating constipation and diarrhea  Has no weight loss  Has some anxiety issues  She has been having some rectal bleeding was evaluated in the emergency room  Not the healthiest eater  Complains of GERD symptoms DYSPHAGIA NAUSEA OFF-AND-ON VOMITING  History of chronic smoking  History for marijuana use  Has alcohol abuse  Previous records were reviewed with her    HISTORY OF PRESENT ILLNESS: Ms.Avina SAVANAH Kinney is a 48 y.o. female with a past history remarkable for , referred for evaluation of   Chief Complaint   Patient presents with    Gastroesophageal Reflux     Pt is here today for an f/u last seen on 9/15/21 for GERD, IBS, internal hemorrhoids, & erosive gastritis.   .    Past Medical,Family, and Social History reviewed and does contribute to the patient presenting condition.    Patient's PMH/PSH,SH,PSYCH Hx, MEDs, ALLERGIES, and ROS were all reviewed and updated in the appropriate sections.    PAST MEDICAL HISTORY:  Past Medical History:   Diagnosis Date    Abnormal menstrual cycle 01/19/2018    Abnormal menstrual cycle 01/19/2018    Acute deep vein thrombosis (DVT)  Alex Frederick MD  Pediatric Cardiology  [address] [place] [address]  [phone]    General Guidelines    Name: Beulah Galindo                   : 1998    Diagnosis:   1. 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 S/P Fontan procedure    3. S/P division of vascular ring    4. S/P cardiac pacemaker procedure    5. Pacemaker malfunction, sequela    6. Abnormal EKG    7. Non-rheumatic mitral regurgitation    8. Non-rheumatic tricuspid valve insufficiency    9. Nonrheumatic aortic valve insufficiency    10. Coronary artery fistula    11. Occasional tremors    12. History of abnormal platelets        PCP: [facility]  PCP Phone Number: [phone]    · If you have an emergency or you think you have an emergency, go to the nearest emergency room!     · Breathing too fast, doesnt look right, consistently not eating well, your child needs to be checked. These are general indications that your child is not feeling well. This may be caused by anything, a stomach virus, an ear ache or heart disease, so please call [facility]. If [facility] thinks you need to be checked for your heart, they will let us know.     · If your child experiences a rapid or very slow heart rate and has the following symptoms, call [facility] or go to the nearest emergency room.   · unexplained chest pain   · does not look right   · feels like they are going to pass out   · actually passes out for unexplained reasons   · weakness or fatigue   · shortness of breath  or breathing fast   · consistent poor feeding     · If your child experiences a rapid or very slow heart rate that lasts longer than 30 minutes call [facility] or go to the nearest emergency room.     · If your child feels like they are going to pass out - have them sit down or lay down immediately. Raise the feet above the head (prop the feet on a chair or the wall) until  the feeling passes. Slowly allow the child to sit, then stand. If the feeling returns, lay back down and start over.     It is very important that you notify [facility] first. [facility] or the ER Physician can reach Dr. Alex Frederick at the office or through [facility] at [phone] as needed.    Call our office ([phone]) one week after ALL tests for results.     PREVENTION OF BACTERIAL ENDOCARDITIS (selective IE)    A COPY OF THIS SHEET MUST BE GIVEN TO ALL OF YOUR DOCTORS OR HEALTH CARE PROVIDERS    You have received this information because you are at an increased risk for developing adverse outcomes from infective endocarditis (IE), also known as subacute bacterial endocarditis (SBE).    Patient Name:  Beulah Galindo    : 1998   Diagnosis:   1. 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 S/P Fontan procedure    3. S/P division of vascular ring    4. S/P cardiac pacemaker procedure    5. Pacemaker malfunction, sequela    6. Abnormal EKG    7. Non-rheumatic mitral regurgitation    8. Non-rheumatic tricuspid valve insufficiency    9. Nonrheumatic aortic valve insufficiency    10. Coronary artery fistula    11. Occasional tremors    12. History of abnormal platelets        As of 2020, Alex Frederick MD, Pediatric Cardiologist recommends that Beulah receive SELECTIVE USE of antibiotic prophylaxis from bacterial endocarditis.    Antibiotic prophylaxis with dental or surgical procedures is recommended in selected instances if your dentist, surgeon or physician believes there is a greater risk of infection.  For example:  1) Any significantly infected operative field (Example: dental abscess or ruptured appendix) which may increase the bacterial load to the blood stream during the procedure; 2) Benefits of antibiotic coverage should be weighed against risk of allergic reactions and anaphylaxis; therefore, their use should be  carefully selected based on individual cases.     Antibiotic prophylaxis is NOT recommended for the following dental procedures or events: routine anesthetic injections through non-infected tissue; taking dental radiographs; placement of removable prosthodontic or orthodontic appliances; adjustment of orthodontic appliances; placement of orthodontic brackets; and shedding of deciduous teeth or bleeding from trauma to the lips or oral mucosa.   If recommended by the Health Care Provider - Antibiotic Prophylactic Regimens   Regimen - Single Dose 30-60 minutes before Procedure  Situation Agent Adults Children   Oral Amoxicillin 2g 50/mg/kg   Unable to take oral meds Ampicillin   OR  Cefazolin or ceftriaxone 2 g IM or IV1    1 g IM or IV 50 mg/kg IM or IV    50 mg/kg IM or IV   Allergic to Penicillins or ampicillin-Oral regimen Cephalexin 2  OR  Clindamycin  OR  Azithromycin or clarithromycin 2 g    600 mg    500 mg 50 mg/kg    20 mg/kg    15 mg/kg   Allergic to penicillin or ampicillin and unable to take oral medications Cefazolin or ceftriaxone 3  OR  Clindamycin 1 g IM or IV    600 mg IM or IV 50 mg/kg IM or IV    20 mg/kg IM or IV   1IM - intramuscular; IV - intravenous  2Or other first or second generation oral cephalosporin in equivalent adult or pediatric dosage.  3Cephalosporins should not be used in an individual with a history of anaphylaxis, angioedema or urticaria with penicillin or ampicillin.   Adapted from Prevention of Infective Endocarditis: Guidelines From the American Heart Association, by the Committee on Rheumatic Fever, Endocarditis, and Kawasaki Disease. Circulation, e-published April 19, 2007. Go to www.americanheart.org/presenter for more information.    The practice of giving patients antibiotics prior to a dental procedure is no longer recommended EXCEPT for patients with the highest risk of adverse outcomes resulting from bacterial endocarditis. We cannot exclude the possibility that an  exceedingly small number of cases, if any, of bacterial endocarditis may be prevented by antibiotic prophylaxis prior to a dental procedure. The importance of good oral and dental health and regular visits to the dentist is important for patients at risk for bacterial endocarditis.  Gastrointestinal (GI)/Genitourinary () Procedures: Antibiotic prophylaxis solely to prevent bacterial endocarditis is no longer recommended for patients who undergo a GI or  tract procedures, including patients with the highest risk of adverse outcomes due to bacterial endocarditis.    Good dental health and hygiene is very effective in preventing bacterial endocarditis.   Always practice good dental health!

## 2024-03-19 DIAGNOSIS — I10 ESSENTIAL HYPERTENSION: ICD-10-CM

## 2024-03-19 RX ORDER — CANDESARTAN 32 MG/1
32 TABLET ORAL DAILY
Qty: 90 TABLET | Refills: 1 | Status: SHIPPED | OUTPATIENT
Start: 2024-03-19

## 2024-03-27 DIAGNOSIS — M79.605 PAIN IN BOTH LOWER EXTREMITIES: ICD-10-CM

## 2024-03-27 DIAGNOSIS — M72.2 PLANTAR FASCIITIS, BILATERAL: Primary | ICD-10-CM

## 2024-03-27 DIAGNOSIS — M79.604 PAIN IN BOTH LOWER EXTREMITIES: ICD-10-CM

## 2024-03-29 NOTE — TELEPHONE ENCOUNTER
Procedure scheduled/Dr MCKENNA Moreira  Procedure: colon egd  Dx: gastroesophageal reflux disease with esophagitis without hemorrhage/ IBS const/diarrhea  Date: 7/3/24  Time: 9:15 a.m.  Hospital: [facility]  Bowel Prep instructions given: yes   In office/via phone: office  Clearance needed: yes  Will be sent to pulmonologist Dr. Barahona for Xarelto  Per patient sees cardiologist Alexa Cowan - [facility]

## 2024-04-08 RX ORDER — POLYETHYLENE GLYCOL 3350 17 G/17G
POWDER, FOR SOLUTION ORAL
Qty: 238 G | Refills: 0 | Status: SHIPPED | OUTPATIENT
Start: 2024-04-08

## 2024-04-08 RX ORDER — BISACODYL 5 MG/1
TABLET, DELAYED RELEASE ORAL
Qty: 4 TABLET | Refills: 0 | Status: SHIPPED | OUTPATIENT
Start: 2024-04-08

## 2024-05-12 SDOH — ECONOMIC STABILITY: FOOD INSECURITY: WITHIN THE PAST 12 MONTHS, YOU WORRIED THAT YOUR FOOD WOULD RUN OUT BEFORE YOU GOT MONEY TO BUY MORE.: SOMETIMES TRUE

## 2024-05-12 SDOH — ECONOMIC STABILITY: INCOME INSECURITY: HOW HARD IS IT FOR YOU TO PAY FOR THE VERY BASICS LIKE FOOD, HOUSING, MEDICAL CARE, AND HEATING?: VERY HARD

## 2024-05-12 SDOH — ECONOMIC STABILITY: FOOD INSECURITY: WITHIN THE PAST 12 MONTHS, THE FOOD YOU BOUGHT JUST DIDN'T LAST AND YOU DIDN'T HAVE MONEY TO GET MORE.: OFTEN TRUE

## 2024-05-12 SDOH — ECONOMIC STABILITY: TRANSPORTATION INSECURITY
IN THE PAST 12 MONTHS, HAS LACK OF TRANSPORTATION KEPT YOU FROM MEETINGS, WORK, OR FROM GETTING THINGS NEEDED FOR DAILY LIVING?: YES

## 2024-05-13 DIAGNOSIS — E55.9 VITAMIN D DEFICIENCY: ICD-10-CM

## 2024-05-13 DIAGNOSIS — J20.9 ACUTE TRACHEOBRONCHITIS: ICD-10-CM

## 2024-05-13 DIAGNOSIS — R56.9 SEIZURES (HCC): ICD-10-CM

## 2024-05-13 RX ORDER — LEVETIRACETAM 500 MG/1
500 TABLET ORAL 2 TIMES DAILY
Qty: 60 TABLET | Refills: 1 | OUTPATIENT
Start: 2024-05-13

## 2024-05-13 RX ORDER — ERGOCALCIFEROL 1.25 MG/1
50000 CAPSULE ORAL WEEKLY
Qty: 12 CAPSULE | Refills: 1 | OUTPATIENT
Start: 2024-05-13

## 2024-05-13 RX ORDER — FLUTICASONE PROPIONATE AND SALMETEROL 50; 250 UG/1; UG/1
POWDER RESPIRATORY (INHALATION)
Qty: 1 EACH | Refills: 2 | Status: SHIPPED | OUTPATIENT
Start: 2024-05-13

## 2024-05-13 RX ORDER — ALBUTEROL SULFATE 90 UG/1
AEROSOL, METERED RESPIRATORY (INHALATION)
Qty: 54 G | Refills: 1 | OUTPATIENT
Start: 2024-05-13

## 2024-05-13 RX ORDER — FLUTICASONE PROPIONATE AND SALMETEROL 50; 250 UG/1; UG/1
POWDER RESPIRATORY (INHALATION)
OUTPATIENT
Start: 2024-05-13

## 2024-05-13 NOTE — TELEPHONE ENCOUNTER
LAST VISIT: 7/26/23  NEXT VISIT: 6/26/24 (patient cancelled last appointment)    Per last dictation patient is on this medication. Please sign for refill if ok. Thank you.

## 2024-05-14 ENCOUNTER — OFFICE VISIT (OUTPATIENT)
Dept: FAMILY MEDICINE CLINIC | Age: 49
End: 2024-05-14

## 2024-05-14 VITALS
BODY MASS INDEX: 32.88 KG/M2 | OXYGEN SATURATION: 91 % | SYSTOLIC BLOOD PRESSURE: 117 MMHG | DIASTOLIC BLOOD PRESSURE: 83 MMHG | HEIGHT: 66 IN | TEMPERATURE: 98 F | HEART RATE: 74 BPM | WEIGHT: 204.6 LBS

## 2024-05-14 DIAGNOSIS — R56.9 SEIZURES (HCC): ICD-10-CM

## 2024-05-14 DIAGNOSIS — E78.5 DYSLIPIDEMIA: ICD-10-CM

## 2024-05-14 DIAGNOSIS — Z86.711 HISTORY OF PULMONARY EMBOLISM: ICD-10-CM

## 2024-05-14 DIAGNOSIS — Z13.29 SCREENING FOR THYROID DISORDER: ICD-10-CM

## 2024-05-14 DIAGNOSIS — Z59.86 FINANCIAL INSECURITY: ICD-10-CM

## 2024-05-14 DIAGNOSIS — I10 ESSENTIAL HYPERTENSION: ICD-10-CM

## 2024-05-14 DIAGNOSIS — E66.8 MODERATE OBESITY: ICD-10-CM

## 2024-05-14 DIAGNOSIS — Z79.01 ANTICOAGULATED: ICD-10-CM

## 2024-05-14 DIAGNOSIS — J44.9 CHRONIC OBSTRUCTIVE PULMONARY DISEASE, UNSPECIFIED COPD TYPE (HCC): ICD-10-CM

## 2024-05-14 DIAGNOSIS — Z86.718 HISTORY OF DVT (DEEP VEIN THROMBOSIS): ICD-10-CM

## 2024-05-14 DIAGNOSIS — E11.69 TYPE 2 DIABETES MELLITUS WITH OTHER SPECIFIED COMPLICATION, WITHOUT LONG-TERM CURRENT USE OF INSULIN (HCC): ICD-10-CM

## 2024-05-14 DIAGNOSIS — Z59.82 TRANSPORTATION INSECURITY: ICD-10-CM

## 2024-05-14 DIAGNOSIS — F41.1 GAD (GENERALIZED ANXIETY DISORDER): ICD-10-CM

## 2024-05-14 DIAGNOSIS — E53.9 VITAMIN B DEFICIENCY: ICD-10-CM

## 2024-05-14 DIAGNOSIS — Z59.48 FOOD DEPRIVATION: ICD-10-CM

## 2024-05-14 DIAGNOSIS — F31.9 BIPOLAR 1 DISORDER (HCC): Primary | ICD-10-CM

## 2024-05-14 DIAGNOSIS — E55.9 VITAMIN D DEFICIENCY: ICD-10-CM

## 2024-05-14 DIAGNOSIS — Z59.819 HOUSING INSTABILITY: ICD-10-CM

## 2024-05-14 RX ORDER — FLUTICASONE FUROATE, UMECLIDINIUM BROMIDE AND VILANTEROL TRIFENATATE 100; 62.5; 25 UG/1; UG/1; UG/1
1 POWDER RESPIRATORY (INHALATION) DAILY
Qty: 1 EACH | Refills: 0 | Status: SHIPPED | COMMUNITY
Start: 2024-05-14

## 2024-05-14 SDOH — ECONOMIC STABILITY - INCOME SECURITY: FINANCIAL INSECURITY: Z59.86

## 2024-05-14 SDOH — ECONOMIC STABILITY - FOOD INSECURITY: OTHER SPECIFIED LACK OF ADEQUATE FOOD: Z59.48

## 2024-05-14 SDOH — ECONOMIC STABILITY - HOUSING INSECURITY: HOUSING INSTABILITY UNSPECIFIED: Z59.819

## 2024-05-14 SDOH — ECONOMIC STABILITY - TRANSPORTATION SECURITY: TRANSPORTATION INSECURITY: Z59.82

## 2024-05-14 ASSESSMENT — PATIENT HEALTH QUESTIONNAIRE - PHQ9
8. MOVING OR SPEAKING SO SLOWLY THAT OTHER PEOPLE COULD HAVE NOTICED. OR THE OPPOSITE, BEING SO FIGETY OR RESTLESS THAT YOU HAVE BEEN MOVING AROUND A LOT MORE THAN USUAL: NOT AT ALL
6. FEELING BAD ABOUT YOURSELF - OR THAT YOU ARE A FAILURE OR HAVE LET YOURSELF OR YOUR FAMILY DOWN: MORE THAN HALF THE DAYS
SUM OF ALL RESPONSES TO PHQ QUESTIONS 1-9: 10
1. LITTLE INTEREST OR PLEASURE IN DOING THINGS: SEVERAL DAYS
SUM OF ALL RESPONSES TO PHQ QUESTIONS 1-9: 5
SUM OF ALL RESPONSES TO PHQ QUESTIONS 1-9: 5
1. LITTLE INTEREST OR PLEASURE IN DOING THINGS: MORE THAN HALF THE DAYS
SUM OF ALL RESPONSES TO PHQ9 QUESTIONS 1 & 2: 2
10. IF YOU CHECKED OFF ANY PROBLEMS, HOW DIFFICULT HAVE THESE PROBLEMS MADE IT FOR YOU TO DO YOUR WORK, TAKE CARE OF THINGS AT HOME, OR GET ALONG WITH OTHER PEOPLE: VERY DIFFICULT
SUM OF ALL RESPONSES TO PHQ QUESTIONS 1-9: 5
10. IF YOU CHECKED OFF ANY PROBLEMS, HOW DIFFICULT HAVE THESE PROBLEMS MADE IT FOR YOU TO DO YOUR WORK, TAKE CARE OF THINGS AT HOME, OR GET ALONG WITH OTHER PEOPLE: SOMEWHAT DIFFICULT
8. MOVING OR SPEAKING SO SLOWLY THAT OTHER PEOPLE COULD HAVE NOTICED. OR THE OPPOSITE, BEING SO FIGETY OR RESTLESS THAT YOU HAVE BEEN MOVING AROUND A LOT MORE THAN USUAL: NOT AT ALL
9. THOUGHTS THAT YOU WOULD BE BETTER OFF DEAD, OR OF HURTING YOURSELF: NOT AT ALL
7. TROUBLE CONCENTRATING ON THINGS, SUCH AS READING THE NEWSPAPER OR WATCHING TELEVISION: NOT AT ALL
SUM OF ALL RESPONSES TO PHQ QUESTIONS 1-9: 10
7. TROUBLE CONCENTRATING ON THINGS, SUCH AS READING THE NEWSPAPER OR WATCHING TELEVISION: NOT AT ALL
SUM OF ALL RESPONSES TO PHQ QUESTIONS 1-9: 10
2. FEELING DOWN, DEPRESSED OR HOPELESS: SEVERAL DAYS
SUM OF ALL RESPONSES TO PHQ QUESTIONS 1-9: 10
2. FEELING DOWN, DEPRESSED OR HOPELESS: MORE THAN HALF THE DAYS
SUM OF ALL RESPONSES TO PHQ QUESTIONS 1-9: 5
5. POOR APPETITE OR OVEREATING: SEVERAL DAYS
9. THOUGHTS THAT YOU WOULD BE BETTER OFF DEAD, OR OF HURTING YOURSELF: NOT AT ALL
3. TROUBLE FALLING OR STAYING ASLEEP: SEVERAL DAYS
6. FEELING BAD ABOUT YOURSELF - OR THAT YOU ARE A FAILURE OR HAVE LET YOURSELF OR YOUR FAMILY DOWN: NOT AT ALL
4. FEELING TIRED OR HAVING LITTLE ENERGY: SEVERAL DAYS
SUM OF ALL RESPONSES TO PHQ9 QUESTIONS 1 & 2: 4
4. FEELING TIRED OR HAVING LITTLE ENERGY: MORE THAN HALF THE DAYS
5. POOR APPETITE OR OVEREATING: SEVERAL DAYS
3. TROUBLE FALLING OR STAYING ASLEEP: SEVERAL DAYS

## 2024-05-14 ASSESSMENT — ANXIETY QUESTIONNAIRES
1. FEELING NERVOUS, ANXIOUS, OR ON EDGE: SEVERAL DAYS
4. TROUBLE RELAXING: NOT AT ALL
7. FEELING AFRAID AS IF SOMETHING AWFUL MIGHT HAPPEN: NOT AT ALL
IF YOU CHECKED OFF ANY PROBLEMS ON THIS QUESTIONNAIRE, HOW DIFFICULT HAVE THESE PROBLEMS MADE IT FOR YOU TO DO YOUR WORK, TAKE CARE OF THINGS AT HOME, OR GET ALONG WITH OTHER PEOPLE: SOMEWHAT DIFFICULT
3. WORRYING TOO MUCH ABOUT DIFFERENT THINGS: NOT AT ALL
6. BECOMING EASILY ANNOYED OR IRRITABLE: NOT AT ALL
GAD7 TOTAL SCORE: 1
2. NOT BEING ABLE TO STOP OR CONTROL WORRYING: NOT AT ALL
5. BEING SO RESTLESS THAT IT IS HARD TO SIT STILL: NOT AT ALL

## 2024-05-14 ASSESSMENT — ENCOUNTER SYMPTOMS
VOMITING: 0
CONSTIPATION: 0
BLOOD IN STOOL: 0
STRIDOR: 0
SORE THROAT: 0
PHOTOPHOBIA: 0
EYE PAIN: 0
EYE REDNESS: 0
COUGH: 0
ABDOMINAL PAIN: 0
EYE DISCHARGE: 0
DIARRHEA: 0
BACK PAIN: 0
NAUSEA: 0
SHORTNESS OF BREATH: 0

## 2024-05-14 NOTE — PATIENT INSTRUCTIONS
[facility] Transportation Resources*  (Call 211 if need more resources.)     [facility]:  What they offer: Medical Appointments Transportation  Phone Number: [phone] ext: 101      [employer].:  What they offer: Senior Ride Programs  Phone Number: [phone]    Website:     [employer]:  What they offer: Transportation  Phone Number: [phone]    Fares:  What they offer: Transportation, 4-64 years old $4, 65 and older $2  Phone Number: [phone]    [employer] A Ride:  What they offer: Program is for 60 years and older/under 60 with disability  Phone Number: 1-665.178.1878 Call Center open 8-4:30pm    [facility] Transit:  What they offer: Senior Ride Programs  Phone Number: [phone](Ivor); [phone] (Stewart)    [facility] Transport(SCAT):  What they offer: Transportation for [facility].  Out of county rides require 72 hour notice.   Phone Number: [phone]([place]); [phone] ([place])    [employer]:  What they offer: Public transportation, disability transportation (TARPS)  Phone Number: 131-082-VNGB(9081); [phone] (TARPS)    [facility] Office on Aging [facility] ([facility]):  What they offer: Medical cab rides for seniors and referral to community resources  Phone Number: [phone]     Area Agency on Aging, District 5:    [place], [place], [place], [place], [place], [place], [place], [place], [facility]:  What they offer: Referral to transportation and other resources for seniors.  Phone Number: [phone]     [place] Community Action Partnership (GLCAP):   [place], [place], [place], [place], [place], [place], [place], [place],      [place]  What they offer: referral to transportation and other resources.  Phone Number: [phone]     [facility] Community Action Commission (NOCAC):   [place], [place], [place]aye [place], and [place] counites  What they offer: referral to community transportation and

## 2024-05-14 NOTE — PROGRESS NOTES
Subjective:      Patient ID: Alexandr Kinney is a 48 y.o. female.    States has been more estes and anxious - broke a plate after throwing it a week ago when irritated- was   Told may be perimenopausal and has appt with GYN.  Has appt with therapist at [facility] Q 2 weeks and her psychiatrist Q 2 months- wants her to see gyn before   psych meds are changed.  Bowels and bladder are so so- states colonoscopy coming in July.  States sometimes bladder feels like she cannot control it- advised to dicuss with Gyn as well.  Breathing has been so so - states cough dry on and off- when she exerts she feels SOB- has appt   with Dr Barahona in June- but ran out of her inhalers- will call for refills.  Still smoking 1/2 PPD- no plans on cutting down at this time.  Last seizure was over a month ago- sees neuro next month.  Last headache a while ago- after BP has been controlled BP has been ok.  Taking xarelto regularly- also sees Dr Guerin for the same.  States tries to eat healthy - state lost a little weight.    Review of Systems   Constitutional:  Negative for chills and fever.   HENT:  Negative for congestion, ear pain, hearing loss, nosebleeds, sore throat and tinnitus.    Eyes:  Negative for photophobia, pain, discharge and redness.   Respiratory:  Negative for cough, shortness of breath and stridor.    Cardiovascular:  Negative for chest pain, palpitations and leg swelling.   Gastrointestinal:  Negative for abdominal pain, blood in stool, constipation, diarrhea, nausea and vomiting.   Endocrine: Negative for polydipsia.   Genitourinary:  Negative for dysuria, flank pain, frequency, hematuria and urgency.   Musculoskeletal:  Negative for back pain, myalgias and neck pain.   Skin:  Negative for rash.   Allergic/Immunologic: Negative for environmental allergies.   Neurological:  Negative for dizziness, tremors, seizures, weakness and headaches.   Hematological:  Does not bruise/bleed easily.   Psychiatric/Behavioral:

## 2024-05-16 ENCOUNTER — TELEPHONE (OUTPATIENT)
Dept: FAMILY MEDICINE CLINIC | Age: 49
End: 2024-05-16

## 2024-05-16 NOTE — TELEPHONE ENCOUNTER
Alexandr Kinney was contacted by Tayler Jean MA, a Community Health Navigator, regarding a Social Determinants of Health referral.     A message was left with the writer's contact information.    Follow-up attempt.    Contacted pt to relay information from Valentina

## 2024-05-16 NOTE — TELEPHONE ENCOUNTER
Alexandr SAVANAH Nirmal was contacted by a Community Health Navigator to discuss a referral for SDOH related needs.     Writer spoke with: Patient and explained the services and assistance that can be provided by a Community Health Navigator.     Patient agreeable to receiving resources and support from writer.     Intake Notes: Pt referred for food, financial, transportation, and housing.    Pt states she is facing an eviction. Gets 942/mo SSI. There has been a transfer in ownership of the property and pt states there was mismanagement of rental funds. She states she has given 500  but they say she still may owe 200. Runs out of SNAP.     Referral to Fair Housing.     Having issues with JFS that pt requested I help her with. Had child support payments go back to the father. Did recert beginning of Feb. Pt and child support told them numerous times to close the cash portion of her case but they did not. Not sure who she should contact. Not sure how to get the child support payments. Feb 16th every other Friday.    Called [employer] for information on case. Workers name Yasmin is [phone]. [employer] states it is unlikely pt would be able to get the back amount.    SNAP recert was in 2023.    No records shown on [employer]'s end that pt had made any calls to S regarding the cash assistance being stopped.    Actions to be completed by writer: none    Actions to be completed by patient: none      Tayler Jean MA

## 2024-05-19 DIAGNOSIS — I10 ESSENTIAL HYPERTENSION: ICD-10-CM

## 2024-05-19 DIAGNOSIS — E87.6 HYPOKALEMIA: ICD-10-CM

## 2024-05-19 RX ORDER — SPIRONOLACTONE 100 MG/1
TABLET, FILM COATED ORAL
Qty: 90 TABLET | Refills: 0 | Status: SHIPPED | OUTPATIENT
Start: 2024-05-19

## 2024-05-24 NOTE — TELEPHONE ENCOUNTER
Alexandr Kinney was contacted by Tayler Jean MA, a Community Health Navigator, regarding a Social Determinants of Health referral.     A message was left with the writer's contact information.    Follow-up attempt.

## 2024-05-28 NOTE — TELEPHONE ENCOUNTER
Alexandr Kinney was contacted by Tayler Jean MA, a Community Health Navigator, regarding a Social Determinants of Health referral.     A message was left with the writer's contact information.    Second follow-up attempt.

## 2024-05-29 NOTE — TELEPHONE ENCOUNTER
Alexandr Kinney was contacted by Tayler Jean MA, a Community Health Navigator, regarding a Social Determinants of Health referral.     A message was left with the writer's contact information.    Final follow-up attempt.

## 2024-06-05 NOTE — TELEPHONE ENCOUNTER
Alexandr Kinney was contacted by Tayler Jean MA, a Community Health Navigator, regarding a Social Determinants of Health referral.     Pt has not responded to writer's 3 f/u attempts.    Will close referral.

## 2024-06-07 ENCOUNTER — TELEPHONE (OUTPATIENT)
Dept: GASTROENTEROLOGY | Age: 49
End: 2024-06-07

## 2024-06-07 DIAGNOSIS — K62.5 RECTAL BLEEDING: Primary | ICD-10-CM

## 2024-06-07 DIAGNOSIS — K64.9 HEMORRHOIDS, UNSPECIFIED HEMORRHOID TYPE: ICD-10-CM

## 2024-06-07 NOTE — TELEPHONE ENCOUNTER
Pt left a voicemail stating that her hemorrhoids are very irritating and painful, she wants them removed immediately and is there anything we can do for her until then?     Attempted to contact patient: I left  stating that the message will get sent and to call our office back when available.

## 2024-06-13 DIAGNOSIS — E78.5 DYSLIPIDEMIA: ICD-10-CM

## 2024-06-13 RX ORDER — ROSUVASTATIN CALCIUM 5 MG/1
5 TABLET, COATED ORAL DAILY
Qty: 90 TABLET | Refills: 1 | Status: SHIPPED | OUTPATIENT
Start: 2024-06-13

## 2024-06-16 DIAGNOSIS — E11.69 TYPE 2 DIABETES MELLITUS WITH OTHER SPECIFIED COMPLICATION, WITHOUT LONG-TERM CURRENT USE OF INSULIN (HCC): ICD-10-CM

## 2024-06-16 DIAGNOSIS — Z86.32 HISTORY OF GESTATIONAL DIABETES: ICD-10-CM

## 2024-06-16 DIAGNOSIS — E11.9 NEW ONSET TYPE 2 DIABETES MELLITUS (HCC): ICD-10-CM

## 2024-06-16 RX ORDER — METFORMIN HYDROCHLORIDE 500 MG/1
TABLET, EXTENDED RELEASE ORAL
Qty: 30 TABLET | Refills: 1 | Status: SHIPPED | OUTPATIENT
Start: 2024-06-16 | End: 2024-07-16

## 2024-06-23 ENCOUNTER — HOSPITAL ENCOUNTER (EMERGENCY)
Age: 49
Discharge: HOME | End: 2024-06-23
Attending: EMERGENCY MEDICINE
Payer: COMMERCIAL

## 2024-06-23 VITALS
DIASTOLIC BLOOD PRESSURE: 82 MMHG | WEIGHT: 203 LBS | OXYGEN SATURATION: 100 % | TEMPERATURE: 98.5 F | SYSTOLIC BLOOD PRESSURE: 132 MMHG | HEART RATE: 80 BPM | HEIGHT: 66 IN | BODY MASS INDEX: 32.62 KG/M2 | RESPIRATION RATE: 16 BRPM

## 2024-06-23 DIAGNOSIS — M79.604 RIGHT LEG PAIN: ICD-10-CM

## 2024-06-23 DIAGNOSIS — Z20.2 POSSIBLE EXPOSURE TO STD: Primary | ICD-10-CM

## 2024-06-23 LAB
ANION GAP SERPL CALCULATED.3IONS-SCNC: 10 MMOL/L (ref 9–17)
BASOPHILS # BLD: <0.03 K/UL (ref 0–0.2)
BASOPHILS NFR BLD: 0 % (ref 0–2)
BILIRUB UR QL STRIP: NEGATIVE
BUN SERPL-MCNC: 10 MG/DL (ref 6–20)
BUN/CREAT SERPL: 14 (ref 9–20)
CALCIUM SERPL-MCNC: 9 MG/DL (ref 8.6–10.4)
CANDIDA SPECIES: NEGATIVE
CHLORIDE SERPL-SCNC: 102 MMOL/L (ref 98–107)
CLARITY UR: CLEAR
CO2 SERPL-SCNC: 23 MMOL/L (ref 20–31)
COLOR UR: YELLOW
COMMENT: NORMAL
CREAT SERPL-MCNC: 0.7 MG/DL (ref 0.5–0.9)
D DIMER PPP FEU-MCNC: 0.39 UG/ML FEU (ref 0–0.59)
EOSINOPHIL # BLD: 0.07 K/UL (ref 0–0.44)
EOSINOPHILS RELATIVE PERCENT: 1 % (ref 1–4)
ERYTHROCYTE [DISTWIDTH] IN BLOOD BY AUTOMATED COUNT: 15.5 % (ref 11.8–14.4)
GARDNERELLA VAGINALIS: POSITIVE
GFR, ESTIMATED: >90 ML/MIN/1.73M2
GLUCOSE SERPL-MCNC: 89 MG/DL (ref 70–99)
GLUCOSE UR STRIP-MCNC: NEGATIVE MG/DL
HCT VFR BLD AUTO: 37.3 % (ref 36.3–47.1)
HGB BLD-MCNC: 11.6 G/DL (ref 11.9–15.1)
HGB UR QL STRIP.AUTO: NEGATIVE
IMM GRANULOCYTES # BLD AUTO: 0.01 K/UL (ref 0–0.3)
IMM GRANULOCYTES NFR BLD: 0 %
KETONES UR STRIP-MCNC: NEGATIVE MG/DL
LEUKOCYTE ESTERASE UR QL STRIP: NEGATIVE
LYMPHOCYTES NFR BLD: 3.67 K/UL (ref 1.1–3.7)
LYMPHOCYTES RELATIVE PERCENT: 58 % (ref 24–43)
MCH RBC QN AUTO: 22.9 PG (ref 25.2–33.5)
MCHC RBC AUTO-ENTMCNC: 31.1 G/DL (ref 28.4–34.8)
MCV RBC AUTO: 73.7 FL (ref 82.6–102.9)
MONOCYTES NFR BLD: 0.58 K/UL (ref 0.1–1.2)
MONOCYTES NFR BLD: 9 % (ref 3–12)
NEUTROPHILS NFR BLD: 32 % (ref 36–65)
NEUTS SEG NFR BLD: 2.02 K/UL (ref 1.5–8.1)
NITRITE UR QL STRIP: NEGATIVE
NRBC BLD-RTO: 0 PER 100 WBC
PH UR STRIP: 5.5 [PH] (ref 5–8)
PLATELET # BLD AUTO: 231 K/UL (ref 138–453)
PMV BLD AUTO: 9.8 FL (ref 8.1–13.5)
POTASSIUM SERPL-SCNC: 4.2 MMOL/L (ref 3.7–5.3)
PROT UR STRIP-MCNC: NEGATIVE MG/DL
RBC # BLD AUTO: 5.06 M/UL (ref 3.95–5.11)
RBC # BLD: ABNORMAL 10*6/UL
SODIUM SERPL-SCNC: 135 MMOL/L (ref 135–144)
SOURCE: ABNORMAL
SP GR UR STRIP: 1.02 (ref 1–1.03)
TRICHOMONAS: NEGATIVE
UROBILINOGEN UR STRIP-ACNC: NORMAL EU/DL (ref 0–1)
WBC OTHER # BLD: 6.4 K/UL (ref 3.5–11.3)

## 2024-06-23 PROCEDURE — 87510 GARDNER VAG DNA DIR PROBE: CPT

## 2024-06-23 PROCEDURE — 99284 EMERGENCY DEPT VISIT MOD MDM: CPT

## 2024-06-23 PROCEDURE — 85025 COMPLETE CBC W/AUTO DIFF WBC: CPT

## 2024-06-23 PROCEDURE — 87491 CHLMYD TRACH DNA AMP PROBE: CPT

## 2024-06-23 PROCEDURE — 80048 BASIC METABOLIC PNL TOTAL CA: CPT

## 2024-06-23 PROCEDURE — 87660 TRICHOMONAS VAGIN DIR PROBE: CPT

## 2024-06-23 PROCEDURE — 81003 URINALYSIS AUTO W/O SCOPE: CPT

## 2024-06-23 PROCEDURE — 6360000002 HC RX W HCPCS: Performed by: PHYSICIAN ASSISTANT

## 2024-06-23 PROCEDURE — 96372 THER/PROPH/DIAG INJ SC/IM: CPT

## 2024-06-23 PROCEDURE — 87480 CANDIDA DNA DIR PROBE: CPT

## 2024-06-23 PROCEDURE — 6370000000 HC RX 637 (ALT 250 FOR IP): Performed by: PHYSICIAN ASSISTANT

## 2024-06-23 PROCEDURE — 85379 FIBRIN DEGRADATION QUANT: CPT

## 2024-06-23 PROCEDURE — 87591 N.GONORRHOEAE DNA AMP PROB: CPT

## 2024-06-23 RX ORDER — ONDANSETRON 4 MG/1
4 TABLET, ORALLY DISINTEGRATING ORAL EVERY 8 HOURS PRN
Status: DISCONTINUED | OUTPATIENT
Start: 2024-06-23 | End: 2024-06-23 | Stop reason: HOSPADM

## 2024-06-23 RX ORDER — AZITHROMYCIN 250 MG/1
2000 TABLET, FILM COATED ORAL ONCE
Status: COMPLETED | OUTPATIENT
Start: 2024-06-23 | End: 2024-06-23

## 2024-06-23 RX ORDER — HYDROCODONE BITARTRATE AND ACETAMINOPHEN 5; 325 MG/1; MG/1
1-2 TABLET ORAL EVERY 8 HOURS PRN
Qty: 12 TABLET | Refills: 0 | Status: SHIPPED | OUTPATIENT
Start: 2024-06-23 | End: 2024-06-26

## 2024-06-23 RX ORDER — MORPHINE SULFATE 4 MG/ML
4 INJECTION, SOLUTION INTRAMUSCULAR; INTRAVENOUS ONCE
Status: COMPLETED | OUTPATIENT
Start: 2024-06-23 | End: 2024-06-23

## 2024-06-23 RX ORDER — DOXYCYCLINE 100 MG/1
100 CAPSULE ORAL ONCE
Status: COMPLETED | OUTPATIENT
Start: 2024-06-23 | End: 2024-06-23

## 2024-06-23 RX ORDER — METRONIDAZOLE 500 MG/1
500 TABLET ORAL 2 TIMES DAILY
Qty: 14 TABLET | Refills: 0 | Status: SHIPPED | OUTPATIENT
Start: 2024-06-23 | End: 2024-06-30

## 2024-06-23 RX ORDER — DOXYCYCLINE HYCLATE 100 MG
100 TABLET ORAL 2 TIMES DAILY
Qty: 14 TABLET | Refills: 0 | Status: SHIPPED | OUTPATIENT
Start: 2024-06-23 | End: 2024-06-30

## 2024-06-23 RX ADMIN — MORPHINE SULFATE 4 MG: 4 INJECTION, SOLUTION INTRAMUSCULAR; INTRAVENOUS at 11:46

## 2024-06-23 RX ADMIN — AZITHROMYCIN DIHYDRATE 2000 MG: 250 TABLET ORAL at 13:24

## 2024-06-23 RX ADMIN — DOXYCYCLINE 100 MG: 100 CAPSULE ORAL at 13:24

## 2024-06-23 RX ADMIN — ONDANSETRON 4 MG: 4 TABLET, ORALLY DISINTEGRATING ORAL at 11:46

## 2024-06-23 ASSESSMENT — PAIN SCALES - GENERAL
PAINLEVEL_OUTOF10: 4
PAINLEVEL_OUTOF10: 10
PAINLEVEL_OUTOF10: 10

## 2024-06-23 ASSESSMENT — PAIN - FUNCTIONAL ASSESSMENT: PAIN_FUNCTIONAL_ASSESSMENT: 0-10

## 2024-06-23 ASSESSMENT — PAIN DESCRIPTION - LOCATION: LOCATION: LEG

## 2024-06-23 ASSESSMENT — PAIN DESCRIPTION - ORIENTATION: ORIENTATION: LEFT;RIGHT

## 2024-06-23 NOTE — ED PROVIDER NOTES
EMERGENCY DEPARTMENT ENCOUNTER   ATTENDING ATTESTATION     Pt Name: Alexandr Kinney  MRN: [id number]  Birthdate 1975  Date of evaluation: 6/23/24   Alexandr Kinney is a 48 y.o. female with CC: Leg Pain    MDM:   I performed a substantive part of the MDM during the patient's E/M visit. I personally made or approved the documented management plan and acknowledge its risk of complications.    Independent Interpretation of EKG: My (EKG/X-Ray/US/CT interpretation   *    Discussion: Management/test interpretation discussed with*           CRITICAL CARE:           RADIOLOGY:All plain film, CT, MRI, and formal ultrasound images (except ED bedside ultrasound) are read by the radiologist, see reports below, unless otherwise noted in MDM or here.  No orders to display     LABS: All lab results were reviewed by myself, and all abnormals are listed below.  Labs Reviewed   VAGINITIS DNA PROBE - Abnormal; Notable for the following components:       Result Value    GARDNERELLA VAGINALIS POSITIVE (*)     All other components within normal limits   CBC WITH AUTO DIFFERENTIAL - Abnormal; Notable for the following components:    Hemoglobin 11.6 (*)     MCV 73.7 (*)     MCH 22.9 (*)     RDW 15.5 (*)     Neutrophils % 32 (*)     Lymphocytes % 58 (*)     All other components within normal limits   C.TRACHOMATIS N.GONORRHOEAE DNA   URINALYSIS   D-DIMER, QUANTITATIVE   BASIC METABOLIC PANEL     CONSULTS:  None  FINAL IMPRESSION      1. Possible exposure to STD    2. Right leg pain            PASTMEDICAL HISTORY     Past Medical History:   Diagnosis Date    Abnormal menstrual cycle 01/19/2018    Abnormal menstrual cycle 01/19/2018    Acute deep vein thrombosis (DVT) of popliteal vein of right lower extremity (HCC)     Acute deep vein thrombosis (DVT) of popliteal vein of right lower extremity (HCC)     Acute tracheobronchitis 01/10/2023    Anemia     Asthma     Moderate persistent asthma without complication    Axillary hidradenitis

## 2024-06-23 NOTE — ED PROVIDER NOTES
[facility] ED  eMERGENCY dEPARTMENTeNCOUnter      Pt Name: Alexandr Kinney  MRN: [id number]  Birthdate 1975  Date ofevaluation: 6/23/2024  Provider: Jose Enrique Martinez PA-C    CHIEF COMPLAINT       Chief Complaint   Patient presents with    Leg Pain         HISTORY OF PRESENT ILLNESS  (Location/Symptom, Timing/Onset, Context/Setting, Quality, Duration, Modifying Factors, Severity.)   Alexandr Kinney is a 48 y.o. female who presents to the emergency department with right leg pain.  History of blood clots.  Currently on blood thinners.  Takes her medication regularly.  Feels a painful lump to the back of her right popliteal fossa.  Also concerned about the possibility of STDs.      Nursing Notes were reviewed.    ALLERGIES     Penicillins, Amoxil [amoxicillin], Bee venom, Cephalexin, Ciprofloxacin, Clindamycin/lincomycin, Elemental sulfur, Flonase [fluticasone], Levaquin [levofloxacin in d5w], Levofloxacin, Macrobid [nitrofurantoin monohyd macro], Nitrofurantoin, Other, and Sulfa antibiotics    CURRENT MEDICATIONS       Previous Medications    ACETAMINOPHEN (TYLENOL) 500 MG TABLET    Take 1 tablet by mouth 4 times daily as needed for Pain    ADVAIR DISKUS 250-50 MCG/ACT AEPB DISKUS INHALER    inhale 1 puff by mouth IN THE MORNING and 1 puff IN THE EVENING    ALBUTEROL SULFATE HFA (VENTOLIN HFA) 108 (90 BASE) MCG/ACT INHALER    Inhale 2 puffs into the lungs every 4 hours as needed for Wheezing    ALBUTEROL SULFATE  (90 BASE) MCG/ACT INHALER    Inhale 2 puffs into the lungs every 6 hours as needed for Wheezing or Shortness of Breath    AMLODIPINE (NORVASC) 10 MG TABLET    Take 1 tablet by mouth daily    ASCORBIC ACID (VITAMIN C) 500 MG TABLET        BENZTROPINE (COGENTIN) 0.5 MG TABLET    take 1 tablet by mouth twice a day    BISACODYL 5 MG EC TABLET    Use as instructed from your physicians office for your colonoscopy prep.    CANDESARTAN (ATACAND) 32 MG TABLET    take 1 tablet by mouth once

## 2024-06-23 NOTE — ED TRIAGE NOTES
Pt c/o R leg pain. Reports she is concerned for a blood clot. Has a h/o blood clots. Also reports painful hemorrhoid and pain with urination. Also would like checked for STDs

## 2024-06-23 NOTE — DISCHARGE INSTRUCTIONS
Take meds as prescribed.  Follow up with doctor  in 3 -4 days.  Return to ER immediately if symptoms worsen or persist.  Do not drink alcohol while taking flagyl as it will cause severe nausea and vomiting.  Ask doctor about outpatient doppler as we discussed.

## 2024-06-24 LAB
C TRACH DNA SPEC QL PROBE+SIG AMP: NEGATIVE
N GONORRHOEA DNA SPEC QL PROBE+SIG AMP: NEGATIVE
SPECIMEN DESCRIPTION: NORMAL

## 2024-06-25 ENCOUNTER — TELEPHONE (OUTPATIENT)
Dept: FAMILY MEDICINE CLINIC | Age: 49
End: 2024-06-25

## 2024-06-25 NOTE — TELEPHONE ENCOUNTER
[facility] ED Follow up Call      VOICEMAIL DOCUMENTATION - ERASE IF NOT USED  Hi, this message is for Alexandr.  This is Patria Diamond MA from [facility] office.  Just calling to see how you are doing after your recent visit to the Emergency Room.  [facility] wants to make sure you were able to fill any prescriptions and that you understand your discharge instructions.  Please return our call if you need to make a follow up appointment with your provider or have any further needs.   Our phone number is [phone].  Have a great day.

## 2024-06-26 ENCOUNTER — OFFICE VISIT (OUTPATIENT)
Dept: PULMONOLOGY | Age: 49
End: 2024-06-26

## 2024-06-26 ENCOUNTER — TELEPHONE (OUTPATIENT)
Dept: PULMONOLOGY | Age: 49
End: 2024-06-26

## 2024-06-26 VITALS
DIASTOLIC BLOOD PRESSURE: 89 MMHG | HEART RATE: 64 BPM | RESPIRATION RATE: 16 BRPM | OXYGEN SATURATION: 100 % | SYSTOLIC BLOOD PRESSURE: 130 MMHG | BODY MASS INDEX: 33.82 KG/M2 | HEIGHT: 65 IN | WEIGHT: 203 LBS

## 2024-06-26 DIAGNOSIS — F17.200 SMOKING: ICD-10-CM

## 2024-06-26 DIAGNOSIS — J45.40 MODERATE PERSISTENT ASTHMA WITHOUT COMPLICATION: ICD-10-CM

## 2024-06-26 DIAGNOSIS — I27.24 CTEPH (CHRONIC THROMBOEMBOLIC PULMONARY HYPERTENSION) (HCC): ICD-10-CM

## 2024-06-26 DIAGNOSIS — E66.09 OBESITY DUE TO EXCESS CALORIES, UNSPECIFIED OBESITY SEVERITY: ICD-10-CM

## 2024-06-26 DIAGNOSIS — I26.92 SADDLE EMBOLUS OF PULMONARY ARTERY WITHOUT ACUTE COR PULMONALE, UNSPECIFIED CHRONICITY (HCC): Primary | ICD-10-CM

## 2024-06-26 DIAGNOSIS — I27.82 CHRONIC PULMONARY EMBOLISM, UNSPECIFIED PULMONARY EMBOLISM TYPE, UNSPECIFIED WHETHER ACUTE COR PULMONALE PRESENT (HCC): ICD-10-CM

## 2024-06-26 DIAGNOSIS — G47.33 OSA (OBSTRUCTIVE SLEEP APNEA): ICD-10-CM

## 2024-06-26 DIAGNOSIS — Z79.01 ANTICOAGULANT LONG-TERM USE: ICD-10-CM

## 2024-06-26 DIAGNOSIS — J20.9 ACUTE TRACHEOBRONCHITIS: ICD-10-CM

## 2024-06-26 DIAGNOSIS — R06.83 SNORING: ICD-10-CM

## 2024-06-26 DIAGNOSIS — J44.9 CHRONIC OBSTRUCTIVE PULMONARY DISEASE, UNSPECIFIED COPD TYPE (HCC): ICD-10-CM

## 2024-06-26 DIAGNOSIS — I50.32 CHRONIC DIASTOLIC (CONGESTIVE) HEART FAILURE (HCC): ICD-10-CM

## 2024-06-26 DIAGNOSIS — I82.413 DVT OF DEEP FEMORAL VEIN, BILATERAL (HCC): ICD-10-CM

## 2024-06-26 DIAGNOSIS — I87.009 POSTPHLEBITIC SYNDROME WITHOUT COMPLICATION: ICD-10-CM

## 2024-06-26 RX ORDER — ALBUTEROL SULFATE 90 UG/1
2 AEROSOL, METERED RESPIRATORY (INHALATION) EVERY 4 HOURS PRN
Qty: 54 G | Refills: 1 | Status: SHIPPED | OUTPATIENT
Start: 2024-06-26

## 2024-06-26 RX ORDER — FLUTICASONE FUROATE, UMECLIDINIUM BROMIDE AND VILANTEROL TRIFENATATE 100; 62.5; 25 UG/1; UG/1; UG/1
1 POWDER RESPIRATORY (INHALATION) DAILY
Qty: 1 EACH | Refills: 5 | Status: SHIPPED | OUTPATIENT
Start: 2024-06-26

## 2024-06-26 NOTE — TELEPHONE ENCOUNTER
Patient was seen today in office. Patient states she mentioned scans that needed to be ordered on her legs but was advised to call her PCP. She states PCP will be out of country until 7/3 and was told to have you order them. Please advise.

## 2024-06-26 NOTE — PROGRESS NOTES
MG tablet take 1 tablet by mouth twice a day  0    rivaroxaban (XARELTO) 20 MG TABS tablet Take 1 tablet by mouth daily (with breakfast) 30 tablet 5     No current facility-administered medications for this visit.       FAMILY HISTORY: family history includes Alcohol Abuse in her maternal grandfather; Allergy (Severe) in her mother; Anemia in her mother; Arthritis in her mother; Asthma in her mother; Bleeding Prob in her sister; Cancer in her maternal aunt and maternal grandmother; Clotting Disorder in her mother; Depression in her sister; Diabetes in her maternal grandfather and maternal grandmother; High Blood Pressure in her mother; Mental Illness in her mother; Other in her sister; Sickle Cell Trait in her mother; Stroke in her sister and another family member; Substance Abuse in her mother.    SOCIAL AND OCCUPATIONAL HEALTH:  The patient is a Current smoker.  There  is not history of TB or TB exposure.  There is not asbestos or silica dust exposure.  The patient reports does not have coal, foundry, quarry or cotton mill exposure.  Travel history reveals no significant history of risk factors for pulm disease.  There is not  history of recreational or IV drug use. The patient does not pets, dogs, cats turtles or exotic birds.        Review of Systems:  Review of Systems -   General ROS: Completed and except as mentioned above were negative   Psychological ROS:  Completed and except as mentioned above were negative  Ophthalmic ROS:  Completed and except as mentioned above were negative  ENT ROS:  Completed and except as mentioned above were negative  Allergy and Immunology ROS:  Completed and except as mentioned above were negative  Hematological and Lymphatic ROS:  Completed and except as mentioned above were negative  Endocrine ROS: Completed and except as mentioned above were negative  Breast ROS:  Completed and except as mentioned above were negative  Respiratory ROS:  Completed and except as mentioned above

## 2024-06-26 NOTE — TELEPHONE ENCOUNTER
Patient notified of Dr. Barahona's response. Patient is asking how she can figure out what is going on with her legs if her PCP does not come back until 7/3. Patient states she cannot wait for PCP to return and will go back to the ER since she can barely walk.

## 2024-07-23 DIAGNOSIS — E11.69 TYPE 2 DIABETES MELLITUS WITH OTHER SPECIFIED COMPLICATION, WITHOUT LONG-TERM CURRENT USE OF INSULIN (HCC): Primary | ICD-10-CM

## 2024-07-23 RX ORDER — METFORMIN HYDROCHLORIDE 500 MG/1
500 TABLET, EXTENDED RELEASE ORAL
Qty: 90 TABLET | Refills: 1 | Status: SHIPPED | OUTPATIENT
Start: 2024-07-23

## 2024-11-05 ENCOUNTER — TELEPHONE (OUTPATIENT)
Dept: FAMILY MEDICINE CLINIC | Age: 49
End: 2024-11-05

## 2024-12-09 DIAGNOSIS — I27.24 CTEPH (CHRONIC THROMBOEMBOLIC PULMONARY HYPERTENSION) (HCC): ICD-10-CM

## 2024-12-09 DIAGNOSIS — I26.92 SADDLE EMBOLUS OF PULMONARY ARTERY WITHOUT ACUTE COR PULMONALE, UNSPECIFIED CHRONICITY (HCC): ICD-10-CM

## 2024-12-09 DIAGNOSIS — I27.82 CHRONIC PULMONARY EMBOLISM, UNSPECIFIED PULMONARY EMBOLISM TYPE, UNSPECIFIED WHETHER ACUTE COR PULMONALE PRESENT (HCC): ICD-10-CM

## 2024-12-09 NOTE — TELEPHONE ENCOUNTER
LAST VISIT: 6/26/24  NEXT VISIT: 1/15/25    Per last dictation patient is on this medication. Recommended lifelong anticoagulation. Please sign for refill if ok. Thank you.

## 2024-12-18 DIAGNOSIS — I10 ESSENTIAL HYPERTENSION: Primary | ICD-10-CM

## 2024-12-18 RX ORDER — SPIRONOLACTONE 100 MG/1
100 TABLET, FILM COATED ORAL DAILY
Qty: 30 TABLET | Refills: 0 | Status: SHIPPED | OUTPATIENT
Start: 2024-12-18

## 2024-12-19 ENCOUNTER — HOSPITAL ENCOUNTER (EMERGENCY)
Age: 49
Discharge: HOME | End: 2024-12-19
Payer: COMMERCIAL

## 2024-12-19 ENCOUNTER — APPOINTMENT (OUTPATIENT)
Dept: VASCULAR LAB | Age: 49
End: 2024-12-19
Payer: COMMERCIAL

## 2024-12-19 VITALS
OXYGEN SATURATION: 99 % | HEART RATE: 85 BPM | DIASTOLIC BLOOD PRESSURE: 77 MMHG | WEIGHT: 200 LBS | TEMPERATURE: 98.1 F | SYSTOLIC BLOOD PRESSURE: 124 MMHG | BODY MASS INDEX: 33.28 KG/M2 | RESPIRATION RATE: 18 BRPM

## 2024-12-19 DIAGNOSIS — B96.89 BACTERIAL VAGINOSIS: Primary | ICD-10-CM

## 2024-12-19 DIAGNOSIS — N76.0 BACTERIAL VAGINOSIS: Primary | ICD-10-CM

## 2024-12-19 DIAGNOSIS — M79.605 PAIN IN BOTH LOWER EXTREMITIES: ICD-10-CM

## 2024-12-19 DIAGNOSIS — M79.604 PAIN IN BOTH LOWER EXTREMITIES: ICD-10-CM

## 2024-12-19 LAB
BILIRUB UR QL STRIP: NEGATIVE
CANDIDA SPECIES: NEGATIVE
CLARITY UR: CLEAR
COLOR UR: YELLOW
EKG ATRIAL RATE: 69 BPM
EKG P AXIS: 17 DEGREES
EKG P-R INTERVAL: 162 MS
EKG Q-T INTERVAL: 426 MS
EKG QRS DURATION: 68 MS
EKG QTC CALCULATION (BAZETT): 456 MS
EKG R AXIS: -7 DEGREES
EKG T AXIS: 40 DEGREES
EKG VENTRICULAR RATE: 69 BPM
GARDNERELLA VAGINALIS: POSITIVE
GLUCOSE UR STRIP-MCNC: NEGATIVE MG/DL
HGB UR QL STRIP.AUTO: NEGATIVE
KETONES UR STRIP-MCNC: ABNORMAL MG/DL
LEUKOCYTE ESTERASE UR QL STRIP: NEGATIVE
NITRITE UR QL STRIP: NEGATIVE
PH UR STRIP: 6.5 [PH] (ref 5–8)
PROT UR STRIP-MCNC: NEGATIVE MG/DL
SOURCE: ABNORMAL
SP GR UR STRIP: 1.02 (ref 1–1.03)
TRICHOMONAS: NEGATIVE
UROBILINOGEN UR STRIP-ACNC: NORMAL EU/DL (ref 0–1)

## 2024-12-19 PROCEDURE — 87480 CANDIDA DNA DIR PROBE: CPT

## 2024-12-19 PROCEDURE — 6370000000 HC RX 637 (ALT 250 FOR IP)

## 2024-12-19 PROCEDURE — 87510 GARDNER VAG DNA DIR PROBE: CPT

## 2024-12-19 PROCEDURE — 81003 URINALYSIS AUTO W/O SCOPE: CPT

## 2024-12-19 PROCEDURE — 87591 N.GONORRHOEAE DNA AMP PROB: CPT

## 2024-12-19 PROCEDURE — 93005 ELECTROCARDIOGRAM TRACING: CPT

## 2024-12-19 PROCEDURE — 93010 ELECTROCARDIOGRAM REPORT: CPT | Performed by: INTERNAL MEDICINE

## 2024-12-19 PROCEDURE — 87660 TRICHOMONAS VAGIN DIR PROBE: CPT

## 2024-12-19 PROCEDURE — 87491 CHLMYD TRACH DNA AMP PROBE: CPT

## 2024-12-19 PROCEDURE — 93970 EXTREMITY STUDY: CPT

## 2024-12-19 PROCEDURE — 99284 EMERGENCY DEPT VISIT MOD MDM: CPT

## 2024-12-19 RX ORDER — METRONIDAZOLE 500 MG/1
500 TABLET ORAL 2 TIMES DAILY
Qty: 14 TABLET | Refills: 0 | Status: SHIPPED | OUTPATIENT
Start: 2024-12-19 | End: 2024-12-26

## 2024-12-19 RX ORDER — ACETAMINOPHEN 500 MG
1000 TABLET ORAL
Status: COMPLETED | OUTPATIENT
Start: 2024-12-19 | End: 2024-12-19

## 2024-12-19 RX ORDER — METRONIDAZOLE 500 MG/1
500 TABLET ORAL ONCE
Status: COMPLETED | OUTPATIENT
Start: 2024-12-19 | End: 2024-12-19

## 2024-12-19 RX ADMIN — METRONIDAZOLE 500 MG: 500 TABLET ORAL at 15:14

## 2024-12-19 RX ADMIN — ACETAMINOPHEN 1000 MG: 500 TABLET ORAL at 14:29

## 2024-12-19 ASSESSMENT — PAIN - FUNCTIONAL ASSESSMENT: PAIN_FUNCTIONAL_ASSESSMENT: 0-10

## 2024-12-19 ASSESSMENT — PAIN SCALES - GENERAL
PAINLEVEL_OUTOF10: 10
PAINLEVEL_OUTOF10: 10

## 2024-12-19 ASSESSMENT — PAIN DESCRIPTION - LOCATION: LOCATION: LEG

## 2024-12-19 ASSESSMENT — ENCOUNTER SYMPTOMS
SHORTNESS OF BREATH: 0
NAUSEA: 0
VOMITING: 0
ABDOMINAL PAIN: 0

## 2024-12-19 ASSESSMENT — PAIN DESCRIPTION - DESCRIPTORS: DESCRIPTORS: STABBING;SHARP

## 2024-12-19 ASSESSMENT — PAIN DESCRIPTION - ORIENTATION: ORIENTATION: RIGHT;LEFT

## 2024-12-19 NOTE — ED NOTES
Patient states 10/10 pain in both legs and vaginal area. Has a new partner, believes she could have an STI, stated \"it smells funky down there and any time I squat and burning when I pee. Its been going on for about 3-4 days.\"

## 2024-12-19 NOTE — ED PROVIDER NOTES
[facility], [address]  [facility] [id number] [phone]    Schedule an appointment as soon as possible for a visit   To follow-up on this visit      DISCHARGE MEDICATIONS:  Discharge Medication List as of 12/19/2024  3:18 PM        START taking these medications    Details   metroNIDAZOLE (FLAGYL) 500 MG tablet Take 1 tablet by mouth 2 times daily for 7 days, Disp-14 tablet, R-0Normal             Trever Membreno DO  Emergency Medicine Physician     (Please note that portions of thisnote were completed with a voice recognition program.  Efforts were made to edit the dictations but occasionally words are mis-transcribed.)        Trever Membreno DO  12/19/24 2161

## 2025-01-17 SDOH — ECONOMIC STABILITY: FOOD INSECURITY: WITHIN THE PAST 12 MONTHS, YOU WORRIED THAT YOUR FOOD WOULD RUN OUT BEFORE YOU GOT MONEY TO BUY MORE.: SOMETIMES TRUE

## 2025-01-17 SDOH — ECONOMIC STABILITY: FOOD INSECURITY: WITHIN THE PAST 12 MONTHS, THE FOOD YOU BOUGHT JUST DIDN'T LAST AND YOU DIDN'T HAVE MONEY TO GET MORE.: SOMETIMES TRUE

## 2025-01-17 SDOH — ECONOMIC STABILITY: TRANSPORTATION INSECURITY
IN THE PAST 12 MONTHS, HAS THE LACK OF TRANSPORTATION KEPT YOU FROM MEDICAL APPOINTMENTS OR FROM GETTING MEDICATIONS?: YES

## 2025-01-17 SDOH — ECONOMIC STABILITY: INCOME INSECURITY: IN THE LAST 12 MONTHS, WAS THERE A TIME WHEN YOU WERE NOT ABLE TO PAY THE MORTGAGE OR RENT ON TIME?: YES

## 2025-01-17 SDOH — HEALTH STABILITY: PHYSICAL HEALTH: ON AVERAGE, HOW MANY MINUTES DO YOU ENGAGE IN EXERCISE AT THIS LEVEL?: 30 MIN

## 2025-01-17 SDOH — HEALTH STABILITY: PHYSICAL HEALTH: ON AVERAGE, HOW MANY DAYS PER WEEK DO YOU ENGAGE IN MODERATE TO STRENUOUS EXERCISE (LIKE A BRISK WALK)?: 7 DAYS

## 2025-01-17 ASSESSMENT — LIFESTYLE VARIABLES
HOW OFTEN DO YOU HAVE A DRINK CONTAINING ALCOHOL: 2
HOW MANY STANDARD DRINKS CONTAINING ALCOHOL DO YOU HAVE ON A TYPICAL DAY: 1 OR 2
HOW MANY STANDARD DRINKS CONTAINING ALCOHOL DO YOU HAVE ON A TYPICAL DAY: 1
HOW OFTEN DO YOU HAVE A DRINK CONTAINING ALCOHOL: MONTHLY OR LESS
HOW OFTEN DO YOU HAVE SIX OR MORE DRINKS ON ONE OCCASION: 1

## 2025-01-17 ASSESSMENT — PATIENT HEALTH QUESTIONNAIRE - PHQ9
3. TROUBLE FALLING OR STAYING ASLEEP: NEARLY EVERY DAY
5. POOR APPETITE OR OVEREATING: SEVERAL DAYS
SUM OF ALL RESPONSES TO PHQ QUESTIONS 1-9: 17
SUM OF ALL RESPONSES TO PHQ9 QUESTIONS 1 & 2: 4
8. MOVING OR SPEAKING SO SLOWLY THAT OTHER PEOPLE COULD HAVE NOTICED. OR THE OPPOSITE, BEING SO FIGETY OR RESTLESS THAT YOU HAVE BEEN MOVING AROUND A LOT MORE THAN USUAL: NEARLY EVERY DAY
SUM OF ALL RESPONSES TO PHQ QUESTIONS 1-9: 17
2. FEELING DOWN, DEPRESSED OR HOPELESS: MORE THAN HALF THE DAYS
7. TROUBLE CONCENTRATING ON THINGS, SUCH AS READING THE NEWSPAPER OR WATCHING TELEVISION: NOT AT ALL
1. LITTLE INTEREST OR PLEASURE IN DOING THINGS: MORE THAN HALF THE DAYS
SUM OF ALL RESPONSES TO PHQ QUESTIONS 1-9: 17
SUM OF ALL RESPONSES TO PHQ QUESTIONS 1-9: 17
4. FEELING TIRED OR HAVING LITTLE ENERGY: NEARLY EVERY DAY
6. FEELING BAD ABOUT YOURSELF - OR THAT YOU ARE A FAILURE OR HAVE LET YOURSELF OR YOUR FAMILY DOWN: NEARLY EVERY DAY
9. THOUGHTS THAT YOU WOULD BE BETTER OFF DEAD, OR OF HURTING YOURSELF: NOT AT ALL

## 2025-01-17 NOTE — TELEPHONE ENCOUNTER
LM for patient to R/S.  Sent unable to reach letter I will STOP taking the medications listed below when I get home from the hospital:    naproxen 500 mg oral tablet  -- 1 tab(s) by mouth 2 times a day   -- Check with your doctor before becoming pregnant.  May cause drowsiness or dizziness.  Obtain medical advice before taking any non-prescription drugs as some may affect the action of this medication.  Take with food or milk.    Tamiflu 75 mg oral capsule  -- 1 cap(s) by mouth 2 times a day

## 2025-01-20 ENCOUNTER — OFFICE VISIT (OUTPATIENT)
Dept: FAMILY MEDICINE CLINIC | Age: 50
End: 2025-01-20
Payer: COMMERCIAL

## 2025-01-20 VITALS
WEIGHT: 206.6 LBS | HEIGHT: 65 IN | BODY MASS INDEX: 34.42 KG/M2 | SYSTOLIC BLOOD PRESSURE: 120 MMHG | OXYGEN SATURATION: 98 % | HEART RATE: 85 BPM | TEMPERATURE: 97.3 F | DIASTOLIC BLOOD PRESSURE: 84 MMHG

## 2025-01-20 DIAGNOSIS — Z71.6 TOBACCO ABUSE COUNSELING: ICD-10-CM

## 2025-01-20 DIAGNOSIS — Z79.01 ANTICOAGULATED: ICD-10-CM

## 2025-01-20 DIAGNOSIS — I10 ESSENTIAL HYPERTENSION: ICD-10-CM

## 2025-01-20 DIAGNOSIS — Z00.00 MEDICARE ANNUAL WELLNESS VISIT, SUBSEQUENT: Primary | ICD-10-CM

## 2025-01-20 DIAGNOSIS — Z72.0 TOBACCO ABUSE DISORDER: ICD-10-CM

## 2025-01-20 DIAGNOSIS — E11.69 TYPE 2 DIABETES MELLITUS WITH OTHER SPECIFIED COMPLICATION, WITHOUT LONG-TERM CURRENT USE OF INSULIN (HCC): ICD-10-CM

## 2025-01-20 DIAGNOSIS — L73.2 HIDRADENITIS SUPPURATIVA: ICD-10-CM

## 2025-01-20 DIAGNOSIS — Z86.718 HISTORY OF DVT (DEEP VEIN THROMBOSIS): ICD-10-CM

## 2025-01-20 DIAGNOSIS — G40.909 SEIZURE DISORDER (HCC): ICD-10-CM

## 2025-01-20 DIAGNOSIS — E66.811 CLASS 1 OBESITY WITH SERIOUS COMORBIDITY AND BODY MASS INDEX (BMI) OF 32.0 TO 32.9 IN ADULT, UNSPECIFIED OBESITY TYPE: ICD-10-CM

## 2025-01-20 DIAGNOSIS — Z86.711 HISTORY OF PULMONARY EMBOLISM: ICD-10-CM

## 2025-01-20 DIAGNOSIS — F39 MOOD DISORDER (HCC): ICD-10-CM

## 2025-01-20 DIAGNOSIS — Z87.19 HISTORY OF GASTRITIS: ICD-10-CM

## 2025-01-20 DIAGNOSIS — Z87.19 HISTORY OF GI BLEED: ICD-10-CM

## 2025-01-20 DIAGNOSIS — E78.5 DYSLIPIDEMIA: ICD-10-CM

## 2025-01-20 DIAGNOSIS — Z90.710 HISTORY OF HYSTERECTOMY: ICD-10-CM

## 2025-01-20 DIAGNOSIS — Z86.14 HISTORY OF METHICILLIN RESISTANT STAPHYLOCOCCUS AUREUS (MRSA): ICD-10-CM

## 2025-01-20 PROBLEM — G89.29 OTHER CHRONIC PAIN: Status: ACTIVE | Noted: 2024-05-28

## 2025-01-20 PROBLEM — R23.2 FLUSHING: Status: ACTIVE | Noted: 2024-05-28

## 2025-01-20 PROBLEM — Z53.20: Status: ACTIVE | Noted: 2024-05-28

## 2025-01-20 PROCEDURE — 3079F DIAST BP 80-89 MM HG: CPT | Performed by: FAMILY MEDICINE

## 2025-01-20 PROCEDURE — G8427 DOCREV CUR MEDS BY ELIG CLIN: HCPCS | Performed by: FAMILY MEDICINE

## 2025-01-20 PROCEDURE — 99213 OFFICE O/P EST LOW 20 MIN: CPT | Performed by: FAMILY MEDICINE

## 2025-01-20 PROCEDURE — 4004F PT TOBACCO SCREEN RCVD TLK: CPT | Performed by: FAMILY MEDICINE

## 2025-01-20 PROCEDURE — 3074F SYST BP LT 130 MM HG: CPT | Performed by: FAMILY MEDICINE

## 2025-01-20 PROCEDURE — 3046F HEMOGLOBIN A1C LEVEL >9.0%: CPT | Performed by: FAMILY MEDICINE

## 2025-01-20 PROCEDURE — G0439 PPPS, SUBSEQ VISIT: HCPCS | Performed by: FAMILY MEDICINE

## 2025-01-20 PROCEDURE — 2022F DILAT RTA XM EVC RTNOPTHY: CPT | Performed by: FAMILY MEDICINE

## 2025-01-20 PROCEDURE — G8417 CALC BMI ABV UP PARAM F/U: HCPCS | Performed by: FAMILY MEDICINE

## 2025-01-20 RX ORDER — POTASSIUM CHLORIDE 750 MG/1
10 TABLET, EXTENDED RELEASE ORAL DAILY
COMMUNITY
Start: 2024-12-17

## 2025-01-20 RX ORDER — CANDESARTAN 32 MG/1
32 TABLET ORAL DAILY
Qty: 90 TABLET | Refills: 1 | Status: SHIPPED | OUTPATIENT
Start: 2025-01-20

## 2025-01-20 RX ORDER — CLONIDINE 0.2 MG/24H
1 PATCH, EXTENDED RELEASE TRANSDERMAL WEEKLY
Qty: 12 PATCH | Refills: 2 | Status: SHIPPED | OUTPATIENT
Start: 2025-01-20

## 2025-01-20 RX ORDER — ROSUVASTATIN CALCIUM 5 MG/1
5 TABLET, COATED ORAL DAILY
Qty: 90 TABLET | Refills: 1 | Status: SHIPPED | OUTPATIENT
Start: 2025-01-20

## 2025-01-20 RX ORDER — SPIRONOLACTONE 100 MG/1
100 TABLET, FILM COATED ORAL DAILY
Qty: 90 TABLET | Refills: 1 | Status: SHIPPED | OUTPATIENT
Start: 2025-01-20

## 2025-01-20 RX ORDER — HYDRALAZINE HYDROCHLORIDE 10 MG/1
10 TABLET, FILM COATED ORAL 3 TIMES DAILY PRN
Qty: 90 TABLET | Refills: 1 | Status: SHIPPED | OUTPATIENT
Start: 2025-01-20 | End: 2026-01-20

## 2025-01-20 RX ORDER — METFORMIN HYDROCHLORIDE 500 MG/1
500 TABLET, EXTENDED RELEASE ORAL
Qty: 90 TABLET | Refills: 1 | Status: SHIPPED | OUTPATIENT
Start: 2025-01-20

## 2025-01-20 RX ORDER — AMLODIPINE BESYLATE 10 MG/1
10 TABLET ORAL DAILY
Qty: 90 TABLET | Refills: 1 | Status: SHIPPED | OUTPATIENT
Start: 2025-01-20

## 2025-01-20 ASSESSMENT — ANXIETY QUESTIONNAIRES
6. BECOMING EASILY ANNOYED OR IRRITABLE: NOT AT ALL
2. NOT BEING ABLE TO STOP OR CONTROL WORRYING: SEVERAL DAYS
5. BEING SO RESTLESS THAT IT IS HARD TO SIT STILL: NOT AT ALL
6. BECOMING EASILY ANNOYED OR IRRITABLE: NOT AT ALL
IF YOU CHECKED OFF ANY PROBLEMS ON THIS QUESTIONNAIRE, HOW DIFFICULT HAVE THESE PROBLEMS MADE IT FOR YOU TO DO YOUR WORK, TAKE CARE OF THINGS AT HOME, OR GET ALONG WITH OTHER PEOPLE: NOT DIFFICULT AT ALL
GAD7 TOTAL SCORE: 0
7. FEELING AFRAID AS IF SOMETHING AWFUL MIGHT HAPPEN: NOT AT ALL
4. TROUBLE RELAXING: NOT AT ALL
3. WORRYING TOO MUCH ABOUT DIFFERENT THINGS: SEVERAL DAYS
2. NOT BEING ABLE TO STOP OR CONTROL WORRYING: NOT AT ALL
GAD7 TOTAL SCORE: 3
1. FEELING NERVOUS, ANXIOUS, OR ON EDGE: SEVERAL DAYS
IF YOU CHECKED OFF ANY PROBLEMS ON THIS QUESTIONNAIRE, HOW DIFFICULT HAVE THESE PROBLEMS MADE IT FOR YOU TO DO YOUR WORK, TAKE CARE OF THINGS AT HOME, OR GET ALONG WITH OTHER PEOPLE: VERY DIFFICULT
3. WORRYING TOO MUCH ABOUT DIFFERENT THINGS: NOT AT ALL
7. FEELING AFRAID AS IF SOMETHING AWFUL MIGHT HAPPEN: NOT AT ALL
4. TROUBLE RELAXING: NOT AT ALL
1. FEELING NERVOUS, ANXIOUS, OR ON EDGE: NOT AT ALL
5. BEING SO RESTLESS THAT IT IS HARD TO SIT STILL: NOT AT ALL

## 2025-01-20 NOTE — PATIENT INSTRUCTIONS
take.  How can you care for yourself as you start a weight-loss plan?  Set realistic goals. Many people expect to lose much more weight than is likely. A weight loss of 5% to 10% of your body weight may be enough to improve your health.  Get family and friends involved to provide support. Talk to them about why you are trying to lose weight, and ask them to help. They can help by participating in exercise and having meals with you, even if they may be eating something different.  Find what works best for you. If you do not have time or do not like to cook, a program that offers meal replacement bars or shakes may be better for you. Or if you like to prepare meals, finding a plan that includes daily menus and recipes may be best.  Ask your doctor about other health professionals who can help you achieve your weight-loss goals.  A dietitian can help you make healthy changes in your diet.  An exercise specialist or  can help you develop a safe and effective exercise program.  A counselor or psychiatrist can help you cope with issues such as depression, anxiety, or family problems that can make it hard to focus on weight loss.  Consider joining a support group for people who are trying to lose weight. Your doctor can suggest groups in your area.  Where can you learn more?  Go to https://[email or web address].net/patientEd and enter U357 to learn more about \"Starting a Weight-Loss Plan: Care Instructions.\"  Current as of: April 30, 2024  Content Version: 14.3  © 2024 [employer].   Care instructions adapted under license by [employer]. If you have questions about a medical condition or this instruction, always ask your healthcare professional. [employer] disclaims any warranty or liability for your use of this information.         Advance Directives: Care Instructions  Overview  An advance directive is a legal way to state your wishes at the end of your life. It tells your family and

## 2025-01-20 NOTE — PROGRESS NOTES
\"Have you been to the ER, urgent care clinic since your last visit?  Hospitalized since your last visit?\"    YES - When: approximately 1 months ago.  Where and Why: pain.    “Have you seen or consulted any other health care providers outside our system since your last visit?”    YES - When: approximately 1 months ago.  Where and Why: pain .             
increase risk for falls  Patient declines any further evaluation or treatment     Depression:  PHQ-2 Score: 4  PHQ-9 Total Score: 17  Total Score Interpretation: 15-19 = moderately severe depression  Interventions:  Monitored by specialist. No acute findings meriting change in the plan     Drug Use:   Substance and Sexual Activity   Drug Use Yes    Types: Marijuana (Weed)     DAST-10 Score: 0     Interventions:  Patient declined any further intervention or treatment       Self-assessment of health:  In general, how would you say your health is?: (!) Poor    Interventions:  Patient declines any further evaluation or treatment    General HRA Questions:  Select all that apply: (!) New or Increased Pain, New or Increased Fatigue, Loneliness, Social Isolation, Stress, Anger  Interventions - Pain:  Patient declined any further interventions or treatment  Interventions Fatigue:  Patient declined any further interventions or treatment  Interventions - Loneliness:  Patient declined any further interventions or treatment  Interventions - Social Isolation:  Patient declined any further interventions or treatment  Interventions - Stress:  Patient declined any further interventions or treatment  Interventions - Anger:  Patient declined any further interventions or treatment       Poor Eating Habits/Diet:  Do you eat balanced/healthy meals regularly?: (!) No  Interventions:  Patient declines any further evaluation or treatment    Abnormal BMI (obese):  Body mass index is 34.38 kg/m². (!) Abnormal  Interventions:  Patient declines any further evaluation or treatment    Obesity Counseling: Patient was asked about her current diet and exercise habits, and personalized advice was provided regarding recommended lifestyle changes. Patient's comorbid health conditions associated with elevated BMI were discussed, as well as the likely benefits of weight loss. Based upon patient's motivation to change her behavior, the following plan was

## 2025-01-21 ENCOUNTER — TELEPHONE (OUTPATIENT)
Dept: GASTROENTEROLOGY | Age: 50
End: 2025-01-21

## 2025-02-24 ENCOUNTER — TELEPHONE (OUTPATIENT)
Dept: FAMILY MEDICINE CLINIC | Age: 50
End: 2025-02-24

## 2025-02-24 NOTE — TELEPHONE ENCOUNTER
Call from [facility] asking if you can send an order for patient to get a glucose machine and supplies she does not have one.

## 2025-02-26 DIAGNOSIS — K64.9 BLEEDING HEMORRHOIDS: Primary | ICD-10-CM

## 2025-03-04 ENCOUNTER — HOSPITAL ENCOUNTER (EMERGENCY)
Age: 50
Discharge: HOME | End: 2025-03-04
Attending: EMERGENCY MEDICINE
Payer: COMMERCIAL

## 2025-03-04 ENCOUNTER — APPOINTMENT (OUTPATIENT)
Dept: VASCULAR LAB | Age: 50
End: 2025-03-04
Payer: COMMERCIAL

## 2025-03-04 VITALS
HEART RATE: 77 BPM | RESPIRATION RATE: 16 BRPM | OXYGEN SATURATION: 98 % | SYSTOLIC BLOOD PRESSURE: 137 MMHG | BODY MASS INDEX: 33.61 KG/M2 | TEMPERATURE: 97.2 F | DIASTOLIC BLOOD PRESSURE: 89 MMHG | WEIGHT: 202 LBS

## 2025-03-04 DIAGNOSIS — M79.604 RIGHT LEG PAIN: Primary | ICD-10-CM

## 2025-03-04 DIAGNOSIS — A59.9 TRICHOMONAL INFECTION: ICD-10-CM

## 2025-03-04 LAB
ANION GAP SERPL CALCULATED.3IONS-SCNC: 11 MMOL/L (ref 9–16)
BASOPHILS # BLD: 0.03 K/UL (ref 0–0.2)
BASOPHILS NFR BLD: 0 % (ref 0–2)
BILIRUB UR QL STRIP: NEGATIVE
BUN SERPL-MCNC: 12 MG/DL (ref 6–20)
CALCIUM SERPL-MCNC: 8.6 MG/DL (ref 8.6–10.4)
CHLORIDE SERPL-SCNC: 108 MMOL/L (ref 98–107)
CLARITY UR: ABNORMAL
CO2 SERPL-SCNC: 19 MMOL/L (ref 20–31)
COLOR UR: YELLOW
CREAT SERPL-MCNC: 0.7 MG/DL (ref 0.5–0.9)
EOSINOPHIL # BLD: 0.09 K/UL (ref 0–0.44)
EOSINOPHILS RELATIVE PERCENT: 1 % (ref 1–4)
EPI CELLS #/AREA URNS HPF: ABNORMAL /HPF (ref 0–5)
ERYTHROCYTE [DISTWIDTH] IN BLOOD BY AUTOMATED COUNT: 16.4 % (ref 11.8–14.4)
GFR, ESTIMATED: >90 ML/MIN/1.73M2
GLUCOSE SERPL-MCNC: 86 MG/DL (ref 74–99)
GLUCOSE UR STRIP-MCNC: NEGATIVE MG/DL
HCT VFR BLD AUTO: 34.2 % (ref 36.3–47.1)
HGB BLD-MCNC: 11.2 G/DL (ref 11.9–15.1)
HGB UR QL STRIP.AUTO: NEGATIVE
IMM GRANULOCYTES # BLD AUTO: 0.03 K/UL (ref 0–0.3)
IMM GRANULOCYTES NFR BLD: 0 %
KETONES UR STRIP-MCNC: ABNORMAL MG/DL
LEUKOCYTE ESTERASE UR QL STRIP: NEGATIVE
LYMPHOCYTES NFR BLD: 3.81 K/UL (ref 1.1–3.7)
LYMPHOCYTES RELATIVE PERCENT: 49 % (ref 24–43)
MCH RBC QN AUTO: 23.9 PG (ref 25.2–33.5)
MCHC RBC AUTO-ENTMCNC: 32.7 G/DL (ref 28.4–34.8)
MCV RBC AUTO: 73.1 FL (ref 82.6–102.9)
MONOCYTES NFR BLD: 0.82 K/UL (ref 0.1–1.2)
MONOCYTES NFR BLD: 11 % (ref 3–12)
MUCOUS THREADS URNS QL MICRO: ABNORMAL
NEUTROPHILS NFR BLD: 39 % (ref 36–65)
NEUTS SEG NFR BLD: 3.03 K/UL (ref 1.5–8.1)
NITRITE UR QL STRIP: NEGATIVE
NRBC BLD-RTO: 0 PER 100 WBC
PH UR STRIP: 6 [PH] (ref 5–8)
PLATELET # BLD AUTO: 257 K/UL (ref 138–453)
PMV BLD AUTO: 9.7 FL (ref 8.1–13.5)
POTASSIUM SERPL-SCNC: 4 MMOL/L (ref 3.7–5.3)
PROT UR STRIP-MCNC: NEGATIVE MG/DL
RBC # BLD AUTO: 4.68 M/UL (ref 3.95–5.11)
RBC # BLD: ABNORMAL 10*6/UL
RBC #/AREA URNS HPF: ABNORMAL /HPF (ref 0–2)
SODIUM SERPL-SCNC: 138 MMOL/L (ref 136–145)
SP GR UR STRIP: 1.04 (ref 1–1.03)
TRICHOMONAS #/AREA URNS HPF: POSITIVE /[HPF]
UROBILINOGEN UR STRIP-ACNC: NORMAL EU/DL (ref 0–1)
WBC #/AREA URNS HPF: ABNORMAL /HPF (ref 0–5)
WBC OTHER # BLD: 7.8 K/UL (ref 3.5–11.3)

## 2025-03-04 PROCEDURE — 80048 BASIC METABOLIC PNL TOTAL CA: CPT

## 2025-03-04 PROCEDURE — 99284 EMERGENCY DEPT VISIT MOD MDM: CPT

## 2025-03-04 PROCEDURE — 85025 COMPLETE CBC W/AUTO DIFF WBC: CPT

## 2025-03-04 PROCEDURE — 93971 EXTREMITY STUDY: CPT

## 2025-03-04 PROCEDURE — 6370000000 HC RX 637 (ALT 250 FOR IP): Performed by: NURSE PRACTITIONER

## 2025-03-04 PROCEDURE — 93971 EXTREMITY STUDY: CPT | Performed by: STUDENT IN AN ORGANIZED HEALTH CARE EDUCATION/TRAINING PROGRAM

## 2025-03-04 PROCEDURE — 81001 URINALYSIS AUTO W/SCOPE: CPT

## 2025-03-04 RX ORDER — TRAMADOL HYDROCHLORIDE 50 MG/1
50 TABLET ORAL ONCE
Status: COMPLETED | OUTPATIENT
Start: 2025-03-04 | End: 2025-03-04

## 2025-03-04 RX ORDER — ONDANSETRON 4 MG/1
4 TABLET, ORALLY DISINTEGRATING ORAL 3 TIMES DAILY PRN
Qty: 21 TABLET | Refills: 0 | Status: SHIPPED | OUTPATIENT
Start: 2025-03-04

## 2025-03-04 RX ORDER — METRONIDAZOLE 500 MG/1
500 TABLET ORAL 2 TIMES DAILY
Qty: 14 TABLET | Refills: 0 | Status: SHIPPED | OUTPATIENT
Start: 2025-03-04 | End: 2025-03-11

## 2025-03-04 RX ORDER — TRAMADOL HYDROCHLORIDE 50 MG/1
50 TABLET ORAL EVERY 6 HOURS PRN
Qty: 12 TABLET | Refills: 0 | Status: SHIPPED | OUTPATIENT
Start: 2025-03-04 | End: 2025-03-07

## 2025-03-04 RX ADMIN — TRAMADOL HYDROCHLORIDE 50 MG: 50 TABLET, COATED ORAL at 14:22

## 2025-03-04 ASSESSMENT — PAIN SCALES - GENERAL
PAINLEVEL_OUTOF10: 10
PAINLEVEL_OUTOF10: 10

## 2025-03-04 ASSESSMENT — PAIN - FUNCTIONAL ASSESSMENT: PAIN_FUNCTIONAL_ASSESSMENT: 0-10

## 2025-03-04 NOTE — ED PROVIDER NOTES
time.    Venous Doppler showed no evidence of DVT.  Unclear if this is a Baker's cyst or what is causing her pain.  Will recommend to follow-up with orthopedics.  Referral was provided.    Her laboratory workup was unremarkable.  Urinalysis was positive for trichomonas but she had no other evidence of bacterial infection.  She will be treated with Flagyl for trichomonas.    I recommend that she follow up with her family doctor for all of her other chronic conditions.  She verbalized understanding of all agrees with discharge plan.    CONSULTS:  None    PROCEDURES:  Procedures    FINAL IMPRESSION      1. Right leg pain    2. Trichomonal infection          DISPOSITION/PLAN   DISPOSITION Decision To Discharge 03/04/2025 03:35:56 PM   DISPOSITION CONDITION Stable           PATIENT REFERRED TO:  Mignon Lee MD  [address], [address]  [facility] [address] [phone]    Call in 2 days  For follow-up evaluation    Luis Enrique Sepulveda,   [address] [address] [phone]    Call in 1 day  For follow-up evaluation of right leg pain      DISCHARGE MEDICATIONS:  New Prescriptions    METRONIDAZOLE (FLAGYL) 500 MG TABLET    Take 1 tablet by mouth 2 times daily for 7 days    ONDANSETRON (ZOFRAN-ODT) 4 MG DISINTEGRATING TABLET    Take 1 tablet by mouth 3 times daily as needed for Nausea or Vomiting    TRAMADOL (ULTRAM) 50 MG TABLET    Take 1 tablet by mouth every 6 hours as needed for Pain for up to 3 days. Intended supply: 3 days. Take lowest dose possible to manage pain Max Daily Amount: 200 mg       (Please note that portions of this note were completed with a voice recognition program.  Efforts were made to edit the dictations but occasionally words are mis-transcribed.)    ELIZABETH ACEVEDO - Radha Meza APRN - CNP  03/04/25 4824

## 2025-03-04 NOTE — DISCHARGE INSTRUCTIONS
Please follow-up with orthopedics for further evaluation of the pain behind your right knee.    Take antibiotics as directed for treatment of trichomonas.    Follow-up with your family doctor for all of the other chronic conditions that you have.

## 2025-03-19 DIAGNOSIS — E11.69 TYPE 2 DIABETES MELLITUS WITH OTHER SPECIFIED COMPLICATION, WITHOUT LONG-TERM CURRENT USE OF INSULIN (HCC): ICD-10-CM

## 2025-03-19 RX ORDER — METFORMIN HYDROCHLORIDE 500 MG/1
500 TABLET, EXTENDED RELEASE ORAL DAILY
Qty: 90 TABLET | Refills: 1 | OUTPATIENT
Start: 2025-03-19

## 2025-03-24 ENCOUNTER — HOSPITAL ENCOUNTER (OUTPATIENT)
Dept: SURGERY | Age: 50
Discharge: HOME | End: 2025-03-24
Payer: COMMERCIAL

## 2025-03-24 VITALS
HEART RATE: 69 BPM | RESPIRATION RATE: 18 BRPM | HEIGHT: 66 IN | DIASTOLIC BLOOD PRESSURE: 90 MMHG | SYSTOLIC BLOOD PRESSURE: 147 MMHG | BODY MASS INDEX: 34.39 KG/M2 | WEIGHT: 214 LBS

## 2025-03-24 DIAGNOSIS — L73.2 HIDRADENITIS SUPPURATIVA OF MULTIPLE SITES: Primary | ICD-10-CM

## 2025-03-24 PROCEDURE — 99202 OFFICE O/P NEW SF 15 MIN: CPT | Performed by: STUDENT IN AN ORGANIZED HEALTH CARE EDUCATION/TRAINING PROGRAM

## 2025-03-24 RX ORDER — DOXYCYCLINE HYCLATE 100 MG
100 TABLET ORAL 2 TIMES DAILY PRN
Qty: 14 TABLET | Refills: 3 | Status: SHIPPED | OUTPATIENT
Start: 2025-03-24 | End: 2025-03-31

## 2025-03-24 RX ORDER — CHLORHEXIDINE GLUCONATE 40 MG/ML
2 SOLUTION TOPICAL DAILY
Qty: 1 EACH | Refills: 3 | Status: SHIPPED | OUTPATIENT
Start: 2025-03-24

## 2025-03-24 NOTE — CONSULTS
PRAPARE - Transportation     Lack of Transportation (Medical): Yes     Lack of Transportation (Non-Medical): Yes   Physical Activity: Sufficiently Active (1/17/2025)    Exercise Vital Sign     Days of Exercise per Week: 7 days     Minutes of Exercise per Session: 30 min   Housing Stability: High Risk (1/17/2025)    Housing Stability Vital Sign     Unable to Pay for Housing in the Last Year: Yes     Number of Times Moved in the Last Year: 0     Homeless in the Last Year: No       Review of Systems - General ROS: negative for - chills or fever  Psychological ROS: negative for - disorientation or hallucinations  Hematological and Lymphatic ROS: positive for - blood clots  Endocrine ROS: negative for - palpitations or polydipsia/polyuria  Respiratory ROS: no cough, shortness of breath, or wheezing  Cardiovascular ROS: no chest pain or dyspnea on exertion  Gastrointestinal ROS: no abdominal pain, change in bowel habits, or black or bloody stools  Genito-Urinary ROS: no dysuria, trouble voiding, or hematuria  Musculoskeletal ROS: positive for - pain in left, posterior  knee  Neurological ROS: no TIA or stroke symptoms  positive for - seizures  Dermatological ROS: positive for - hidradenitis    Vitals:    03/24/25 1004   BP: (!) 147/90   Pulse: 69   Resp: 18        No intake/output data recorded.    General Appearance: alert and oriented to person, place and time, well developed and well- nourished, in no acute distress  Head: normocephalic and atraumatic  Eyes: pupils equal, round, and reactive to light, extraocular eye movements intact, conjunctivae normal  ENT: tympanic membrane, external ear normal bilaterally, nose without deformity  Neck: supple, trachea midline  Pulmonary/Chest: normal air movement, no respiratory distress  Cardiovascular: normal rate, regular rhythm  Abdomen:  morbidly obese, soft, nontender to palpation  Extremities: no cyanosis, clubbing or edema  Skin:  bilateral axilla with hidradenitis, sinus

## 2025-03-27 ENCOUNTER — TELEPHONE (OUTPATIENT)
Dept: FAMILY MEDICINE CLINIC | Age: 50
End: 2025-03-27

## 2025-03-27 NOTE — TELEPHONE ENCOUNTER
Pt called and stated that she need a referral to neurology she go to [facility] [address] is the provider name fax number is [phone]

## 2025-04-01 DIAGNOSIS — E78.5 DYSLIPIDEMIA: Primary | ICD-10-CM

## 2025-04-01 RX ORDER — ROSUVASTATIN CALCIUM 5 MG/1
5 TABLET, COATED ORAL DAILY
Qty: 90 TABLET | Refills: 1 | Status: SHIPPED | OUTPATIENT
Start: 2025-04-01

## 2025-04-25 ENCOUNTER — HOSPITAL ENCOUNTER (EMERGENCY)
Age: 50
Discharge: HOME | End: 2025-04-25
Attending: EMERGENCY MEDICINE
Payer: COMMERCIAL

## 2025-04-25 VITALS
SYSTOLIC BLOOD PRESSURE: 124 MMHG | DIASTOLIC BLOOD PRESSURE: 69 MMHG | OXYGEN SATURATION: 94 % | RESPIRATION RATE: 16 BRPM | BODY MASS INDEX: 34.54 KG/M2 | WEIGHT: 214 LBS | HEART RATE: 78 BPM | TEMPERATURE: 98.1 F

## 2025-04-25 DIAGNOSIS — R56.9 SEIZURE (HCC): Primary | ICD-10-CM

## 2025-04-25 LAB
ALBUMIN SERPL-MCNC: 3.9 G/DL (ref 3.5–5.2)
ALBUMIN/GLOB SERPL: 1.2 {RATIO} (ref 1–2.5)
ALP SERPL-CCNC: 101 U/L (ref 35–104)
ALT SERPL-CCNC: 26 U/L (ref 10–35)
ANION GAP SERPL CALCULATED.3IONS-SCNC: 14 MMOL/L (ref 9–16)
AST SERPL-CCNC: 19 U/L (ref 10–35)
BASOPHILS # BLD: 0 K/UL (ref 0–0.2)
BASOPHILS NFR BLD: 0 %
BILIRUB SERPL-MCNC: 0.3 MG/DL (ref 0–1.2)
BUN SERPL-MCNC: 9 MG/DL (ref 6–20)
CALCIUM SERPL-MCNC: 8.9 MG/DL (ref 8.6–10.4)
CHLORIDE SERPL-SCNC: 106 MMOL/L (ref 98–107)
CO2 SERPL-SCNC: 18 MMOL/L (ref 20–31)
CREAT SERPL-MCNC: 0.9 MG/DL (ref 0.5–0.9)
EOSINOPHIL # BLD: 0.1 K/UL (ref 0–0.4)
EOSINOPHILS RELATIVE PERCENT: 1 % (ref 1–4)
ERYTHROCYTE [DISTWIDTH] IN BLOOD BY AUTOMATED COUNT: 16.9 % (ref 11.8–14.4)
GFR, ESTIMATED: 84 ML/MIN/1.73M2
GLUCOSE SERPL-MCNC: 81 MG/DL (ref 74–99)
HCG SERPL QL: NEGATIVE
HCT VFR BLD AUTO: 36.6 % (ref 36.3–47.1)
HGB BLD-MCNC: 11.8 G/DL (ref 11.9–15.1)
IMM GRANULOCYTES # BLD AUTO: 0 K/UL (ref 0–0.3)
IMM GRANULOCYTES NFR BLD: 0 %
LEVETIRACETAM SERPL-MCNC: <2 UG/ML
LYMPHOCYTES NFR BLD: 5.3 K/UL (ref 1–4.8)
LYMPHOCYTES RELATIVE PERCENT: 53 % (ref 24–44)
MCH RBC QN AUTO: 24.3 PG (ref 25.2–33.5)
MCHC RBC AUTO-ENTMCNC: 32.2 G/DL (ref 28.4–34.8)
MCV RBC AUTO: 75.5 FL (ref 82.6–102.9)
MONOCYTES NFR BLD: 1 K/UL (ref 0.2–0.8)
MONOCYTES NFR BLD: 10 % (ref 1–7)
NEUTROPHILS NFR BLD: 36 % (ref 36–66)
NEUTS SEG NFR BLD: 3.6 K/UL (ref 1.8–7.7)
NRBC BLD-RTO: 0 PER 100 WBC
PLATELET # BLD AUTO: 213 K/UL (ref 138–453)
PMV BLD AUTO: 10.3 FL (ref 8.1–13.5)
POTASSIUM SERPL-SCNC: 3.8 MMOL/L (ref 3.7–5.3)
PROT SERPL-MCNC: 7 G/DL (ref 6.6–8.7)
RBC # BLD AUTO: 4.85 M/UL (ref 3.95–5.11)
SODIUM SERPL-SCNC: 137 MMOL/L (ref 136–145)
WBC OTHER # BLD: 10 K/UL (ref 3.5–11.3)

## 2025-04-25 PROCEDURE — 96374 THER/PROPH/DIAG INJ IV PUSH: CPT

## 2025-04-25 PROCEDURE — 99284 EMERGENCY DEPT VISIT MOD MDM: CPT

## 2025-04-25 PROCEDURE — 85025 COMPLETE CBC W/AUTO DIFF WBC: CPT

## 2025-04-25 PROCEDURE — 84703 CHORIONIC GONADOTROPIN ASSAY: CPT

## 2025-04-25 PROCEDURE — 6360000002 HC RX W HCPCS: Performed by: PHYSICIAN ASSISTANT

## 2025-04-25 PROCEDURE — 2580000003 HC RX 258: Performed by: PHYSICIAN ASSISTANT

## 2025-04-25 PROCEDURE — 80177 DRUG SCRN QUAN LEVETIRACETAM: CPT

## 2025-04-25 PROCEDURE — 6370000000 HC RX 637 (ALT 250 FOR IP): Performed by: PHYSICIAN ASSISTANT

## 2025-04-25 PROCEDURE — 80053 COMPREHEN METABOLIC PANEL: CPT

## 2025-04-25 PROCEDURE — 96375 TX/PRO/DX INJ NEW DRUG ADDON: CPT

## 2025-04-25 RX ORDER — LEVETIRACETAM 500 MG/5ML
1500 INJECTION, SOLUTION, CONCENTRATE INTRAVENOUS ONCE
Status: COMPLETED | OUTPATIENT
Start: 2025-04-25 | End: 2025-04-25

## 2025-04-25 RX ORDER — LORAZEPAM 2 MG/ML
2 INJECTION INTRAMUSCULAR ONCE
Status: COMPLETED | OUTPATIENT
Start: 2025-04-25 | End: 2025-04-25

## 2025-04-25 RX ORDER — LEVETIRACETAM 500 MG/1
500 TABLET ORAL 2 TIMES DAILY
Qty: 60 TABLET | Refills: 0 | Status: SHIPPED | OUTPATIENT
Start: 2025-04-25 | End: 2025-05-25

## 2025-04-25 RX ORDER — KETOROLAC TROMETHAMINE 15 MG/ML
15 INJECTION, SOLUTION INTRAMUSCULAR; INTRAVENOUS ONCE
Status: COMPLETED | OUTPATIENT
Start: 2025-04-25 | End: 2025-04-25

## 2025-04-25 RX ORDER — 0.9 % SODIUM CHLORIDE 0.9 %
1000 INTRAVENOUS SOLUTION INTRAVENOUS ONCE
Status: COMPLETED | OUTPATIENT
Start: 2025-04-25 | End: 2025-04-25

## 2025-04-25 RX ORDER — LEVETIRACETAM 500 MG/1
500 TABLET ORAL ONCE
Status: COMPLETED | OUTPATIENT
Start: 2025-04-25 | End: 2025-04-25

## 2025-04-25 RX ORDER — LORAZEPAM 2 MG/ML
2 INJECTION INTRAMUSCULAR ONCE
Status: DISCONTINUED | OUTPATIENT
Start: 2025-04-25 | End: 2025-04-25 | Stop reason: SDUPTHER

## 2025-04-25 RX ADMIN — KETOROLAC TROMETHAMINE 15 MG: 15 INJECTION, SOLUTION INTRAMUSCULAR; INTRAVENOUS at 17:15

## 2025-04-25 RX ADMIN — SODIUM CHLORIDE 1000 ML: 0.9 INJECTION, SOLUTION INTRAVENOUS at 17:16

## 2025-04-25 RX ADMIN — LORAZEPAM 2 MG: 2 INJECTION INTRAMUSCULAR; INTRAVENOUS at 16:43

## 2025-04-25 RX ADMIN — LEVETIRACETAM 500 MG: 500 TABLET, FILM COATED ORAL at 19:17

## 2025-04-25 RX ADMIN — LEVETIRACETAM 1500 MG: 100 INJECTION INTRAVENOUS at 16:33

## 2025-04-25 ASSESSMENT — ENCOUNTER SYMPTOMS
EYE DISCHARGE: 0
RHINORRHEA: 0
BACK PAIN: 0
WHEEZING: 0
EYE PAIN: 0
EYE ITCHING: 0
COLOR CHANGE: 0
COUGH: 0
NAUSEA: 0
SORE THROAT: 0
VOMITING: 0

## 2025-04-25 NOTE — ED PROVIDER NOTES
EMERGENCY DEPARTMENT ENCOUNTER   ATTENDING ATTESTATION   Pt Name: Alexandr Kinney  MRN: [id number]  Birthdate 1975  Date of evaluation: 4/26/25   Alexandr Kinney is a 49 y.o. female with CC: Seizures    MDM:   I performed a substantive part of the MDM during the patient's E/M visit. I personally made or approved the documented management plan and acknowledge its risk of complications.      ED Course as of 04/26/25 1312   Fri Apr 25, 2025   1746 Family has been updated [ISH]   1905 The patient CBC, CMP are without gross abnormality [ISH]      ED Course User Index  [ISH] Yvrose Gomez PA-C       CRITICAL CARE:       EKG: All EKG's are interpreted by the Emergency Department Physician who either signs or Co-signs this chart in the absence of a cardiologist.      RADIOLOGY:All plain film, CT, MRI, and formal ultrasound images (except ED bedside ultrasound) are read by the radiologist, see reports below, unless otherwise noted in MDM or here.  No orders to display     LABS: All lab results were reviewed by myself, and all abnormals are listed below.  Labs Reviewed   CBC WITH AUTO DIFFERENTIAL - Abnormal; Notable for the following components:       Result Value    Hemoglobin 11.8 (*)     MCV 75.5 (*)     MCH 24.3 (*)     RDW 16.9 (*)     Lymphocytes % 53 (*)     Monocytes % 10 (*)     Lymphocytes Absolute 5.30 (*)     Monocytes Absolute 1.00 (*)     All other components within normal limits   COMPREHENSIVE METABOLIC PANEL W/ REFLEX TO MG FOR LOW K - Abnormal; Notable for the following components:    CO2 18 (*)     All other components within normal limits   LEVETIRACETAM LEVEL   HCG, SERUM, QUALITATIVE     CONSULTS:  IP CONSULT TO NEUROLOGY  FINAL IMPRESSION      1. Seizure (HCC)            PASTMEDICAL HISTORY     Past Medical History:   Diagnosis Date    Anemia     Asthma     Moderate persistent asthma without complication    Axillary hidradenitis suppurativa 05/06/2020    Bipolar 1 disorder (HCC)     Chiari I

## 2025-04-25 NOTE — ED PROVIDER NOTES
EMERGENCY DEPARTMENT ENCOUNTER    Pt Name: Alexandr Kinney  MRN: [id number]  Birthdate 1975  Date of evaluation: 4/25/25  CHIEF COMPLAINT       Chief Complaint   Patient presents with    Seizures     HISTORY OF PRESENT ILLNESS   The patient is a 49-year-old female who presents via EMS for seizures.  The patient was seizing upon her arrival in the emergency department.  She has apparently had 3 seizures lasting several minutes today.  She does state that she ran out of her Keppra several weeks ago, has an appointment with a new neurologist next week.             REVIEW OF SYSTEMS     Review of Systems   Constitutional:  Negative for chills and fever.   HENT:  Negative for ear pain, rhinorrhea and sore throat.    Eyes:  Negative for pain, discharge and itching.   Respiratory:  Negative for cough and wheezing.    Cardiovascular:  Negative for chest pain and palpitations.   Gastrointestinal:  Negative for nausea and vomiting.   Genitourinary:  Negative for difficulty urinating and dysuria.   Musculoskeletal:  Negative for back pain and myalgias.   Skin:  Negative for color change and wound.   Neurological:  Positive for seizures and headaches. Negative for dizziness.   Psychiatric/Behavioral:  Negative for dysphoric mood.      PASTMEDICAL HISTORY     Past Medical History:   Diagnosis Date    Anemia     Asthma     Moderate persistent asthma without complication    Axillary hidradenitis suppurativa 05/06/2020    Bipolar 1 disorder (HCC)     Chiari I malformation (HCC) 06/21/2017    A MRI of the brain in 2014 was suspicious for pseudotumor cerebri but a spinal tap revealed a normal opening pressure of 16 cm water.  An EEG was normal.  A MRI of the thoracic spine in 2015 was normal. Relevant history of cervical myelopathy with an abnormal MRI of the cervical spine in January 2015.  A follow-up MRI of the brain in April 2017 was unchanged.  A MRI of the cervical spine in June 2    Chiari I malformation (HCC) 06/21/2017

## 2025-04-25 NOTE — DISCHARGE INSTRUCTIONS
It is very important that you take your seizure medication as prescribed    I did send the prescription over to the [facility] on [address].  Please pick it up in the morning so that you can take 2 doses tomorrow    You will not be able to drive until you are cleared by neurology, in the state [facility] this is at least 6 months    Please follow-up with your neurologist next week as scheduled    Return to the emergency department for any emergent concerns

## 2025-04-25 NOTE — ED NOTES
Pt to ED by EMS c/o 4 seizures today. Pt had seizure activity upon arrival to ED. 2mg ativan given IV. Pt states she has been out of her Keppra for over a week. Pt is alert and oriented post- ictal. Seizure precautions implemented. RR equal and nonlabored. Pt c/o right calf pain with hx of DVT. Pt also c/o blurred vision and headache. Family at bedside. Call light within reach.  Sinus Rhythm on the monitor and EKG.

## 2025-04-26 LAB
EKG ATRIAL RATE: 72 BPM
EKG P AXIS: 52 DEGREES
EKG P-R INTERVAL: 184 MS
EKG Q-T INTERVAL: 416 MS
EKG QRS DURATION: 78 MS
EKG QTC CALCULATION (BAZETT): 455 MS
EKG R AXIS: -9 DEGREES
EKG T AXIS: 7 DEGREES
EKG VENTRICULAR RATE: 72 BPM

## 2025-05-01 ENCOUNTER — OFFICE VISIT (OUTPATIENT)
Dept: ORTHOPEDIC SURGERY | Age: 50
End: 2025-05-01

## 2025-05-01 VITALS — WEIGHT: 214 LBS | BODY MASS INDEX: 34.39 KG/M2 | HEIGHT: 66 IN

## 2025-05-01 DIAGNOSIS — R52 PAIN: Primary | ICD-10-CM

## 2025-05-01 DIAGNOSIS — S83.241A ACUTE MEDIAL MENISCUS TEAR OF RIGHT KNEE, INITIAL ENCOUNTER: ICD-10-CM

## 2025-05-01 RX ORDER — METHYLPREDNISOLONE 4 MG/1
TABLET ORAL
Qty: 1 KIT | Refills: 0 | Status: SHIPPED | OUTPATIENT
Start: 2025-05-01 | End: 2025-05-07

## 2025-05-01 NOTE — PROGRESS NOTES
[facility]  [address]  [facility] [address]  Dept Phone: [phone]  Dept Fax: [phone]      Orthopaedic Trauma New Patient      CHIEF COMPLAINT:    Chief Complaint   Patient presents with    New Patient     Rt knee       HISTORY OF PRESENT ILLNESS:    The patient is a 49 y.o. female who is being seen as a new patient for evaluation of acute exacerbation of chronic right knee pain.  Patient reports she has had pain in this right knee for over a year.  Pain is localized to the posterior aspect of the knee associated with swelling and limited range of motion.  Patient with history of bilateral lower extremity DVTs years ago for which she continues on Xarelto.    She reports over the last several weeks her pain has progressively worsened which drove her to be evaluated in the ED on 3/4/2025  She had a Doppler at encounter on 3/4/2025 that was negative for any DVT.  Referred to orthopedics for further evaluation due to concern for possible popliteal cyst.    Patient continues to report pain most severely to the posterior knee associated with intermittent swelling that seems to be activity related.  Patient is ambulatory without assistive devices but notes significant pain with any prolonged period of standing or walking.    No surgical history, injections or physical therapy of the right knee however she reports she has been doing directed home exercise program per her PCP over the last 6 weeks.      Past Medical History:    Past Medical History:   Diagnosis Date    Anemia     Asthma     Moderate persistent asthma without complication    Axillary hidradenitis suppurativa 05/06/2020    Bipolar 1 disorder (HCC)     Chiari I malformation (HCC) 06/21/2017    A MRI of the brain in 2014 was suspicious for pseudotumor cerebri but a spinal tap revealed a normal opening pressure of 16 cm water.  An EEG was normal.  A MRI of the thoracic spine in 2015 was normal. Relevant history of

## 2025-05-16 ENCOUNTER — TELEPHONE (OUTPATIENT)
Dept: ORTHOPEDIC SURGERY | Age: 50
End: 2025-05-16

## 2025-05-16 NOTE — TELEPHONE ENCOUNTER
Pt was informed by her insurance that in order to obtain approval for the MRI the diagnosis code must be more specific. Please follow up with pt when possible.

## 2025-05-19 NOTE — TELEPHONE ENCOUNTER
Spoke with Alexandr and MRI department, test is pending review and authorization from insurance company. Patient verbalized understanding.

## 2025-06-11 ENCOUNTER — HOSPITAL ENCOUNTER (EMERGENCY)
Age: 50
Discharge: HOME | End: 2025-06-11
Attending: EMERGENCY MEDICINE
Payer: COMMERCIAL

## 2025-06-11 VITALS
WEIGHT: 202 LBS | DIASTOLIC BLOOD PRESSURE: 93 MMHG | RESPIRATION RATE: 18 BRPM | HEART RATE: 78 BPM | HEIGHT: 66 IN | BODY MASS INDEX: 32.47 KG/M2 | TEMPERATURE: 98.2 F | SYSTOLIC BLOOD PRESSURE: 146 MMHG

## 2025-06-11 DIAGNOSIS — R45.851 SUICIDAL IDEATION: Primary | ICD-10-CM

## 2025-06-11 DIAGNOSIS — R56.9 SEIZURE (HCC): ICD-10-CM

## 2025-06-11 LAB
AMPHET UR QL SCN: NEGATIVE
ANION GAP SERPL CALCULATED.3IONS-SCNC: 11 MMOL/L (ref 9–16)
BARBITURATES UR QL SCN: NEGATIVE
BASOPHILS # BLD: <0.03 K/UL (ref 0–0.2)
BASOPHILS NFR BLD: 0 % (ref 0–2)
BENZODIAZ UR QL: NEGATIVE
BILIRUB UR QL STRIP: NEGATIVE
BUN SERPL-MCNC: 7 MG/DL (ref 6–20)
CALCIUM SERPL-MCNC: 9 MG/DL (ref 8.6–10.4)
CANNABINOIDS UR QL SCN: POSITIVE
CHLORIDE SERPL-SCNC: 103 MMOL/L (ref 98–107)
CLARITY UR: CLEAR
CO2 SERPL-SCNC: 22 MMOL/L (ref 20–31)
COCAINE UR QL SCN: NEGATIVE
COLOR UR: YELLOW
COMMENT: NORMAL
CREAT SERPL-MCNC: 0.7 MG/DL (ref 0.5–0.9)
EOSINOPHIL # BLD: 0.04 K/UL (ref 0–0.44)
EOSINOPHILS RELATIVE PERCENT: 1 % (ref 1–4)
ERYTHROCYTE [DISTWIDTH] IN BLOOD BY AUTOMATED COUNT: 15.9 % (ref 11.8–14.4)
FENTANYL UR QL: NEGATIVE
GFR, ESTIMATED: >90 ML/MIN/1.73M2
GLUCOSE SERPL-MCNC: 107 MG/DL (ref 74–99)
GLUCOSE UR STRIP-MCNC: NEGATIVE MG/DL
HCT VFR BLD AUTO: 35.5 % (ref 36.3–47.1)
HGB BLD-MCNC: 11.7 G/DL (ref 11.9–15.1)
HGB UR QL STRIP.AUTO: NEGATIVE
IMM GRANULOCYTES # BLD AUTO: 0.02 K/UL (ref 0–0.3)
IMM GRANULOCYTES NFR BLD: 0 %
KETONES UR STRIP-MCNC: NEGATIVE MG/DL
LEUKOCYTE ESTERASE UR QL STRIP: NEGATIVE
LEVETIRACETAM SERPL-MCNC: 8 UG/ML
LYMPHOCYTES NFR BLD: 3.05 K/UL (ref 1.1–3.7)
LYMPHOCYTES RELATIVE PERCENT: 40 % (ref 24–43)
MCH RBC QN AUTO: 24.2 PG (ref 25.2–33.5)
MCHC RBC AUTO-ENTMCNC: 33 G/DL (ref 28.4–34.8)
MCV RBC AUTO: 73.5 FL (ref 82.6–102.9)
METHADONE UR QL: NEGATIVE
MONOCYTES NFR BLD: 0.65 K/UL (ref 0.1–1.2)
MONOCYTES NFR BLD: 9 % (ref 3–12)
NEUTROPHILS NFR BLD: 50 % (ref 36–65)
NEUTS SEG NFR BLD: 3.81 K/UL (ref 1.5–8.1)
NITRITE UR QL STRIP: NEGATIVE
NRBC BLD-RTO: 0 PER 100 WBC
OPIATES UR QL SCN: NEGATIVE
OXYCODONE UR QL SCN: NEGATIVE
PCP UR QL SCN: NEGATIVE
PH UR STRIP: 7 [PH] (ref 5–8)
PLATELET # BLD AUTO: 248 K/UL (ref 138–453)
PMV BLD AUTO: 10.4 FL (ref 8.1–13.5)
POTASSIUM SERPL-SCNC: 4.3 MMOL/L (ref 3.7–5.3)
PROT UR STRIP-MCNC: NEGATIVE MG/DL
RBC # BLD AUTO: 4.83 M/UL (ref 3.95–5.11)
RBC # BLD: ABNORMAL 10*6/UL
SODIUM SERPL-SCNC: 136 MMOL/L (ref 136–145)
SP GR UR STRIP: 1.01 (ref 1–1.03)
TEST INFORMATION: ABNORMAL
UROBILINOGEN UR STRIP-ACNC: NORMAL EU/DL (ref 0–1)
WBC OTHER # BLD: 7.6 K/UL (ref 3.5–11.3)

## 2025-06-11 PROCEDURE — 87591 N.GONORRHOEAE DNA AMP PROB: CPT

## 2025-06-11 PROCEDURE — 99285 EMERGENCY DEPT VISIT HI MDM: CPT

## 2025-06-11 PROCEDURE — 85025 COMPLETE CBC W/AUTO DIFF WBC: CPT

## 2025-06-11 PROCEDURE — 80307 DRUG TEST PRSMV CHEM ANLYZR: CPT

## 2025-06-11 PROCEDURE — 87491 CHLMYD TRACH DNA AMP PROBE: CPT

## 2025-06-11 PROCEDURE — 93005 ELECTROCARDIOGRAM TRACING: CPT | Performed by: EMERGENCY MEDICINE

## 2025-06-11 PROCEDURE — 6370000000 HC RX 637 (ALT 250 FOR IP): Performed by: EMERGENCY MEDICINE

## 2025-06-11 PROCEDURE — 80048 BASIC METABOLIC PNL TOTAL CA: CPT

## 2025-06-11 PROCEDURE — 81003 URINALYSIS AUTO W/O SCOPE: CPT

## 2025-06-11 PROCEDURE — 80177 DRUG SCRN QUAN LEVETIRACETAM: CPT

## 2025-06-11 RX ORDER — ACETAMINOPHEN 500 MG
1000 TABLET ORAL ONCE
Status: COMPLETED | OUTPATIENT
Start: 2025-06-11 | End: 2025-06-11

## 2025-06-11 RX ORDER — LEVETIRACETAM 500 MG/1
1000 TABLET ORAL ONCE
Status: DISCONTINUED | OUTPATIENT
Start: 2025-06-11 | End: 2025-06-11 | Stop reason: HOSPADM

## 2025-06-11 RX ADMIN — ACETAMINOPHEN 1000 MG: 500 TABLET ORAL at 09:52

## 2025-06-11 ASSESSMENT — PAIN SCALES - GENERAL
PAINLEVEL_OUTOF10: 3
PAINLEVEL_OUTOF10: 8

## 2025-06-11 ASSESSMENT — PAIN DESCRIPTION - LOCATION: LOCATION: HEAD;LEG

## 2025-06-11 ASSESSMENT — PAIN - FUNCTIONAL ASSESSMENT: PAIN_FUNCTIONAL_ASSESSMENT: 0-10
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
## 2025-06-11 NOTE — ED NOTES
Updated patient on plan to transfer to [facility] pending room assignment. Patient verbalizes understanding and agreeable to plan. Pt expresses concern about leaving her children but does admit she wants to take care of her mental health. Patient provided with ice water per request. No further needs identified.

## 2025-06-11 NOTE — ED NOTES
Report received from Jennifer. Patient here for seizures, unwitnessed PTA, and suicidal ideation without plan. Accepted at [facility], pending bed assignment and transportation. Patient asleep on cot, RR even and unlabored. 1:1 safety sitter at bedside.

## 2025-06-11 NOTE — ED NOTES
[x] [facility]    [] [facility]    []  [place] [facility]       SUICIDE/SECURITY WATCH EMERGENCY DEPARTMENT    Orders    [x]  Suicide/Security Watch Precautions initiated as checked below:   6/11/25 7:24 AM EDT STA17/17    [x] Notified physician:  Juan Acosta MD  6/11/25 7:24 AM EDT    [x] Orders obtained as appropriate:     [x] 1:1 Observer    [x] 1:1 Observer, Notified by:  Alina Leon, RN    Contact Nurse Supervisor Notified, Prosper MARTIN at bedside    [x] Safety Meal Tray Ordered, if applicable, via phone to kitchen (no EPIC order)    [x] Remove all personal clothes from room, placed in belonging bag, and placed behind nurses station.Patients placed in snap/paper gown/pants.     [x] Remove all personal belongings from room and secured away from patient.   All personal clothing and belongings include the following:    [x] Shirt              [] Hat      [x]   [x] Pants             [] Necklace/ring/watch   [x] Purse  [] Coat/Jacket            [] Wallet      [] Other: Please document in EPIC  [] Socks             [] Cell phone     [] Other: Please document in EPIC  [] Shoes/boots/slippers        [] Tablet      [] Other: Please document in EPIC         Documentation     [x] Suicide Precautions documented under Required Documentation       [x] C-SSRS Screening and C-SSRS Risk Assessment Completed     [x] Patient placed in \"Suicide Precautions\" in Epic     [x] Initiate paper constant observer flowsheet with      [x] 1:1 Constant Observer order in place (if HIGH RISK); instructions provided.  Suicide precautions require observer be within 4-7 feet      [x] Initiate every 15 minute observations per observer as delegated by the RN.     [x] Initiate RN assessment and documentation      These items or items similar should be removed from the room and belongings searched by [facility] [employer] . Personal belongings then secured in nurses station:(Patient has the right to call-light. 1:1 sitter

## 2025-06-11 NOTE — DISCHARGE INSTRUCTIONS
Return to this emergency room immediately if your symptoms persist, worsen or if new ones form.    Make sure you follow-up with your primary care doctor, Counselor,  within the next 1-2 business days.

## 2025-06-11 NOTE — ED PROVIDER NOTES
EMERGENCY DEPARTMENT ENCOUNTER    Pt Name: Alexandr Kinney  MRN: [id number]  Birthdate 1975  Date of evaluation: 6/11/25  CHIEF COMPLAINT       Chief Complaint   Patient presents with    Seizures     Pt states she has had multiple seizures since yesterday. Pt states she takes keppra.    Suicidal     Pt states she called [facility] stating she was suicidal and [employer] showed up at her door this morning     HISTORY OF PRESENT ILLNESS   The history is provided by the patient and medical records.  The patient is a 49-year-old female with history of seizures on Keppra, asthma, anemia, GERD, and bipolar who was brought in by ambulance for seizure activity and suicidal ideations.  Patient states she has developed high levels of stress for the past week.  During this time she has had multiple daily seizures.  She denies head strike.  She does endorse tongue trauma.  She has been taking her Keppra as directed.  She states she is stressed, she has been thinking about suicide however does not have a plan.  Per EMS, she called [facility] last evening endorsing suicidal ideations.  [employer] went to her house this morning for a wellness check and she was transferred to our emergency room for further evaluation.  Also per EMS, patient is at risk of being active from her home and she had a court date scheduled for today.     REVIEW OF SYSTEMS     Review of Systems  All other systems reviewed and are negative.    PASTMEDICAL HISTORY     Past Medical History:   Diagnosis Date    Anemia     Asthma     Moderate persistent asthma without complication    Axillary hidradenitis suppurativa 05/06/2020    Bipolar 1 disorder (HCC)     Chiari I malformation (HCC) 06/21/2017    A MRI of the brain in 2014 was suspicious for pseudotumor cerebri but a spinal tap revealed a normal opening pressure of 16 cm water.  An EEG was normal.  A MRI of the thoracic spine in 2015 was normal. Relevant history of cervical myelopathy with an abnormal MRI of the cervical

## 2025-06-11 NOTE — ED NOTES
Patient requesting to speak to member of care team, writer busy with other patient. Physician went to bedside. Physician relays plan to discharge, patient is no longer feeling suicidal, was voluntary admission. Patient feels safe going home with her children who give her reason for living.

## 2025-06-12 LAB
CHLAMYDIA DNA UR QL NAA+PROBE: NEGATIVE
EKG ATRIAL RATE: 75 BPM
EKG P AXIS: 50 DEGREES
EKG P-R INTERVAL: 178 MS
EKG Q-T INTERVAL: 394 MS
EKG QRS DURATION: 82 MS
EKG QTC CALCULATION (BAZETT): 439 MS
EKG R AXIS: 1 DEGREES
EKG T AXIS: 15 DEGREES
EKG VENTRICULAR RATE: 75 BPM
N GONORRHOEA DNA UR QL NAA+PROBE: NEGATIVE
SPECIMEN DESCRIPTION: NORMAL

## 2025-07-07 DIAGNOSIS — I27.24 CTEPH (CHRONIC THROMBOEMBOLIC PULMONARY HYPERTENSION) (HCC): ICD-10-CM

## 2025-07-07 DIAGNOSIS — I27.82 CHRONIC PULMONARY EMBOLISM, UNSPECIFIED PULMONARY EMBOLISM TYPE, UNSPECIFIED WHETHER ACUTE COR PULMONALE PRESENT (HCC): ICD-10-CM

## 2025-07-07 DIAGNOSIS — I26.92 SADDLE EMBOLUS OF PULMONARY ARTERY WITHOUT ACUTE COR PULMONALE, UNSPECIFIED CHRONICITY (HCC): ICD-10-CM

## 2025-07-07 NOTE — TELEPHONE ENCOUNTER
LAST VISIT: 6/26/24  NEXT VISIT: 10/6/25 (patient has cancelled twice and our office cancelled once since last visit)    Per last dictation patient to be on lifelong anticoagulation. Unsure if you are willing to sign for refill. Thank you.

## 2025-07-11 ENCOUNTER — TELEPHONE (OUTPATIENT)
Dept: FAMILY MEDICINE CLINIC | Age: 50
End: 2025-07-11

## 2025-07-11 NOTE — TELEPHONE ENCOUNTER
Call from patient says she is having seizures and wanted to be seen today or Monday.  Patient was advised that Dr Lee was gone for the day but if she was having an emergency situation she may want to be evaluated at the emergency and I will notify Dr Lee  Patient voiced understanding

## 2025-07-13 ENCOUNTER — APPOINTMENT (OUTPATIENT)
Dept: GENERAL RADIOLOGY | Age: 50
End: 2025-07-13
Payer: COMMERCIAL

## 2025-07-13 ENCOUNTER — HOSPITAL ENCOUNTER (EMERGENCY)
Age: 50
Discharge: HOME | End: 2025-07-14
Attending: EMERGENCY MEDICINE
Payer: COMMERCIAL

## 2025-07-13 DIAGNOSIS — M25.511 ACUTE PAIN OF RIGHT SHOULDER: ICD-10-CM

## 2025-07-13 DIAGNOSIS — Z79.899 SEIZURE SECONDARY TO SUBTHERAPEUTIC ANTICONVULSANT MEDICATION (HCC): Primary | ICD-10-CM

## 2025-07-13 DIAGNOSIS — R56.9 SEIZURE SECONDARY TO SUBTHERAPEUTIC ANTICONVULSANT MEDICATION (HCC): Primary | ICD-10-CM

## 2025-07-13 DIAGNOSIS — M25.562 ACUTE PAIN OF LEFT KNEE: ICD-10-CM

## 2025-07-13 LAB
ANION GAP SERPL CALCULATED.3IONS-SCNC: 14 MMOL/L (ref 9–16)
BASOPHILS # BLD: <0.03 K/UL (ref 0–0.2)
BASOPHILS NFR BLD: 0 % (ref 0–2)
BUN SERPL-MCNC: 8 MG/DL (ref 6–20)
CALCIUM SERPL-MCNC: 8.5 MG/DL (ref 8.6–10.4)
CHLORIDE SERPL-SCNC: 105 MMOL/L (ref 98–107)
CO2 SERPL-SCNC: 20 MMOL/L (ref 20–31)
CREAT SERPL-MCNC: 0.9 MG/DL (ref 0.5–0.9)
EOSINOPHIL # BLD: 0.1 K/UL (ref 0–0.44)
EOSINOPHILS RELATIVE PERCENT: 1 % (ref 1–4)
ERYTHROCYTE [DISTWIDTH] IN BLOOD BY AUTOMATED COUNT: 15.8 % (ref 11.8–14.4)
GFR, ESTIMATED: 81 ML/MIN/1.73M2
GLUCOSE SERPL-MCNC: 95 MG/DL (ref 74–99)
HCT VFR BLD AUTO: 33.1 % (ref 36.3–47.1)
HGB BLD-MCNC: 11 G/DL (ref 11.9–15.1)
IMM GRANULOCYTES # BLD AUTO: 0.02 K/UL (ref 0–0.3)
IMM GRANULOCYTES NFR BLD: 0 %
LEVETIRACETAM SERPL-MCNC: 8 UG/ML
LYMPHOCYTES NFR BLD: 4.61 K/UL (ref 1.1–3.7)
LYMPHOCYTES RELATIVE PERCENT: 49 % (ref 24–43)
MCH RBC QN AUTO: 24.1 PG (ref 25.2–33.5)
MCHC RBC AUTO-ENTMCNC: 33.2 G/DL (ref 28.4–34.8)
MCV RBC AUTO: 72.4 FL (ref 82.6–102.9)
MONOCYTES NFR BLD: 0.86 K/UL (ref 0.1–1.2)
MONOCYTES NFR BLD: 9 % (ref 3–12)
NEUTROPHILS NFR BLD: 41 % (ref 36–65)
NEUTS SEG NFR BLD: 3.82 K/UL (ref 1.5–8.1)
NRBC BLD-RTO: 0 PER 100 WBC
PLATELET # BLD AUTO: 241 K/UL (ref 138–453)
PMV BLD AUTO: 10.4 FL (ref 8.1–13.5)
POTASSIUM SERPL-SCNC: 3.9 MMOL/L (ref 3.7–5.3)
RBC # BLD AUTO: 4.57 M/UL (ref 3.95–5.11)
RBC # BLD: ABNORMAL 10*6/UL
SODIUM SERPL-SCNC: 139 MMOL/L (ref 136–145)
TSH SERPL DL<=0.05 MIU/L-ACNC: 1.37 UIU/ML (ref 0.27–4.2)
WBC OTHER # BLD: 9.4 K/UL (ref 3.5–11.3)

## 2025-07-13 PROCEDURE — 85025 COMPLETE CBC W/AUTO DIFF WBC: CPT

## 2025-07-13 PROCEDURE — 6360000002 HC RX W HCPCS: Performed by: STUDENT IN AN ORGANIZED HEALTH CARE EDUCATION/TRAINING PROGRAM

## 2025-07-13 PROCEDURE — 93005 ELECTROCARDIOGRAM TRACING: CPT | Performed by: STUDENT IN AN ORGANIZED HEALTH CARE EDUCATION/TRAINING PROGRAM

## 2025-07-13 PROCEDURE — 84443 ASSAY THYROID STIM HORMONE: CPT

## 2025-07-13 PROCEDURE — 6360000002 HC RX W HCPCS

## 2025-07-13 PROCEDURE — 2580000003 HC RX 258: Performed by: EMERGENCY MEDICINE

## 2025-07-13 PROCEDURE — 6360000002 HC RX W HCPCS: Performed by: EMERGENCY MEDICINE

## 2025-07-13 PROCEDURE — 80048 BASIC METABOLIC PNL TOTAL CA: CPT

## 2025-07-13 PROCEDURE — 6370000000 HC RX 637 (ALT 250 FOR IP): Performed by: STUDENT IN AN ORGANIZED HEALTH CARE EDUCATION/TRAINING PROGRAM

## 2025-07-13 PROCEDURE — 96374 THER/PROPH/DIAG INJ IV PUSH: CPT

## 2025-07-13 PROCEDURE — 99285 EMERGENCY DEPT VISIT HI MDM: CPT

## 2025-07-13 PROCEDURE — 96375 TX/PRO/DX INJ NEW DRUG ADDON: CPT

## 2025-07-13 PROCEDURE — 80177 DRUG SCRN QUAN LEVETIRACETAM: CPT

## 2025-07-13 PROCEDURE — 73562 X-RAY EXAM OF KNEE 3: CPT

## 2025-07-13 PROCEDURE — 73030 X-RAY EXAM OF SHOULDER: CPT

## 2025-07-13 RX ORDER — HYDROCODONE BITARTRATE AND ACETAMINOPHEN 5; 325 MG/1; MG/1
1 TABLET ORAL ONCE
Status: COMPLETED | OUTPATIENT
Start: 2025-07-13 | End: 2025-07-13

## 2025-07-13 RX ORDER — LORAZEPAM 2 MG/ML
INJECTION INTRAMUSCULAR
Status: COMPLETED
Start: 2025-07-13 | End: 2025-07-13

## 2025-07-13 RX ORDER — 0.9 % SODIUM CHLORIDE 0.9 %
1000 INTRAVENOUS SOLUTION INTRAVENOUS ONCE
Status: COMPLETED | OUTPATIENT
Start: 2025-07-13 | End: 2025-07-13

## 2025-07-13 RX ORDER — LEVETIRACETAM 500 MG/5ML
1000 INJECTION, SOLUTION, CONCENTRATE INTRAVENOUS ONCE
Status: COMPLETED | OUTPATIENT
Start: 2025-07-13 | End: 2025-07-13

## 2025-07-13 RX ORDER — KETOROLAC TROMETHAMINE 15 MG/ML
15 INJECTION, SOLUTION INTRAMUSCULAR; INTRAVENOUS ONCE
Status: COMPLETED | OUTPATIENT
Start: 2025-07-13 | End: 2025-07-13

## 2025-07-13 RX ORDER — LORAZEPAM 2 MG/ML
1 INJECTION INTRAMUSCULAR ONCE
Status: COMPLETED | OUTPATIENT
Start: 2025-07-13 | End: 2025-07-13

## 2025-07-13 RX ADMIN — SODIUM CHLORIDE 1000 ML: 9 INJECTION, SOLUTION INTRAVENOUS at 19:19

## 2025-07-13 RX ADMIN — KETOROLAC TROMETHAMINE 15 MG: 15 INJECTION, SOLUTION INTRAMUSCULAR; INTRAVENOUS at 22:34

## 2025-07-13 RX ADMIN — LEVETIRACETAM 1000 MG: 500 INJECTION, SOLUTION, CONCENTRATE INTRAVENOUS at 19:18

## 2025-07-13 RX ADMIN — HYDROCODONE BITARTRATE AND ACETAMINOPHEN 1 TABLET: 5; 325 TABLET ORAL at 23:41

## 2025-07-13 RX ADMIN — LORAZEPAM 1 MG: 2 INJECTION INTRAMUSCULAR at 19:50

## 2025-07-13 RX ADMIN — LORAZEPAM 1 MG: 2 INJECTION INTRAMUSCULAR; INTRAVENOUS at 19:50

## 2025-07-13 ASSESSMENT — PAIN - FUNCTIONAL ASSESSMENT: PAIN_FUNCTIONAL_ASSESSMENT: 0-10

## 2025-07-13 ASSESSMENT — PAIN SCALES - GENERAL
PAINLEVEL_OUTOF10: 8

## 2025-07-13 NOTE — ED NOTES
Pt. Family called out for seizure, upon writer entering room pt was shaking. Pt. Opened mouth voluntarily when writer said she was about to suction. Pt. Gag reflex in tact.

## 2025-07-13 NOTE — ED PROVIDER NOTES
EMERGENCY DEPARTMENT ENCOUNTER    Pt Name: Alexandr Kinney  MRN: [id number]  Birthdate 1975  Date of evaluation: 7/13/25  CHIEF COMPLAINT       Chief Complaint   Patient presents with    Seizures     HISTORY OF PRESENT ILLNESS   The history is provided by the patient and medical records.  The patient is a 49-year-old female with history of seizures, on Keppra, asthma, anemia, GERD and bipolar disorder who was brought in by ambulance for breakthrough seizures.  Patient reports 8 seizures over the past 3 days.  She denies head strike.  She reports medical compliance.  She states she is under high levels of stress, she is in the process of being evicted from her home which will occur this Wednesday.  Denies SI, HI.    REVIEW OF SYSTEMS     Review of Systems  All other systems reviewed and are negative.    PASTMEDICAL HISTORY     Past Medical History:   Diagnosis Date    Anemia     Asthma     Moderate persistent asthma without complication    Axillary hidradenitis suppurativa 05/06/2020    Bipolar 1 disorder (HCC)     Chiari I malformation (HCC) 06/21/2017    A MRI of the brain in 2014 was suspicious for pseudotumor cerebri but a spinal tap revealed a normal opening pressure of 16 cm water.  An EEG was normal.  A MRI of the thoracic spine in 2015 was normal. Relevant history of cervical myelopathy with an abnormal MRI of the cervical spine in January 2015.  A follow-up MRI of the brain in April 2017 was unchanged.  A MRI of the cervical spine in June 2    Chiari I malformation (HCC) 06/21/2017    A MRI of the brain in 2014 was suspicious for pseudotumor cerebri but a spinal tap revealed a normal opening pressure of 16 cm water.  An EEG was normal.  A MRI of the thoracic spine in 2015 was normal. Relevant history of cervical myelopathy with an abnormal MRI of the cervical spine in January 2015.  A follow-up MRI of the brain in April 2017 was unchanged.  A MRI of the cervical spine in June 2    Family history of

## 2025-07-14 VITALS
SYSTOLIC BLOOD PRESSURE: 130 MMHG | HEIGHT: 66 IN | BODY MASS INDEX: 33.75 KG/M2 | DIASTOLIC BLOOD PRESSURE: 78 MMHG | WEIGHT: 210 LBS | HEART RATE: 68 BPM | OXYGEN SATURATION: 93 % | RESPIRATION RATE: 22 BRPM | TEMPERATURE: 97.9 F

## 2025-07-14 LAB
EKG ATRIAL RATE: 77 BPM
EKG P AXIS: 41 DEGREES
EKG P-R INTERVAL: 178 MS
EKG Q-T INTERVAL: 406 MS
EKG QRS DURATION: 78 MS
EKG QTC CALCULATION (BAZETT): 459 MS
EKG R AXIS: -10 DEGREES
EKG T AXIS: 4 DEGREES
EKG VENTRICULAR RATE: 77 BPM

## 2025-07-14 RX ORDER — HYDROCODONE BITARTRATE AND ACETAMINOPHEN 5; 325 MG/1; MG/1
1 TABLET ORAL EVERY 6 HOURS PRN
Qty: 10 TABLET | Refills: 0 | Status: SHIPPED | OUTPATIENT
Start: 2025-07-14 | End: 2025-07-17

## 2025-07-14 RX ORDER — LEVETIRACETAM 750 MG/1
750 TABLET ORAL 2 TIMES DAILY
Qty: 60 TABLET | Refills: 0 | Status: SHIPPED | OUTPATIENT
Start: 2025-07-14 | End: 2025-08-13

## 2025-07-14 NOTE — DISCHARGE INSTRUCTIONS
If you take an anti-seizure medication, then take that medication as previously indicated and prescribed.  Do not miss any doses.    Do not drive any vehicles or operate any heavy machinery for a period of 6 months after having a seizure.  If you are caught driving and have had a seizure, then you could possible go to skilled nursing.    PLEASE RETURN TO THE EMERGENCY DEPARTMENT IMMEDIATELY for worsening symptoms, any seizure lasting for more than 5 minutes,  having multiple seizures in a row,  or if you develop any concerning symptoms such as: high fever not relieved by acetaminophen (Tylenol) and/or ibuprofen (Motrin / Advil), chills, shortness of breath, chest pain, feeling of your heart fluttering or racing, persistent nausea and/or vomiting, vomiting up blood, blood in your stool, loss of consciousness, numbness, weakness or tingling in the arms or legs or change in color of the extremities, changes in mental status, persistent headache, blurry vision loss of bladder / bowel control, unable to follow up with your physician, or other any other care or concern.

## 2025-07-14 NOTE — ED PROVIDER NOTES
[facility] EMERGENCY DEPARTMENT  Emergency Department  Emergency Medicine     Care of Alexandr Kinney was assumed from previous provider and is being seen for Seizures  .  The patient's initial evaluation and plan have been discussed with the prior provider who initially evaluated the patient.     EMERGENCY DEPARTMENT COURSE / MEDICAL DECISION MAKING:       MEDICATIONS GIVEN:  Orders Placed This Encounter   Medications    sodium chloride 0.9 % bolus 1,000 mL    levETIRAcetam (KEPPRA) injection 1,000 mg    LORazepam (ATIVAN) 2 MG/ML injection     Danielle Valenzuela: cabinet override    LORazepam (ATIVAN) injection 1 mg    ketorolac (TORADOL) injection 15 mg       LABS / RADIOLOGY:     Labs Reviewed   BASIC METABOLIC PANEL - Abnormal; Notable for the following components:       Result Value    Calcium 8.5 (*)     All other components within normal limits   CBC WITH AUTO DIFFERENTIAL - Abnormal; Notable for the following components:    Hemoglobin 11.0 (*)     Hematocrit 33.1 (*)     MCV 72.4 (*)     MCH 24.1 (*)     RDW 15.8 (*)     Lymphocytes % 49 (*)     Lymphocytes Absolute 4.61 (*)     All other components within normal limits   LEVETIRACETAM LEVEL   TSH   URINALYSIS       No results found.    RECENT VITALS:     Temp: 97.9 °F (36.6 °C),  Pulse: 73, Respirations: 17, BP: 126/83, SpO2: 95 %         This patient is a 49 y.o. Female with seizures.  Keppra level is borderline low.  Was loaded with Keppra.  States she fell and upon my evaluation is complaining of right shoulder and left knee pain.  Will get x-rays.  States she did not take her medications for the last couple days.    Patient received Norco upon my reassessment stating some pain improvement but still having some pain.  Imaging negative.  Is requesting For prescription be sent to pharmacy initially stating that she was taking her Keppra but then told myself that she has not taken it for the past 2 days but did take it today.  Received a gram bolus

## 2025-07-18 ENCOUNTER — OFFICE VISIT (OUTPATIENT)
Dept: FAMILY MEDICINE CLINIC | Age: 50
End: 2025-07-18
Payer: COMMERCIAL

## 2025-07-18 VITALS
HEART RATE: 72 BPM | BODY MASS INDEX: 34.58 KG/M2 | HEIGHT: 66 IN | OXYGEN SATURATION: 100 % | DIASTOLIC BLOOD PRESSURE: 80 MMHG | WEIGHT: 215.2 LBS | SYSTOLIC BLOOD PRESSURE: 120 MMHG

## 2025-07-18 DIAGNOSIS — R79.89 ELEVATED LFTS: ICD-10-CM

## 2025-07-18 DIAGNOSIS — E53.8 COBALAMIN DEFICIENCY: ICD-10-CM

## 2025-07-18 DIAGNOSIS — Z72.0 TOBACCO ABUSE DISORDER: ICD-10-CM

## 2025-07-18 DIAGNOSIS — Z91.199 NON-COMPLIANCE WITH TREATMENT: ICD-10-CM

## 2025-07-18 DIAGNOSIS — E11.69 TYPE 2 DIABETES MELLITUS WITH OTHER SPECIFIED COMPLICATION, WITHOUT LONG-TERM CURRENT USE OF INSULIN (HCC): ICD-10-CM

## 2025-07-18 DIAGNOSIS — R56.9 SEIZURES (HCC): ICD-10-CM

## 2025-07-18 DIAGNOSIS — E66.811 CLASS 1 OBESITY WITH SERIOUS COMORBIDITY AND BODY MASS INDEX (BMI) OF 32.0 TO 32.9 IN ADULT, UNSPECIFIED OBESITY TYPE: ICD-10-CM

## 2025-07-18 DIAGNOSIS — Z71.6 TOBACCO ABUSE COUNSELING: ICD-10-CM

## 2025-07-18 DIAGNOSIS — F41.1 GAD (GENERALIZED ANXIETY DISORDER): ICD-10-CM

## 2025-07-18 DIAGNOSIS — E53.9 VITAMIN B DEFICIENCY: ICD-10-CM

## 2025-07-18 DIAGNOSIS — Z90.710 HISTORY OF HYSTERECTOMY: ICD-10-CM

## 2025-07-18 DIAGNOSIS — J45.40 MODERATE PERSISTENT ASTHMA WITHOUT COMPLICATION: ICD-10-CM

## 2025-07-18 DIAGNOSIS — J44.9 CHRONIC OBSTRUCTIVE PULMONARY DISEASE, UNSPECIFIED COPD TYPE (HCC): ICD-10-CM

## 2025-07-18 DIAGNOSIS — I10 ESSENTIAL HYPERTENSION: Primary | ICD-10-CM

## 2025-07-18 DIAGNOSIS — Z86.718 HISTORY OF DVT (DEEP VEIN THROMBOSIS): ICD-10-CM

## 2025-07-18 DIAGNOSIS — Z80.0 FAMILY HISTORY OF THROAT CANCER: ICD-10-CM

## 2025-07-18 DIAGNOSIS — F31.9 BIPOLAR 1 DISORDER (HCC): ICD-10-CM

## 2025-07-18 DIAGNOSIS — E55.9 VITAMIN D DEFICIENCY: ICD-10-CM

## 2025-07-18 DIAGNOSIS — Z86.711 HISTORY OF PULMONARY EMBOLISM: ICD-10-CM

## 2025-07-18 DIAGNOSIS — E78.5 DYSLIPIDEMIA: ICD-10-CM

## 2025-07-18 DIAGNOSIS — D64.9 CHRONIC ANEMIA: ICD-10-CM

## 2025-07-18 DIAGNOSIS — F12.90 MARIJUANA USE: ICD-10-CM

## 2025-07-18 DIAGNOSIS — Z79.01 ANTICOAGULATED: ICD-10-CM

## 2025-07-18 DIAGNOSIS — K21.00 GASTROESOPHAGEAL REFLUX DISEASE WITH ESOPHAGITIS WITHOUT HEMORRHAGE: ICD-10-CM

## 2025-07-18 DIAGNOSIS — Z13.29 THYROID DISORDER SCREEN: ICD-10-CM

## 2025-07-18 PROCEDURE — 4004F PT TOBACCO SCREEN RCVD TLK: CPT | Performed by: FAMILY MEDICINE

## 2025-07-18 PROCEDURE — 2022F DILAT RTA XM EVC RTNOPTHY: CPT | Performed by: FAMILY MEDICINE

## 2025-07-18 PROCEDURE — 3079F DIAST BP 80-89 MM HG: CPT | Performed by: FAMILY MEDICINE

## 2025-07-18 PROCEDURE — G8427 DOCREV CUR MEDS BY ELIG CLIN: HCPCS | Performed by: FAMILY MEDICINE

## 2025-07-18 PROCEDURE — G8417 CALC BMI ABV UP PARAM F/U: HCPCS | Performed by: FAMILY MEDICINE

## 2025-07-18 PROCEDURE — 99214 OFFICE O/P EST MOD 30 MIN: CPT | Performed by: FAMILY MEDICINE

## 2025-07-18 PROCEDURE — 3074F SYST BP LT 130 MM HG: CPT | Performed by: FAMILY MEDICINE

## 2025-07-18 PROCEDURE — 3023F SPIROM DOC REV: CPT | Performed by: FAMILY MEDICINE

## 2025-07-18 PROCEDURE — 3046F HEMOGLOBIN A1C LEVEL >9.0%: CPT | Performed by: FAMILY MEDICINE

## 2025-07-18 ASSESSMENT — ENCOUNTER SYMPTOMS
COUGH: 0
BACK PAIN: 0
ABDOMINAL PAIN: 0
SORE THROAT: 0
CONSTIPATION: 0
DIARRHEA: 0
PHOTOPHOBIA: 0
VOMITING: 0
STRIDOR: 0
EYE DISCHARGE: 0
SHORTNESS OF BREATH: 0
EYE PAIN: 0
NAUSEA: 0
BLOOD IN STOOL: 0
EYE REDNESS: 0

## 2025-07-18 ASSESSMENT — PATIENT HEALTH QUESTIONNAIRE - PHQ9
1. LITTLE INTEREST OR PLEASURE IN DOING THINGS: MORE THAN HALF THE DAYS
8. MOVING OR SPEAKING SO SLOWLY THAT OTHER PEOPLE COULD HAVE NOTICED. OR THE OPPOSITE, BEING SO FIGETY OR RESTLESS THAT YOU HAVE BEEN MOVING AROUND A LOT MORE THAN USUAL: MORE THAN HALF THE DAYS
9. THOUGHTS THAT YOU WOULD BE BETTER OFF DEAD, OR OF HURTING YOURSELF: MORE THAN HALF THE DAYS
SUM OF ALL RESPONSES TO PHQ QUESTIONS 1-9: 19
SUM OF ALL RESPONSES TO PHQ QUESTIONS 1-9: 17
7. TROUBLE CONCENTRATING ON THINGS, SUCH AS READING THE NEWSPAPER OR WATCHING TELEVISION: MORE THAN HALF THE DAYS
2. FEELING DOWN, DEPRESSED OR HOPELESS: MORE THAN HALF THE DAYS
3. TROUBLE FALLING OR STAYING ASLEEP: MORE THAN HALF THE DAYS
5. POOR APPETITE OR OVEREATING: MORE THAN HALF THE DAYS
SUM OF ALL RESPONSES TO PHQ QUESTIONS 1-9: 19
SUM OF ALL RESPONSES TO PHQ QUESTIONS 1-9: 19
6. FEELING BAD ABOUT YOURSELF - OR THAT YOU ARE A FAILURE OR HAVE LET YOURSELF OR YOUR FAMILY DOWN: NEARLY EVERY DAY
10. IF YOU CHECKED OFF ANY PROBLEMS, HOW DIFFICULT HAVE THESE PROBLEMS MADE IT FOR YOU TO DO YOUR WORK, TAKE CARE OF THINGS AT HOME, OR GET ALONG WITH OTHER PEOPLE: VERY DIFFICULT
4. FEELING TIRED OR HAVING LITTLE ENERGY: MORE THAN HALF THE DAYS

## 2025-07-18 NOTE — PROGRESS NOTES
4 times daily as needed for Pain 30 tablet 0    potassium chloride (KLOR-CON M) 10 MEQ extended release tablet Take 1 tablet by mouth daily 90 tablet 3    docusate sodium (COLACE) 100 MG capsule Take 1 capsule by mouth 2 times daily 30 capsule 0    Lidocaine, Anorectal, 5 % GEL Apply 1 Application topically every 8 hours as needed (pain) 113 g 0    folic acid (FOLVITE) 1 MG tablet Take 1 tablet by mouth daily 90 tablet 5    vitamin D (ERGOCALCIFEROL) 1.25 MG (87642 UT) CAPS capsule Take 1 capsule by mouth once a week 12 capsule 1    hydrOXYzine HCl (ATARAX) 25 MG tablet       omeprazole (PRILOSEC) 20 MG delayed release capsule take 1 capsule by mouth twice a day BEFORE A MEAL 180 capsule 0    albuterol sulfate  (90 Base) MCG/ACT inhaler Inhale 2 puffs into the lungs every 6 hours as needed for Wheezing or Shortness of Breath 18 g 3    VRAYLAR 3 MG CAPS capsule take 1 tablet by mouth once daily      escitalopram (LEXAPRO) 10 MG tablet       cetirizine (ZYRTEC) 10 MG tablet Take 1 tablet by mouth daily 30 tablet 0    benztropine (COGENTIN) 0.5 MG tablet take 1 tablet by mouth twice a day  0    rivaroxaban (XARELTO) 20 MG TABS tablet Take 1 tablet by mouth daily (with breakfast) 30 tablet 5    ascorbic acid (VITAMIN C) 500 MG tablet  (Patient not taking: Reported on 7/18/2025)       No facility-administered encounter medications on file as of 7/18/2025.      Electronically signed by XIANG MENDES MD on 7/18/25 at 1:55 PM EDT

## 2025-07-18 NOTE — PATIENT INSTRUCTIONS
Thank you for choosing [facility].  We know you have options when it comes to your healthcare; we appreciate that you chose us. Our goal is to provide exceptional  service and world class care to every patient.  You will be receiving a survey via email or text message asking for your feedback.  Please take a few minutes to share your thoughts about your recent visit. Your comments helps us understand what we do well and ways we can improve.  Thank you in advance for your valuable feedback.    Patient Education        Deciding About Using Medicines To Quit Smoking  How can you decide about using medicines to quit smoking?  What are the medicines you can use?  Your doctor may prescribe varenicline (Chantix) or bupropion SR. These medicines can help you cope with cravings for tobacco. They are pills that don't contain nicotine.  You also can use nicotine replacement products. These do contain nicotine. There are many types.  Gum and lozenges slowly release nicotine into your mouth.  Patches stick to your skin. They slowly release nicotine into your bloodstream.  An inhaler has a cummings that contains nicotine. You breathe in a puff of nicotine vapor through your mouth and throat.  Nasal spray releases a mist that contains nicotine.  What are key points about this decision?  Using medicines can increase your chances of quitting smoking. They can ease cravings and withdrawal symptoms.  Getting counseling along with using medicine can raise your chances of quitting even more.  These nicotine replacement products have less nicotine than cigarettes. And by itself, nicotine is not nearly as harmful as smoking. The tars, carbon monoxide, and other toxic chemicals in tobacco cause the harmful effects.  The side effects of nicotine replacement products depend on the type of product. For example, a patch can make your skin red and itchy. Medicines in pill form can make you sick to your stomach. They can also cause dry mouth and

## 2025-07-29 DIAGNOSIS — I10 ESSENTIAL HYPERTENSION: ICD-10-CM

## 2025-07-29 RX ORDER — CANDESARTAN 32 MG/1
32 TABLET ORAL DAILY
Qty: 90 TABLET | Refills: 1 | Status: SHIPPED | OUTPATIENT
Start: 2025-07-29

## 2025-08-24 DIAGNOSIS — I10 ESSENTIAL HYPERTENSION: ICD-10-CM

## 2025-08-25 RX ORDER — SPIRONOLACTONE 100 MG/1
100 TABLET, FILM COATED ORAL DAILY
Qty: 90 TABLET | Refills: 1 | Status: SHIPPED | OUTPATIENT
Start: 2025-08-25

## 2025-09-02 DIAGNOSIS — I10 ESSENTIAL HYPERTENSION: Primary | ICD-10-CM

## 2025-09-02 RX ORDER — CLONIDINE 0.2 MG/24H
1 PATCH, EXTENDED RELEASE TRANSDERMAL
Qty: 12 PATCH | Refills: 1 | Status: SHIPPED | OUTPATIENT
Start: 2025-09-02 | End: 2026-02-17

## (undated) DEVICE — GARMENT COMPR STD FOR 17IN CALF UNIF THER FLOTRN

## (undated) DEVICE — SURGICAL PROCEDURE KIT BASIN MAJ SET UP

## (undated) DEVICE — BASIN EMSIS 700ML GRAPHITE PLAS KID SHP GRAD

## (undated) DEVICE — Device: Brand: DEFENDO VALVE AND CONNECTOR KIT

## (undated) DEVICE — SYRINGE IRRIG 60ML SFT PLIABLE BLB EZ TO GRP 1 HND USE W/

## (undated) DEVICE — GLOVE SURG SZ 75 L12IN FNGR THK87MIL WHT LTX FREE

## (undated) DEVICE — MEDICINE CUP, GRADUATED, STER: Brand: MEDLINE

## (undated) DEVICE — JELLY,LUBE,STERILE,FLIP TOP,TUBE,2-OZ: Brand: MEDLINE

## (undated) DEVICE — 3M™ WARMING BLANKET, LOWER BODY, 10 PER CASE, 42568: Brand: BAIR HUGGER™

## (undated) DEVICE — SUTURE VCRL SZ 3-0 L27IN ABSRB UD L26MM SH 1/2 CIR J416H

## (undated) DEVICE — FORCEPS BX L240CM JAW DIA2.8MM L CAP W/ NDL MIC MESH TOOTH

## (undated) DEVICE — GAUZE,SPONGE,4"X4",16PLY,STRL,LF,10/TRAY: Brand: MEDLINE

## (undated) DEVICE — PAD,ABDOMINAL,5"X9",ST,LF,25/BX: Brand: MEDLINE INDUSTRIES, INC.

## (undated) DEVICE — HYPODERMIC SAFETY NEEDLE: Brand: MAGELLAN

## (undated) DEVICE — TUBING, SUCTION, 1/4" X 12', STRAIGHT: Brand: MEDLINE

## (undated) DEVICE — GLOVE SURG 8 11.7IN BEAD CUF LIGHT BRN SENSICARE LTX FREE

## (undated) DEVICE — MINOR BSIN PK

## (undated) DEVICE — YANKAUER,POOLE TIP,STERILE,50/CS: Brand: MEDLINE

## (undated) DEVICE — SHEET, T, LAPAROTOMY, STERILE: Brand: MEDLINE

## (undated) DEVICE — INTENDED FOR TISSUE SEPARATION, AND OTHER PROCEDURES THAT REQUIRE A SHARP SURGICAL BLADE TO PUNCTURE OR CUT.: Brand: BARD-PARKER ® CARBON RIB-BACK BLADES

## (undated) DEVICE — GLOVE SURG SZ 7 L12IN FNGR THK87MIL WHT LTX FREE

## (undated) DEVICE — SYRINGE, LUER LOCK, 10ML: Brand: MEDLINE

## (undated) DEVICE — MASK,FACE,CHAMBER,FLUID RESIST,TIES: Brand: MEDLINE

## (undated) DEVICE — SYRINGE MED 50ML LUERLOCK TIP

## (undated) DEVICE — CONMED DISPOSABLE GASTROINTESTINAL CYTOLOGY BRUSH, STRAIGHT HANDLE, 2.5 MM X 160 CM: Brand: CONMED

## (undated) DEVICE — ADAPTER TBNG LUER STUB 15 GA INTMED

## (undated) DEVICE — GAUZE,PACKING STRIP,IODOFORM,1/2"X5YD,ST: Brand: CURAD

## (undated) DEVICE — CO2 CANNULA,SUPERSOFT, ADLT,7'O2,7'CO2: Brand: MEDLINE

## (undated) DEVICE — ELECTRODE PT RET AD L9FT HI MOIST COND ADH HYDRGEL CORDED

## (undated) DEVICE — GAUZE,SPONGE,FLUFF,6"X6.75",STRL,5/TRAY: Brand: MEDLINE

## (undated) DEVICE — BLADE CLIPPER GEN PURP NS

## (undated) DEVICE — GOWN,AURORA,NONREINFORCED,LARGE: Brand: MEDLINE

## (undated) DEVICE — CUP MED 1OZ CLR POLYPR FEED GRAD W/O LID

## (undated) DEVICE — CHLORAPREP 26ML ORANGE

## (undated) DEVICE — YANKAUER,FLEXIBLE HANDLE,REGLR CAPACITY: Brand: MEDLINE INDUSTRIES, INC.

## (undated) DEVICE — GARMENT,MEDLINE,DVT,INT,CALF,MED, GEN2: Brand: MEDLINE

## (undated) DEVICE — PAD,NON-ADHERENT,3X8,STERILE,LF,1/PK: Brand: MEDLINE

## (undated) DEVICE — BLOCK BITE 60FR RUBBER ADLT DENTAL

## (undated) DEVICE — STANDARD HYPODERMIC NEEDLE,POLYPROPYLENE HUB: Brand: MONOJECT